# Patient Record
Sex: FEMALE | Race: WHITE | NOT HISPANIC OR LATINO | Employment: PART TIME | URBAN - METROPOLITAN AREA
[De-identification: names, ages, dates, MRNs, and addresses within clinical notes are randomized per-mention and may not be internally consistent; named-entity substitution may affect disease eponyms.]

---

## 2017-12-14 ENCOUNTER — CONVERSION ENCOUNTER (OUTPATIENT)
Dept: MAMMOGRAPHY | Facility: HOSPITAL | Age: 57
End: 2017-12-14

## 2019-02-25 ENCOUNTER — HOSPITAL ENCOUNTER (OUTPATIENT)
Dept: OTHER | Facility: HOSPITAL | Age: 59
Discharge: HOME | End: 2019-02-25

## 2019-02-25 LAB
ALBUMIN SERPL-MCNC: 4.5 G/DL (ref 3.5–5)
ALBUMIN/GLOB SERPL: 1.7 {RATIO} (ref 1.4–2.6)
ALP SERPL-CCNC: 50 U/L (ref 53–141)
ALT SERPL-CCNC: 16 U/L (ref 10–40)
ANION GAP SERPL CALC-SCNC: 14 MMOL/L (ref 8–19)
AST SERPL-CCNC: 17 U/L (ref 15–50)
BASOPHILS # BLD AUTO: 0.04 10*3/UL (ref 0–0.2)
BASOPHILS NFR BLD AUTO: 0.8 % (ref 0–3)
BILIRUB SERPL-MCNC: 0.59 MG/DL (ref 0.2–1.3)
BUN SERPL-MCNC: 13 MG/DL (ref 5–25)
BUN/CREAT SERPL: 18 {RATIO} (ref 6–20)
CALCIUM SERPL-MCNC: 8.9 MG/DL (ref 8.7–10.4)
CHLORIDE SERPL-SCNC: 103 MMOL/L (ref 99–111)
CHOLEST SERPL-MCNC: 193 MG/DL (ref 107–200)
CHOLEST/HDLC SERPL: 2.8 {RATIO} (ref 3–6)
CONV ABS IMM GRAN: 0.01 10*3/UL (ref 0–0.2)
CONV CO2: 28 MMOL/L (ref 22–32)
CONV IMMATURE GRAN: 0.2 % (ref 0–1.8)
CONV TOTAL PROTEIN: 7.2 G/DL (ref 6.3–8.2)
CREAT UR-MCNC: 0.73 MG/DL (ref 0.5–0.9)
DEPRECATED RDW RBC AUTO: 44.3 FL (ref 36.4–46.3)
EOSINOPHIL # BLD AUTO: 0.13 10*3/UL (ref 0–0.7)
EOSINOPHIL # BLD AUTO: 2.5 % (ref 0–7)
ERYTHROCYTE [DISTWIDTH] IN BLOOD BY AUTOMATED COUNT: 12.8 % (ref 11.7–14.4)
EST. AVERAGE GLUCOSE BLD GHB EST-MCNC: 111 MG/DL
GFR SERPLBLD BASED ON 1.73 SQ M-ARVRAT: >60 ML/MIN/{1.73_M2}
GLOBULIN UR ELPH-MCNC: 2.7 G/DL (ref 2–3.5)
GLUCOSE SERPL-MCNC: 100 MG/DL (ref 65–99)
HBA1C MFR BLD: 14 G/DL (ref 12–16)
HBA1C MFR BLD: 5.5 % (ref 3.5–5.7)
HCT VFR BLD AUTO: 41.5 % (ref 37–47)
HDLC SERPL-MCNC: 69 MG/DL (ref 40–60)
LDLC SERPL CALC-MCNC: 110 MG/DL (ref 70–100)
LYMPHOCYTES # BLD AUTO: 1.77 10*3/UL (ref 1–5)
MCH RBC QN AUTO: 31.7 PG (ref 27–31)
MCHC RBC AUTO-ENTMCNC: 33.7 G/DL (ref 33–37)
MCV RBC AUTO: 93.9 FL (ref 81–99)
MONOCYTES # BLD AUTO: 0.49 10*3/UL (ref 0.2–1.2)
MONOCYTES NFR BLD AUTO: 9.4 % (ref 3–10)
NEUTROPHILS # BLD AUTO: 2.75 10*3/UL (ref 2–8)
NEUTROPHILS NFR BLD AUTO: 53 % (ref 30–85)
NRBC CBCN: 0 % (ref 0–0.7)
OSMOLALITY SERPL CALC.SUM OF ELEC: 292 MOSM/KG (ref 273–304)
PLATELET # BLD AUTO: 202 10*3/UL (ref 130–400)
PMV BLD AUTO: 11 FL (ref 9.4–12.3)
POTASSIUM SERPL-SCNC: 4.1 MMOL/L (ref 3.5–5.3)
RBC # BLD AUTO: 4.42 10*6/UL (ref 4.2–5.4)
SODIUM SERPL-SCNC: 141 MMOL/L (ref 135–147)
TRIGL SERPL-MCNC: 72 MG/DL (ref 40–150)
TSH SERPL-ACNC: 1.04 M[IU]/L (ref 0.27–4.2)
VARIANT LYMPHS NFR BLD MANUAL: 34.1 % (ref 20–45)
VLDLC SERPL-MCNC: 14 MG/DL (ref 5–37)
WBC # BLD AUTO: 5.19 10*3/UL (ref 4.8–10.8)

## 2019-04-24 ENCOUNTER — HOSPITAL ENCOUNTER (OUTPATIENT)
Dept: MAMMOGRAPHY | Facility: HOSPITAL | Age: 59
Discharge: HOME | End: 2019-04-24
Attending: OBSTETRICS & GYNECOLOGY

## 2020-02-29 ENCOUNTER — HOSPITAL ENCOUNTER (OUTPATIENT)
Dept: OTHER | Facility: HOSPITAL | Age: 60
Discharge: HOME | End: 2020-02-29

## 2020-02-29 LAB
ALBUMIN SERPL-MCNC: 4.6 G/DL (ref 3.5–5)
ALBUMIN/GLOB SERPL: 1.6 {RATIO} (ref 1.4–2.6)
ALP SERPL-CCNC: 50 U/L (ref 43–160)
ALT SERPL-CCNC: 16 U/L (ref 10–40)
ANION GAP SERPL CALC-SCNC: 18 MMOL/L (ref 8–19)
AST SERPL-CCNC: 17 U/L (ref 15–50)
BASOPHILS # BLD AUTO: 0.04 10*3/UL (ref 0–0.2)
BASOPHILS NFR BLD AUTO: 0.7 % (ref 0–3)
BILIRUB SERPL-MCNC: 0.33 MG/DL (ref 0.2–1.3)
BUN SERPL-MCNC: 16 MG/DL (ref 5–25)
BUN/CREAT SERPL: 23 {RATIO} (ref 6–20)
CALCIUM SERPL-MCNC: 9 MG/DL (ref 8.7–10.4)
CHLORIDE SERPL-SCNC: 102 MMOL/L (ref 99–111)
CHOLEST SERPL-MCNC: 202 MG/DL (ref 107–200)
CHOLEST/HDLC SERPL: 3 {RATIO} (ref 3–6)
CONV ABS IMM GRAN: 0.02 10*3/UL (ref 0–0.2)
CONV CO2: 24 MMOL/L (ref 22–32)
CONV IMMATURE GRAN: 0.3 % (ref 0–1.8)
CONV TOTAL PROTEIN: 7.5 G/DL (ref 6.3–8.2)
CREAT UR-MCNC: 0.7 MG/DL (ref 0.5–0.9)
DEPRECATED RDW RBC AUTO: 44.2 FL (ref 36.4–46.3)
EOSINOPHIL # BLD AUTO: 0.18 10*3/UL (ref 0–0.7)
EOSINOPHIL # BLD AUTO: 3.1 % (ref 0–7)
ERYTHROCYTE [DISTWIDTH] IN BLOOD BY AUTOMATED COUNT: 12.9 % (ref 11.7–14.4)
GFR SERPLBLD BASED ON 1.73 SQ M-ARVRAT: >60 ML/MIN/{1.73_M2}
GLOBULIN UR ELPH-MCNC: 2.9 G/DL (ref 2–3.5)
GLUCOSE SERPL-MCNC: 101 MG/DL (ref 65–99)
HCT VFR BLD AUTO: 39.8 % (ref 37–47)
HDLC SERPL-MCNC: 68 MG/DL (ref 40–60)
HGB BLD-MCNC: 13.3 G/DL (ref 12–16)
LDLC SERPL CALC-MCNC: 117 MG/DL (ref 70–100)
LYMPHOCYTES # BLD AUTO: 1.94 10*3/UL (ref 1–5)
LYMPHOCYTES NFR BLD AUTO: 33.2 % (ref 20–45)
MCH RBC QN AUTO: 31.3 PG (ref 27–31)
MCHC RBC AUTO-ENTMCNC: 33.4 G/DL (ref 33–37)
MCV RBC AUTO: 93.6 FL (ref 81–99)
MONOCYTES # BLD AUTO: 0.65 10*3/UL (ref 0.2–1.2)
MONOCYTES NFR BLD AUTO: 11.1 % (ref 3–10)
NEUTROPHILS # BLD AUTO: 3.02 10*3/UL (ref 2–8)
NEUTROPHILS NFR BLD AUTO: 51.6 % (ref 30–85)
NRBC CBCN: 0 % (ref 0–0.7)
OSMOLALITY SERPL CALC.SUM OF ELEC: 291 MOSM/KG (ref 273–304)
PLATELET # BLD AUTO: 215 10*3/UL (ref 130–400)
PMV BLD AUTO: 10.8 FL (ref 9.4–12.3)
POTASSIUM SERPL-SCNC: 4 MMOL/L (ref 3.5–5.3)
RBC # BLD AUTO: 4.25 10*6/UL (ref 4.2–5.4)
SODIUM SERPL-SCNC: 140 MMOL/L (ref 135–147)
TRIGL SERPL-MCNC: 84 MG/DL (ref 40–150)
TSH SERPL-ACNC: 1.84 M[IU]/L (ref 0.27–4.2)
VLDLC SERPL-MCNC: 17 MG/DL (ref 5–37)
WBC # BLD AUTO: 5.85 10*3/UL (ref 4.8–10.8)

## 2020-03-02 LAB
EST. AVERAGE GLUCOSE BLD GHB EST-MCNC: 120 MG/DL
HBA1C MFR BLD: 5.8 % (ref 3.5–5.7)

## 2020-03-27 ENCOUNTER — HOSPITAL ENCOUNTER (OUTPATIENT)
Dept: LAB | Facility: HOSPITAL | Age: 60
Discharge: HOME | End: 2020-03-27
Attending: INTERNAL MEDICINE

## 2020-03-27 LAB — 25(OH)D3 SERPL-MCNC: 26.8 NG/ML (ref 30–100)

## 2020-04-29 ENCOUNTER — HOSPITAL ENCOUNTER (OUTPATIENT)
Dept: OTHER | Facility: HOSPITAL | Age: 60
Discharge: HOME | End: 2020-04-29

## 2020-04-29 ENCOUNTER — OFFICE VISIT CONVERTED (OUTPATIENT)
Dept: OTHER | Facility: HOSPITAL | Age: 60
End: 2020-04-29
Attending: NURSE PRACTITIONER

## 2020-04-30 LAB — SARS-COV-2 RNA SPEC QL NAA+PROBE: NOT DETECTED

## 2020-05-01 ENCOUNTER — HOSPITAL ENCOUNTER (OUTPATIENT)
Dept: OTHER | Facility: HOSPITAL | Age: 60
Discharge: HOME | End: 2020-05-01

## 2020-05-01 ENCOUNTER — CONVERSION ENCOUNTER (OUTPATIENT)
Dept: OTHER | Facility: HOSPITAL | Age: 60
End: 2020-05-01

## 2020-05-04 LAB — SARS-COV-2 RNA SPEC QL NAA+PROBE: NOT DETECTED

## 2020-06-30 ENCOUNTER — OFFICE VISIT CONVERTED (OUTPATIENT)
Dept: ORTHOPEDIC SURGERY | Facility: CLINIC | Age: 60
End: 2020-06-30
Attending: ORTHOPAEDIC SURGERY

## 2020-08-01 ENCOUNTER — HOSPITAL ENCOUNTER (OUTPATIENT)
Dept: MAMMOGRAPHY | Facility: HOSPITAL | Age: 60
Discharge: HOME | End: 2020-08-01
Attending: OBSTETRICS & GYNECOLOGY

## 2020-12-30 ENCOUNTER — HOSPITAL ENCOUNTER (OUTPATIENT)
Dept: OTHER | Facility: HOSPITAL | Age: 60
Discharge: HOME | End: 2020-12-30
Attending: INTERNAL MEDICINE

## 2021-01-26 ENCOUNTER — HOSPITAL ENCOUNTER (OUTPATIENT)
Dept: OTHER | Facility: HOSPITAL | Age: 61
Discharge: HOME | End: 2021-01-26
Attending: INTERNAL MEDICINE

## 2021-02-23 ENCOUNTER — HOSPITAL ENCOUNTER (OUTPATIENT)
Dept: OTHER | Facility: HOSPITAL | Age: 61
Discharge: HOME | End: 2021-02-23

## 2021-02-23 LAB
ALBUMIN SERPL-MCNC: 4.4 G/DL (ref 3.5–5)
ALBUMIN/GLOB SERPL: 1.5 {RATIO} (ref 1.4–2.6)
ALP SERPL-CCNC: 58 U/L (ref 43–160)
ALT SERPL-CCNC: 23 U/L (ref 10–40)
ANION GAP SERPL CALC-SCNC: 15 MMOL/L (ref 8–19)
AST SERPL-CCNC: 20 U/L (ref 15–50)
BASOPHILS # BLD AUTO: 0.06 10*3/UL (ref 0–0.2)
BASOPHILS NFR BLD AUTO: 0.9 % (ref 0–3)
BILIRUB SERPL-MCNC: 0.63 MG/DL (ref 0.2–1.3)
BUN SERPL-MCNC: 13 MG/DL (ref 5–25)
BUN/CREAT SERPL: 14 {RATIO} (ref 6–20)
CALCIUM SERPL-MCNC: 9 MG/DL (ref 8.7–10.4)
CHLORIDE SERPL-SCNC: 105 MMOL/L (ref 99–111)
CHOLEST SERPL-MCNC: 229 MG/DL (ref 107–200)
CHOLEST/HDLC SERPL: 3.4 {RATIO} (ref 3–6)
CONV ABS IMM GRAN: 0.01 10*3/UL (ref 0–0.2)
CONV CO2: 26 MMOL/L (ref 22–32)
CONV IMMATURE GRAN: 0.2 % (ref 0–1.8)
CONV TOTAL PROTEIN: 7.3 G/DL (ref 6.3–8.2)
CREAT UR-MCNC: 0.9 MG/DL (ref 0.5–0.9)
DEPRECATED RDW RBC AUTO: 44.4 FL (ref 36.4–46.3)
EOSINOPHIL # BLD AUTO: 0.2 10*3/UL (ref 0–0.7)
EOSINOPHIL # BLD AUTO: 3.1 % (ref 0–7)
ERYTHROCYTE [DISTWIDTH] IN BLOOD BY AUTOMATED COUNT: 12.9 % (ref 11.7–14.4)
EST. AVERAGE GLUCOSE BLD GHB EST-MCNC: 117 MG/DL
GFR SERPLBLD BASED ON 1.73 SQ M-ARVRAT: >60 ML/MIN/{1.73_M2}
GLOBULIN UR ELPH-MCNC: 2.9 G/DL (ref 2–3.5)
GLUCOSE SERPL-MCNC: 108 MG/DL (ref 65–99)
HBA1C MFR BLD: 5.7 % (ref 3.5–5.7)
HCT VFR BLD AUTO: 43.7 % (ref 37–47)
HDLC SERPL-MCNC: 67 MG/DL (ref 40–60)
HGB BLD-MCNC: 14.3 G/DL (ref 12–16)
LDLC SERPL CALC-MCNC: 143 MG/DL (ref 70–100)
LYMPHOCYTES # BLD AUTO: 2.06 10*3/UL (ref 1–5)
LYMPHOCYTES NFR BLD AUTO: 31.6 % (ref 20–45)
MCH RBC QN AUTO: 30.5 PG (ref 27–31)
MCHC RBC AUTO-ENTMCNC: 32.7 G/DL (ref 33–37)
MCV RBC AUTO: 93.2 FL (ref 81–99)
MONOCYTES # BLD AUTO: 0.68 10*3/UL (ref 0.2–1.2)
MONOCYTES NFR BLD AUTO: 10.4 % (ref 3–10)
NEUTROPHILS # BLD AUTO: 3.51 10*3/UL (ref 2–8)
NEUTROPHILS NFR BLD AUTO: 53.8 % (ref 30–85)
NRBC CBCN: 0 % (ref 0–0.7)
OSMOLALITY SERPL CALC.SUM OF ELEC: 295 MOSM/KG (ref 273–304)
PLATELET # BLD AUTO: 224 10*3/UL (ref 130–400)
PMV BLD AUTO: 10.9 FL (ref 9.4–12.3)
POTASSIUM SERPL-SCNC: 4.4 MMOL/L (ref 3.5–5.3)
RBC # BLD AUTO: 4.69 10*6/UL (ref 4.2–5.4)
SODIUM SERPL-SCNC: 142 MMOL/L (ref 135–147)
TRIGL SERPL-MCNC: 94 MG/DL (ref 40–150)
TSH SERPL-ACNC: 1.2 M[IU]/L (ref 0.27–4.2)
VLDLC SERPL-MCNC: 19 MG/DL (ref 5–37)
WBC # BLD AUTO: 6.52 10*3/UL (ref 4.8–10.8)

## 2021-03-11 ENCOUNTER — TELEMEDICINE (OUTPATIENT)
Dept: FAMILY MEDICINE CLINIC | Facility: TELEHEALTH | Age: 61
End: 2021-03-11

## 2021-03-11 VITALS — TEMPERATURE: 97.2 F

## 2021-03-11 DIAGNOSIS — R11.2 NAUSEA AND VOMITING, INTRACTABILITY OF VOMITING NOT SPECIFIED, UNSPECIFIED VOMITING TYPE: Primary | ICD-10-CM

## 2021-03-11 DIAGNOSIS — R50.9 FEVER, UNSPECIFIED FEVER CAUSE: ICD-10-CM

## 2021-03-11 PROCEDURE — 99422 OL DIG E/M SVC 11-20 MIN: CPT | Performed by: NURSE PRACTITIONER

## 2021-03-11 NOTE — PROGRESS NOTES
Marisa Portillo  1960 03/11/2021    EMPLOYEES: PLEASE EMAIL THIS TO: [email or web address]    Employee Health,      1. Does this employee have a negative COVID-19 (PCR) test following onset of the most   recent symptoms?  yes    Date of test if available:   3/9/2021    2. Does this employee have an alternate and independent diagnosis, other than COVID-19, that may   account for the presenting symptoms?  yes    3. In your opinion, is the employee at low risk of spreading the illness and cleared to return to work   in a healthcare setting? yes      Vivi Varela, CALOS  14:55 EST  03/11/21

## 2021-03-11 NOTE — PROGRESS NOTES
Marisa Portillo    1960  [id number]    I have reviewed the e-Visit questionnaire and patient's answers, my assessment and plan are as follows:      HPI  Marisa Portillo is a 61 y.o. [employer] employee who has been ill with fever, headache, nausea, diarrhea and body aches since Sunday. She tested negative for Covid-19 on 3/9/2021. She was not exposed to covid 19 and she had her second covid  vaccine the end of January. She has been fever free for 24 hours without the use of fever reducing medications and she is without symptoms today. Yesterday she states she began feeling normal and has been eating and drinking as normal. She does report feeling weak due to the illness.     Review of Systems - General ROS: negative for - chills, fever, body aches or headache  Positive for: mild weakness  GI ROS: negative for nausea, vomiting, diarrhea       Diagnoses and all orders for this visit:    1. Nausea and vomiting, intractability of vomiting not specified, unspecified vomiting type (Primary)    2. Fever, unspecified fever cause    recommend rest and decaffeinated fluids.   Follow up prn for reoccurring signs or symptoms.      Any medications prescribed have been sent electronically to   [facility] - [place], [place] - [address] [phone]  - [phone]   [address] [address]  Phone: [phone] Fax: [phone]      Time Documentation  Counseled patient  Counseling topics: treatment options, follow up plan and return instructions  Total encounter time: counseling time more than 50% of visit: 15 minutes        Vivi Varela, CALOS  03/11/21  14:56 EST

## 2021-03-11 NOTE — PATIENT INSTRUCTIONS
Nausea, Adult  Nausea is feeling sick to your stomach or feeling that you are about to throw up (vomit). Feeling sick to your stomach is usually not serious, but it may be an early sign of a more serious medical problem.  As you feel sicker to your stomach, you may throw up. If you throw up, or if you are not able to drink enough fluids, there is a risk that you may lose too much water in your body (get dehydrated). If you lose too much water in your body, you may:  · Feel tired.  · Feel thirsty.  · Have a dry mouth.  · Have cracked lips.  · Go pee (urinate) less often.  Older adults and people who have other diseases or a weak body defense system (immune system) have a higher risk of losing too much water in the body.  The main goals of treating this condition are:  · To relieve your nausea.  · To ensure your nausea occurs less often.  · To prevent throwing up and losing too much fluid.  Follow these instructions at home:  Watch your symptoms for any changes. Tell your doctor about them. Follow these instructions as told by your doctor.  Eating and drinking         · Take an ORS (oral rehydration solution). This is a drink that is sold at pharmacies and stores.  · Drink clear fluids in small amounts as you are able. These include:  ? Water.  ? Ice chips.  ? Fruit juice that has water added (diluted fruit juice).  ? Low-calorie sports drinks.  · Eat bland, easy-to-digest foods in small amounts as you are able, such as:  ? Bananas.  ? Applesauce.  ? Rice.  ? Low-fat (lean) meats.  ? Toast.  ? Crackers.  · Avoid drinking fluids that have a lot of sugar or caffeine in them. This includes energy drinks, sports drinks, and soda.  · Avoid alcohol.  · Avoid spicy or fatty foods.  General instructions  · Take over-the-counter and prescription medicines only as told by your doctor.  · Rest at home while you get better.  · Drink enough fluid to keep your pee (urine) pale yellow.  · Take slow and deep breaths when you feel  sick to your stomach.  · Avoid food or things that have strong smells.  · Wash your hands often with soap and water. If you cannot use soap and water, use hand .  · Make sure that all people in your home wash their hands well and often.  · Keep all follow-up visits as told by your doctor. This is important.  Contact a doctor if:  · You feel sicker to your stomach.  · You feel sick to your stomach for more than 2 days.  · You throw up.  · You are not able to drink fluids without throwing up.  · You have new symptoms.  · You have a fever.  · You have a headache.  · You have muscle cramps.  · You have a rash.  · You have pain while peeing.  · You feel light-headed or dizzy.  Get help right away if:  · You have pain in your chest, neck, arm, or jaw.  · You feel very weak or you pass out (faint).  · You have throw up that is bright red or looks like coffee grounds.  · You have bloody or black poop (stools) or poop that looks like tar.  · You have a very bad headache, a stiff neck, or both.  · You have very bad pain, cramping, or bloating in your belly (abdomen).  · You have trouble breathing or you are breathing very quickly.  · Your heart is beating very quickly.  · Your skin feels cold and clammy.  · You feel confused.  · You have signs of losing too much water in your body, such as:  ? Dark pee, very little pee, or no pee.  ? Cracked lips.  ? Dry mouth.  ? Sunken eyes.  ? Sleepiness.  ? Weakness.  These symptoms may be an emergency. Do not wait to see if the symptoms will go away. Get medical help right away. Call your local emergency services (911 in the [place] Do not drive yourself to the hospital.  Summary  · Nausea is feeling sick to your stomach or feeling that you are about to throw up (vomit).  · If you throw up, or if you are not able to drink enough fluids, there is a risk that you may lose too much water in your body (get dehydrated).  · Eat and drink what your doctor tells you. Take  over-the-counter and prescription medicines only as told by your doctor.  · Contact a doctor right away if your symptoms get worse or you have new symptoms.  · Keep all follow-up visits as told by your doctor. This is important.  This information is not intended to replace advice given to you by your health care provider. Make sure you discuss any questions you have with your health care provider.  Document Revised: 05/28/2019 Document Reviewed: 05/28/2019  [employer] Patient Education © 2020 [employer]  Fever, Adult         A fever is an increase in your body's temperature. It often means a temperature of 100.4°F (38°C) or higher. Brief mild or moderate fevers often have no long-term effects. They often do not need treatment. Moderate or high fevers may make you feel uncomfortable. Sometimes, they can be a sign of a serious illness or disease. A fever that keeps coming back or that lasts a long time may cause you to lose water in your body (get dehydrated).  You can take your temperature with a thermometer to see if you have a fever. Temperature can change with:  · Age.  · Time of day.  · Where the thermometer is put in the body. Readings may vary when the thermometer is put:  ? In the mouth (oral).  ? In the butt (rectal).  ? In the ear (tympanic).  ? Under the arm (axillary).  ? On the forehead (temporal).  Follow these instructions at home:  Medicines  · Take over-the-counter and prescription medicines only as told by your doctor. Follow the dosing instructions carefully.  · If you were prescribed an antibiotic medicine, take it as told by your doctor. Do not stop taking it even if you start to feel better.  General instructions  · Watch for any changes in your symptoms. Tell your doctor about them.  · Rest as needed.  · Drink enough fluid to keep your pee (urine) pale yellow.  · Sponge yourself or bathe with room-temperature water as needed. This helps to lower your body temperature. Do not use ice  water.  · Do not use too many blankets or wear clothes that are too heavy.  · If your fever was caused by an infection that spreads from person to person (is contagious), such as a cold or the flu:  ? You should stay home from work and public places for at least 24 hours after your fever is gone.  ? Your fever should be gone for at least 24 hours without the need to use medicines.  Contact a doctor if:  · You throw up (vomit).  · You cannot eat or drink without throwing up.  · You have watery poop (diarrhea).  · It hurts when you pee.  · Your symptoms do not get better with treatment.  · You have new symptoms.  · You feel very weak.  Get help right away if:  · You are short of breath or have trouble breathing.  · You are dizzy or you pass out (faint).  · You feel mixed up (confused).  · You have signs of not having enough water in your body, such as:  ? Dark pee, very little pee, or no pee.  ? Cracked lips.  ? Dry mouth.  ? Sunken eyes.  ? Sleepiness.  ? Weakness.  · You have very bad pain in your belly (abdomen).  · You keep throwing up or having watery poop.  · You have a rash on your skin.  · Your symptoms get worse all of a sudden.  Summary  · A fever is an increase in your body's temperature. It often means a temperature of 100.4°F (38°C) or higher.  · Watch for any changes in your symptoms. Tell your doctor about them.  · Take all medicines only as told by your doctor.  · Do not go to work or other public places if your fever was caused by an illness that can spread to other people.  · Get help right away if you have signs that you do not have enough water in your body.  This information is not intended to replace advice given to you by your health care provider. Make sure you discuss any questions you have with your health care provider.  Document Revised: 06/03/2019 Document Reviewed: 06/03/2019  [employer] Patient Education © 2020 [employer]    Fever, Adult         A fever is an increase in your body's  temperature. It often means a temperature of 100.4°F (38°C) or higher. Brief mild or moderate fevers often have no long-term effects. They often do not need treatment. Moderate or high fevers may make you feel uncomfortable. Sometimes, they can be a sign of a serious illness or disease. A fever that keeps coming back or that lasts a long time may cause you to lose water in your body (get dehydrated).  You can take your temperature with a thermometer to see if you have a fever. Temperature can change with:  · Age.  · Time of day.  · Where the thermometer is put in the body. Readings may vary when the thermometer is put:  ? In the mouth (oral).  ? In the butt (rectal).  ? In the ear (tympanic).  ? Under the arm (axillary).  ? On the forehead (temporal).  Follow these instructions at home:  Medicines  · Take over-the-counter and prescription medicines only as told by your doctor. Follow the dosing instructions carefully.  · If you were prescribed an antibiotic medicine, take it as told by your doctor. Do not stop taking it even if you start to feel better.  General instructions  · Watch for any changes in your symptoms. Tell your doctor about them.  · Rest as needed.  · Drink enough fluid to keep your pee (urine) pale yellow.  · Sponge yourself or bathe with room-temperature water as needed. This helps to lower your body temperature. Do not use ice water.  · Do not use too many blankets or wear clothes that are too heavy.  · If your fever was caused by an infection that spreads from person to person (is contagious), such as a cold or the flu:  ? You should stay home from work and public places for at least 24 hours after your fever is gone.  ? Your fever should be gone for at least 24 hours without the need to use medicines.  Contact a doctor if:  · You throw up (vomit).  · You cannot eat or drink without throwing up.  · You have watery poop (diarrhea).  · It hurts when you pee.  · Your symptoms do not get better with  treatment.  · You have new symptoms.  · You feel very weak.  Get help right away if:  · You are short of breath or have trouble breathing.  · You are dizzy or you pass out (faint).  · You feel mixed up (confused).  · You have signs of not having enough water in your body, such as:  ? Dark pee, very little pee, or no pee.  ? Cracked lips.  ? Dry mouth.  ? Sunken eyes.  ? Sleepiness.  ? Weakness.  · You have very bad pain in your belly (abdomen).  · You keep throwing up or having watery poop.  · You have a rash on your skin.  · Your symptoms get worse all of a sudden.  Summary  · A fever is an increase in your body's temperature. It often means a temperature of 100.4°F (38°C) or higher.  · Watch for any changes in your symptoms. Tell your doctor about them.  · Take all medicines only as told by your doctor.  · Do not go to work or other public places if your fever was caused by an illness that can spread to other people.  · Get help right away if you have signs that you do not have enough water in your body.  This information is not intended to replace advice given to you by your health care provider. Make sure you discuss any questions you have with your health care provider.  Document Revised: 06/03/2019 Document Reviewed: 06/03/2019  [employer] Patient Education © 2020 [employer]    Nausea, Adult  Nausea is feeling sick to your stomach or feeling that you are about to throw up (vomit). Feeling sick to your stomach is usually not serious, but it may be an early sign of a more serious medical problem.  As you feel sicker to your stomach, you may throw up. If you throw up, or if you are not able to drink enough fluids, there is a risk that you may lose too much water in your body (get dehydrated). If you lose too much water in your body, you may:  · Feel tired.  · Feel thirsty.  · Have a dry mouth.  · Have cracked lips.  · Go pee (urinate) less often.  Older adults and people who have other diseases or a weak body  defense system (immune system) have a higher risk of losing too much water in the body.  The main goals of treating this condition are:  · To relieve your nausea.  · To ensure your nausea occurs less often.  · To prevent throwing up and losing too much fluid.  Follow these instructions at home:  Watch your symptoms for any changes. Tell your doctor about them. Follow these instructions as told by your doctor.  Eating and drinking         · Take an ORS (oral rehydration solution). This is a drink that is sold at pharmacies and stores.  · Drink clear fluids in small amounts as you are able. These include:  ? Water.  ? Ice chips.  ? Fruit juice that has water added (diluted fruit juice).  ? Low-calorie sports drinks.  · Eat bland, easy-to-digest foods in small amounts as you are able, such as:  ? Bananas.  ? Applesauce.  ? Rice.  ? Low-fat (lean) meats.  ? Toast.  ? Crackers.  · Avoid drinking fluids that have a lot of sugar or caffeine in them. This includes energy drinks, sports drinks, and soda.  · Avoid alcohol.  · Avoid spicy or fatty foods.  General instructions  · Take over-the-counter and prescription medicines only as told by your doctor.  · Rest at home while you get better.  · Drink enough fluid to keep your pee (urine) pale yellow.  · Take slow and deep breaths when you feel sick to your stomach.  · Avoid food or things that have strong smells.  · Wash your hands often with soap and water. If you cannot use soap and water, use hand .  · Make sure that all people in your home wash their hands well and often.  · Keep all follow-up visits as told by your doctor. This is important.  Contact a doctor if:  · You feel sicker to your stomach.  · You feel sick to your stomach for more than 2 days.  · You throw up.  · You are not able to drink fluids without throwing up.  · You have new symptoms.  · You have a fever.  · You have a headache.  · You have muscle cramps.  · You have a rash.  · You have pain  while peeing.  · You feel light-headed or dizzy.  Get help right away if:  · You have pain in your chest, neck, arm, or jaw.  · You feel very weak or you pass out (faint).  · You have throw up that is bright red or looks like coffee grounds.  · You have bloody or black poop (stools) or poop that looks like tar.  · You have a very bad headache, a stiff neck, or both.  · You have very bad pain, cramping, or bloating in your belly (abdomen).  · You have trouble breathing or you are breathing very quickly.  · Your heart is beating very quickly.  · Your skin feels cold and clammy.  · You feel confused.  · You have signs of losing too much water in your body, such as:  ? Dark pee, very little pee, or no pee.  ? Cracked lips.  ? Dry mouth.  ? Sunken eyes.  ? Sleepiness.  ? Weakness.  These symptoms may be an emergency. Do not wait to see if the symptoms will go away. Get medical help right away. Call your local emergency services (911 in the [place] Do not drive yourself to the hospital.  Summary  · Nausea is feeling sick to your stomach or feeling that you are about to throw up (vomit).  · If you throw up, or if you are not able to drink enough fluids, there is a risk that you may lose too much water in your body (get dehydrated).  · Eat and drink what your doctor tells you. Take over-the-counter and prescription medicines only as told by your doctor.  · Contact a doctor right away if your symptoms get worse or you have new symptoms.  · Keep all follow-up visits as told by your doctor. This is important.  This information is not intended to replace advice given to you by your health care provider. Make sure you discuss any questions you have with your health care provider.  Document Revised: 05/28/2019 Document Reviewed: 05/28/2019  [employer] Patient Education © 2020 [employer]

## 2021-05-10 NOTE — H&P
History and Physical      Patient Name: Marisa Portillo   Patient ID: [id number]   Sex: Female   YOB: 1960    Primary Care Provider: Judah Johnson MD    Visit Date: April 29, 2020    Provider: CALOS Parra   Location[facility]   Location Address: [address] [place] [address]  [id number]   Location Phone: [phone]          Chief Complaint  · Evaluation for Respiratory Virus      History Of Present Illness  Marisa Portillo is a 60 year old /White female who presents today to the clinic for evaluation of a respiratory virus.   Date of Onset: 04/25/2020   What Symptoms: chills, headache, nausea, sore throat, vomiting, and Sinus pain   Quality of Symptoms: Sinus pressure in the frontal sinuses with headache sore throat drainage and nausea vomiting since Saturday. Patient states she improved on Sunday went to work but has since worsened feeling fatigued   Anything make it worse or better: Nothing   Severity of Symptoms: Moderate   Any known Exposure to COVID-19: Patient is a  at [employer], and has been swabbing covid patients.      Patient presents the respiratory virus evaluation center with chills, nausea, headache, sinus pressure, sore throat, drainage, nausea, vomiting, body aches since Saturday.  Patient states she improved on Sunday and when she returned to work.  Patient states since then she has felt more fatigued.  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       Past Medical History  Left 3rd Finger crush injury; Seasonal allergies         Past Surgical History  Appendectomy; Colonoscopy; Hysterectomy         Medication List  aspirin oral; Tylenol Extra Strength 500 mg oral tablet; Vivelle-Dot transdermal         Allergy List  NO KNOWN DRUG ALLERGIES         Family Medical History  Stroke; Heart Disease; Cancer, Unspecified; Diabetes, unspecified type; Family history of certain  chronic disabling diseases; arthritis; Osteoporosis         Social History  Alcohol Use (Current some day); lives with spouse; .; Recreational Drug Use (Never); Tobacco (Never)         Review of Systems  · Constitutional  o Admits  o : chills, fatigue  o Denies  o : fever, weight gain, weight loss  · HENT  o Admits  o : headaches, sinus pain, nasal congestion, nasal discharge, sore throat  o Denies  o : vertigo, recent head injury, ear pain, ear fullness  · Respiratory  o Denies  o : cough, asthma  · Gastrointestinal  o Admits  o : nausea, vomiting  o Denies  o : diarrhea, constipation, abdominal pain  · Integument  o Denies  o : rash  · Neurologic  o Admits  o : headache      Vitals  Date Time BP Position Site L\R Cuff Size HR RR TEMP (F) WT  HT  BMI kg/m2 BSA m2 O2 Sat HC       04/29/2020 01:55 PM      80 - R  98.8     94 %          Physical Examination  · Constitutional  o Appearance  o : no acute distress, well-nourished, appears ill  · Head and Face  o Head  o :   § Inspection  § : atraumatic, normocephalic  · Eyes  o Eyes  o : extraocular movements intact, no scleral icterus, no conjunctival injection  · Ears, Nose, Mouth and Throat  o Ears  o :   § External Ears  § : normal  § Otoscopic Examination  § : normal tympanic membrane exam  o Nose  o :   § Intranasal Exam  § : Frontal sinus tenderness with palpation  o Oral Cavity  o :   § Oral Mucosa  § : moist mucous membranes  o Throat  o :   § Oropharynx  § : oropharynx inflammation present, tonsils within normal limits  · Respiratory  o Respiratory Effort  o : breathing comfortably, symmetric chest rise  o Auscultation of Lungs  o : clear to asculatation bilaterally, no wheezes, rales, or rhonchii  · Cardiovascular  o Heart  o :   § Auscultation of Heart  § : regular rate and rhythm, no murmurs, rubs, or gallops  o Peripheral Vascular System  o :   § Extremities  § : no edema  · Lymphatic  o Neck  o : no lymphadenopathy present  o Supraclavicular  Nodes  o : no supraclavicular nodes  · Skin and Subcutaneous Tissue  o General Inspection  o : normal inspection  · Neurologic  o Mental Status Examination  o :   § Orientation  § : grossly oriented to person, place and time  o Gait and Station  o :   § Gait Screening  § : normal gait  · Psychiatric  o General  o : normal mood and affect              Assessment  · Body aches     780.96/R52  · Chills     780.64/R68.83  · Headache     784.0/R51  · Nausea     787.02/R11.0  Educated patient that I cannot exclude Covid-19 as a diagnosis. Patient was in tier 1 for covid testing and was tested today. Patient will be notified of results in 24 hrs. Educated patient on self quarantine and self-monitoring. Patient given work note and education provided by the CDC.  · Sinusitis     473.9/J32.9  Patient prescribed Omnicef and Mucinex at this time for sinus infection. Educated on supportive care to include rest, fluids, Tylenol and Motrin as needed. Educated patient to call or return to clinic with any new or worsening symptoms. Patient reports that she cannot take Augmentin due to diarrhea.  · Vomiting     787.03/R11.10      Plan  · Orders  o [facility] NCOV2 (send-out) (20661) - 780.96/R52, 784.0/R51, 787.02/R11.0, 787.03/R11.10 - 04/29/2020  · Medications  o cefdinir 300 mg oral capsule   SIG: take 1 capsule (300 mg) by oral route every 12 hours for 10 days   DISP: (20) capsules with 0 refills  Prescribed on 04/29/2020     o Mucinex 600 mg oral tablet extended release 12hr   SIG: take 2 tablets (1,200 mg) by oral route every 12 hours as needed for 5 days   DISP: (20) Tablet extended release 12hrs with 0 refills  Prescribed on 04/29/2020     o Medications have been Reconciled  o Transition of Care or Provider Policy  · Instructions  o Chronic conditions reviewed and taken into consideration for today's treatment plan.   o Plan of Care:   o Patient instructed to seek medical attention urgently for new or worsening  symptoms.  o Patient was educated on their diagnosis, treatment, and medications today.   o Recommend self monitoring. Instructions given.   o Self-monitoring for 14 days. Instructions given.  o Follow-up with PCP in 2-3 days.  o Electronically Identified Patient Education Materials Provided Electronically  · Disposition  o Call or Return if symptoms worsen or persist.  o Meds sent to pharmacy  o As Needed for Follow Up            Electronically Signed by: Cathy Hdz, APRN -Author on April 29, 2020 02:13:43 PM

## 2021-05-10 NOTE — H&P
"   History and Physical      Patient Name: Marisa Portillo   Patient ID: [id number]   Sex: Female   YOB: 1960    Primary Care Provider: Judah Johnson MD   Referring Provider: Bob Kent MD    Visit Date: June 30, 2020    Provider: Bob Kent MD   Location: [facility]   Location Address: [address] [place] [address]  [id number]   Location Phone: [phone]          Chief Complaint  · Bilateral Hip Pain      History Of Present Illness  Marisa Portillo is a 60 year old /White female who presents today to [facility].      right. It has been several years since she seen us last and at that time she had an injection with relief of pain from bursitis. She reports both hips are aching her with radiating pain into the thigh, does not go past the knee. No new injuries. She reports the most pain over the lateral hips. She reports pain at night with sleep. She has been taking Tylenol a few times a day if needed. Overall, she reports healthy.               \">The patient presents today for evaluation of bilateral hip pain, left > right. It has been several years since she seen us last and at that time she had an injection with relief of pain from bursitis. She reports both hips are aching her with radiating pain into the thigh, does not go past the knee. No new injuries. She reports the most pain over the lateral hips. She reports pain at night with sleep. She has been taking Tylenol a few times a day if needed. Overall, she reports healthy.                      Past Medical History  Arthritis; Bladder Disorder; Left 3rd Finger crush injury; Limb Swelling; Seasonal allergies         Past Surgical History  Appendectomy; Colonoscopy; Hysterectomy; Metal implants         Medication List  aspirin oral; cefdinir 300 mg oral capsule; diclofenac sodium 75 mg oral tablet,delayed release (DR/EC); Mucinex 600 mg oral tablet extended release 12hr; Tylenol 325 mg oral capsule; " "Vivelle-Dot transdermal         Allergy List  NO KNOWN DRUG ALLERGIES         Family Medical History  Stroke; Heart Disease; Cancer, Unspecified; Diabetes, unspecified type; Blood Diseases; Family history of certain chronic disabling diseases; arthritis; Osteoporosis; Family history of Arthritis         Social History  Alcohol Use (Current some day); lives with spouse; .; Recreational Drug Use (Never); Tobacco (Never); Working         Review of Systems  · Constitutional  o Denies  o : fever, chills, weight loss  · Cardiovascular  o Denies  o : chest pain, shortness of breath  · Gastrointestinal  o Denies  o : liver disease, heartburn, nausea, blood in stools  · Genitourinary  o Denies  o : painful urination, blood in urine  · Integument  o Denies  o : rash, itching  · Neurologic  o Denies  o : headache, weakness, loss of consciousness  · Musculoskeletal  o Denies  o : painful, swollen joints  · Psychiatric  o Denies  o : drug/alcohol addiction, anxiety, depression      Vitals  Date Time BP Position Site L\R Cuff Size HR RR TEMP (F) WT  HT  BMI kg/m2 BSA m2 O2 Sat        06/30/2020 02:21 PM      77 - R   180lbs 8oz 5'  5\" 30.04 1.94 97 %          Physical Examination  · Constitutional  o Appearance  o : well developed, well-nourished, no obvious deformities present  · Head and Face  o Head  o :   § Inspection  § : normocephalic  o Face  o :   § Inspection  § : no facial lesions  · Eyes  o Conjunctivae  o : conjunctivae normal  o Sclerae  o : sclerae white  · Ears, Nose, Mouth and Throat  o Ears  o :   § External Ears  § : appearance within normal limits  § Hearing  § : intact  o Nose  o :   § External Nose  § : appearance normal  · Neck  o Inspection/Palpation  o : normal appearance  o Range of Motion  o : full range of motion  · Respiratory  o Respiratory Effort  o : breathing unlabored  o Inspection of Chest  o : normal appearance  o Auscultation of Lungs  o : no audible wheezing or " rales  · Cardiovascular  o Heart  o : regular rate  · Gastrointestinal  o Abdominal Examination  o : soft and non-tender  · Skin and Subcutaneous Tissue  o General Inspection  o : intact, no rashes  · Psychiatric  o General  o : Alert and oriented x3  o Judgement and Insight  o : judgment and insight intact  o Mood and Affect  o : mood normal, affect appropriate  · Extremities  o Extremities  o : Ambulates without a limp, full weight-bearing, tender over the lateral hips over the greater trochanters, sensation grossly intact, pulses normal, good ROM  · Injection Note/Aspiration Note  o Site  o : left hip   o Procedure  o : Procedure: After educating the patient, patient gave consent for procedure. After using Chloraprep, the joint space was injected. The patient tolerated the procedure well.   o Medication  o : 80 mg of DepoMedrol with 9cc of 1% Lidocaine  · In Office Procedures  o View  o : AP/LATERAL  o Site  o : bilateral, hip   o Indication  o : Bilateral hip pain  o Study  o : X-rays ordered, taken in the office, and reviewed today.  o Xray  o : Reveals good joint space, no fractures or acute findings   o Comparative Data  o : No comparative data found          Assessment  · Trochanteric Bursitis, bilateral     726.5/M70.60  · Bilateral Pain: Hip     719.45/M25.559      Plan  · Orders  o Depo-Medrol injection 80mg () - 726.5/M70.60 - 06/30/2020   Lot [id number] Exp 06 2021 [employer] Administered by ROSIE Kent MD  o Hip Intra-articular Injection without US Guidance [facility] ([id number]) - 726.5/M70.60 - 06/30/2020   Lot [id number] Exp 07 01 2021 [employer] Administered by ROSIE Kent MD  o Hip (Left) 2 or more views (includes AP Pelvis) [facility] ([id number]) - 719.45/M25.559 - 06/30/2020  o Hip (Right) 2 or more views (includes AP Pelvis) [facility]. ([id number]) - 719.45/M25.559 - 06/30/2020  · Medications  o Medications have been Reconciled  o Transition of Care or Provider Policy  · Instructions  o Reviewed  the patient's Past Medical, Social, and Family history as well as the ROS at today's visit, no changes.  o Call or return if worsening symptoms.  o Exercise handout given.  o X-ray ordered, taken and reviewed at this visit.  o The above service was scribed by Yoli Martin on my behalf and I attest to the accuracy of the note. roya  o Diagnosis and treatment plan discussed. She wished to proceed with a left hip steroid injection, home exercises and anti-inflammatory prescription of Voltaren. Follow-up as needed.   o Electronically Identified Patient Education Materials Provided Electronically            Electronically Signed by: Yoli Martin-, Other -Author on July 2, 2020 11:21:14 AM  Electronically Co-signed by: Bob Kent MD -Reviewer on July 7, 2020 08:59:10 AM

## 2021-05-15 VITALS — OXYGEN SATURATION: 94 % | TEMPERATURE: 98.8 F | HEART RATE: 80 BPM

## 2021-05-15 VITALS — BODY MASS INDEX: 30.07 KG/M2 | OXYGEN SATURATION: 97 % | HEART RATE: 77 BPM | HEIGHT: 65 IN | WEIGHT: 180.5 LBS

## 2021-05-15 VITALS — TEMPERATURE: 98.7 F

## 2021-07-26 PROBLEM — N32.9 BLADDER DISORDER: Status: ACTIVE | Noted: 2021-07-26

## 2021-07-26 PROBLEM — M19.90 ARTHRITIS: Status: ACTIVE | Noted: 2021-07-26

## 2021-07-26 PROBLEM — E55.9 VITAMIN D DEFICIENCY: Status: ACTIVE | Noted: 2021-07-26

## 2021-07-26 PROBLEM — M79.89 LIMB SWELLING: Status: ACTIVE | Noted: 2021-07-26

## 2021-07-26 PROBLEM — S06.0XAA CONCUSSION: Status: ACTIVE | Noted: 2019-07-20

## 2021-07-26 PROBLEM — J30.2 SEASONAL ALLERGIC RHINITIS: Status: ACTIVE | Noted: 2021-07-26

## 2021-07-26 RX ORDER — MULTIVITAMIN WITH IRON
250 TABLET ORAL DAILY
COMMUNITY
Start: 2020-07-20 | End: 2022-03-09

## 2021-08-06 PROBLEM — E78.2 MIXED HYPERLIPIDEMIA: Status: ACTIVE | Noted: 2021-08-06

## 2021-08-06 PROBLEM — R73.01 IFG (IMPAIRED FASTING GLUCOSE): Status: ACTIVE | Noted: 2021-08-06

## 2021-08-06 PROBLEM — G45.9 TIA (TRANSIENT ISCHEMIC ATTACK): Status: ACTIVE | Noted: 2021-08-06

## 2021-08-10 ENCOUNTER — OFFICE VISIT (OUTPATIENT)
Dept: INTERNAL MEDICINE | Facility: CLINIC | Age: 61
End: 2021-08-10

## 2021-08-10 VITALS
SYSTOLIC BLOOD PRESSURE: 136 MMHG | HEIGHT: 65 IN | WEIGHT: 177 LBS | OXYGEN SATURATION: 98 % | TEMPERATURE: 97.9 F | DIASTOLIC BLOOD PRESSURE: 79 MMHG | HEART RATE: 76 BPM | BODY MASS INDEX: 29.49 KG/M2

## 2021-08-10 DIAGNOSIS — E55.9 VITAMIN D DEFICIENCY: ICD-10-CM

## 2021-08-10 DIAGNOSIS — R73.01 IFG (IMPAIRED FASTING GLUCOSE): ICD-10-CM

## 2021-08-10 DIAGNOSIS — N32.9 BLADDER DISORDER: ICD-10-CM

## 2021-08-10 DIAGNOSIS — J30.2 SEASONAL ALLERGIC RHINITIS, UNSPECIFIED TRIGGER: ICD-10-CM

## 2021-08-10 DIAGNOSIS — M19.90 ARTHRITIS: Primary | ICD-10-CM

## 2021-08-10 DIAGNOSIS — M79.89 LIMB SWELLING: ICD-10-CM

## 2021-08-10 DIAGNOSIS — G45.9 TIA (TRANSIENT ISCHEMIC ATTACK): ICD-10-CM

## 2021-08-10 DIAGNOSIS — E78.2 MIXED HYPERLIPIDEMIA: ICD-10-CM

## 2021-08-10 PROCEDURE — 99396 PREV VISIT EST AGE 40-64: CPT | Performed by: INTERNAL MEDICINE

## 2021-08-10 RX ORDER — ESTRADIOL 0.04 MG/D
PATCH, EXTENDED RELEASE TRANSDERMAL
Status: ON HOLD | COMMUNITY
Start: 2021-07-23 | End: 2021-12-28

## 2021-10-04 ENCOUNTER — TRANSCRIBE ORDERS (OUTPATIENT)
Dept: ADMINISTRATIVE | Facility: HOSPITAL | Age: 61
End: 2021-10-04

## 2021-10-04 DIAGNOSIS — U07.1 COVID-19: Primary | ICD-10-CM

## 2021-10-04 RX ORDER — EPINEPHRINE 1 MG/ML
0.3 INJECTION, SOLUTION, CONCENTRATE INTRAVENOUS AS NEEDED
Status: DISCONTINUED | OUTPATIENT
Start: 2021-10-05 | End: 2021-10-07 | Stop reason: HOSPADM

## 2021-10-04 RX ORDER — DIPHENHYDRAMINE HYDROCHLORIDE 50 MG/ML
50 INJECTION INTRAMUSCULAR; INTRAVENOUS AS NEEDED
Status: DISCONTINUED | OUTPATIENT
Start: 2021-10-05 | End: 2021-10-07 | Stop reason: HOSPADM

## 2021-10-05 ENCOUNTER — HOSPITAL ENCOUNTER (OUTPATIENT)
Dept: INFUSION THERAPY | Facility: HOSPITAL | Age: 61
Discharge: HOME | End: 2021-10-05
Admitting: NURSE PRACTITIONER

## 2021-10-05 VITALS
HEART RATE: 77 BPM | RESPIRATION RATE: 18 BRPM | OXYGEN SATURATION: 98 % | SYSTOLIC BLOOD PRESSURE: 132 MMHG | TEMPERATURE: 98 F | DIASTOLIC BLOOD PRESSURE: 76 MMHG

## 2021-10-05 DIAGNOSIS — U07.1 COVID-19: Primary | ICD-10-CM

## 2021-10-05 PROCEDURE — M0243 CASIRIVI AND IMDEVI INFUSION: HCPCS | Performed by: NURSE PRACTITIONER

## 2021-10-05 PROCEDURE — 25010000002 INJECTION, CASIRIVIMAB AND IMDEVIMAB, 1200 MG: Performed by: NURSE PRACTITIONER

## 2021-10-05 RX ORDER — ZINC SULFATE 50(220)MG
220 CAPSULE ORAL DAILY
COMMUNITY
End: 2021-12-09

## 2021-10-05 RX ORDER — MULTIVIT WITH MINERALS/LUTEIN
250 TABLET ORAL DAILY
Status: ON HOLD | COMMUNITY
End: 2021-12-28

## 2021-10-05 RX ADMIN — CASIRIVIMAB AND IMDEVIMAB 300 MG: 600; 600 INJECTION, SOLUTION, CONCENTRATE INTRAVENOUS at 10:16

## 2021-10-06 ENCOUNTER — TRANSCRIBE ORDERS (OUTPATIENT)
Dept: ADMINISTRATIVE | Facility: HOSPITAL | Age: 61
End: 2021-10-06

## 2021-10-06 DIAGNOSIS — Z12.31 SCREENING MAMMOGRAM, ENCOUNTER FOR: Primary | ICD-10-CM

## 2021-10-12 ENCOUNTER — HOSPITAL ENCOUNTER (OUTPATIENT)
Dept: MAMMOGRAPHY | Facility: HOSPITAL | Age: 61
Discharge: HOME | End: 2021-10-12
Admitting: OBSTETRICS & GYNECOLOGY

## 2021-10-12 DIAGNOSIS — Z12.31 SCREENING MAMMOGRAM, ENCOUNTER FOR: ICD-10-CM

## 2021-10-12 PROCEDURE — 77063 BREAST TOMOSYNTHESIS BI: CPT

## 2021-10-12 PROCEDURE — 77067 SCR MAMMO BI INCL CAD: CPT

## 2021-10-13 ENCOUNTER — TELEPHONE (OUTPATIENT)
Dept: OBSTETRICS AND GYNECOLOGY | Facility: CLINIC | Age: 61
End: 2021-10-13

## 2021-10-13 DIAGNOSIS — R92.8 ABNORMALITY OF RIGHT BREAST ON SCREENING MAMMOGRAM: Primary | ICD-10-CM

## 2021-10-13 NOTE — TELEPHONE ENCOUNTER
Patient notified of results and recommendations. She is aware the scheduling department will be contacting her to set up that appointment.

## 2021-10-13 NOTE — TELEPHONE ENCOUNTER
----- Message from CALOS Dietrich sent at 10/13/2021  9:55 AM EDT -----  Please discuss results with pt. Orders sent for diagnostic rt mammo and u/s. Thanks

## 2021-11-12 ENCOUNTER — HOSPITAL ENCOUNTER (OUTPATIENT)
Dept: ULTRASOUND IMAGING | Facility: HOSPITAL | Age: 61
Discharge: HOME | End: 2021-11-12

## 2021-11-12 ENCOUNTER — TRANSCRIBE ORDERS (OUTPATIENT)
Dept: ADMINISTRATIVE | Facility: HOSPITAL | Age: 61
End: 2021-11-12

## 2021-11-12 ENCOUNTER — HOSPITAL ENCOUNTER (OUTPATIENT)
Dept: MAMMOGRAPHY | Facility: HOSPITAL | Age: 61
Discharge: HOME | End: 2021-11-12

## 2021-11-12 DIAGNOSIS — N63.10 BREAST MASS, RIGHT: Primary | ICD-10-CM

## 2021-11-12 DIAGNOSIS — R92.8 ABNORMALITY OF RIGHT BREAST ON SCREENING MAMMOGRAM: ICD-10-CM

## 2021-11-12 DIAGNOSIS — R92.8 ABNORMAL MAMMOGRAM: Primary | ICD-10-CM

## 2021-11-12 PROCEDURE — G0279 TOMOSYNTHESIS, MAMMO: HCPCS

## 2021-11-12 PROCEDURE — 76642 ULTRASOUND BREAST LIMITED: CPT

## 2021-11-12 PROCEDURE — 77065 DX MAMMO INCL CAD UNI: CPT

## 2021-12-01 ENCOUNTER — DOCUMENTATION (OUTPATIENT)
Dept: MAMMOGRAPHY | Facility: HOSPITAL | Age: 61
End: 2021-12-01

## 2021-12-01 ENCOUNTER — HOSPITAL ENCOUNTER (OUTPATIENT)
Dept: MAMMOGRAPHY | Facility: HOSPITAL | Age: 61
Discharge: HOME | End: 2021-12-01

## 2021-12-01 DIAGNOSIS — N63.10 BREAST MASS, RIGHT: ICD-10-CM

## 2021-12-01 PROCEDURE — 88305 TISSUE EXAM BY PATHOLOGIST: CPT | Performed by: OBSTETRICS & GYNECOLOGY

## 2021-12-01 PROCEDURE — A4648 IMPLANTABLE TISSUE MARKER: HCPCS

## 2021-12-01 PROCEDURE — 0 LIDOCAINE 1 % SOLUTION: Performed by: OBSTETRICS & GYNECOLOGY

## 2021-12-01 PROCEDURE — 88360 TUMOR IMMUNOHISTOCHEM/MANUAL: CPT | Performed by: OBSTETRICS & GYNECOLOGY

## 2021-12-01 RX ORDER — LIDOCAINE HYDROCHLORIDE AND EPINEPHRINE 10; 10 MG/ML; UG/ML
30 INJECTION, SOLUTION INFILTRATION; PERINEURAL ONCE
Status: COMPLETED | OUTPATIENT
Start: 2021-12-01 | End: 2021-12-01

## 2021-12-01 RX ORDER — LIDOCAINE HYDROCHLORIDE 10 MG/ML
30 INJECTION, SOLUTION INFILTRATION; PERINEURAL ONCE
Status: COMPLETED | OUTPATIENT
Start: 2021-12-01 | End: 2021-12-01

## 2021-12-01 RX ADMIN — LIDOCAINE HYDROCHLORIDE 30 ML: 10 INJECTION, SOLUTION INFILTRATION; PERINEURAL at 10:56

## 2021-12-01 RX ADMIN — LIDOCAINE HYDROCHLORIDE AND EPINEPHRINE 30 ML: 10; 10 INJECTION, SOLUTION INFILTRATION; PERINEURAL at 10:56

## 2021-12-03 ENCOUNTER — DOCUMENTATION (OUTPATIENT)
Dept: MAMMOGRAPHY | Facility: HOSPITAL | Age: 61
End: 2021-12-03

## 2021-12-03 DIAGNOSIS — Z17.0 MALIGNANT NEOPLASM OF RIGHT BREAST IN FEMALE, ESTROGEN RECEPTOR POSITIVE, UNSPECIFIED SITE OF BREAST (HCC): Primary | ICD-10-CM

## 2021-12-03 DIAGNOSIS — C50.911 MALIGNANT NEOPLASM OF RIGHT BREAST IN FEMALE, ESTROGEN RECEPTOR POSITIVE, UNSPECIFIED SITE OF BREAST (HCC): Primary | ICD-10-CM

## 2021-12-03 LAB
CYTO UR: NORMAL
LAB AP CASE REPORT: NORMAL
LAB AP CLINICAL INFORMATION: NORMAL
LAB AP SPECIAL STAINS: NORMAL
PATH REPORT.FINAL DX SPEC: NORMAL
PATH REPORT.GROSS SPEC: NORMAL

## 2021-12-03 NOTE — PROGRESS NOTES
GOT APPT FOR BREAST CLINIC, CALLED PT AND GAVE DATE, TIME AND LOCATION OF APPT ON 12/9/21 AT 1:00 WITH ARRIVAL OF 12:30 AND PT V/U

## 2021-12-07 ENCOUNTER — TELEPHONE (OUTPATIENT)
Dept: MAMMOGRAPHY | Facility: HOSPITAL | Age: 61
End: 2021-12-07

## 2021-12-07 DIAGNOSIS — C50.911 MALIGNANT NEOPLASM OF RIGHT BREAST IN FEMALE, ESTROGEN RECEPTOR POSITIVE, UNSPECIFIED SITE OF BREAST (HCC): Primary | ICD-10-CM

## 2021-12-07 DIAGNOSIS — Z17.0 MALIGNANT NEOPLASM OF RIGHT BREAST IN FEMALE, ESTROGEN RECEPTOR POSITIVE, UNSPECIFIED SITE OF BREAST (HCC): Primary | ICD-10-CM

## 2021-12-09 ENCOUNTER — OFFICE VISIT (OUTPATIENT)
Dept: OTHER | Facility: HOSPITAL | Age: 61
End: 2021-12-09

## 2021-12-09 ENCOUNTER — CONSULT (OUTPATIENT)
Dept: ONCOLOGY | Facility: HOSPITAL | Age: 61
End: 2021-12-09

## 2021-12-09 ENCOUNTER — OFFICE VISIT (OUTPATIENT)
Dept: PLASTIC SURGERY | Facility: CLINIC | Age: 61
End: 2021-12-09

## 2021-12-09 ENCOUNTER — TELEPHONE (OUTPATIENT)
Dept: ONCOLOGY | Facility: HOSPITAL | Age: 61
End: 2021-12-09

## 2021-12-09 VITALS — HEIGHT: 66 IN | WEIGHT: 177 LBS | BODY MASS INDEX: 28.45 KG/M2 | RESPIRATION RATE: 16 BRPM

## 2021-12-09 VITALS
TEMPERATURE: 98.1 F | RESPIRATION RATE: 18 BRPM | DIASTOLIC BLOOD PRESSURE: 70 MMHG | HEART RATE: 75 BPM | WEIGHT: 177.03 LBS | SYSTOLIC BLOOD PRESSURE: 144 MMHG | BODY MASS INDEX: 29.01 KG/M2 | OXYGEN SATURATION: 94 %

## 2021-12-09 VITALS
BODY MASS INDEX: 28.82 KG/M2 | OXYGEN SATURATION: 94 % | WEIGHT: 173 LBS | SYSTOLIC BLOOD PRESSURE: 149 MMHG | HEIGHT: 65 IN | TEMPERATURE: 98.4 F | HEART RATE: 82 BPM | DIASTOLIC BLOOD PRESSURE: 83 MMHG

## 2021-12-09 DIAGNOSIS — Z17.0 MALIGNANT NEOPLASM OF OVERLAPPING SITES OF RIGHT BREAST IN FEMALE, ESTROGEN RECEPTOR POSITIVE (HCC): Primary | ICD-10-CM

## 2021-12-09 DIAGNOSIS — C50.811 MALIGNANT NEOPLASM OF OVERLAPPING SITES OF RIGHT BREAST IN FEMALE, ESTROGEN RECEPTOR POSITIVE (HCC): Primary | ICD-10-CM

## 2021-12-09 PROCEDURE — 99204 OFFICE O/P NEW MOD 45 MIN: CPT | Performed by: SURGERY

## 2021-12-09 PROCEDURE — G0463 HOSPITAL OUTPT CLINIC VISIT: HCPCS | Performed by: INTERNAL MEDICINE

## 2021-12-09 PROCEDURE — 99205 OFFICE O/P NEW HI 60 MIN: CPT | Performed by: SURGERY

## 2021-12-09 PROCEDURE — 99205 OFFICE O/P NEW HI 60 MIN: CPT | Performed by: INTERNAL MEDICINE

## 2021-12-09 RX ORDER — CLINDAMYCIN PHOSPHATE 900 MG/50ML
900 INJECTION INTRAVENOUS ONCE
Status: CANCELLED | OUTPATIENT
Start: 2021-12-09 | End: 2021-12-09

## 2021-12-09 NOTE — PROGRESS NOTES
"Establish Care (immediate breast cancer recon Dr. Mcgraw)            History of Present Illness  Marisa Portillo is a 61 y.o. female who presents to [facility] as a consult from Dr. Mcgraw  to discuss breast reconstructive options.  She wears 36B  bra size and would like to be 36 full C. She is planned to have     Bilateral breast recon with Allergan implant and mesh    Subjective      Patient has no known allergies.  Allergies Reconciled.    Review of Systems     Objective     /83 (BP Location: Right arm, Patient Position: Sitting)   Pulse 82   Temp 98.4 °F (36.9 °C) (Temporal)   Ht 165.1 cm (65\")   Wt 78.5 kg (173 lb)   SpO2 94%   BMI 28.79 kg/m²     Body mass index is 28.79 kg/m².    Physical Exam  Bilateral breast with grade2 nipple ptosis, no palpable masses or tenderness   Axillary Lymphadenopathy: No axillary lymphadenopathy  SN-N (Right Breast): 23  SN-N (Left Breast): 23  N-IMF (Right Breast): 7.5  N-IMF (Left Breast): 8  Base Width (Right Breast): 16  Base Width (Left Breast): 16      Result Review :   The following data was reviewed by: Lacy Shelton MD on 12/09/2021:           Assessment and Plan      Diagnoses and all orders for this visit:    1. Malignant neoplasm of overlapping sites of right breast in female, estrogen receptor positive (HCC) (Primary)        Additional Order(s):       Plan:  • Breast reconstruction options (Tissue Expander/Implant versus Autologous) were all discussed with the patient at length.  In addition, we discussed options for symmetry procedures and nipple reconstruction.  The patient understands that her reconstructed breast will not have the same sensation as a natural breast and that visible incision lines will always be present.  In addition, patient understand that breast reconstruction is a multi-stage procedure that can take months to years to complete.   • We will send information for " prior authorization.  Photos were obtained.   • Patient was given the breast reconstruction pamphlet as well as directed to the ASPS website for additional information.   • The patient was made aware that the FDA has discovered rare occurrences between an uncommon form of lymphoma (anaplastic large cell) and women with breast implants.  While the incidence is quite low, the risk of development is real; therefore, any unusual symptoms or signs (most commonly a late fluid collection years later) should be brought to the attention of your physician.  Patient also counseled on risks and benefits of saline versus silicone implants, lifting restrictions, scar placement, risk of capsular contracture, animation deformity, need for likely revision of breast implants at some point in her life, FDA recommendation of MRI every three years to monitor for rupture, and continued mammography for breast cancer surveillance.   • We will have patient obtain pre-op clearance from their PCP.  • We had a long discussion with the patient and her family today regarding her reconstructive options.  These include no reconstruction, reconstruction at the time of mastectomy or lumpectomy, and finally delayed reconstruction.  We discussed specific types of reconstruction, including: tissue expander and/or implants, and autologous reconstruction with either pedicled or free flap.  We also covered the later stages of reconstruction, including nipple reconstruction and areola tattooing, fat grafting, and symmetry procedures if indicated or desired.   • I described the typical yasmine-operative course of each procedure, including the nature of the procedure, hospital stay, necessity for drains, post-operative activity restriction, and postoperative visits (including those for tissue expansion).  We also discussed the time frame for reconstruction.  I shared information about saline vs. silicone implants, including their differences, risk of capsular  contracture, rippling, or leak/rupture, and safety/monitoring issues.  I described Alloderm and its use for implant reconstruction. We discussed that radiation therapy, if she ultimately requires it, may alter the reconstructive options.     • We reviewed surgical risks including, but not limited to, infection, bleeding, hematoma, seroma, pain, scarring, numbness, skin or nipple loss, wound healing problems, flap failures including partial or complete flap loss, asymmetry, need for revisions or further surgeries,  and risks associated with anesthesia.   • Additional risks with autologous reconstruction include donor wound morbidities, such as hernias or bulges, wound healing problems, muscle weakness, partial or complete flap loss, and typical surgical risks as listed above.   • The use of biological mesh is not for soft tissue reinforcement. The use of mesh is necessary because the mastectomy dissection pocket is larger than the implant itself. The intention of the mesh is to restrain the displacement of the implant to the axilla, which is a very common complication requiring revision. In my experience, the use of mesh avoids that specific complication and very often avoids a second surgery. The use of mesh also allows me to perform a safer procedure since it holds the implant in place and alleviates the pressure over the skin that depends only on the subdermal blood supply. Without the mesh, I believe I wouldn't be able to perform direct implant reconstruction and give the optimal result that patient desires and deserves.   •  Specifically on her case, she can have nipple sparing mastectomies with lateral radial incision per my standpoint.   • Consent: Bilateral breast reconstruction with implant, mesh, use of spy and possible expanders .   • Target implant size: 605 cc  •   • I spent 90 minutes explaining to patient and her  about her procedure and after care.   •   • CPT codes:   • 74067-39 - immediate  insertion of breast prosthesis following reconstruction   • 79802-41  implantation of biologic implant  • 56885- intravenous injection of agent (SPY)  •   • Implants:  • Mesh: galaflex [id number] x2              Allergan , 605, 650 x 3  · Expander: [id number] x2  · Single use sizer [id number] x1    OR time: 3 hours    • Lacy Shelton MD, PhD  • NPI: [id number]    I spent 60 minutes caring for Marisa on this date of service. This time includes time spent by me in the following activities:performing a medically appropriate examination and/or evaluation , counseling and educating the patient/family/caregiver, documenting information in the medical record, and care coordination    Follow Up     No follow-ups on file.    Patient was given instructions and counseling regarding her condition. Please see specific information pulled into the AVS if appropriate.     Lacy Shelton MD  12/09/2021

## 2021-12-09 NOTE — PROGRESS NOTES
[facility]   [facility]    Location  [facility] HEMATOLOGY & ONCOLOGY    Chief Complaint  Breast Cancer (-New Pt)    Referring Provider: Judah Johnson,*  PCP: Judah Johnson MD    Subjective          Oncology/Hematology History Overview Note     Right Breast Cancer:    Diagnostic mammogram on 11/12/2021: 2 cm asymmetry in the outer central right breast with amorphous calcifications.  Similar appearing group of amorphous calcifications in the outer central right breast.  6 mm asymmetry in the inner right breast.  Right axillary lymphadenopathy.    Ultrasound-guided biopsy on 11/1/2021: Right breast 3 o'clock position, 2 cm from the nipple with invasive ductal carcinoma, grade 2.  Right breast 9 o'clock position, 3 cm from the nipple with invasive ductal carcinoma, grade 2, ER positive (50%), MT positive (3%), HER-2 negative (score 0), Ki-67 14%.  Associated with intermediate grade ductal carcinoma in situ.  Right axillary lymph node positive for breast carcinoma.     Malignant neoplasm of overlapping sites of right breast in female, estrogen receptor positive (HCC)   12/9/2021 Initial Diagnosis    Malignant neoplasm of overlapping sites of right breast in female, estrogen receptor positive (HCC)         History of Present Illness  Patient comes in today with her  and 2 daughters to discuss the plan of care.  She is a nurse in our hospital.  We discussed the results of her biopsy and the significance as it relates to the plan of care.  I was talking with the patient in the room with the breast surgeon at the same time.  After giving the patient several options she elected to undergo surgery first after her staging scans are complete.  She will likely need chemotherapy which we also discussed.      Review of Systems   Constitutional: Negative for appetite change, diaphoresis, fever, unexpected weight gain and unexpected weight loss.   HENT: Negative for hearing loss, sore  throat and voice change.    Eyes: Negative for blurred vision, double vision, pain, redness and visual disturbance.   Respiratory: Negative for cough, shortness of breath and wheezing.    Cardiovascular: Negative for chest pain, palpitations and leg swelling.   Endocrine: Negative for cold intolerance, heat intolerance, polydipsia and polyuria.   Genitourinary: Negative for decreased urine volume, difficulty urinating, frequency and urinary incontinence.   Musculoskeletal: Positive for joint swelling. Negative for arthralgias, back pain and myalgias.   Skin: Negative for color change, rash, skin lesions and bruise.   Neurological: Positive for headache. Negative for dizziness, seizures and numbness.   Hematological: Negative for adenopathy. Does not bruise/bleed easily.   Psychiatric/Behavioral: Positive for stress. Negative for depressed mood. The patient is not nervous/anxious.    All other systems reviewed and are negative.      Past Medical History:   Diagnosis Date   • Allergies    • Breast cancer (HCC)    • High cholesterol    • IFG (impaired fasting glucose)    • Osteoarthritis    • TIA (transient ischemic attack)    • Vitamin D deficiency      Past Surgical History:   Procedure Laterality Date   • APPENDECTOMY     • TOTAL ABDOMINAL HYSTERECTOMY WITH SALPINGO OOPHORECTOMY       Social History     Socioeconomic History   • Marital status:    Tobacco Use   • Smoking status: Never Smoker   • Smokeless tobacco: Never Used   Vaping Use   • Vaping Use: Never used   Substance and Sexual Activity   • Alcohol use: Never   • Drug use: Never     Family History   Problem Relation Age of Onset   • Hypertension Mother    • Diabetes Mother    • Cancer Mother    • Arthritis Mother    • Hypertension Father    • Diabetes Father    • Cancer Father    • Arthritis Father        Objective   Physical Exam  General: Alert, cooperative, no acute distress  Eyes: Anicteric sclera, PERRLA  Respiratory: normal respiratory  effort  Cardiovascular: no lower extremity edema  Skin: Normal tone, no rash, no lesions  Psychiatric: Appropriate affect, intact judgment  Neurologic: No focal sensory or motor deficits, normal cognition   Musculoskeletal: Normal muscle strength and tone  Extremities: No clubbing, cyanosis, or deformities    Vitals:    12/09/21 1311   BP: 144/70   Pulse: 75   Resp: 18   Temp: 98.1 °F (36.7 °C)   SpO2: 94%   Weight: 80.3 kg (177 lb 0.5 oz)   PainSc: 0-No pain     ECOG score: 0         PHQ-9 Total Score: 0         Result Review :   The following data was reviewed by: Starr Mendez MD PhD on 12/09/2021:  Lab Results   Component Value Date    HGB 14.3 02/23/2021    HCT 43.7 02/23/2021    MCV 93.2 02/23/2021     02/23/2021    WBC 6.52 02/23/2021    NEUTROABS 3.51 02/23/2021    LYMPHSABS 2.06 02/23/2021    MONOSABS 0.68 02/23/2021    EOSABS 0.20 02/23/2021    BASOSABS 0.06 02/23/2021     Lab Results   Component Value Date    GLUCOSE 108 (H) 02/23/2021    BUN 13 02/23/2021    CREATININE 0.90 02/23/2021     02/23/2021    K 4.4 02/23/2021     02/23/2021    CO2 26 02/23/2021    CALCIUM 9.0 02/23/2021    PROTEINTOT 7.3 02/23/2021    ALBUMIN 4.4 02/23/2021    BILITOT 0.63 02/23/2021    ALKPHOS 58 02/23/2021    AST 20 02/23/2021    ALT 23 02/23/2021          Assessment and Plan    Diagnoses and all orders for this visit:    1. Malignant neoplasm of overlapping sites of right breast in female, estrogen receptor positive (HCC) (Primary)  -     Ambulatory Referral to [facility] Counseling/Testing - [facility]    Right breast cancer: Invasive ductal carcinoma with right axillary lymph node involvement.  Dr. Mcgraw has ordered CT CAP with contrast and bone scan as well as brain MRI for staging.  Patient will also have a breast MRI to better evaluate the axillary lymph node status.  She will follow up with the patient in 1 and half weeks and plan for bilateral mastectomy with right axillary lymph node  dissection if numerous lymph nodes are involved.  She will likely have a port placed at that time as well.  The patient will follow up with me in 3 to 4 weeks after surgery to discuss the results of her pathology and make plans for chemotherapy, assuming it is indicated.      I spent 68 minutes caring for Marisa on this date of service. This time includes time spent by me in the following activities: preparing for the visit, reviewing tests, obtaining and/or reviewing a separately obtained history, performing a medically appropriate examination and/or evaluation, counseling and educating the patient/family/caregiver, ordering medications, tests, or procedures, referring and communicating with other health care professionals and documenting information in the medical record      Patient was given instructions and counseling regarding her condition or for health maintenance advice. Please see specific information pulled into the AVS if appropriate.     Starr Mendez MD PhD    12/9/2021

## 2021-12-09 NOTE — TELEPHONE ENCOUNTER
CALLER: ALONSO YUSUF      RELATIONSHIP :SELF      CALL REGARDING: HAD RECV CALL WITH MESSAGE FROM SCHEDULING EXT [id number],     HAD JUST LEFT ONCOLOGY APPT WITH DR BARRETO WASN'T SURE IF WAS ORDERS PUT IN FROM DR BARRETO OR DR RICHARDSON.      I ADVISED DID NOT SEE ANY ORDERS PUT IN FROM DR BARRETO BUT HAD ORDERS IN FROM DR RICHARDSON FOR SOME CT AND MRI'S      I ADV THE CENTRAL SCHEDULING NUMBER [phone] EXT [id number] OPTION 3    I WARM TRANSFERRED ALONSO WITH AMARJIT IN CENTRAL SCHEDULING TO FURTHER ASSIST.

## 2021-12-09 NOTE — PROGRESS NOTES
Chief Complaint: Breast Cancer    Subjective         History of Present Illness  Marisa Portillo is a 61 y.o. female presents to [facility] to be seen for 2 breast masses in right breast that were biopsied along with an enlarged axillary node.  She has an extensive family history of breast cancer and liver/lung/prostate.  The sites were biopsied and returned as:      Clinical Information    Breast mass   Comment:         Final Diagnosis   1. Right breast,  3 o'clock position, 2 cm from nipple, ultrasound-guided core biopsy:  - Invasive carcinoma of no special type (ductal)  - Histologic grade ([place] Histologic Score):    - Glandular (Acinar)/Tubular Differentiation:  Score 3  - Nuclear Pleomorphism:  Score 2  - Mitotic Rate:  Score 1  - Overall Grade:  Grade 2               - Size of largest focus of invasion:  6 mm     2. Right breast,  9 o'clock position, 3 cm from nipple, ultrasound-guided core biopsy:  - Invasive carcinoma of no special type (ductal)  - Histologic grade ([place] Histologic Score):    - Glandular (Acinar)/Tubular Differentiation:  Score 3  - Nuclear Pleomorphism:  Score 2  - Mitotic Rate:  Score 1  - Overall Grade:  Grade 2               - Size of largest focus of invasion:  16 mm               - Breast biomarker testing:                            - Estrogen Receptor (ER):  Positive (50%, moderate to strong)                            - Progesterone Receptor (PgR):  Positive (3%, weak)                            - HER2 (by immunohistochemistry):  Negative (Score 0)  - Ki-67:  14%               - Other findings:                            - Intermediate grade ductal carcinoma in situ (DCIS)        3. Lymph node, right axilla, ultrasound guided core biopsy:               - Macrometastatic carcinoma, consistent with breast primary               - Size of largest focus of invasion:  5 mm               - Breast biomarker testing:                             - Estrogen Receptor (ER):  Positive (40%, moderate to strong)                            - Progesterone Receptor (PgR): Positive (5%, weak)                            - HER2 (by immunohistochemistry):  Negative (Score      Narrative & Impression   PROCEDURE:  MAMMO DIAGNOSTIC DIGITAL TOMOSYNTHESIS RIGHT W CAD, 11/12/2021, 12:51  US BREAST RIGHT LIMITED, 11/12/2021, 12:59     COMPARISON:  [facility], , US BREAST RIGHT LIMITED, 11/12/2021, 12:59.  [facility], DIG SCREENING BILAT RENAE W 3D RENETTA, 12/14/2017, 7:53.  [facility], DIG SCREENING BILAT RENAE W 3D RENETTA, 4/24/2019, 7:45.  [facility],   DIG SCREENING BILAT RENAE W 3D RENETTA, 8/01/2020, 9:11.  [facility], MAMMO SCREENING   DIGITAL TOMOSYNTHESIS BILATERAL W CAD, 10/12/2021, 13:41.     INDICATIONS:  61-year-old woman called back from screening mammogram for right breast asymmetries   and calcifications.  The patient reports recent influenza vaccination in her right arm.  Family   history of breast cancer in her mother.     FINDINGS:          Mammogram:  2 cm developing asymmetry in the outer central right breast, middle to posterior depth, with   associated punctate and amorphous calcifications.  Similar appearing group of punctate and amorphous calcifications also seen in the outer central   right breast, anterior depth.  6 mm asymmetry in the inner right breast, middle to posterior depth, visible only on the CC view.     Ultrasound:  Corresponding to the asymmetry with calcifications in the outer right breast, there is a 2.3 x 1.8   x 1.9 cm irregular mass at 09:00, 3 cm from the nipple.  Corresponding to the asymmetry in the inner right breast on mammogram, there is a 7 x 6 x 7 mm   indistinct hypoechoic mass at 03:00, 2 cm from the nipple.  Evaluation of the right axilla shows lymph nodes with cortical thickening.     IMPRESSION:  Recommend ultrasound-guided biopsy of two  "right breast masses.     Right axillary lymphadenopathy is amenable to ultrasound-guided biopsy.     Management of calcifications in the right breast will be based on these results.     I discussed results with the patient following the exam.  She will be scheduled for biopsy, and our   staff will notify the ordering clinician's office.       Objective     Past Medical History:   Diagnosis Date   • Allergies    • Breast cancer (HCC)    • High cholesterol    • IFG (impaired fasting glucose)    • Osteoarthritis    • TIA (transient ischemic attack)    • Vitamin D deficiency        Past Surgical History:   Procedure Laterality Date   • APPENDECTOMY     • TOTAL ABDOMINAL HYSTERECTOMY WITH SALPINGO OOPHORECTOMY           Current Outpatient Medications:   •  Acetaminophen (Tylenol) 325 MG capsule, Tylenol 325 mg oral capsule take 2 capsules by oral route 2 times a day   Active, Disp: , Rfl:   •  aspirin 81 MG oral suspension, , Disp: , Rfl:   •  Iona 0.0375 MG/24HR patch, APPLY 1 PATCH 2 TIMES A WEEK, Disp: , Rfl:   •  Magnesium 250 MG tablet, , Disp: , Rfl:   •  Vitamin A 3 MG (70249 UT) capsule, Take 10,000 Units by mouth Daily., Disp: , Rfl:   •  vitamin C (ASCORBIC ACID) 250 MG tablet, Take 250 mg by mouth Daily., Disp: , Rfl:   •  vitamin D3 (Vitamin D) 125 MCG (5000 UT) capsule capsule, , Disp: , Rfl:     No Known Allergies     Family History   Problem Relation Age of Onset   • Hypertension Mother    • Diabetes Mother    • Cancer Mother    • Arthritis Mother    • Hypertension Father    • Diabetes Father    • Cancer Father    • Arthritis Father        Social History     Socioeconomic History   • Marital status:    Tobacco Use   • Smoking status: Never Smoker   • Smokeless tobacco: Never Used   Vaping Use   • Vaping Use: Never used   Substance and Sexual Activity   • Alcohol use: Never   • Drug use: Never       Vital Signs:   Resp 16   Ht 166.4 cm (65.5\")   Wt 80.3 kg (177 lb)   BMI 29.01 kg/m²    Review of " Systems    Physical Exam  Vitals and nursing note reviewed.   Constitutional:       Appearance: Normal appearance.   HENT:      Head: Normocephalic and atraumatic.   Eyes:      Extraocular Movements: Extraocular movements intact.      Pupils: Pupils are equal, round, and reactive to light.   Cardiovascular:      Pulses: Normal pulses.   Pulmonary:      Effort: Pulmonary effort is normal. No accessory muscle usage or respiratory distress.   Chest:   Breasts:      Right: Mass present. No inverted nipple, nipple discharge, skin change, axillary adenopathy or supraclavicular adenopathy.      Left: Normal. No inverted nipple, nipple discharge, skin change, axillary adenopathy or supraclavicular adenopathy.        Comments: Mass at 0900 about 3 cm in size, well helaed biopsy site, no skin or nipple changes  Abdominal:      General: Abdomen is flat.      Palpations: Abdomen is soft.      Tenderness: There is no abdominal tenderness. There is no guarding.   Musculoskeletal:         General: No swelling, tenderness or deformity.      Cervical back: Neck supple.   Lymphadenopathy:      Upper Body:      Right upper body: No supraclavicular or axillary adenopathy.      Left upper body: No supraclavicular or axillary adenopathy.   Skin:     General: Skin is warm and dry.   Neurological:      General: No focal deficit present.      Mental Status: She is alert and oriented to person, place, and time.   Psychiatric:         Mood and Affect: Mood normal.         Thought Content: Thought content normal.          Result Review :               Assessment and Plan    Diagnoses and all orders for this visit:    1. Malignant neoplasm of overlapping sites of right breast in female, estrogen receptor positive (HCC) (Primary)  -     Ambulatory Referral to Plastic Surgery  -     MRI Breast Bilateral Screening With & Without Contrast; Future  -     CT Chest With & Without Contrast; Future  -     CT Abdomen Pelvis With & Without Contrast;  Future  -     MRI Brain With & Without Contrast; Future  -     NM Bone Scan Whole Body; Future  -     NM sentinel node injection only; Future  -     Case Request; Standing  -     Mammo Breast Placement Device Initial Without Biopsy Right; Future  -     Case Request      Will plan for bilateral mastectomy and will send to plastics for consultation- hoping for surgery date of 12/28 if chemotherapy not to be done upfront  Genetics and mammaprint to be ordered by oncology    Follow Up   Return for Next scheduled followup after surgery.  Patient was given instructions and counseling regarding her condition or for health maintenance advice. Please see specific information pulled into the AVS if appropriate.         This document has been electronically signed by Jacqueline Mcgraw MD  December 9, 2021 16:26 EST

## 2021-12-10 ENCOUNTER — PATIENT ROUNDING (BHMG ONLY) (OUTPATIENT)
Dept: ONCOLOGY | Facility: HOSPITAL | Age: 61
End: 2021-12-10

## 2021-12-10 ENCOUNTER — PREP FOR SURGERY (OUTPATIENT)
Dept: OTHER | Facility: HOSPITAL | Age: 61
End: 2021-12-10

## 2021-12-10 DIAGNOSIS — C50.811 MALIGNANT NEOPLASM OF OVERLAPPING SITES OF RIGHT BREAST IN FEMALE, ESTROGEN RECEPTOR POSITIVE (HCC): Primary | ICD-10-CM

## 2021-12-10 DIAGNOSIS — Z17.0 MALIGNANT NEOPLASM OF OVERLAPPING SITES OF RIGHT BREAST IN FEMALE, ESTROGEN RECEPTOR POSITIVE (HCC): Primary | ICD-10-CM

## 2021-12-10 NOTE — PROGRESS NOTES
December 10, 2021    Hello, may I speak with Marisa Portillo?    My name is Claudia Roe.    I am  with [facility]  [address] [place] [address] [address]  [place] [address] [phone].    Before we get started may I verify your date of birth? 1960    I am calling to officially welcome you to our practice and ask about your recent visit. Is this a good time to talk? NO- Left Voicemail    Tell me about your visit with us. What things went well?         We're always looking for ways to make our patients' experiences even better. Do you have recommendations on ways we may improve?  NO- Left Voicemail    Overall were you satisfied with your first visit to our practice? NO- Left Voicemail       I appreciate you taking the time to speak with me today. Is there anything else I can do for you? NO- Left Voicemail      Thank you, and have a great day.

## 2021-12-10 NOTE — PROGRESS NOTES
S/W PATIENT AFTER BIOPSY AND DISCUSSED DISCHARGE INSTRUCTIONS AND PT V/U. PT REPORTED THAT SHE FELT SOMETHING DIFFERENT IN HER BREAST. PT DID HAVE MAMMOGRAM LAST YEAR. PT REPORTED HAVING HER FIRST CHILD AT 24 AND HER FIRST PERIOD AT 14 AND SHE STARTED MENOPAUSE IN HER EARLY 40s. PT IS PRESENTLY TAKING HORMONE REPLACEMENT THERAPY. PT REPORTED THAT HER MOTHER HAD BREAST CANCER, MATERNAL AUNT BREAST CANCER, FATHER HAD LUNG AND LIVER CANCER AND POSSIBLY PROSTATE.  PT REPORTED HAVING GENETICS DONE ABOUT 2 YEARS AGO AND THE RESULTS SHOWED LOW RISK. I GAVE PT A CARD WITH MY CONTACT INFORMATION AND ENCOURAGED HER TO CALL WITH ANY QUESTIONS OR CONCERNS AND SHE V/U

## 2021-12-13 ENCOUNTER — HOSPITAL ENCOUNTER (OUTPATIENT)
Dept: CT IMAGING | Facility: HOSPITAL | Age: 61
Discharge: HOME | End: 2021-12-13

## 2021-12-13 ENCOUNTER — HOSPITAL ENCOUNTER (OUTPATIENT)
Dept: NUCLEAR MEDICINE | Facility: HOSPITAL | Age: 61
Discharge: HOME | End: 2021-12-13

## 2021-12-13 DIAGNOSIS — C50.811 MALIGNANT NEOPLASM OF OVERLAPPING SITES OF RIGHT BREAST IN FEMALE, ESTROGEN RECEPTOR POSITIVE (HCC): ICD-10-CM

## 2021-12-13 DIAGNOSIS — Z17.0 MALIGNANT NEOPLASM OF OVERLAPPING SITES OF RIGHT BREAST IN FEMALE, ESTROGEN RECEPTOR POSITIVE (HCC): ICD-10-CM

## 2021-12-13 LAB — CREAT BLDA-MCNC: 0.7 MG/DL

## 2021-12-13 PROCEDURE — 74177 CT ABD & PELVIS W/CONTRAST: CPT

## 2021-12-13 PROCEDURE — 71260 CT THORAX DX C+: CPT

## 2021-12-13 PROCEDURE — 78306 BONE IMAGING WHOLE BODY: CPT

## 2021-12-13 PROCEDURE — 82565 ASSAY OF CREATININE: CPT

## 2021-12-13 PROCEDURE — 0 TECHNETIUM MEDRONATE KIT: Performed by: SURGERY

## 2021-12-13 PROCEDURE — 0 IOPAMIDOL PER 1 ML: Performed by: SURGERY

## 2021-12-13 PROCEDURE — A9503 TC99M MEDRONATE: HCPCS | Performed by: SURGERY

## 2021-12-13 RX ORDER — TC 99M MEDRONATE 20 MG/10ML
20.3 INJECTION, POWDER, LYOPHILIZED, FOR SOLUTION INTRAVENOUS
Status: COMPLETED | OUTPATIENT
Start: 2021-12-13 | End: 2021-12-13

## 2021-12-13 RX ADMIN — TC 99M MEDRONATE 20.3 MILLICURIE: 20 INJECTION, POWDER, LYOPHILIZED, FOR SOLUTION INTRAVENOUS at 12:26

## 2021-12-13 RX ADMIN — IOPAMIDOL 100 ML: 755 INJECTION, SOLUTION INTRAVENOUS at 12:11

## 2021-12-14 ENCOUNTER — PREP FOR SURGERY (OUTPATIENT)
Dept: OTHER | Facility: HOSPITAL | Age: 61
End: 2021-12-14

## 2021-12-15 ENCOUNTER — HOSPITAL ENCOUNTER (OUTPATIENT)
Dept: MRI IMAGING | Facility: HOSPITAL | Age: 61
Discharge: HOME | End: 2021-12-15
Admitting: SURGERY

## 2021-12-15 VITALS — SYSTOLIC BLOOD PRESSURE: 127 MMHG | HEART RATE: 75 BPM | DIASTOLIC BLOOD PRESSURE: 73 MMHG | OXYGEN SATURATION: 98 %

## 2021-12-15 DIAGNOSIS — Z17.0 MALIGNANT NEOPLASM OF OVERLAPPING SITES OF RIGHT BREAST IN FEMALE, ESTROGEN RECEPTOR POSITIVE (HCC): ICD-10-CM

## 2021-12-15 DIAGNOSIS — C50.811 MALIGNANT NEOPLASM OF OVERLAPPING SITES OF RIGHT BREAST IN FEMALE, ESTROGEN RECEPTOR POSITIVE (HCC): ICD-10-CM

## 2021-12-15 PROCEDURE — 0 GADOBENATE DIMEGLUMINE 529 MG/ML SOLUTION: Performed by: SURGERY

## 2021-12-15 PROCEDURE — 77049 MRI BREAST C-+ W/CAD BI: CPT

## 2021-12-15 PROCEDURE — A9577 INJ MULTIHANCE: HCPCS | Performed by: SURGERY

## 2021-12-15 RX ORDER — FAMOTIDINE 10 MG/ML
20 INJECTION, SOLUTION INTRAVENOUS ONCE
Status: COMPLETED | OUTPATIENT
Start: 2021-12-15 | End: 2021-12-15

## 2021-12-15 RX ADMIN — GADOBENATE DIMEGLUMINE 15 ML: 529 INJECTION, SOLUTION INTRAVENOUS at 10:10

## 2021-12-15 RX ADMIN — FAMOTIDINE 20 MG: 10 INJECTION INTRAVENOUS at 10:41

## 2021-12-15 NOTE — NURSING NOTE
Dr Weiss notified vitals stable, symptoms improved  Ok to discharge with instructions to return to ER if symptoms worsen/return, Benadryl per package directions

## 2021-12-15 NOTE — NURSING NOTE
Pt brought to rad holding per MRI staff post MRI with contrast, c/o itching on back, arms, hands, visible redness noted to back, arms and neck. Runny nose.  Denies itchy throat, ears or difficulty breathing.  Vitals stable, awaiting radiologist to see

## 2021-12-15 NOTE — NURSING NOTE
Dr Weiss at bedside to assess/talk with pt. Symptoms remain, no worsening.  Orders received for Pepcid 20mg IV now

## 2021-12-16 ENCOUNTER — HOSPITAL ENCOUNTER (OUTPATIENT)
Dept: MAMMOGRAPHY | Facility: HOSPITAL | Age: 61
Discharge: HOME | End: 2021-12-16

## 2021-12-16 ENCOUNTER — TELEPHONE (OUTPATIENT)
Dept: ONCOLOGY | Facility: HOSPITAL | Age: 61
End: 2021-12-16

## 2021-12-16 DIAGNOSIS — C50.811 MALIGNANT NEOPLASM OF OVERLAPPING SITES OF RIGHT BREAST IN FEMALE, ESTROGEN RECEPTOR POSITIVE (HCC): ICD-10-CM

## 2021-12-16 DIAGNOSIS — Z17.0 MALIGNANT NEOPLASM OF OVERLAPPING SITES OF RIGHT BREAST IN FEMALE, ESTROGEN RECEPTOR POSITIVE (HCC): ICD-10-CM

## 2021-12-16 PROCEDURE — 0 LIDOCAINE 1 % SOLUTION: Performed by: SURGERY

## 2021-12-16 PROCEDURE — A4648 IMPLANTABLE TISSUE MARKER: HCPCS

## 2021-12-16 RX ORDER — LIDOCAINE HYDROCHLORIDE 10 MG/ML
10 INJECTION, SOLUTION INFILTRATION; PERINEURAL ONCE
Status: COMPLETED | OUTPATIENT
Start: 2021-12-16 | End: 2021-12-16

## 2021-12-16 RX ADMIN — LIDOCAINE HYDROCHLORIDE 10 ML: 10 INJECTION, SOLUTION INFILTRATION; PERINEURAL at 14:42

## 2021-12-16 NOTE — TELEPHONE ENCOUNTER
Caller: Marisa Portillo    Relationship: Self    Best call back number: [phone]    What is the best time to reach you: ANYTIME - LEAVE VM IF NO ANSWER    Who are you requesting to speak with (clinical staff, provider,  specific staff member): NURSE     What was the call regarding: PATIENT WOULD LIKE TO KNOW IF SHE SHOULD GET HER COVID BOOSTER.  SHE ALSO HAS A QUESTION ABOUT THE MRI THAT SHE HAD DONE YESTERDAY.      Do you require a callback: YES

## 2021-12-16 NOTE — TELEPHONE ENCOUNTER
Advised patient this was discussed with Dr. Pizano who advised to NOT get the COVID Booster prior to surgery. Dr. Mcgraw will call patient tomorrow about breast MRI.

## 2021-12-20 ENCOUNTER — CLINICAL SUPPORT (OUTPATIENT)
Dept: GENETICS | Facility: HOSPITAL | Age: 61
End: 2021-12-20

## 2021-12-20 DIAGNOSIS — Z80.3 FAMILY HISTORY OF MALIGNANT NEOPLASM OF BREAST: ICD-10-CM

## 2021-12-20 DIAGNOSIS — Z13.79 GENETIC TESTING: Primary | ICD-10-CM

## 2021-12-20 DIAGNOSIS — Z80.42 FAMILY HISTORY OF MALIGNANT NEOPLASM OF PROSTATE: ICD-10-CM

## 2021-12-20 DIAGNOSIS — Z17.0 MALIGNANT NEOPLASM OF RIGHT BREAST IN FEMALE, ESTROGEN RECEPTOR POSITIVE, UNSPECIFIED SITE OF BREAST: ICD-10-CM

## 2021-12-20 DIAGNOSIS — C50.911 MALIGNANT NEOPLASM OF RIGHT BREAST IN FEMALE, ESTROGEN RECEPTOR POSITIVE, UNSPECIFIED SITE OF BREAST: ICD-10-CM

## 2021-12-20 DIAGNOSIS — C50.919 MALIGNANT NEOPLASM OF FEMALE BREAST, UNSPECIFIED ESTROGEN RECEPTOR STATUS, UNSPECIFIED LATERALITY, UNSPECIFIED SITE OF BREAST (HCC): Primary | ICD-10-CM

## 2021-12-22 ENCOUNTER — TELEPHONE (OUTPATIENT)
Dept: OBSTETRICS AND GYNECOLOGY | Facility: CLINIC | Age: 61
End: 2021-12-22

## 2021-12-27 ENCOUNTER — HOSPITAL ENCOUNTER (OUTPATIENT)
Dept: MRI IMAGING | Facility: HOSPITAL | Age: 61
End: 2021-12-27

## 2021-12-28 ENCOUNTER — HOSPITAL ENCOUNTER (OUTPATIENT)
Facility: HOSPITAL | Age: 61
Discharge: HOME | End: 2021-12-29
Attending: SURGERY | Admitting: SURGERY

## 2021-12-28 ENCOUNTER — HOSPITAL ENCOUNTER (OUTPATIENT)
Dept: NUCLEAR MEDICINE | Facility: HOSPITAL | Age: 61
Discharge: HOME | End: 2021-12-28

## 2021-12-28 ENCOUNTER — ANESTHESIA EVENT (OUTPATIENT)
Dept: PERIOP | Facility: HOSPITAL | Age: 61
End: 2021-12-28

## 2021-12-28 ENCOUNTER — APPOINTMENT (OUTPATIENT)
Dept: GENERAL RADIOLOGY | Facility: HOSPITAL | Age: 61
End: 2021-12-28

## 2021-12-28 ENCOUNTER — ANESTHESIA (OUTPATIENT)
Dept: PERIOP | Facility: HOSPITAL | Age: 61
End: 2021-12-28

## 2021-12-28 DIAGNOSIS — Z17.0 MALIGNANT NEOPLASM OF OVERLAPPING SITES OF RIGHT BREAST IN FEMALE, ESTROGEN RECEPTOR POSITIVE: ICD-10-CM

## 2021-12-28 DIAGNOSIS — Z78.9 DECREASED ACTIVITIES OF DAILY LIVING (ADL): ICD-10-CM

## 2021-12-28 DIAGNOSIS — Z90.13 S/P BILATERAL MASTECTOMY: ICD-10-CM

## 2021-12-28 DIAGNOSIS — Z17.0 MALIGNANT NEOPLASM OF OVERLAPPING SITES OF RIGHT BREAST IN FEMALE, ESTROGEN RECEPTOR POSITIVE (HCC): ICD-10-CM

## 2021-12-28 DIAGNOSIS — Z91.89 AT RISK FOR LYMPHEDEMA: ICD-10-CM

## 2021-12-28 DIAGNOSIS — Z98.890 S/P BREAST RECONSTRUCTION: Primary | ICD-10-CM

## 2021-12-28 DIAGNOSIS — C50.811 MALIGNANT NEOPLASM OF OVERLAPPING SITES OF RIGHT BREAST IN FEMALE, ESTROGEN RECEPTOR POSITIVE: ICD-10-CM

## 2021-12-28 DIAGNOSIS — C50.811 MALIGNANT NEOPLASM OF OVERLAPPING SITES OF RIGHT BREAST IN FEMALE, ESTROGEN RECEPTOR POSITIVE (HCC): ICD-10-CM

## 2021-12-28 PROCEDURE — 19303 MAST SIMPLE COMPLETE: CPT | Performed by: SURGERY

## 2021-12-28 PROCEDURE — C1889 IMPLANT/INSERT DEVICE, NOC: HCPCS | Performed by: SURGERY

## 2021-12-28 PROCEDURE — 19340 INSJ BREAST IMPLT SM D MAST: CPT | Performed by: SURGERY

## 2021-12-28 PROCEDURE — 88305 TISSUE EXAM BY PATHOLOGIST: CPT | Performed by: SURGERY

## 2021-12-28 PROCEDURE — 71045 X-RAY EXAM CHEST 1 VIEW: CPT

## 2021-12-28 PROCEDURE — C1789 PROSTHESIS, BREAST, IMP: HCPCS | Performed by: SURGERY

## 2021-12-28 PROCEDURE — 76000 FLUOROSCOPY <1 HR PHYS/QHP: CPT

## 2021-12-28 PROCEDURE — 38792 RA TRACER ID OF SENTINL NODE: CPT

## 2021-12-28 PROCEDURE — C1781 MESH (IMPLANTABLE): HCPCS | Performed by: SURGERY

## 2021-12-28 PROCEDURE — 93005 ELECTROCARDIOGRAM TRACING: CPT | Performed by: ANESTHESIOLOGY

## 2021-12-28 PROCEDURE — 0 CEFAZOLIN PER 500 MG

## 2021-12-28 PROCEDURE — G0378 HOSPITAL OBSERVATION PER HR: HCPCS

## 2021-12-28 PROCEDURE — 88307 TISSUE EXAM BY PATHOLOGIST: CPT | Performed by: SURGERY

## 2021-12-28 PROCEDURE — C1788 PORT, INDWELLING, IMP: HCPCS | Performed by: SURGERY

## 2021-12-28 PROCEDURE — 19302 P-MASTECTOMY W/LN REMOVAL: CPT | Performed by: SURGERY

## 2021-12-28 PROCEDURE — 25010000002 ONDANSETRON PER 1 MG: Performed by: NURSE ANESTHETIST, CERTIFIED REGISTERED

## 2021-12-28 PROCEDURE — 19302 P-MASTECTOMY W/LN REMOVAL: CPT | Performed by: SPECIALIST/TECHNOLOGIST, OTHER

## 2021-12-28 PROCEDURE — C1894 INTRO/SHEATH, NON-LASER: HCPCS | Performed by: SURGERY

## 2021-12-28 PROCEDURE — 15860 IV NJX TST VASC FLO FLAP/GRF: CPT | Performed by: SURGERY

## 2021-12-28 PROCEDURE — 25010000002 HYDROMORPHONE 1 MG/ML SOLUTION: Performed by: NURSE ANESTHETIST, CERTIFIED REGISTERED

## 2021-12-28 PROCEDURE — 88331 PATH CONSLTJ SURG 1 BLK 1SPC: CPT | Performed by: SURGERY

## 2021-12-28 PROCEDURE — 19357 TISS XPNDR PLMT BRST RCNSTJ: CPT | Performed by: SURGERY

## 2021-12-28 PROCEDURE — 25010000002 DEXAMETHASONE PER 1 MG: Performed by: NURSE ANESTHETIST, CERTIFIED REGISTERED

## 2021-12-28 PROCEDURE — 36571 INSERT PICVAD CATH: CPT | Performed by: SURGERY

## 2021-12-28 PROCEDURE — 15777 ACELLULAR DERM MATRIX IMPLT: CPT | Performed by: SURGERY

## 2021-12-28 PROCEDURE — 25010000002 MIDAZOLAM PER 1 MG: Performed by: ANESTHESIOLOGY

## 2021-12-28 PROCEDURE — 19303 MAST SIMPLE COMPLETE: CPT | Performed by: SPECIALIST/TECHNOLOGIST, OTHER

## 2021-12-28 PROCEDURE — 0 TECHETIUM TC99M TILMANOCEPT: Performed by: SURGERY

## 2021-12-28 PROCEDURE — 25010000002 HEPARIN (PORCINE) PER 1000 UNITS: Performed by: SURGERY

## 2021-12-28 PROCEDURE — 25010000002 PROPOFOL 10 MG/ML EMULSION: Performed by: NURSE ANESTHETIST, CERTIFIED REGISTERED

## 2021-12-28 PROCEDURE — 25010000002 FENTANYL CITRATE (PF) 50 MCG/ML SOLUTION: Performed by: NURSE ANESTHETIST, CERTIFIED REGISTERED

## 2021-12-28 PROCEDURE — A9520 TC99 TILMANOCEPT DIAG 0.5MCI: HCPCS | Performed by: SURGERY

## 2021-12-28 PROCEDURE — 25010000002 ONDANSETRON PER 1 MG: Performed by: SURGERY

## 2021-12-28 PROCEDURE — 25010000002 HYDROMORPHONE PER 4 MG: Performed by: NURSE ANESTHETIST, CERTIFIED REGISTERED

## 2021-12-28 DEVICE — LIGACLIP MCA MULTIPLE CLIP APPLIERS, 20 MEDIUM CLIPS
Type: IMPLANTABLE DEVICE | Site: BREAST | Status: FUNCTIONAL
Brand: LIGACLIP

## 2021-12-28 DEVICE — IMPLANTABLE DEVICE: Type: IMPLANTABLE DEVICE | Site: BREAST | Status: FUNCTIONAL

## 2021-12-28 DEVICE — BRST GEL NATRELLE INSPIRA SMOTH COHESIVE F/P 560CC: Type: IMPLANTABLE DEVICE | Site: BREAST | Status: FUNCTIONAL

## 2021-12-28 DEVICE — PRT INTRO VASC/INTERV VORTEX FILL/HL DETACH/POLYURET/CATH 8F: Type: IMPLANTABLE DEVICE | Site: SUBCLAVIAN | Status: FUNCTIONAL

## 2021-12-28 DEVICE — EXPNDR TISS BRST F/HT V/P STL 133S FV/T 500CC: Type: IMPLANTABLE DEVICE | Site: BREAST | Status: FUNCTIONAL

## 2021-12-28 RX ORDER — ONDANSETRON 2 MG/ML
4 INJECTION INTRAMUSCULAR; INTRAVENOUS ONCE AS NEEDED
Status: DISCONTINUED | OUTPATIENT
Start: 2021-12-28 | End: 2021-12-28 | Stop reason: HOSPADM

## 2021-12-28 RX ORDER — ONDANSETRON 2 MG/ML
4 INJECTION INTRAMUSCULAR; INTRAVENOUS EVERY 6 HOURS PRN
Status: DISCONTINUED | OUTPATIENT
Start: 2021-12-28 | End: 2021-12-29 | Stop reason: HOSPADM

## 2021-12-28 RX ORDER — LIDOCAINE HYDROCHLORIDE 20 MG/ML
INJECTION, SOLUTION INFILTRATION; PERINEURAL AS NEEDED
Status: DISCONTINUED | OUTPATIENT
Start: 2021-12-28 | End: 2021-12-28 | Stop reason: SURG

## 2021-12-28 RX ORDER — ONDANSETRON 2 MG/ML
INJECTION INTRAMUSCULAR; INTRAVENOUS AS NEEDED
Status: DISCONTINUED | OUTPATIENT
Start: 2021-12-28 | End: 2021-12-28 | Stop reason: SURG

## 2021-12-28 RX ORDER — ACETAMINOPHEN 500 MG
1000 TABLET ORAL ONCE
Status: COMPLETED | OUTPATIENT
Start: 2021-12-28 | End: 2021-12-28

## 2021-12-28 RX ORDER — PROMETHAZINE HYDROCHLORIDE 25 MG/1
25 SUPPOSITORY RECTAL ONCE AS NEEDED
Status: DISCONTINUED | OUTPATIENT
Start: 2021-12-28 | End: 2021-12-28 | Stop reason: HOSPADM

## 2021-12-28 RX ORDER — SODIUM CHLORIDE, SODIUM LACTATE, POTASSIUM CHLORIDE, CALCIUM CHLORIDE 600; 310; 30; 20 MG/100ML; MG/100ML; MG/100ML; MG/100ML
9 INJECTION, SOLUTION INTRAVENOUS CONTINUOUS PRN
Status: DISCONTINUED | OUTPATIENT
Start: 2021-12-28 | End: 2021-12-29 | Stop reason: HOSPADM

## 2021-12-28 RX ORDER — DEXMEDETOMIDINE HYDROCHLORIDE 100 UG/ML
INJECTION, SOLUTION INTRAVENOUS AS NEEDED
Status: DISCONTINUED | OUTPATIENT
Start: 2021-12-28 | End: 2021-12-28 | Stop reason: SURG

## 2021-12-28 RX ORDER — PROPOFOL 10 MG/ML
VIAL (ML) INTRAVENOUS AS NEEDED
Status: DISCONTINUED | OUTPATIENT
Start: 2021-12-28 | End: 2021-12-28 | Stop reason: SURG

## 2021-12-28 RX ORDER — ONDANSETRON 4 MG/1
4 TABLET, FILM COATED ORAL EVERY 6 HOURS PRN
Status: DISCONTINUED | OUTPATIENT
Start: 2021-12-28 | End: 2021-12-29 | Stop reason: HOSPADM

## 2021-12-28 RX ORDER — ASPIRIN 81 MG/1
81 TABLET ORAL DAILY
COMMUNITY
End: 2022-03-09

## 2021-12-28 RX ORDER — OXYCODONE HYDROCHLORIDE 5 MG/1
5 TABLET ORAL
Status: DISCONTINUED | OUTPATIENT
Start: 2021-12-28 | End: 2021-12-28 | Stop reason: HOSPADM

## 2021-12-28 RX ORDER — CLINDAMYCIN PHOSPHATE 600 MG/50ML
600 INJECTION INTRAVENOUS EVERY 8 HOURS
Status: COMPLETED | OUTPATIENT
Start: 2021-12-28 | End: 2021-12-29

## 2021-12-28 RX ORDER — MIDAZOLAM HYDROCHLORIDE 1 MG/ML
2 INJECTION INTRAMUSCULAR; INTRAVENOUS ONCE
Status: COMPLETED | OUTPATIENT
Start: 2021-12-28 | End: 2021-12-28

## 2021-12-28 RX ORDER — NALOXONE HCL 0.4 MG/ML
0.4 VIAL (ML) INJECTION
Status: DISCONTINUED | OUTPATIENT
Start: 2021-12-28 | End: 2021-12-29 | Stop reason: HOSPADM

## 2021-12-28 RX ORDER — DEXTROSE AND SODIUM CHLORIDE 5; .45 G/100ML; G/100ML
100 INJECTION, SOLUTION INTRAVENOUS CONTINUOUS
Status: DISCONTINUED | OUTPATIENT
Start: 2021-12-28 | End: 2021-12-29 | Stop reason: HOSPADM

## 2021-12-28 RX ORDER — ESTRADIOL 0.04 MG/D
1 FILM, EXTENDED RELEASE TRANSDERMAL 2 TIMES WEEKLY
Status: DISCONTINUED | OUTPATIENT
Start: 2021-12-30 | End: 2021-12-29 | Stop reason: HOSPADM

## 2021-12-28 RX ORDER — PANTOPRAZOLE SODIUM 40 MG/1
40 TABLET, DELAYED RELEASE ORAL
Status: DISCONTINUED | OUTPATIENT
Start: 2021-12-29 | End: 2021-12-29 | Stop reason: HOSPADM

## 2021-12-28 RX ORDER — BUPIVACAINE HYDROCHLORIDE 2.5 MG/ML
INJECTION, SOLUTION EPIDURAL; INFILTRATION; INTRACAUDAL AS NEEDED
Status: DISCONTINUED | OUTPATIENT
Start: 2021-12-28 | End: 2021-12-28 | Stop reason: HOSPADM

## 2021-12-28 RX ORDER — KETAMINE HYDROCHLORIDE 50 MG/ML
INJECTION, SOLUTION, CONCENTRATE INTRAMUSCULAR; INTRAVENOUS AS NEEDED
Status: DISCONTINUED | OUTPATIENT
Start: 2021-12-28 | End: 2021-12-28 | Stop reason: SURG

## 2021-12-28 RX ORDER — MORPHINE SULFATE 2 MG/ML
2 INJECTION, SOLUTION INTRAMUSCULAR; INTRAVENOUS
Status: DISCONTINUED | OUTPATIENT
Start: 2021-12-28 | End: 2021-12-29 | Stop reason: HOSPADM

## 2021-12-28 RX ORDER — MAGNESIUM HYDROXIDE 1200 MG/15ML
LIQUID ORAL AS NEEDED
Status: DISCONTINUED | OUTPATIENT
Start: 2021-12-28 | End: 2021-12-28 | Stop reason: HOSPADM

## 2021-12-28 RX ORDER — PROMETHAZINE HYDROCHLORIDE 12.5 MG/1
25 TABLET ORAL ONCE AS NEEDED
Status: DISCONTINUED | OUTPATIENT
Start: 2021-12-28 | End: 2021-12-28 | Stop reason: HOSPADM

## 2021-12-28 RX ORDER — HYDROCODONE BITARTRATE AND ACETAMINOPHEN 5; 325 MG/1; MG/1
2 TABLET ORAL EVERY 4 HOURS PRN
Status: DISCONTINUED | OUTPATIENT
Start: 2021-12-28 | End: 2021-12-29 | Stop reason: HOSPADM

## 2021-12-28 RX ORDER — DEXAMETHASONE SODIUM PHOSPHATE 4 MG/ML
INJECTION, SOLUTION INTRA-ARTICULAR; INTRALESIONAL; INTRAMUSCULAR; INTRAVENOUS; SOFT TISSUE AS NEEDED
Status: DISCONTINUED | OUTPATIENT
Start: 2021-12-28 | End: 2021-12-28 | Stop reason: SURG

## 2021-12-28 RX ORDER — CLINDAMYCIN PHOSPHATE 900 MG/50ML
900 INJECTION INTRAVENOUS ONCE
Status: COMPLETED | OUTPATIENT
Start: 2021-12-28 | End: 2021-12-28

## 2021-12-28 RX ORDER — ROCURONIUM BROMIDE 10 MG/ML
INJECTION, SOLUTION INTRAVENOUS AS NEEDED
Status: DISCONTINUED | OUTPATIENT
Start: 2021-12-28 | End: 2021-12-28 | Stop reason: SURG

## 2021-12-28 RX ORDER — HYDROMORPHONE HCL 110MG/55ML
PATIENT CONTROLLED ANALGESIA SYRINGE INTRAVENOUS AS NEEDED
Status: DISCONTINUED | OUTPATIENT
Start: 2021-12-28 | End: 2021-12-28 | Stop reason: SURG

## 2021-12-28 RX ORDER — FENTANYL CITRATE 50 UG/ML
INJECTION, SOLUTION INTRAMUSCULAR; INTRAVENOUS AS NEEDED
Status: DISCONTINUED | OUTPATIENT
Start: 2021-12-28 | End: 2021-12-28 | Stop reason: SURG

## 2021-12-28 RX ORDER — EPHEDRINE SULFATE 50 MG/ML
INJECTION, SOLUTION INTRAVENOUS AS NEEDED
Status: DISCONTINUED | OUTPATIENT
Start: 2021-12-28 | End: 2021-12-28 | Stop reason: SURG

## 2021-12-28 RX ORDER — SUCCINYLCHOLINE/SOD CL,ISO/PF 100 MG/5ML
SYRINGE (ML) INTRAVENOUS AS NEEDED
Status: DISCONTINUED | OUTPATIENT
Start: 2021-12-28 | End: 2021-12-28 | Stop reason: SURG

## 2021-12-28 RX ADMIN — ONDANSETRON 4 MG: 2 INJECTION INTRAMUSCULAR; INTRAVENOUS at 14:20

## 2021-12-28 RX ADMIN — KETAMINE HYDROCHLORIDE 25 MG: 50 INJECTION, SOLUTION INTRAMUSCULAR; INTRAVENOUS at 10:26

## 2021-12-28 RX ADMIN — ONDANSETRON 4 MG: 2 INJECTION INTRAMUSCULAR; INTRAVENOUS at 17:09

## 2021-12-28 RX ADMIN — MIDAZOLAM 2 MG: 1 INJECTION INTRAMUSCULAR; INTRAVENOUS at 10:05

## 2021-12-28 RX ADMIN — EPHEDRINE SULFATE 10 MG: 50 INJECTION INTRAVENOUS at 12:02

## 2021-12-28 RX ADMIN — FENTANYL CITRATE 100 MCG: 50 INJECTION, SOLUTION INTRAMUSCULAR; INTRAVENOUS at 10:26

## 2021-12-28 RX ADMIN — EPHEDRINE SULFATE 10 MG: 50 INJECTION INTRAVENOUS at 11:59

## 2021-12-28 RX ADMIN — LIDOCAINE HYDROCHLORIDE 100 MG: 20 INJECTION, SOLUTION INFILTRATION; PERINEURAL at 10:26

## 2021-12-28 RX ADMIN — HYDROMORPHONE HYDROCHLORIDE 0.5 MG: 1 INJECTION, SOLUTION INTRAMUSCULAR; INTRAVENOUS; SUBCUTANEOUS at 14:44

## 2021-12-28 RX ADMIN — PROPOFOL 150 MG: 10 INJECTION, EMULSION INTRAVENOUS at 10:26

## 2021-12-28 RX ADMIN — DEXAMETHASONE SODIUM PHOSPHATE 8 MG: 4 INJECTION INTRA-ARTICULAR; INTRALESIONAL; INTRAMUSCULAR; INTRAVENOUS; SOFT TISSUE at 10:30

## 2021-12-28 RX ADMIN — EPHEDRINE SULFATE 10 MG: 50 INJECTION INTRAVENOUS at 10:56

## 2021-12-28 RX ADMIN — TILMANOCEPT 1 DOSE: KIT at 08:10

## 2021-12-28 RX ADMIN — HYDROCODONE BITARTRATE AND ACETAMINOPHEN 2 TABLET: 5; 325 TABLET ORAL at 18:43

## 2021-12-28 RX ADMIN — HYDROMORPHONE HYDROCHLORIDE 0.5 MG: 1 INJECTION, SOLUTION INTRAMUSCULAR; INTRAVENOUS; SUBCUTANEOUS at 14:30

## 2021-12-28 RX ADMIN — ROCURONIUM BROMIDE 15 MG: 10 INJECTION INTRAVENOUS at 11:10

## 2021-12-28 RX ADMIN — ROCURONIUM BROMIDE 5 MG: 10 INJECTION INTRAVENOUS at 10:26

## 2021-12-28 RX ADMIN — CLINDAMYCIN IN 5 PERCENT DEXTROSE 600 MG: 12 INJECTION, SOLUTION INTRAVENOUS at 18:48

## 2021-12-28 RX ADMIN — ONDANSETRON 4 MG: 2 INJECTION INTRAMUSCULAR; INTRAVENOUS at 10:30

## 2021-12-28 RX ADMIN — SODIUM CHLORIDE, POTASSIUM CHLORIDE, SODIUM LACTATE AND CALCIUM CHLORIDE 9 ML/HR: 600; 310; 30; 20 INJECTION, SOLUTION INTRAVENOUS at 08:18

## 2021-12-28 RX ADMIN — Medication 140 MG: at 10:26

## 2021-12-28 RX ADMIN — HYDROMORPHONE HYDROCHLORIDE 0.5 MG: 1 INJECTION, SOLUTION INTRAMUSCULAR; INTRAVENOUS; SUBCUTANEOUS at 15:23

## 2021-12-28 RX ADMIN — EPHEDRINE SULFATE 10 MG: 50 INJECTION INTRAVENOUS at 10:43

## 2021-12-28 RX ADMIN — DEXMEDETOMIDINE 20 MCG: 100 INJECTION, SOLUTION, CONCENTRATE INTRAVENOUS at 11:24

## 2021-12-28 RX ADMIN — DEXMEDETOMIDINE 10 MCG: 100 INJECTION, SOLUTION, CONCENTRATE INTRAVENOUS at 11:19

## 2021-12-28 RX ADMIN — HYDROMORPHONE HYDROCHLORIDE 0.5 MG: 2 INJECTION, SOLUTION INTRAMUSCULAR; INTRAVENOUS; SUBCUTANEOUS at 13:17

## 2021-12-28 RX ADMIN — SUGAMMADEX 160 MG: 100 INJECTION, SOLUTION INTRAVENOUS at 13:07

## 2021-12-28 RX ADMIN — CLINDAMYCIN PHOSPHATE 900 MG: 900 INJECTION, SOLUTION INTRAVENOUS at 10:31

## 2021-12-28 RX ADMIN — DEXMEDETOMIDINE 10 MCG: 100 INJECTION, SOLUTION, CONCENTRATE INTRAVENOUS at 10:26

## 2021-12-28 RX ADMIN — SODIUM CHLORIDE, POTASSIUM CHLORIDE, SODIUM LACTATE AND CALCIUM CHLORIDE: 600; 310; 30; 20 INJECTION, SOLUTION INTRAVENOUS at 12:37

## 2021-12-28 RX ADMIN — HYDROMORPHONE HYDROCHLORIDE 0.5 MG: 2 INJECTION, SOLUTION INTRAMUSCULAR; INTRAVENOUS; SUBCUTANEOUS at 11:33

## 2021-12-28 RX ADMIN — ACETAMINOPHEN 1000 MG: 500 TABLET ORAL at 08:18

## 2021-12-28 RX ADMIN — DEXTROSE AND SODIUM CHLORIDE 100 ML/HR: 5; 450 INJECTION, SOLUTION INTRAVENOUS at 17:10

## 2021-12-28 NOTE — ANESTHESIA PREPROCEDURE EVALUATION
Anesthesia Evaluation     Patient summary reviewed and Nursing notes reviewed   no history of anesthetic complications:  NPO Solid Status: > 8 hours  NPO Liquid Status: > 2 hours           Airway   Mallampati: II  TM distance: >3 FB  Neck ROM: full  No difficulty expected  Dental      Pulmonary - negative pulmonary ROS and normal exam    breath sounds clear to auscultation  Cardiovascular - normal exam  Exercise tolerance: good (4-7 METS)    Rhythm: regular    (+) hyperlipidemia,       Neuro/Psych  (+) TIA,     GI/Hepatic/Renal/Endo - negative ROS     Musculoskeletal     Abdominal    Substance History - negative use     OB/GYN negative ob/gyn ROS         Other   arthritis,    history of cancer                    Anesthesia Plan    ASA 3     general       Anesthetic plan, all risks, benefits, and alternatives have been provided, discussed and informed consent has been obtained with: patient.

## 2021-12-28 NOTE — ANESTHESIA POSTPROCEDURE EVALUATION
Patient: Marisa Portillo    Procedure Summary     Date: 12/28/21 Room / Location: [facility] OR  / [facility]    Anesthesia Start: 1021 Anesthesia Stop: 1331    Procedures:       BILATERAL BREAST MASTECTOMIES WITH RIGHT SENTINEL NODE BIOPSIES WITH FROZEN SECTIONS (Bilateral )      INSERTION OF PORTACATH (Left )      bilateral breast reconstructon with bilateral mesh placement.   left implant, right tissue expander placement (Bilateral Breast) Diagnosis:       Malignant neoplasm of overlapping sites of right breast in female, estrogen receptor positive (HCC)      (Malignant neoplasm of overlapping sites of right breast in female, estrogen receptor positive (HCC) [C50.811, Z17.0])    Surgeons: Jacqueline Mcgraw MD; Lacy Shelton MD Provider: Richard Amos MD    Anesthesia Type: general ASA Status: 3          Anesthesia Type: general    Vitals  Vitals Value Taken Time   /73 12/28/21 1434   Temp 36 °C (96.8 °F) 12/28/21 1328   Pulse 91 12/28/21 1439   Resp 11 12/28/21 1358   SpO2 96 % 12/28/21 1439   Vitals shown include unvalidated device data.        Post Anesthesia Care and Evaluation    Patient location during evaluation: bedside  Patient participation: complete - patient participated  Level of consciousness: awake  Pain management: adequate  Airway patency: patent  Anesthetic complications: No anesthetic complications  PONV Status: none  Cardiovascular status: acceptable and stable  Respiratory status: acceptable  Hydration status: acceptable    Comments: An Anesthesiologist personally participated in the most demanding procedures (including induction and emergence if applicable) in the anesthesia plan, monitored the course of anesthesia administration at frequent intervals and remained physically present and available for immediate diagnosis and treatment of emergencies.

## 2021-12-29 ENCOUNTER — READMISSION MANAGEMENT (OUTPATIENT)
Dept: CALL CENTER | Facility: HOSPITAL | Age: 61
End: 2021-12-29

## 2021-12-29 VITALS
HEIGHT: 65 IN | OXYGEN SATURATION: 98 % | RESPIRATION RATE: 20 BRPM | HEART RATE: 77 BPM | WEIGHT: 177.03 LBS | TEMPERATURE: 98.6 F | SYSTOLIC BLOOD PRESSURE: 164 MMHG | DIASTOLIC BLOOD PRESSURE: 49 MMHG | BODY MASS INDEX: 29.49 KG/M2

## 2021-12-29 PROBLEM — Z90.13 S/P BILATERAL MASTECTOMY: Status: ACTIVE | Noted: 2021-12-29

## 2021-12-29 PROBLEM — Z95.828 PORT-A-CATH IN PLACE: Status: ACTIVE | Noted: 2021-12-29

## 2021-12-29 PROBLEM — Z98.890 S/P BREAST RECONSTRUCTION: Status: ACTIVE | Noted: 2021-12-29

## 2021-12-29 LAB
ANION GAP SERPL CALCULATED.3IONS-SCNC: 9.5 MMOL/L (ref 5–15)
BASOPHILS # BLD AUTO: 0.02 10*3/MM3 (ref 0–0.2)
BASOPHILS NFR BLD AUTO: 0.1 % (ref 0–1.5)
BUN SERPL-MCNC: 9 MG/DL (ref 8–23)
BUN/CREAT SERPL: 13.4 (ref 7–25)
CALCIUM SPEC-SCNC: 8.5 MG/DL (ref 8.6–10.5)
CHLORIDE SERPL-SCNC: 102 MMOL/L (ref 98–107)
CO2 SERPL-SCNC: 24.5 MMOL/L (ref 22–29)
CREAT SERPL-MCNC: 0.67 MG/DL (ref 0.57–1)
CYTO UR: NORMAL
DEPRECATED RDW RBC AUTO: 45.6 FL (ref 37–54)
EOSINOPHIL # BLD AUTO: 0 10*3/MM3 (ref 0–0.4)
EOSINOPHIL NFR BLD AUTO: 0 % (ref 0.3–6.2)
ERYTHROCYTE [DISTWIDTH] IN BLOOD BY AUTOMATED COUNT: 13.5 % (ref 12.3–15.4)
GFR SERPL CREATININE-BSD FRML MDRD: 89 ML/MIN/1.73
GLUCOSE SERPL-MCNC: 169 MG/DL (ref 65–99)
HCT VFR BLD AUTO: 33.8 % (ref 34–46.6)
HGB BLD-MCNC: 11.6 G/DL (ref 12–15.9)
IMM GRANULOCYTES # BLD AUTO: 0.07 10*3/MM3 (ref 0–0.05)
IMM GRANULOCYTES NFR BLD AUTO: 0.5 % (ref 0–0.5)
LAB AP CASE REPORT: NORMAL
LAB AP CLINICAL INFORMATION: NORMAL
LAB AP SYNOPTIC CHECKLIST: NORMAL
LYMPHOCYTES # BLD AUTO: 1.06 10*3/MM3 (ref 0.7–3.1)
LYMPHOCYTES NFR BLD AUTO: 7.5 % (ref 19.6–45.3)
Lab: NORMAL
MCH RBC QN AUTO: 31.4 PG (ref 26.6–33)
MCHC RBC AUTO-ENTMCNC: 34.3 G/DL (ref 31.5–35.7)
MCV RBC AUTO: 91.6 FL (ref 79–97)
MONOCYTES # BLD AUTO: 1.26 10*3/MM3 (ref 0.1–0.9)
MONOCYTES NFR BLD AUTO: 8.9 % (ref 5–12)
NEUTROPHILS NFR BLD AUTO: 11.68 10*3/MM3 (ref 1.7–7)
NEUTROPHILS NFR BLD AUTO: 83 % (ref 42.7–76)
NRBC BLD AUTO-RTO: 0 /100 WBC (ref 0–0.2)
PATH REPORT.FINAL DX SPEC: NORMAL
PATH REPORT.GROSS SPEC: NORMAL
PLATELET # BLD AUTO: 202 10*3/MM3 (ref 140–450)
PMV BLD AUTO: 10.4 FL (ref 6–12)
POTASSIUM SERPL-SCNC: 4 MMOL/L (ref 3.5–5.2)
RBC # BLD AUTO: 3.69 10*6/MM3 (ref 3.77–5.28)
SODIUM SERPL-SCNC: 136 MMOL/L (ref 136–145)
WBC NRBC COR # BLD: 14.09 10*3/MM3 (ref 3.4–10.8)

## 2021-12-29 PROCEDURE — 99024 POSTOP FOLLOW-UP VISIT: CPT | Performed by: SURGERY

## 2021-12-29 PROCEDURE — G0378 HOSPITAL OBSERVATION PER HR: HCPCS

## 2021-12-29 PROCEDURE — L8015 EXT BREASTPROSTHESIS GARMENT: HCPCS

## 2021-12-29 PROCEDURE — 97530 THERAPEUTIC ACTIVITIES: CPT

## 2021-12-29 PROCEDURE — 25010000002 ONDANSETRON PER 1 MG: Performed by: SURGERY

## 2021-12-29 PROCEDURE — 80048 BASIC METABOLIC PNL TOTAL CA: CPT | Performed by: SURGERY

## 2021-12-29 PROCEDURE — 97165 OT EVAL LOW COMPLEX 30 MIN: CPT

## 2021-12-29 PROCEDURE — 85025 COMPLETE CBC W/AUTO DIFF WBC: CPT | Performed by: SURGERY

## 2021-12-29 PROCEDURE — 97535 SELF CARE MNGMENT TRAINING: CPT

## 2021-12-29 RX ORDER — CEPHALEXIN 500 MG/1
500 CAPSULE ORAL 4 TIMES DAILY
Qty: 12 CAPSULE | Refills: 0 | Status: SHIPPED | OUTPATIENT
Start: 2021-12-29 | End: 2022-01-01

## 2021-12-29 RX ORDER — HYDROCODONE BITARTRATE AND ACETAMINOPHEN 5; 325 MG/1; MG/1
1 TABLET ORAL EVERY 4 HOURS PRN
Qty: 18 TABLET | Refills: 0 | Status: SHIPPED | OUTPATIENT
Start: 2021-12-29 | End: 2022-01-01

## 2021-12-29 RX ADMIN — HYDROCODONE BITARTRATE AND ACETAMINOPHEN 2 TABLET: 5; 325 TABLET ORAL at 09:50

## 2021-12-29 RX ADMIN — CLINDAMYCIN IN 5 PERCENT DEXTROSE 600 MG: 12 INJECTION, SOLUTION INTRAVENOUS at 02:22

## 2021-12-29 RX ADMIN — HYDROCODONE BITARTRATE AND ACETAMINOPHEN 2 TABLET: 5; 325 TABLET ORAL at 04:11

## 2021-12-29 RX ADMIN — PANTOPRAZOLE SODIUM 40 MG: 40 TABLET, DELAYED RELEASE ORAL at 05:43

## 2021-12-29 RX ADMIN — ONDANSETRON 4 MG: 2 INJECTION INTRAMUSCULAR; INTRAVENOUS at 04:12

## 2021-12-29 RX ADMIN — DEXTROSE AND SODIUM CHLORIDE 100 ML/HR: 5; 450 INJECTION, SOLUTION INTRAVENOUS at 04:12

## 2021-12-29 NOTE — OUTREACH NOTE
Prep Survey      Responses   [facility] patient discharged from? [facility]   Is LACE score < 7 ? Yes   Emergency Room discharge w/ pulse ox? No   Eligibility [facility]   Date of Admission 12/28/21   Date of Discharge 12/29/21   Discharge Disposition Home or Self Care   Discharge diagnosis BILATERAL BREAST MASTECTOMIES,   bilateral breast reconstructon    Does the patient have one of the following disease processes/diagnoses(primary or secondary)? General Surgery   Does the patient have Home health ordered? No   Is there a DME ordered? No   Prep survey completed? Yes          Babs Kilpatrick RN

## 2021-12-30 ENCOUNTER — TRANSITIONAL CARE MANAGEMENT TELEPHONE ENCOUNTER (OUTPATIENT)
Dept: CALL CENTER | Facility: HOSPITAL | Age: 61
End: 2021-12-30

## 2021-12-30 NOTE — OUTREACH NOTE
Call Center TCM Note      Responses   [facility] patient discharged from? [place]   Does the patient have one of the following disease processes/diagnoses(primary or secondary)? General Surgery   TCM attempt successful? Yes   Call start time 1108   Call end time 1109   Discharge diagnosis BILATERAL BREAST MASTECTOMIES,   bilateral breast reconstructon    Meds reviewed with patient/caregiver? Yes   Is the patient having any side effects they believe may be caused by any medication additions or changes? No   Does the patient have all medications related to this admission filled (includes all antibiotics, pain medications, etc.) Yes   Is the patient taking all medications as directed (includes completed medication regime)? Yes   Does the patient have a follow up appointment scheduled with their surgeon? Yes   Has the patient kept scheduled appointments due by today? N/A   Comments f/u with surgeon on 1/4/22   Has home health visited the patient within 72 hours of discharge? N/A   Psychosocial issues? No   Did the patient receive a copy of their discharge instructions? Yes   Nursing interventions Reviewed instructions with patient   What is the patient's perception of their health status since discharge? Improving   Nursing interventions Nurse provided patient education   Is the patient /caregiver able to teach back basic post-op care? Continue use of incentive spirometry at least 1 week post discharge,  Practice 'cough and deep breath',  Drive as instructed by MD in discharge instructions,  Take showers only when approved by MD-sponge bathe until then,  No tub bath, swimming, or hot tub until instructed by MD,  Do not remove steri-strips,  Keep incision areas clean,dry and protected,  Lifting as instructed by MD in discharge instructions   Is the patient/caregiver able to teach back signs and symptoms of incisional infection? Fever   Is the patient/caregiver able to teach back steps to recovery at home? Set small,  achievable goals for return to baseline health,  Rest and rebuild strength, gradually increase activity   Is the patient/caregiver able to teach back the hierarchy of who to call/visit for symptoms/problems? PCP, Specialist, Home health nurse, Urgent Care, ED, 911 Yes   TCM call completed? Yes   Wrap up additional comments Says she is doing well, no questions or concerns at this time, she will be following up with her surgeon.          Iesha Devine RN    12/30/2021, 11:09 EST

## 2022-01-04 ENCOUNTER — OFFICE VISIT (OUTPATIENT)
Dept: PLASTIC SURGERY | Facility: CLINIC | Age: 62
End: 2022-01-04

## 2022-01-04 VITALS
TEMPERATURE: 95.5 F | OXYGEN SATURATION: 98 % | DIASTOLIC BLOOD PRESSURE: 80 MMHG | HEIGHT: 65 IN | HEART RATE: 80 BPM | SYSTOLIC BLOOD PRESSURE: 139 MMHG | BODY MASS INDEX: 29.16 KG/M2 | WEIGHT: 175 LBS

## 2022-01-04 DIAGNOSIS — Z09 POSTOPERATIVE FOLLOW-UP: Primary | ICD-10-CM

## 2022-01-04 PROCEDURE — 99024 POSTOP FOLLOW-UP VISIT: CPT | Performed by: NURSE PRACTITIONER

## 2022-01-04 NOTE — PROGRESS NOTES
"Post-op Follow-up (expander)            History of Present Illness  Marisa Portillo is a 61 y.o. female who presents to [facility] as follow up from bilateral nipple sparing mastectomies with left implant and right expander due to blood flow. She has 300 cc of fluid on the right breast and the drain has minimal output.    cSubjective      Multihance [gadobenate]  Allergies Reconciled.    Review of Systems     Objective     /75 (BP Location: Left arm, Patient Position: Sitting)   Pulse 75   Temp 95.6 °F (35.3 °C) (Temporal)   Ht 165.1 cm (65\")   Wt 78.5 kg (173 lb)   SpO2 97%   BMI 28.79 kg/m²     Body mass index is 28.79 kg/m².    Physical Exam  Breasts healing well, left still high. Right with lower portion with retraction.       Result Review :   The following data was reviewed by: Lacy Shelton MD on 01/13/2022:           Assessment and Plan      Diagnoses and all orders for this visit:    1. Postoperative follow-up (Primary)        Additional Order(s):       Plan:  • Right drain removed today  • Right breast expander :   • 180cc today. Total of 480cc  • Plan to follow up next week for next fill. Left breast has 560 cc. I plan to fill the right breast with 120 cc of saline.   •   • CPT codes:   • Lacy Shelton MD, PhD  • NPI: [id number]    I spent 60 minutes caring for Marisa on this date of service. This time includes time spent by me in the following activities:performing a medically appropriate examination and/or evaluation , counseling and educating the patient/family/caregiver, documenting information in the medical record, and care coordination    Follow Up     No follow-ups on file.    Patient was given instructions and counseling regarding her condition. Please see specific information pulled into the AVS if appropriate.     Lacy Shelton MD  01/13/2022      Answers for HPI/ROS submitted by the patient on " 1/3/2022  Please describe your symptoms.: Post op visit  Have you had these symptoms before?: No  How long have you been having these symptoms?: 5-7 days  Please list any medications you are currently taking for this condition.: Hrdrocodone5/325 1 tab q4 hours prn pain. Average tsking once daily  Please describe any probable cause for these symptoms. : Post op masrectomy reconstruction  What is the primary reason for your visit?: Other      Answers for HPI/ROS submitted by the patient on 1/12/2022  How long have you been having these symptoms?: 1-2 weeks  What is the primary reason for your visit?: Other

## 2022-01-05 LAB — QT INTERVAL: 432 MS

## 2022-01-05 NOTE — PROGRESS NOTES
Answers for HPI/ROS submitted by the patient on 1/6/2022  Have you had these symptoms before?: No  How long have you been having these symptoms?: 1-2 weeks  What is the primary reason for your visit?: Other    Conflicting answers have been found for some questions. Please document the patient's answers manually.     Chief Complaint  No chief complaint on file.    Subjective          History of Present Illness  The patient is here to follow up on bilateral mastectomy with axillary dissection.  They are doing well and have no complaints.  Pathology is shown below:      Clinical Information    Comment:    Malignant neoplasm of overlapping sites of right breast in female, estrogen receptor positive (HCC)      Final Diagnosis   1. [place] node #1, excision:               - One lymph node positive for macrometastatic carcinoma (1/1)               - See synoptic checklist     2. [place] node #2, excision:               - One lymph node positive for macrometastatic carcinoma (1/1)               - See synoptic checklist     3. [place] node #3, excision:               - One lymph node negative for metastatic carcinoma (0/1)               - See synoptic checklist     4. [place] node #4, excision:               - One of two lymph nodes positive for macrometastatic carcinoma (1/2)               - See synoptic checklist     5. [place] node #5, excision:               - One of three lymph nodes positive for macrometastatic carcinoma (1/3)               - See synoptic checklist     6. [place] node #6, excision:               - One lymph node negative for carcinoma (0/1)               - See synoptic checklist     7. [place] node #7, excision:               - One lymph node negative for carcinoma (0/1)               - See synoptic checklist     8. Left breast, mastectomy:               - Fibrocystic change               - Usual ductal hyperplasia     9. Left breast, retroareolar biopsy:               - Intraductal papilloma     "           - Usual ductal hyperplasia     10. 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 Right breast, retroareolar biopsy:               - Negative for carcinoma and carcinoma in situ               - See synoptic checklist     12. Right breast, additional retroareolar tissue, excision:               - Negative for carcinoma and carcinoma in situ               - See synoptic checklist      Electronically signed by Deyvi Crews MD on 12/29/2021 at 1250   Synoptic Checklist     INVASIVE CARCINOMA OF THE BREAST: Resection  8th Edition - Protocol posted: 6/30/2021  INVASIVE CARCINOMA OF THE BREAST: COMPLETE EXCISION - 1, 2, 3, 4, 5, 6, 7, 10, 11, 12  SPECIMEN   Procedure  Total mastectomy    Specimen Laterality  Right    TUMOR   Histologic Type  Invasive carcinoma of no special type (ductal)    Histologic Grade ([place] Histologic Score)     Glandular (Acinar) / Tubular Differentiation  Score 3    Nuclear Pleomorphism  Score 2    Mitotic Rate  Score 2    Overall Grade  Grade 2 (scores of 6 or 7)    Tumor Size  Greatest dimension of largest invasive focus (Millimeters): 23 mm   Tumor Focality  Multiple foci of invasive carcinoma    Number of Foci  2    Ductal Carcinoma In Situ (DCIS)  Present      Positive for extensive intraductal component (EIC)    Architectural Patterns  [place]      Cribriform      Papillary      Solid    Nuclear Grade  Grade III (high)    Necrosis  Present, central (expansive \"comedo\" necrosis)    Tumor Extent     Microcalcifications  Present in invasive carcinoma      Present in non-neoplastic tissue    Treatment Effect in the Breast  No known presurgical therapy    MARGINS   Margin Status for Invasive Carcinoma  " All margins negative for invasive carcinoma    Distance from Invasive Carcinoma to Closest Margin  20 mm   Closest Margin(s) to Invasive Carcinoma  Posterior    Margin Status for DCIS  All margins negative for DCIS    Distance from DCIS to Closest Margin  7 mm   Closest Margin(s) to DCIS  Posterior    REGIONAL LYMPH NODES   Regional Lymph Node Status  Tumor present in regional lymph node(s)    Number of Lymph Nodes with Macrometastases  4    Number of Lymph Nodes with Micrometastases  0    Number of Lymph Nodes with Isolated Tumor Cells  0    Size of Largest Sofi Metastatic Deposit  23 mm   Extranodal Extension  Not identified    Total Number of Lymph Nodes Examined (sentinel and non-sentinel)  10    Number of [place] Nodes Examined  10    PATHOLOGIC STAGE CLASSIFICATION (pTNM, AJCC 8th Edition)   Reporting of pT, pN, and (when applicable) pM categories is based on information available to the pathologist at the time the report is issued. As per the AJCC (Chapter 1, 8th Ed.) it is the managing physician’s responsibility to establish the final pathologic stage based upon all pertinent information, including but potentially not limited to this pathology report.   TNM Descriptors  m (multiple foci of invasive carcinoma)    pT Category  pT2    pN Category  pN2a    Breast Biomarker Testing Performed on Previous Biopsy     Estrogen Receptor (ER) Status  Positive (greater than 10% of cells demonstrate nuclear positivity)    Percentage of Cells with Nuclear Positivity  40 %   Breast Biomarker Testing Performed on Previous Biopsy     Progesterone Receptor (PgR) Status  Positive    Percentage of Cells with Nuclear Positivity  5 %   Breast Biomarker Testing Performed on Previous Biopsy     HER2 (by immunohistochemistry)  Negative (Score 0)    Breast Biomarker Testing Performed on Previous Biopsy     Ki-67 Percentage of Positive Nuclei  14 %   Testing Performed on Case Number  [id number]    .          Objective   Vital Signs:    There were no vitals taken for this visit.    Physical Exam  Vitals and nursing note reviewed.   Constitutional:       General: She is not in acute distress.     Appearance: Normal appearance. She is well-developed.   HENT:      Head: Normocephalic and atraumatic.   Eyes:      Extraocular Movements: Extraocular movements intact.      Pupils: Pupils are equal, round, and reactive to light.   Cardiovascular:      Pulses: Normal pulses.   Pulmonary:      Effort: Pulmonary effort is normal. No retractions.      Breath sounds: Normal air entry. No wheezing.   Abdominal:      General: There is no distension.      Palpations: Abdomen is soft.      Tenderness: There is no abdominal tenderness.      Hernia: No hernia is present.   Musculoskeletal:         General: No swelling or deformity.      Cervical back: Neck supple.   Skin:     General: Skin is warm and dry.      Findings: No erythema.      Comments: Surgical Incision Healing Well   Neurological:      General: No focal deficit present.      Mental Status: She is alert and oriented to person, place, and time.      Motor: Motor function is intact.   Psychiatric:         Mood and Affect: Mood normal.         Thought Content: Thought content normal.            Result Review :                 Assessment and Plan    Diagnoses and all orders for this visit:    1. Malignant neoplasm of overlapping sites of right breast in female, estrogen receptor positive (HCC) (Primary)    Keep apt with oncology and plastics  Will refer to radiation oncology    Follow Up   No follow-ups on file.  Patient was given instructions and counseling regarding her condition or for health maintenance advice. Please see specific information pulled into the AVS if appropriate.

## 2022-01-06 ENCOUNTER — OFFICE VISIT (OUTPATIENT)
Dept: SURGERY | Facility: CLINIC | Age: 62
End: 2022-01-06

## 2022-01-06 ENCOUNTER — TELEPHONE (OUTPATIENT)
Dept: SURGERY | Facility: CLINIC | Age: 62
End: 2022-01-06

## 2022-01-06 VITALS — WEIGHT: 175.2 LBS | BODY MASS INDEX: 29.19 KG/M2 | RESPIRATION RATE: 14 BRPM | HEIGHT: 65 IN

## 2022-01-06 DIAGNOSIS — C50.811 MALIGNANT NEOPLASM OF OVERLAPPING SITES OF RIGHT BREAST IN FEMALE, ESTROGEN RECEPTOR POSITIVE: Primary | ICD-10-CM

## 2022-01-06 DIAGNOSIS — Z17.0 MALIGNANT NEOPLASM OF OVERLAPPING SITES OF RIGHT BREAST IN FEMALE, ESTROGEN RECEPTOR POSITIVE: Primary | ICD-10-CM

## 2022-01-06 PROCEDURE — 99024 POSTOP FOLLOW-UP VISIT: CPT | Performed by: SURGERY

## 2022-01-06 NOTE — TELEPHONE ENCOUNTER
DELETE AFTER REVIEWING: Telephone encounter to be sent to the clinical pool     Caller: MARGARETH    Relationship: [facility]    Best call back number: [phone]    What orders are you requesting (i.e. lab or imaging): ACTIVE THERAPY PLAN    Additional notes: MARGARETH IS WITH [facility], NEEDS ORDER INPUT INTO [employer] OR FAXED [phone]

## 2022-01-07 ENCOUNTER — APPOINTMENT (OUTPATIENT)
Dept: OCCUPATIONAL THERAPY | Facility: HOSPITAL | Age: 62
End: 2022-01-07

## 2022-01-12 ENCOUNTER — HOSPITAL ENCOUNTER (OUTPATIENT)
Dept: OCCUPATIONAL THERAPY | Facility: HOSPITAL | Age: 62
Discharge: HOME | End: 2022-01-12
Admitting: SURGERY

## 2022-01-12 DIAGNOSIS — L90.5 SCAR CONDITION AND FIBROSIS OF SKIN: ICD-10-CM

## 2022-01-12 DIAGNOSIS — R52 PAIN: ICD-10-CM

## 2022-01-12 DIAGNOSIS — Z91.89 AT RISK FOR LYMPHEDEMA: Primary | ICD-10-CM

## 2022-01-12 PROCEDURE — 97165 OT EVAL LOW COMPLEX 30 MIN: CPT

## 2022-01-12 PROCEDURE — 97535 SELF CARE MNGMENT TRAINING: CPT

## 2022-01-12 PROCEDURE — 97110 THERAPEUTIC EXERCISES: CPT

## 2022-01-12 NOTE — THERAPY EVALUATION
Outpatient Occupational Therapy Lymphedema Initial Evaluation   [facility]     Patient Name: Marisa Portillo  : 1960  MRN: [id number]  Today's Date: 2022      Visit Date: 2022    Patient Active Problem List   Diagnosis   • Arthritis   • Bladder disorder   • Concussion   • Contact dermatitis and eczema   • Injury, other and unspecified, finger   • Limb swelling   • Seasonal allergic rhinitis   • Vitamin D deficiency   • IFG (impaired fasting glucose)   • Mixed hyperlipidemia   • TIA (transient ischemic attack)   • Malignant neoplasm of overlapping sites of right breast in female, estrogen receptor positive (HCC)   • Port-A-Cath placement   • S/P bilateral nipple sparing mastectomy   • S/P BILATERAL IMMEDIATE BREAST RECONSTRUCTION WITH LEFT PRE PEC PLACEMENT OF SILICONE GEL IMPLANT AND MESH, RIGHT PRE PEC PLACEMENT OF EXPANDER AND MESH        Past Medical History:   Diagnosis Date   • Allergies    • Breast cancer (HCC)    • High cholesterol    • IFG (impaired fasting glucose)    • Osteoarthritis    • Port-A-Cath placement 2021   • S/P BILATERAL IMMEDIATE BREAST RECONSTRUCTION WITH LEFT PRE PEC PLACEMENT OF SILICONE GEL IMPLANT AND MESH, RIGHT PRE PEC PLACEMENT OF EXPANDER AND MESH 2021   • TIA (transient ischemic attack)     no residual (tia approximately 8 years ago)   • Vitamin D deficiency         Past Surgical History:   Procedure Laterality Date   • APPENDECTOMY     • BREAST AUGMENTATION Bilateral 2021    Procedure: bilateral breast reconstructon with bilateral mesh placement.   left implant, right tissue expander placement;  Surgeon: Lacy Shelton MD;  [place] [facility] [place] [facility] [place]  Service: Plastics;  Laterality: Bilateral;   • CEREBRAL ANGIOGRAM      clip placed- pt stated can have MRI with cerebral clips   • CYST REMOVAL Right     rt wrist   • MASTECTOMY     • MASTECTOMY COMPLETE / SIMPLE W/ SENTINEL NODE BIOPSY Bilateral    • PORTACATH PLACEMENT Left 2021     Procedure: INSERTION OF PORTACATH;  Surgeon: Jacqueline Mcgraw MD;  [place] [facility] [place] [facility] [place];  Service: General;  Laterality: Left;   • SENTINEL NODE BIOPSY Bilateral 12/28/2021    Procedure: BILATERAL BREAST MASTECTOMIES WITH RIGHT SENTINEL NODE BIOPSIES WITH FROZEN SECTIONS;  Surgeon: Jacqueline Mcgraw MD;  Location: [facility];  Service: General;  Laterality: Bilateral;   • TOTAL ABDOMINAL HYSTERECTOMY WITH SALPINGO OOPHORECTOMY      Ovaries left in place          Visit Dx:     ICD-10-CM ICD-9-CM   1. At risk for lymphedema  Z91.89 V49.89   2. Scar condition and fibrosis of skin  L90.5 709.2   3. Pain  R52 780.96        Patient History     Row Name 01/12/22 0800             History    Brief Description of Current Complaint B mastectomy with SNLB x10  -CH              Fall Risk Assessment    Does patient have a fear of falling No  -CH              Daily Activities    How does patient learn best? Listening; Reading; Demonstration; Pictures/Video  -      Barriers to learning None  -            User Key  (r) = Recorded By, (t) = Taken By, (c) = Cosigned By    Initials Name Provider Type     Daja Felix OT Occupational Therapist                 Lymphedema     Row Name 01/12/22 0800             Subjective Pain    Able to rate subjective pain? yes  -CH      Pre-Treatment Pain Level 6  -CH      Post-Treatment Pain Level 6  -CH      Subjective Pain Comment Patient complained of pain in the distal brachium that radiates into the chest wall.  Patient describes it as a burning and sharp pain.  -CH              Subjective Comments    Subjective Comments Patient states she is anticipating getting her drain tubes being pulled at her next plastic surgeons visit.  She will also follow-up on when her range of motion restrictions will be removed.  -CH              Lymphedema Assessment    Lymphedema Classification RUE:; at risk/stage 0  -CH      Lymphedema Cancer Related Sx bilateral; simple mastectomy; sentinel  "node biopsy; other (comment)  Immediate above the pectoral implantation with expander on the right and full implant on the left.  -CH      Lymph Nodes Removed # 10  -CH      Positive Lymph Nodes # 4  -CH      Chemo Received yes  -CH      Chemo Treatments #/Timeframe Pt. has an appt with her oncologist to discuss chemotherapy regime.  -CH      Radiation Therapy Received yes  -CH      Radiation Treatments #/Timeframe --  Pt. has a referral for XRT but has not initiated.  -CH              Physical Concerns    The amount of pain associated with my lymphedema is: 0  -CH      The amount of limb heaviness associated with my lymphedema is: 0  -CH      The amount of skin tightness associated with my lymphedema is: 0  -CH      The size of my swollen limb(s) seems: 0  -CH      Lymphedema affects the movement of my swollen limb(s): 0  -CH      The strength in my swollen limb(s) is: 0  -CH              Psychosocial Concerns    Lymphedema affects my body image (i.e., \"how I think I look\"). 0  -CH      Lymphedema affects my socializing with others. 0  -CH      Lymphedema affects my intimate relations with spouse or partner (rate 0 if not applicable 0  -CH      Lymphedema \"gets me down\" (i.e., depression, frustration, or anger) 0  -CH      I must rely on others for help due to my lymphedema. 0  -CH      I know what to do to manage my lymphedema 0  -CH              Functional Concerns    Lymphedema affects my ability to perform self-care activities (i.e. eating, dressing, hygiene) 0  -CH      Lymphedema affects my ability to perform routine home or work-related activities. 0  -CH      Lymphedema affects my performance of preferred leisure activities. 0  -CH      Lymphedema affects proper fit of clothing/shoes 0  -CH      Lymphedema affects my sleep 0  -CH              Posture/Observations    Alignment Options Rounded shoulders  -CH              General ROM    GENERAL ROM COMMENTS unable to assess due to restriction from " reconstruction surgery  -CH              MMT (Manual Muscle Testing)    General MMT Comments unable to assess due to restriction from reconstruction surgery  -CH              Skin Changes/Observations    Location/Assessment Upper Quadrant  -CH      Upper Quadrant Conditions bilateral:; intact; scab(s)  -CH      Upper Quadrant Color/Pigment bilateral:; normal  -CH      Upper Quadrant Skin Details Drain tubes in place bilaterally  -CH      Skin Observations Comment OT suspects patient may have lymphatic sclerosis that may be the cause of the pain in the inferior aspect of the brachium.  OT could not visually confirm a lymphatic cord nor could it be felt however the pain presentation does mimic that condition.  -CH              L-Dex Bioimpedence Screening    L-Dex Measurement Extremity RUE  -CH      L-Dex Patient Position Standing  -CH      L-Dex UE Dominate Side Right  -CH      L-Dex UE At Risk Side Right  -CH      L-Dex UE Pre Surgical Value No  -CH      L-Dex UE Score -4.5  -CH      L-Dex UE Baseline Score -4.5  -CH      L-Dex UE Value Change 0  -CH              Lymphedema Life Impact Scale Totals    A.  Total Q1 - Q17 (Do not include Q18) 0  -CH      B.  Total number of questions answered (Q1-Q17) 17  -CH      C. Divide A by B 0  -CH      D. Multiple C by 25 0  -CH            User Key  (r) = Recorded By, (t) = Taken By, (c) = Cosigned By    Initials Name Provider Type    Daja Villafana OT Occupational Therapist                  Therapy Education  Education Details: Patient provided with education on lymphatic pathology and the development of lymphedema as well as the need to engage in lymphedema prevention program.  OT provided patient with education on home exercise program.  Patient is only able to do the first 3 exercise of that program as it does not require movement at the shoulder that abducts or flexes into the arms.  However OT did instruct patient to initiate full exercise program once she has been  "released from her activity restrictions from the plastic surgeon.  Discussed potential lymphatic sclerosis versus neurological damage from surgery that is resulting in the pain at the inferior brachium.  Further evaluation for both will be completed once range of motion is restrictions are lifted.  Patient was also educated on \"bye-bye\" lymphatic flossing technique.  OT educated patient that she cannot go beyond the restricted range of motion that is placed on her until she is released by the plastic surgeon however after that she can do full lymphatic flossing technique with abduction and flexion at the shoulder.  OT was unable to complete the stoplight/lymphedema prevention education protocol at this visit will complete at next visit.  Given: Symptoms/condition management, HEP  Program: New  How Provided: Verbal, Demonstration  Provided to: Patient  Level of Understanding: Verbalized, Demonstrated  70241 - OT Self Care/Mgmt Minutes: 25     Patient provided education on risk factors for lymphedema to include greater than 6 lymph node removal, BMI greater than 30, mastectomy versus lumpectomy, radiation therapy, and Taxol use and advanced age.  Patients risk factors include having a mastectomy, having greater than 6 lymph nodes removed (x10), undergoing radiation therapy, and probable use of Taxol and chemotherapy regime.      Pt. educated on side effects of radiation therapy including edema, skin integrity and soft tissue changes.   Pt. educated to perform self-manual lymphatic drainage 2 times daily to mitigate edema/lymphedema.  Pt. educated on performing stretching 2 times daily to prevent soft tissue tightening and/or range of motion deficits.  Pt. also educated to keep skin well moisturized per XRT instruction.  OT also recommended patient increase hydration to improve skin integrity through XRT.    Patient currently is unable to wear a bra due to precautions from the above the pec implant.  However, patient " had 10 lymph nodes removed in the right axilla and will be participating in XRT and will benefit from postsurgical compression to manage ongoing swelling due to her increased risk of lymphedema development as she will have 4 out of the 6 main risk factors that are associated with development of lymphedema after breast cancer treatment.    OT Mastectomy Care:    Location: Bilateral chest wall    Date of Surgery: 12/28/2021  Date of Discharge: 12/29/2021    Garments  Type of Garment Dispensed: Post Surgical Compression Garment    Breast : Amoeleif    Product Name: [id number] zip ST high size 36/38 black  Number of Garments Dispensed: One     OT Goals     Row Name 01/12/22 1329          Time Calculation    OT Goal Re-Cert Due Date 02/11/22  -           User Key  (r) = Recorded By, (t) = Taken By, (c) = Cosigned By    Initials Name Provider Type    Daja Villafana, OT Occupational Therapist              1. Post Breast Surgery Care/at risk for Lymphedema  LTG 1: 90 days:  As an indicator of no exacerbation of lymphedema staging, the patient will present with an L-Dex score less than [10] points from preoperative baseline.   STATUS: New  STG 1a:   30 days: To prevent exacerbation of mixed edema to lymphedema, patient will utilize the 2 postsurgical compression garments daily.      STATUS: New  STG 1b: 30 days: Patient will be independent with self-manual lymphatic massage.    STATUS: New  STG 1c: 30 days:  Patient will be independent with identification of signs and symptoms of lymphedema exasperation per stoplight to recovery education handout.   STATUS: New  STG 1 d: 30 days: Patient will be independent with HEP to prevent advancement in lymphedema staging.   STATUS: New  TREATMENT:  Self Care/ADL retraining, Therapeutic Activity, Neuromuscular Re-education, Therapeutic Exercise, Bioimpedence Fluid Analysis, Post-Surgical compression garement [address]-High/ [employer] Cam[employer] Kit 2860K,  Orthotic Management and training,  and Manual Therapy.     OT Assessment/Plan     Row Name 01/12/22 1328 01/12/22 1325       OT Assessment    Functional Limitations -- Performance in self-care ADL  -    Impairments -- Impaired lymphatic circulation; Integumentary integrity; Pain  -    Assessment Comments -- Patient is a pleasant 61-year-old female who recently underwent a bilateral mastectomy with sentinel node biopsy x10 and immediate above the pec reconstruction.  Patient is noted with metastasis to 4 lymph nodes and will undergo both radiation therapy and chemotherapy.  Patient will benefit from continued skilled occupational therapy services to address ongoing pain and possible lymphatic sclerosis as well as evaluate ongoing lymphatic functioning to prevent decline in I/ADL independence and advancement in lymphedema staging.  -    OT Rehab Potential -- Good  -    Patient/caregiver participated in establishment of treatment plan and goals -- Yes  -    Patient would benefit from skilled therapy intervention -- Yes  -       OT Plan    OT Frequency -- Other (comment)  0-1 time per week.  -    Predicted Duration of Therapy Intervention (OT) -- Pt. to re-evaluated 3 weeks post-surgery, 3 weeks post XRT, every 3 months from baseline for years 1-3 and every 6 months years 4 and 5  -    Planned CPT's? -- OT RE-EVAL: 97786; OT SELF CARE/MGMT/TRAIN 15 MIN: 25297; OT NEUROMUSC RE EDUCATION EA 15 MIN: 49076; OT THER ACT EA 15 MIN: 82382WJ; OT THER PROC EA 15 MIN: 83669MU; OT ULTRASOUND EA 15 MIN: 24776; OT MANUAL THERAPY EA 15 MIN: 40290; OT BIS XTRACELL FLUID ANALYSIS: 57031; OT ORTHOTIC MGMT/TRAIN EA 15 MIN: 69269; OT ORTHO/PROSTHET CHECKOUT EA 15 MIN: 83857  -    Planned Therapy Interventions (Optional Details) home exercise program; joint mobilization; manual therapy techniques; orthotic fitting/training; patient/family education; postural re-education; ROM (Range of Motion); strengthening; stretching   - --          User Key  (r) = Recorded By, (t) = Taken By, (c) = Cosigned By    Initials Name Provider Type     Daja Felix OT Occupational Therapist              OT eval complexity:   Examination:   Performance Deficits include: Health management, bathing and showering, dressing   # deficits affecting performance: 3-5  Decision Making:   Treatment Options Considered : Health promotion, remediation/restoration, adaptation, prevention  # Treatment options considered : Multiple (4+)  Modification Made for: None  Level of Task Modification: None  Comorbidity Affect Performance: Yes  How is performance affected: Breast reconstruction surgery restriction  Evaluation Level Determined: Low            Time Calculation:   Timed Charges  08727 - OT Therapeutic Exercise Minutes: 15  59101 - OT Self Care/Mgmt Minutes: 25  Total Minutes  Timed Charges Total Minutes: 40   Total Minutes: 40     Therapy Charges for Today     Code Description Service Date Service Provider Modifiers Qty    00193024681  OT EVAL LOW COMPLEXITY 4 1/12/2022 Daja Felix OT GO 1    [id number]  OT SELF CARE/MGMT/TRAIN EA 15 MIN 1/12/2022 Daja Felix OT GO 2    [id number]  OT THER PROC EA 15 MIN 1/12/2022 Daja Felix OT GO 1                    Daja Felix OT  1/12/2022

## 2022-01-13 ENCOUNTER — OFFICE VISIT (OUTPATIENT)
Dept: PLASTIC SURGERY | Facility: CLINIC | Age: 62
End: 2022-01-13

## 2022-01-13 VITALS
OXYGEN SATURATION: 97 % | HEART RATE: 75 BPM | WEIGHT: 173 LBS | DIASTOLIC BLOOD PRESSURE: 75 MMHG | HEIGHT: 65 IN | TEMPERATURE: 95.6 F | SYSTOLIC BLOOD PRESSURE: 124 MMHG | BODY MASS INDEX: 28.82 KG/M2

## 2022-01-13 DIAGNOSIS — Z09 POSTOPERATIVE FOLLOW-UP: Primary | ICD-10-CM

## 2022-01-13 PROCEDURE — 99024 POSTOP FOLLOW-UP VISIT: CPT | Performed by: SURGERY

## 2022-01-14 ENCOUNTER — OFFICE VISIT (OUTPATIENT)
Dept: ONCOLOGY | Facility: HOSPITAL | Age: 62
End: 2022-01-14

## 2022-01-14 ENCOUNTER — HOSPITAL ENCOUNTER (OUTPATIENT)
Dept: OCCUPATIONAL THERAPY | Facility: HOSPITAL | Age: 62
Setting detail: THERAPIES SERIES
Discharge: HOME | End: 2022-01-14

## 2022-01-14 ENCOUNTER — HOSPITAL ENCOUNTER (OUTPATIENT)
Dept: ONCOLOGY | Facility: HOSPITAL | Age: 62
Setting detail: INFUSION SERIES
Discharge: HOME | End: 2022-01-14

## 2022-01-14 VITALS
TEMPERATURE: 97.9 F | SYSTOLIC BLOOD PRESSURE: 139 MMHG | RESPIRATION RATE: 18 BRPM | OXYGEN SATURATION: 97 % | DIASTOLIC BLOOD PRESSURE: 79 MMHG | BODY MASS INDEX: 29.94 KG/M2 | WEIGHT: 179.9 LBS | HEART RATE: 70 BPM

## 2022-01-14 DIAGNOSIS — Z17.0 MALIGNANT NEOPLASM OF OVERLAPPING SITES OF RIGHT BREAST IN FEMALE, ESTROGEN RECEPTOR POSITIVE: Primary | ICD-10-CM

## 2022-01-14 DIAGNOSIS — C50.811 MALIGNANT NEOPLASM OF OVERLAPPING SITES OF RIGHT BREAST IN FEMALE, ESTROGEN RECEPTOR POSITIVE: Primary | ICD-10-CM

## 2022-01-14 DIAGNOSIS — R52 PAIN: ICD-10-CM

## 2022-01-14 DIAGNOSIS — L90.5 SCAR CONDITION AND FIBROSIS OF SKIN: ICD-10-CM

## 2022-01-14 DIAGNOSIS — Z91.89 AT RISK FOR LYMPHEDEMA: Primary | ICD-10-CM

## 2022-01-14 LAB
ALBUMIN SERPL-MCNC: 4.18 G/DL (ref 3.5–5.2)
ALBUMIN/GLOB SERPL: 1.7 G/DL
ALP SERPL-CCNC: 58 U/L (ref 39–117)
ALT SERPL W P-5'-P-CCNC: 25 U/L (ref 1–33)
ANION GAP SERPL CALCULATED.3IONS-SCNC: 6 MMOL/L (ref 5–15)
AST SERPL-CCNC: 22 U/L (ref 1–32)
BASOPHILS # BLD AUTO: 0.05 10*3/MM3 (ref 0–0.2)
BASOPHILS NFR BLD AUTO: 0.9 % (ref 0–1.5)
BILIRUB SERPL-MCNC: 0.4 MG/DL (ref 0–1.2)
BUN SERPL-MCNC: 13 MG/DL (ref 8–23)
BUN/CREAT SERPL: 19.7 (ref 7–25)
CALCIUM SPEC-SCNC: 9.1 MG/DL (ref 8.6–10.5)
CHLORIDE SERPL-SCNC: 105 MMOL/L (ref 98–107)
CO2 SERPL-SCNC: 27 MMOL/L (ref 22–29)
CREAT SERPL-MCNC: 0.66 MG/DL (ref 0.57–1)
DEPRECATED RDW RBC AUTO: 45.6 FL (ref 37–54)
EOSINOPHIL # BLD AUTO: 0.28 10*3/MM3 (ref 0–0.4)
EOSINOPHIL NFR BLD AUTO: 4.8 % (ref 0.3–6.2)
ERYTHROCYTE [DISTWIDTH] IN BLOOD BY AUTOMATED COUNT: 13.1 % (ref 12.3–15.4)
GFR SERPL CREATININE-BSD FRML MDRD: 91 ML/MIN/1.73
GLOBULIN UR ELPH-MCNC: 2.5 GM/DL
GLUCOSE SERPL-MCNC: 97 MG/DL (ref 65–99)
HCT VFR BLD AUTO: 38.9 % (ref 34–46.6)
HGB BLD-MCNC: 12.9 G/DL (ref 12–15.9)
IMM GRANULOCYTES # BLD AUTO: 0 10*3/MM3 (ref 0–0.05)
IMM GRANULOCYTES NFR BLD AUTO: 0 % (ref 0–0.5)
LYMPHOCYTES # BLD AUTO: 1.44 10*3/MM3 (ref 0.7–3.1)
LYMPHOCYTES NFR BLD AUTO: 24.8 % (ref 19.6–45.3)
MCH RBC QN AUTO: 31.1 PG (ref 26.6–33)
MCHC RBC AUTO-ENTMCNC: 33.2 G/DL (ref 31.5–35.7)
MCV RBC AUTO: 93.7 FL (ref 79–97)
MONOCYTES # BLD AUTO: 0.62 10*3/MM3 (ref 0.1–0.9)
MONOCYTES NFR BLD AUTO: 10.7 % (ref 5–12)
NEUTROPHILS NFR BLD AUTO: 3.41 10*3/MM3 (ref 1.7–7)
NEUTROPHILS NFR BLD AUTO: 58.8 % (ref 42.7–76)
PLATELET # BLD AUTO: 244 10*3/MM3 (ref 140–450)
PMV BLD AUTO: 10 FL (ref 6–12)
POTASSIUM SERPL-SCNC: 4 MMOL/L (ref 3.5–5.2)
PROT SERPL-MCNC: 6.7 G/DL (ref 6–8.5)
RBC # BLD AUTO: 4.15 10*6/MM3 (ref 3.77–5.28)
SODIUM SERPL-SCNC: 138 MMOL/L (ref 136–145)
WBC NRBC COR # BLD: 5.8 10*3/MM3 (ref 3.4–10.8)

## 2022-01-14 PROCEDURE — 25010000002 HEPARIN LOCK FLUSH PER 10 UNITS: Performed by: INTERNAL MEDICINE

## 2022-01-14 PROCEDURE — 85025 COMPLETE CBC W/AUTO DIFF WBC: CPT | Performed by: INTERNAL MEDICINE

## 2022-01-14 PROCEDURE — 99214 OFFICE O/P EST MOD 30 MIN: CPT | Performed by: INTERNAL MEDICINE

## 2022-01-14 PROCEDURE — 80053 COMPREHEN METABOLIC PANEL: CPT | Performed by: INTERNAL MEDICINE

## 2022-01-14 PROCEDURE — L8015 EXT BREASTPROSTHESIS GARMENT: HCPCS

## 2022-01-14 PROCEDURE — 36591 DRAW BLOOD OFF VENOUS DEVICE: CPT

## 2022-01-14 RX ORDER — HEPARIN SODIUM (PORCINE) LOCK FLUSH IV SOLN 100 UNIT/ML 100 UNIT/ML
500 SOLUTION INTRAVENOUS AS NEEDED
Status: DISCONTINUED | OUTPATIENT
Start: 2022-01-14 | End: 2022-01-15 | Stop reason: HOSPADM

## 2022-01-14 RX ORDER — SODIUM CHLORIDE 0.9 % (FLUSH) 0.9 %
20 SYRINGE (ML) INJECTION AS NEEDED
Status: CANCELLED | OUTPATIENT
Start: 2022-01-14

## 2022-01-14 RX ORDER — SODIUM CHLORIDE 0.9 % (FLUSH) 0.9 %
20 SYRINGE (ML) INJECTION AS NEEDED
Status: DISCONTINUED | OUTPATIENT
Start: 2022-01-14 | End: 2022-01-15 | Stop reason: HOSPADM

## 2022-01-14 RX ORDER — DEXAMETHASONE 4 MG/1
TABLET ORAL
Qty: 12 TABLET | Refills: 3 | Status: SHIPPED | OUTPATIENT
Start: 2022-01-14 | End: 2022-03-09

## 2022-01-14 RX ORDER — ONDANSETRON HYDROCHLORIDE 8 MG/1
8 TABLET, FILM COATED ORAL 3 TIMES DAILY PRN
Qty: 30 TABLET | Refills: 5 | Status: SHIPPED | OUTPATIENT
Start: 2022-01-14 | End: 2022-05-23

## 2022-01-14 RX ORDER — HEPARIN SODIUM (PORCINE) LOCK FLUSH IV SOLN 100 UNIT/ML 100 UNIT/ML
500 SOLUTION INTRAVENOUS AS NEEDED
Status: CANCELLED | OUTPATIENT
Start: 2022-01-14

## 2022-01-14 RX ORDER — OLANZAPINE 5 MG/1
5 TABLET ORAL NIGHTLY
Qty: 30 TABLET | Refills: 2 | Status: SHIPPED | OUTPATIENT
Start: 2022-01-14 | End: 2022-05-23

## 2022-01-14 RX ADMIN — HEPARIN SODIUM (PORCINE) LOCK FLUSH IV SOLN 100 UNIT/ML 500 UNITS: 100 SOLUTION at 10:33

## 2022-01-14 RX ADMIN — SODIUM CHLORIDE, PRESERVATIVE FREE 20 ML: 5 INJECTION INTRAVENOUS at 10:33

## 2022-01-14 NOTE — THERAPY TREATMENT NOTE
Outpatient Occupational Therapy Lymphedema Treatment Note   [facility]     Patient Name: Marisa Portillo  : 1960  MRN: [id number]  Today's Date: 2022      Visit Date: 2022    Patient Active Problem List   Diagnosis   • Arthritis   • Bladder disorder   • Concussion   • Contact dermatitis and eczema   • Injury, other and unspecified, finger   • Limb swelling   • Seasonal allergic rhinitis   • Vitamin D deficiency   • IFG (impaired fasting glucose)   • Mixed hyperlipidemia   • TIA (transient ischemic attack)   • Malignant neoplasm of overlapping sites of right breast in female, estrogen receptor positive (HCC)   • Port-A-Cath placement   • S/P bilateral nipple sparing mastectomy   • S/P BILATERAL IMMEDIATE BREAST RECONSTRUCTION WITH LEFT PRE PEC PLACEMENT OF SILICONE GEL IMPLANT AND MESH, RIGHT PRE PEC PLACEMENT OF EXPANDER AND MESH        Past Medical History:   Diagnosis Date   • Allergies    • Breast cancer (HCC)    • High cholesterol    • IFG (impaired fasting glucose)    • Osteoarthritis    • Port-A-Cath placement 2021   • S/P BILATERAL IMMEDIATE BREAST RECONSTRUCTION WITH LEFT PRE PEC PLACEMENT OF SILICONE GEL IMPLANT AND MESH, RIGHT PRE PEC PLACEMENT OF EXPANDER AND MESH 2021   • TIA (transient ischemic attack)     no residual (tia approximately 8 years ago)   • Vitamin D deficiency         Past Surgical History:   Procedure Laterality Date   • APPENDECTOMY     • BREAST AUGMENTATION Bilateral 2021    Procedure: bilateral breast reconstructon with bilateral mesh placement.   left implant, right tissue expander placement;  Surgeon: Lacy Shelton MD;  [facility] [place];  Service: Plastics;  Laterality: Bilateral;   • CEREBRAL ANGIOGRAM      clip placed- pt stated can have MRI with cerebral clips   • CYST REMOVAL Right     rt wrist   • MASTECTOMY     • MASTECTOMY COMPLETE / SIMPLE W/ SENTINEL NODE BIOPSY Bilateral    • PORTACATH PLACEMENT Left 2021     Procedure: INSERTION OF PORTACATH;  Surgeon: Jacqueline Mcgraw MD;  [place] [facility] [place] [facility] [place]  Service: General;  Laterality: Left;   • SENTINEL NODE BIOPSY Bilateral 12/28/2021    Procedure: BILATERAL BREAST MASTECTOMIES WITH RIGHT SENTINEL NODE BIOPSIES WITH FROZEN SECTIONS;  Surgeon: Jacqueline Mcgraw MD;  Location: [facility];  Service: General;  Laterality: Bilateral;   • TOTAL ABDOMINAL HYSTERECTOMY WITH SALPINGO OOPHORECTOMY      Ovaries left in place          Visit Dx:      ICD-10-CM ICD-9-CM   1. At risk for lymphedema  Z91.89 V49.89   2. Scar condition and fibrosis of skin  L90.5 709.2   3. Pain  R52 780.96        Lymphedema     Row Name 01/14/22 1100             Skin Changes/Observations    Location/Assessment Upper Quadrant  -CH      Upper Quadrant Conditions bilateral:; clean; left:; weeping  -      Skin Observations Comment Patient is observed with weeping serous fluid that is clear with mild yellow tinge that is draining from the left drain tube incision site.  -CH              Compression/Skin Care    Compression/Skin Care skin care  -      Skin Care other (comment)  -      Compression/Skin Care Comments OT cleanse serous fluid off of patient with the use of an alcohol swab on the abdomen.  OT did not use alcohol on the open wound.  OT dressed the wound with a Telfa nonstick 4 x 4 gauze pad and tape.  -            User Key  (r) = Recorded By, (t) = Taken By, (c) = Cosigned By    Initials Name Provider Type     Daja Felix OT Occupational Therapist                 OT Ortho     Row Name 01/14/22 1100             Subjective Comments    Subjective Comments Patient states that after her drain tubes were removed at the plastic surgeons office they attempted to assist her with the postsurgical compression garment and found that it was too tight for her to fit appropriately and sustain comfort and maintain the integrity of the breast reconstruction surgery.  -              Orthotics  "& Prosthetics Management    Orthosis Location other (see comments)  Post Surgical Compression Bra  -CH      Additional Documentation Orthosis Location (Row)  -CH            User Key  (r) = Recorded By, (t) = Taken By, (c) = Cosigned By    Initials Name Provider Type    Daja Villafana OT Occupational Therapist                  OT Mastectomy Care:    Location: Bilateral chest wall     Date of Surgery: 12/28/2021  Date of Discharge: 12/29/2021     Garments  Type of Garment Dispensed: Post Surgical Compression Garment     Breast : Jimmy     Product Name: [id number] Ashley zip ST high size 40/42      black  Number of Garments Dispensed:   2    **Patient exchanged the 1 Ashley zip ST high size 36/38 for the Ashley zip ST high 40/42.  OT did issue a second compression garments to allow her to have 1 to wash in 1 to wear.  Despite some increase in discomfort with maneuvering to get into the garment patient stated once the garment was on that her lateral chest wall felt \"much better\"                               Time Calculation:         Therapy Charges for Today     Code Description Service Date Service Provider Modifiers Qty    46477718738 HC BRA POST/SURG SULTANA ALL SZ/COLOR JIMMY 1/14/2022 Daja Felix OT  1    [id number] HC BRA POST/SURG SULTANA ALL SZ/COLOR JIMMY 1/14/2022 Daja Felix OT  1                      Daja Felix OT  1/14/2022  "

## 2022-01-14 NOTE — PROGRESS NOTES
"Follow-up (Expander fill)            History of Present Illness  Marisa Portillo is a 61 y.o. female who presents to [facility] as follow up from bilateral nipple sparing mastectomies with left implant and right expander due to blood flow. She has 300 cc of fluid on the right breast and the drain has minimal output.   Patient  is starting chemo on 01/31/20222. 4 treatments 4 weeks apart .    cSubjective      Multihance [gadobenate]  Allergies Reconciled.    Review of Systems     Objective     /79 (BP Location: Left arm, Patient Position: Sitting)   Pulse 71   Temp 98 °F (36.7 °C) (Temporal)   Ht 165.1 cm (65\")   Wt 79.4 kg (175 lb)   SpO2 98%   BMI 29.12 kg/m²     Body mass index is 29.12 kg/m².    Physical Exam  Breasts healing well, left still high. Right with lower portion with retraction.       Result Review :   The following data was reviewed by: Lacy Shelton MD on 01/13/2022:           Assessment and Plan      Diagnoses and all orders for this visit:    1. S/P BILATERAL IMMEDIATE BREAST RECONSTRUCTION WITH LEFT PRE PEC PLACEMENT OF SILICONE GEL IMPLANT AND MESH, RIGHT PRE PEC PLACEMENT OF EXPANDER AND MESH (Primary)        Additional Order(s):       Plan:  • Right drain removed today  • Right breast expander :   • 120cc today. Total of 600 cc  • Left breast has 560 cc.    • Patient to follow up after end of radiation   • CPT codes:   • Lacy Shelton MD, PhD  • NPI: [id number]    I spent 60 minutes caring for Marisa on this date of service. This time includes time spent by me in the following activities:performing a medically appropriate examination and/or evaluation , counseling and educating the patient/family/caregiver, documenting information in the medical record, and care coordination    Follow Up     No follow-ups on file.    Patient was given instructions and counseling regarding her condition. Please see specific information " pulled into the AVS if appropriate.     Lacy Shelton MD  01/20/2022      Answers for HPI/ROS submitted by the patient on 1/3/2022  Please describe your symptoms.: Post op visit  Have you had these symptoms before?: No  How long have you been having these symptoms?: 5-7 days  Please list any medications you are currently taking for this condition.: Hrdrocodone5/325 1 tab q4 hours prn pain. Average tsking once daily  Please describe any probable cause for these symptoms. : Post op masrectomy reconstruction  What is the primary reason for your visit?: Other      Answers for HPI/ROS submitted by the patient on 1/12/2022  How long have you been having these symptoms?: 1-2 weeks  What is the primary reason for your visit?: Other

## 2022-01-14 NOTE — PROGRESS NOTES
Patient  Marisa Portillo    Location  [facility] HEMATOLOGY & ONCOLOGY    Chief Complaint  Breast Cancer (-F2)    Referring Provider: Judah Johnosn, *  PCP: Judah Johnson MD    Subjective          Oncology/Hematology History Overview Note     Right Breast Cancer:    Diagnostic mammogram on 11/12/2021: 2 cm asymmetry in the outer central right breast with amorphous calcifications.  Similar appearing group of amorphous calcifications in the outer central right breast.  6 mm asymmetry in the inner right breast.  Right axillary lymphadenopathy.    Ultrasound-guided biopsy on 11/1/2021: Right breast 3 o'clock position, 2 cm from the nipple with invasive ductal carcinoma, grade 2.  Right breast 9 o'clock position, 3 cm from the nipple with invasive ductal carcinoma, grade 2, ER positive (50%), AK positive (3%), HER-2 negative (score 0), Ki-67 14%.  Associated with intermediate grade ductal carcinoma in situ.  Right axillary lymph node positive for breast carcinoma.    CT CAP and bone scan on 12/13/21: negative for metastatic disease    Bilateral mastectomy 12/28/2021: Right breast with multiple (2) foci of invasive ductal carcinoma, largest focus 2.3 cm, grade 2, associated with DCIS with extensive intraductal component, all margins negative, 4/10 lymph nodes involved with no extranodal extension and the largest focus measured at 3.3 cm, ER positive (40%), AK positive (5%), HER-2 negative by IHC (score 0), Ki-67 14%, pT2 pN2a cM0     Malignant neoplasm of overlapping sites of right breast in female, estrogen receptor positive (HCC)   12/9/2021 Initial Diagnosis    Malignant neoplasm of overlapping sites of right breast in female, estrogen receptor positive (HCC)     12/28/2021 Cancer Staged    Staging form: Breast, AJCC 8th Edition  - Pathologic stage from 12/28/2021: Stage IB (pT2, pN2a, cM0, G2, ER+, AK+, HER2-) - Signed by Starr Mendez MD PhD on 1/30/2022 1/14/2022 -   Chemotherapy    OP CENTRAL VENOUS ACCESS DEVICE ACCESS, CARE, AND MAINTENANCE (CVAD)     1/31/2022 -  Chemotherapy    OP BREAST TC DOCEtaxel / Cyclophosphamide         History of Present Illness  Patient comes in today to follow-up after her bilateral mastectomy.  This was done on 12/28/2021.  I reviewed the results of the pathology and discussed it with the patient.  She had 4 axillary lymph nodes involved with malignancy.  There is no reason to send Oncotype testing.  She would likely benefit from chemotherapy.  We discussed the risks and benefits to treatment with 4 cycles of TC.  Patient is agreeable.  She has not yet seen radiation oncology and is agreeable to referral.  She has follow-up with plastic surgery.  In the left breast she had an implant placed.  In the right breast she has an expander.  Drains were taken out yesterday post surgery.  Other than some pain related to the procedure she does not have any new complaints or concerns.    Review of Systems   Constitutional: Positive for fatigue. Negative for appetite change, diaphoresis, fever, unexpected weight gain and unexpected weight loss.   HENT: Negative for hearing loss, sore throat and voice change.    Eyes: Negative for blurred vision, double vision, pain, redness and visual disturbance.   Respiratory: Negative for cough, shortness of breath and wheezing.    Cardiovascular: Negative for chest pain, palpitations and leg swelling.   Endocrine: Negative for cold intolerance, heat intolerance, polydipsia and polyuria.   Genitourinary: Negative for decreased urine volume, difficulty urinating, frequency and urinary incontinence.   Musculoskeletal: Negative for arthralgias, back pain, joint swelling and myalgias.   Skin: Negative for color change, rash, skin lesions and wound.   Neurological: Negative for dizziness, seizures, numbness and headache.   Hematological: Negative for adenopathy. Does not bruise/bleed easily.   Psychiatric/Behavioral: Negative  for depressed mood. The patient is not nervous/anxious.    All other systems reviewed and are negative.      Past Medical History:   Diagnosis Date   • Allergies    • Breast cancer (HCC)    • High cholesterol    • IFG (impaired fasting glucose)    • Osteoarthritis    • Port-A-Cath placement 12/29/2021   • S/P BILATERAL IMMEDIATE BREAST RECONSTRUCTION WITH LEFT PRE PEC PLACEMENT OF SILICONE GEL IMPLANT AND MESH, RIGHT PRE PEC PLACEMENT OF EXPANDER AND MESH 12/29/2021   • TIA (transient ischemic attack)     no residual (tia approximately 8 years ago)   • Vitamin D deficiency      Past Surgical History:   Procedure Laterality Date   • APPENDECTOMY     • BREAST AUGMENTATION Bilateral 12/28/2021    Procedure: bilateral breast reconstructon with bilateral mesh placement.   left implant, right tissue expander placement;  Surgeon: Lacy Shelton MD;  Location: [facility] OR [facility];  Service: Plastics;  Laterality: Bilateral;   • CEREBRAL ANGIOGRAM      clip placed- pt stated can have MRI with cerebral clips   • CYST REMOVAL Right     rt wrist   • MASTECTOMY     • MASTECTOMY COMPLETE / SIMPLE W/ SENTINEL NODE BIOPSY Bilateral    • PORTACATH PLACEMENT Left 12/28/2021    Procedure: INSERTION OF PORTACATH;  Surgeon: Jacqueline Mcgraw MD;  Location: [facility] OR [facility] [place];  Service: General;  Laterality: Left;   • SENTINEL NODE BIOPSY Bilateral 12/28/2021    Procedure: BILATERAL BREAST MASTECTOMIES WITH RIGHT SENTINEL NODE BIOPSIES WITH FROZEN SECTIONS;  Surgeon: Jacqueline Mcgraw MD;  Location: [facility];  Service: General;  Laterality: Bilateral;   • TOTAL ABDOMINAL HYSTERECTOMY WITH SALPINGO OOPHORECTOMY      Ovaries left in place      Social History     Socioeconomic History   • Marital status:    Tobacco Use   • Smoking status: Never Smoker   • Smokeless tobacco: Never Used   Vaping Use   • Vaping Use: Never used   Substance and Sexual Activity   • Alcohol use: Yes     Comment: socially    • Drug use: Never   •  Sexual activity: Defer     Family History   Problem Relation Age of Onset   • Hypertension Mother    • Diabetes Mother    • Cancer Mother    • Arthritis Mother    • Hypertension Father    • Diabetes Father    • Cancer Father    • Arthritis Father    • Malig Hyperthermia Neg Hx        Objective   Physical Exam  General: Alert, cooperative, no acute distress  Eyes: Anicteric sclera, PERRLA  Respiratory: normal respiratory effort  Cardiovascular: no lower extremity edema  Skin: Normal tone, no rash, no lesions  Psychiatric: Appropriate affect, intact judgment  Neurologic: No focal sensory or motor deficits, normal cognition   Musculoskeletal: Normal muscle strength and tone  Extremities: No clubbing, cyanosis, or deformities    Vitals:    01/14/22 0839   BP: 139/79   Pulse: 70   Resp: 18   Temp: 97.9 °F (36.6 °C)   SpO2: 97%   Weight: 81.6 kg (179 lb 14.3 oz)   PainSc: 0-No pain     ECOG score: 0         PHQ-9 Total Score: 0       Result Review :   The following data was reviewed by: Starr Mendez MD PhD on 01/14/2022:  Lab Results   Component Value Date    HGB 12.9 01/14/2022    HCT 38.9 01/14/2022    MCV 93.7 01/14/2022     01/14/2022    WBC 5.80 01/14/2022    NEUTROABS 3.41 01/14/2022    LYMPHSABS 1.44 01/14/2022    MONOSABS 0.62 01/14/2022    EOSABS 0.28 01/14/2022    BASOSABS 0.05 01/14/2022     Lab Results   Component Value Date    GLUCOSE 97 01/14/2022    BUN 13 01/14/2022    CREATININE 0.66 01/14/2022     01/14/2022    K 4.0 01/14/2022     01/14/2022    CO2 27.0 01/14/2022    CALCIUM 9.1 01/14/2022    PROTEINTOT 6.7 01/14/2022    ALBUMIN 4.18 01/14/2022    BILITOT 0.4 01/14/2022    ALKPHOS 58 01/14/2022    AST 22 01/14/2022    ALT 25 01/14/2022          Assessment and Plan    Diagnoses and all orders for this visit:    1. Malignant neoplasm of overlapping sites of right breast in female, estrogen receptor positive (HCC) (Primary)  -     CBC & Differential; Future  -     Comprehensive  Metabolic Panel; Future  -     OLANZapine (zyPREXA) 5 MG tablet; Take 1 tablet by mouth Every Night.  Dispense: 30 tablet; Refill: 2  -     dexamethasone (DECADRON) 4 MG tablet; Take 2 tablets oral twice a day for 3 consecutive days beginning the day before chemotherapy and continue for 6 doses.  Dispense: 12 tablet; Refill: 3  -     ondansetron (ZOFRAN) 8 MG tablet; Take 1 tablet by mouth 3 (Three) Times a Day As Needed for Nausea or Vomiting.  Dispense: 30 tablet; Refill: 5      ER positive right breast cancer: Status post bilateral mastectomy.  Patient agrees to 4 cycles of chemotherapy with TC.  I will check labs today and follow-up with the patient prior to the start of treatment.  I will also refer her to radiation oncology for initial appointment.    Patient was given instructions and counseling regarding her condition or for health maintenance advice. Please see specific information pulled into the AVS if appropriate.     Starr Mendez MD PhD    1/30/2022

## 2022-01-20 ENCOUNTER — OFFICE VISIT (OUTPATIENT)
Dept: PLASTIC SURGERY | Facility: CLINIC | Age: 62
End: 2022-01-20

## 2022-01-20 ENCOUNTER — OFFICE VISIT (OUTPATIENT)
Dept: ONCOLOGY | Facility: HOSPITAL | Age: 62
End: 2022-01-20

## 2022-01-20 VITALS
OXYGEN SATURATION: 98 % | WEIGHT: 175 LBS | HEART RATE: 71 BPM | DIASTOLIC BLOOD PRESSURE: 79 MMHG | TEMPERATURE: 98 F | HEIGHT: 65 IN | SYSTOLIC BLOOD PRESSURE: 134 MMHG | BODY MASS INDEX: 29.16 KG/M2

## 2022-01-20 VITALS
RESPIRATION RATE: 16 BRPM | WEIGHT: 179.23 LBS | DIASTOLIC BLOOD PRESSURE: 74 MMHG | HEART RATE: 83 BPM | BODY MASS INDEX: 29.83 KG/M2 | SYSTOLIC BLOOD PRESSURE: 129 MMHG | OXYGEN SATURATION: 98 % | TEMPERATURE: 97.8 F

## 2022-01-20 DIAGNOSIS — Z23 NEED FOR IMMUNIZATION AGAINST COMBINATION OF INFECTIOUS DISEASES: Primary | ICD-10-CM

## 2022-01-20 DIAGNOSIS — Z98.890 S/P BREAST RECONSTRUCTION: Primary | ICD-10-CM

## 2022-01-20 DIAGNOSIS — C50.811 MALIGNANT NEOPLASM OF OVERLAPPING SITES OF RIGHT BREAST IN FEMALE, ESTROGEN RECEPTOR POSITIVE: ICD-10-CM

## 2022-01-20 DIAGNOSIS — Z17.0 MALIGNANT NEOPLASM OF OVERLAPPING SITES OF RIGHT BREAST IN FEMALE, ESTROGEN RECEPTOR POSITIVE: ICD-10-CM

## 2022-01-20 PROCEDURE — 99024 POSTOP FOLLOW-UP VISIT: CPT | Performed by: SURGERY

## 2022-01-20 PROCEDURE — 99215 OFFICE O/P EST HI 40 MIN: CPT | Performed by: NURSE PRACTITIONER

## 2022-01-20 PROCEDURE — G0463 HOSPITAL OUTPT CLINIC VISIT: HCPCS | Performed by: NURSE PRACTITIONER

## 2022-01-20 NOTE — PROGRESS NOTES
CHEMOTHERAPY PREPARATION    Marisa Portillo  [id number]  1960    Chief Complaint: chemo education     History of present illness:  Marisa Portillo is a 61 y.o. year old female who is here today for chemotherapy preparation and needs assessment.  The patient has been diagnosed with breast cancer and is scheduled to begin treatment with Docetaxel and Cytoxan.     Oncology History:    Oncology/Hematology History Overview Note     Right Breast Cancer:    Diagnostic mammogram on 11/12/2021: 2 cm asymmetry in the outer central right breast with amorphous calcifications.  Similar appearing group of amorphous calcifications in the outer central right breast.  6 mm asymmetry in the inner right breast.  Right axillary lymphadenopathy.    Ultrasound-guided biopsy on 11/1/2021: Right breast 3 o'clock position, 2 cm from the nipple with invasive ductal carcinoma, grade 2.  Right breast 9 o'clock position, 3 cm from the nipple with invasive ductal carcinoma, grade 2, ER positive (50%), NC positive (3%), HER-2 negative (score 0), Ki-67 14%.  Associated with intermediate grade ductal carcinoma in situ.  Right axillary lymph node positive for breast carcinoma.     Malignant neoplasm of overlapping sites of right breast in female, estrogen receptor positive (HCC)   12/9/2021 Initial Diagnosis    Malignant neoplasm of overlapping sites of right breast in female, estrogen receptor positive (HCC)     1/14/2022 -  Chemotherapy    OP CENTRAL VENOUS ACCESS DEVICE ACCESS, CARE, AND MAINTENANCE (CVAD)     1/31/2022 -  Chemotherapy    OP BREAST TC DOCEtaxel / Cyclophosphamide         The current medication list and allergy list were reviewed and reconciled.     Past Medical History, Past Surgical History, Social History, Family History have been reviewed and are without significant changes except as mentioned.    Review of Systems:    Review of Systems   Constitutional: Positive for fatigue.   All other systems reviewed and are  negative.      Physical Exam:    Vitals:    01/20/22 1352   BP: 129/74   Pulse: 83   Resp: 16   Temp: 97.8 °F (36.6 °C)   SpO2: 98%     Vitals:    01/20/22 1352   PainSc: 0-No pain          ECOG: (0) Fully Active - Able to Carry On All Pre-disease Performance Without Restriction    General: well appearing, in no acute distress  Cardiovascular: regular rate and rhythm, no murmurs noted  Lungs: clear to auscultation bilaterally, respirations even and unlabored  Extremities: no lower extremity edema  Skin: no rashes, lesions, bruising, or petechiae  Psych: Mood is stable            NEEDS ASSESSMENTS    VAD Assessment  The patient and I discussed planned intravenous chemotherapy as well as other IV treatments that are often needed throughout the course of treatment. These may include, but are not limited to blood transfusions, antibiotics, and IV hydration. The vasculature does not appear to be adequate for multiple peripheral IVs throughout their treatment course. Discussed risks and benefits of VADs. The patient would like to pursue Port-A-Cath insertion prior to initiation of treatment.       Palliative Care  The patient and I discussed palliative care services. Palliative care is not the same as Hospice care. This is specialized medical care for people living with serious illness with the goal of improving quality of life for the patient and their family. [facility] has partnered with [facility] to offer our patients outpatient palliative care early along with their treatment to assist in coordination of care, symptom management, pain management, and medical decision making.  Oncology criteria for palliative care referral is not met at this time. The patient is not interested in a palliative care consultation.     Additional Referral needs  none      CHEMOTHERAPY EDUCATION    Booklets Given: Chemotherapy and You [x]  Eating Hints [x]    Sexuality/Fertility Books []      Chemotherapy/Biotherapy Education Sheets: (list all  that apply)  Docetaxel, Cytoxan, Neulasta                                                                                                                                                                Chemotherapy Regimen:   Treatment Plans     Name Type Plan Dates Plan Provider         Active    OP BREAST TC DOCEtaxel / Cyclophosphamide ONCOLOGY TREATMENT  1/30/2022 - Present Starr Mendez MD PhD                    TOPICS EDUCATION PROVIDED COMMENTS   ANEMIA:  role of RBC, cause, s/s, ways to manage, role of transfusion [x]    THROMBOCYTOPENIA:  role of platelet, cause, s/s, ways to prevent bleeding, things to avoid, when to seek help [x]    NEUTROPENIA:  role of WBC, cause, infection precautions, s/s of infection, when to call MD [x]    NUTRITION & APPETITE CHANGES:  importance of maintaining healthy diet & weight, ways to manage to improve intake, dietary consult, exercise regimen [x]    DIARRHEA:  causes, s/s of dehydration, ways to manage, dietary changes, when to call MD [x]    CONSTIPATION:  causes, ways to manage, dietary changes, when to call MD [x]    NAUSEA & VOMITING:  cause, use of antiemetics, dietary changes, when to call MD [x]    MOUTH SORES:  causes, oral care, ways to manage [x]    ALOPECIA:  cause, ways to manage, resources [x]    INFERTILITY & SEXUALITY:  causes, fertility preservation options, sexuality changes, ways to manage, importance of birth control [x]    NERVOUS SYSTEM CHANGES:  causes, s/s, neuropathies, cognitive changes, ways to manage [x]    PAIN:  causes, ways to manage [x]    SKIN & NAIL CHANGES:  cause, s/s, ways to manage [x]    ORGAN TOXICITIES:  cause, s/s, need for diagnostic tests, labs, when to notify MD [x]    SURVIVORSHIP:  distress, distress assessment, secondary malignancies, early/late effects, follow-up, social issues, social support [x]    HOME CARE:  use of spill kits, storing of PO chemo, how to manage bodily fluids [x]    MISCELLANEOUS:  drug interactions,  administration, anne-marie, et [x]        Assessment and Plan:    Diagnoses and all orders for this visit:    1. Need for immunization against combination of infectious diseases (Primary)    2. Malignant neoplasm of overlapping sites of right breast in female, estrogen receptor positive (HCC)      Port placement completed.   Plans to start treatment on 1/31/2022.  Chemotherapy education completed with patient and her daughter.     Discussed with Dr. Mendez with recommendation to obtain Shingles vaccine as well as pneumonia vaccine.       The patient and I have reviewed their cancer diagnosis and scheduled treatment plan. Needs assessment was completed including genetics, psychosocial needs, barriers to care, VAD evaluation, advanced care planning, and palliative care services. Referrals have been ordered as appropriate based upon our evaluation and patient desires.     Chemotherapy teaching was also completed today as documented above. Adequate time was given to answer all questions to her satisfaction. Patient and family are aware of their care team members and contact information if they have questions or problems throughout the treatment course. The patient is adequately prepared to begin treatment as scheduled.     Reviewed with patient education regarding EMLA cream, dexamethasone and Zofran prescriptions sent to pharmacy.       I advised the patient that she can take Tylenol or Ibuprofen as needed for aches/pains related to cancer/treatment.  I also advised that her can use Senakot or Miralax as needed for constipation or Imodium as needed for diarrhea.       I reviewed with the patient the care team members. I also reviewed the option of the urgent care clinic through our oncology office for evaluation and management of symptoms related to treatment.    I spent 45 minutes caring for Marisa on this date of service. This time includes time spent by me in the following activities: preparing for the visit,  reviewing tests, obtaining and/or reviewing a separately obtained history, performing a medically appropriate examination and/or evaluation, counseling and educating the patient/family/caregiver, ordering medications, tests, or procedures, referring and communicating with other health care professionals, documenting information in the medical record, independently interpreting results and communicating that information with the patient/family/caregiver and care coordination.     Li Culver, APRN

## 2022-01-21 ENCOUNTER — TELEPHONE (OUTPATIENT)
Dept: SURGERY | Facility: CLINIC | Age: 62
End: 2022-01-21

## 2022-01-21 NOTE — TELEPHONE ENCOUNTER
Caller: ALONSO YUSUF    Relationship: SELF    Best call back number: [phone]    What form or medical record are you requesting: UPDATED FMLA PAPERWORK    Who is requesting this form or medical record from you: EMPLOYER    How would you like to receive the form or medical records (pick-up, mail, fax): FAX  If fax, what is the fax number: [phone]    Timeframe paperwork needed: BY NEXT Monday ASAP    Additional Notes: PATIENT IS REQUESTING UPDATED FMLA FORM WITH EXTENDED LEAVE DATES. ORIGINAL DATES 21 - 22. EXTENDED LEAVE OF 2 MORE WEEKS FOR A TOTAL OF 6 WEEKS (21 - 2/10/22). PLEASE INCLUDE NAME, , AND RETURN TO WORK DATE AS WELL, AND ADDRESS THE ATTENTION TO ESTEVAN MADRIGAL.

## 2022-01-24 ENCOUNTER — IMMUNIZATION (OUTPATIENT)
Dept: VACCINE CLINIC | Facility: HOSPITAL | Age: 62
End: 2022-01-24

## 2022-01-24 DIAGNOSIS — Z23 NEED FOR VACCINATION: Primary | ICD-10-CM

## 2022-01-24 PROCEDURE — 91306 HC SARSCOV2 VAC 50MCG/0.25ML IM: CPT | Performed by: INTERNAL MEDICINE

## 2022-01-24 PROCEDURE — 0064A HC ADM SARSCOV2 50MCG/0.25ML BOOSTER: CPT | Performed by: INTERNAL MEDICINE

## 2022-01-25 NOTE — TELEPHONE ENCOUNTER
I am not sure if pt needs a letter from us or her [facility] papers changed. I will ask her at her f/u with Dr. Phillips, and get this taken care of for her.

## 2022-01-26 ENCOUNTER — APPOINTMENT (OUTPATIENT)
Dept: OCCUPATIONAL THERAPY | Facility: HOSPITAL | Age: 62
End: 2022-01-26

## 2022-01-26 NOTE — PROGRESS NOTES
Answers for HPI/ROS submitted by the patient on 1/6/2022  Have you had these symptoms before?: No  How long have you been having these symptoms?: 1-2 weeks  What is the primary reason for your visit?: Other    Conflicting answers have been found for some questions. Please document the patient's answers manually.     Chief Complaint  Follow-up    Subjective          History of Present Illness  The patient is here to follow up on bilateral mastectomy with axillary dissection.  They are doing well and have no complaints.  Pathology is shown below:      Clinical Information    Comment:    Malignant neoplasm of overlapping sites of right breast in female, estrogen receptor positive (HCC)      Final Diagnosis   1. [place] node #1, excision:               - One lymph node positive for macrometastatic carcinoma (1/1)               - See synoptic checklist     2. [place] node #2, excision:               - One lymph node positive for macrometastatic carcinoma (1/1)               - See synoptic checklist     3. [place] node #3, excision:               - One lymph node negative for metastatic carcinoma (0/1)               - See synoptic checklist     4. [place] node #4, excision:               - One of two lymph nodes positive for macrometastatic carcinoma (1/2)               - See synoptic checklist     5. [place] node #5, excision:               - One of three lymph nodes positive for macrometastatic carcinoma (1/3)               - See synoptic checklist     6. [place] node #6, excision:               - One lymph node negative for carcinoma (0/1)               - See synoptic checklist     7. [place] node #7, excision:               - One lymph node negative for carcinoma (0/1)               - See synoptic checklist     8. Left breast, mastectomy:               - Fibrocystic change               - Usual ductal hyperplasia     9. Left breast, retroareolar biopsy:               - Intraductal papilloma               -  "Usual ductal hyperplasia     10. 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 Right breast, retroareolar biopsy:               - Negative for carcinoma and carcinoma in situ               - See synoptic checklist     12. Right breast, additional retroareolar tissue, excision:               - Negative for carcinoma and carcinoma in situ               - See synoptic checklist      Electronically signed by Deyvi Crews MD on 12/29/2021 at 1250   Synoptic Checklist     INVASIVE CARCINOMA OF THE BREAST: Resection  8th Edition - Protocol posted: 6/30/2021  INVASIVE CARCINOMA OF THE BREAST: COMPLETE EXCISION - 1, 2, 3, 4, 5, 6, 7, 10, 11, 12  SPECIMEN   Procedure  Total mastectomy    Specimen Laterality  Right    TUMOR   Histologic Type  Invasive carcinoma of no special type (ductal)    Histologic Grade (Sherman Histologic Score)     Glandular (Acinar) / Tubular Differentiation  Score 3    Nuclear Pleomorphism  Score 2    Mitotic Rate  Score 2    Overall Grade  Grade 2 (scores of 6 or 7)    Tumor Size  Greatest dimension of largest invasive focus (Millimeters): 23 mm   Tumor Focality  Multiple foci of invasive carcinoma    Number of Foci  2    Ductal Carcinoma In Situ (DCIS)  Present      Positive for extensive intraductal component (EIC)    Architectural Patterns  [place]      Cribriform      Papillary      Solid    Nuclear Grade  Grade III (high)    Necrosis  Present, central (expansive \"comedo\" necrosis)    Tumor Extent     Microcalcifications  Present in invasive carcinoma      Present in non-neoplastic tissue    Treatment Effect in the Breast  No known presurgical therapy    MARGINS   Margin Status for Invasive Carcinoma  All margins " negative for invasive carcinoma    Distance from Invasive Carcinoma to Closest Margin  20 mm   Closest Margin(s) to Invasive Carcinoma  Posterior    Margin Status for DCIS  All margins negative for DCIS    Distance from DCIS to Closest Margin  7 mm   Closest Margin(s) to DCIS  Posterior    REGIONAL LYMPH NODES   Regional Lymph Node Status  Tumor present in regional lymph node(s)    Number of Lymph Nodes with Macrometastases  4    Number of Lymph Nodes with Micrometastases  0    Number of Lymph Nodes with Isolated Tumor Cells  0    Size of Largest Sofi Metastatic Deposit  23 mm   Extranodal Extension  Not identified    Total Number of Lymph Nodes Examined (sentinel and non-sentinel)  10    Number of [place] Nodes Examined  10    PATHOLOGIC STAGE CLASSIFICATION (pTNM, AJCC 8th Edition)   Reporting of pT, pN, and (when applicable) pM categories is based on information available to the pathologist at the time the report is issued. As per the AJCC (Chapter 1, 8th Ed.) it is the managing physician’s responsibility to establish the final pathologic stage based upon all pertinent information, including but potentially not limited to this pathology report.   TNM Descriptors  m (multiple foci of invasive carcinoma)    pT Category  pT2    pN Category  pN2a    Breast Biomarker Testing Performed on Previous Biopsy     Estrogen Receptor (ER) Status  Positive (greater than 10% of cells demonstrate nuclear positivity)    Percentage of Cells with Nuclear Positivity  40 %   Breast Biomarker Testing Performed on Previous Biopsy     Progesterone Receptor (PgR) Status  Positive    Percentage of Cells with Nuclear Positivity  5 %   Breast Biomarker Testing Performed on Previous Biopsy     HER2 (by immunohistochemistry)  Negative (Score 0)    Breast Biomarker Testing Performed on Previous Biopsy     Ki-67 Percentage of Positive Nuclei  14 %   Testing Performed on Case Number  [id number]    .        She is getting her expansions and those  "are going ok and she is completely filled on the right.      Objective   Vital Signs:   Resp 16   Ht 165.1 cm (65\")   Wt 78.9 kg (174 lb)   BMI 28.96 kg/m²     Physical Exam  Vitals and nursing note reviewed.   Constitutional:       General: She is not in acute distress.     Appearance: Normal appearance. She is well-developed.   HENT:      Head: Normocephalic and atraumatic.   Eyes:      Extraocular Movements: Extraocular movements intact.      Pupils: Pupils are equal, round, and reactive to light.   Cardiovascular:      Pulses: Normal pulses.   Pulmonary:      Effort: Pulmonary effort is normal. No retractions.      Breath sounds: Normal air entry. No wheezing.   Abdominal:      General: There is no distension.      Palpations: Abdomen is soft.      Tenderness: There is no abdominal tenderness.      Hernia: No hernia is present.   Musculoskeletal:         General: No swelling or deformity.      Cervical back: Neck supple.   Skin:     General: Skin is warm and dry.      Findings: No erythema.      Comments: Surgical Incision Healing Well   Neurological:      General: No focal deficit present.      Mental Status: She is alert and oriented to person, place, and time.      Motor: Motor function is intact.   Psychiatric:         Mood and Affect: Mood normal.         Thought Content: Thought content normal.               Assessment and Plan    Diagnoses and all orders for this visit:    1. Malignant neoplasm of overlapping sites of right breast in female, estrogen receptor positive (HCC) (Primary)  -     Ambulatory Referral to Radiation Oncology    Keep apt with oncology and plastics  Will refer to radiation oncology    Follow Up   Return in about 3 months (around 4/27/2022).  Patient was given instructions and counseling regarding her condition or for health maintenance advice. Please see specific information pulled into the AVS if appropriate.     "

## 2022-01-27 ENCOUNTER — HOSPITAL ENCOUNTER (OUTPATIENT)
Dept: OCCUPATIONAL THERAPY | Facility: HOSPITAL | Age: 62
Setting detail: THERAPIES SERIES
Discharge: HOME | End: 2022-01-27

## 2022-01-27 ENCOUNTER — OFFICE VISIT (OUTPATIENT)
Dept: SURGERY | Facility: CLINIC | Age: 62
End: 2022-01-27

## 2022-01-27 VITALS — WEIGHT: 174 LBS | BODY MASS INDEX: 28.99 KG/M2 | RESPIRATION RATE: 16 BRPM | HEIGHT: 65 IN

## 2022-01-27 DIAGNOSIS — R52 PAIN: ICD-10-CM

## 2022-01-27 DIAGNOSIS — Z91.89 AT RISK FOR LYMPHEDEMA: Primary | ICD-10-CM

## 2022-01-27 DIAGNOSIS — Z17.0 MALIGNANT NEOPLASM OF OVERLAPPING SITES OF RIGHT BREAST IN FEMALE, ESTROGEN RECEPTOR POSITIVE: Primary | ICD-10-CM

## 2022-01-27 DIAGNOSIS — C50.811 MALIGNANT NEOPLASM OF OVERLAPPING SITES OF RIGHT BREAST IN FEMALE, ESTROGEN RECEPTOR POSITIVE: Primary | ICD-10-CM

## 2022-01-27 DIAGNOSIS — L90.5 SCAR CONDITION AND FIBROSIS OF SKIN: ICD-10-CM

## 2022-01-27 PROCEDURE — 97535 SELF CARE MNGMENT TRAINING: CPT

## 2022-01-27 PROCEDURE — 99024 POSTOP FOLLOW-UP VISIT: CPT | Performed by: SURGERY

## 2022-01-27 PROCEDURE — 97140 MANUAL THERAPY 1/> REGIONS: CPT

## 2022-01-27 NOTE — THERAPY TREATMENT NOTE
Outpatient Occupational Therapy Lymphedema Treatment Note   [facility]     Patient Name: Marisa Portillo  : 1960  MRN: [id number]  Today's Date: 2022      Visit Date: 2022    Patient Active Problem List   Diagnosis   • Arthritis   • Bladder disorder   • Concussion   • Contact dermatitis and eczema   • Injury, other and unspecified, finger   • Limb swelling   • Seasonal allergic rhinitis   • Vitamin D deficiency   • IFG (impaired fasting glucose)   • Mixed hyperlipidemia   • TIA (transient ischemic attack)   • Malignant neoplasm of overlapping sites of right breast in female, estrogen receptor positive (HCC)   • Port-A-Cath placement   • S/P bilateral nipple sparing mastectomy   • S/P BILATERAL IMMEDIATE BREAST RECONSTRUCTION WITH LEFT PRE PEC PLACEMENT OF SILICONE GEL IMPLANT AND MESH, RIGHT PRE PEC PLACEMENT OF EXPANDER AND MESH        Past Medical History:   Diagnosis Date   • Allergies    • Breast cancer (HCC)    • High cholesterol    • IFG (impaired fasting glucose)    • Osteoarthritis    • Port-A-Cath placement 2021   • S/P BILATERAL IMMEDIATE BREAST RECONSTRUCTION WITH LEFT PRE PEC PLACEMENT OF SILICONE GEL IMPLANT AND MESH, RIGHT PRE PEC PLACEMENT OF EXPANDER AND MESH 2021   • TIA (transient ischemic attack)     no residual (tia approximately 8 years ago)   • Vitamin D deficiency         Past Surgical History:   Procedure Laterality Date   • APPENDECTOMY     • BREAST AUGMENTATION Bilateral 2021    Procedure: bilateral breast reconstructon with bilateral mesh placement.   left implant, right tissue expander placement;  Surgeon: Lacy Shelton MD;  [facility] [facility] [place];  Service: Plastics;  Laterality: Bilateral;   • CEREBRAL ANGIOGRAM      clip placed- pt stated can have MRI with cerebral clips   • CYST REMOVAL Right     rt wrist   • MASTECTOMY     • MASTECTOMY COMPLETE / SIMPLE W/ SENTINEL NODE BIOPSY Bilateral    • PORTACATH PLACEMENT Left 2021     Procedure: INSERTION OF PORTACATH;  Surgeon: Jacqueline Mcgraw MD;  Location: [facility] OR [facility] [place];  Service: General;  Laterality: Left;   • SENTINEL NODE BIOPSY Bilateral 12/28/2021    Procedure: BILATERAL BREAST MASTECTOMIES WITH RIGHT SENTINEL NODE BIOPSIES WITH FROZEN SECTIONS;  Surgeon: Jacqueline Mcgraw MD;  Location: [facility] OR [facility] [place];  Service: General;  Laterality: Bilateral;   • TOTAL ABDOMINAL HYSTERECTOMY WITH SALPINGO OOPHORECTOMY      Ovaries left in place          Visit Dx:      ICD-10-CM ICD-9-CM   1. At risk for lymphedema  Z91.89 V49.89   2. Scar condition and fibrosis of skin  L90.5 709.2   3. Pain  R52 780.96        Lymphedema     Row Name 01/27/22 1600             Subjective Pain    Able to rate subjective pain? yes  -SC      Pre-Treatment Pain Level 6  -SC      Post-Treatment Pain Level 6  -SC      Subjective Pain Comment B chest wall at implantation areas  -SC              Subjective Comments    Subjective Comments Pt states she has noticed an increase in pulling and burning in L axilla. Pt also states that her plastic surgeon has discontinued all ROM restrictions  -SC              Skin Changes/Observations    Location/Assessment Upper Quadrant  -SC      Upper Quadrant Conditions bilateral:; clean; left:; scab(s)  -SC      Skin Observations Comment Pt noted with healing scar from surgery and implantation  -SC              Manual Lymphatic Drainage    Manual Therapy Therapist providing myofascial release in L axilla to attempt to break lymphatic cording x1. Therapist unable to break cord this date  -SC              Compression/Skin Care    Compression/Skin Care Comments Pt donning compression bra  -SC            User Key  (r) = Recorded By, (t) = Taken By, (c) = Cosigned By    Initials Name Provider Type    SC Zo Winter COTA Occupational Therapy Assistant                         OT Assessment/Plan     Row Name 01/27/22 0442          OT Assessment    Assessment Comments Patient will benefit  "from continued skilled occupational therapy services to address ongoing pain and possible lymphatic sclerosis as well as evaluate ongoing lymphatic functioning to prevent decline in I/ADL independence and advancement in lymphedema staging  -SC     Patient would benefit from skilled therapy intervention Yes  -SC            OT Plan    OT Plan Comments Continue POC  -SC           User Key  (r) = Recorded By, (t) = Taken By, (c) = Cosigned By    Initials Name Provider Type    Zo Brock COTA Occupational Therapy Assistant                    Manual Rx (last 36 hours)     Manual Treatments     Row Name 01/27/22 1655             Total Minutes    96335 - OT Manual Therapy Minutes 25  -SC            User Key  (r) = Recorded By, (t) = Taken By, (c) = Cosigned By    Initials Name Provider Type    Zo Brock COTA Occupational Therapy Assistant                  Therapy Education  Given: HEP, Symptoms/condition management  Program: New, Reinforced  How Provided: Verbal, Demonstration  Provided to: Patient  Level of Understanding: Teach back education performed, Verbalized, Demonstrated  59899 - OT Self Care/Mgmt Minutes: 40  Therapist re-educating pt on full home exercise program. Pt able to demonstrate good understanding of technique. Patient was also re-educated on \"bye-bye\" lymphatic flossing technique in flexion and abduction.       Patient provided education on risk factors for lymphedema to include greater than 6 lymph node removal, BMI greater than 30, mastectomy versus lumpectomy, radiation therapy, and Taxol use and advanced age.  Patients risk factors include greater than 6 lymph node removal, BMI greater than 30, mastectomy, and XRT.  Patient provided with the LeAP lymphedema action plan stoplight to recovery handout at evaluation (Rehabilitation Oncology: July 2019 - Volume 37 - Issue 3 - p 122-127 doi: 10.1097/01.[id number]) to improve patient's ability to identify lymph exacerbation and " provide guidance on appropriate action to be taken to control symptoms.   Pt re-educated to perform self-manual lymphatic drainage technique with recommendation to perform 2 times daily to mitigate edema/lymphedema.  Pt re-educated on performing stretching 2 times daily to prevent soft tissue tightening and/or range of motion deficits.        Time Calculation:   Timed Charges  41159 - OT Manual Therapy Minutes: 25  25050 - OT Self Care/Mgmt Minutes: 40  Total Minutes  Timed Charges Total Minutes: 65   Total Minutes: 65     Therapy Charges for Today     Code Description Service Date Service Provider Modifiers Qty    41240011060 HC OT MANUAL THERAPY EA 15 MIN 1/27/2022 Zo Winter COTA GO 2    83467517587 HC OT SELF CARE/MGMT/TRAIN EA 15 MIN 1/27/2022 oZ Winter COTA GO 2                      TANIA Gunter  1/27/2022

## 2022-01-31 ENCOUNTER — DOCUMENTATION (OUTPATIENT)
Dept: PHARMACY | Facility: HOSPITAL | Age: 62
End: 2022-01-31

## 2022-01-31 ENCOUNTER — TELEPHONE (OUTPATIENT)
Dept: SURGERY | Facility: CLINIC | Age: 62
End: 2022-01-31

## 2022-01-31 ENCOUNTER — OFFICE VISIT (OUTPATIENT)
Dept: ONCOLOGY | Facility: HOSPITAL | Age: 62
End: 2022-01-31

## 2022-01-31 ENCOUNTER — HOSPITAL ENCOUNTER (OUTPATIENT)
Dept: ONCOLOGY | Facility: HOSPITAL | Age: 62
Setting detail: INFUSION SERIES
Discharge: HOME | End: 2022-01-31

## 2022-01-31 VITALS
HEART RATE: 89 BPM | OXYGEN SATURATION: 96 % | BODY MASS INDEX: 30.41 KG/M2 | SYSTOLIC BLOOD PRESSURE: 138 MMHG | RESPIRATION RATE: 18 BRPM | TEMPERATURE: 98.5 F | DIASTOLIC BLOOD PRESSURE: 74 MMHG | HEIGHT: 64 IN | WEIGHT: 178.13 LBS

## 2022-01-31 DIAGNOSIS — Z17.0 MALIGNANT NEOPLASM OF OVERLAPPING SITES OF RIGHT BREAST IN FEMALE, ESTROGEN RECEPTOR POSITIVE: Primary | ICD-10-CM

## 2022-01-31 DIAGNOSIS — C50.811 MALIGNANT NEOPLASM OF OVERLAPPING SITES OF RIGHT BREAST IN FEMALE, ESTROGEN RECEPTOR POSITIVE: Primary | ICD-10-CM

## 2022-01-31 LAB
ALBUMIN SERPL-MCNC: 4.43 G/DL (ref 3.5–5.2)
ALBUMIN/GLOB SERPL: 1.7 G/DL
ALP SERPL-CCNC: 67 U/L (ref 39–117)
ALT SERPL W P-5'-P-CCNC: 14 U/L (ref 1–33)
ANION GAP SERPL CALCULATED.3IONS-SCNC: 11.6 MMOL/L (ref 5–15)
AST SERPL-CCNC: 12 U/L (ref 1–32)
BASOPHILS # BLD AUTO: 0.01 10*3/MM3 (ref 0–0.2)
BASOPHILS NFR BLD AUTO: 0.1 % (ref 0–1.5)
BILIRUB SERPL-MCNC: 0.3 MG/DL (ref 0–1.2)
BUN SERPL-MCNC: 15 MG/DL (ref 8–23)
BUN/CREAT SERPL: 21.1 (ref 7–25)
CALCIUM SPEC-SCNC: 9.7 MG/DL (ref 8.6–10.5)
CHLORIDE SERPL-SCNC: 104 MMOL/L (ref 98–107)
CO2 SERPL-SCNC: 22.4 MMOL/L (ref 22–29)
CREAT SERPL-MCNC: 0.71 MG/DL (ref 0.57–1)
DEPRECATED RDW RBC AUTO: 44.7 FL (ref 37–54)
EOSINOPHIL # BLD AUTO: 0 10*3/MM3 (ref 0–0.4)
EOSINOPHIL NFR BLD AUTO: 0 % (ref 0.3–6.2)
ERYTHROCYTE [DISTWIDTH] IN BLOOD BY AUTOMATED COUNT: 12.9 % (ref 12.3–15.4)
GFR SERPL CREATININE-BSD FRML MDRD: 83 ML/MIN/1.73
GLOBULIN UR ELPH-MCNC: 2.6 GM/DL
GLUCOSE SERPL-MCNC: 167 MG/DL (ref 65–99)
HCT VFR BLD AUTO: 38.8 % (ref 34–46.6)
HGB BLD-MCNC: 13 G/DL (ref 12–15.9)
IMM GRANULOCYTES # BLD AUTO: 0.03 10*3/MM3 (ref 0–0.05)
IMM GRANULOCYTES NFR BLD AUTO: 0.2 % (ref 0–0.5)
LYMPHOCYTES # BLD AUTO: 0.84 10*3/MM3 (ref 0.7–3.1)
LYMPHOCYTES NFR BLD AUTO: 5.8 % (ref 19.6–45.3)
MCH RBC QN AUTO: 31.2 PG (ref 26.6–33)
MCHC RBC AUTO-ENTMCNC: 33.5 G/DL (ref 31.5–35.7)
MCV RBC AUTO: 93 FL (ref 79–97)
MONOCYTES # BLD AUTO: 0.49 10*3/MM3 (ref 0.1–0.9)
MONOCYTES NFR BLD AUTO: 3.4 % (ref 5–12)
NEUTROPHILS NFR BLD AUTO: 13.02 10*3/MM3 (ref 1.7–7)
NEUTROPHILS NFR BLD AUTO: 90.5 % (ref 42.7–76)
PLATELET # BLD AUTO: 234 10*3/MM3 (ref 140–450)
PMV BLD AUTO: 10.2 FL (ref 6–12)
POTASSIUM SERPL-SCNC: 3.8 MMOL/L (ref 3.5–5.2)
PROT SERPL-MCNC: 7 G/DL (ref 6–8.5)
RBC # BLD AUTO: 4.17 10*6/MM3 (ref 3.77–5.28)
SODIUM SERPL-SCNC: 138 MMOL/L (ref 136–145)
WBC NRBC COR # BLD: 14.39 10*3/MM3 (ref 3.4–10.8)

## 2022-01-31 PROCEDURE — 99214 OFFICE O/P EST MOD 30 MIN: CPT | Performed by: INTERNAL MEDICINE

## 2022-01-31 PROCEDURE — 85025 COMPLETE CBC W/AUTO DIFF WBC: CPT | Performed by: INTERNAL MEDICINE

## 2022-01-31 PROCEDURE — 96417 CHEMO IV INFUS EACH ADDL SEQ: CPT

## 2022-01-31 PROCEDURE — 25010000002 HEPARIN LOCK FLUSH PER 10 UNITS: Performed by: INTERNAL MEDICINE

## 2022-01-31 PROCEDURE — 96413 CHEMO IV INFUSION 1 HR: CPT

## 2022-01-31 PROCEDURE — 25010000002 PALONOSETRON PER 25 MCG: Performed by: INTERNAL MEDICINE

## 2022-01-31 PROCEDURE — 96377 APPLICATON ON-BODY INJECTOR: CPT

## 2022-01-31 PROCEDURE — 25010000002 PEGFILGRASTIM 6 MG/0.6ML PREFILLED SYRINGE KIT: Performed by: INTERNAL MEDICINE

## 2022-01-31 PROCEDURE — 25010000002 DOCETAXEL 20 MG/ML SOLUTION 8 ML VIAL: Performed by: INTERNAL MEDICINE

## 2022-01-31 PROCEDURE — 96375 TX/PRO/DX INJ NEW DRUG ADDON: CPT

## 2022-01-31 PROCEDURE — 80053 COMPREHEN METABOLIC PANEL: CPT | Performed by: INTERNAL MEDICINE

## 2022-01-31 PROCEDURE — 25010000002 CYCLOPHOSPHAMIDE PER 100 MG: Performed by: INTERNAL MEDICINE

## 2022-01-31 RX ORDER — FAMOTIDINE 10 MG/ML
20 INJECTION, SOLUTION INTRAVENOUS AS NEEDED
Status: CANCELLED | OUTPATIENT
Start: 2022-01-31

## 2022-01-31 RX ORDER — SODIUM CHLORIDE 0.9 % (FLUSH) 0.9 %
20 SYRINGE (ML) INJECTION AS NEEDED
Status: DISCONTINUED | OUTPATIENT
Start: 2022-01-31 | End: 2022-02-01 | Stop reason: HOSPADM

## 2022-01-31 RX ORDER — SODIUM CHLORIDE 0.9 % (FLUSH) 0.9 %
20 SYRINGE (ML) INJECTION AS NEEDED
Status: CANCELLED | OUTPATIENT
Start: 2022-01-31

## 2022-01-31 RX ORDER — HEPARIN SODIUM (PORCINE) LOCK FLUSH IV SOLN 100 UNIT/ML 100 UNIT/ML
500 SOLUTION INTRAVENOUS AS NEEDED
Status: DISCONTINUED | OUTPATIENT
Start: 2022-01-31 | End: 2022-02-01 | Stop reason: HOSPADM

## 2022-01-31 RX ORDER — PALONOSETRON 0.05 MG/ML
0.25 INJECTION, SOLUTION INTRAVENOUS ONCE
Status: COMPLETED | OUTPATIENT
Start: 2022-01-31 | End: 2022-01-31

## 2022-01-31 RX ORDER — SODIUM CHLORIDE 9 MG/ML
250 INJECTION, SOLUTION INTRAVENOUS ONCE
Status: COMPLETED | OUTPATIENT
Start: 2022-01-31 | End: 2022-01-31

## 2022-01-31 RX ORDER — SENNOSIDES 8.6 MG
650 CAPSULE ORAL 2 TIMES DAILY
COMMUNITY

## 2022-01-31 RX ORDER — HEPARIN SODIUM (PORCINE) LOCK FLUSH IV SOLN 100 UNIT/ML 100 UNIT/ML
500 SOLUTION INTRAVENOUS AS NEEDED
Status: CANCELLED | OUTPATIENT
Start: 2022-01-31

## 2022-01-31 RX ORDER — DIPHENHYDRAMINE HYDROCHLORIDE 50 MG/ML
50 INJECTION INTRAMUSCULAR; INTRAVENOUS AS NEEDED
Status: CANCELLED | OUTPATIENT
Start: 2022-01-31

## 2022-01-31 RX ADMIN — SODIUM CHLORIDE, PRESERVATIVE FREE 20 ML: 5 INJECTION INTRAVENOUS at 13:42

## 2022-01-31 RX ADMIN — PALONOSETRON HYDROCHLORIDE 0.25 MG: 0.25 INJECTION, SOLUTION INTRAVENOUS at 11:27

## 2022-01-31 RX ADMIN — DOCETAXEL 140 MG: 20 INJECTION, SOLUTION, CONCENTRATE INTRAVENOUS at 11:48

## 2022-01-31 RX ADMIN — SODIUM CHLORIDE 250 ML: 9 INJECTION, SOLUTION INTRAVENOUS at 11:27

## 2022-01-31 RX ADMIN — PEGFILGRASTIM 6 MG: KIT SUBCUTANEOUS at 13:37

## 2022-01-31 RX ADMIN — HEPARIN SODIUM (PORCINE) LOCK FLUSH IV SOLN 100 UNIT/ML 500 UNITS: 100 SOLUTION at 13:42

## 2022-01-31 RX ADMIN — CYCLOPHOSPHAMIDE 1130 MG: 1 INJECTION, POWDER, FOR SOLUTION INTRAVENOUS; ORAL at 12:58

## 2022-01-31 NOTE — PROGRESS NOTES
"Presents with  for C1 of 4 TC for Right ER+ breast CA.    Ht: 161.3 cm  Wt: 80.8 kg  BSA: 1.85 m2    Doses confirmed to be within acceptable variance using today's parameters:    Docetaxel 75 mg/m2= 140 mg  Cyclophos 600 mg/m2= 1130 mg    Labs from 1/31 reviewed and are within Epic comm parameter variation limits.    UpToDate patient information sheets provided and key information verbally highlighted including:  Overview of regimen with infusion times; use of PO dexamethasone days -1, 0, 1 each cycle; overview of OnPro device including use of loratidine for bone pain; recognition and mgmt of infusion or allergic reactions; n/v mgmt; peripheral neuropathy; \"call your doctor right away\" parameters.  All questions answered in kind and no outstanding issues at this time.  RTC 3 weeks for C2.  "

## 2022-01-31 NOTE — PROGRESS NOTES
Outpatient Nutrition Oncology Assessment    Patient Name: Marisa Portillo  YOB: 1960  MRN: [id number]  Assessment Date: 1/31/2022    CLINICAL NUTRITION ASSESSMENT    Dx:  R breast Ca      Type of Cancer Treatment  Docetaxel, Cytoxan         Reason for Assessment  Assessment   H&P:    Past Medical History:   Diagnosis Date   • Allergies    • Breast cancer (HCC)    • High cholesterol    • IFG (impaired fasting glucose)    • Osteoarthritis    • Port-A-Cath placement 12/29/2021   • S/P BILATERAL IMMEDIATE BREAST RECONSTRUCTION WITH LEFT PRE PEC PLACEMENT OF SILICONE GEL IMPLANT AND MESH, RIGHT PRE PEC PLACEMENT OF EXPANDER AND MESH 12/29/2021   • TIA (transient ischemic attack)     no residual (tia approximately 8 years ago)   • Vitamin D deficiency       Current Problems:   Patient Active Problem List   Diagnosis Code   • Arthritis M19.90   • Bladder disorder N32.9   • Concussion S06.0X9A   • Contact dermatitis and eczema L25.9   • Injury, other and unspecified, finger S69.90XA, S69.80XA   • Limb swelling M79.89   • Seasonal allergic rhinitis J30.2   • Vitamin D deficiency E55.9   • IFG (impaired fasting glucose) R73.01   • Mixed hyperlipidemia E78.2   • TIA (transient ischemic attack) G45.9   • Malignant neoplasm of overlapping sites of right breast in female, estrogen receptor positive (HCC) C50.811, Z17.0   • Port-A-Cath placement Z95.828   • S/P bilateral nipple sparing mastectomy Z90.13   • S/P BILATERAL IMMEDIATE BREAST RECONSTRUCTION WITH LEFT PRE PEC PLACEMENT OF SILICONE GEL IMPLANT AND MESH, RIGHT PRE PEC PLACEMENT OF EXPANDER AND MESH Z98.890                Weight Hx  Wt Readings from Last 30 Encounters:   01/31/22 0942 80.8 kg (178 lb 2.1 oz)   01/27/22 0908 78.9 kg (174 lb)   01/20/22 1352 81.3 kg (179 lb 3.7 oz)   01/20/22 1202 79.4 kg (175 lb)   01/14/22 0839 81.6 kg (179 lb 14.3 oz)   01/13/22 1109 78.5 kg (173 lb)   01/06/22 0857 79.5 kg (175 lb 3.2 oz)   01/04/22 0930 79.4 kg (175 lb)    12/28/21 0707 80.3 kg (177 lb 0.5 oz)   12/14/21 1330 78.5 kg (173 lb)   12/09/21 1503 78.5 kg (173 lb)   12/09/21 1311 80.3 kg (177 lb 0.5 oz)   12/09/21 1302 80.3 kg (177 lb)   08/10/21 1426 80.3 kg (177 lb)   06/30/20 0000 81.9 kg (180 lb 8 oz)         Estimated/Assessed Needs     Row Name 01/31/22 1226          Calculation Measurements    Weight Used For Calculations 80.8 kg (178 lb 2.1 oz)            Estimated/Assessed Needs    Additional Documentation Fluid Requirements (Group); KCAL/KG (Group); Protein Requirements (Group)            Calorie Requirements    Weight Used For Calorie Calculations 80.8 kg (178 lb 2.1 oz)            KCAL/KG    KCAL/KG 25 Kcal/Kg (kcal)     25 Kcal/Kg (kcal) 2020            Protein Requirements    Weight Used For Protein Calculations 53.4 kg (117 lb 11.6 oz)  IBW     Est Protein Requirement Amount (gms/kg) 1.2 gm protein     Estimated Protein Requirements (gms/day) 64.08            Fluid Requirements    Fluid Requirements (mL/day) 1602  30 ml/kg IBW     RDA Method (mL) 1602                  Labs/Medications        Pertinent Labs Reviewed.   Results from last 7 days   Lab Units 01/31/22  0939   SODIUM mmol/L 138   POTASSIUM mmol/L 3.8   CHLORIDE mmol/L 104   CO2 mmol/L 22.4   BUN mg/dL 15   CREATININE mg/dL 0.71   CALCIUM mg/dL 9.7   BILIRUBIN mg/dL 0.3   ALK PHOS U/L 67   ALT (SGPT) U/L 14   AST (SGOT) U/L 12   GLUCOSE mg/dL 167*     Results from last 7 days   Lab Units 01/31/22  0939   HEMOGLOBIN g/dL 13.0   HEMATOCRIT % 38.8       Pertinent Medications Magnesium, OLANZapine, Vitamin A, acetaminophen, aspirin, dexamethasone, ondansetron, and vitamin D3     Nutrition Diagnosis        Nutrition Dx Problem 1 Increased nutrient needs related to increased nutrient needs due to catabolic disease as evidenced by physiological causes increasing nutrient needs.       Nutrition Intervention       RD Action Nutrition assessment and consultation  Written materials provided:  Nausea &  Vomiting, Oral Care, High Calorie/High Protein foods, Food Safety     Monitor/Evaluation       Monitor Per oncology nutrition protocol.     Comments:    Pt receiving her first infusion today.  She has not experienced recent nutrition-related concerns or unintentional weight decline.  Pt reports to eat a variety of foods and to be drinking adequately.  Reviewed potential nutrition-related side effects, such as n/v and mucositis.  Discussed MNT for n/v & importance of good oral care.  Pt had questions regarding plant-based high protein oral nutrition shakes.  Answered questions appropriately.  Pt agreed for Oncology RD to request samples of [employer]lsen oral nutrition shakes to be sent to her home address.  Ordered 1 12 pack case of Standard 1.0 (vanilla) to be sent to her home.      Addendum:  Recommended pt stop taking Vitamin A supplementation during her chemotherapy (and future radiation therapy) treatments.  Communicated to pt that a request for the oral supplements successfully went through to [employer].    Electronically signed by:  Starr Felipe RD  01/31/22 12:38 EST

## 2022-02-01 ENCOUNTER — TELEPHONE (OUTPATIENT)
Dept: ONCOLOGY | Facility: HOSPITAL | Age: 62
End: 2022-02-01

## 2022-02-01 ENCOUNTER — APPOINTMENT (OUTPATIENT)
Dept: RADIATION ONCOLOGY | Facility: HOSPITAL | Age: 62
End: 2022-02-01

## 2022-02-01 NOTE — TELEPHONE ENCOUNTER
"Diagnosis: Breast Cancer    Reason for referral: Rounding    Comments: [employer] student contacted pt via telephone to f/u during first week of tx/rounding. Pt sounded to be in a pleasant mood. Introduced myself and discussed OSW role/psychosocial services available. Discussed opportunities for gas, transportation and financial assistance. Pt reported that she provides own transportation to and from appts and is not interested in any additional assistance at this time. Pt inquired about her eligibility for financial assistance to help cover medical bills. OSW relayed information about the Financial Assistance Program and provided pt with Oncology Financial Navigator phone number so that pt can reach out and inquire about application and get answers to any additional questions regarding the program. [employer] student also discussed with pt opportunities for connection to support services and mental health services (peer support, support groups, counseling, etc.). Pt indicated no interest in support services at this time, stating that she has \"plenty of support\" including from pt's spouse and other family members and friends. Pt also shared that she is speaking with other people who are going through the similar experience as pt. Pt describes these conversations to be her \"own little support group\" and finds it very helpful. Pt resides with spouse in [place] and has [employer] [facility] [employer] insurance coverage. Provided OSW phone number and encouraged pt to reach out if any assistance is needed in future. OSW and [employer] student support remains available, pt expressed gratitude.       Services/Referrals Provided: Financial assistance - Oncology financial navigator    "

## 2022-02-02 ENCOUNTER — TELEPHONE (OUTPATIENT)
Dept: ONCOLOGY | Facility: HOSPITAL | Age: 62
End: 2022-02-02

## 2022-02-02 NOTE — TELEPHONE ENCOUNTER
Marisa Portillo 1960       Symptoms    • Does patient have nausea and vomiting? N    • Does patient have medications prescribed for N/V? Y    • Does patient have diarrhea? N    • Does the patient have diarrhea medications? N    • Does the patient have pain? N    • Is pain medications effective?     Discharge  • Do you have any questions about your discharge instructions? N    Patient Satisfaction with [facility]    • Where you satisfied with the care you received? Y    Pt suggestions for the [facility]    • Do you have any suggestions to improve your care? Y    Comments    • Follow up phone call comments

## 2022-02-07 ENCOUNTER — TELEPHONE (OUTPATIENT)
Dept: ONCOLOGY | Facility: HOSPITAL | Age: 62
End: 2022-02-07

## 2022-02-07 NOTE — TELEPHONE ENCOUNTER
Caller: ALONSO    Relationship to patient: SELF    Best call back number: [phone]    Patient is needing: TO TALK TO AWAIS ABOUT FMLA.

## 2022-02-08 ENCOUNTER — APPOINTMENT (OUTPATIENT)
Dept: OCCUPATIONAL THERAPY | Facility: HOSPITAL | Age: 62
End: 2022-02-08

## 2022-02-11 ENCOUNTER — APPOINTMENT (OUTPATIENT)
Dept: RADIATION ONCOLOGY | Facility: HOSPITAL | Age: 62
End: 2022-02-11

## 2022-02-11 ENCOUNTER — CONSULT (OUTPATIENT)
Dept: RADIATION ONCOLOGY | Facility: HOSPITAL | Age: 62
End: 2022-02-11

## 2022-02-11 VITALS
DIASTOLIC BLOOD PRESSURE: 78 MMHG | WEIGHT: 181 LBS | BODY MASS INDEX: 31.56 KG/M2 | HEART RATE: 86 BPM | OXYGEN SATURATION: 96 % | SYSTOLIC BLOOD PRESSURE: 143 MMHG | RESPIRATION RATE: 16 BRPM | TEMPERATURE: 97.2 F

## 2022-02-11 DIAGNOSIS — Z17.0 MALIGNANT NEOPLASM OF OVERLAPPING SITES OF RIGHT BREAST IN FEMALE, ESTROGEN RECEPTOR POSITIVE: Primary | ICD-10-CM

## 2022-02-11 DIAGNOSIS — C50.811 MALIGNANT NEOPLASM OF OVERLAPPING SITES OF RIGHT BREAST IN FEMALE, ESTROGEN RECEPTOR POSITIVE: Primary | ICD-10-CM

## 2022-02-11 PROCEDURE — 99204 OFFICE O/P NEW MOD 45 MIN: CPT | Performed by: RADIOLOGY

## 2022-02-11 PROCEDURE — G0463 HOSPITAL OUTPT CLINIC VISIT: HCPCS | Performed by: RADIOLOGY

## 2022-02-11 NOTE — PROGRESS NOTES
New Patient Office Visit      Encounter Date: 02/11/2022   Patient Name: Marisa Portillo  YOB: 1960   Medical Record Number: [id number]   Primary Diagnosis: Malignant neoplasm of overlapping sites of right breast in female, estrogen receptor positive (HCC) [C50.811, Z17.0]   Cancer Staging: Cancer Staging  Malignant neoplasm of overlapping sites of right breast in female, estrogen receptor positive (HCC)  Staging form: Breast, AJCC 8th Edition  - Pathologic stage from 12/28/2021: Stage IB (pT2, pN2a, cM0, G2, ER+, MS+, HER2-) - Signed by Starr Mendez MD PhD on 1/30/2022      Chief Complaint:    Chief Complaint   Patient presents with   • Consult   • Breast Cancer       History of Present Illness: Marisa Portillo is a 61-year-old female with invasive ductal carcinoma of the right breast who is seen in consultation regarding radiotherapy as a component of adjuvant management. Ms. Portillo appreciated a right breast mass this past fall and underwent screening mammography on 10/12/2021 revealing a focal asymmetry with associated calcifications within the outer central right breast spanning the middle to posterior third depth. There is also an asymmetry within the inner posterior right breast. Diagnostic mammography performed on 11/12/2021 revealed a 2 cm developing asymmetry in the outer central right breast, middle to posterior depth with associated punctate and amorphous calcifications. Ultrasound performed the same day revealed this to measure 2.3 x 1.8 x 1.9 cm. Evaluation of the right axilla revealed lymph nodes with cortical thickening. Pathology from biopsy of the right breast revealed invasive ductal carcinoma, grade 2, ER positive/MS positive, HER-2/aries negative with Ki-67 of 14%. There were 2 lesions within the right breast, one at 3:00, 2 cm from the nipple and another at 9:00, 3 cm from the nipple. A right axillary lymph node was biopsied revealing metastatic carcinoma consistent  with breast primary. On 12/28/2021, Ms. Portillo underwent bilateral nipple sparing mastectomy with right axillary lymph node dissection. Pathology from this procedure revealed invasive ductal carcinoma, grade 2, ER positive/IN positive, HER-2/aries negative.  Ms. Portillo reports feeling well overall with no complaints.  She has been evaluated by Dr. Mendez with plans for adjuvant chemotherapy.    Subjective          Review of Systems: Review of Systems   Constitutional: Positive for fatigue. Negative for appetite change and unexpected weight change.   HENT: Negative for sore throat, tinnitus and trouble swallowing.    Eyes: Positive for visual disturbance (wear glasses).   Respiratory: Negative for cough and shortness of breath.    Cardiovascular: Negative for chest pain and palpitations.   Gastrointestinal: Negative for constipation, diarrhea and nausea.   Endocrine:        On hormone replacement from [age] yrs old    Genitourinary: Negative for difficulty urinating, dysuria, frequency, urgency and vaginal bleeding.   Musculoskeletal: Positive for arthralgias and back pain.   Skin: Negative for color change.   Neurological: Negative for dizziness, tremors, weakness and headaches.   Psychiatric/Behavioral: Positive for sleep disturbance (insomnia). Negative for agitation and suicidal ideas.       Past Medical History:   Past Medical History:   Diagnosis Date   • Allergies    • Breast cancer (HCC)    • High cholesterol    • IFG (impaired fasting glucose)    • Osteoarthritis    • Port-A-Cath placement 12/29/2021   • S/P BILATERAL IMMEDIATE BREAST RECONSTRUCTION WITH LEFT PRE PEC PLACEMENT OF SILICONE GEL IMPLANT AND MESH, RIGHT PRE PEC PLACEMENT OF EXPANDER AND MESH 12/29/2021   • TIA (transient ischemic attack)     no residual (tia approximately 8 years ago)   • Vitamin D deficiency        Past Surgical History:   Past Surgical History:   Procedure Laterality Date   • APPENDECTOMY     • BREAST AUGMENTATION Bilateral  12/28/2021    Procedure: bilateral breast reconstructon with bilateral mesh placement.   left implant, right tissue expander placement;  Surgeon: Lacy Shelton MD;  Location: [facility];  Service: Plastics;  Laterality: Bilateral;   • CEREBRAL ANGIOGRAM      clip placed- pt stated can have MRI with cerebral clips   • CYST REMOVAL Right     rt wrist   • MASTECTOMY     • MASTECTOMY COMPLETE / SIMPLE W/ SENTINEL NODE BIOPSY Bilateral    • PORTACATH PLACEMENT Left 12/28/2021    Procedure: INSERTION OF PORTACATH;  Surgeon: Jacqueline Mcgraw MD;  Location: [facility];  Service: General;  Laterality: Left;   • SENTINEL NODE BIOPSY Bilateral 12/28/2021    Procedure: BILATERAL BREAST MASTECTOMIES WITH RIGHT SENTINEL NODE BIOPSIES WITH FROZEN SECTIONS;  Surgeon: Jacqueline Mcgraw MD;  Location: [facility];  Service: General;  Laterality: Bilateral;       Family History:   Family History   Problem Relation Age of Onset   • Hypertension Mother    • Diabetes Mother    • Cancer Mother    • Arthritis Mother    • Breast cancer Mother    • Hypertension Father    • Diabetes Father    • Cancer Father    • Arthritis Father    • Liver cancer Father    • Lung cancer Father    • Breast cancer Maternal Aunt    • Malig Hyperthermia Neg Hx        Social History:   Social History     Socioeconomic History   • Marital status:    Tobacco Use   • Smoking status: Never Smoker   • Smokeless tobacco: Never Used   Vaping Use   • Vaping Use: Never used   Substance and Sexual Activity   • Alcohol use: Yes     Comment: socially    • Drug use: Never   • Sexual activity: Defer       Medications:     Current Outpatient Medications:   •  acetaminophen (TYLENOL) 650 MG 8 hr tablet, Take 650 mg by mouth Every 8 (Eight) Hours As Needed for Mild Pain ., Disp: , Rfl:   •  dexamethasone (DECADRON) 4 MG tablet, Take 2 tablets oral twice a day for 3 consecutive days beginning the day before chemotherapy and continue for 6 doses.,  Disp: 12 tablet, Rfl: 3  •  Loratadine-Pseudoephedrine (CLARITIN-D 24 HOUR PO), Take  by mouth., Disp: , Rfl:   •  Magnesium 250 MG tablet, Take 250 mg by mouth Daily., Disp: , Rfl:   •  OLANZapine (zyPREXA) 5 MG tablet, Take 1 tablet by mouth Every Night., Disp: 30 tablet, Rfl: 2  •  ondansetron (ZOFRAN) 8 MG tablet, Take 1 tablet by mouth 3 (Three) Times a Day As Needed for Nausea or Vomiting., Disp: 30 tablet, Rfl: 5  •  vitamin D3 (Vitamin D) 125 MCG (5000 UT) capsule capsule, Take 5,000 Units by mouth Daily., Disp: , Rfl:   •  aspirin 81 MG EC tablet, Take 81 mg by mouth Daily. TAKES FOR PREVENTIVE HEART HEALTH. PT REPORTED HER LAST DOSE 12-12-21., Disp: , Rfl:   •  Vitamin A 3 MG (84406 UT) capsule, Take 10,000 Units by mouth Daily., Disp: , Rfl:     Allergies:   Allergies   Allergen Reactions   • Multihance [Gadobenate] Hives and Itching     MRI contrast       Pain: (on a scale of 0-10)   Pain Score    02/11/22 1307   PainSc: 0-No pain       Advanced Care Plan: N   Quality of Life: 100 - Full Activity    Objective     Physical Exam:   Vital Signs:   Vitals:    02/11/22 1307   BP: 143/78   Pulse: 86   Resp: 16   Temp: 97.2 °F (36.2 °C)   TempSrc: Temporal   SpO2: 96%   Weight: 82.1 kg (181 lb)   PainSc: 0-No pain     Body mass index is 31.56 kg/m².     Physical Exam  Constitutional:       General: She is not in acute distress.     Appearance: Normal appearance. She is normal weight. She is not ill-appearing or toxic-appearing.   HENT:      Head: Normocephalic and atraumatic.      Mouth/Throat:      Mouth: Mucous membranes are moist.   Pulmonary:      Effort: Pulmonary effort is normal. No respiratory distress.   Chest:      Comments: S/p bilateral nipple sparing mastectomy with healing incisions  Abdominal:      General: There is no distension.      Palpations: Abdomen is soft.   Skin:     General: Skin is warm and dry.   Neurological:      General: No focal deficit present.      Mental Status: She is alert  and oriented to person, place, and time.      Cranial Nerves: No cranial nerve deficit.      Gait: Gait normal.   Psychiatric:         Mood and Affect: Mood normal.         Results:   Radiographs:   Pathology: I personally reviewed the pathology reports from the procedures performed on 12/1/2021 and 12/28/2021. The pertinent findings are as above in HPI.    Labs:   WBC   Date Value Ref Range Status   01/31/2022 14.39 (H) 3.40 - 10.80 10*3/mm3 Final     Hemoglobin   Date Value Ref Range Status   01/31/2022 13.0 12.0 - 15.9 g/dL Final     Hematocrit   Date Value Ref Range Status   01/31/2022 38.8 34.0 - 46.6 % Final     Platelets   Date Value Ref Range Status   01/31/2022 234 140 - 450 10*3/mm3 Final    No results found for: PSA No results found for: CEA       Assessment / Plan          Assessment/Plan:   Marisa Portillo is a 61-year-old female with cT2 cN1 cM0, pT2 pN2a cM0 invasive ductal carcinoma, grade 2, ER positive/OR positive, HER-2/aries negative of the right breast status post bilateral mastectomy with right axillary lymph node dissection.  She is doing well overall after surgery.  ECOG 0    I discussed the clinical, radiographic and pathologic findings to date with Ms. Portillo.  I explained the role of adjuvant radiotherapy in the management of node positive breast cancer.  I recommended external beam radiotherapy to the right chest wall and axilla, outlining the rationale for this approach.  We specifically discussed the risk for implant failure after external beam radiotherapy.  Understanding the risks, benefits and alternatives to radiotherapy, Ms. Portillo is agreeable to recommendation and will first undergo adjuvant chemotherapy before undergoing CT simulation for treatment planning purposes.  She was encouraged to contact me in the interim with any questions or concerns regarding her care.        Dudley Martinez MD  Radiation Oncology  [facility]    This document has been signed by Dudley BAIG  MD Juan on February 11, 2022 16:20 EST

## 2022-02-14 ENCOUNTER — APPOINTMENT (OUTPATIENT)
Dept: OCCUPATIONAL THERAPY | Facility: HOSPITAL | Age: 62
End: 2022-02-14

## 2022-02-19 RX ORDER — FAMOTIDINE 10 MG/ML
20 INJECTION, SOLUTION INTRAVENOUS AS NEEDED
Status: CANCELLED | OUTPATIENT
Start: 2022-02-21

## 2022-02-19 RX ORDER — DIPHENHYDRAMINE HYDROCHLORIDE 50 MG/ML
50 INJECTION INTRAMUSCULAR; INTRAVENOUS AS NEEDED
Status: CANCELLED | OUTPATIENT
Start: 2022-02-21

## 2022-02-21 ENCOUNTER — OFFICE VISIT (OUTPATIENT)
Dept: ONCOLOGY | Facility: HOSPITAL | Age: 62
End: 2022-02-21

## 2022-02-21 ENCOUNTER — HOSPITAL ENCOUNTER (OUTPATIENT)
Dept: ONCOLOGY | Facility: HOSPITAL | Age: 62
Setting detail: INFUSION SERIES
Discharge: HOME | End: 2022-02-21

## 2022-02-21 VITALS
WEIGHT: 187.39 LBS | DIASTOLIC BLOOD PRESSURE: 67 MMHG | OXYGEN SATURATION: 98 % | HEART RATE: 107 BPM | BODY MASS INDEX: 32.67 KG/M2 | RESPIRATION RATE: 18 BRPM | TEMPERATURE: 97.8 F | SYSTOLIC BLOOD PRESSURE: 149 MMHG

## 2022-02-21 VITALS
SYSTOLIC BLOOD PRESSURE: 149 MMHG | RESPIRATION RATE: 18 BRPM | TEMPERATURE: 97.8 F | OXYGEN SATURATION: 98 % | WEIGHT: 187.39 LBS | HEIGHT: 64 IN | BODY MASS INDEX: 31.99 KG/M2 | DIASTOLIC BLOOD PRESSURE: 67 MMHG | HEART RATE: 107 BPM

## 2022-02-21 DIAGNOSIS — C50.811 MALIGNANT NEOPLASM OF OVERLAPPING SITES OF RIGHT BREAST IN FEMALE, ESTROGEN RECEPTOR POSITIVE: Primary | ICD-10-CM

## 2022-02-21 DIAGNOSIS — Z17.0 MALIGNANT NEOPLASM OF OVERLAPPING SITES OF RIGHT BREAST IN FEMALE, ESTROGEN RECEPTOR POSITIVE: Primary | ICD-10-CM

## 2022-02-21 DIAGNOSIS — C50.911 HORMONE RECEPTOR POSITIVE BREAST CANCER, RIGHT: Primary | ICD-10-CM

## 2022-02-21 LAB
ALBUMIN SERPL-MCNC: 4.23 G/DL (ref 3.5–5.2)
ALBUMIN/GLOB SERPL: 1.9 G/DL
ALP SERPL-CCNC: 68 U/L (ref 39–117)
ALT SERPL W P-5'-P-CCNC: 44 U/L (ref 1–33)
ANION GAP SERPL CALCULATED.3IONS-SCNC: 11.2 MMOL/L (ref 5–15)
AST SERPL-CCNC: 19 U/L (ref 1–32)
BASOPHILS # BLD AUTO: 0.01 10*3/MM3 (ref 0–0.2)
BASOPHILS NFR BLD AUTO: 0.1 % (ref 0–1.5)
BILIRUB SERPL-MCNC: 0.3 MG/DL (ref 0–1.2)
BUN SERPL-MCNC: 16 MG/DL (ref 8–23)
BUN/CREAT SERPL: 24.2 (ref 7–25)
CALCIUM SPEC-SCNC: 9.2 MG/DL (ref 8.6–10.5)
CHLORIDE SERPL-SCNC: 107 MMOL/L (ref 98–107)
CO2 SERPL-SCNC: 20.8 MMOL/L (ref 22–29)
CREAT SERPL-MCNC: 0.66 MG/DL (ref 0.57–1)
DEPRECATED RDW RBC AUTO: 48 FL (ref 37–54)
EOSINOPHIL # BLD AUTO: 0 10*3/MM3 (ref 0–0.4)
EOSINOPHIL NFR BLD AUTO: 0 % (ref 0.3–6.2)
ERYTHROCYTE [DISTWIDTH] IN BLOOD BY AUTOMATED COUNT: 14.1 % (ref 12.3–15.4)
GFR SERPL CREATININE-BSD FRML MDRD: 90 ML/MIN/1.73
GLOBULIN UR ELPH-MCNC: 2.3 GM/DL
GLUCOSE SERPL-MCNC: 216 MG/DL (ref 65–99)
HCT VFR BLD AUTO: 35.2 % (ref 34–46.6)
HGB BLD-MCNC: 11.7 G/DL (ref 12–15.9)
IMM GRANULOCYTES # BLD AUTO: 0.08 10*3/MM3 (ref 0–0.05)
IMM GRANULOCYTES NFR BLD AUTO: 0.7 % (ref 0–0.5)
LYMPHOCYTES # BLD AUTO: 0.69 10*3/MM3 (ref 0.7–3.1)
LYMPHOCYTES NFR BLD AUTO: 5.7 % (ref 19.6–45.3)
MCH RBC QN AUTO: 31.4 PG (ref 26.6–33)
MCHC RBC AUTO-ENTMCNC: 33.2 G/DL (ref 31.5–35.7)
MCV RBC AUTO: 94.4 FL (ref 79–97)
MONOCYTES # BLD AUTO: 0.12 10*3/MM3 (ref 0.1–0.9)
MONOCYTES NFR BLD AUTO: 1 % (ref 5–12)
NEUTROPHILS NFR BLD AUTO: 11.25 10*3/MM3 (ref 1.7–7)
NEUTROPHILS NFR BLD AUTO: 92.5 % (ref 42.7–76)
PLATELET # BLD AUTO: 379 10*3/MM3 (ref 140–450)
PMV BLD AUTO: 9.8 FL (ref 6–12)
POTASSIUM SERPL-SCNC: 4 MMOL/L (ref 3.5–5.2)
PROT SERPL-MCNC: 6.5 G/DL (ref 6–8.5)
RBC # BLD AUTO: 3.73 10*6/MM3 (ref 3.77–5.28)
SODIUM SERPL-SCNC: 139 MMOL/L (ref 136–145)
WBC NRBC COR # BLD: 12.15 10*3/MM3 (ref 3.4–10.8)

## 2022-02-21 PROCEDURE — 96413 CHEMO IV INFUSION 1 HR: CPT

## 2022-02-21 PROCEDURE — 25010000002 HEPARIN LOCK FLUSH PER 10 UNITS: Performed by: INTERNAL MEDICINE

## 2022-02-21 PROCEDURE — 96375 TX/PRO/DX INJ NEW DRUG ADDON: CPT

## 2022-02-21 PROCEDURE — 25010000002 CYCLOPHOSPHAMIDE PER 100 MG: Performed by: INTERNAL MEDICINE

## 2022-02-21 PROCEDURE — 99213 OFFICE O/P EST LOW 20 MIN: CPT | Performed by: INTERNAL MEDICINE

## 2022-02-21 PROCEDURE — 80053 COMPREHEN METABOLIC PANEL: CPT | Performed by: INTERNAL MEDICINE

## 2022-02-21 PROCEDURE — 96377 APPLICATON ON-BODY INJECTOR: CPT

## 2022-02-21 PROCEDURE — 85025 COMPLETE CBC W/AUTO DIFF WBC: CPT | Performed by: INTERNAL MEDICINE

## 2022-02-21 PROCEDURE — 96417 CHEMO IV INFUS EACH ADDL SEQ: CPT

## 2022-02-21 PROCEDURE — 25010000002 PEGFILGRASTIM 6 MG/0.6ML PREFILLED SYRINGE KIT: Performed by: INTERNAL MEDICINE

## 2022-02-21 PROCEDURE — 25010000002 DOCETAXEL 20 MG/ML SOLUTION 8 ML VIAL: Performed by: INTERNAL MEDICINE

## 2022-02-21 PROCEDURE — 25010000002 PALONOSETRON PER 25 MCG: Performed by: INTERNAL MEDICINE

## 2022-02-21 RX ORDER — SODIUM CHLORIDE 0.9 % (FLUSH) 0.9 %
20 SYRINGE (ML) INJECTION AS NEEDED
Status: CANCELLED | OUTPATIENT
Start: 2022-02-21

## 2022-02-21 RX ORDER — HEPARIN SODIUM (PORCINE) LOCK FLUSH IV SOLN 100 UNIT/ML 100 UNIT/ML
500 SOLUTION INTRAVENOUS AS NEEDED
Status: CANCELLED | OUTPATIENT
Start: 2022-02-21

## 2022-02-21 RX ORDER — PALONOSETRON 0.05 MG/ML
0.25 INJECTION, SOLUTION INTRAVENOUS ONCE
Status: COMPLETED | OUTPATIENT
Start: 2022-02-21 | End: 2022-02-21

## 2022-02-21 RX ORDER — HEPARIN SODIUM (PORCINE) LOCK FLUSH IV SOLN 100 UNIT/ML 100 UNIT/ML
500 SOLUTION INTRAVENOUS AS NEEDED
Status: DISCONTINUED | OUTPATIENT
Start: 2022-02-21 | End: 2022-02-22 | Stop reason: HOSPADM

## 2022-02-21 RX ORDER — SODIUM CHLORIDE 9 MG/ML
250 INJECTION, SOLUTION INTRAVENOUS ONCE
Status: COMPLETED | OUTPATIENT
Start: 2022-02-21 | End: 2022-02-21

## 2022-02-21 RX ORDER — SODIUM CHLORIDE 0.9 % (FLUSH) 0.9 %
20 SYRINGE (ML) INJECTION AS NEEDED
Status: DISCONTINUED | OUTPATIENT
Start: 2022-02-21 | End: 2022-02-22 | Stop reason: HOSPADM

## 2022-02-21 RX ADMIN — PALONOSETRON HYDROCHLORIDE 0.25 MG: 0.25 INJECTION, SOLUTION INTRAVENOUS at 11:16

## 2022-02-21 RX ADMIN — CYCLOPHOSPHAMIDE 1130 MG: 1 INJECTION, POWDER, FOR SOLUTION INTRAVENOUS; ORAL at 12:37

## 2022-02-21 RX ADMIN — DOCETAXEL 140 MG: 20 INJECTION, SOLUTION, CONCENTRATE INTRAVENOUS at 11:35

## 2022-02-21 RX ADMIN — SODIUM CHLORIDE, PRESERVATIVE FREE 20 ML: 5 INJECTION INTRAVENOUS at 13:10

## 2022-02-21 RX ADMIN — SODIUM CHLORIDE 250 ML: 9 INJECTION, SOLUTION INTRAVENOUS at 11:16

## 2022-02-21 RX ADMIN — HEPARIN SODIUM (PORCINE) LOCK FLUSH IV SOLN 100 UNIT/ML 500 UNITS: 100 SOLUTION at 13:10

## 2022-02-21 RX ADMIN — PEGFILGRASTIM 6 MG: KIT SUBCUTANEOUS at 13:14

## 2022-02-21 NOTE — PROGRESS NOTES
2/21/22:    Follow up with pt today to see if she received and/or like the [employer] plant-based oral shake samples that were sent a few weeks ago.  Pt reports she received them, liked the shakes, and wanted more information on ordering them.  Pt wanted to weigh her options on price, nutritional content, and asked about glucose control oral nutrition shakes. Provided nutrition information on the new [employer] shake for diabetics and the price, the nutrition information on the current shake she is drinking and price, and on [employer] brand plant-based shakes.  Emailed the representative from [employer] (Sung Radford) who gave Oncology RD a contact number to call for possible discounted pricing on the shakes as well.      Oncology RD remains available per protocol.

## 2022-02-21 NOTE — PROGRESS NOTES
Marisa Portillo   : 1960     LOCATION: [facility] HEMATOLOGY & ONCOLOGY     Chief Complaint  Breast Cancer    Referring Provider: Judah Johnson, *  PCP: Judah Johnson MD    Oncology/Hematology History Overview Note     Right Breast Cancer:    Diagnostic mammogram on 2021: 2 cm asymmetry in the outer central right breast with amorphous calcifications.  Similar appearing group of amorphous calcifications in the outer central right breast.  6 mm asymmetry in the inner right breast.  Right axillary lymphadenopathy.    Ultrasound-guided biopsy on 2021: Right breast 3 o'clock position, 2 cm from the nipple with invasive ductal carcinoma, grade 2.  Right breast 9 o'clock position, 3 cm from the nipple with invasive ductal carcinoma, grade 2, ER positive (50%), IN positive (3%), HER-2 negative (score 0), Ki-67 14%.  Associated with intermediate grade ductal carcinoma in situ.  Right axillary lymph node positive for breast carcinoma.    CT CAP and bone scan on 21: negative for metastatic disease    Bilateral mastectomy 2021: Right breast with multiple (2) foci of invasive ductal carcinoma, largest focus 2.3 cm, grade 2, associated with DCIS with extensive intraductal component, all margins negative, 4/10 lymph nodes involved with no extranodal extension and the largest focus measured at 3.3 cm, ER positive (40%), IN positive (5%), HER-2 negative by IHC (score 0), Ki-67 14%, pT2 pN2a cM0     Malignant neoplasm of overlapping sites of right breast in female, estrogen receptor positive (HCC)   2021 Initial Diagnosis    Malignant neoplasm of overlapping sites of right breast in female, estrogen receptor positive (HCC)     2021 Cancer Staged    Staging form: Breast, AJCC 8th Edition  - Pathologic stage from 2021: Stage IB (pT2, pN2a, cM0, G2, ER+, IN+, HER2-) - Signed by Starr Mendez MD PhD on 2022 -  Chemotherapy    OP  CENTRAL VENOUS ACCESS DEVICE ACCESS, CARE, AND MAINTENANCE (CVAD)     1/31/2022 -  Chemotherapy    OP BREAST TC DOCEtaxel / Cyclophosphamide             Subjective        History of Present Illness   Pt returns for f/u prior to cycle# 2 TC   Had some back pain post neulasta otherwise tolerated chemo well       Review of Systems   Constitutional: Positive for fatigue. Negative for appetite change, diaphoresis, fever, unexpected weight gain and unexpected weight loss.   HENT: Negative for hearing loss, sore throat and voice change.    Eyes: Negative for blurred vision, double vision, pain, redness and visual disturbance.   Respiratory: Negative for cough, shortness of breath and wheezing.    Cardiovascular: Negative for chest pain, palpitations and leg swelling.   Endocrine: Negative for cold intolerance, heat intolerance, polydipsia and polyuria.   Genitourinary: Negative for decreased urine volume, difficulty urinating, frequency and urinary incontinence.   Musculoskeletal: Negative for arthralgias, back pain, joint swelling and myalgias.   Skin: Negative for color change, rash, skin lesions and wound.   Neurological: Negative for dizziness, seizures, numbness and headache.   Hematological: Negative for adenopathy. Does not bruise/bleed easily.   Psychiatric/Behavioral: Negative for depressed mood. The patient is not nervous/anxious.      Current Outpatient Medications on File Prior to Visit   Medication Sig Dispense Refill   • acetaminophen (TYLENOL) 650 MG 8 hr tablet Take 650 mg by mouth Every 8 (Eight) Hours As Needed for Mild Pain .     • aspirin 81 MG EC tablet Take 81 mg by mouth Daily. TAKES FOR PREVENTIVE HEART HEALTH. PT REPORTED HER LAST DOSE 12-12-21.     • dexamethasone (DECADRON) 4 MG tablet Take 2 tablets oral twice a day for 3 consecutive days beginning the day before chemotherapy and continue for 6 doses. 12 tablet 3   • Loratadine-Pseudoephedrine (CLARITIN-D 24 HOUR PO) Take  by mouth.     •  Magnesium 250 MG tablet Take 250 mg by mouth Daily.     • OLANZapine (zyPREXA) 5 MG tablet Take 1 tablet by mouth Every Night. 30 tablet 2   • ondansetron (ZOFRAN) 8 MG tablet Take 1 tablet by mouth 3 (Three) Times a Day As Needed for Nausea or Vomiting. 30 tablet 5   • Vitamin A 3 MG (12452 UT) capsule Take 10,000 Units by mouth Daily.     • vitamin D3 (Vitamin D) 125 MCG (5000 UT) capsule capsule Take 5,000 Units by mouth Daily.       Current Facility-Administered Medications on File Prior to Visit   Medication Dose Route Frequency Provider Last Rate Last Admin   • heparin injection 500 Units  500 Units Intravenous PRN Starr Mendez MD PhD       • sodium chloride 0.9 % flush 20 mL  20 mL Intravenous PRN Starr Mendez MD PhD           Allergies   Allergen Reactions   • Multihance [Gadobenate] Hives and Itching     MRI contrast       Past Medical History:   Diagnosis Date   • Allergies    • Breast cancer (HCC)    • High cholesterol    • IFG (impaired fasting glucose)    • Osteoarthritis    • Port-A-Cath placement 12/29/2021   • S/P BILATERAL IMMEDIATE BREAST RECONSTRUCTION WITH LEFT PRE PEC PLACEMENT OF SILICONE GEL IMPLANT AND MESH, RIGHT PRE PEC PLACEMENT OF EXPANDER AND MESH 12/29/2021   • TIA (transient ischemic attack)     no residual (tia approximately 8 years ago)   • Vitamin D deficiency      Past Surgical History:   Procedure Laterality Date   • APPENDECTOMY     • BREAST AUGMENTATION Bilateral 12/28/2021    Procedure: bilateral breast reconstructon with bilateral mesh placement.   left implant, right tissue expander placement;  Surgeon: Lacy Shelton MD;  Location: [facility];  Service: Plastics;  Laterality: Bilateral;   • CEREBRAL ANGIOGRAM      clip placed- pt stated can have MRI with cerebral clips   • CYST REMOVAL Right     rt wrist   • MASTECTOMY     • MASTECTOMY COMPLETE / SIMPLE W/ SENTINEL NODE BIOPSY Bilateral    • PORTACATH PLACEMENT Left 12/28/2021    Procedure: INSERTION  [facility];  Surgeon: Jacqueline Mcgraw MD;  Location: [facility];  Service: General;  Laterality: Left;   • SENTINEL NODE BIOPSY Bilateral 12/28/2021    Procedure: BILATERAL BREAST MASTECTOMIES WITH RIGHT SENTINEL NODE BIOPSIES WITH FROZEN SECTIONS;  Surgeon: Jacqueline Mcgraw MD;  Location: [facility];  Service: General;  Laterality: Bilateral;     Social History     Socioeconomic History   • Marital status:    Tobacco Use   • Smoking status: Never Smoker   • Smokeless tobacco: Never Used   Vaping Use   • Vaping Use: Never used   Substance and Sexual Activity   • Alcohol use: Yes     Comment: socially    • Drug use: Never   • Sexual activity: Defer     Family History   Problem Relation Age of Onset   • Hypertension Mother    • Diabetes Mother    • Cancer Mother    • Arthritis Mother    • Breast cancer Mother    • Hypertension Father    • Diabetes Father    • Cancer Father    • Arthritis Father    • Liver cancer Father    • Lung cancer Father    • Breast cancer Maternal Aunt    • Malig Hyperthermia Neg Hx      Immunization History   Administered Date(s) Administered   • COVID-19 (MODERNA) 1st, 2nd, 3rd Dose Only 12/30/2020, 01/26/2021   • COVID-19 (MODERNA) BOOSTER 01/24/2022       Objective     /67   Pulse 107   Temp 97.8 °F (36.6 °C)   Resp 18   Wt 85 kg (187 lb 6.3 oz)   SpO2 98%   BMI 32.67 kg/m²         Physical Exam    Deferred for discussion    ECOG score: 0           PHQ-9 Total Score:           Result Review :   The following data was reviewed by: Angelina Burrows MD on 02/21/2022:  Lab Results   Component Value Date    HGB 11.7 (L) 02/21/2022    HCT 35.2 02/21/2022    MCV 94.4 02/21/2022     02/21/2022    WBC 12.15 (H) 02/21/2022    NEUTROABS 11.25 (H) 02/21/2022    LYMPHSABS 0.69 (L) 02/21/2022    MONOSABS 0.12 02/21/2022    EOSABS 0.00 02/21/2022    BASOSABS 0.01 02/21/2022     Lab Results   Component Value Date    GLUCOSE 216 (H) 02/21/2022    BUN 16 02/21/2022     CREATININE 0.66 02/21/2022     02/21/2022    K 4.0 02/21/2022     02/21/2022    CO2 20.8 (L) 02/21/2022    CALCIUM 9.2 02/21/2022    PROTEINTOT 6.5 02/21/2022    ALBUMIN 4.23 02/21/2022    BILITOT 0.3 02/21/2022    ALKPHOS 68 02/21/2022    AST 19 02/21/2022    ALT 44 (H) 02/21/2022         Data reviewed: labs          Assessment and Plan      ASSESSMENT  Breast cancer   PLAN/RECOMMENDATIONS  Labs and clinical parameters ok for treatment  Start oral iron(one tab daily ) for mild anemia  F/u 3 weeks  Diagnoses and all orders for this visit:    1. Hormone receptor positive breast cancer, right (HCC) (Primary)      I spent 20 minutes caring for Marisa on this date of service. This time includes time spent by me in the following activities: reviewing tests, counseling and educating the patient/family/caregiver, ordering medications, tests, or procedures, documenting information in the medical record and independently interpreting results and communicating that information with the patient/family/caregiver    Patient was given instructions and counseling regarding her condition or for health maintenance advice. Please see specific information pulled into the AVS if appropriate.     Angelina Burrows MD    2/21/2022

## 2022-02-25 ENCOUNTER — APPOINTMENT (OUTPATIENT)
Dept: OCCUPATIONAL THERAPY | Facility: HOSPITAL | Age: 62
End: 2022-02-25

## 2022-03-01 ENCOUNTER — HOSPITAL ENCOUNTER (OUTPATIENT)
Dept: OCCUPATIONAL THERAPY | Facility: HOSPITAL | Age: 62
Setting detail: THERAPIES SERIES
Discharge: HOME | End: 2022-03-01

## 2022-03-01 DIAGNOSIS — Z91.89 AT RISK FOR LYMPHEDEMA: Primary | ICD-10-CM

## 2022-03-01 DIAGNOSIS — L90.5 SCAR CONDITION AND FIBROSIS OF SKIN: ICD-10-CM

## 2022-03-01 DIAGNOSIS — R52 PAIN: ICD-10-CM

## 2022-03-01 PROCEDURE — 97110 THERAPEUTIC EXERCISES: CPT

## 2022-03-01 PROCEDURE — 97535 SELF CARE MNGMENT TRAINING: CPT

## 2022-03-01 PROCEDURE — 97140 MANUAL THERAPY 1/> REGIONS: CPT

## 2022-03-01 PROCEDURE — 93702 BIS XTRACELL FLUID ANALYSIS: CPT

## 2022-03-01 NOTE — THERAPY RE-EVALUATION
Outpatient Occupational Therapy Lymphedema Re-Evaluation   [place]     Patient Name: Marisa Portillo  : 1960  MRN: [id number]  Today's Date: 3/1/2022      Visit Date: 2022    Patient Active Problem List   Diagnosis   • Arthritis   • Bladder disorder   • Concussion   • Contact dermatitis and eczema   • Injury, other and unspecified, finger   • Limb swelling   • Seasonal allergic rhinitis   • Vitamin D deficiency   • IFG (impaired fasting glucose)   • Mixed hyperlipidemia   • TIA (transient ischemic attack)   • Malignant neoplasm of overlapping sites of right breast in female, estrogen receptor positive (HCC)   • Port-A-Cath placement   • S/P bilateral nipple sparing mastectomy   • S/P BILATERAL IMMEDIATE BREAST RECONSTRUCTION WITH LEFT PRE PEC PLACEMENT OF SILICONE GEL IMPLANT AND MESH, RIGHT PRE PEC PLACEMENT OF EXPANDER AND MESH        Past Medical History:   Diagnosis Date   • Allergies    • Breast cancer (HCC)    • High cholesterol    • IFG (impaired fasting glucose)    • Osteoarthritis    • Port-A-Cath placement 2021   • S/P BILATERAL IMMEDIATE BREAST RECONSTRUCTION WITH LEFT PRE PEC PLACEMENT OF SILICONE GEL IMPLANT AND MESH, RIGHT PRE PEC PLACEMENT OF EXPANDER AND MESH 2021   • TIA (transient ischemic attack)     no residual (tia approximately 8 years ago)   • Vitamin D deficiency         Past Surgical History:   Procedure Laterality Date   • APPENDECTOMY     • BREAST AUGMENTATION Bilateral 2021    Procedure: bilateral breast reconstructon with bilateral mesh placement.   left implant, right tissue expander placement;  Surgeon: Lacy Shelton MD;  Location: [facility] OR [facility];  Service: Plastics;  Laterality: Bilateral;   • CEREBRAL ANGIOGRAM      clip placed- pt stated can have MRI with cerebral clips   • CYST REMOVAL Right     rt wrist   • MASTECTOMY     • MASTECTOMY COMPLETE / SIMPLE W/ SENTINEL NODE BIOPSY Bilateral    • PORTACATH PLACEMENT Left 2021    Procedure:  INSERTION OF PORTACATH;  Surgeon: Jacqueline Mcgraw MD;  Location: [facility];  Service: General;  Laterality: Left;   • SENTINEL NODE BIOPSY Bilateral 12/28/2021    Procedure: BILATERAL BREAST MASTECTOMIES WITH RIGHT SENTINEL NODE BIOPSIES WITH FROZEN SECTIONS;  Surgeon: Jacqueline Mcgraw MD;  Location: [facility];  Service: General;  Laterality: Bilateral;         Visit Dx:     ICD-10-CM ICD-9-CM   1. At risk for lymphedema  Z91.89 V49.89   2. Scar condition and fibrosis of skin  L90.5 709.2   3. 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 Chay Mcgraw Harper  -MP      What clinical tests have you had for this problem? X-ray; CT scan; MRI; Blood Work; Mammogram  -MP      Are you or can you be pregnant No  -MP              Fall Risk Assessment    Any falls in the past year: No  -MP      Does patient have a fear of falling No  -MP              Services    Prior Rehab/Home Health Experiences No  -MP      Are you currently receiving Home Health services No  -MP      Do you plan to receive Home Health services in the near future No  -MP              Daily Activities    Primary Language English  -MP      Are you able to read Yes  -MP      Are you able to write Yes  -MP      How does patient learn best? Other (comment)  -MP      Barriers to learning None  -MP              Safety    Are you being hurt, hit, or frightened by anyone at home or in your life? No  -MP      Are you being neglected by a caregiver No  -MP      Have you had any of the following issues with N/A  -MP            User Key   "(r) = Recorded By, (t) = Taken By, (c) = Cosigned By    Initials Name Provider Type    MP Leticia Shahid OT Occupational Therapist                 Lymphedema     Row Name 03/01/22 0800             Subjective Pain    Able to rate subjective pain? yes  -MP      Pre-Treatment Pain Level 0  -MP      Post-Treatment Pain Level 0  -MP              Subjective Comments    Subjective Comments Pt is currently undergoing chemotherapy.  She states she has not had any problems with it.  -MP              Lymphedema Assessment    Lymphedema Classification at risk/stage 0  -MP      Lymph Nodes Removed # 10  -MP      Positive Lymph Nodes # 4  -MP      Chemo Received yes  -MP      Adverse Chemo Reactions/Complication none per pt report  -MP              Physical Concerns    The amount of pain associated with my lymphedema is: 0  -MP      The amount of limb heaviness associated with my lymphedema is: 0  -MP      The amount of skin tightness associated with my lymphedema is: 0  -MP      The size of my swollen limb(s) seems: 0  -MP      Lymphedema affects the movement of my swollen limb(s): 0  -MP      The strength in my swollen limb(s) is: 0  -MP              Psychosocial Concerns    Lymphedema affects my body image (i.e., \"how I think I look\"). 0  -MP      Lymphedema affects my socializing with others. 0  -MP      Lymphedema affects my intimate relations with spouse or partner (rate 0 if not applicable 0  -MP      Lymphedema \"gets me down\" (i.e., depression, frustration, or anger) 0  -MP      I must rely on others for help due to my lymphedema. 0  -MP      I know what to do to manage my lymphedema 0  -MP              Functional Concerns    Lymphedema affects my ability to perform self-care activities (i.e. eating, dressing, hygiene) 0  -MP      Lymphedema affects my ability to perform routine home or work-related activities. 0  -MP      Lymphedema affects my performance of preferred leisure activities. 0  -MP      Lymphedema affects " proper fit of clothing/shoes 0  -MP      Lymphedema affects my sleep 0  -MP              General ROM    Head/Neck/Trunk Comments  -MP      RT Upper Ext Rt Shoulder External Rotation  -MP      LT Upper Ext Lt Shoulder External Rotation  -MP      GENERAL ROM COMMENTS slight rounding of shoulders  -MP              Right Upper Ext    Rt Shoulder External Rotation AROM 60  -MP              Left Upper Ext    Lt Shoulder External Rotation AROM 60  -MP              Skin Changes/Observations    Location/Assessment Upper Quadrant  -MP      Upper Quadrant Conditions bilateral:; clean; left:; scab(s)  -MP      Skin Observations Comment no drainage, normal color and no complaints of scar pain  -MP              Lymphedema Sensation    Lymphedema Sensation Comments numbness at the right lateral chest wall  -MP              Manual Lymphatic Drainage    Manual Therapy mild cording noted at the left axilla region.  Therapist provided myofascial release and patient reported decrease in tightness after; no cording visually observed after.  -MP              L-Dex Bioimpedence Screening    L-Dex UE Score 0.3  -MP      L-Dex UE Baseline Score -4.5  -MP      L-Dex UE Value Change 4.8  -MP      $ L-Dex Charge yes  -MP              Lymphedema Life Impact Scale Totals    A.  Total Q1 - Q17 (Do not include Q18) 0  -MP      B.  Total number of questions answered (Q1-Q17) 17  -MP      C. Divide A by B 0  -MP      D. Multiple C by 25 0  -MP            User Key  (r) = Recorded By, (t) = Taken By, (c) = Cosigned By    Initials Name Provider Type    Leticia Avalos OT Occupational Therapist               1. Post Breast Surgery Care/at risk for Lymphedema  LTG 1: 90 days:  As an indicator of no exacerbation of lymphedema staging, the patient will present with an L-Dex score less than 7 points from preoperative baseline.              STATUS: met. ongoing  STG 1a:   30 days: To prevent exacerbation of mixed edema to lymphedema, patient will utilize  the 2 postsurgical compression garments daily.                 STATUS: met.  STG 1b: 30 days: Patient will be independent with self-manual lymphatic massage.               STATUS: met. ongoing  STG 1c: 30 days:  Patient will be independent with identification of signs and symptoms of lymphedema exasperation per stoplight to recovery education handout.              STATUS: met. ongoing  STG 1 d: 30 days: Patient will be independent with HEP to prevent advancement in lymphedema staging.              STATUS: met. Ongoing.  TREATMENT:  Self Care/ADL retraining, Therapeutic Activity, Neuromuscular Re-education, Therapeutic Exercise, Bioimpedence Fluid Analysis, Post-Surgical compression garement [id number] [facility]-ST-High/ [employer] Kit 2860K, Orthotic Management and training,  and Manual Therapy.        Therapy Education  Education Details: OT provided review of education in use of stop light tool, which patient is well verses in.  OT instructed patient to continue current HEP, and added doorway stretch exercise to be done gently for low pectoralis and pectoralis minor.  Pt demonstrated properly. Patient demonstated all HEP exercises properly.  She was asked to focus on external rotation bilaterally since slight reduction in motion is noted.  Given: HEP, Symptoms/condition management  Program: Reinforced, New  How Provided: Verbal, Demonstration, Written  Provided to: Patient  Level of Understanding: Teach back education performed, Verbalized, Demonstrated  86578 - OT Self Care/Mgmt Minutes: 15      Manual Rx (last 36 hours)     Manual Treatments     Row Name 03/01/22 1044             Total Minutes    64569 - OT Manual Therapy Minutes 25  -MP            User Key  (r) = Recorded By, (t) = Taken By, (c) = Cosigned By    Initials Name Provider Type    Leticia Avalos OT Occupational Therapist               OT Goals     Row Name 03/01/22 1044          Time Calculation    OT Goal Re-Cert Due Date 03/31/22  -JENNIFER            User Key  (r) = Recorded By, (t) = Taken By, (c) = Cosigned By    Initials Name Provider Type    Leticia Avalos OT Occupational Therapist                 OT Assessment/Plan     Row Name 03/01/22 1032          OT Assessment    Assessment Comments Pt responded well to trigger point and myofascial release technique with decreased tightness at the left axilla and no visual evidence of lymphatic cording.  She is able to perform HEP independently and is aware of signs/symptoms to be alert for and to call therapist or MD if any concerns prior to next appointment in 3 months.  She is appropriate to continue surveillence program in order to prevent decline in ADLs status and evaluate ongoing lymphatic function and prevent advancement in lymphedema staging.  -MP     OT Diagnosis At risk for lymphedema  -MP     OT Rehab Potential Excellent  -MP     Patient/caregiver participated in establishment of treatment plan and goals Yes  -MP     Patient would benefit from skilled therapy intervention Yes  -MP            OT Plan    OT Frequency Other (comment)  -MP     OT Plan Comments See 3 weeks after Chemotherapy completion or monthly until completiion of chemotherapy due to increased risk during chemotherapy and medications, then continue surveillence progran every 3 months through year 3 and every 6 months year 4 and 5.  -MP           User Key  (r) = Recorded By, (t) = Taken By, (c) = Cosigned By    Initials Name Provider Type    Leticia Avalos OT Occupational Therapist              Note:  Patient's last chemotherapy appointment will be April 4.  She leaves for vacation on April 23, so will be scheduled for next Lymphedema appointment on April 21, 2022.           Time Calculation:   Timed Charges  52973 - OT Therapeutic Exercise Minutes: 20  81176 - OT Manual Therapy Minutes: 25  73031 - OT Self Care/Mgmt Minutes: 15  Total Minutes  Timed Charges Total Minutes: 60   Total Minutes: 60     Therapy Charges for Today      Code Description Service Date Service Provider Modifiers Qty    43743988457 HC PT BIS XTRACELL FLUID ANALYSIS 3/1/2022 Leticia Shahid OT  1    33714501875 HC OT THER PROC EA 15 MIN 3/1/2022 Leticia Shahid OT GO 1    62205599815 HC OT MANUAL THERAPY EA 15 MIN 3/1/2022 Leticia Shahid OT GO 2    12510460337 HC OT SELF CARE/MGMT/TRAIN EA 15 MIN 3/1/2022 Leticia Shahid OT GO 1                    Leticia Shahid OT  3/1/2022

## 2022-03-09 ENCOUNTER — HOSPITAL ENCOUNTER (INPATIENT)
Facility: HOSPITAL | Age: 62
LOS: 5 days | Discharge: HOME | End: 2022-03-14
Attending: EMERGENCY MEDICINE | Admitting: INTERNAL MEDICINE

## 2022-03-09 ENCOUNTER — APPOINTMENT (OUTPATIENT)
Dept: GENERAL RADIOLOGY | Facility: HOSPITAL | Age: 62
End: 2022-03-09

## 2022-03-09 DIAGNOSIS — A41.9 SEPSIS, DUE TO UNSPECIFIED ORGANISM, UNSPECIFIED WHETHER ACUTE ORGAN DYSFUNCTION PRESENT: Primary | ICD-10-CM

## 2022-03-09 LAB
ALBUMIN SERPL-MCNC: 3.7 G/DL (ref 3.5–5.2)
ALBUMIN/GLOB SERPL: 1.3 G/DL
ALP SERPL-CCNC: 95 U/L (ref 39–117)
ALT SERPL W P-5'-P-CCNC: 18 U/L (ref 1–33)
ANION GAP SERPL CALCULATED.3IONS-SCNC: 10.6 MMOL/L (ref 5–15)
AST SERPL-CCNC: 16 U/L (ref 1–32)
BACTERIA UR QL AUTO: ABNORMAL /HPF
BASOPHILS # BLD AUTO: 0.1 10*3/MM3 (ref 0–0.2)
BASOPHILS NFR BLD AUTO: 0.3 % (ref 0–1.5)
BILIRUB SERPL-MCNC: 0.9 MG/DL (ref 0–1.2)
BILIRUB UR QL STRIP: NEGATIVE
BUN SERPL-MCNC: 11 MG/DL (ref 8–23)
BUN/CREAT SERPL: 14.3 (ref 7–25)
CALCIUM SPEC-SCNC: 8.9 MG/DL (ref 8.6–10.5)
CHLORIDE SERPL-SCNC: 102 MMOL/L (ref 98–107)
CLARITY UR: ABNORMAL
CO2 SERPL-SCNC: 22.4 MMOL/L (ref 22–29)
COLOR UR: ABNORMAL
CREAT SERPL-MCNC: 0.77 MG/DL (ref 0.57–1)
D-LACTATE SERPL-SCNC: 1.2 MMOL/L (ref 0.5–2)
DEPRECATED RDW RBC AUTO: 52.4 FL (ref 37–54)
EGFRCR SERPLBLD CKD-EPI 2021: 86.8 ML/MIN/1.73
EOSINOPHIL # BLD AUTO: 0 10*3/MM3 (ref 0–0.4)
EOSINOPHIL NFR BLD AUTO: 0 % (ref 0.3–6.2)
ERYTHROCYTE [DISTWIDTH] IN BLOOD BY AUTOMATED COUNT: 15.7 % (ref 12.3–15.4)
GLOBULIN UR ELPH-MCNC: 2.8 GM/DL
GLUCOSE SERPL-MCNC: 146 MG/DL (ref 65–99)
GLUCOSE UR STRIP-MCNC: ABNORMAL MG/DL
HCT VFR BLD AUTO: 32.5 % (ref 34–46.6)
HGB BLD-MCNC: 11.1 G/DL (ref 12–15.9)
HGB UR QL STRIP.AUTO: ABNORMAL
HOLD SPECIMEN: NORMAL
HOLD SPECIMEN: NORMAL
HYALINE CASTS UR QL AUTO: ABNORMAL /LPF
IMM GRANULOCYTES # BLD AUTO: 0.27 10*3/MM3 (ref 0–0.05)
IMM GRANULOCYTES NFR BLD AUTO: 0.8 % (ref 0–0.5)
KETONES UR QL STRIP: ABNORMAL
LEUKOCYTE ESTERASE UR QL STRIP.AUTO: ABNORMAL
LYMPHOCYTES # BLD AUTO: 0.94 10*3/MM3 (ref 0.7–3.1)
LYMPHOCYTES NFR BLD AUTO: 2.8 % (ref 19.6–45.3)
MCH RBC QN AUTO: 32 PG (ref 26.6–33)
MCHC RBC AUTO-ENTMCNC: 34.2 G/DL (ref 31.5–35.7)
MCV RBC AUTO: 93.7 FL (ref 79–97)
MONOCYTES # BLD AUTO: 2.46 10*3/MM3 (ref 0.1–0.9)
MONOCYTES NFR BLD AUTO: 7.3 % (ref 5–12)
NEUTROPHILS NFR BLD AUTO: 30.06 10*3/MM3 (ref 1.7–7)
NEUTROPHILS NFR BLD AUTO: 88.8 % (ref 42.7–76)
NITRITE UR QL STRIP: POSITIVE
NRBC BLD AUTO-RTO: 0.1 /100 WBC (ref 0–0.2)
PH UR STRIP.AUTO: 5.5 [PH] (ref 5–8)
PLATELET # BLD AUTO: 178 10*3/MM3 (ref 140–450)
PMV BLD AUTO: 10 FL (ref 6–12)
POTASSIUM SERPL-SCNC: 3.7 MMOL/L (ref 3.5–5.2)
PROT SERPL-MCNC: 6.5 G/DL (ref 6–8.5)
PROT UR QL STRIP: ABNORMAL
RBC # BLD AUTO: 3.47 10*6/MM3 (ref 3.77–5.28)
RBC # UR STRIP: ABNORMAL /HPF
REF LAB TEST METHOD: ABNORMAL
SODIUM SERPL-SCNC: 135 MMOL/L (ref 136–145)
SP GR UR STRIP: 1.02 (ref 1–1.03)
SQUAMOUS #/AREA URNS HPF: ABNORMAL /HPF
UROBILINOGEN UR QL STRIP: ABNORMAL
WBC # UR STRIP: ABNORMAL /HPF
WBC NRBC COR # BLD: 33.83 10*3/MM3 (ref 3.4–10.8)
WHOLE BLOOD HOLD SPECIMEN: NORMAL
WHOLE BLOOD HOLD SPECIMEN: NORMAL

## 2022-03-09 PROCEDURE — 87040 BLOOD CULTURE FOR BACTERIA: CPT

## 2022-03-09 PROCEDURE — 81001 URINALYSIS AUTO W/SCOPE: CPT | Performed by: EMERGENCY MEDICINE

## 2022-03-09 PROCEDURE — 83605 ASSAY OF LACTIC ACID: CPT | Performed by: EMERGENCY MEDICINE

## 2022-03-09 PROCEDURE — 99283 EMERGENCY DEPT VISIT LOW MDM: CPT

## 2022-03-09 PROCEDURE — 99284 EMERGENCY DEPT VISIT MOD MDM: CPT

## 2022-03-09 PROCEDURE — 36415 COLL VENOUS BLD VENIPUNCTURE: CPT

## 2022-03-09 PROCEDURE — 87086 URINE CULTURE/COLONY COUNT: CPT | Performed by: EMERGENCY MEDICINE

## 2022-03-09 PROCEDURE — 71045 X-RAY EXAM CHEST 1 VIEW: CPT

## 2022-03-09 PROCEDURE — 85025 COMPLETE CBC W/AUTO DIFF WBC: CPT

## 2022-03-09 PROCEDURE — 80053 COMPREHEN METABOLIC PANEL: CPT

## 2022-03-09 PROCEDURE — 87186 SC STD MICRODIL/AGAR DIL: CPT | Performed by: EMERGENCY MEDICINE

## 2022-03-09 PROCEDURE — 25010000002 CEFEPIME PER 500 MG: Performed by: EMERGENCY MEDICINE

## 2022-03-09 RX ORDER — SODIUM CHLORIDE 0.9 % (FLUSH) 0.9 %
10 SYRINGE (ML) INJECTION AS NEEDED
Status: DISCONTINUED | OUTPATIENT
Start: 2022-03-09 | End: 2022-03-10

## 2022-03-09 RX ORDER — ACETAMINOPHEN 325 MG/1
975 TABLET ORAL ONCE
Status: COMPLETED | OUTPATIENT
Start: 2022-03-09 | End: 2022-03-09

## 2022-03-09 RX ORDER — OLANZAPINE 5 MG/1
5 TABLET, ORALLY DISINTEGRATING ORAL ONCE
Status: COMPLETED | OUTPATIENT
Start: 2022-03-09 | End: 2022-03-09

## 2022-03-09 RX ORDER — CEFEPIME 1 G/50ML
2 INJECTION, SOLUTION INTRAVENOUS ONCE
Status: COMPLETED | OUTPATIENT
Start: 2022-03-09 | End: 2022-03-09

## 2022-03-09 RX ADMIN — OLANZAPINE 5 MG: 5 TABLET, ORALLY DISINTEGRATING ORAL at 21:00

## 2022-03-09 RX ADMIN — ACETAMINOPHEN 975 MG: 325 TABLET ORAL at 18:33

## 2022-03-09 RX ADMIN — SODIUM CHLORIDE 1000 ML: 9 INJECTION, SOLUTION INTRAVENOUS at 20:35

## 2022-03-09 RX ADMIN — CEFEPIME 2 G: 1 INJECTION, SOLUTION INTRAVENOUS at 20:38

## 2022-03-10 LAB
ANION GAP SERPL CALCULATED.3IONS-SCNC: 7.3 MMOL/L (ref 5–15)
BASOPHILS # BLD AUTO: 0.09 10*3/MM3 (ref 0–0.2)
BASOPHILS NFR BLD AUTO: 0.4 % (ref 0–1.5)
BUN SERPL-MCNC: 10 MG/DL (ref 8–23)
BUN/CREAT SERPL: 16.4 (ref 7–25)
CALCIUM SPEC-SCNC: 8.4 MG/DL (ref 8.6–10.5)
CHLORIDE SERPL-SCNC: 110 MMOL/L (ref 98–107)
CO2 SERPL-SCNC: 22.7 MMOL/L (ref 22–29)
CREAT SERPL-MCNC: 0.61 MG/DL (ref 0.57–1)
DEPRECATED RDW RBC AUTO: 54.5 FL (ref 37–54)
EGFRCR SERPLBLD CKD-EPI 2021: 100.6 ML/MIN/1.73
EOSINOPHIL # BLD AUTO: 0.02 10*3/MM3 (ref 0–0.4)
EOSINOPHIL NFR BLD AUTO: 0.1 % (ref 0.3–6.2)
ERYTHROCYTE [DISTWIDTH] IN BLOOD BY AUTOMATED COUNT: 15.7 % (ref 12.3–15.4)
GLUCOSE SERPL-MCNC: 124 MG/DL (ref 65–99)
HCT VFR BLD AUTO: 28.8 % (ref 34–46.6)
HGB BLD-MCNC: 9.6 G/DL (ref 12–15.9)
IMM GRANULOCYTES # BLD AUTO: 0.19 10*3/MM3 (ref 0–0.05)
IMM GRANULOCYTES NFR BLD AUTO: 0.8 % (ref 0–0.5)
LYMPHOCYTES # BLD AUTO: 0.8 10*3/MM3 (ref 0.7–3.1)
LYMPHOCYTES NFR BLD AUTO: 3.2 % (ref 19.6–45.3)
MCH RBC QN AUTO: 32 PG (ref 26.6–33)
MCHC RBC AUTO-ENTMCNC: 33.3 G/DL (ref 31.5–35.7)
MCV RBC AUTO: 96 FL (ref 79–97)
MONOCYTES # BLD AUTO: 2.03 10*3/MM3 (ref 0.1–0.9)
MONOCYTES NFR BLD AUTO: 8.1 % (ref 5–12)
NEUTROPHILS NFR BLD AUTO: 21.8 10*3/MM3 (ref 1.7–7)
NEUTROPHILS NFR BLD AUTO: 87.4 % (ref 42.7–76)
NRBC BLD AUTO-RTO: 0 /100 WBC (ref 0–0.2)
PLATELET # BLD AUTO: 164 10*3/MM3 (ref 140–450)
PMV BLD AUTO: 10 FL (ref 6–12)
POTASSIUM SERPL-SCNC: 3.6 MMOL/L (ref 3.5–5.2)
RBC # BLD AUTO: 3 10*6/MM3 (ref 3.77–5.28)
SODIUM SERPL-SCNC: 140 MMOL/L (ref 136–145)
WBC NRBC COR # BLD: 24.93 10*3/MM3 (ref 3.4–10.8)

## 2022-03-10 PROCEDURE — 85025 COMPLETE CBC W/AUTO DIFF WBC: CPT | Performed by: INTERNAL MEDICINE

## 2022-03-10 PROCEDURE — 99223 1ST HOSP IP/OBS HIGH 75: CPT | Performed by: INTERNAL MEDICINE

## 2022-03-10 PROCEDURE — 25010000002 ENOXAPARIN PER 10 MG: Performed by: INTERNAL MEDICINE

## 2022-03-10 PROCEDURE — 25010000002 CEFEPIME PER 500 MG: Performed by: INTERNAL MEDICINE

## 2022-03-10 PROCEDURE — 80048 BASIC METABOLIC PNL TOTAL CA: CPT | Performed by: INTERNAL MEDICINE

## 2022-03-10 PROCEDURE — 25010000002 SODIUM CHLORIDE 0.9 % WITH KCL 20 MEQ 20-0.9 MEQ/L-% SOLUTION: Performed by: INTERNAL MEDICINE

## 2022-03-10 RX ORDER — ACETAMINOPHEN 325 MG/1
650 TABLET ORAL EVERY 4 HOURS PRN
Status: DISCONTINUED | OUTPATIENT
Start: 2022-03-10 | End: 2022-03-10

## 2022-03-10 RX ORDER — ONDANSETRON 2 MG/ML
4 INJECTION INTRAMUSCULAR; INTRAVENOUS EVERY 4 HOURS PRN
Status: DISCONTINUED | OUTPATIENT
Start: 2022-03-10 | End: 2022-03-14 | Stop reason: HOSPADM

## 2022-03-10 RX ORDER — POLYETHYLENE GLYCOL 3350 17 G/17G
17 POWDER, FOR SOLUTION ORAL DAILY PRN
Status: DISCONTINUED | OUTPATIENT
Start: 2022-03-10 | End: 2022-03-14 | Stop reason: HOSPADM

## 2022-03-10 RX ORDER — CEFEPIME 1 G/50ML
2 INJECTION, SOLUTION INTRAVENOUS EVERY 8 HOURS
Status: DISCONTINUED | OUTPATIENT
Start: 2022-03-10 | End: 2022-03-14 | Stop reason: HOSPADM

## 2022-03-10 RX ORDER — SODIUM CHLORIDE 0.9 % (FLUSH) 0.9 %
10 SYRINGE (ML) INJECTION EVERY 12 HOURS SCHEDULED
Status: DISCONTINUED | OUTPATIENT
Start: 2022-03-10 | End: 2022-03-14 | Stop reason: HOSPADM

## 2022-03-10 RX ORDER — CALCIUM CARBONATE 200(500)MG
2 TABLET,CHEWABLE ORAL 3 TIMES DAILY PRN
Status: DISCONTINUED | OUTPATIENT
Start: 2022-03-10 | End: 2022-03-14 | Stop reason: HOSPADM

## 2022-03-10 RX ORDER — ALUMINA, MAGNESIA, AND SIMETHICONE 2400; 2400; 240 MG/30ML; MG/30ML; MG/30ML
15 SUSPENSION ORAL EVERY 6 HOURS PRN
Status: DISCONTINUED | OUTPATIENT
Start: 2022-03-10 | End: 2022-03-14 | Stop reason: HOSPADM

## 2022-03-10 RX ORDER — ACETAMINOPHEN 500 MG
1000 TABLET ORAL EVERY 4 HOURS PRN
Status: DISCONTINUED | OUTPATIENT
Start: 2022-03-10 | End: 2022-03-14 | Stop reason: HOSPADM

## 2022-03-10 RX ORDER — AMOXICILLIN 250 MG
1 CAPSULE ORAL 2 TIMES DAILY PRN
Status: DISCONTINUED | OUTPATIENT
Start: 2022-03-10 | End: 2022-03-14 | Stop reason: HOSPADM

## 2022-03-10 RX ORDER — ACETAMINOPHEN 160 MG/5ML
650 SOLUTION ORAL EVERY 4 HOURS PRN
Status: DISCONTINUED | OUTPATIENT
Start: 2022-03-10 | End: 2022-03-10

## 2022-03-10 RX ORDER — OLANZAPINE 5 MG/1
5 TABLET ORAL NIGHTLY
Status: DISCONTINUED | OUTPATIENT
Start: 2022-03-10 | End: 2022-03-14 | Stop reason: HOSPADM

## 2022-03-10 RX ORDER — ACETAMINOPHEN 650 MG/1
650 SUPPOSITORY RECTAL EVERY 4 HOURS PRN
Status: DISCONTINUED | OUTPATIENT
Start: 2022-03-10 | End: 2022-03-10

## 2022-03-10 RX ORDER — SODIUM CHLORIDE AND POTASSIUM CHLORIDE 150; 900 MG/100ML; MG/100ML
75 INJECTION, SOLUTION INTRAVENOUS CONTINUOUS
Status: DISCONTINUED | OUTPATIENT
Start: 2022-03-10 | End: 2022-03-12

## 2022-03-10 RX ORDER — FAMOTIDINE 20 MG/1
20 TABLET, FILM COATED ORAL NIGHTLY
Status: DISCONTINUED | OUTPATIENT
Start: 2022-03-10 | End: 2022-03-12

## 2022-03-10 RX ORDER — TRAMADOL HYDROCHLORIDE 50 MG/1
50 TABLET ORAL EVERY 6 HOURS PRN
Status: DISCONTINUED | OUTPATIENT
Start: 2022-03-10 | End: 2022-03-14 | Stop reason: HOSPADM

## 2022-03-10 RX ORDER — HYDROCODONE BITARTRATE AND ACETAMINOPHEN 7.5; 325 MG/1; MG/1
1 TABLET ORAL EVERY 4 HOURS PRN
Status: DISCONTINUED | OUTPATIENT
Start: 2022-03-10 | End: 2022-03-14 | Stop reason: HOSPADM

## 2022-03-10 RX ORDER — IBUPROFEN 600 MG/1
600 TABLET ORAL EVERY 4 HOURS PRN
Status: DISCONTINUED | OUTPATIENT
Start: 2022-03-10 | End: 2022-03-12

## 2022-03-10 RX ORDER — BISACODYL 10 MG
10 SUPPOSITORY, RECTAL RECTAL DAILY PRN
Status: DISCONTINUED | OUTPATIENT
Start: 2022-03-10 | End: 2022-03-14 | Stop reason: HOSPADM

## 2022-03-10 RX ORDER — SODIUM CHLORIDE 0.9 % (FLUSH) 0.9 %
10 SYRINGE (ML) INJECTION AS NEEDED
Status: DISCONTINUED | OUTPATIENT
Start: 2022-03-10 | End: 2022-03-14 | Stop reason: HOSPADM

## 2022-03-10 RX ORDER — BISACODYL 5 MG/1
5 TABLET, DELAYED RELEASE ORAL DAILY PRN
Status: DISCONTINUED | OUTPATIENT
Start: 2022-03-10 | End: 2022-03-14 | Stop reason: HOSPADM

## 2022-03-10 RX ORDER — CHOLECALCIFEROL (VITAMIN D3) 125 MCG
5 CAPSULE ORAL NIGHTLY PRN
Status: DISCONTINUED | OUTPATIENT
Start: 2022-03-10 | End: 2022-03-14 | Stop reason: HOSPADM

## 2022-03-10 RX ADMIN — Medication 10 ML: at 00:37

## 2022-03-10 RX ADMIN — CEFEPIME 2 G: 1 INJECTION, SOLUTION INTRAVENOUS at 04:20

## 2022-03-10 RX ADMIN — POTASSIUM CHLORIDE AND SODIUM CHLORIDE 100 ML/HR: 900; 150 INJECTION, SOLUTION INTRAVENOUS at 09:49

## 2022-03-10 RX ADMIN — IBUPROFEN 600 MG: 600 TABLET ORAL at 20:33

## 2022-03-10 RX ADMIN — FAMOTIDINE 20 MG: 20 TABLET ORAL at 20:33

## 2022-03-10 RX ADMIN — OLANZAPINE 5 MG: 5 TABLET, FILM COATED ORAL at 20:33

## 2022-03-10 RX ADMIN — CEFEPIME 2 G: 1 INJECTION, SOLUTION INTRAVENOUS at 12:15

## 2022-03-10 RX ADMIN — POTASSIUM CHLORIDE AND SODIUM CHLORIDE 100 ML/HR: 900; 150 INJECTION, SOLUTION INTRAVENOUS at 00:37

## 2022-03-10 RX ADMIN — FAMOTIDINE 20 MG: 20 TABLET ORAL at 00:41

## 2022-03-10 RX ADMIN — ENOXAPARIN SODIUM 40 MG: 100 INJECTION SUBCUTANEOUS at 08:47

## 2022-03-10 RX ADMIN — ACETAMINOPHEN 650 MG: 325 TABLET ORAL at 04:20

## 2022-03-10 RX ADMIN — POTASSIUM CHLORIDE AND SODIUM CHLORIDE 100 ML/HR: 900; 150 INJECTION, SOLUTION INTRAVENOUS at 22:59

## 2022-03-10 RX ADMIN — ACETAMINOPHEN 650 MG: 325 TABLET ORAL at 16:46

## 2022-03-10 RX ADMIN — CEFEPIME 2 G: 1 INJECTION, SOLUTION INTRAVENOUS at 20:33

## 2022-03-10 RX ADMIN — Medication 10 ML: at 12:17

## 2022-03-10 RX ADMIN — ACETAMINOPHEN 650 MG: 325 TABLET ORAL at 08:47

## 2022-03-10 NOTE — H&P
[facility]   HISTORY AND PHYSICAL    Patient Name: Marisa Portillo  : 1960  MRN: [id number]  Primary Care Physician: Judah Johnson MD  Date of admission: 3/9/2022      Subjective   Subjective     Chief Complaint/HPI: Fevers, not feeling well, chills, dark urine, was told she might have cystitis with the chemotherapy, she came to the emergency room last night not feeling well, found to have a white count of 33,000, she has been admitted for IV antibiotics, she had a temperature of 102, general malaise, also having some headaches some nausea, not eating real well    Marisa Portillo is a 62 y.o. female who has a history of invasive ductal carcinoma the right breast and has been seen by radiation oncology as well as hematology oncology,, she initially had diagnosis back in November of invasive ductal CA with positive lymph nodes, on 2020 when she underwent bilateral nipple sparing mastectomy with right axillary lymph node dissection she did have a CT scan initially back in December showing negative for metastatic disease, just received chemotherapy on , , ,    Review of Systems   All systems were reviewed and negative except for: Fevers general malaise as above, not feeling well decreased appetite, headaches      Personal History     Past Medical History:   Diagnosis Date   • Allergies    • Breast cancer (HCC)    • High cholesterol    • IFG (impaired fasting glucose)    • Osteoarthritis    • Port-A-Cath placement 2021   • S/P BILATERAL IMMEDIATE BREAST RECONSTRUCTION WITH LEFT PRE PEC PLACEMENT OF SILICONE GEL IMPLANT AND MESH, RIGHT PRE PEC PLACEMENT OF EXPANDER AND MESH 2021   • TIA (transient ischemic attack)     no residual (tia approximately 8 years ago)   • Vitamin D deficiency        Past Surgical History:   Procedure Laterality Date   • APPENDECTOMY     • BREAST AUGMENTATION Bilateral 2021    Procedure: bilateral breast  reconstructon with bilateral mesh placement.   left implant, right tissue expander placement;  Surgeon: Lacy Shelton MD;  [place] [facility] [place] [facility] [place]  Service: Plastics;  Laterality: Bilateral;   • CEREBRAL ANGIOGRAM      clip placed- pt stated can have MRI with cerebral clips   • CYST REMOVAL Right     rt wrist   • MASTECTOMY     • MASTECTOMY COMPLETE / SIMPLE W/ SENTINEL NODE BIOPSY Bilateral    • PORTACATH PLACEMENT Left 12/28/2021    Procedure: INSERTION OF PORTACATH;  Surgeon: Jacqueline Mcgraw MD;  [place] [facility] [place] [facility] [place]  Service: General;  Laterality: Left;   • SENTINEL NODE BIOPSY Bilateral 12/28/2021    Procedure: BILATERAL BREAST MASTECTOMIES WITH RIGHT SENTINEL NODE BIOPSIES WITH FROZEN SECTIONS;  Surgeon: Jacqueline Mcgraw MD;  Location: [facility];  Service: General;  Laterality: Bilateral;       Family History: family history includes Arthritis in her father and mother; Breast cancer in her maternal aunt and mother; Cancer in her father and mother; Diabetes in her father and mother; Hypertension in her father and mother; Liver cancer in her father; Lung cancer in her father.     Social History:  reports that she has never smoked. She has never used smokeless tobacco. She reports current alcohol use. She reports that she does not use drugs.    Home Medications:  Loratadine-Pseudoephedrine, OLANZapine, Vitamin A, acetaminophen, ondansetron, and vitamin D3      Allergies:  Allergies   Allergen Reactions   • Multihance [Gadobenate] Hives and Itching     MRI contrast       Objective   Objective     Vitals:  Temp:  [98.8 °F (37.1 °C)-102.9 °F (39.4 °C)] 99.1 °F (37.3 °C)  Heart Rate:  [] 94  Resp:  [20] 20  BP: (105-129)/(58-66) 124/65    Physical Exam patient is awake and alert, she is generally weak appearing, she is lost most of her hair, her neck is supple her abdomen soft nondistended cardiac exam regular rhythm her lungs are clear anteriorly and laterally with diminished breath  sounds she is got very moist clammy skin throughout, feels very warm to touch, her lower legs showed trace edema around the ankles and PreTAB regions bilateral, she is answering questions appropriately moving all 4 extremities to command no unusual rashes on the face arms or lower legs,       Result Review    Result Review:  I have personally reviewed the results from the time of this admission to 03/10/22 7:51 AM EST and agree with these findings:  [x]  Laboratory  [x]  Microbiology  [x]  Radiology  []  EKG/Telemetry   []  Cardiology/Vascular   []  Pathology  [x]  Old records  []  Other:      Assessment/Plan   Assessment / Plan     Brief Patient Summary:  Marisa Portillo is a 62 y.o. female who has a diagnosis of invasive ductal carcinoma, has been undergoing chemotherapy, and now presents with infection possible sepsis,    Active Hospital Problems:  Active Hospital Problems    Diagnosis    • Sepsis, due to unspecified organism, unspecified whether acute organ dysfunction present (HCC)        Assessment/Plan:     Sepsis, plan, continue IV fluids, IV antibiotics, hematology, oncology consultation, supportive care, follow labs,    Urinary tract infection, antibiotics supportive care as above, await culture results    Anemia most likely multifactorial, rehydration etc. we will follow,    Invasive ductal carcinoma right breast previous bilateral mastectomies with multifocal areas of invasive ductal carcinoma largest area 2.3 cm with positive lymph nodes 4 out of 10 ER and [place] positive HER-2 negative,, currently undergoing chemotherapy and possible radiation therapy as adjuvant treatment,    MAGUI, appendectomy, Port-A-Cath placement,    Impaired fasting glucose in the past, currently slightly elevated will follow    Vitamin D deficiency    TIA 2010, MRI MRA underwent a Star closure procedure with clipping    Osteoarthritis    Chronic venous insufficiency changes,    DVT prophylaxis, with Lovenox,    GI prophylaxis  ordered       CODE STATUS:    Code Status and Medical Interventions:   Ordered at: 03/09/22 2152     Code Status (Patient has no pulse and is not breathing):    CPR (Attempt to Resuscitate)     Medical Interventions (Patient has pulse or is breathing):    Full         Electronically signed by Judah Johnson MD, 03/10/22, 7:51 AM EST.

## 2022-03-10 NOTE — SIGNIFICANT NOTE
03/10/22 1258   Coping/Psychosocial   Observed Emotional State pleasant   Verbalized Emotional State hopefulness;happiness   Additional Documentation Spiritual Care (Group)   Spiritual Care   Use of Spiritual Resources spirituality for coping, indicated strong use of   Spiritual Care Source  initiative  (daily rounding)   Spiritual Care Follow-Up follow-up planned regularly for general support   Response to Spiritual Care receptive of support;thanks expressed   Spiritual Care Interventions prayer support provided   Spiritual Care Visit Type initial  (introductions made and my role explained.)   Spiritual Care Request coping/stress of illness support;spiritual/moral support   Receptivity to Spiritual Care visit welcomed   At time of visit pt is on 4NT.

## 2022-03-10 NOTE — PLAN OF CARE
Goal Outcome Evaluation:  Plan of Care Reviewed With: patient        Progress: no change  Outcome Evaluation: Pt. currently resting. Pt. receiving continuous fluids with KCL 20mEq at 100mL/hr. Pt. reported she was having body aches and headache, tylenol was given as needed. Pt. has SCDs on. No new issues/needs noted at this time.

## 2022-03-11 ENCOUNTER — ANESTHESIA (OUTPATIENT)
Dept: PERIOP | Facility: HOSPITAL | Age: 62
End: 2022-03-11

## 2022-03-11 ENCOUNTER — ANESTHESIA EVENT (OUTPATIENT)
Dept: PERIOP | Facility: HOSPITAL | Age: 62
End: 2022-03-11

## 2022-03-11 LAB
25(OH)D3 SERPL-MCNC: 19.1 NG/ML (ref 30–100)
ABO GROUP BLD: NORMAL
ABO GROUP BLD: NORMAL
ALBUMIN SERPL-MCNC: 2.9 G/DL (ref 3.5–5.2)
ALBUMIN/GLOB SERPL: 1.3 G/DL
ALP SERPL-CCNC: 74 U/L (ref 39–117)
ALT SERPL W P-5'-P-CCNC: 18 U/L (ref 1–33)
ANION GAP SERPL CALCULATED.3IONS-SCNC: 9.7 MMOL/L (ref 5–15)
AST SERPL-CCNC: 17 U/L (ref 1–32)
BACTERIA SPEC AEROBE CULT: ABNORMAL
BASOPHILS # BLD AUTO: 0.07 10*3/MM3 (ref 0–0.2)
BASOPHILS NFR BLD AUTO: 0.4 % (ref 0–1.5)
BILIRUB SERPL-MCNC: 0.7 MG/DL (ref 0–1.2)
BLD GP AB SCN SERPL QL: NEGATIVE
BUN SERPL-MCNC: 8 MG/DL (ref 8–23)
BUN/CREAT SERPL: 14.8 (ref 7–25)
CALCIUM SPEC-SCNC: 8.1 MG/DL (ref 8.6–10.5)
CHLORIDE SERPL-SCNC: 111 MMOL/L (ref 98–107)
CO2 SERPL-SCNC: 20.3 MMOL/L (ref 22–29)
CREAT SERPL-MCNC: 0.54 MG/DL (ref 0.57–1)
DEPRECATED RDW RBC AUTO: 54.1 FL (ref 37–54)
EGFRCR SERPLBLD CKD-EPI 2021: 103.6 ML/MIN/1.73
EOSINOPHIL # BLD AUTO: 0.02 10*3/MM3 (ref 0–0.4)
EOSINOPHIL NFR BLD AUTO: 0.1 % (ref 0.3–6.2)
ERYTHROCYTE [DISTWIDTH] IN BLOOD BY AUTOMATED COUNT: 15.6 % (ref 12.3–15.4)
ERYTHROCYTE [SEDIMENTATION RATE] IN BLOOD: 25 MM/HR (ref 0–30)
FOLATE SERPL-MCNC: 16.8 NG/ML (ref 4.78–24.2)
GLOBULIN UR ELPH-MCNC: 2.3 GM/DL
GLUCOSE SERPL-MCNC: 106 MG/DL (ref 65–99)
HBA1C MFR BLD: 6.3 % (ref 4.8–5.6)
HCT VFR BLD AUTO: 23.3 % (ref 34–46.6)
HGB BLD-MCNC: 7.7 G/DL (ref 12–15.9)
IMM GRANULOCYTES # BLD AUTO: 0.14 10*3/MM3 (ref 0–0.05)
IMM GRANULOCYTES NFR BLD AUTO: 0.8 % (ref 0–0.5)
LYMPHOCYTES # BLD AUTO: 0.79 10*3/MM3 (ref 0.7–3.1)
LYMPHOCYTES NFR BLD AUTO: 4.3 % (ref 19.6–45.3)
MAGNESIUM SERPL-MCNC: 1.9 MG/DL (ref 1.6–2.4)
MCH RBC QN AUTO: 31.8 PG (ref 26.6–33)
MCHC RBC AUTO-ENTMCNC: 33 G/DL (ref 31.5–35.7)
MCV RBC AUTO: 96.3 FL (ref 79–97)
MONOCYTES # BLD AUTO: 1.91 10*3/MM3 (ref 0.1–0.9)
MONOCYTES NFR BLD AUTO: 10.3 % (ref 5–12)
NEUTROPHILS NFR BLD AUTO: 15.58 10*3/MM3 (ref 1.7–7)
NEUTROPHILS NFR BLD AUTO: 84.1 % (ref 42.7–76)
NRBC BLD AUTO-RTO: 0 /100 WBC (ref 0–0.2)
PLATELET # BLD AUTO: 214 10*3/MM3 (ref 140–450)
PMV BLD AUTO: 9.6 FL (ref 6–12)
POTASSIUM SERPL-SCNC: 3.6 MMOL/L (ref 3.5–5.2)
PROT SERPL-MCNC: 5.2 G/DL (ref 6–8.5)
RBC # BLD AUTO: 2.42 10*6/MM3 (ref 3.77–5.28)
RH BLD: NEGATIVE
RH BLD: NEGATIVE
SARS-COV-2 RNA PNL SPEC NAA+PROBE: NOT DETECTED
SODIUM SERPL-SCNC: 141 MMOL/L (ref 136–145)
T&S EXPIRATION DATE: NORMAL
TSH SERPL DL<=0.05 MIU/L-ACNC: 1.4 UIU/ML (ref 0.27–4.2)
VIT B12 BLD-MCNC: >2000 PG/ML (ref 211–946)
WBC NRBC COR # BLD: 18.51 10*3/MM3 (ref 3.4–10.8)

## 2022-03-11 PROCEDURE — 87205 SMEAR GRAM STAIN: CPT | Performed by: SURGERY

## 2022-03-11 PROCEDURE — 0HRT0JZ REPLACEMENT OF RIGHT BREAST WITH SYNTHETIC SUBSTITUTE, OPEN APPROACH: ICD-10-PCS | Performed by: SURGERY

## 2022-03-11 PROCEDURE — 86900 BLOOD TYPING SEROLOGIC ABO: CPT

## 2022-03-11 PROCEDURE — 99223 1ST HOSP IP/OBS HIGH 75: CPT | Performed by: INTERNAL MEDICINE

## 2022-03-11 PROCEDURE — 25010000002 VANCOMYCIN 5 G RECONSTITUTED SOLUTION: Performed by: INTERNAL MEDICINE

## 2022-03-11 PROCEDURE — 99233 SBSQ HOSP IP/OBS HIGH 50: CPT | Performed by: INTERNAL MEDICINE

## 2022-03-11 PROCEDURE — 86900 BLOOD TYPING SEROLOGIC ABO: CPT | Performed by: NURSE ANESTHETIST, CERTIFIED REGISTERED

## 2022-03-11 PROCEDURE — 25010000002 MIDAZOLAM PER 1 MG: Performed by: STUDENT IN AN ORGANIZED HEALTH CARE EDUCATION/TRAINING PROGRAM

## 2022-03-11 PROCEDURE — 19020 MASTOTOMY EXPL DRG ABSC DP: CPT | Performed by: SURGERY

## 2022-03-11 PROCEDURE — 82607 VITAMIN B-12: CPT | Performed by: INTERNAL MEDICINE

## 2022-03-11 PROCEDURE — 85652 RBC SED RATE AUTOMATED: CPT | Performed by: INTERNAL MEDICINE

## 2022-03-11 PROCEDURE — 25010000002 VANCOMYCIN 5 G RECONSTITUTED SOLUTION: Performed by: SURGERY

## 2022-03-11 PROCEDURE — 87077 CULTURE AEROBIC IDENTIFY: CPT | Performed by: SURGERY

## 2022-03-11 PROCEDURE — 25010000002 FENTANYL CITRATE (PF) 50 MCG/ML SOLUTION: Performed by: NURSE ANESTHETIST, CERTIFIED REGISTERED

## 2022-03-11 PROCEDURE — C1789 PROSTHESIS, BREAST, IMP: HCPCS | Performed by: SURGERY

## 2022-03-11 PROCEDURE — 87186 SC STD MICRODIL/AGAR DIL: CPT | Performed by: SURGERY

## 2022-03-11 PROCEDURE — 25010000002 CEFEPIME PER 500 MG: Performed by: INTERNAL MEDICINE

## 2022-03-11 PROCEDURE — U0004 COV-19 TEST NON-CDC HGH THRU: HCPCS | Performed by: SURGERY

## 2022-03-11 PROCEDURE — 25010000002 GENTAMICIN PER 80 MG: Performed by: SURGERY

## 2022-03-11 PROCEDURE — 25010000002 CEFAZOLIN 1-4 GM/50ML-% SOLUTION: Performed by: SURGERY

## 2022-03-11 PROCEDURE — 87075 CULTR BACTERIA EXCEPT BLOOD: CPT | Performed by: SURGERY

## 2022-03-11 PROCEDURE — 83036 HEMOGLOBIN GLYCOSYLATED A1C: CPT | Performed by: INTERNAL MEDICINE

## 2022-03-11 PROCEDURE — 80050 GENERAL HEALTH PANEL: CPT | Performed by: INTERNAL MEDICINE

## 2022-03-11 PROCEDURE — 25010000002 ALBUMIN HUMAN 5% PER 50 ML: Performed by: NURSE ANESTHETIST, CERTIFIED REGISTERED

## 2022-03-11 PROCEDURE — 86901 BLOOD TYPING SEROLOGIC RH(D): CPT | Performed by: NURSE ANESTHETIST, CERTIFIED REGISTERED

## 2022-03-11 PROCEDURE — 82746 ASSAY OF FOLIC ACID SERUM: CPT | Performed by: INTERNAL MEDICINE

## 2022-03-11 PROCEDURE — 25010000002 PROPOFOL 10 MG/ML EMULSION: Performed by: NURSE ANESTHETIST, CERTIFIED REGISTERED

## 2022-03-11 PROCEDURE — 82306 VITAMIN D 25 HYDROXY: CPT | Performed by: INTERNAL MEDICINE

## 2022-03-11 PROCEDURE — 25010000002 HYDROMORPHONE PER 4 MG: Performed by: NURSE ANESTHETIST, CERTIFIED REGISTERED

## 2022-03-11 PROCEDURE — 86850 RBC ANTIBODY SCREEN: CPT | Performed by: NURSE ANESTHETIST, CERTIFIED REGISTERED

## 2022-03-11 PROCEDURE — 25010000002 ONDANSETRON PER 1 MG: Performed by: NURSE ANESTHETIST, CERTIFIED REGISTERED

## 2022-03-11 PROCEDURE — 87070 CULTURE OTHR SPECIMN AEROBIC: CPT | Performed by: SURGERY

## 2022-03-11 PROCEDURE — 0H9T0ZZ DRAINAGE OF RIGHT BREAST, OPEN APPROACH: ICD-10-PCS | Performed by: SURGERY

## 2022-03-11 PROCEDURE — P9041 ALBUMIN (HUMAN),5%, 50ML: HCPCS | Performed by: NURSE ANESTHETIST, CERTIFIED REGISTERED

## 2022-03-11 PROCEDURE — 99232 SBSQ HOSP IP/OBS MODERATE 35: CPT | Performed by: SURGERY

## 2022-03-11 PROCEDURE — 83735 ASSAY OF MAGNESIUM: CPT | Performed by: INTERNAL MEDICINE

## 2022-03-11 PROCEDURE — 86901 BLOOD TYPING SEROLOGIC RH(D): CPT

## 2022-03-11 PROCEDURE — 0HPT0NZ REMOVAL OF TISSUE EXPANDER FROM RIGHT BREAST, OPEN APPROACH: ICD-10-PCS | Performed by: SURGERY

## 2022-03-11 PROCEDURE — 19342 INSJ/RPLCMT BRST IMPLT SEP D: CPT | Performed by: SURGERY

## 2022-03-11 DEVICE — IMPLANTABLE DEVICE: Type: IMPLANTABLE DEVICE | Site: BREAST | Status: FUNCTIONAL

## 2022-03-11 RX ORDER — CEFAZOLIN SODIUM 1 G/50ML
INJECTION, SOLUTION INTRAVENOUS CONTINUOUS PRN
Status: COMPLETED | OUTPATIENT
Start: 2022-03-11 | End: 2022-03-11

## 2022-03-11 RX ORDER — FENTANYL CITRATE 50 UG/ML
INJECTION, SOLUTION INTRAMUSCULAR; INTRAVENOUS AS NEEDED
Status: DISCONTINUED | OUTPATIENT
Start: 2022-03-11 | End: 2022-03-11 | Stop reason: SURG

## 2022-03-11 RX ORDER — ALBUMIN, HUMAN INJ 5% 5 %
SOLUTION INTRAVENOUS CONTINUOUS PRN
Status: DISCONTINUED | OUTPATIENT
Start: 2022-03-11 | End: 2022-03-11 | Stop reason: SURG

## 2022-03-11 RX ORDER — SCOLOPAMINE TRANSDERMAL SYSTEM 1 MG/1
1 PATCH, EXTENDED RELEASE TRANSDERMAL ONCE
Status: COMPLETED | OUTPATIENT
Start: 2022-03-11 | End: 2022-03-11

## 2022-03-11 RX ORDER — GENTAMICIN SULFATE 40 MG/ML
INJECTION, SOLUTION INTRAMUSCULAR; INTRAVENOUS AS NEEDED
Status: DISCONTINUED | OUTPATIENT
Start: 2022-03-11 | End: 2022-03-11 | Stop reason: HOSPADM

## 2022-03-11 RX ORDER — HYDROMORPHONE HCL 110MG/55ML
PATIENT CONTROLLED ANALGESIA SYRINGE INTRAVENOUS AS NEEDED
Status: DISCONTINUED | OUTPATIENT
Start: 2022-03-11 | End: 2022-03-11 | Stop reason: SURG

## 2022-03-11 RX ORDER — SODIUM CHLORIDE, SODIUM LACTATE, POTASSIUM CHLORIDE, CALCIUM CHLORIDE 600; 310; 30; 20 MG/100ML; MG/100ML; MG/100ML; MG/100ML
INJECTION, SOLUTION INTRAVENOUS CONTINUOUS PRN
Status: DISCONTINUED | OUTPATIENT
Start: 2022-03-11 | End: 2022-03-11 | Stop reason: SURG

## 2022-03-11 RX ORDER — PROMETHAZINE HYDROCHLORIDE 12.5 MG/1
25 TABLET ORAL ONCE AS NEEDED
Status: DISCONTINUED | OUTPATIENT
Start: 2022-03-11 | End: 2022-03-11 | Stop reason: HOSPADM

## 2022-03-11 RX ORDER — ONDANSETRON 2 MG/ML
4 INJECTION INTRAMUSCULAR; INTRAVENOUS ONCE AS NEEDED
Status: DISCONTINUED | OUTPATIENT
Start: 2022-03-11 | End: 2022-03-11 | Stop reason: HOSPADM

## 2022-03-11 RX ORDER — MIDAZOLAM HYDROCHLORIDE 1 MG/ML
2 INJECTION INTRAMUSCULAR; INTRAVENOUS ONCE
Status: COMPLETED | OUTPATIENT
Start: 2022-03-11 | End: 2022-03-11

## 2022-03-11 RX ORDER — SODIUM CHLORIDE, SODIUM LACTATE, POTASSIUM CHLORIDE, CALCIUM CHLORIDE 600; 310; 30; 20 MG/100ML; MG/100ML; MG/100ML; MG/100ML
9 INJECTION, SOLUTION INTRAVENOUS CONTINUOUS PRN
Status: DISCONTINUED | OUTPATIENT
Start: 2022-03-11 | End: 2022-03-13

## 2022-03-11 RX ORDER — MAGNESIUM HYDROXIDE 1200 MG/15ML
LIQUID ORAL AS NEEDED
Status: DISCONTINUED | OUTPATIENT
Start: 2022-03-11 | End: 2022-03-11 | Stop reason: HOSPADM

## 2022-03-11 RX ORDER — OXYCODONE HYDROCHLORIDE 5 MG/1
5 TABLET ORAL
Status: DISCONTINUED | OUTPATIENT
Start: 2022-03-11 | End: 2022-03-11 | Stop reason: HOSPADM

## 2022-03-11 RX ORDER — ALBUMIN, HUMAN INJ 5% 5 %
SOLUTION INTRAVENOUS
Status: COMPLETED
Start: 2022-03-11 | End: 2022-03-11

## 2022-03-11 RX ORDER — PROPOFOL 10 MG/ML
VIAL (ML) INTRAVENOUS AS NEEDED
Status: DISCONTINUED | OUTPATIENT
Start: 2022-03-11 | End: 2022-03-11 | Stop reason: SURG

## 2022-03-11 RX ORDER — SODIUM CHLORIDE 0.9 % (FLUSH) 0.9 %
10 SYRINGE (ML) INJECTION AS NEEDED
Status: DISCONTINUED | OUTPATIENT
Start: 2022-03-11 | End: 2022-03-11 | Stop reason: HOSPADM

## 2022-03-11 RX ORDER — SODIUM CHLORIDE 0.9 % (FLUSH) 0.9 %
10 SYRINGE (ML) INJECTION EVERY 12 HOURS SCHEDULED
Status: DISCONTINUED | OUTPATIENT
Start: 2022-03-11 | End: 2022-03-11 | Stop reason: HOSPADM

## 2022-03-11 RX ORDER — ONDANSETRON 2 MG/ML
INJECTION INTRAMUSCULAR; INTRAVENOUS AS NEEDED
Status: DISCONTINUED | OUTPATIENT
Start: 2022-03-11 | End: 2022-03-11 | Stop reason: SURG

## 2022-03-11 RX ORDER — PROMETHAZINE HYDROCHLORIDE 25 MG/1
25 SUPPOSITORY RECTAL ONCE AS NEEDED
Status: DISCONTINUED | OUTPATIENT
Start: 2022-03-11 | End: 2022-03-11 | Stop reason: HOSPADM

## 2022-03-11 RX ORDER — MEPERIDINE HYDROCHLORIDE 25 MG/ML
12.5 INJECTION INTRAMUSCULAR; INTRAVENOUS; SUBCUTANEOUS
Status: DISCONTINUED | OUTPATIENT
Start: 2022-03-11 | End: 2022-03-11 | Stop reason: HOSPADM

## 2022-03-11 RX ADMIN — FENTANYL CITRATE 50 MCG: 50 INJECTION, SOLUTION INTRAMUSCULAR; INTRAVENOUS at 19:15

## 2022-03-11 RX ADMIN — HYDROMORPHONE HYDROCHLORIDE 1 MG: 2 INJECTION, SOLUTION INTRAMUSCULAR; INTRAVENOUS; SUBCUTANEOUS at 20:04

## 2022-03-11 RX ADMIN — ACETAMINOPHEN 975 MG: 325 SUPPOSITORY RECTAL at 20:00

## 2022-03-11 RX ADMIN — ACETAMINOPHEN 1000 MG: 500 TABLET, FILM COATED ORAL at 13:26

## 2022-03-11 RX ADMIN — PROPOFOL 100 MG: 10 INJECTION, EMULSION INTRAVENOUS at 19:21

## 2022-03-11 RX ADMIN — HYDROMORPHONE HYDROCHLORIDE 1 MG: 2 INJECTION, SOLUTION INTRAMUSCULAR; INTRAVENOUS; SUBCUTANEOUS at 19:58

## 2022-03-11 RX ADMIN — FAMOTIDINE 20 MG: 20 TABLET ORAL at 22:56

## 2022-03-11 RX ADMIN — CEFEPIME 2 G: 1 INJECTION, SOLUTION INTRAVENOUS at 04:58

## 2022-03-11 RX ADMIN — CEFEPIME 2 G: 1 INJECTION, SOLUTION INTRAVENOUS at 13:27

## 2022-03-11 RX ADMIN — ALBUMIN HUMAN: 0.05 INJECTION, SOLUTION INTRAVENOUS at 19:40

## 2022-03-11 RX ADMIN — Medication 10 ML: at 09:00

## 2022-03-11 RX ADMIN — ALBUMIN HUMAN: 0.05 INJECTION, SOLUTION INTRAVENOUS at 19:10

## 2022-03-11 RX ADMIN — ACETAMINOPHEN 1000 MG: 500 TABLET, FILM COATED ORAL at 07:37

## 2022-03-11 RX ADMIN — MIDAZOLAM 2 MG: 1 INJECTION INTRAMUSCULAR; INTRAVENOUS at 18:16

## 2022-03-11 RX ADMIN — VANCOMYCIN HYDROCHLORIDE 1250 MG: 5 INJECTION, POWDER, LYOPHILIZED, FOR SOLUTION INTRAVENOUS at 22:57

## 2022-03-11 RX ADMIN — ONDANSETRON 4 MG: 2 INJECTION INTRAMUSCULAR; INTRAVENOUS at 19:54

## 2022-03-11 RX ADMIN — SCOPALAMINE 1 PATCH: 1 PATCH, EXTENDED RELEASE TRANSDERMAL at 19:07

## 2022-03-11 RX ADMIN — FENTANYL CITRATE 50 MCG: 50 INJECTION, SOLUTION INTRAMUSCULAR; INTRAVENOUS at 19:54

## 2022-03-11 RX ADMIN — CEFEPIME 2 G: 1 INJECTION, SOLUTION INTRAVENOUS at 19:35

## 2022-03-11 RX ADMIN — SODIUM CHLORIDE, POTASSIUM CHLORIDE, SODIUM LACTATE AND CALCIUM CHLORIDE: 600; 310; 30; 20 INJECTION, SOLUTION INTRAVENOUS at 19:10

## 2022-03-11 RX ADMIN — VANCOMYCIN HYDROCHLORIDE 1750 MG: 5 INJECTION, POWDER, LYOPHILIZED, FOR SOLUTION INTRAVENOUS at 09:00

## 2022-03-11 RX ADMIN — SODIUM CHLORIDE, POTASSIUM CHLORIDE, SODIUM LACTATE AND CALCIUM CHLORIDE 9 ML/HR: 600; 310; 30; 20 INJECTION, SOLUTION INTRAVENOUS at 18:16

## 2022-03-11 NOTE — PROGRESS NOTES
[facility]   Progress Note    Patient Name: Marisa Portillo  : 1960  MRN: [id number]  Primary Care Physician: Judah Johnson MD  Date of admission: 3/9/2022    Subjective   Subjective   HPI: Patient seen today for sepsis, UTI, fevers, and now with developing right breast pain and swelling, she says it is gotten much worse she noticed it getting worse the other day in the shower, she is concerned,  Patient appears to be a little more alert and awake than she was yesterday morning,  Still with fevers up to 101.1 degrees,    Review of Systems   All systems were reviewed and negative except for: Swelling of the right expander and breast area skin, still with fever still not feeling well general malaise nausea not eating well no appetite,      Objective   Objective     Vitals:  Patient Vitals for the past 24 hrs:   BP Temp Temp src Pulse Resp SpO2   22 0718 146/80 99.9 °F (37.7 °C) Oral 108 18 95 %   22 0247 146/66 98.2 °F (36.8 °C) Oral 97 20 98 %   03/10/22 2302 133/58 99.1 °F (37.3 °C) -- 102 -- 95 %   03/10/22 2300 -- -- Oral -- 20 --   03/10/22 2105 -- 99.5 °F (37.5 °C) -- -- -- --   03/10/22 1950 136/66 (!) 101.1 °F (38.4 °C) Oral 97 20 95 %   03/10/22 1559 146/67 (!) 100.6 °F (38.1 °C) Oral 110 20 96 %   03/10/22 1109 115/64 98.2 °F (36.8 °C) Oral 95 20 95 %      Physical Exam   Lungs clear anteriorly and laterally, cardiac exam regular rhythm abdomen soft nondistended lower extremities no edema to trace ankle edema, no rashes, but she has marked redness swelling discoloration warmth to the right breast area around the entire breast mainly on the lateral side was swelling, no expressible drainage or ulceration, nipple area appears to be spared, very tender to touch, some axillary swelling is also noted,      Result Review    Result Review:  I have personally reviewed the results from the time of this admission to 22 9:50 AM EST and agree with these findings:  [x]   Laboratory  [x]  Microbiology  [x]  Radiology  []  EKG/Telemetry   []  Cardiology/Vascular   []  Pathology  []  Old records  []  Other:      Active Hospital Meds:  Scheduled Meds:cefepime, 2 g, Intravenous, Q8H  enoxaparin, 40 mg, Subcutaneous, Daily  famotidine, 20 mg, Oral, Nightly  OLANZapine, 5 mg, Oral, Nightly  sodium chloride, 10 mL, Intravenous, Q12H  vancomycin, 1,250 mg, Intravenous, Q12H  vancomycin, 1,750 mg, Intravenous, Once      Continuous Infusions:Pharmacy to Dose Cefepime,   Pharmacy to dose vancomycin,   sodium chloride 0.9 % with KCl 20 mEq, 100 mL/hr, Last Rate: 100 mL/hr (03/10/22 0700)      PRN Meds:.•  acetaminophen  •  aluminum-magnesium hydroxide-simethicone  •  senna-docusate sodium **AND** polyethylene glycol **AND** bisacodyl **AND** bisacodyl  •  calcium carbonate  •  HYDROcodone-acetaminophen  •  ibuprofen  •  melatonin  •  ondansetron  •  Pharmacy to Dose Cefepime  •  Pharmacy to dose vancomycin  •  sodium chloride  •  traMADol    Assessment/Plan   Assessment / Plan     Active Hospital Problems:  Active Hospital Problems    Diagnosis    • Sepsis, due to unspecified organism, unspecified whether acute organ dysfunction present (HCC)        Assessment/Plan:     Sepsis, multifactorial to include urinary tract infection and now cellulitis of the right breast and expander implant area, may need CT scan of the chest area breast area to assess for abscess formation etc., I discussed the case with hematology oncology Dr. Mendez today March 11 and with our plastic surgeon who did the surgery Dr. Sandhu this morning, March 11, and she is going to see the patient after surgery, plan is to continue IV fluids, broaden antibiotics to include vancomycin,, white count has improved some    Worsening anemia most likely due to rehydration, bone marrow suppression sepsis, may need blood transfusion, will check again tomorrow March 12, reassess at that point, platelet count is holding     Urinary tract  infection, gram-negative rods, final sensitivity ID pending, continue cefepime     Invasive ductal carcinoma right breast previous bilateral mastectomies with multifocal areas of invasive ductal carcinoma largest area 2.3 cm with positive lymph nodes 4 out of 10 ER and GA positive HER-2 negative,, currently undergoing chemotherapy and possible radiation therapy as adjuvant treatment,     MAGUI, appendectomy, Port-A-Cath placement,     Impaired fasting glucose in the past, currently slightly elevated will follow, blood sugar seem to be holding March 11,     Vitamin D deficiency     TIA 2010, MRI MRA underwent a Star closure procedure with clipping     Osteoarthritis     Chronic venous insufficiency changes,     DVT prophylaxis, with Lovenox,     GI prophylaxis ordered, with Pepcid           CODE STATUS:    Code Status and Medical Interventions:   Ordered at: 03/09/22 2152     Code Status (Patient has no pulse and is not breathing):    CPR (Attempt to Resuscitate)     Medical Interventions (Patient has pulse or is breathing):    Full       Electronically signed by Judah Johnson MD, 03/11/22, 9:50 AM EST.

## 2022-03-11 NOTE — ANESTHESIA PREPROCEDURE EVALUATION
Anesthesia Evaluation     Patient summary reviewed and Nursing notes reviewed   history of anesthetic complications: PONV  NPO Solid Status: > 8 hours  NPO Liquid Status: > 2 hours           Airway   Mallampati: III  TM distance: >3 FB  Neck ROM: full  No difficulty expected  Dental          Pulmonary - negative pulmonary ROS    breath sounds clear to auscultation  Cardiovascular   Exercise tolerance: good (4-7 METS)    ECG reviewed  Rhythm: regular    (+) hyperlipidemia,       Neuro/Psych  (+) TIA,      ROS Comment: concussion  GI/Hepatic/Renal/Endo    (+) obesity,       Musculoskeletal     Abdominal    Substance History - negative use     OB/GYN negative ob/gyn ROS         Other   arthritis,    history of cancer (breast)    ROS/Med Hx Other: Sepsis s/p mastectomy and reconstruction 3 months ago. Vascular insufficiency LLE. Pt sees Vascular surgeon in [place]. Plan to receive stent.                Anesthesia Plan    ASA 3 - emergent     general   (Patient understands anesthesia not responsible for dental damage.)  intravenous induction     Anesthetic plan, all risks, benefits, and alternatives have been provided, discussed and informed consent has been obtained with: patient.    Plan discussed with CRNA and attending.        CODE STATUS:    Code Status (Patient has no pulse and is not breathing): CPR (Attempt to Resuscitate)  Medical Interventions (Patient has pulse or is breathing): Full

## 2022-03-11 NOTE — PLAN OF CARE
Goal Outcome Evaluation:  Plan of Care Reviewed With: patient   Pt VSS, has been febrile intermittently throughout shift, skin flushed and clammy, states that this is norm after her chemo treatments. She is adlib able to complete her own ADL's continent of B/B, left chest port patent and infusing with NS 20 K+, c/o of HA today, states  she is starting to feel better.      Progress: no change

## 2022-03-11 NOTE — CONSULTS
"Plastic Surgery  Note    Current Date: 3/11/2022  Name: Marisa Portillo  MRN: [id number]  YOB: 1960  VS: /80 (BP Location: Left arm, Patient Position: Lying)   Pulse 108   Temp 99.9 °F (37.7 °C) (Oral)   Resp 18   Ht 165.1 cm (65\")   Wt 84.3 kg (185 lb 13.6 oz)   SpO2 95%   BMI 30.93 kg/m²   ?  ?  History of Present Illness:  Marisa Portillo is a female 62 y.o. on chemotherapy due to right breast cancer sp 3 months of kelly mastectomy with right expander and left implant. Her last chemo was 01/22 and she has developed right breast redness for approximately 5 days. She has had fevers and leukocytosis    Family History:   Family History   Problem Relation Age of Onset   • Hypertension Mother    • Diabetes Mother    • Cancer Mother    • Arthritis Mother    • Breast cancer Mother    • Hypertension Father    • Diabetes Father    • Cancer Father    • Arthritis Father    • Liver cancer Father    • Lung cancer Father    • Breast cancer Maternal Aunt    • Malig Hyperthermia Neg Hx      Social History:   Social History     Socioeconomic History   • Marital status:    Tobacco Use   • Smoking status: Never Smoker   • Smokeless tobacco: Never Used   Vaping Use   • Vaping Use: Never used   Substance and Sexual Activity   • Alcohol use: Yes     Comment: socially    • Drug use: Never   • Sexual activity: Defer     Medical Problems: [unfilled]  Past Medical History:   Past Medical History:   Diagnosis Date   • Allergies    • Breast cancer (HCC)    • High cholesterol    • IFG (impaired fasting glucose)    • Osteoarthritis    • Port-A-Cath placement 12/29/2021   • S/P BILATERAL IMMEDIATE BREAST RECONSTRUCTION WITH LEFT PRE PEC PLACEMENT OF SILICONE GEL IMPLANT AND MESH, RIGHT PRE PEC PLACEMENT OF EXPANDER AND MESH 12/29/2021   • TIA (transient ischemic attack)     no residual (tia approximately 8 years ago)   • Vitamin D deficiency      Past Surgical History:   Past Surgical History:   Procedure " Laterality Date   • APPENDECTOMY     • BREAST AUGMENTATION Bilateral 12/28/2021    Procedure: bilateral breast reconstructon with bilateral mesh placement.   left implant, right tissue expander placement;  Surgeon: Lacy Shelton MD;  Location: [facility];  Service: Plastics;  Laterality: Bilateral;   • CEREBRAL ANGIOGRAM      clip placed- pt stated can have MRI with cerebral clips   • CYST REMOVAL Right     rt wrist   • MASTECTOMY     • MASTECTOMY COMPLETE / SIMPLE W/ SENTINEL NODE BIOPSY Bilateral    • PORTACATH PLACEMENT Left 12/28/2021    Procedure: INSERTION OF PORTACATH;  Surgeon: Jacqueline Mcgraw MD;  Location: [facility];  Service: General;  Laterality: Left;   • SENTINEL NODE BIOPSY Bilateral 12/28/2021    Procedure: BILATERAL BREAST MASTECTOMIES WITH RIGHT SENTINEL NODE BIOPSIES WITH FROZEN SECTIONS;  Surgeon: Jacqueline Mcgraw MD;  Location: [facility];  Service: General;  Laterality: Bilateral;     Medications:   Current Facility-Administered Medications:   •  acetaminophen (TYLENOL) tablet 1,000 mg, 1,000 mg, Oral, Q4H PRN, Stefan Tran MD, 1,000 mg at 03/11/22 0737  •  aluminum-magnesium hydroxide-simethicone (MAALOX MAX) 400-400-40 MG/5ML suspension 15 mL, 15 mL, Oral, Q6H PRN, Stefan Tran MD  •  sennosides-docusate (PERICOLACE) 8.6-50 MG per tablet 1 tablet, 1 tablet, Oral, BID PRN **AND** polyethylene glycol (MIRALAX) packet 17 g, 17 g, Oral, Daily PRN **AND** bisacodyl (DULCOLAX) EC tablet 5 mg, 5 mg, Oral, Daily PRN **AND** bisacodyl (DULCOLAX) suppository 10 mg, 10 mg, Rectal, Daily PRN, Stefan Tran MD  •  calcium carbonate (TUMS) chewable tablet 500 mg (200 mg elemental), 2 tablet, Oral, TID PRN, Stefan Tran MD  •  cefepime (MAXIPIME) IVPB 2 g (premix) in D5, 2 g, Intravenous, Q8H, Stefan Tran MD, 2 g at 03/11/22 045  •  enoxaparin (LOVENOX) syringe 40 mg, 40 mg, Subcutaneous, Daily, Stefan Tran MD, 40 mg at 03/10/22 0849  •  famotidine (PEPCID) tablet  "20 mg, 20 mg, Oral, Nightly, Stefan Tran MD, 20 mg at 03/10/22 2033  •  HYDROcodone-acetaminophen (NORCO) 7.5-325 MG per tablet 1 tablet, 1 tablet, Oral, Q4H PRN, Stefan Tran MD  •  ibuprofen (ADVIL,MOTRIN) tablet 600 mg, 600 mg, Oral, Q4H PRN, Stefan Tran MD, 600 mg at 03/10/22 2033  •  melatonin tablet 5 mg, 5 mg, Oral, Nightly PRN, Stefan Tran MD  •  OLANZapine (zyPREXA) tablet 5 mg, 5 mg, Oral, Nightly, Stefan Tran MD, 5 mg at 03/10/22 2033  •  ondansetron (ZOFRAN) injection 4 mg, 4 mg, Intravenous, Q4H PRN, Stefan Tran MD  •  Pharmacy to Dose Cefepime, , Does not apply, Continuous PRN, Stefan Tran MD  •  Pharmacy to dose vancomycin, , Does not apply, Continuous PRN, Judah Johnson MD  •  sodium chloride 0.9 % flush 10 mL, 10 mL, Intravenous, Q12H, Stefan Tran MD, 10 mL at 03/10/22 1217  •  sodium chloride 0.9 % flush 10 mL, 10 mL, Intravenous, PRN, Stefan Tran MD  •  sodium chloride 0.9 % with KCl 20 mEq/L infusion, 75 mL/hr, Intravenous, Continuous, Judah Johnson MD, Last Rate: 100 mL/hr at 03/10/22 2259, 100 mL/hr at 03/10/22 2259  •  traMADol (ULTRAM) tablet 50 mg, 50 mg, Oral, Q6H PRN, Stefan Tran MD  •  vancomycin 1250 mg/250 mL 0.9% NS IVPB ([facility]), 1,250 mg, Intravenous, Q12H, Judah Johnson MD  •  vancomycin 1750 mg/500 mL 0.9% NS IVPB ([facility]), 1,750 mg, Intravenous, Once, Judah Johnson MD  Allergies:   Allergies   Allergen Reactions   • Multihance [Gadobenate] Hives and Itching     MRI contrast     ?  Review of Systems: All system were reviewed and were negative, except the ones noted above.   Physical Examination:   Blood pressure 146/80, pulse 108, temperature 99.9 °F (37.7 °C), temperature source Oral, resp. rate 18, height 165.1 cm (65\"), weight 84.3 kg (185 lb 13.6 oz), SpO2 95 %, not currently breastfeeding.  Physical examination demonstrates  General appearance: alert, cooperative, no distress, appears stated age  Head: " Normocephalic, without obvious abnormality  Lungs: clear to auscultation bilaterally  Breasts: left breast looks normal. Right breast with redness and swelling   Heart: regular rate and rhythm, S1, S2 normal, no murmur, click, rub or gallop    Assessment and Plan:  . 61 yo lady with right breast infection.  Plan for OR today for right breast exploration   Risks and benefits discussed with patient who agrees to proceed.     Lacy Shelton. MD, PhD  Plastic and reconstructive surgery

## 2022-03-11 NOTE — PROGRESS NOTES
"Pharmacy to Dose Vancomycin Day: 1    Marisa Portillo is a 62 y.o.female admitted with SSTI. Pharmacy has been consulted to dose IV Vancomycin     Consulting Provider: Alex  Clinical Indication: SSTI  Pertinent Past Medical History:   Goal -600 mg/L.hr  Duration of therapy: 7 days    165.1 cm (65\")       03/10/22  0020      Weight: 84.3 kg (185 lb 13.6 oz)         Estimated Creatinine Clearance: 114.3 mL/min (A) (by C-G formula based on SCr of 0.54 mg/dL (L)).  Results from last 7 days   Lab Units 03/11/22  0459 03/10/22  0641 03/09/22  1831   BUN mg/dL 8 10 11   CREATININE mg/dL 0.54* 0.61 0.77       HD/PD/CRRT?: No    Lab Results   Component Value Date    WBC 18.51 (H) 03/11/2022      Temperature    03/10/22 2302 03/11/22 0247 03/11/22 0718   Temp: 99.1 °F (37.3 °C) 98.2 °F (36.8 °C) 99.9 °F (37.7 °C)        Contrast Administered: No    Relevant Micro:   Microbiology Results (last 10 days)       Procedure Component Value - Date/Time    Urine Culture - Urine, Urine, Clean Catch [[id number]]  (Abnormal) Collected: 03/09/22 1926    Lab Status: Preliminary result Specimen: Urine, Clean Catch Updated: 03/10/22 1457     Urine Culture >100,000 CFU/mL Gram Negative Bacilli    Blood Culture - Blood, Arm, Left [[id number]]  (Normal) Collected: 03/09/22 1831    Lab Status: Preliminary result Specimen: Blood from Arm, Left Updated: 03/10/22 1847     Blood Culture No growth at 24 hours             Relevant Radiology:   3/9 Chest Xray:  IMPRESSION: Mild bilateral lower lobe pulmonary opacities, favored to be atelectasis, less likely pneumonia.    Other Antimicrobial Therapy: Cefepime 2g IV q8h    Assessment/Plan  Loading dose: 1750 mg at 08:30 03/11/2022.  Regimen: 1250 mg IV every 12 hours.  Start time: 09:26 on 03/11/2022  Exposure target: AUC24 (range)400-600 mg/L.hr   AUC24,ss: 483 mg/L.hr  PAUC*: 69 %  Ctrough,ss: 14.2 mg/L  Pconc*: 25 %  Tox.: 9 %    Note: Vancomycin trough scheduled prior to 3rd dose    Level " due: Vanc trough 3/12 @ 0800  Labs ordered: CBC and BMP ordered with AM labs

## 2022-03-11 NOTE — CONSULTS
[facility]     Progress Note    Patient Name: Marisa Portillo  : 1960  MRN: [id number]  Primary Care Physician:  Judah Johnson MD  Date of admission: 3/9/2022    Subjective   Subjective     Chief Complaint: fevers, breast pain    HPI:  Ms. Marisa Portillo is a very pleasant 62 year old female who is followed by Dr. Mendez for stage IB right hormone positive, Her 2 neg, LN positive invasive ductal breast cancer with DCIS component diagnosed on 2021. She completed bilateral mastectomies on 2021 with bilateral expanders placed. She is having adjuvant chemotherapy with Docetaxel and Cyclophosphamide which started on  2022. She just completed cycle 2 of treatment on 2022. She is to receive 4 cycles of chemotherapy and then have radiation for positive lymph nodes. She noticed she was not feeling well on Tuesday. Her  noticed her skin was hot to touch when he came to bed. She was noted to have fevers up to 102.8 and came to the ER. Her urine was noted to be very dark, + nitrates, + WBC, + RBC's. Urine culture shows gram negative bacilli. Ms. Portillo reports her right breast expander has pain and redness as well. She is on Vancomycin and Cefepime. WBC trending downward from 24 to 18.5 this AM. Hemoglobin 9.6 down to 7.7. Platelet counts are acceptable.     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     Review of Systems  Review of Systems   Constitutional: Positive for fatigue and fever.   HENT: Negative.    Eyes: Negative.    Respiratory: Negative.    Cardiovascular: Negative.    Gastrointestinal: Negative.    Endocrine: Negative.    Genitourinary: Positive for breast pain and hematuria (microscopic).   Musculoskeletal: Negative.    Allergic/Immunologic: Negative.    Neurological: Negative.    Hematological: Negative.    Psychiatric/Behavioral: Negative.          Objective   Objective     Vitals:   Temp:  [98.2 °F (36.8 °C)-101.1 °F (38.4 °C)] 100 °F (37.8  °C)  Heart Rate:  [] 103  Resp:  [18-20] 20  BP: (133-146)/(58-80) 139/73  Physical Exam    Constitutional: Awake, alert, non-toxic but ill appearing   Eyes: PERRLA, sclerae anicteric, no conjunctival injection   HENT: NCAT, mucous membranes moist   Neck: Supple, no thyromegaly, no lymphadenopathy, trachea midline   Respiratory: Clear to auscultation bilaterally, nonlabored respirations    Cardiovascular: RRR, no murmurs, rubs, or gallops, palpable pedal pulses bilaterally   Gastrointestinal: Positive bowel sounds, soft, nontender, nondistended   Musculoskeletal: No bilateral ankle edema, no clubbing or cyanosis to extremities   Psychiatric: Appropriate affect, cooperative   Neurologic: Oriented x 3, strength symmetric in all extremities, Cranial Nerves grossly intact to confrontation, speech clear   Skin: No rashes   Breast exam: Right breast with expander in place with redness, erythema and warm to touch. She has complaints of pain to the breast.     Result Review    Result Review:  I have personally reviewed the results from the time of this admission to 3/11/2022 11:46 EST and agree with these findings:  [x]  Laboratory  [x]  Microbiology  [x]  Radiology  []  EKG/Telemetry   []  Cardiology/Vascular   []  Pathology  [x]  Old records  []  Other:  Most notable findings include: right breast, fevers, hemglobin 7.7, WBC 18.51.     Assessment/Plan   Assessment / Plan     Brief Patient Summary:  Marisa Portillo is a 62 y.o. female admitted on 3/9/2022 for fevers, sepsis and UTI. She has bilateral breast expander in place with redness, erythema and warmth to touch. Status post cycle 2 of 4  adjuvant Docetaxel, Cyclophosphamide chemotherapy on 2/21/2022. Next dose scheduled for 3/14/2022. Fevers up to 102.9 T max. She has UTI with gram negative baccilli. Dr. Shelton seen at bedside and plans to take her to surgery today, take out expander, wash out and place drains. She has anemia present with hemoglobin dropped  from 9.6 down to 7.7 since admission which is secondary to chemotherapy. Patient is on oral iron . WBC is trending downward, 24.93 on admission and down to 18.51. She is on Vancomycin and Cefepime.         Active Hospital Problems:  Active Hospital Problems    Diagnosis    • Sepsis, due to unspecified organism, unspecified whether acute organ dysfunction present (HCC)             Sepsis secondary to UTI and soft tissue infection of the chest wall. Continue with IV antibiotics per primary team. I discussed her case with Dr. Mcgraw, the breast cancer surgeon. I will also discuss the plan with Dr. Shelton in plastic surgery.  Plans to go to surgery this afternoon with Dr. Shelton to take out expander, wash out and place drains. I will delay her next cycle of chemotherapy at least 1 week. We will continue to follow the patient closely and move it back further if needed.      Stage IB right hormone positive, LN postive Breast cancer: Status post bilateral mastectomy with left breast implant and right breast expander in place. Cycle 2 of chemotherapy was given on 2/21/22.  Next dose of chemotherapy due on 3/14/22 which will have to be delayed due current infection.  At this time we have rescheduled 1 week later and will continue to re-evaluate that plan.      Chemotherapy induced anemia: Transfuse for hemoglobin less than 7 unless patient is symptomatic with anemia such as shortness of breath or dizziness. She is on oral iron therapy.     Leukocytosis secondary to UTI and right chest wall infection. The patient gets neupogen after each chemotherapy cycle which is also likely contributing.           DVT prophylaxis:  Medical and mechanical DVT prophylaxis orders are present.    CODE STATUS:   Code Status (Patient has no pulse and is not breathing): CPR (Attempt to Resuscitate)  Medical Interventions (Patient has pulse or is breathing): Full    Disposition:  I expect patient to be discharged: not at this time.      Electronically signed by CALOS Galaviz, 03/11/22, 11:20 AM EST.    _______________________________________________________________________    I personally saw and examined the patient.  I reviewed the case with our nurse practitioner.  I performed key components of the E and M services required for billing.    I discussed the plan of care with the patient.  She tells me that she feels a bit better today than she did but she is still very fatigued and her right breast is very painful.  She is also still having fevers which are causing headaches. I told her it would be fine to take Tylenol as needed to control the fevers and headaches.  Oncology will continue to follow closely.  I will speak with Dr. Shelton after the patient's surgery.     Starr Mendez MD, PhD 3/11/22 5:37pm

## 2022-03-11 NOTE — PLAN OF CARE
Goal Outcome Evaluation:  Plan of Care Reviewed With: patient        Progress: no change  Outcome Evaluation: Pt. currently resting. Pt. continues to have intermitten fever. PRN Motrin was given. Pt. currently on continuous fluids with KCL 20mEq at 100mL/hr. No new issues/needs noted at this time.

## 2022-03-12 LAB
ALBUMIN SERPL-MCNC: 3 G/DL (ref 3.5–5.2)
ALBUMIN/GLOB SERPL: 1.3 G/DL
ALP SERPL-CCNC: 89 U/L (ref 39–117)
ALT SERPL W P-5'-P-CCNC: 19 U/L (ref 1–33)
ANION GAP SERPL CALCULATED.3IONS-SCNC: 10.8 MMOL/L (ref 5–15)
AST SERPL-CCNC: 20 U/L (ref 1–32)
BASOPHILS # BLD AUTO: 0.08 10*3/MM3 (ref 0–0.2)
BASOPHILS NFR BLD AUTO: 0.5 % (ref 0–1.5)
BILIRUB SERPL-MCNC: 0.7 MG/DL (ref 0–1.2)
BUN SERPL-MCNC: 8 MG/DL (ref 8–23)
BUN/CREAT SERPL: 14 (ref 7–25)
CALCIUM SPEC-SCNC: 8.4 MG/DL (ref 8.6–10.5)
CHLORIDE SERPL-SCNC: 108 MMOL/L (ref 98–107)
CO2 SERPL-SCNC: 21.2 MMOL/L (ref 22–29)
CREAT SERPL-MCNC: 0.57 MG/DL (ref 0.57–1)
DEPRECATED RDW RBC AUTO: 53.1 FL (ref 37–54)
EGFRCR SERPLBLD CKD-EPI 2021: 102.3 ML/MIN/1.73
EOSINOPHIL # BLD AUTO: 0.03 10*3/MM3 (ref 0–0.4)
EOSINOPHIL NFR BLD AUTO: 0.2 % (ref 0.3–6.2)
ERYTHROCYTE [DISTWIDTH] IN BLOOD BY AUTOMATED COUNT: 15.6 % (ref 12.3–15.4)
ERYTHROCYTE [SEDIMENTATION RATE] IN BLOOD: 24 MM/HR (ref 0–30)
GLOBULIN UR ELPH-MCNC: 2.4 GM/DL
GLUCOSE SERPL-MCNC: 102 MG/DL (ref 65–99)
HCT VFR BLD AUTO: 25.4 % (ref 34–46.6)
HGB BLD-MCNC: 8.7 G/DL (ref 12–15.9)
IMM GRANULOCYTES # BLD AUTO: 0.11 10*3/MM3 (ref 0–0.05)
IMM GRANULOCYTES NFR BLD AUTO: 0.7 % (ref 0–0.5)
LYMPHOCYTES # BLD AUTO: 1.1 10*3/MM3 (ref 0.7–3.1)
LYMPHOCYTES NFR BLD AUTO: 7.4 % (ref 19.6–45.3)
MAGNESIUM SERPL-MCNC: 1.9 MG/DL (ref 1.6–2.4)
MCH RBC QN AUTO: 32.6 PG (ref 26.6–33)
MCHC RBC AUTO-ENTMCNC: 34.3 G/DL (ref 31.5–35.7)
MCV RBC AUTO: 95.1 FL (ref 79–97)
MONOCYTES # BLD AUTO: 1.48 10*3/MM3 (ref 0.1–0.9)
MONOCYTES NFR BLD AUTO: 10 % (ref 5–12)
NEUTROPHILS NFR BLD AUTO: 11.97 10*3/MM3 (ref 1.7–7)
NEUTROPHILS NFR BLD AUTO: 81.2 % (ref 42.7–76)
NRBC BLD AUTO-RTO: 0 /100 WBC (ref 0–0.2)
PLATELET # BLD AUTO: 228 10*3/MM3 (ref 140–450)
PMV BLD AUTO: 9.7 FL (ref 6–12)
POTASSIUM SERPL-SCNC: 3.6 MMOL/L (ref 3.5–5.2)
PROT SERPL-MCNC: 5.4 G/DL (ref 6–8.5)
RBC # BLD AUTO: 2.67 10*6/MM3 (ref 3.77–5.28)
SODIUM SERPL-SCNC: 140 MMOL/L (ref 136–145)
VANCOMYCIN TROUGH SERPL-MCNC: 12.9 MCG/ML (ref 5–20)
WBC NRBC COR # BLD: 14.77 10*3/MM3 (ref 3.4–10.8)

## 2022-03-12 PROCEDURE — 80202 ASSAY OF VANCOMYCIN: CPT | Performed by: SURGERY

## 2022-03-12 PROCEDURE — 80053 COMPREHEN METABOLIC PANEL: CPT | Performed by: SURGERY

## 2022-03-12 PROCEDURE — 85652 RBC SED RATE AUTOMATED: CPT | Performed by: SURGERY

## 2022-03-12 PROCEDURE — 25010000002 SODIUM CHLORIDE 0.9 % WITH KCL 20 MEQ 20-0.9 MEQ/L-% SOLUTION: Performed by: SURGERY

## 2022-03-12 PROCEDURE — 99232 SBSQ HOSP IP/OBS MODERATE 35: CPT | Performed by: INTERNAL MEDICINE

## 2022-03-12 PROCEDURE — 25010000002 CEFEPIME PER 500 MG: Performed by: SURGERY

## 2022-03-12 PROCEDURE — 25010000002 VANCOMYCIN 5 G RECONSTITUTED SOLUTION: Performed by: SURGERY

## 2022-03-12 PROCEDURE — 25010000002 ENOXAPARIN PER 10 MG: Performed by: SURGERY

## 2022-03-12 PROCEDURE — 85025 COMPLETE CBC W/AUTO DIFF WBC: CPT | Performed by: INTERNAL MEDICINE

## 2022-03-12 PROCEDURE — 83735 ASSAY OF MAGNESIUM: CPT | Performed by: SURGERY

## 2022-03-12 RX ORDER — PANTOPRAZOLE SODIUM 40 MG/1
40 TABLET, DELAYED RELEASE ORAL
Status: DISCONTINUED | OUTPATIENT
Start: 2022-03-12 | End: 2022-03-13

## 2022-03-12 RX ORDER — ERGOCALCIFEROL 1.25 MG/1
50000 CAPSULE ORAL
Status: DISCONTINUED | OUTPATIENT
Start: 2022-03-12 | End: 2022-03-14 | Stop reason: HOSPADM

## 2022-03-12 RX ORDER — SODIUM CHLORIDE AND POTASSIUM CHLORIDE 150; 900 MG/100ML; MG/100ML
50 INJECTION, SOLUTION INTRAVENOUS CONTINUOUS
Status: DISCONTINUED | OUTPATIENT
Start: 2022-03-12 | End: 2022-03-13

## 2022-03-12 RX ADMIN — CEFEPIME 2 G: 1 INJECTION, SOLUTION INTRAVENOUS at 12:22

## 2022-03-12 RX ADMIN — CEFEPIME 2 G: 1 INJECTION, SOLUTION INTRAVENOUS at 20:28

## 2022-03-12 RX ADMIN — OLANZAPINE 5 MG: 5 TABLET, FILM COATED ORAL at 20:30

## 2022-03-12 RX ADMIN — VANCOMYCIN HYDROCHLORIDE 1250 MG: 5 INJECTION, POWDER, LYOPHILIZED, FOR SOLUTION INTRAVENOUS at 20:29

## 2022-03-12 RX ADMIN — ERGOCALCIFEROL 50000 UNITS: 1.25 CAPSULE ORAL at 09:37

## 2022-03-12 RX ADMIN — POTASSIUM CHLORIDE AND SODIUM CHLORIDE 75 ML/HR: 900; 150 INJECTION, SOLUTION INTRAVENOUS at 02:58

## 2022-03-12 RX ADMIN — CEFEPIME 2 G: 1 INJECTION, SOLUTION INTRAVENOUS at 04:23

## 2022-03-12 RX ADMIN — HYDROCODONE BITARTRATE AND ACETAMINOPHEN 1 TABLET: 7.5; 325 TABLET ORAL at 16:43

## 2022-03-12 RX ADMIN — VANCOMYCIN HYDROCHLORIDE 1250 MG: 5 INJECTION, POWDER, LYOPHILIZED, FOR SOLUTION INTRAVENOUS at 09:37

## 2022-03-12 RX ADMIN — PANTOPRAZOLE SODIUM 40 MG: 40 TABLET, DELAYED RELEASE ORAL at 16:44

## 2022-03-12 NOTE — PROGRESS NOTES
"Pharmacy to Dose Vancomycin Day: 2    Marisa Portillo is a 62 y.o.female admitted with SSTI. Pharmacy has been consulted to dose IV Vancomycin     Consulting Provider: Alex  Clinical Indication: SSTI  Pertinent Past Medical History:   Goal -600 mg/L.hr  Duration of therapy: 7 days    165.1 cm (65\")       03/10/22  0020      Weight: 84.3 kg (185 lb 13.6 oz)         Estimated Creatinine Clearance: 108.3 mL/min (by C-G formula based on SCr of 0.57 mg/dL).  Results from last 7 days  Lab  Units  03/12/22  0509  03/11/22  0459  03/10/22  0641  03/09/22  1831  BUN  mg/dL  8  8  10  11  CREATININE  mg/dL  0.57  0.54*  0.61  0.77      HD/PD/CRRT?: No    Lab Results   Component Value Date    WBC 14.77 (H) 03/12/2022      Temperature    03/12/22 0359 03/12/22 0719 03/12/22 1110   Temp: 98.5 °F (36.9 °C) 98.8 °F (37.1 °C) 98.1 °F (36.7 °C)        Contrast Administered: No    Relevant Micro:   Microbiology Results (last 10 days)       Procedure Component Value - Date/Time    Wound Culture - Wound, Breast, Right [[id number]] Collected: 03/11/22 1929    Lab Status: Preliminary result Specimen: Wound from Breast, Right Updated: 03/11/22 2124     Gram Stain Occasional Gram positive cocci in pairs and clusters    COVID PRE-OP / PRE-PROCEDURE SCREENING ORDER (NO ISOLATION) - Swab, Nasopharynx [[id number]]  (Normal) Collected: 03/11/22 1308    Lab Status: Final result Specimen: Swab from Nasopharynx Updated: 03/11/22 1813    Narrative:      The following orders were created for panel order COVID PRE-OP / PRE-PROCEDURE SCREENING ORDER (NO ISOLATION) - Swab, Nasopharynx.  Procedure                               Abnormality         Status                     ---------                               -----------         ------                     COVID-19,APTIMA PANTHER(...[[id number]]  Normal              Final result                 Please view results for these tests on the individual orders.    COVID-19,APTIMA PANTHER(MICHEAL), " SERA/[facility], NP/OP SWAB IN UTM/VTM/SALINE TRANSPORT MEDIA,24 HR TAT - Swab, Nasopharynx [[id number]]  (Normal) Collected: 03/11/22 1308    Lab Status: Final result Specimen: Swab from Nasopharynx Updated: 03/11/22 1813     COVID19 Not Detected    Narrative:      Fact sheet for providers: https://www.fda.gov/media/367056/download     Fact sheet for patients: https://www.fda.gov/media/102137/download    Test performed by RT PCR.    Urine Culture - Urine, Urine, Clean Catch [[id number]]  (Abnormal)  (Susceptibility) Collected: 03/09/22 1926    Lab Status: Final result Specimen: Urine, Clean Catch Updated: 03/11/22 1255     Urine Culture >100,000 CFU/mL Klebsiella pneumoniae ssp pneumoniae    Susceptibility        Klebsiella pneumoniae ssp pneumoniae      CAYDEN      Ampicillin Resistant      Ampicillin + Sulbactam Susceptible      Cefazolin Susceptible      Cefepime Susceptible      Ceftazidime Susceptible      Ceftriaxone Susceptible      Gentamicin Susceptible      Levofloxacin Susceptible      Nitrofurantoin Intermediate      Piperacillin + Tazobactam Susceptible      Trimethoprim + Sulfamethoxazole Susceptible                           Blood Culture - Blood, Arm, Left [[id number]]  (Normal) Collected: 03/09/22 1831    Lab Status: Preliminary result Specimen: Blood from Arm, Left Updated: 03/11/22 1845     Blood Culture No growth at 2 days             Relevant Radiology:   3/9 Chest Xray:  IMPRESSION: Mild bilateral lower lobe pulmonary opacities, favored to be atelectasis, less likely pneumonia.    Other Antimicrobial Therapy: Cefepime 2g IV q8h    Assessment/Plan:    Continues on Vancomycin 1,250 mg IV q 12 hours  Vancomycin trough resulted 12.9 mcg/ml, correlating with AUC of 471 at steady state  SCr clinically stable      Continue current dosing for now. Consider deescalation as cultures finalize, if clinically appropriate.  Follow up level at steady state if therapy continues, or if change in renal indices.

## 2022-03-12 NOTE — OP NOTE
Plastic Surgery Op Note    RIGHT BREAST INCISION AND DRAINAGE WITH EXPANDER REMOVAL AND REPLACEMENT WITH IMPLANT    Marisa Portillo  3/11/2022    Pre-op Diagnosis:   Sepsis, due to unspecified organism, unspecified whether acute organ dysfunction present (HCC) [A41.9]    Post-Op Diagnosis Codes:     * Sepsis, due to unspecified organism, unspecified whether acute organ dysfunction present (HCC) [A41.9]  RIGHT BREAST ABSCESS     Anesthesia: General    Staff:   Circulator: Bj Malave RN  Scrub Person: Yamile Dumas    Surgeon: Dr Shelton    Estimated Blood Loss: minimal    Specimens:   Order Name Source Comment Collection Info Order Time   ANAEROBIC CULTURE Breast, Right  Collected By: Lacy Shelton MD 3/11/2022  7:44 PM     Release to patient   Immediate        WOUND CULTURE Breast, Right  Collected By: Lacy Shelton MD 3/11/2022  7:44 PM     Release to patient   Immediate        TISSUE PATHOLOGY EXAM Breast, Right  Collected By: Lacy Shelton MD 3/11/2022  7:46 PM     Release to patient   Immediate              Drains:   Closed/Suction Drain 1 Inferior;Right Breast Bulb 19 Fr. (Active)       [REMOVED] Closed/Suction Drain 1 Inferior;Lateral;Right Breast Bulb 15 Fr. (Removed)       [REMOVED] Closed/Suction Drain 2 Inferior;Lateral;Left Breast Bulb 15 Fr. (Removed)       Findings:     300 cc of pus  Mesh well incorporated  Smooth breast implant cavity     Implants:      Right:   Explanted: allergan expander.   Implanted: Sientra Smooth round gel implant [id number]        Complications: none       Date: 3/11/2022  Time: 20:15 EST  After risks and benefits were discussed with patient and inform consent was signed, patient was taken to the OR and positioned supine. The surgical site was then prepped in a sterile fashion way and a time out was performed confirming patient's name and procedure to be performed. All surgical team was introduced by name.   I incised the right breast in  the same previous mastectomy site. Then, I proceeded with aspiration of the pus. The total volume was 300 cc. Then I de inserted the mesh in its upper portion and  perforated the expander, aspirated the content of the expander and removed it. I then inspected the cavity. The mesh was well incorporated and the pocket was smooth. I then irrigated with 3 L of saline and then with 1 L of antibiotic solution with double antibiotics and betadine. A 19F drain was placed. Then a new implant was placed. The upper portion of the mesh was re sutured to the breast cavity and the skin was closed in 3 layers. Surgical glue was placed at the end.   Patient tolerated procedure well, there were no complications, all instruments were checked and patient was awaken and taken to PACU in stable condition.  I was present for the entire procedure.   Lacy Shelton MD  03/11/22  20:15 EST

## 2022-03-12 NOTE — PLAN OF CARE
Goal Outcome Evaluation:  Upon assessment right breast  was red warm shiny and indurated area was outlined then PCP/ Surgeon and Oncologist was notified, last nite during her shower  noticed area beginning to reddend,she c/o slight discomfort, but did not report, thought this discomfort was a possible side effect from placement of tissue expander. New orders for NPO and decrease of fluids, pt will be going to OR today, consent signed and chlorhexidine bath completed. Family is present at bedside      Progress: no change

## 2022-03-12 NOTE — PROGRESS NOTES
[facility]   Progress Note    Patient Name: Marisa Portillo  : 1960  MRN: [id number]  Primary Care Physician: Judah Johnson MD  Date of admission: 3/9/2022    Subjective   Subjective   HPI: Right breast implant expander infection with cellulitis, status post operating room last night with debridement I&D, and drain placement by plastics, patient feels much better, also here with UTI fevers general malaise, that is also improved,        Review of Systems   All systems were reviewed and negative except for: Says she slept better than she has for several nights now, no fevers rested comfortably, daughter at bedside, no chest pains other than the pain from the surgical site, but that is minimal right now no shortness of breath      Objective   Objective     Vitals:  Patient Vitals for the past 24 hrs:   BP Temp Temp src Pulse Resp SpO2   22 0719 119/59 98.8 °F (37.1 °C) Oral 90 20 95 %   22 0359 104/56 98.5 °F (36.9 °C) Oral 85 18 92 %   22 0239 101/54 97.9 °F (36.6 °C) Oral 87 16 91 %   22 0126 104/56 98.6 °F (37 °C) Oral 88 16 92 %   22 0024 123/58 98.8 °F (37.1 °C) Oral 90 16 93 %   22 2313 115/59 98.8 °F (37.1 °C) Oral 89 16 92 %   22 2239 122/58 98.6 °F (37 °C) Oral 91 16 93 %   22 2216 120/56 98.1 °F (36.7 °C) Oral 92 16 94 %   225 -- -- Oral -- 16 --   22 2140 114/58 98.8 °F (37.1 °C) Oral 97 16 97 %   22 108/51 99.1 °F (37.3 °C) Temporal 96 16 93 %   22 112/52 -- -- 98 14 93 %   22 114/53 -- -- 101 14 91 %   22 114/65 -- -- 103 14 90 %   22 103/52 -- -- 99 16 92 %   22 95/46 -- -- 100 14 92 %   22 103/54 -- -- 102 14 92 %   22 105/50 -- -- 100 14 92 %   22 105/51 -- -- 101 14 92 %   22 101/48 -- -- 100 14 93 %   22 100/49 -- -- 100 12 95 %   22 98/53 -- -- 99 12 94 %   22 103/50 --  -- 98 16 92 %   03/11/22 2011 114/53 98.6 °F (37 °C) Temporal 97 14 95 %   03/11/22 1918 -- -- -- -- -- 95 %   03/11/22 1516 138/67 (!) 101.1 °F (38.4 °C) Oral 106 18 94 %   03/11/22 1125 139/73 100 °F (37.8 °C) Oral 103 20 97 %      Physical Exam   Lungs clear anteriorly and laterally cardiac exam regular rhythm her abdomen soft and her lower legs showed no edema no rashes on the ankles arms or face, she is sleeping comfortably resting quiet, breathing comfortably,      Result Review    Result Review:  I have personally reviewed the results from the time of this admission to 03/12/22 9:05 AM EST and agree with these findings:  [x]  Laboratory  [x]  Microbiology  [x]  Radiology  []  EKG/Telemetry   []  Cardiology/Vascular   []  Pathology  []  Old records  []  Other:      Active Hospital Meds:  Scheduled Meds:cefepime, 2 g, Intravenous, Q8H  enoxaparin, 40 mg, Subcutaneous, Daily  famotidine, 20 mg, Oral, Nightly  OLANZapine, 5 mg, Oral, Nightly  sodium chloride, 10 mL, Intravenous, Q12H  vancomycin, 1,250 mg, Intravenous, Q12H      Continuous Infusions:lactated ringers, 9 mL/hr, Last Rate: 9 mL/hr (03/11/22 1816)  Pharmacy to Dose Cefepime,   Pharmacy to dose vancomycin,   sodium chloride 0.9 % with KCl 20 mEq, 75 mL/hr, Last Rate: 75 mL/hr (03/12/22 0258)      PRN Meds:.•  acetaminophen  •  aluminum-magnesium hydroxide-simethicone  •  senna-docusate sodium **AND** polyethylene glycol **AND** bisacodyl **AND** bisacodyl  •  calcium carbonate  •  HYDROcodone-acetaminophen  •  HYDROmorphone  •  ibuprofen  •  lactated ringers  •  melatonin  •  ondansetron  •  Pharmacy to Dose Cefepime  •  Pharmacy to dose vancomycin  •  sodium chloride  •  traMADol    Assessment/Plan   Assessment / Plan     Active Hospital Problems:  Active Hospital Problems    Diagnosis    • Sepsis, due to unspecified organism, unspecified whether acute organ dysfunction present (HCC)        Assessment/Plan:     Sepsis, multifactorial to include  urinary tract infection and now cellulitis of the right breast and expander implant area, status post surgical debridement removal of pus fluid, drain placed last night March 11, 2022 patient's improved clinically white count is improving, IV vancomycin added day #2 IV cefepime continues day #3 --------------I discussed the case with hematology oncology Dr. Mendez today March 11 and with our plastic surgeon who did the surgery Dr. Sandhu this morning, March 11, and evening of March 12, appreciate her help,------ we will continue antibiotics, decrease IV fluids since she is feeling better and eating better, discussed with nursing staff March 12, follow labs daily     anemia -----most likely due to rehydration, bone marrow suppression, chemo, sepsis, may need blood transfusion,, improved overnight March 12,     Urinary tract infection,  Klebsiella pneumonia, sensitive to cefepime, continue as above    Invasive ductal carcinoma right breast previous bilateral mastectomies with multifocal areas of invasive ductal carcinoma largest area 2.3 cm with positive lymph nodes 4 out of 10 ER and [facility] positive HER-2 negative,, currently undergoing chemotherapy and possible radiation therapy as adjuvant treatment,     MAGUI, appendectomy, Port-A-Cath placement,     Impaired fasting glucose in the past, currently slightly elevated will follow, blood sugar seem to be holding March 11,     Vitamin D deficiency, will replace with 50,000 units weekly dosing,     TIA 2010, MRI MRA underwent a Star closure procedure with clipping     Osteoarthritis     Chronic venous insufficiency changes,     DVT prophylaxis, with Lovenox,     GI prophylaxis ordered, with Pepcid        CODE STATUS:    Code Status and Medical Interventions:   Ordered at: 03/09/22 2574     Code Status (Patient has no pulse and is not breathing):    CPR (Attempt to Resuscitate)     Medical Interventions (Patient has pulse or is breathing):    Full       Electronically signed  by Judah Johnson MD, 03/12/22, 9:05 AM EST.

## 2022-03-12 NOTE — ANESTHESIA POSTPROCEDURE EVALUATION
Patient: Marisa Portillo    Procedure Summary     Date: 03/11/22 Room / Location: [facility] OR 04 / [facility] MAIN OR    Anesthesia Start: 1910 Anesthesia Stop: 2014    Procedure: INCISION AND DRAINAGE ABSCESS OF RIGHT BREAST WITH EXPANDER REMOVAL AND IMPLANT PLACEMENT (Right Breast) Diagnosis:       Sepsis, due to unspecified organism, unspecified whether acute organ dysfunction present (HCC)      (Sepsis, due to unspecified organism, unspecified whether acute organ dysfunction present (HCC) [A41.9])    Surgeons: Lacy Shelton MD Provider: Shruthi Ahmadi DO    Anesthesia Type: general ASA Status: 3 - Emergent          Anesthesia Type: general    Vitals  Vitals Value Taken Time   /54 03/11/22 2046   Temp 37 °C (98.6 °F) 03/11/22 2011   Pulse 100 03/11/22 2050   Resp 14 03/11/22 2031   SpO2 92 % 03/11/22 2050   Vitals shown include unvalidated device data.        Post Anesthesia Care and Evaluation    Patient location during evaluation: bedside  Patient participation: complete - patient participated  Level of consciousness: awake  Pain management: adequate  Airway patency: patent  Anesthetic complications: No anesthetic complications  PONV Status: none  Cardiovascular status: acceptable and stable  Respiratory status: acceptable  Hydration status: acceptable    Comments: An Anesthesiologist personally participated in the most demanding procedures (including induction and emergence if applicable) in the anesthesia plan, monitored the course of anesthesia administration at frequent intervals and remained physically present and available for immediate diagnosis and treatment of emergencies.

## 2022-03-12 NOTE — PLAN OF CARE
Goal Outcome Evaluation:           Progress: improving. Pt returned from surgery approximatly 2140. Monitoring  vital signs, dressing in place dry and intact on right breast area to right side. Drain in place. Sanguineous drainage approximately 50 cc and the 10 cc. Pt denies pain. Pt was medicated with zofran for nausea and was effective. Pt alert and oriented. Daughter at bedside. Will continue to monitor conditon.

## 2022-03-13 LAB
ALBUMIN SERPL-MCNC: 2.8 G/DL (ref 3.5–5.2)
ALBUMIN/GLOB SERPL: 1.3 G/DL
ALP SERPL-CCNC: 68 U/L (ref 39–117)
ALT SERPL W P-5'-P-CCNC: 18 U/L (ref 1–33)
ANION GAP SERPL CALCULATED.3IONS-SCNC: 8.9 MMOL/L (ref 5–15)
AST SERPL-CCNC: 17 U/L (ref 1–32)
BASOPHILS # BLD AUTO: 0.07 10*3/MM3 (ref 0–0.2)
BASOPHILS NFR BLD AUTO: 1.3 % (ref 0–1.5)
BILIRUB SERPL-MCNC: 0.3 MG/DL (ref 0–1.2)
BUN SERPL-MCNC: 8 MG/DL (ref 8–23)
BUN/CREAT SERPL: 13.3 (ref 7–25)
CALCIUM SPEC-SCNC: 8.4 MG/DL (ref 8.6–10.5)
CHLORIDE SERPL-SCNC: 110 MMOL/L (ref 98–107)
CO2 SERPL-SCNC: 24.1 MMOL/L (ref 22–29)
CREAT SERPL-MCNC: 0.6 MG/DL (ref 0.57–1)
DEPRECATED RDW RBC AUTO: 53.1 FL (ref 37–54)
EGFRCR SERPLBLD CKD-EPI 2021: 101 ML/MIN/1.73
EOSINOPHIL # BLD AUTO: 0.09 10*3/MM3 (ref 0–0.4)
EOSINOPHIL NFR BLD AUTO: 1.7 % (ref 0.3–6.2)
ERYTHROCYTE [DISTWIDTH] IN BLOOD BY AUTOMATED COUNT: 15.3 % (ref 12.3–15.4)
GLOBULIN UR ELPH-MCNC: 2.1 GM/DL
GLUCOSE SERPL-MCNC: 92 MG/DL (ref 65–99)
HCT VFR BLD AUTO: 23.9 % (ref 34–46.6)
HGB BLD-MCNC: 8.1 G/DL (ref 12–15.9)
IMM GRANULOCYTES # BLD AUTO: 0.05 10*3/MM3 (ref 0–0.05)
IMM GRANULOCYTES NFR BLD AUTO: 1 % (ref 0–0.5)
IRON 24H UR-MRATE: 65 MCG/DL (ref 37–145)
IRON SATN MFR SERPL: 35 % (ref 20–50)
LYMPHOCYTES # BLD AUTO: 0.99 10*3/MM3 (ref 0.7–3.1)
LYMPHOCYTES NFR BLD AUTO: 19.1 % (ref 19.6–45.3)
MAGNESIUM SERPL-MCNC: 2 MG/DL (ref 1.6–2.4)
MCH RBC QN AUTO: 32.4 PG (ref 26.6–33)
MCHC RBC AUTO-ENTMCNC: 33.9 G/DL (ref 31.5–35.7)
MCV RBC AUTO: 95.6 FL (ref 79–97)
MONOCYTES # BLD AUTO: 0.78 10*3/MM3 (ref 0.1–0.9)
MONOCYTES NFR BLD AUTO: 15 % (ref 5–12)
NEUTROPHILS NFR BLD AUTO: 3.21 10*3/MM3 (ref 1.7–7)
NEUTROPHILS NFR BLD AUTO: 61.9 % (ref 42.7–76)
NRBC BLD AUTO-RTO: 0 /100 WBC (ref 0–0.2)
PLATELET # BLD AUTO: 242 10*3/MM3 (ref 140–450)
PMV BLD AUTO: 9.7 FL (ref 6–12)
POTASSIUM SERPL-SCNC: 3.5 MMOL/L (ref 3.5–5.2)
PROT SERPL-MCNC: 4.9 G/DL (ref 6–8.5)
RBC # BLD AUTO: 2.5 10*6/MM3 (ref 3.77–5.28)
SODIUM SERPL-SCNC: 143 MMOL/L (ref 136–145)
TIBC SERPL-MCNC: 186 MCG/DL (ref 298–536)
TRANSFERRIN SERPL-MCNC: 125 MG/DL (ref 200–360)
WBC NRBC COR # BLD: 5.19 10*3/MM3 (ref 3.4–10.8)

## 2022-03-13 PROCEDURE — 80053 COMPREHEN METABOLIC PANEL: CPT | Performed by: INTERNAL MEDICINE

## 2022-03-13 PROCEDURE — 25010000002 CEFEPIME PER 500 MG: Performed by: SURGERY

## 2022-03-13 PROCEDURE — 99232 SBSQ HOSP IP/OBS MODERATE 35: CPT | Performed by: INTERNAL MEDICINE

## 2022-03-13 PROCEDURE — 25010000002 VANCOMYCIN 5 G RECONSTITUTED SOLUTION: Performed by: SURGERY

## 2022-03-13 PROCEDURE — 83735 ASSAY OF MAGNESIUM: CPT | Performed by: INTERNAL MEDICINE

## 2022-03-13 PROCEDURE — 84466 ASSAY OF TRANSFERRIN: CPT | Performed by: INTERNAL MEDICINE

## 2022-03-13 PROCEDURE — 85025 COMPLETE CBC W/AUTO DIFF WBC: CPT | Performed by: INTERNAL MEDICINE

## 2022-03-13 PROCEDURE — 83540 ASSAY OF IRON: CPT | Performed by: INTERNAL MEDICINE

## 2022-03-13 RX ORDER — CETIRIZINE HYDROCHLORIDE 10 MG/1
10 TABLET ORAL DAILY
Status: DISCONTINUED | OUTPATIENT
Start: 2022-03-13 | End: 2022-03-14 | Stop reason: HOSPADM

## 2022-03-13 RX ORDER — FAMOTIDINE 10 MG/ML
20 INJECTION, SOLUTION INTRAVENOUS EVERY 12 HOURS SCHEDULED
Status: DISCONTINUED | OUTPATIENT
Start: 2022-03-13 | End: 2022-03-14 | Stop reason: HOSPADM

## 2022-03-13 RX ORDER — POTASSIUM CHLORIDE 750 MG/1
10 CAPSULE, EXTENDED RELEASE ORAL 2 TIMES DAILY WITH MEALS
Status: DISCONTINUED | OUTPATIENT
Start: 2022-03-13 | End: 2022-03-14 | Stop reason: HOSPADM

## 2022-03-13 RX ORDER — CLOTRIMAZOLE AND BETAMETHASONE DIPROPIONATE 10; .64 MG/G; MG/G
1 CREAM TOPICAL EVERY 12 HOURS SCHEDULED
Status: DISCONTINUED | OUTPATIENT
Start: 2022-03-13 | End: 2022-03-14 | Stop reason: HOSPADM

## 2022-03-13 RX ADMIN — CETIRIZINE HYDROCHLORIDE 10 MG: 10 TABLET, FILM COATED ORAL at 11:59

## 2022-03-13 RX ADMIN — POTASSIUM CHLORIDE 10 MEQ: 750 CAPSULE, EXTENDED RELEASE ORAL at 17:26

## 2022-03-13 RX ADMIN — POTASSIUM CHLORIDE 10 MEQ: 750 CAPSULE, EXTENDED RELEASE ORAL at 11:59

## 2022-03-13 RX ADMIN — CEFEPIME 2 G: 1 INJECTION, SOLUTION INTRAVENOUS at 21:12

## 2022-03-13 RX ADMIN — Medication 10 ML: at 21:12

## 2022-03-13 RX ADMIN — CLOTRIMAZOLE AND BETAMETHASONE DIPROPIONATE 1 APPLICATION: 10; .5 CREAM TOPICAL at 10:59

## 2022-03-13 RX ADMIN — FAMOTIDINE 20 MG: 10 INJECTION INTRAVENOUS at 21:12

## 2022-03-13 RX ADMIN — Medication 10 ML: at 08:59

## 2022-03-13 RX ADMIN — OLANZAPINE 5 MG: 5 TABLET, FILM COATED ORAL at 21:12

## 2022-03-13 RX ADMIN — FAMOTIDINE 20 MG: 10 INJECTION INTRAVENOUS at 12:00

## 2022-03-13 RX ADMIN — VANCOMYCIN HYDROCHLORIDE 1250 MG: 5 INJECTION, POWDER, LYOPHILIZED, FOR SOLUTION INTRAVENOUS at 21:12

## 2022-03-13 RX ADMIN — CEFEPIME 2 G: 1 INJECTION, SOLUTION INTRAVENOUS at 12:00

## 2022-03-13 RX ADMIN — CLOTRIMAZOLE AND BETAMETHASONE DIPROPIONATE 1 APPLICATION: 10; .5 CREAM TOPICAL at 21:13

## 2022-03-13 RX ADMIN — PANTOPRAZOLE SODIUM 40 MG: 40 TABLET, DELAYED RELEASE ORAL at 06:34

## 2022-03-13 RX ADMIN — VANCOMYCIN HYDROCHLORIDE 1250 MG: 5 INJECTION, POWDER, LYOPHILIZED, FOR SOLUTION INTRAVENOUS at 08:59

## 2022-03-13 RX ADMIN — CEFEPIME 2 G: 1 INJECTION, SOLUTION INTRAVENOUS at 04:00

## 2022-03-13 NOTE — PLAN OF CARE
Goal Outcome Evaluation:  Plan of Care Reviewed With: patient        Progress: improving Pt pleasant and cooperative. Pt ambulating with steady gait throughout the day and evening. Pt doing well. Lab work results had improvement. Pt states feeling much better. Pt had surgery 03/11/22 and does continue with dressing on right chest. Drain noted with sanguineous drainage 10 ml. Pt denies pain. Will continue to monitor.

## 2022-03-14 ENCOUNTER — APPOINTMENT (OUTPATIENT)
Dept: ONCOLOGY | Facility: HOSPITAL | Age: 62
End: 2022-03-14

## 2022-03-14 ENCOUNTER — READMISSION MANAGEMENT (OUTPATIENT)
Dept: CALL CENTER | Facility: HOSPITAL | Age: 62
End: 2022-03-14

## 2022-03-14 VITALS
HEIGHT: 65 IN | TEMPERATURE: 98.4 F | DIASTOLIC BLOOD PRESSURE: 71 MMHG | SYSTOLIC BLOOD PRESSURE: 127 MMHG | HEART RATE: 79 BPM | RESPIRATION RATE: 18 BRPM | WEIGHT: 185.85 LBS | BODY MASS INDEX: 30.96 KG/M2 | OXYGEN SATURATION: 98 %

## 2022-03-14 LAB
ALBUMIN SERPL-MCNC: 2.9 G/DL (ref 3.5–5.2)
ALBUMIN/GLOB SERPL: 1.2 G/DL
ALP SERPL-CCNC: 64 U/L (ref 39–117)
ALT SERPL W P-5'-P-CCNC: 17 U/L (ref 1–33)
ANION GAP SERPL CALCULATED.3IONS-SCNC: 9.8 MMOL/L (ref 5–15)
AST SERPL-CCNC: 15 U/L (ref 1–32)
BACTERIA SPEC AEROBE CULT: ABNORMAL
BACTERIA SPEC AEROBE CULT: NORMAL
BASOPHILS # BLD AUTO: 0.08 10*3/MM3 (ref 0–0.2)
BASOPHILS NFR BLD AUTO: 1.5 % (ref 0–1.5)
BILIRUB SERPL-MCNC: 0.2 MG/DL (ref 0–1.2)
BUN SERPL-MCNC: 6 MG/DL (ref 8–23)
BUN/CREAT SERPL: 11.3 (ref 7–25)
CALCIUM SPEC-SCNC: 8.5 MG/DL (ref 8.6–10.5)
CHLORIDE SERPL-SCNC: 109 MMOL/L (ref 98–107)
CO2 SERPL-SCNC: 23.2 MMOL/L (ref 22–29)
CREAT SERPL-MCNC: 0.53 MG/DL (ref 0.57–1)
DEPRECATED RDW RBC AUTO: 53.1 FL (ref 37–54)
EGFRCR SERPLBLD CKD-EPI 2021: 104.1 ML/MIN/1.73
EOSINOPHIL # BLD AUTO: 0.15 10*3/MM3 (ref 0–0.4)
EOSINOPHIL NFR BLD AUTO: 2.9 % (ref 0.3–6.2)
ERYTHROCYTE [DISTWIDTH] IN BLOOD BY AUTOMATED COUNT: 15.4 % (ref 12.3–15.4)
GLOBULIN UR ELPH-MCNC: 2.5 GM/DL
GLUCOSE SERPL-MCNC: 108 MG/DL (ref 65–99)
GRAM STN SPEC: ABNORMAL
HCT VFR BLD AUTO: 26.4 % (ref 34–46.6)
HGB BLD-MCNC: 8.9 G/DL (ref 12–15.9)
IMM GRANULOCYTES # BLD AUTO: 0.06 10*3/MM3 (ref 0–0.05)
IMM GRANULOCYTES NFR BLD AUTO: 1.2 % (ref 0–0.5)
LAB AP CASE REPORT: NORMAL
LAB AP CLINICAL INFORMATION: NORMAL
LYMPHOCYTES # BLD AUTO: 0.98 10*3/MM3 (ref 0.7–3.1)
LYMPHOCYTES NFR BLD AUTO: 18.9 % (ref 19.6–45.3)
MAGNESIUM SERPL-MCNC: 2 MG/DL (ref 1.6–2.4)
MCH RBC QN AUTO: 31.8 PG (ref 26.6–33)
MCHC RBC AUTO-ENTMCNC: 33.7 G/DL (ref 31.5–35.7)
MCV RBC AUTO: 94.3 FL (ref 79–97)
MONOCYTES # BLD AUTO: 0.76 10*3/MM3 (ref 0.1–0.9)
MONOCYTES NFR BLD AUTO: 14.7 % (ref 5–12)
NEUTROPHILS NFR BLD AUTO: 3.15 10*3/MM3 (ref 1.7–7)
NEUTROPHILS NFR BLD AUTO: 60.8 % (ref 42.7–76)
NRBC BLD AUTO-RTO: 0 /100 WBC (ref 0–0.2)
NT-PROBNP SERPL-MCNC: 1085 PG/ML (ref 0–900)
PATH REPORT.FINAL DX SPEC: NORMAL
PATH REPORT.GROSS SPEC: NORMAL
PLATELET # BLD AUTO: 316 10*3/MM3 (ref 140–450)
PMV BLD AUTO: 9.3 FL (ref 6–12)
POTASSIUM SERPL-SCNC: 3.7 MMOL/L (ref 3.5–5.2)
PROT SERPL-MCNC: 5.4 G/DL (ref 6–8.5)
RBC # BLD AUTO: 2.8 10*6/MM3 (ref 3.77–5.28)
SODIUM SERPL-SCNC: 142 MMOL/L (ref 136–145)
WBC NRBC COR # BLD: 5.18 10*3/MM3 (ref 3.4–10.8)

## 2022-03-14 PROCEDURE — 85025 COMPLETE CBC W/AUTO DIFF WBC: CPT | Performed by: INTERNAL MEDICINE

## 2022-03-14 PROCEDURE — 99239 HOSP IP/OBS DSCHRG MGMT >30: CPT | Performed by: INTERNAL MEDICINE

## 2022-03-14 PROCEDURE — 25010000002 CEFEPIME PER 500 MG: Performed by: SURGERY

## 2022-03-14 PROCEDURE — 83735 ASSAY OF MAGNESIUM: CPT | Performed by: INTERNAL MEDICINE

## 2022-03-14 PROCEDURE — 25010000002 HEPARIN LOCK FLUSH PER 10 UNITS: Performed by: INTERNAL MEDICINE

## 2022-03-14 PROCEDURE — 83880 ASSAY OF NATRIURETIC PEPTIDE: CPT | Performed by: INTERNAL MEDICINE

## 2022-03-14 PROCEDURE — 25010000002 VANCOMYCIN 5 G RECONSTITUTED SOLUTION: Performed by: SURGERY

## 2022-03-14 PROCEDURE — 80053 COMPREHEN METABOLIC PANEL: CPT | Performed by: INTERNAL MEDICINE

## 2022-03-14 RX ORDER — ERGOCALCIFEROL 1.25 MG/1
50000 CAPSULE ORAL
Qty: 12 CAPSULE | Refills: 3 | Status: SHIPPED | OUTPATIENT
Start: 2022-03-19 | End: 2023-03-28

## 2022-03-14 RX ORDER — CEPHALEXIN 500 MG/1
500 CAPSULE ORAL 2 TIMES DAILY
Qty: 21 CAPSULE | Refills: 0 | Status: SHIPPED | OUTPATIENT
Start: 2022-03-14 | End: 2022-04-15

## 2022-03-14 RX ORDER — DOXYCYCLINE HYCLATE 100 MG/1
100 CAPSULE ORAL 2 TIMES DAILY
Qty: 20 CAPSULE | Refills: 0 | Status: SHIPPED | OUTPATIENT
Start: 2022-03-14 | End: 2022-05-09

## 2022-03-14 RX ORDER — FAMOTIDINE 20 MG/1
20 TABLET, FILM COATED ORAL 2 TIMES DAILY
Qty: 60 TABLET | Refills: 0 | Status: SHIPPED | OUTPATIENT
Start: 2022-03-14 | End: 2022-08-04

## 2022-03-14 RX ORDER — CLOTRIMAZOLE AND BETAMETHASONE DIPROPIONATE 10; .64 MG/G; MG/G
1 CREAM TOPICAL EVERY 12 HOURS SCHEDULED
Qty: 45 G | Refills: 2 | Status: ON HOLD | OUTPATIENT
Start: 2022-03-14 | End: 2022-04-15

## 2022-03-14 RX ORDER — POTASSIUM CHLORIDE 750 MG/1
10 CAPSULE, EXTENDED RELEASE ORAL 2 TIMES DAILY WITH MEALS
Qty: 60 CAPSULE | Refills: 5 | Status: SHIPPED | OUTPATIENT
Start: 2022-03-14 | End: 2022-05-26

## 2022-03-14 RX ORDER — HEPARIN SODIUM (PORCINE) LOCK FLUSH IV SOLN 100 UNIT/ML 100 UNIT/ML
500 SOLUTION INTRAVENOUS ONCE
Status: COMPLETED | OUTPATIENT
Start: 2022-03-14 | End: 2022-03-14

## 2022-03-14 RX ADMIN — CETIRIZINE HYDROCHLORIDE 10 MG: 10 TABLET, FILM COATED ORAL at 09:19

## 2022-03-14 RX ADMIN — FAMOTIDINE 20 MG: 10 INJECTION INTRAVENOUS at 09:19

## 2022-03-14 RX ADMIN — VANCOMYCIN HYDROCHLORIDE 1250 MG: 5 INJECTION, POWDER, LYOPHILIZED, FOR SOLUTION INTRAVENOUS at 09:19

## 2022-03-14 RX ADMIN — POTASSIUM CHLORIDE 10 MEQ: 750 CAPSULE, EXTENDED RELEASE ORAL at 09:19

## 2022-03-14 RX ADMIN — Medication 10 ML: at 09:19

## 2022-03-14 RX ADMIN — CLOTRIMAZOLE AND BETAMETHASONE DIPROPIONATE 1 APPLICATION: 10; .5 CREAM TOPICAL at 09:20

## 2022-03-14 RX ADMIN — CEFEPIME 2 G: 1 INJECTION, SOLUTION INTRAVENOUS at 11:41

## 2022-03-14 RX ADMIN — ACETAMINOPHEN 1000 MG: 500 TABLET, FILM COATED ORAL at 00:45

## 2022-03-14 RX ADMIN — HEPARIN SODIUM (PORCINE) LOCK FLUSH IV SOLN 100 UNIT/ML 500 UNITS: 100 SOLUTION at 12:27

## 2022-03-14 RX ADMIN — CEFEPIME 2 G: 1 INJECTION, SOLUTION INTRAVENOUS at 06:00

## 2022-03-14 NOTE — PLAN OF CARE
Goal Outcome Evaluation:  Plan of Care Reviewed With: patient        Progress: improving  Outcome Evaluation: PT DENIES PAIN/NAUSEA AT THIS TIME. RASH SPREAD TO BACKS OF LEGS. VS WNL. PT AMBULATED HALLS 2-3 TIMES AT THE BEGINNING OF SHIFT. PT STATES OVERALL IMPROVEMENT. WILL CONTINUE TO MONITOR

## 2022-03-14 NOTE — DISCHARGE SUMMARY
Psychiatric         DISCHARGE SUMMARY    Patient Name: Marisa Portillo  : 1960  MRN: [id number]    Date of Admission: 3/9/2022  Date of Discharge:  3/14/2022  Primary Care Physician: Judah Johnson MD    Consults     Date and Time Order Name Status Description    3/10/2022  8:09 AM Hematology & Oncology Inpatient Consult Completed           Presenting Problem:   Sepsis, due to unspecified organism, unspecified whether acute organ dysfunction present (HCC) [A41.9]    Active and Resolved Hospital Problems:  Active Hospital Problems    Diagnosis POA   • Sepsis, due to unspecified organism, unspecified whether acute organ dysfunction present (HCC) [A41.9] Yes      Resolved Hospital Problems   No resolved problems to display.         Hospital Course     Hospital Course:  Marisa Portillo is a 62 y.o. female     Rash on the back most likely due to moisture heat, will apply Lotrisone, IV Pepcid will be restarted, along with Zyrtec, will consider steroid but avoid if possible , will send home with Zyrtec and Pepcid orally along with some Lotrisone cream,      Sepsis, multifactorial to include urinary tract infection and now cellulitis of the right breast and expander implant area, status post surgical debridement removal of pus// fluid, drain placed last night 2022------- patient's improved clinically,   white count is improving my back to normal ,, IV vancomycin added day #4 IV cefepime continues day #5--------------I discussed the case with hematology oncology Dr. Mendez--  and with our plastic surgeon who did the surgery Dr. Sandhu, , and evening of , appreciate her help,------ we will continue antibiotics, we will stop IV fluids , we will send home today on oral antibiotics Keflex and doxycycline Keflex for 7 days doxy for 10 days, she will follow-up in the office with LASIK surgery, await final culture results on the  gram-positive organism from the wound culture, the urine is sensitive to cephalexin      Mild hypokalemia will replace, with oral dosing 10 twice a day March 13, will send home with oral dosing of potassium, patient will have blood work again in 1 week as an outpatient, holding chemo for a week or so     anemia -----most likely due to rehydration, bone marrow suppression, chemo, sepsis, may need blood transfusion,, , blood counts improved, on March 14 we will go ahead and send home without any blood transfusions, she did not receive any IV iron in the hospital, her iron levels were actually normal on March 13, anemia most likely due to chemo bone marrow suppression etc.     Urinary tract infection,  Klebsiella pneumonia, sensitive to cefepime, continue as above, will send home on Keflex with adequate sensitivities, she has been adequately treated in the hospital anyway antibiotics are more for the cellulitis and breast abscess issue     Invasive ductal carcinoma right breast previous bilateral mastectomies with multifocal areas of invasive ductal carcinoma largest area 2.3 cm with positive lymph nodes 4 out of 10 ER and [place] positive HER-2 negative,, currently undergoing chemotherapy and possible radiation therapy as adjuvant treatment,     MAGUI, appendectomy, Port-A-Cath placement,     Impaired fasting glucose in the past, currently slightly elevated will follow, blood sugar seem to be holding March 11, 12th and 13th     Vitamin D deficiency, will replace with 50,000 units weekly dosing, March 12, patient will get a prescription for vitamin D 50,000 units to go home with weekly,     TIA 2010, MRI MRA underwent a Star closure procedure with clipping     Osteoarthritis     Chronic venous insufficiency changes,, Hep-Lock IV fluids March 13 she will watch the swelling in her feet at time of discharge,     DVT prophylaxis, with Lovenox,     GI prophylaxis ordered, with Pepcid, she will be getting Pepcid orally anyway for  the rash on her back,       Day of Discharge     Patient Vitals for the past 24 hrs:   BP Temp Temp src Pulse Resp SpO2   03/14/22 0738 118/61 97.9 °F (36.6 °C) Oral 78 18 96 %   03/14/22 0300 122/52 98 °F (36.7 °C) Oral 67 18 99 %   03/13/22 2300 133/59 99.1 °F (37.3 °C) Oral 93 18 96 %   03/13/22 1900 137/81 98.9 °F (37.2 °C) Oral 90 18 97 %   03/13/22 1514 124/75 98.8 °F (37.1 °C) Oral 85 18 98 %   03/13/22 1158 124/65 98.4 °F (36.9 °C) Oral 82 20 96 %        Physical Exam:  Patient was alert pleasant talkative, not having a lot of pain on the right breast area her lungs were clear anteriorly and laterally cardiac exam regular rhythm her lower legs did show some edema, trace to 1+ diffusely, she was pleasant, talkative alert moving all 4 extremities well,      Pertinent  and/or Most Recent Results     LAB RESULTS:      Lab 03/14/22  0813 03/13/22  0530 03/12/22  0509 03/11/22  0459 03/10/22  0641 03/09/22  2041   WBC 5.18 5.19 14.77* 18.51* 24.93*  --    HEMOGLOBIN 8.9* 8.1* 8.7* 7.7* 9.6*  --    HEMATOCRIT 26.4* 23.9* 25.4* 23.3* 28.8*  --    PLATELETS 316 242 228 214 164  --    NEUTROS ABS 3.15 3.21 11.97* 15.58* 21.80*  --    IMMATURE GRANS (ABS) 0.06* 0.05 0.11* 0.14* 0.19*  --    LYMPHS ABS 0.98 0.99 1.10 0.79 0.80  --    MONOS ABS 0.76 0.78 1.48* 1.91* 2.03*  --    EOS ABS 0.15 0.09 0.03 0.02 0.02  --    MCV 94.3 95.6 95.1 96.3 96.0  --    SED RATE  --   --  24 25  --   --    LACTATE  --   --   --   --   --  1.2         Lab 03/14/22  0813 03/13/22  0530 03/12/22  0509 03/11/22  0459 03/10/22  0641   SODIUM 142 143 140 141 140   POTASSIUM 3.7 3.5 3.6 3.6 3.6   CHLORIDE 109* 110* 108* 111* 110*   CO2 23.2 24.1 21.2* 20.3* 22.7   ANION GAP 9.8 8.9 10.8 9.7 7.3   BUN 6* 8 8 8 10   CREATININE 0.53* 0.60 0.57 0.54* 0.61   EGFR 104.1 101.0 102.3 103.6 100.6   GLUCOSE 108* 92 102* 106* 124*   CALCIUM 8.5* 8.4* 8.4* 8.1* 8.4*   MAGNESIUM 2.0 2.0 1.9 1.9  --    HEMOGLOBIN A1C  --   --   --  6.30*  --    TSH  --   --    --  1.400  --          Lab 03/14/22  0813 03/13/22  0530 03/12/22  0509 03/11/22  0459 03/09/22  1831   TOTAL PROTEIN 5.4* 4.9* 5.4* 5.2* 6.5   ALBUMIN 2.90* 2.80* 3.00* 2.90* 3.70   GLOBULIN 2.5 2.1 2.4 2.3 2.8   ALT (SGPT) 17 18 19 18 18   AST (SGOT) 15 17 20 17 16   BILIRUBIN 0.2 0.3 0.7 0.7 0.9   ALK PHOS 64 68 89 74 95         Lab 03/14/22  0813   PROBNP 1,085.0*             Lab 03/13/22  0530 03/11/22  1853 03/11/22  1845 03/11/22  1845 03/11/22 0459   IRON 65  --   --   --   --    IRON SATURATION 35  --   --   --   --    TIBC 186*  --   --   --   --    TRANSFERRIN 125*  --   --   --   --    FOLATE  --   --   --   --  16.80   VITAMIN B 12  --   --   --   --  >2,000*   ABO TYPING  --  A   < > A  --    RH TYPING  --  Negative   < > Negative  --    ANTIBODY SCREEN  --   --   --  Negative  --     < > = values in this interval not displayed.         Brief Urine Lab Results  (Last result in the past 365 days)      Color   Clarity   Blood   Leuk Est   Nitrite   Protein   CREAT   Urine HCG        03/09/22 1926 Dark Yellow   Cloudy   Small (1+)   Small (1+)   Positive   100 mg/dL (2+)               Microbiology Results (last 10 days)     Procedure Component Value - Date/Time    Wound Culture - Wound, Breast, Right [[id number]]  (Abnormal) Collected: 03/11/22 1929    Lab Status: Preliminary result Specimen: Wound from Breast, Right Updated: 03/13/22 0725     Wound Culture Scant growth (1+) Gram Positive Cocci     Gram Stain Occasional Gram positive cocci in pairs and clusters    COVID PRE-OP / PRE-PROCEDURE SCREENING ORDER (NO ISOLATION) - Swab, Nasopharynx [[id number]]  (Normal) Collected: 03/11/22 1308    Lab Status: Final result Specimen: Swab from Nasopharynx Updated: 03/11/22 1813    Narrative:      The following orders were created for panel order COVID PRE-OP / PRE-PROCEDURE SCREENING ORDER (NO ISOLATION) - Swab, Nasopharynx.  Procedure                               Abnormality         Status                      ---------                               -----------         ------                     COVID-19,APTIMA PANTHER(...[[id number]]  Normal              Final result                 Please view results for these tests on the individual orders.    COVID-19,APTIMA PANTHER(MICHEAL),[facility], NP/OP SWAB IN UTM/VTM/SALINE TRANSPORT MEDIA,24 HR TAT - Swab, Nasopharynx [[id number]]  (Normal) Collected: 03/11/22 1308    Lab Status: Final result Specimen: Swab from Nasopharynx Updated: 03/11/22 1813     COVID19 Not Detected    Narrative:      Fact sheet for providers: https://www.fda.gov/media/802800/download     Fact sheet for patients: https://www.fda.gov/media/991019/download    Test performed by RT PCR.    Urine Culture - Urine, Urine, Clean Catch [[id number]]  (Abnormal)  (Susceptibility) Collected: 03/09/22 1926    Lab Status: Final result Specimen: Urine, Clean Catch Updated: 03/11/22 1255     Urine Culture >100,000 CFU/mL Klebsiella pneumoniae ssp pneumoniae    Susceptibility      Klebsiella pneumoniae ssp pneumoniae      CAYDEN      Ampicillin Resistant     Ampicillin + Sulbactam Susceptible      Cefazolin Susceptible      Cefepime Susceptible      Ceftazidime Susceptible      Ceftriaxone Susceptible      Gentamicin Susceptible      Levofloxacin Susceptible      Nitrofurantoin Intermediate      Piperacillin + Tazobactam Susceptible      Trimethoprim + Sulfamethoxazole Susceptible                           Blood Culture - Blood, Arm, Left [[id number]]  (Normal) Collected: 03/09/22 1831    Lab Status: Preliminary result Specimen: Blood from Arm, Left Updated: 03/13/22 1947     Blood Culture No growth at 4 days          XR Chest 1 View    Result Date: 3/9/2022  Impression:  Mild bilateral lower lobe pulmonary opacities, favored to be atelectasis, less likely pneumonia.       DESTINY GOLDEN MD       Electronically Signed and Approved By: DESTINY GOLDEN MD on 3/09/2022 at 20:21                           Labs Pending at  Discharge:  Pending Labs     Order Current Status    Tissue Pathology Exam Collected (03/11/22 1945)    Anaerobic Culture - Wound, Breast, Right In process    Blood Culture - Blood, Arm, Left Preliminary result    Wound Culture - Wound, Breast, Right Preliminary result          Imaging Results (All)     Procedure Component Value Units Date/Time    XR Chest 1 View [[id number]] Collected: 03/09/22 2022     Updated: 03/09/22 2025    Narrative:      PROCEDURE: XR CHEST 1 VW     COMPARISON: [facility], CR, XR CHEST 1 VW, 12/28/2021, 14:21.     INDICATIONS: FEVER, FATIGUE, BREAST CANCER PATIENT     FINDINGS:   Faint bilateral lower lobe airspace opacities.  No pneumothorax or pleural effusion.  No evidence   of acute process of the mediastinal structures.  Left-sided port unchanged with tip in the lower   SVC.       Impression:       Mild bilateral lower lobe pulmonary opacities, favored to be atelectasis, less likely   pneumonia.                  DESTINY GOLDEN MD         Electronically Signed and Approved By: DESTINY GOLDEN MD on 3/09/2022 at 20:21                          Discharge Details        Discharge Medications      New Medications      Instructions Start Date   cephalexin 500 MG capsule  Commonly known as: Keflex   500 mg, Oral, 2 Times Daily      clotrimazole-betamethasone 1-0.05 % cream  Commonly known as: LOTRISONE   1 application, Topical, Every 12 Hours Scheduled      doxycycline 100 MG capsule  Commonly known as: VIBRAMYCIN   100 mg, Oral, 2 Times Daily      potassium chloride 10 MEQ CR capsule  Commonly known as: MICRO-K   10 mEq, Oral, 2 Times Daily With Meals      vitamin D 1.25 MG (45183 UT) capsule capsule  Commonly known as: ERGOCALCIFEROL   50,000 Units, Oral, Every 7 Days   Start Date: March 19, 2022        Continue These Medications      Instructions Start Date   acetaminophen 650 MG 8 hr tablet  Commonly known as: TYLENOL   650 mg, Oral, 2 Times Daily      CLARITIN-D 24 HOUR PO   1  tablet, Oral, Daily      OLANZapine 5 MG tablet  Commonly known as: zyPREXA   5 mg, Oral, Nightly      ondansetron 8 MG tablet  Commonly known as: ZOFRAN   8 mg, Oral, 3 Times Daily PRN      Vitamin A 3 MG (82743 UT) capsule   10,000 Units, Oral, Daily      vitamin D3 125 MCG (5000 UT) capsule capsule   5,000 Units, Oral, Daily             Allergies   Allergen Reactions   • Multihance [Gadobenate] Hives and Itching     MRI contrast         Discharge Disposition:  Home or Self Care    Diet:  Diet Instructions     Diet: Regular      Discharge Diet: Regular            Discharge Activity:   Activity Instructions     Activity as Tolerated              CODE STATUS:  Code Status and Medical Interventions:   Ordered at: 03/09/22 2152     Code Status (Patient has no pulse and is not breathing):    CPR (Attempt to Resuscitate)     Medical Interventions (Patient has pulse or is breathing):    Full         Future Appointments   Date Time Provider Department Center   3/21/2022  9:30 AM CHAIR 22 [facility] OP INFU  [facility] OPIF [facility]   3/21/2022  9:45 AM Starr Mendez MD PhD [facility] ONC E521 [facility]   4/8/2022  2:00 PM Dudley Martinez MD [facility] RO [facility]   4/11/2022  9:30 AM CHAIR 18 [facility] OP INFU  [facility] OP[facility]   4/11/2022 11:45 AM Starr Mendez MD PhD [facility] ONC E521 [facility]   4/21/2022  9:00 AM Jacqueline Mcgraw MD [facility] GS HARET [facility]   4/21/2022 10:30 AM Leticia Shahid OT  [facility] LYMCL [facility]   6/6/2022 10:00 AM Daja Felix OT  [facility] LYMCL [facility]   8/11/2022  2:00 PM Judah Johnson MD [facility] JENNIFER [facility]       Additional Instructions for the Follow-ups that You Need to Schedule     Discharge Follow-up with PCP   As directed       Currently Documented PCP:    Judah Johnson MD    PCP Phone Number:    [phone]     Follow Up Details: followup in 7-10 days               Time spent on Discharge including face to face service:  35    minutes    Electronically signed by Judah Johnson MD, 03/14/22, 9:04 AM EDT.

## 2022-03-15 ENCOUNTER — TRANSITIONAL CARE MANAGEMENT TELEPHONE ENCOUNTER (OUTPATIENT)
Dept: CALL CENTER | Facility: HOSPITAL | Age: 62
End: 2022-03-15

## 2022-03-15 ENCOUNTER — TELEPHONE (OUTPATIENT)
Dept: ONCOLOGY | Facility: HOSPITAL | Age: 62
End: 2022-03-15

## 2022-03-15 NOTE — OUTREACH NOTE
Prep Survey    Flowsheet Row Responses   Congregational facility patient discharged from? [facility]   Is LACE score < 7 ? No   Emergency Room discharge w/ pulse ox? No   Eligibility [facility]   Hospital [facility]   Date of Admission 03/09/22   Date of Discharge 03/14/22   Discharge Disposition Home or Self Care   Discharge diagnosis sepsis d/t UTI & cellulitis right breast/expander implant area, I & D right breast with expander removal & implant placement   Does the patient have one of the following disease processes/diagnoses(primary or secondary)? Sepsis   Does the patient have Home health ordered? No   Is there a DME ordered? No   Prep survey completed? Yes          TITO STEIN - Registered Nurse

## 2022-03-15 NOTE — TELEPHONE ENCOUNTER
Caller: Marisa Portillo    Relationship: Self    Best call back number: [phone]    Who are you requesting to speak with (clinical staff, provider,  specific staff member): [employer]    What was the call regarding: MARISA CALLED TO SPEAK WITH AWAIS REGARDING A SCHEDULED FLIGHT.    Do you require a callback: YES

## 2022-03-15 NOTE — OUTREACH NOTE
Call Center TCM Note    Flowsheet Row Responses   [facility] patient discharged from? [facility]   Does the patient have one of the following disease processes/diagnoses(primary or secondary)? Sepsis   TCM attempt successful? Yes   Call start time 1600   Call end time 1604   Discharge diagnosis sepsis d/t UTI & cellulitis right breast/expander implant area, I & D right breast with expander removal & implant placement   Meds reviewed with patient/caregiver? Yes   Is the patient having any side effects they believe may be caused by any medication additions or changes? No   Does the patient have all medications related to this admission filled (includes all antibiotics, inhalers, nebulizers,steroids,etc.) Yes   Is the patient taking all medications as directed (includes completed medication regime)? Yes   Does the patient have a primary care provider?  Yes   Comments regarding PCP Patient has followup with surgeon on 3/17/2022, PCP on 3/24/22 and hematology on 3/21/22   Does the patient have an appointment with their PCP within 7 days of discharge? Greater than 7 days   What is preventing the patient from scheduling follow up appointments within 7 days of discharge? Earlier appointment not available   Nursing Interventions Verified appointment date/time/provider   Has the patient kept scheduled appointments due by today? N/A   Psychosocial issues? No   Did the patient receive a copy of their discharge instructions? Yes   Nursing interventions Reviewed instructions with patient   What is the patient's perception of their health status since discharge? Improving   Nursing interventions Nurse provided patient education   Is patient/caregiver able to teach back steps to recovery at home? Set small, achievable goals for return to baseline health, Rest and regain strength   Is the patient/caregiver able to teach back signs and symptoms of worsening condition: Fever   If the patient is a current smoker, are they able to  teach back resources for cessation? Not a smoker   Is the patient/caregiver able to teach back the hierarchy of who to call/visit for symptoms/problems? PCP, Specialist, Home health nurse, Urgent Care, ED, 911 Yes   TCM call completed? Yes   Wrap up additional comments Per patient she is doing well.           Paddy Hobson RN    3/15/2022, 16:06 EDT

## 2022-03-17 ENCOUNTER — OFFICE VISIT (OUTPATIENT)
Dept: PLASTIC SURGERY | Facility: CLINIC | Age: 62
End: 2022-03-17

## 2022-03-17 ENCOUNTER — TELEPHONE (OUTPATIENT)
Dept: ONCOLOGY | Facility: HOSPITAL | Age: 62
End: 2022-03-17

## 2022-03-17 VITALS
TEMPERATURE: 96.8 F | SYSTOLIC BLOOD PRESSURE: 128 MMHG | OXYGEN SATURATION: 97 % | WEIGHT: 185 LBS | HEART RATE: 97 BPM | DIASTOLIC BLOOD PRESSURE: 80 MMHG | HEIGHT: 65 IN | BODY MASS INDEX: 30.82 KG/M2

## 2022-03-17 DIAGNOSIS — N61.1 BREAST ABSCESS: Primary | ICD-10-CM

## 2022-03-17 LAB — BACTERIA SPEC ANAEROBE CULT: NORMAL

## 2022-03-17 PROCEDURE — 99024 POSTOP FOLLOW-UP VISIT: CPT | Performed by: SURGERY

## 2022-03-17 NOTE — TELEPHONE ENCOUNTER
Caller: ALONSO    Relationship to patient: SELF    Best call back number: [phone]    Patient is needing: TO REQUEST A CALL FROM AWAIS REGARDING FMLA PAPERWORK.

## 2022-03-18 PROBLEM — N61.1 BREAST ABSCESS: Status: ACTIVE | Noted: 2022-03-18

## 2022-03-21 ENCOUNTER — OFFICE VISIT (OUTPATIENT)
Dept: PLASTIC SURGERY | Facility: CLINIC | Age: 62
End: 2022-03-21

## 2022-03-21 ENCOUNTER — OFFICE VISIT (OUTPATIENT)
Dept: ONCOLOGY | Facility: HOSPITAL | Age: 62
End: 2022-03-21

## 2022-03-21 ENCOUNTER — HOSPITAL ENCOUNTER (OUTPATIENT)
Dept: ONCOLOGY | Facility: HOSPITAL | Age: 62
Setting detail: INFUSION SERIES
Discharge: HOME | End: 2022-03-21

## 2022-03-21 ENCOUNTER — NURSE NAVIGATOR (OUTPATIENT)
Dept: ONCOLOGY | Facility: HOSPITAL | Age: 62
End: 2022-03-21

## 2022-03-21 VITALS
TEMPERATURE: 96.4 F | HEART RATE: 97 BPM | DIASTOLIC BLOOD PRESSURE: 80 MMHG | SYSTOLIC BLOOD PRESSURE: 129 MMHG | HEIGHT: 65 IN | WEIGHT: 185 LBS | BODY MASS INDEX: 30.82 KG/M2

## 2022-03-21 VITALS
HEART RATE: 97 BPM | TEMPERATURE: 97.8 F | DIASTOLIC BLOOD PRESSURE: 66 MMHG | WEIGHT: 185.19 LBS | BODY MASS INDEX: 30.85 KG/M2 | RESPIRATION RATE: 18 BRPM | SYSTOLIC BLOOD PRESSURE: 147 MMHG | HEIGHT: 65 IN | OXYGEN SATURATION: 97 %

## 2022-03-21 VITALS
TEMPERATURE: 97.8 F | BODY MASS INDEX: 30.82 KG/M2 | OXYGEN SATURATION: 97 % | DIASTOLIC BLOOD PRESSURE: 66 MMHG | HEART RATE: 97 BPM | WEIGHT: 185.19 LBS | RESPIRATION RATE: 18 BRPM | SYSTOLIC BLOOD PRESSURE: 147 MMHG

## 2022-03-21 DIAGNOSIS — Z17.0 MALIGNANT NEOPLASM OF OVERLAPPING SITES OF RIGHT BREAST IN FEMALE, ESTROGEN RECEPTOR POSITIVE: Primary | ICD-10-CM

## 2022-03-21 DIAGNOSIS — C50.811 MALIGNANT NEOPLASM OF OVERLAPPING SITES OF RIGHT BREAST IN FEMALE, ESTROGEN RECEPTOR POSITIVE: Primary | ICD-10-CM

## 2022-03-21 DIAGNOSIS — Z09 POSTOPERATIVE FOLLOW-UP: Primary | ICD-10-CM

## 2022-03-21 LAB
ALBUMIN SERPL-MCNC: 4.2 G/DL (ref 3.5–5.2)
ALBUMIN/GLOB SERPL: 1.8 G/DL
ALP SERPL-CCNC: 69 U/L (ref 39–117)
ALT SERPL W P-5'-P-CCNC: 18 U/L (ref 1–33)
ANION GAP SERPL CALCULATED.3IONS-SCNC: 8.7 MMOL/L (ref 5–15)
AST SERPL-CCNC: 14 U/L (ref 1–32)
BASOPHILS # BLD AUTO: 0.02 10*3/MM3 (ref 0–0.2)
BASOPHILS NFR BLD AUTO: 0.2 % (ref 0–1.5)
BILIRUB SERPL-MCNC: 0.2 MG/DL (ref 0–1.2)
BUN SERPL-MCNC: 13 MG/DL (ref 8–23)
BUN/CREAT SERPL: 22.8 (ref 7–25)
CALCIUM SPEC-SCNC: 9.3 MG/DL (ref 8.6–10.5)
CHLORIDE SERPL-SCNC: 105 MMOL/L (ref 98–107)
CO2 SERPL-SCNC: 24.3 MMOL/L (ref 22–29)
CREAT SERPL-MCNC: 0.57 MG/DL (ref 0.57–1)
DEPRECATED RDW RBC AUTO: 54.1 FL (ref 37–54)
EGFRCR SERPLBLD CKD-EPI 2021: 102.3 ML/MIN/1.73
EOSINOPHIL # BLD AUTO: 0 10*3/MM3 (ref 0–0.4)
EOSINOPHIL NFR BLD AUTO: 0 % (ref 0.3–6.2)
ERYTHROCYTE [DISTWIDTH] IN BLOOD BY AUTOMATED COUNT: 15.6 % (ref 12.3–15.4)
GLOBULIN UR ELPH-MCNC: 2.4 GM/DL
GLUCOSE SERPL-MCNC: 179 MG/DL (ref 65–99)
HCT VFR BLD AUTO: 34.3 % (ref 34–46.6)
HGB BLD-MCNC: 11.4 G/DL (ref 12–15.9)
IMM GRANULOCYTES # BLD AUTO: 0.04 10*3/MM3 (ref 0–0.05)
IMM GRANULOCYTES NFR BLD AUTO: 0.4 % (ref 0–0.5)
LYMPHOCYTES # BLD AUTO: 0.62 10*3/MM3 (ref 0.7–3.1)
LYMPHOCYTES NFR BLD AUTO: 5.7 % (ref 19.6–45.3)
MCH RBC QN AUTO: 32 PG (ref 26.6–33)
MCHC RBC AUTO-ENTMCNC: 33.2 G/DL (ref 31.5–35.7)
MCV RBC AUTO: 96.3 FL (ref 79–97)
MONOCYTES # BLD AUTO: 0.08 10*3/MM3 (ref 0.1–0.9)
MONOCYTES NFR BLD AUTO: 0.7 % (ref 5–12)
NEUTROPHILS NFR BLD AUTO: 10.12 10*3/MM3 (ref 1.7–7)
NEUTROPHILS NFR BLD AUTO: 93 % (ref 42.7–76)
PLATELET # BLD AUTO: 340 10*3/MM3 (ref 140–450)
PMV BLD AUTO: 9 FL (ref 6–12)
POTASSIUM SERPL-SCNC: 4.3 MMOL/L (ref 3.5–5.2)
PROT SERPL-MCNC: 6.6 G/DL (ref 6–8.5)
RBC # BLD AUTO: 3.56 10*6/MM3 (ref 3.77–5.28)
SODIUM SERPL-SCNC: 138 MMOL/L (ref 136–145)
WBC NRBC COR # BLD: 10.88 10*3/MM3 (ref 3.4–10.8)

## 2022-03-21 PROCEDURE — 25010000002 DOCETAXEL 20 MG/ML SOLUTION 8 ML VIAL: Performed by: INTERNAL MEDICINE

## 2022-03-21 PROCEDURE — 96417 CHEMO IV INFUS EACH ADDL SEQ: CPT

## 2022-03-21 PROCEDURE — 25010000002 PEGFILGRASTIM 6 MG/0.6ML PREFILLED SYRINGE KIT: Performed by: INTERNAL MEDICINE

## 2022-03-21 PROCEDURE — 25010000002 CYCLOPHOSPHAMIDE PER 100 MG: Performed by: INTERNAL MEDICINE

## 2022-03-21 PROCEDURE — 25010000002 PALONOSETRON PER 25 MCG: Performed by: INTERNAL MEDICINE

## 2022-03-21 PROCEDURE — 85025 COMPLETE CBC W/AUTO DIFF WBC: CPT | Performed by: INTERNAL MEDICINE

## 2022-03-21 PROCEDURE — 96375 TX/PRO/DX INJ NEW DRUG ADDON: CPT

## 2022-03-21 PROCEDURE — 25010000002 HEPARIN LOCK FLUSH PER 10 UNITS: Performed by: INTERNAL MEDICINE

## 2022-03-21 PROCEDURE — 96377 APPLICATON ON-BODY INJECTOR: CPT

## 2022-03-21 PROCEDURE — 96413 CHEMO IV INFUSION 1 HR: CPT

## 2022-03-21 PROCEDURE — 99215 OFFICE O/P EST HI 40 MIN: CPT | Performed by: INTERNAL MEDICINE

## 2022-03-21 PROCEDURE — 80053 COMPREHEN METABOLIC PANEL: CPT | Performed by: INTERNAL MEDICINE

## 2022-03-21 PROCEDURE — 99024 POSTOP FOLLOW-UP VISIT: CPT | Performed by: NURSE PRACTITIONER

## 2022-03-21 RX ORDER — ESTRADIOL 0.03 MG/D
FILM, EXTENDED RELEASE TRANSDERMAL
COMMUNITY
End: 2022-05-09

## 2022-03-21 RX ORDER — DIPHENHYDRAMINE HYDROCHLORIDE 50 MG/ML
50 INJECTION INTRAMUSCULAR; INTRAVENOUS AS NEEDED
Status: CANCELLED | OUTPATIENT
Start: 2022-03-21

## 2022-03-21 RX ORDER — HEPARIN SODIUM (PORCINE) LOCK FLUSH IV SOLN 100 UNIT/ML 100 UNIT/ML
500 SOLUTION INTRAVENOUS AS NEEDED
Status: DISCONTINUED | OUTPATIENT
Start: 2022-03-21 | End: 2022-03-22 | Stop reason: HOSPADM

## 2022-03-21 RX ORDER — PALONOSETRON 0.05 MG/ML
0.25 INJECTION, SOLUTION INTRAVENOUS ONCE
Status: COMPLETED | OUTPATIENT
Start: 2022-03-21 | End: 2022-03-21

## 2022-03-21 RX ORDER — HEPARIN SODIUM (PORCINE) LOCK FLUSH IV SOLN 100 UNIT/ML 100 UNIT/ML
500 SOLUTION INTRAVENOUS AS NEEDED
Status: CANCELLED | OUTPATIENT
Start: 2022-03-21

## 2022-03-21 RX ORDER — FAMOTIDINE 10 MG/ML
20 INJECTION, SOLUTION INTRAVENOUS AS NEEDED
Status: CANCELLED | OUTPATIENT
Start: 2022-03-21

## 2022-03-21 RX ORDER — SODIUM CHLORIDE 0.9 % (FLUSH) 0.9 %
20 SYRINGE (ML) INJECTION AS NEEDED
Status: DISCONTINUED | OUTPATIENT
Start: 2022-03-21 | End: 2022-03-22 | Stop reason: HOSPADM

## 2022-03-21 RX ORDER — SODIUM CHLORIDE 9 MG/ML
250 INJECTION, SOLUTION INTRAVENOUS ONCE
Status: COMPLETED | OUTPATIENT
Start: 2022-03-21 | End: 2022-03-21

## 2022-03-21 RX ORDER — SODIUM CHLORIDE 0.9 % (FLUSH) 0.9 %
20 SYRINGE (ML) INJECTION AS NEEDED
Status: CANCELLED | OUTPATIENT
Start: 2022-03-21

## 2022-03-21 RX ADMIN — HEPARIN SODIUM (PORCINE) LOCK FLUSH IV SOLN 100 UNIT/ML 500 UNITS: 100 SOLUTION at 12:56

## 2022-03-21 RX ADMIN — CYCLOPHOSPHAMIDE 1130 MG: 500 INJECTION, POWDER, FOR SOLUTION INTRAVENOUS; ORAL at 12:17

## 2022-03-21 RX ADMIN — Medication 20 ML: at 12:55

## 2022-03-21 RX ADMIN — SODIUM CHLORIDE 250 ML: 9 INJECTION, SOLUTION INTRAVENOUS at 11:01

## 2022-03-21 RX ADMIN — PEGFILGRASTIM 6 MG: KIT SUBCUTANEOUS at 12:51

## 2022-03-21 RX ADMIN — PALONOSETRON HYDROCHLORIDE 0.25 MG: 0.25 INJECTION, SOLUTION INTRAVENOUS at 11:01

## 2022-03-21 RX ADMIN — DOCETAXEL 140 MG: 20 INJECTION, SOLUTION, CONCENTRATE INTRAVENOUS at 11:10

## 2022-03-21 NOTE — ADDENDUM NOTE
Encounter addended by: Mariana Willard RN on: 3/21/2022 1:21 PM   Actions taken: Charge Capture section accepted

## 2022-03-21 NOTE — PROGRESS NOTES
"Chief Complaint  Post-op Follow-up (INCISION AND DRAINAGE ABSCESS OF RIGHT BREAST WITH EXPANDER REMOVAL AND IMPLANT PLACEMENT)    Subjective          History of Present Illness  Marisa Portillo is a 62 y.o. female who presents to [facility] for Postoperative Follow-Up of INCISION AND DRAINAGE ABSCESS OF RIGHT BREAST WITH EXPANDER REMOVAL AND IMPLANT PLACEMENT done on 3/11/2022.  Drain right side less than 30 ml for past 3 days. Ready for drain to be removed. Having chemo today.    Allergies: Multihance [gadobenate]  Allergies Reconciled.    Review of Systems   Review of Systems  All system were reviewed and were negative, except the ones noted above.   Objective     /80 (BP Location: Left arm, Patient Position: Sitting)   Pulse 97   Temp 96.4 °F (35.8 °C) (Temporal)   Ht 165.1 cm (65\")   Wt 83.9 kg (185 lb)   BMI 30.79 kg/m²     Body mass index is 30.79 kg/m².    Physical Exam  Breast: Incision healing well, expected swelling, no erythema, drain with serosanguineous fluid     Result Review :                Assessment and Plan      Diagnoses and all orders for this visit:    1. Postoperative follow-up (Primary)        Plan:  • Drain removed. Instructed to apply bacitracin ointment to drain site in a thin layer daily x 5 days, 2 x 2 gauze dressing to drain site prn,  Bra prn, no heavy lifting, RTC 2 weeks with Dr. Shelton.         Follow Up     Return in about 2 weeks (around 4/4/2022) for with Dr. Shelton.    Patient was given instructions and counseling regarding her condition. Please see specific information pulled into the AVS if appropriate.     Brigitte Chase, APRDIVYA  03/21/2022  "

## 2022-03-21 NOTE — PROGRESS NOTES
3/21/22:    Follow up per pt request.    No wt loss or nutrition-related concerns noted.      Pt had questions regarding a cold-pressed fruit/vegetable juice that she drinking (which is unpasteurized).  The product (Zest) contains only fruits/vegetable juices, not excessive vitamins/minerals and no herbal content, however due to the potential risk of microorganisms Oncology RD recommended pt not consume these drinks during chemotherapy.    Oncology RD remains available per protocol.

## 2022-03-21 NOTE — PROGRESS NOTES
Rounded on patient today.  Patient with recent hospital admission, here today for a follow up and infusion.  Saw plastics prior to coming to our clinic and had drains removed.  Patient states she is doing well and feeling well.  No needs or concerns to be addressed today.  Dietician to come speak to patient per request.  I reminded Ms Portillo to call me if I could help her with anything at all.  She looked wonderful today.

## 2022-03-21 NOTE — PROGRESS NOTES
Patient  Marisa Portillo    Location  [facility] HEMATOLOGY & ONCOLOGY    Chief Complaint  Breast Cancer    Referring Provider: Starr Mendez MD PhD  PCP: Judah Johnson MD    Subjective          Oncology/Hematology History Overview Note     Right Breast Cancer:    Diagnostic mammogram on 11/12/2021: 2 cm asymmetry in the outer central right breast with amorphous calcifications.  Similar appearing group of amorphous calcifications in the outer central right breast.  6 mm asymmetry in the inner right breast.  Right axillary lymphadenopathy.    Ultrasound-guided biopsy on 11/1/2021: Right breast 3 o'clock position, 2 cm from the nipple with invasive ductal carcinoma, grade 2.  Right breast 9 o'clock position, 3 cm from the nipple with invasive ductal carcinoma, grade 2, ER positive (50%), TN positive (3%), HER-2 negative (score 0), Ki-67 14%.  Associated with intermediate grade ductal carcinoma in situ.  Right axillary lymph node positive for breast carcinoma.    CT CAP and bone scan on 12/13/21: negative for metastatic disease    Bilateral mastectomy 12/28/2021: Right breast with multiple (2) foci of invasive ductal carcinoma, largest focus 2.3 cm, grade 2, associated with DCIS with extensive intraductal component, all margins negative, 4/10 lymph nodes involved with no extranodal extension and the largest focus measured at 3.3 cm, ER positive (40%), TN positive (5%), HER-2 negative by IHC (score 0), Ki-67 14%, pT2 pN2a cM0     Malignant neoplasm of overlapping sites of right breast in female, estrogen receptor positive (HCC)   12/9/2021 Initial Diagnosis    Malignant neoplasm of overlapping sites of right breast in female, estrogen receptor positive (HCC)     12/28/2021 Cancer Staged    Staging form: Breast, AJCC 8th Edition  - Pathologic stage from 12/28/2021: Stage IB (pT2, pN2a, cM0, G2, ER+, TN+, HER2-) - Signed by Starr Mendez MD PhD on 1/30/2022 1/14/2022 -   Chemotherapy    OP CENTRAL VENOUS ACCESS DEVICE ACCESS, CARE, AND MAINTENANCE (CVAD)     1/31/2022 -  Chemotherapy    OP BREAST TC DOCEtaxel / Cyclophosphamide         HPI  Patient comes in today for toxicity check prior to cycle 3 of chemotherapy.  She was recently in the hospital for UTI with sepsis and abscess around the breast implant.  Patient follow-up with plastic surgery this morning and had drains removed.  I have spoken with Dr. Shelton who agrees the patient can proceed to chemotherapy.  She says she is feeling much better although still very fatigued after the hospitalization.    Review of Systems   Constitutional: Positive for fatigue. Negative for appetite change, diaphoresis, fever, unexpected weight gain and unexpected weight loss.   HENT: Negative for hearing loss, sore throat and voice change.    Eyes: Negative for blurred vision, double vision, pain, redness and visual disturbance.   Respiratory: Negative for cough, shortness of breath and wheezing.    Cardiovascular: Negative for chest pain, palpitations and leg swelling.   Endocrine: Negative for cold intolerance, heat intolerance, polydipsia and polyuria.   Genitourinary: Negative for decreased urine volume, difficulty urinating, frequency and urinary incontinence.   Musculoskeletal: Negative for arthralgias, back pain, joint swelling and myalgias.   Skin: Negative for color change, rash, skin lesions and wound.   Neurological: Negative for dizziness, seizures, numbness and headache.   Hematological: Negative for adenopathy. Does not bruise/bleed easily.   Psychiatric/Behavioral: Negative for depressed mood. The patient is not nervous/anxious.    All other systems reviewed and are negative.      Past Medical History:   Diagnosis Date   • Allergies    • Breast cancer (HCC)    • High cholesterol    • IFG (impaired fasting glucose)    • Osteoarthritis    • Port-A-Cath placement 12/29/2021   • S/P BILATERAL IMMEDIATE BREAST RECONSTRUCTION WITH  LEFT PRE PEC PLACEMENT OF SILICONE GEL IMPLANT AND MESH, RIGHT PRE PEC PLACEMENT OF EXPANDER AND MESH 12/29/2021   • TIA (transient ischemic attack)     no residual (tia approximately 8 years ago)   • Vitamin D deficiency      Past Surgical History:   Procedure Laterality Date   • APPENDECTOMY     • BREAST AUGMENTATION Bilateral 12/28/2021    Procedure: bilateral breast reconstructon with bilateral mesh placement.   left implant, right tissue expander placement;  Surgeon: Lacy Shelton MD;  [place] [facility] [place] [facility] [place]: [place]astics;  Laterality: Bilateral;   • CEREBRAL ANGIOGRAM      clip placed- pt stated can have MRI with cerebral clips   • CYST REMOVAL Right     rt wrist   • INCISION AND DRAINAGE OF WOUND Right 3/11/2022    Procedure: INCISION AND DRAINAGE ABSCESS OF RIGHT BREAST WITH EXPANDER REMOVAL AND IMPLANT PLACEMENT;  Surgeon: Lacy Shelton MD;  Location: [facility];  Service: Plastics;  Laterality: Right;   • MASTECTOMY     • MASTECTOMY COMPLETE / SIMPLE W/ SENTINEL NODE BIOPSY Bilateral    • PORTACATH PLACEMENT Left 12/28/2021    Procedure: INSERTION OF PORTACATH;  Surgeon: Jacqueline Mcgraw MD;  Location: [facility];  Service: General;  Laterality: Left;   • SENTINEL NODE BIOPSY Bilateral 12/28/2021    Procedure: BILATERAL BREAST MASTECTOMIES WITH RIGHT SENTINEL NODE BIOPSIES WITH FROZEN SECTIONS;  Surgeon: Jacqueline Mcgraw MD;  Location: [facility];  Service: General;  Laterality: Bilateral;     Social History     Socioeconomic History   • Marital status:    Tobacco Use   • Smoking status: Never Smoker   • Smokeless tobacco: Never Used   Vaping Use   • Vaping Use: Never used   Substance and Sexual Activity   • Alcohol use: Yes     Comment: socially    • Drug use: Never   • Sexual activity: Defer     Family History   Problem Relation Age of Onset   • Hypertension Mother    • Diabetes Mother    • Cancer Mother    • Arthritis Mother    • Breast cancer Mother     • Hypertension Father    • Diabetes Father    • Cancer Father    • Arthritis Father    • Liver cancer Father    • Lung cancer Father    • Breast cancer Maternal Aunt    • Malig Hyperthermia Neg Hx        Objective   Physical Exam  General: Alert, cooperative, no acute distress, appears fatigued  Eyes: Anicteric sclera, PERRLA  Respiratory: normal respiratory effort  Cardiovascular: no lower extremity edema  Skin: Normal tone, no rash, no lesions  Psychiatric: Appropriate affect, intact judgment  Neurologic: No focal sensory or motor deficits, normal cognition   Musculoskeletal: Normal muscle strength and tone  Extremities: No clubbing, cyanosis, or deformities    Vitals:    03/21/22 0919   BP: 147/66   Pulse: 97   Resp: 18   Temp: 97.8 °F (36.6 °C)   SpO2: 97%   Weight: 84 kg (185 lb 3 oz)   PainSc: 0-No pain     ECOG score: 0         PHQ-9 Total Score:         Result Review :   The following data was reviewed by: Starr Mendez MD PhD on 03/21/2022:  Lab Results   Component Value Date    HGB 11.4 (L) 03/21/2022    HCT 34.3 03/21/2022    MCV 96.3 03/21/2022     03/21/2022    WBC 10.88 (H) 03/21/2022    NEUTROABS 10.12 (H) 03/21/2022    LYMPHSABS 0.62 (L) 03/21/2022    MONOSABS 0.08 (L) 03/21/2022    EOSABS 0.00 03/21/2022    BASOSABS 0.02 03/21/2022     Lab Results   Component Value Date    GLUCOSE 179 (H) 03/21/2022    BUN 13 03/21/2022    CREATININE 0.57 03/21/2022     03/21/2022    K 4.3 03/21/2022     03/21/2022    CO2 24.3 03/21/2022    CALCIUM 9.3 03/21/2022    PROTEINTOT 6.6 03/21/2022    ALBUMIN 4.20 03/21/2022    BILITOT 0.2 03/21/2022    ALKPHOS 69 03/21/2022    AST 14 03/21/2022    ALT 18 03/21/2022          Assessment and Plan    Diagnoses and all orders for this visit:    1. Malignant neoplasm of overlapping sites of right breast in female, estrogen receptor positive (HCC) (Primary)      ER+ breast cancer: Patient has experienced significant toxicity with her recent infections  and hospitalizations.  However, she has recovered and her labs show no significant ongoing toxicity.  She will proceed to cycle 3 of TC today.  I will follow-up with her in 3 weeks for repeat CBC, CMP, and toxicity check prior to cycle 4.      UTI and Right breast Abccess:  The patient continues to improve.  Dr. Shelton removed her drains today and agrees that the patient can continue chemotherapy as planned.       Patient was given instructions and counseling regarding her condition or for health maintenance advice. Please see specific information pulled into the AVS if appropriate.

## 2022-03-22 ENCOUNTER — OFFICE VISIT (OUTPATIENT)
Dept: INTERNAL MEDICINE | Facility: CLINIC | Age: 62
End: 2022-03-22

## 2022-03-22 ENCOUNTER — TELEPHONE (OUTPATIENT)
Dept: PLASTIC SURGERY | Facility: CLINIC | Age: 62
End: 2022-03-22

## 2022-03-22 VITALS
RESPIRATION RATE: 16 BRPM | SYSTOLIC BLOOD PRESSURE: 134 MMHG | OXYGEN SATURATION: 99 % | HEIGHT: 65 IN | HEART RATE: 78 BPM | DIASTOLIC BLOOD PRESSURE: 76 MMHG | BODY MASS INDEX: 30.89 KG/M2 | WEIGHT: 185.4 LBS | TEMPERATURE: 97.3 F

## 2022-03-22 DIAGNOSIS — E78.2 MIXED HYPERLIPIDEMIA: ICD-10-CM

## 2022-03-22 DIAGNOSIS — A41.9 SEPSIS, DUE TO UNSPECIFIED ORGANISM, UNSPECIFIED WHETHER ACUTE ORGAN DYSFUNCTION PRESENT: Primary | ICD-10-CM

## 2022-03-22 DIAGNOSIS — L25.9 CONTACT DERMATITIS AND ECZEMA: ICD-10-CM

## 2022-03-22 DIAGNOSIS — N61.1 BREAST ABSCESS: ICD-10-CM

## 2022-03-22 DIAGNOSIS — R73.01 IFG (IMPAIRED FASTING GLUCOSE): ICD-10-CM

## 2022-03-22 DIAGNOSIS — E55.9 VITAMIN D DEFICIENCY: ICD-10-CM

## 2022-03-22 PROCEDURE — 99495 TRANSJ CARE MGMT MOD F2F 14D: CPT | Performed by: INTERNAL MEDICINE

## 2022-03-22 NOTE — TELEPHONE ENCOUNTER
Patient was given exercises to do at lymphedema clinic and wants to know if there are any restrictions to those?    She can resume wearing compression bra if she wants correct?    Weight limit is 10 lbs correct?

## 2022-03-22 NOTE — PROGRESS NOTES
"Chief Complaint/ HPI: Patient is here to follow-up with hospital stay, recent breast cellulitis abscess after recent mastectomy, spacer placement, also had UTI, feeling much better, went home a week ago Monday with antibiotics,    F/u transtional care visit, March 9 through March 14, 2022 at the hospital for the above issues, doing well currently no fevers, still short of breath at times,      Objective   Vital Signs  Vitals:    03/22/22 1645   BP: 134/76   BP Location: Left arm   Patient Position: Sitting   Cuff Size: Adult   Pulse: 78   Resp: 16   Temp: 97.3 °F (36.3 °C)   TempSrc: Temporal   SpO2: 99%   Weight: 84.1 kg (185 lb 6.4 oz)   Height: 165.1 cm (65\")      Body mass index is 30.85 kg/m².  Review of Systems   Constitutional: Negative.    HENT: Negative.    Eyes: Negative.    Respiratory: Negative.    Cardiovascular: Negative.    Gastrointestinal: Negative.    Endocrine: Negative.    Genitourinary: Negative.    Musculoskeletal: Negative.    Allergic/Immunologic: Negative.    Neurological: Negative.    Hematological: Negative.    Psychiatric/Behavioral: Negative.       Physical Exam  Constitutional:       General: She is not in acute distress.     Appearance: Normal appearance.   HENT:      Head: Normocephalic.      Mouth/Throat:      Mouth: Mucous membranes are moist.   Eyes:      Conjunctiva/sclera: Conjunctivae normal.      Pupils: Pupils are equal, round, and reactive to light.   Cardiovascular:      Rate and Rhythm: Normal rate and regular rhythm.      Pulses: Normal pulses.      Heart sounds: Normal heart sounds.   Pulmonary:      Effort: Pulmonary effort is normal.      Breath sounds: Normal breath sounds.   Abdominal:      General: Abdomen is flat. Bowel sounds are normal.      Palpations: Abdomen is soft.   Musculoskeletal:         General: No swelling. Normal range of motion.      Cervical back: Neck supple.   Skin:     General: Skin is warm and dry.      Coloration: Skin is not jaundiced. "   Neurological:      General: No focal deficit present.      Mental Status: She is alert and oriented to person, place, and time. Mental status is at baseline.   Psychiatric:         Mood and Affect: Mood normal.         Behavior: Behavior normal.         Thought Content: Thought content normal.         Judgment: Judgment normal.        Result Review :   Lab Results   Component Value Date    PROBNP 1,085.0 (H) 03/14/2022     CMP    CMP 3/13/22 3/14/22 3/21/22   Glucose 92 108 (A) 179 (A)   BUN 8 6 (A) 13   Creatinine 0.60 0.53 (A) 0.57   Sodium 143 142 138   Potassium 3.5 3.7 4.3   Chloride 110 (A) 109 (A) 105   Calcium 8.4 (A) 8.5 (A) 9.3   Albumin 2.80 (A) 2.90 (A) 4.20   Total Bilirubin 0.3 0.2 0.2   Alkaline Phosphatase 68 64 69   AST (SGOT) 17 15 14   ALT (SGPT) 18 17 18   (A) Abnormal value            CBC w/diff    CBC w/Diff 3/13/22 3/14/22 3/21/22   WBC 5.19 5.18 10.88 (A)   RBC 2.50 (A) 2.80 (A) 3.56 (A)   Hemoglobin 8.1 (A) 8.9 (A) 11.4 (A)   Hematocrit 23.9 (A) 26.4 (A) 34.3   MCV 95.6 94.3 96.3   MCH 32.4 31.8 32.0   MCHC 33.9 33.7 33.2   RDW 15.3 15.4 15.6 (A)   Platelets 242 316 340   Neutrophil Rel % 61.9 60.8 93.0 (A)   Immature Granulocyte Rel % 1.0 (A) 1.2 (A) 0.4   Lymphocyte Rel % 19.1 (A) 18.9 (A) 5.7 (A)   Monocyte Rel % 15.0 (A) 14.7 (A) 0.7 (A)   Eosinophil Rel % 1.7 2.9 0.0 (A)   Basophil Rel % 1.3 1.5 0.2   (A) Abnormal value                Lab Results   Component Value Date    TSH 1.400 03/11/2022    TSH 1.200 02/23/2021    TSH 1.840 02/29/2020      No results found for: FREET4   A1C Last 3 Results    HGBA1C Last 3 Results 3/11/22   Hemoglobin A1C 6.30 (A)   (A) Abnormal value                              Visit Diagnoses:    ICD-10-CM ICD-9-CM   1. Sepsis, due to unspecified organism, unspecified whether acute organ dysfunction present (HCC)  A41.9 038.9     995.91   2. IFG (impaired fasting glucose)  R73.01 790.21   3. Breast abscess  N61.1 611.0   4. Vitamin D deficiency  E55.9 268.9    5. Contact dermatitis and eczema  L25.9 692.9   6. Mixed hyperlipidemia  E78.2 272.2       Assessment and Plan   Diagnoses and all orders for this visit:    1. Sepsis, due to unspecified organism, unspecified whether acute organ dysfunction present (HCC) (Primary)    2. IFG (impaired fasting glucose)    3. Breast abscess    4. Vitamin D deficiency    5. Contact dermatitis and eczema    6. Mixed hyperlipidemia        Rash on the back most likely due to moisture heat, will apply Lotrisone, IV Pepcid will be restarted, along with Zyrtec, will consider steroid but avoid if possible March 13, will send home with Zyrtec and Pepcid orally along with some Lotrisone cream, March 14     Sepsis, multifactorial to include urinary tract infection and now cellulitis of the right breast and expander implant area, status post surgical debridement removal of pus// fluid, drain placed last night March 11, 2022------- patient's improved clinically,   white count is improving my back to normal March 13,, IV vancomycin added day #4 IV cefepime continues day #5--------------I discussed the case with hematology oncology Dr. Mendez-- March 11 and with our plastic surgeon who did the surgery Dr. Sandhu, March 11, and evening of March 12, appreciate her help,------ we will continue antibiotics, we will stop IV fluids March 13, we will send home today on oral antibiotics Keflex and doxycycline Keflex for 7 days doxy for 10 days, current--- labs, white count 10.8 hemoglobin 11.4 platelets are normal, blood sugars were elevated     Mild hypokalemia will replace, with oral dosing 10 twice a day March 13, will send home with oral dosing of potassium, patient will have blood work again in 1 week as an outpatient, holding chemo for a week or so--- patient will go back to 1 potassium per day for now, continue magnesium supplements March 22, 2022,     anemia -----most likely due to rehydration, bone marrow suppression, chemo, sepsis, may need  blood transfusion,, , blood counts improved, on March 14 we will go ahead and send home without any blood transfusions, she did not receive any IV iron in the hospital, her iron levels were actually normal on March 13, anemia most likely due to chemo bone marrow suppression etc. lab work shows improvement in hemoglobin white count slightly elevated but due to steroids recently, March 22, 2022     Urinary tract infection,  Klebsiella pneumonia, sensitive to cefepime, continue as above, will send home on Keflex with adequate sensitivities, she has been adequately treated in the hospital anyway antibiotics are more for the cellulitis and breast abscess issue     Invasive ductal carcinoma right breast previous bilateral mastectomies with multifocal areas of invasive ductal carcinoma largest area 2.3 cm with positive lymph nodes 4 out of 10 ER and [place] positive HER-2 negative,, currently undergoing chemotherapy and possible radiation therapy as adjuvant treatment, patient does take prechemo prednisone, and just finished up a course,     MAGUI, appendectomy, Port-A-Cath placement,     Impaired fasting glucose in the past, currently slightly elevated--- will monitor closely,    Vitamin D deficiency, will replace with 50,000 units weekly dosing, March 12, patient will get a prescription for vitamin D 50,000 units to go home with weekly,     TIA 2010, MRI/ mrA underwent a Star closure procedure with clipping     Osteoarthritis     Chronic venous insufficiency changes,, Hep-Lock IV fluids March 13 she will watch the swelling in her feet at time of discharge,       Follow Up   Return in about 4 months (around 7/22/2022).  Patient was given instructions and counseling regarding her condition or for health maintenance advice. Please see specific information pulled into the AVS if appropriate.

## 2022-03-24 ENCOUNTER — READMISSION MANAGEMENT (OUTPATIENT)
Dept: CALL CENTER | Facility: HOSPITAL | Age: 62
End: 2022-03-24

## 2022-03-24 NOTE — OUTREACH NOTE
Sepsis Week 2 Survey    Flowsheet Row Responses   [facility] patient discharged from? [place]   Does the patient have one of the following disease processes/diagnoses(primary or secondary)? Sepsis   Week 2 attempt successful? Yes   Call start time 1655   Call end time 1657   Discharge diagnosis sepsis d/t UTI & cellulitis right breast/expander implant area, I & D right breast with expander removal & implant placement   Is the patient taking all medications as directed (includes completed medication regime)? Yes   Has the patient kept scheduled appointments due by today? Yes   Psychosocial issues? No   What is the patient's perception of their health status since discharge? Improving   Nursing interventions Nurse provided patient education   Nursing interventions Nurse provided patient education   Is the patient/caregiver able to teach back the hierarchy of who to call/visit for symptoms/problems? PCP, Specialist, Home health nurse, Urgent Care, ED, 911 Yes   Additional teach back comments She is doing well and f/u with providers. No issues at the current time.    Week 2 call completed? Yes   Revoked No further contact(revokes)-requires comment   Is the patient interested in additional calls from an ambulatory ?  NOTE:  applies to high risk patients requiring additional follow-up. No   Graduated/Revoked comments F/U with providers and does not need more calls.           JESICA PALM - Registered Nurse

## 2022-04-02 RX ORDER — SULFAMETHOXAZOLE AND TRIMETHOPRIM 800; 160 MG/1; MG/1
1 TABLET ORAL 2 TIMES DAILY
Qty: 28 TABLET | Refills: 0 | Status: SHIPPED | OUTPATIENT
Start: 2022-04-02 | End: 2022-04-16

## 2022-04-04 DIAGNOSIS — Z17.0 MALIGNANT NEOPLASM OF OVERLAPPING SITES OF RIGHT BREAST IN FEMALE, ESTROGEN RECEPTOR POSITIVE: ICD-10-CM

## 2022-04-04 DIAGNOSIS — C50.811 MALIGNANT NEOPLASM OF OVERLAPPING SITES OF RIGHT BREAST IN FEMALE, ESTROGEN RECEPTOR POSITIVE: ICD-10-CM

## 2022-04-04 RX ORDER — OLANZAPINE 5 MG/1
TABLET ORAL
Qty: 30 TABLET | Refills: 2 | OUTPATIENT
Start: 2022-04-04

## 2022-04-05 ENCOUNTER — TELEPHONE (OUTPATIENT)
Dept: ONCOLOGY | Facility: HOSPITAL | Age: 62
End: 2022-04-05

## 2022-04-05 ENCOUNTER — OFFICE VISIT (OUTPATIENT)
Dept: PLASTIC SURGERY | Facility: CLINIC | Age: 62
End: 2022-04-05

## 2022-04-05 VITALS
DIASTOLIC BLOOD PRESSURE: 75 MMHG | OXYGEN SATURATION: 97 % | HEIGHT: 65 IN | BODY MASS INDEX: 30.85 KG/M2 | SYSTOLIC BLOOD PRESSURE: 130 MMHG | HEART RATE: 75 BPM

## 2022-04-05 DIAGNOSIS — N61.1 BREAST ABSCESS: ICD-10-CM

## 2022-04-05 DIAGNOSIS — S21.001A WOUND OF RIGHT BREAST, INITIAL ENCOUNTER: Primary | ICD-10-CM

## 2022-04-05 PROCEDURE — 87147 CULTURE TYPE IMMUNOLOGIC: CPT | Performed by: SURGERY

## 2022-04-05 PROCEDURE — 87205 SMEAR GRAM STAIN: CPT | Performed by: SURGERY

## 2022-04-05 PROCEDURE — 99024 POSTOP FOLLOW-UP VISIT: CPT | Performed by: SURGERY

## 2022-04-05 PROCEDURE — 87070 CULTURE OTHR SPECIMN AEROBIC: CPT | Performed by: SURGERY

## 2022-04-05 PROCEDURE — 87186 SC STD MICRODIL/AGAR DIL: CPT | Performed by: SURGERY

## 2022-04-05 RX ORDER — DEXAMETHASONE 4 MG/1
TABLET ORAL
COMMUNITY
Start: 2022-04-03 | End: 2022-05-09

## 2022-04-05 NOTE — TELEPHONE ENCOUNTER
Caller: Marisa Portillo    Relationship: Self    Best call back number: [phone]    What is the best time to reach you: ANY    Who are you requesting to speak with (clinical staff, provider,  specific staff member): CLINICAL      What was the call regarding: PATIENT CALLED WANTED TO KNOW WHEN SHE SHOULD RETURN TO WORK FOR NORMAL WORK SCHEDULE     Do you require a callback: YES

## 2022-04-05 NOTE — PROGRESS NOTES
"Chief Complaint  Follow-up (Breast concerns)    Subjective          History of Present Illness  aMrisa Portillo is a 62 y.o. female who presents to [facility] for Postoperative Follow-Up of INCISION AND DRAINAGE ABSCESS OF RIGHT BREAST WITH EXPANDER REMOVAL AND IMPLANT PLACEMENT done on 3/11/2022.  Pt here today for breast concerns. Right breast redness. No drainage.    Allergies: Multihance [gadobenate]  Allergies Reconciled.    Review of Systems     All system were reviewed and were negative, except the ones noted above.   Objective     /75   Pulse 75   Ht 165.1 cm (65\")   SpO2 97%   BMI 30.85 kg/m²     Body mass index is 30.85 kg/m².    Physical Exam  Breast: cellulite of the right breast.     Result Review :     Procedure:    After risks and benefits were discussed with patient, the right breast was prepped in a sterile fashion way, the lower outer portion of the breast was anesthetized with lidocaine and  I incised the lower portion of the incision. Serous sanguineous fluid was observed. The a drain was placed. The drain was fixed in place with 3-0 nylon. A sterile dressing was applied.  Patient tolerated procedure well and there were no complications.        Assessment and Plan      Diagnoses and all orders for this visit:    1. Wound of right breast, initial encounter (Primary)  -     Wound Culture - Wound, Breast, Right    2. Breast abscess        Plan:  • Patient to keep her antibiotics, she will call us on Thursday telling us about her progression.         Follow Up     No follow-ups on file.    Patient was given instructions and counseling regarding her condition. Please see specific information pulled into the AVS if appropriate.     Lacy Shelton MD  04/05/2022  "

## 2022-04-06 NOTE — TELEPHONE ENCOUNTER
Informed patient restrictions typically continue until 3-4 weeks after last treatment, but this can be adjusted based on how she is feeling.

## 2022-04-07 LAB
BACTERIA SPEC AEROBE CULT: ABNORMAL
GRAM STN SPEC: ABNORMAL

## 2022-04-08 ENCOUNTER — APPOINTMENT (OUTPATIENT)
Dept: RADIATION ONCOLOGY | Facility: HOSPITAL | Age: 62
End: 2022-04-08

## 2022-04-10 RX ORDER — PALONOSETRON 0.05 MG/ML
0.25 INJECTION, SOLUTION INTRAVENOUS ONCE
Status: CANCELLED | OUTPATIENT
Start: 2022-04-11

## 2022-04-10 RX ORDER — DIPHENHYDRAMINE HYDROCHLORIDE 50 MG/ML
50 INJECTION INTRAMUSCULAR; INTRAVENOUS AS NEEDED
Status: CANCELLED | OUTPATIENT
Start: 2022-04-11

## 2022-04-10 RX ORDER — FAMOTIDINE 10 MG/ML
20 INJECTION, SOLUTION INTRAVENOUS AS NEEDED
Status: CANCELLED | OUTPATIENT
Start: 2022-04-11

## 2022-04-10 RX ORDER — SODIUM CHLORIDE 9 MG/ML
250 INJECTION, SOLUTION INTRAVENOUS ONCE
Status: CANCELLED | OUTPATIENT
Start: 2022-04-11

## 2022-04-11 ENCOUNTER — OFFICE VISIT (OUTPATIENT)
Dept: PLASTIC SURGERY | Facility: CLINIC | Age: 62
End: 2022-04-11

## 2022-04-11 ENCOUNTER — HOSPITAL ENCOUNTER (OUTPATIENT)
Dept: ONCOLOGY | Facility: HOSPITAL | Age: 62
Setting detail: INFUSION SERIES
Discharge: HOME | End: 2022-04-11

## 2022-04-11 ENCOUNTER — OFFICE VISIT (OUTPATIENT)
Dept: ONCOLOGY | Facility: HOSPITAL | Age: 62
End: 2022-04-11

## 2022-04-11 VITALS
HEIGHT: 65 IN | BODY MASS INDEX: 30.6 KG/M2 | SYSTOLIC BLOOD PRESSURE: 119 MMHG | HEART RATE: 85 BPM | OXYGEN SATURATION: 97 % | WEIGHT: 183.64 LBS | TEMPERATURE: 98.3 F | DIASTOLIC BLOOD PRESSURE: 65 MMHG | RESPIRATION RATE: 18 BRPM

## 2022-04-11 VITALS
BODY MASS INDEX: 30.56 KG/M2 | RESPIRATION RATE: 18 BRPM | TEMPERATURE: 98.3 F | HEART RATE: 85 BPM | WEIGHT: 183.64 LBS | OXYGEN SATURATION: 97 % | DIASTOLIC BLOOD PRESSURE: 65 MMHG | SYSTOLIC BLOOD PRESSURE: 119 MMHG

## 2022-04-11 VITALS — DIASTOLIC BLOOD PRESSURE: 78 MMHG | SYSTOLIC BLOOD PRESSURE: 118 MMHG | HEART RATE: 91 BPM

## 2022-04-11 DIAGNOSIS — C50.811 MALIGNANT NEOPLASM OF OVERLAPPING SITES OF RIGHT BREAST IN FEMALE, ESTROGEN RECEPTOR POSITIVE: Primary | ICD-10-CM

## 2022-04-11 DIAGNOSIS — Z09 POSTOPERATIVE FOLLOW-UP: Primary | ICD-10-CM

## 2022-04-11 DIAGNOSIS — Z17.0 MALIGNANT NEOPLASM OF OVERLAPPING SITES OF RIGHT BREAST IN FEMALE, ESTROGEN RECEPTOR POSITIVE: Primary | ICD-10-CM

## 2022-04-11 DIAGNOSIS — N61.1 BREAST ABSCESS: ICD-10-CM

## 2022-04-11 LAB
ALBUMIN SERPL-MCNC: 3.96 G/DL (ref 3.5–5.2)
ALBUMIN/GLOB SERPL: 1.8 G/DL
ALP SERPL-CCNC: 61 U/L (ref 39–117)
ALT SERPL W P-5'-P-CCNC: 15 U/L (ref 1–33)
ANION GAP SERPL CALCULATED.3IONS-SCNC: 8.8 MMOL/L (ref 5–15)
AST SERPL-CCNC: 16 U/L (ref 1–32)
BASOPHILS # BLD AUTO: 0.08 10*3/MM3 (ref 0–0.2)
BASOPHILS NFR BLD AUTO: 1.6 % (ref 0–1.5)
BILIRUB SERPL-MCNC: 0.2 MG/DL (ref 0–1.2)
BUN SERPL-MCNC: 14 MG/DL (ref 8–23)
BUN/CREAT SERPL: 17.5 (ref 7–25)
CALCIUM SPEC-SCNC: 9.1 MG/DL (ref 8.6–10.5)
CHLORIDE SERPL-SCNC: 106 MMOL/L (ref 98–107)
CO2 SERPL-SCNC: 23.2 MMOL/L (ref 22–29)
CREAT SERPL-MCNC: 0.8 MG/DL (ref 0.57–1)
DEPRECATED RDW RBC AUTO: 56.2 FL (ref 37–54)
EGFRCR SERPLBLD CKD-EPI 2021: 82.9 ML/MIN/1.73
EOSINOPHIL # BLD AUTO: 0.06 10*3/MM3 (ref 0–0.4)
EOSINOPHIL NFR BLD AUTO: 1.2 % (ref 0.3–6.2)
ERYTHROCYTE [DISTWIDTH] IN BLOOD BY AUTOMATED COUNT: 15.9 % (ref 12.3–15.4)
GLOBULIN UR ELPH-MCNC: 2.2 GM/DL
GLUCOSE SERPL-MCNC: 105 MG/DL (ref 65–99)
HCT VFR BLD AUTO: 32.9 % (ref 34–46.6)
HGB BLD-MCNC: 10.8 G/DL (ref 12–15.9)
IMM GRANULOCYTES # BLD AUTO: 0.02 10*3/MM3 (ref 0–0.05)
IMM GRANULOCYTES NFR BLD AUTO: 0.4 % (ref 0–0.5)
LYMPHOCYTES # BLD AUTO: 1.23 10*3/MM3 (ref 0.7–3.1)
LYMPHOCYTES NFR BLD AUTO: 24 % (ref 19.6–45.3)
MCH RBC QN AUTO: 31.8 PG (ref 26.6–33)
MCHC RBC AUTO-ENTMCNC: 32.8 G/DL (ref 31.5–35.7)
MCV RBC AUTO: 96.8 FL (ref 79–97)
MONOCYTES # BLD AUTO: 1.12 10*3/MM3 (ref 0.1–0.9)
MONOCYTES NFR BLD AUTO: 21.8 % (ref 5–12)
NEUTROPHILS NFR BLD AUTO: 2.62 10*3/MM3 (ref 1.7–7)
NEUTROPHILS NFR BLD AUTO: 51 % (ref 42.7–76)
PLATELET # BLD AUTO: 317 10*3/MM3 (ref 140–450)
PMV BLD AUTO: 9.4 FL (ref 6–12)
POTASSIUM SERPL-SCNC: 4.3 MMOL/L (ref 3.5–5.2)
PROT SERPL-MCNC: 6.2 G/DL (ref 6–8.5)
RBC # BLD AUTO: 3.4 10*6/MM3 (ref 3.77–5.28)
SODIUM SERPL-SCNC: 138 MMOL/L (ref 136–145)
WBC NRBC COR # BLD: 5.13 10*3/MM3 (ref 3.4–10.8)

## 2022-04-11 PROCEDURE — 99214 OFFICE O/P EST MOD 30 MIN: CPT | Performed by: INTERNAL MEDICINE

## 2022-04-11 PROCEDURE — 80053 COMPREHEN METABOLIC PANEL: CPT | Performed by: INTERNAL MEDICINE

## 2022-04-11 PROCEDURE — 25010000002 HEPARIN LOCK FLUSH PER 10 UNITS: Performed by: INTERNAL MEDICINE

## 2022-04-11 PROCEDURE — 36591 DRAW BLOOD OFF VENOUS DEVICE: CPT

## 2022-04-11 PROCEDURE — 99024 POSTOP FOLLOW-UP VISIT: CPT | Performed by: NURSE PRACTITIONER

## 2022-04-11 PROCEDURE — 85025 COMPLETE CBC W/AUTO DIFF WBC: CPT | Performed by: INTERNAL MEDICINE

## 2022-04-11 RX ORDER — HEPARIN SODIUM (PORCINE) LOCK FLUSH IV SOLN 100 UNIT/ML 100 UNIT/ML
500 SOLUTION INTRAVENOUS AS NEEDED
Status: CANCELLED | OUTPATIENT
Start: 2022-04-11

## 2022-04-11 RX ORDER — HEPARIN SODIUM (PORCINE) LOCK FLUSH IV SOLN 100 UNIT/ML 100 UNIT/ML
500 SOLUTION INTRAVENOUS AS NEEDED
Status: DISCONTINUED | OUTPATIENT
Start: 2022-04-11 | End: 2022-04-12 | Stop reason: HOSPADM

## 2022-04-11 RX ORDER — SODIUM CHLORIDE 0.9 % (FLUSH) 0.9 %
20 SYRINGE (ML) INJECTION AS NEEDED
Status: DISCONTINUED | OUTPATIENT
Start: 2022-04-11 | End: 2022-04-12 | Stop reason: HOSPADM

## 2022-04-11 RX ORDER — SODIUM CHLORIDE 0.9 % (FLUSH) 0.9 %
20 SYRINGE (ML) INJECTION AS NEEDED
Status: CANCELLED | OUTPATIENT
Start: 2022-04-11

## 2022-04-11 RX ADMIN — Medication 20 ML: at 11:09

## 2022-04-11 RX ADMIN — HEPARIN SODIUM (PORCINE) LOCK FLUSH IV SOLN 100 UNIT/ML 500 UNITS: 100 SOLUTION at 11:10

## 2022-04-11 NOTE — PROGRESS NOTES
Chief Complaint  Follow up for drain Insertion on 04/05/2022  Subjective         History of Present Illness  Marisa Portillo is a 62 y.o. female who presents to [facility] for Postoperative Follow-Up of INCISION AND DRAINAGE ABSCESS OF RIGHT BREAST WITH EXPANDER REMOVAL AND IMPLANT PLACEMENT done on 3/11/2022.      Pt is here today for a follow up with drains has small amount of cloudy thick light brown drng.  No fever, but is still mild redness and has drainage coming out of incision site  Has been putting out less than 30 for 4 days. Drain keeps losing suctioning, tried to push back in and had some suctioning.           Allergies: Multihance [gadobenate]  Allergies Reconciled.    Review of Systems     All system were reviewed and were negative, except the ones noted above.   Objective     /78 (BP Location: Left arm, Patient Position: Sitting)   Pulse 91     There is no height or weight on file to calculate BMI.    Physical Exam  Breast: right breast incision healing, mild but improved erythema, no increased warmth, drain intact    Result Review :     Procedure:    Assessment and Plan      Diagnoses and all orders for this visit:    1. Postoperative follow-up (Primary)        Plan:  • Finish Bactrim. Removed drain due to loss of suction. Continue to monitor redness. Discuss with Dr. Mendez if she wants to continue chemo today (last treatment is scheduled today). RTC next week. Call office for any sign of infection or worsening of symptoms.         Follow Up     No follow-ups on file.    Patient was given instructions and counseling regarding her condition. Please see specific information pulled into the AVS if appropriate.     Brigitte Chase, CALOS  04/11/2022

## 2022-04-14 ENCOUNTER — PREP FOR SURGERY (OUTPATIENT)
Dept: OTHER | Facility: HOSPITAL | Age: 62
End: 2022-04-14

## 2022-04-14 DIAGNOSIS — N61.0 BREAST INFECTION: Primary | ICD-10-CM

## 2022-04-14 RX ORDER — CLINDAMYCIN PHOSPHATE 900 MG/50ML
900 INJECTION INTRAVENOUS ONCE
Status: CANCELLED | OUTPATIENT
Start: 2022-04-14 | End: 2022-04-14

## 2022-04-14 NOTE — PROGRESS NOTES
"Chief Complaint  Follow up for drain Insertion on 04/05/2022  Subjective         History of Present Illness  Marisa Portillo is a 62 y.o. female who presents to [facility] for Postoperative Follow-Up RIGHT BREAST IMPLANT REMOVAL WITH INCISION AND DRAINAGE on 4/15/2022. Doing well. Drain putting out slightly more than 30 ml a day. Incision healing. Has one more chemo on Monday.  .           Allergies: Multihance [gadobenate]  Allergies Reconciled.    Review of Systems     All system were reviewed and were negative, except the ones noted above.   Objective     /67 (BP Location: Left arm, Patient Position: Sitting)   Pulse 80   Temp 97.4 °F (36.3 °C)   Ht 165.1 cm (65\")   Wt 83 kg (183 lb)   SpO2 97%   BMI 30.45 kg/m²     Body mass index is 30.45 kg/m².    Physical Exam  Breast: right breast incision healing, improved erythema, no increased warmth, drain intact with serous fluid    Result Review :     Procedure:    Assessment and Plan      Diagnoses and all orders for this visit:    1. Postoperative follow-up (Primary)        Plan:  •  Discuss with Dr. Mendez if she wants to continue chemo. Dr. Shelton saw patient and notes she is fine with chemo when the drain is. May return to clinic Monday morning for drain pull if less than 30 ml daily x 3 days. Limit use of right arm, may shower, RTC next week. Call office for any sign of infection or worsening of symptoms.         Follow Up     Return in 5 days (on 4/25/2022) for drain removal with Brigitte.    Patient was given instructions and counseling regarding her condition. Please see specific information pulled into the AVS if appropriate.     Brigitte Chase, CALOS  04/20/2022  "

## 2022-04-15 ENCOUNTER — ANESTHESIA (OUTPATIENT)
Dept: PERIOP | Facility: HOSPITAL | Age: 62
End: 2022-04-15

## 2022-04-15 ENCOUNTER — TELEPHONE (OUTPATIENT)
Dept: ONCOLOGY | Facility: HOSPITAL | Age: 62
End: 2022-04-15

## 2022-04-15 ENCOUNTER — OFFICE VISIT (OUTPATIENT)
Dept: PLASTIC SURGERY | Facility: CLINIC | Age: 62
End: 2022-04-15

## 2022-04-15 ENCOUNTER — ANESTHESIA EVENT (OUTPATIENT)
Dept: PERIOP | Facility: HOSPITAL | Age: 62
End: 2022-04-15

## 2022-04-15 ENCOUNTER — HOSPITAL ENCOUNTER (OUTPATIENT)
Facility: HOSPITAL | Age: 62
Setting detail: HOSPITAL OUTPATIENT SURGERY
Discharge: HOME | End: 2022-04-15
Attending: SURGERY | Admitting: SURGERY

## 2022-04-15 VITALS
HEIGHT: 65 IN | OXYGEN SATURATION: 94 % | WEIGHT: 183.86 LBS | RESPIRATION RATE: 16 BRPM | SYSTOLIC BLOOD PRESSURE: 118 MMHG | DIASTOLIC BLOOD PRESSURE: 57 MMHG | BODY MASS INDEX: 30.63 KG/M2 | TEMPERATURE: 97 F | HEART RATE: 81 BPM

## 2022-04-15 VITALS
SYSTOLIC BLOOD PRESSURE: 125 MMHG | BODY MASS INDEX: 30.6 KG/M2 | HEIGHT: 65 IN | OXYGEN SATURATION: 97 % | WEIGHT: 183.64 LBS | HEART RATE: 85 BPM | TEMPERATURE: 98.5 F | DIASTOLIC BLOOD PRESSURE: 78 MMHG

## 2022-04-15 DIAGNOSIS — N61.0 BREAST INFECTION: Primary | ICD-10-CM

## 2022-04-15 DIAGNOSIS — Z09 POSTOPERATIVE FOLLOW-UP: Primary | ICD-10-CM

## 2022-04-15 PROCEDURE — 25010000002 PROPOFOL 10 MG/ML EMULSION: Performed by: NURSE ANESTHETIST, CERTIFIED REGISTERED

## 2022-04-15 PROCEDURE — 25010000002 ONDANSETRON PER 1 MG: Performed by: NURSE ANESTHETIST, CERTIFIED REGISTERED

## 2022-04-15 PROCEDURE — 25010000002 MIDAZOLAM PER 1 MG: Performed by: ANESTHESIOLOGY

## 2022-04-15 PROCEDURE — 25010000002 FENTANYL CITRATE (PF) 50 MCG/ML SOLUTION: Performed by: NURSE ANESTHETIST, CERTIFIED REGISTERED

## 2022-04-15 PROCEDURE — 19328 RMVL INTACT BREAST IMPLANT: CPT | Performed by: SURGERY

## 2022-04-15 RX ORDER — ACETAMINOPHEN 500 MG
1000 TABLET ORAL ONCE
Status: COMPLETED | OUTPATIENT
Start: 2022-04-15 | End: 2022-04-15

## 2022-04-15 RX ORDER — CLINDAMYCIN PHOSPHATE 900 MG/50ML
900 INJECTION INTRAVENOUS ONCE
Status: COMPLETED | OUTPATIENT
Start: 2022-04-15 | End: 2022-04-15

## 2022-04-15 RX ORDER — OXYCODONE HYDROCHLORIDE 5 MG/1
5 TABLET ORAL
Status: DISCONTINUED | OUTPATIENT
Start: 2022-04-15 | End: 2022-04-15 | Stop reason: HOSPADM

## 2022-04-15 RX ORDER — PROPOFOL 10 MG/ML
VIAL (ML) INTRAVENOUS AS NEEDED
Status: DISCONTINUED | OUTPATIENT
Start: 2022-04-15 | End: 2022-04-15 | Stop reason: SURG

## 2022-04-15 RX ORDER — SULFAMETHOXAZOLE AND TRIMETHOPRIM 800; 160 MG/1; MG/1
1 TABLET ORAL 2 TIMES DAILY
Qty: 20 TABLET | Refills: 0 | Status: SHIPPED | OUTPATIENT
Start: 2022-04-15 | End: 2022-04-25 | Stop reason: SDUPTHER

## 2022-04-15 RX ORDER — MEPERIDINE HYDROCHLORIDE 25 MG/ML
12.5 INJECTION INTRAMUSCULAR; INTRAVENOUS; SUBCUTANEOUS
Status: DISCONTINUED | OUTPATIENT
Start: 2022-04-15 | End: 2022-04-15 | Stop reason: HOSPADM

## 2022-04-15 RX ORDER — PROMETHAZINE HYDROCHLORIDE 25 MG/1
25 SUPPOSITORY RECTAL ONCE AS NEEDED
Status: DISCONTINUED | OUTPATIENT
Start: 2022-04-15 | End: 2022-04-15 | Stop reason: HOSPADM

## 2022-04-15 RX ORDER — PHENYLEPHRINE HCL IN 0.9% NACL 1 MG/10 ML
SYRINGE (ML) INTRAVENOUS AS NEEDED
Status: DISCONTINUED | OUTPATIENT
Start: 2022-04-15 | End: 2022-04-15 | Stop reason: SURG

## 2022-04-15 RX ORDER — ONDANSETRON 2 MG/ML
4 INJECTION INTRAMUSCULAR; INTRAVENOUS ONCE AS NEEDED
Status: DISCONTINUED | OUTPATIENT
Start: 2022-04-15 | End: 2022-04-15 | Stop reason: HOSPADM

## 2022-04-15 RX ORDER — MIDAZOLAM HYDROCHLORIDE 1 MG/ML
2 INJECTION INTRAMUSCULAR; INTRAVENOUS ONCE
Status: COMPLETED | OUTPATIENT
Start: 2022-04-15 | End: 2022-04-15

## 2022-04-15 RX ORDER — SULFAMETHOXAZOLE AND TRIMETHOPRIM 800; 160 MG/1; MG/1
1 TABLET ORAL 2 TIMES DAILY
Qty: 20 TABLET | Refills: 0 | Status: SHIPPED | OUTPATIENT
Start: 2022-04-15 | End: 2022-05-09

## 2022-04-15 RX ORDER — ONDANSETRON 2 MG/ML
INJECTION INTRAMUSCULAR; INTRAVENOUS AS NEEDED
Status: DISCONTINUED | OUTPATIENT
Start: 2022-04-15 | End: 2022-04-15 | Stop reason: SURG

## 2022-04-15 RX ORDER — FENTANYL CITRATE 50 UG/ML
INJECTION, SOLUTION INTRAMUSCULAR; INTRAVENOUS AS NEEDED
Status: DISCONTINUED | OUTPATIENT
Start: 2022-04-15 | End: 2022-04-15 | Stop reason: SURG

## 2022-04-15 RX ORDER — PROMETHAZINE HYDROCHLORIDE 12.5 MG/1
25 TABLET ORAL ONCE AS NEEDED
Status: DISCONTINUED | OUTPATIENT
Start: 2022-04-15 | End: 2022-04-15 | Stop reason: HOSPADM

## 2022-04-15 RX ORDER — LIDOCAINE HYDROCHLORIDE 20 MG/ML
INJECTION, SOLUTION INFILTRATION; PERINEURAL AS NEEDED
Status: DISCONTINUED | OUTPATIENT
Start: 2022-04-15 | End: 2022-04-15 | Stop reason: SURG

## 2022-04-15 RX ORDER — SODIUM CHLORIDE, SODIUM LACTATE, POTASSIUM CHLORIDE, CALCIUM CHLORIDE 600; 310; 30; 20 MG/100ML; MG/100ML; MG/100ML; MG/100ML
9 INJECTION, SOLUTION INTRAVENOUS CONTINUOUS PRN
Status: DISCONTINUED | OUTPATIENT
Start: 2022-04-15 | End: 2022-04-15 | Stop reason: HOSPADM

## 2022-04-15 RX ADMIN — SODIUM CHLORIDE, POTASSIUM CHLORIDE, SODIUM LACTATE AND CALCIUM CHLORIDE 9 ML/HR: 600; 310; 30; 20 INJECTION, SOLUTION INTRAVENOUS at 10:23

## 2022-04-15 RX ADMIN — Medication 100 MCG: at 11:46

## 2022-04-15 RX ADMIN — PROPOFOL 100 MCG/KG/MIN: 10 INJECTION, EMULSION INTRAVENOUS at 11:36

## 2022-04-15 RX ADMIN — PROPOFOL 50 MG: 10 INJECTION, EMULSION INTRAVENOUS at 11:36

## 2022-04-15 RX ADMIN — FENTANYL CITRATE 50 MCG: 50 INJECTION, SOLUTION INTRAMUSCULAR; INTRAVENOUS at 11:37

## 2022-04-15 RX ADMIN — ONDANSETRON 4 MG: 2 INJECTION INTRAMUSCULAR; INTRAVENOUS at 11:42

## 2022-04-15 RX ADMIN — CLINDAMYCIN IN 5 PERCENT DEXTROSE 900 MG: 18 INJECTION, SOLUTION INTRAVENOUS at 11:35

## 2022-04-15 RX ADMIN — LIDOCAINE HYDROCHLORIDE 100 MG: 20 INJECTION, SOLUTION INFILTRATION; PERINEURAL at 11:36

## 2022-04-15 RX ADMIN — ONDANSETRON 4 MG: 2 INJECTION INTRAMUSCULAR; INTRAVENOUS at 12:37

## 2022-04-15 RX ADMIN — ACETAMINOPHEN 1000 MG: 500 TABLET ORAL at 10:23

## 2022-04-15 RX ADMIN — MIDAZOLAM HYDROCHLORIDE 2 MG: 1 INJECTION, SOLUTION INTRAMUSCULAR; INTRAVENOUS at 10:23

## 2022-04-15 NOTE — TELEPHONE ENCOUNTER
Patient had surgery today to remove infected implant and had a drain placed. Advised we will delay chemotherapy another week due to infection and drain.

## 2022-04-15 NOTE — H&P
Plastic  Surgery H&P    Patient Identification:  Name: Marisa Portillo  Age: 62 y.o.  Sex: female  :  1960  MRN: [id number]                       Chief Complaint:  Right breast infection    History of Present Illness:   Patient is a 63 yo lady sp kelly nipple sparing mastectomy who complicated with right breast infection during chemotherapy and failed salvage treatment. She is here today for right breast implant removal.     Past Medical History:   Diagnosis Date   • Allergies    • Breast cancer (HCC)    • High cholesterol    • IFG (impaired fasting glucose)    • Osteoarthritis    • Port-A-Cath placement 2021   • S/P BILATERAL IMMEDIATE BREAST RECONSTRUCTION WITH LEFT PRE PEC PLACEMENT OF SILICONE GEL IMPLANT AND MESH, RIGHT PRE PEC PLACEMENT OF EXPANDER AND MESH 2021   • TIA (transient ischemic attack)     no residual (tia approximately 8 years ago)   • Vitamin D deficiency        Past Surgical History:   Procedure Laterality Date   • APPENDECTOMY     • BREAST AUGMENTATION Bilateral 2021    Procedure: bilateral breast reconstructon with bilateral mesh placement.   left implant, right tissue expander placement;  Surgeon: Lacy Shelton MD;  Location: [facility] OR [facility] [place];  Service: Plastics;  Laterality: Bilateral;   • CEREBRAL ANGIOGRAM      clip placed- pt stated can have MRI with cerebral clips   • CYST REMOVAL Right     rt wrist   • INCISION AND DRAINAGE OF WOUND Right 3/11/2022    Procedure: INCISION AND DRAINAGE ABSCESS OF RIGHT BREAST WITH EXPANDER REMOVAL AND IMPLANT PLACEMENT;  Surgeon: Lacy Shelton MD;  Location: [facility] OR;  Service: Plastics;  Laterality: Right;   • MASTECTOMY     • MASTECTOMY COMPLETE / SIMPLE W/ SENTINEL NODE BIOPSY Bilateral    • PORTACATH PLACEMENT Left 2021    Procedure: INSERTION OF PORTACATH;  Surgeon: Jacqueline Mcgraw MD;  Location: [facility] OR [facility] [place];  Service: General;  Laterality: Left;   • SENTINEL NODE BIOPSY Bilateral 2021     Procedure: BILATERAL BREAST MASTECTOMIES WITH RIGHT SENTINEL NODE BIOPSIES WITH FROZEN SECTIONS;  Surgeon: Jacqueline Mcgraw MD;  Location: [facility] OR [facility] [place];  Service: General;  Laterality: Bilateral;        No medications prior to admission.       Allergies   Allergen Reactions   • Multihance [Gadobenate] Hives and Itching     MRI contrast       Social History     Tobacco Use   • Smoking status: Never Smoker   • Smokeless tobacco: Never Used   Substance Use Topics   • Alcohol use: Yes     Comment: socially         Family History   Problem Relation Age of Onset   • Hypertension Mother    • Diabetes Mother    • Cancer Mother    • Arthritis Mother    • Breast cancer Mother    • Hypertension Father    • Diabetes Father    • Cancer Father    • Arthritis Father    • Liver cancer Father    • Lung cancer Father    • Breast cancer Maternal Aunt    • Malig Hyperthermia Neg Hx         Review of Systems  All system were reviewed and were negative, except the ones noted above.     Objective:  not currently breastfeeding.    Exam:  General: alert, cooperative and no distress  Head: normocephalic, without obvious abnormality, atraumatic  Eyes: EOMI, no icterus  Neck: no JVD and supple, symmetrical, trachea midline  Lungs: clear to auscultation bilaterally  Heart: normal rate, regular rhythm   Abdomen: soft, non-distended, non-tender, no peritoneal signs, no masses  Extremities: normal, atraumatic, no cyanosis or edema  Skin:right breast redness with exposed implant     All labs (24hrs): No results found for this or any previous visit (from the past 24 hour(s)).         Assessment:  61 yo lady with right breast infection after breast reconstruction    Plan:  Plan to proceed with right breast implant removal. Risks and benefits discussed with patient who agrees to proceed.     Lacy Shelton MD    4/15/2022

## 2022-04-15 NOTE — TELEPHONE ENCOUNTER
Caller: Marisa Portillo    Relationship: Self    Best call back number: [phone]    What is the best time to reach you: ASAP    Who are you requesting to speak with (clinical staff, provider,  specific staff member): AWAIS    Do you know the name of the person who called:     What was the call regarding: PT ASKING TO SPEAK WITH AWAIS    Do you require a callback: YES

## 2022-04-15 NOTE — DISCHARGE INSTRUCTIONS
Ok for regular diet    Do not drive for next 2 weeks    Ok to shower but be aware you are a fall risk so have someone with you or place a chair in the shower. It is ok to wet the breast. Wash the drain site with water and liquid soap everyday. No sponge or cloths. Wash the drain site before other parts of the body    Strip drains q 8 hours and record drain output daily. Wash hands or wear gloves when manipulating drains.    Do not exercise for the next 2 weeks.    Sleep in an upright position    No bra for 1 week. After that, wear a comfortable soft bra    Ok to move arms slowly to the shoulder level (brushing hair movement)      Tylenol last at 10:23 am

## 2022-04-15 NOTE — OP NOTE
Plastic Surgery Op Note    RIGHT BREAST INCISION AND DRAINAGE, REMOVAL OF  IMPLANT AND MESH    Marisa Portillo  4/15/2022    Pre-op Diagnosis:   Breast infection [N61.0]    Post-Op Diagnosis Codes:     * Breast infection [N61.0]    Anesthesia: Choice    Staff:   Circulator: Tatiana La, UGO; Hannah Koroma RN  Scrub Person: Aarti Beckett  Assistant: Symone Menendez    Surgeon: Dr Shelton    Estimated Blood Loss: minimal    Specimens:   Order Name Source Comment Collection Info Order Time   TISSUE PATHOLOGY EXAM Breast, Right  Collected By: Lacy Shelton MD 4/15/2022 11:58 AM     Release to patient   Immediate              Drains:   Closed/Suction Drain 1 Inferior;Right Bulb 15 Fr. (Active)   Dressing Status Clean;Dry;Intact 04/15/22 1223   Drainage Appearance Bloody 04/15/22 1223   Status To bulb suction 04/15/22 1223   Output (mL) 10 mL 04/15/22 1340       [REMOVED] Closed/Suction Drain 1 Inferior;Right Breast Bulb 19 Fr. (Removed)       Findings:     Erythema of the skin, minimal fluid pocket      Complications: none       Date: 4/15/2022  Time: 16:19 EDT  After risks and benefits were discussed with patient and informed consent was signed, patient was taken to the OR and positioned supine. The surgical site was then prepped in a sterile fashion way and a time out was performed confirming patient's name and procedure to be performed. All surgical team was introduced by name.   I injected lidocaine with epinephrine at the location of the previous incision and future drain site. Then, I incised the skin and removed the implant and mesh. The cavity was irrigated, a drain was placed and the skin was closed with 3-0 vicryl. A sterile dressing was applied.   Patient tolerated procedure well, there were no complications, all instruments were checked and patient was awakened and taken to PACU in stable condition.  I was present for the entire procedure.   Lacy Shelton MD  04/15/22  16:19  EDT

## 2022-04-15 NOTE — ANESTHESIA PREPROCEDURE EVALUATION
Anesthesia Evaluation     Patient summary reviewed and Nursing notes reviewed   no history of anesthetic complications:  NPO Solid Status: > 8 hours  NPO Liquid Status: > 2 hours           Airway   Mallampati: II  TM distance: >3 FB  Neck ROM: full  No difficulty expected  Dental      Pulmonary - negative pulmonary ROS and normal exam    breath sounds clear to auscultation  Cardiovascular - normal exam  Exercise tolerance: good (4-7 METS)    Rhythm: regular  Rate: normal    (+) hyperlipidemia,       Neuro/Psych  (+) TIA,    GI/Hepatic/Renal/Endo - negative ROS     Musculoskeletal (-) negative ROS    Abdominal    Substance History - negative use     OB/GYN negative ob/gyn ROS         Other - negative ROS                       Anesthesia Plan    ASA 2     general   (Patient understands anesthesia not responsible for dental damage.)  intravenous induction     Anesthetic plan, all risks, benefits, and alternatives have been provided, discussed and informed consent has been obtained with: patient.    Plan discussed with CRNA.        CODE STATUS:

## 2022-04-15 NOTE — ANESTHESIA POSTPROCEDURE EVALUATION
Patient: Marisa Portillo    Procedure Summary     Date: 04/15/22 Room / Location: [facility]  / [facility]    Anesthesia Start: 1135 Anesthesia Stop: 1217    Procedure: RIGHT BREAST IMPLANT REMOVAL WITH INCISION AND DRAINAGE (Right Breast) Diagnosis:       Breast infection      (Breast infection [N61.0])    Surgeons: Lacy Shelton MD Provider: Deyvi Ortez MD    Anesthesia Type: general ASA Status: 2          Anesthesia Type: general    Vitals  Vitals Value Taken Time   /53 04/15/22 1224   Temp 36.3 °C (97.3 °F) 04/15/22 1214   Pulse 77 04/15/22 1226   Resp 12 04/15/22 1214   SpO2 95 % 04/15/22 1226   Vitals shown include unvalidated device data.        Post Anesthesia Care and Evaluation    Patient location during evaluation: bedside  Patient participation: complete - patient participated  Level of consciousness: awake and alert  Pain management: adequate  Airway patency: patent  Anesthetic complications: No anesthetic complications  PONV Status: none  Cardiovascular status: acceptable  Respiratory status: acceptable  Hydration status: acceptable    Comments: An Anesthesiologist personally participated in the most demanding procedures (including induction and emergence if applicable) in the anesthesia plan, monitored the course of anesthesia administration at frequent intervals and remained physically present and available for immediate diagnosis and treatment of emergencies.

## 2022-04-15 NOTE — PROGRESS NOTES
Patient came to office today concerning her right breast. She has an open wound to the lateral aspect of the right incision, states that this was the location of the drain. The site is approximately 2.5 cm x 1.5 cm. Within this opening there is a 3-4 mm opening in which the breast implant is visible. The site is draining a clear yellow tinged fluid. Sutures are noted on the medial superior aspect of the wound and an additional stitch is visible to the lateral aspect as well. There is noted redness around the wound, based on the ink pen markings that the patient previously placed the redness is decreasing. Area is warm and non-tender, there is a small area at the posterior lateral aspect of the opening that is firm but softened after pressure applied and fluid drained from the site. All information was relayed to Dr. Shelton. Due to the opening having exposure of the implant the patient was advised to go to the hospital so that she can have surgery today with Dr. Shelton. Patient verbalized understanding of all information. A dressing of 4x4's and paper tape were applied.

## 2022-04-18 ENCOUNTER — APPOINTMENT (OUTPATIENT)
Dept: RADIATION ONCOLOGY | Facility: HOSPITAL | Age: 62
End: 2022-04-18

## 2022-04-18 ENCOUNTER — APPOINTMENT (OUTPATIENT)
Dept: ONCOLOGY | Facility: HOSPITAL | Age: 62
End: 2022-04-18

## 2022-04-18 LAB
LAB AP CASE REPORT: NORMAL
LAB AP CLINICAL INFORMATION: NORMAL
PATH REPORT.FINAL DX SPEC: NORMAL
PATH REPORT.GROSS SPEC: NORMAL

## 2022-04-19 ENCOUNTER — TELEPHONE (OUTPATIENT)
Dept: SURGERY | Facility: CLINIC | Age: 62
End: 2022-04-19

## 2022-04-20 ENCOUNTER — OFFICE VISIT (OUTPATIENT)
Dept: PLASTIC SURGERY | Facility: CLINIC | Age: 62
End: 2022-04-20

## 2022-04-20 VITALS
SYSTOLIC BLOOD PRESSURE: 128 MMHG | HEIGHT: 65 IN | DIASTOLIC BLOOD PRESSURE: 67 MMHG | WEIGHT: 183 LBS | BODY MASS INDEX: 30.49 KG/M2 | HEART RATE: 80 BPM | OXYGEN SATURATION: 97 % | TEMPERATURE: 97.4 F

## 2022-04-20 DIAGNOSIS — Z09 POSTOPERATIVE FOLLOW-UP: Primary | ICD-10-CM

## 2022-04-20 PROCEDURE — 99024 POSTOP FOLLOW-UP VISIT: CPT | Performed by: NURSE PRACTITIONER

## 2022-04-20 NOTE — TELEPHONE ENCOUNTER
It's completely up to how the pt feels. She doesn't have to, but if she wishes to, she can r/s to a more convenient day/time for her.

## 2022-04-20 NOTE — PROGRESS NOTES
"Chief Complaint  Follow up for drain Insertion on 04/05/2022  Subjective         History of Present Illness  Marisa Portillo is a 62 y.o. female who presents to [facility] for Postoperative Follow-Up RIGHT BREAST IMPLANT REMOVAL WITH INCISION AND DRAINAGE on 4/15/2022. Here today for right breast drain removal; Has been putting out 10CC-20CC. Still has 1 more day of antibiotics left. Doing well.  .           Allergies: Multihance [gadobenate]  Allergies Reconciled.    Review of Systems     All system were reviewed and were negative, except the ones noted above.   Objective     /77   Pulse 84   Temp 98 °F (36.7 °C) (Temporal)   Ht 165.1 cm (65\")   SpO2 97%   BMI 30.45 kg/m²     Body mass index is 30.45 kg/m².    Physical Exam  Breast: right breast incision healing, resolving erythema, no increased warmth, drain intact with minimal output    Result Review :     Procedure:    Assessment and Plan      Diagnoses and all orders for this visit:    1. Postoperative follow-up (Primary)        Plan:    Removed drain from right breast, incision healing well, limit use of arms, return to clinic 2 weeks to discuss implant placement (if radiation agrees). RTC 2 weeks    Follow Up     No follow-ups on file.    Patient was given instructions and counseling regarding her condition. Please see specific information pulled into the AVS if appropriate.     Brigitte Chase, CALOS  04/25/2022  "

## 2022-04-21 ENCOUNTER — APPOINTMENT (OUTPATIENT)
Dept: OCCUPATIONAL THERAPY | Facility: HOSPITAL | Age: 62
End: 2022-04-21

## 2022-04-24 RX ORDER — DIPHENHYDRAMINE HYDROCHLORIDE 50 MG/ML
50 INJECTION INTRAMUSCULAR; INTRAVENOUS AS NEEDED
Status: CANCELLED | OUTPATIENT
Start: 2022-04-25

## 2022-04-24 RX ORDER — FAMOTIDINE 10 MG/ML
20 INJECTION, SOLUTION INTRAVENOUS AS NEEDED
Status: CANCELLED | OUTPATIENT
Start: 2022-04-25

## 2022-04-25 ENCOUNTER — OFFICE VISIT (OUTPATIENT)
Dept: ONCOLOGY | Facility: HOSPITAL | Age: 62
End: 2022-04-25

## 2022-04-25 ENCOUNTER — HOSPITAL ENCOUNTER (OUTPATIENT)
Dept: ONCOLOGY | Facility: HOSPITAL | Age: 62
Setting detail: INFUSION SERIES
Discharge: HOME | End: 2022-04-25

## 2022-04-25 ENCOUNTER — OFFICE VISIT (OUTPATIENT)
Dept: PLASTIC SURGERY | Facility: CLINIC | Age: 62
End: 2022-04-25

## 2022-04-25 VITALS
SYSTOLIC BLOOD PRESSURE: 124 MMHG | HEART RATE: 84 BPM | HEIGHT: 65 IN | BODY MASS INDEX: 30.45 KG/M2 | DIASTOLIC BLOOD PRESSURE: 77 MMHG | OXYGEN SATURATION: 97 % | TEMPERATURE: 98 F

## 2022-04-25 VITALS
DIASTOLIC BLOOD PRESSURE: 76 MMHG | RESPIRATION RATE: 16 BRPM | SYSTOLIC BLOOD PRESSURE: 139 MMHG | OXYGEN SATURATION: 97 % | HEART RATE: 93 BPM | BODY MASS INDEX: 30.49 KG/M2 | TEMPERATURE: 97.6 F | WEIGHT: 182.98 LBS | HEIGHT: 65 IN

## 2022-04-25 VITALS
RESPIRATION RATE: 16 BRPM | WEIGHT: 182.98 LBS | OXYGEN SATURATION: 97 % | TEMPERATURE: 97.6 F | BODY MASS INDEX: 30.45 KG/M2 | DIASTOLIC BLOOD PRESSURE: 76 MMHG | SYSTOLIC BLOOD PRESSURE: 139 MMHG | HEART RATE: 93 BPM

## 2022-04-25 DIAGNOSIS — Z09 POSTOPERATIVE FOLLOW-UP: Primary | ICD-10-CM

## 2022-04-25 DIAGNOSIS — N65.0 BREAST RECONSTRUCTION DEFORMITY: Primary | ICD-10-CM

## 2022-04-25 DIAGNOSIS — C50.811 MALIGNANT NEOPLASM OF OVERLAPPING SITES OF RIGHT BREAST IN FEMALE, ESTROGEN RECEPTOR POSITIVE: Primary | ICD-10-CM

## 2022-04-25 DIAGNOSIS — Z17.0 MALIGNANT NEOPLASM OF OVERLAPPING SITES OF RIGHT BREAST IN FEMALE, ESTROGEN RECEPTOR POSITIVE: ICD-10-CM

## 2022-04-25 DIAGNOSIS — N61.1 BREAST ABSCESS: ICD-10-CM

## 2022-04-25 DIAGNOSIS — C50.811 MALIGNANT NEOPLASM OF OVERLAPPING SITES OF RIGHT BREAST IN FEMALE, ESTROGEN RECEPTOR POSITIVE: ICD-10-CM

## 2022-04-25 DIAGNOSIS — Z17.0 MALIGNANT NEOPLASM OF OVERLAPPING SITES OF RIGHT BREAST IN FEMALE, ESTROGEN RECEPTOR POSITIVE: Primary | ICD-10-CM

## 2022-04-25 LAB
ALBUMIN SERPL-MCNC: 4.42 G/DL (ref 3.5–5.2)
ALBUMIN/GLOB SERPL: 1.8 G/DL
ALP SERPL-CCNC: 62 U/L (ref 39–117)
ALT SERPL W P-5'-P-CCNC: 24 U/L (ref 1–33)
ANION GAP SERPL CALCULATED.3IONS-SCNC: 14.1 MMOL/L (ref 5–15)
AST SERPL-CCNC: 22 U/L (ref 1–32)
BASOPHILS # BLD AUTO: 0.01 10*3/MM3 (ref 0–0.2)
BASOPHILS NFR BLD AUTO: 0.4 % (ref 0–1.5)
BILIRUB SERPL-MCNC: 0.3 MG/DL (ref 0–1.2)
BUN SERPL-MCNC: 16 MG/DL (ref 8–23)
BUN/CREAT SERPL: 21.6 (ref 7–25)
CALCIUM SPEC-SCNC: 9.9 MG/DL (ref 8.6–10.5)
CHLORIDE SERPL-SCNC: 104 MMOL/L (ref 98–107)
CO2 SERPL-SCNC: 19.9 MMOL/L (ref 22–29)
CREAT SERPL-MCNC: 0.74 MG/DL (ref 0.57–1)
DEPRECATED RDW RBC AUTO: 52.4 FL (ref 37–54)
EGFRCR SERPLBLD CKD-EPI 2021: 91 ML/MIN/1.73
EOSINOPHIL # BLD AUTO: 0 10*3/MM3 (ref 0–0.4)
EOSINOPHIL NFR BLD AUTO: 0 % (ref 0.3–6.2)
ERYTHROCYTE [DISTWIDTH] IN BLOOD BY AUTOMATED COUNT: 14.7 % (ref 12.3–15.4)
GLOBULIN UR ELPH-MCNC: 2.5 GM/DL
GLUCOSE SERPL-MCNC: 244 MG/DL (ref 65–99)
HCT VFR BLD AUTO: 35.7 % (ref 34–46.6)
HGB BLD-MCNC: 11.9 G/DL (ref 12–15.9)
IMM GRANULOCYTES # BLD AUTO: 0 10*3/MM3 (ref 0–0.05)
IMM GRANULOCYTES NFR BLD AUTO: 0 % (ref 0–0.5)
LYMPHOCYTES # BLD AUTO: 0.59 10*3/MM3 (ref 0.7–3.1)
LYMPHOCYTES NFR BLD AUTO: 21.4 % (ref 19.6–45.3)
MCH RBC QN AUTO: 32 PG (ref 26.6–33)
MCHC RBC AUTO-ENTMCNC: 33.3 G/DL (ref 31.5–35.7)
MCV RBC AUTO: 96 FL (ref 79–97)
MONOCYTES # BLD AUTO: 0.02 10*3/MM3 (ref 0.1–0.9)
MONOCYTES NFR BLD AUTO: 0.7 % (ref 5–12)
NEUTROPHILS NFR BLD AUTO: 2.14 10*3/MM3 (ref 1.7–7)
NEUTROPHILS NFR BLD AUTO: 77.5 % (ref 42.7–76)
PLATELET # BLD AUTO: 220 10*3/MM3 (ref 140–450)
PMV BLD AUTO: 9.7 FL (ref 6–12)
POTASSIUM SERPL-SCNC: 4 MMOL/L (ref 3.5–5.2)
PROT SERPL-MCNC: 6.9 G/DL (ref 6–8.5)
RBC # BLD AUTO: 3.72 10*6/MM3 (ref 3.77–5.28)
SODIUM SERPL-SCNC: 138 MMOL/L (ref 136–145)
WBC NRBC COR # BLD: 2.76 10*3/MM3 (ref 3.4–10.8)

## 2022-04-25 PROCEDURE — 99214 OFFICE O/P EST MOD 30 MIN: CPT | Performed by: INTERNAL MEDICINE

## 2022-04-25 PROCEDURE — 25010000002 DOCETAXEL 20 MG/ML SOLUTION 8 ML VIAL: Performed by: INTERNAL MEDICINE

## 2022-04-25 PROCEDURE — 25010000002 PEGFILGRASTIM 6 MG/0.6ML PREFILLED SYRINGE KIT: Performed by: INTERNAL MEDICINE

## 2022-04-25 PROCEDURE — 85025 COMPLETE CBC W/AUTO DIFF WBC: CPT | Performed by: INTERNAL MEDICINE

## 2022-04-25 PROCEDURE — 96417 CHEMO IV INFUS EACH ADDL SEQ: CPT

## 2022-04-25 PROCEDURE — 25010000002 CYCLOPHOSPHAMIDE PER 100 MG: Performed by: INTERNAL MEDICINE

## 2022-04-25 PROCEDURE — 25010000002 HEPARIN LOCK FLUSH PER 10 UNITS: Performed by: INTERNAL MEDICINE

## 2022-04-25 PROCEDURE — G0463 HOSPITAL OUTPT CLINIC VISIT: HCPCS | Performed by: INTERNAL MEDICINE

## 2022-04-25 PROCEDURE — 99024 POSTOP FOLLOW-UP VISIT: CPT | Performed by: NURSE PRACTITIONER

## 2022-04-25 PROCEDURE — 96377 APPLICATON ON-BODY INJECTOR: CPT

## 2022-04-25 PROCEDURE — 96413 CHEMO IV INFUSION 1 HR: CPT

## 2022-04-25 PROCEDURE — 80053 COMPREHEN METABOLIC PANEL: CPT | Performed by: INTERNAL MEDICINE

## 2022-04-25 PROCEDURE — 96375 TX/PRO/DX INJ NEW DRUG ADDON: CPT

## 2022-04-25 PROCEDURE — 25010000002 PALONOSETRON PER 25 MCG: Performed by: INTERNAL MEDICINE

## 2022-04-25 RX ORDER — HEPARIN SODIUM (PORCINE) LOCK FLUSH IV SOLN 100 UNIT/ML 100 UNIT/ML
500 SOLUTION INTRAVENOUS AS NEEDED
Status: CANCELLED | OUTPATIENT
Start: 2022-04-25

## 2022-04-25 RX ORDER — SODIUM CHLORIDE 9 MG/ML
250 INJECTION, SOLUTION INTRAVENOUS ONCE
Status: COMPLETED | OUTPATIENT
Start: 2022-04-25 | End: 2022-04-25

## 2022-04-25 RX ORDER — SODIUM CHLORIDE 0.9 % (FLUSH) 0.9 %
20 SYRINGE (ML) INJECTION AS NEEDED
Status: CANCELLED | OUTPATIENT
Start: 2022-04-25

## 2022-04-25 RX ORDER — SODIUM CHLORIDE 0.9 % (FLUSH) 0.9 %
20 SYRINGE (ML) INJECTION AS NEEDED
Status: DISCONTINUED | OUTPATIENT
Start: 2022-04-25 | End: 2022-04-26 | Stop reason: HOSPADM

## 2022-04-25 RX ORDER — PALONOSETRON 0.05 MG/ML
0.25 INJECTION, SOLUTION INTRAVENOUS ONCE
Status: COMPLETED | OUTPATIENT
Start: 2022-04-25 | End: 2022-04-25

## 2022-04-25 RX ORDER — HEPARIN SODIUM (PORCINE) LOCK FLUSH IV SOLN 100 UNIT/ML 100 UNIT/ML
500 SOLUTION INTRAVENOUS AS NEEDED
Status: DISCONTINUED | OUTPATIENT
Start: 2022-04-25 | End: 2022-04-26 | Stop reason: HOSPADM

## 2022-04-25 RX ADMIN — PEGFILGRASTIM 6 MG: KIT SUBCUTANEOUS at 13:14

## 2022-04-25 RX ADMIN — CYCLOPHOSPHAMIDE 1130 MG: 1 INJECTION, POWDER, FOR SOLUTION INTRAVENOUS; ORAL at 13:10

## 2022-04-25 RX ADMIN — DOCETAXEL 140 MG: 20 INJECTION, SOLUTION, CONCENTRATE INTRAVENOUS at 12:05

## 2022-04-25 RX ADMIN — Medication 20 ML: at 13:55

## 2022-04-25 RX ADMIN — PALONOSETRON HYDROCHLORIDE 0.25 MG: 0.25 INJECTION, SOLUTION INTRAVENOUS at 11:34

## 2022-04-25 RX ADMIN — SODIUM CHLORIDE 250 ML: 9 INJECTION, SOLUTION INTRAVENOUS at 11:34

## 2022-04-25 RX ADMIN — HEPARIN SODIUM (PORCINE) LOCK FLUSH IV SOLN 100 UNIT/ML 500 UNITS: 100 SOLUTION at 13:55

## 2022-04-25 NOTE — PROGRESS NOTES
Patient  Marisa Portillo    Location  [facility] HEMATOLOGY & ONCOLOGY    Chief Complaint  Breast Cancer    Referring Provider: Judah Johnson, *  PCP: Judah Johnson MD    Subjective          Oncology/Hematology History Overview Note     Right Breast Cancer:    Diagnostic mammogram on 11/12/2021: 2 cm asymmetry in the outer central right breast with amorphous calcifications.  Similar appearing group of amorphous calcifications in the outer central right breast.  6 mm asymmetry in the inner right breast.  Right axillary lymphadenopathy.    Ultrasound-guided biopsy on 11/1/2021: Right breast 3 o'clock position, 2 cm from the nipple with invasive ductal carcinoma, grade 2.  Right breast 9 o'clock position, 3 cm from the nipple with invasive ductal carcinoma, grade 2, ER positive (50%), ID positive (3%), HER-2 negative (score 0), Ki-67 14%.  Associated with intermediate grade ductal carcinoma in situ.  Right axillary lymph node positive for breast carcinoma.    CT CAP and bone scan on 12/13/21: negative for metastatic disease    Bilateral mastectomy 12/28/2021: Right breast with multiple (2) foci of invasive ductal carcinoma, largest focus 2.3 cm, grade 2, associated with DCIS with extensive intraductal component, all margins negative, 4/10 lymph nodes involved with no extranodal extension and the largest focus measured at 3.3 cm, ER positive (40%), ID positive (5%), HER-2 negative by IHC (score 0), Ki-67 14%, pT2 pN2a cM0     Malignant neoplasm of overlapping sites of right breast in female, estrogen receptor positive (HCC)   12/9/2021 Initial Diagnosis    Malignant neoplasm of overlapping sites of right breast in female, estrogen receptor positive (HCC)     12/28/2021 Cancer Staged    Staging form: Breast, AJCC 8th Edition  - Pathologic stage from 12/28/2021: Stage IB (pT2, pN2a, cM0, G2, ER+, ID+, HER2-) - Signed by Starr Mendez MD PhD on 1/30/2022 1/14/2022 -   Chemotherapy    OP CENTRAL VENOUS ACCESS DEVICE ACCESS, CARE, AND MAINTENANCE (CVAD)     1/31/2022 -  Chemotherapy    OP BREAST TC DOCEtaxel / Cyclophosphamide         HPI  Patient comes in today for cycle 4 of chemotherapy.  This has been delayed 2 weeks due to repeated infection in her breast, near the implant.  She has since had her breast implant removed and the drains were removed today.  The pt says she feels well and is glad to get the chemotherapy over with.      Review of Systems   Constitutional: Negative for appetite change, diaphoresis, fatigue, fever, unexpected weight gain and unexpected weight loss.   HENT: Negative for hearing loss, mouth sores, sore throat, swollen glands, trouble swallowing and voice change.    Eyes: Negative for blurred vision.   Respiratory: Negative for cough, shortness of breath and wheezing.    Cardiovascular: Negative for chest pain and palpitations.   Gastrointestinal: Negative for abdominal pain, blood in stool, constipation, diarrhea, nausea and vomiting.   Endocrine: Negative for cold intolerance and heat intolerance.   Genitourinary: Negative for difficulty urinating, dysuria, frequency, hematuria and urinary incontinence.   Musculoskeletal: Negative for arthralgias, back pain and myalgias.   Skin: Negative for rash, skin lesions and wound.   Neurological: Negative for dizziness, seizures, weakness, numbness and headache.   Hematological: Does not bruise/bleed easily.   Psychiatric/Behavioral: Negative for depressed mood. The patient is not nervous/anxious.    All other systems reviewed and are negative.      Past Medical History:   Diagnosis Date   • Allergies    • Breast cancer (HCC)    • High cholesterol    • IFG (impaired fasting glucose)    • Osteoarthritis    • Port-A-Cath placement 12/29/2021   • S/P BILATERAL IMMEDIATE BREAST RECONSTRUCTION WITH LEFT PRE PEC PLACEMENT OF SILICONE GEL IMPLANT AND MESH, RIGHT PRE PEC PLACEMENT OF EXPANDER AND MESH 12/29/2021   •  TIA (transient ischemic attack)     no residual (tia approximately 8 years ago)   • Vitamin D deficiency      Past Surgical History:   Procedure Laterality Date   • APPENDECTOMY     • BREAST AUGMENTATION Bilateral 12/28/2021    Procedure: bilateral breast reconstructon with bilateral mesh placement.   left implant, right tissue expander placement;  Surgeon: Lacy Shelton MD;  [place] [facility] [place] [facility] [place]  Service: [place];  Laterality: Bilateral;   • BREAST TISSUE EXPANDER REMOVAL INSERTION OF IMPLANT Right 4/15/2022    Procedure: RIGHT BREAST IMPLANT REMOVAL WITH INCISION AND DRAINAGE;  Surgeon: Lacy Shelton MD;  [place] [facility] [place] [facility] [place]  Service: Plastics;  Laterality: Right;   • CEREBRAL ANGIOGRAM      clip placed- pt stated can have MRI with cerebral clips   • CYST REMOVAL Right     rt wrist   • INCISION AND DRAINAGE OF WOUND Right 3/11/2022    Procedure: INCISION AND DRAINAGE ABSCESS OF RIGHT BREAST WITH EXPANDER REMOVAL AND IMPLANT PLACEMENT;  Surgeon: Lacy Shelton MD;  Location: [facility] MAIN OR;  Service: Plastics;  Laterality: Right;   • MASTECTOMY     • MASTECTOMY COMPLETE / SIMPLE W/ SENTINEL NODE BIOPSY Bilateral    • PORTACATH PLACEMENT Left 12/28/2021    Procedure: INSERTION OF PORTACATH;  Surgeon: Jacqueline Mcgraw MD;  [place] [facility] [place] [facility] [place]  Service: General;  Laterality: Left;   • SENTINEL NODE BIOPSY Bilateral 12/28/2021    Procedure: BILATERAL BREAST MASTECTOMIES WITH RIGHT SENTINEL NODE BIOPSIES WITH FROZEN SECTIONS;  Surgeon: Jacqueline Mcgraw MD;  [place] [facility] [place]  Service: General;  Laterality: Bilateral;     Social History     Socioeconomic History   • Marital status:    Tobacco Use   • Smoking status: Never Smoker   • Smokeless tobacco: Never Used   Vaping Use   • Vaping Use: Never used   Substance and Sexual Activity   • Alcohol use: Yes     Comment: socially    • Drug use: Never   • Sexual activity: Defer     Family History   Problem Relation  Age of Onset   • Hypertension Mother    • Diabetes Mother    • Cancer Mother    • Arthritis Mother    • Breast cancer Mother    • Hypertension Father    • Diabetes Father    • Cancer Father    • Arthritis Father    • Liver cancer Father    • Lung cancer Father    • Breast cancer Maternal Aunt    • Malig Hyperthermia Neg Hx        Objective   Physical Exam  General: Alert, cooperative, no acute distress, well-appearing  Eyes: Anicteric sclera, PERRLA  Respiratory: normal respiratory effort  Cardiovascular: no lower extremity edema  Skin: Normal tone, no rash, no lesions  Psychiatric: Appropriate affect, intact judgment  Neurologic: No focal sensory or motor deficits, normal cognition   Musculoskeletal: Normal muscle strength and tone  Extremities: No clubbing, cyanosis, or deformities    Vitals:    04/25/22 1012   BP: 139/76   Pulse: 93   Resp: 16   Temp: 97.6 °F (36.4 °C)   SpO2: 97%   Weight: 83 kg (182 lb 15.7 oz)   PainSc: 0-No pain     ECOG score: 0         PHQ-9 Total Score:         Result Review :   The following data was reviewed by: Starr Mendez MD PhD on 04/25/2022:  Lab Results   Component Value Date    HGB 11.9 (L) 04/25/2022    HCT 35.7 04/25/2022    MCV 96.0 04/25/2022     04/25/2022    WBC 2.76 (L) 04/25/2022    NEUTROABS 2.14 04/25/2022    LYMPHSABS 0.59 (L) 04/25/2022    MONOSABS 0.02 (L) 04/25/2022    EOSABS 0.00 04/25/2022    BASOSABS 0.01 04/25/2022     Lab Results   Component Value Date    GLUCOSE 105 (H) 04/11/2022    BUN 14 04/11/2022    CREATININE 0.80 04/11/2022     04/11/2022    K 4.3 04/11/2022     04/11/2022    CO2 23.2 04/11/2022    CALCIUM 9.1 04/11/2022    PROTEINTOT 6.2 04/11/2022    ALBUMIN 3.96 04/11/2022    BILITOT 0.2 04/11/2022    ALKPHOS 61 04/11/2022    AST 16 04/11/2022    ALT 15 04/11/2022          Assessment and Plan    Diagnoses and all orders for this visit:    1. Breast abscess    2. Malignant neoplasm of overlapping sites of right breast in female,  estrogen receptor positive (HCC)      ER+ Right breast Cancer:  The pt is here for toxicity check prior to C4 of TC.  I reviewed her CBC and CMP and they are adequate for treatment.  I will f/u with her in 1 month for another toxicity check post treatment.  We discussed adjuvant endocrine therapy today but will hold off on starting it for now.  She will see rad onc next week and talk about whether it is safe to delay radiation in order to place a tissue expander again.      Patient was given instructions and counseling regarding her condition or for health maintenance advice. Please see specific information pulled into the AVS if appropriate.

## 2022-04-25 NOTE — PROGRESS NOTES
Patient  Marisa Portillo    Location  [facility] HEMATOLOGY & ONCOLOGY    Chief Complaint  Breast Cancer    Referring Provider: Judah Johnson, *  PCP: Judah Johnson MD    Subjective     {Problem List  Visit Diagnosis   Encounters  Notes  Medications  Labs  Result Review Imaging  Media :23}     Oncology/Hematology History Overview Note     Right Breast Cancer:    Diagnostic mammogram on 11/12/2021: 2 cm asymmetry in the outer central right breast with amorphous calcifications.  Similar appearing group of amorphous calcifications in the outer central right breast.  6 mm asymmetry in the inner right breast.  Right axillary lymphadenopathy.    Ultrasound-guided biopsy on 11/1/2021: Right breast 3 o'clock position, 2 cm from the nipple with invasive ductal carcinoma, grade 2.  Right breast 9 o'clock position, 3 cm from the nipple with invasive ductal carcinoma, grade 2, ER positive (50%), SD positive (3%), HER-2 negative (score 0), Ki-67 14%.  Associated with intermediate grade ductal carcinoma in situ.  Right axillary lymph node positive for breast carcinoma.    CT CAP and bone scan on 12/13/21: negative for metastatic disease    Bilateral mastectomy 12/28/2021: Right breast with multiple (2) foci of invasive ductal carcinoma, largest focus 2.3 cm, grade 2, associated with DCIS with extensive intraductal component, all margins negative, 4/10 lymph nodes involved with no extranodal extension and the largest focus measured at 3.3 cm, ER positive (40%), SD positive (5%), HER-2 negative by IHC (score 0), Ki-67 14%, pT2 pN2a cM0     Malignant neoplasm of overlapping sites of right breast in female, estrogen receptor positive (HCC)   12/9/2021 Initial Diagnosis    Malignant neoplasm of overlapping sites of right breast in female, estrogen receptor positive (HCC)     12/28/2021 Cancer Staged    Staging form: Breast, AJCC 8th Edition  - Pathologic stage from 12/28/2021: Stage IB  (pT2, pN2a, cM0, G2, ER+, SD+, HER2-) - Signed by Starr Mendez MD PhD on 1/30/2022 1/14/2022 -  Chemotherapy    OP CENTRAL VENOUS ACCESS DEVICE ACCESS, CARE, AND MAINTENANCE (CVAD)     1/31/2022 -  Chemotherapy    OP BREAST TC DOCEtaxel / Cyclophosphamide         History of Present Illness  ***    Review of Systems    Past Medical History:   Diagnosis Date   • Allergies    • Breast cancer (HCC)    • High cholesterol    • IFG (impaired fasting glucose)    • Osteoarthritis    • Port-A-Cath placement 12/29/2021   • S/P BILATERAL IMMEDIATE BREAST RECONSTRUCTION WITH LEFT PRE PEC PLACEMENT OF SILICONE GEL IMPLANT AND MESH, RIGHT PRE PEC PLACEMENT OF EXPANDER AND MESH 12/29/2021   • TIA (transient ischemic attack)     no residual (tia approximately 8 years ago)   • Vitamin D deficiency      Past Surgical History:   Procedure Laterality Date   • APPENDECTOMY     • BREAST AUGMENTATION Bilateral 12/28/2021    Procedure: bilateral breast reconstructon with bilateral mesh placement.   left implant, right tissue expander placement;  Surgeon: Lacy Shelton MD;  [place] [facility] [place] [facility] [place]  Service: [place]astics;  Laterality: Bilateral;   • BREAST TISSUE EXPANDER REMOVAL INSERTION OF IMPLANT Right 4/15/2022    Procedure: RIGHT BREAST IMPLANT REMOVAL WITH INCISION AND DRAINAGE;  Surgeon: Lacy Shelton MD;  [place] [facility] [place] [facility] [place]  Service: Plastics;  Laterality: Right;   • CEREBRAL ANGIOGRAM      clip placed- pt stated can have MRI with cerebral clips   • CYST REMOVAL Right     rt wrist   • INCISION AND DRAINAGE OF WOUND Right 3/11/2022    Procedure: INCISION AND DRAINAGE ABSCESS OF RIGHT BREAST WITH EXPANDER REMOVAL AND IMPLANT PLACEMENT;  Surgeon: Lacy Shelton MD;  Location: [facility] OR;  Service: Plastics;  Laterality: Right;   • MASTECTOMY     • MASTECTOMY COMPLETE / SIMPLE W/ SENTINEL NODE BIOPSY Bilateral    • PORTACATH PLACEMENT Left 12/28/2021    Procedure: INSERTION OF PORTACATH;   Surgeon: Jacqueline Mcgraw MD;  Location: [facility];  Service: General;  Laterality: Left;   • SENTINEL NODE BIOPSY Bilateral 12/28/2021    Procedure: BILATERAL BREAST MASTECTOMIES WITH RIGHT SENTINEL NODE BIOPSIES WITH FROZEN SECTIONS;  Surgeon: Jacqueline Mcgraw MD;  Location: [facility];  Service: General;  Laterality: Bilateral;     Social History     Socioeconomic History   • Marital status:    Tobacco Use   • Smoking status: Never Smoker   • Smokeless tobacco: Never Used   Vaping Use   • Vaping Use: Never used   Substance and Sexual Activity   • Alcohol use: Yes     Comment: socially    • Drug use: Never   • Sexual activity: Defer     Family History   Problem Relation Age of Onset   • Hypertension Mother    • Diabetes Mother    • Cancer Mother    • Arthritis Mother    • Breast cancer Mother    • Hypertension Father    • Diabetes Father    • Cancer Father    • Arthritis Father    • Liver cancer Father    • Lung cancer Father    • Breast cancer Maternal Aunt    • Malig Hyperthermia Neg Hx        Objective   Physical Exam  ***    Vitals:    04/25/22 1012   BP: 139/76   Pulse: 93   Resp: 16   Temp: 97.6 °F (36.4 °C)   SpO2: 97%   Weight: 83 kg (182 lb 15.7 oz)   PainSc: 0-No pain     ECOG score: 0         PHQ-9 Total Score:         Result Review :{Labs  Result Review  Imaging  Med Tab  Media  Procedures :23}   The following data was reviewed by: Starr Mendez MD PhD on 04/25/2022:  Lab Results   Component Value Date    HGB 11.9 (L) 04/25/2022    HCT 35.7 04/25/2022    MCV 96.0 04/25/2022     04/25/2022    WBC 2.76 (L) 04/25/2022    NEUTROABS 2.14 04/25/2022    LYMPHSABS 0.59 (L) 04/25/2022    MONOSABS 0.02 (L) 04/25/2022    EOSABS 0.00 04/25/2022    BASOSABS 0.01 04/25/2022     Lab Results   Component Value Date    GLUCOSE 244 (H) 04/25/2022    BUN 16 04/25/2022    CREATININE 0.74 04/25/2022     04/25/2022    K 4.0 04/25/2022     04/25/2022    CO2 19.9 (L) 04/25/2022     CALCIUM 9.9 04/25/2022    PROTEINTOT 6.9 04/25/2022    ALBUMIN 4.42 04/25/2022    BILITOT 0.3 04/25/2022    ALKPHOS 62 04/25/2022    AST 22 04/25/2022    ALT 24 04/25/2022          Assessment and Plan {CC Problem List  Visit Diagnosis   ROS  Review (Popup)  Health Maintenance  Quality  BestPractice  Medications  SmartSets  SnapShot Encounters  Media :23}   Diagnoses and all orders for this visit:    1. Breast abscess    2. Malignant neoplasm of overlapping sites of right breast in female, estrogen receptor positive (HCC)  -     CBC & Differential; Future  -     Comprehensive Metabolic Panel; Future          Patient was given instructions and counseling regarding her condition or for health maintenance advice. Please see specific information pulled into the AVS if appropriate.     Starr Mendez MD PhD    4/25/2022

## 2022-04-26 ENCOUNTER — APPOINTMENT (OUTPATIENT)
Dept: OCCUPATIONAL THERAPY | Facility: HOSPITAL | Age: 62
End: 2022-04-26

## 2022-05-02 ENCOUNTER — APPOINTMENT (OUTPATIENT)
Dept: RADIATION ONCOLOGY | Facility: HOSPITAL | Age: 62
End: 2022-05-02

## 2022-05-09 ENCOUNTER — HOSPITAL ENCOUNTER (OUTPATIENT)
Dept: OCCUPATIONAL THERAPY | Facility: HOSPITAL | Age: 62
Setting detail: THERAPIES SERIES
Discharge: HOME | End: 2022-05-09

## 2022-05-09 ENCOUNTER — OFFICE VISIT (OUTPATIENT)
Dept: PLASTIC SURGERY | Facility: CLINIC | Age: 62
End: 2022-05-09

## 2022-05-09 ENCOUNTER — OFFICE VISIT (OUTPATIENT)
Dept: SURGERY | Facility: CLINIC | Age: 62
End: 2022-05-09

## 2022-05-09 VITALS
HEART RATE: 97 BPM | SYSTOLIC BLOOD PRESSURE: 146 MMHG | DIASTOLIC BLOOD PRESSURE: 82 MMHG | BODY MASS INDEX: 30.29 KG/M2 | HEIGHT: 65 IN | TEMPERATURE: 98 F

## 2022-05-09 VITALS — WEIGHT: 182 LBS | RESPIRATION RATE: 15 BRPM | HEIGHT: 65 IN | BODY MASS INDEX: 30.32 KG/M2

## 2022-05-09 DIAGNOSIS — L24.A9 WOUND DRAINAGE: Primary | ICD-10-CM

## 2022-05-09 DIAGNOSIS — Z17.0 MALIGNANT NEOPLASM OF OVERLAPPING SITES OF RIGHT BREAST IN FEMALE, ESTROGEN RECEPTOR POSITIVE: Primary | ICD-10-CM

## 2022-05-09 DIAGNOSIS — Z91.89 AT RISK FOR LYMPHEDEMA: Primary | ICD-10-CM

## 2022-05-09 DIAGNOSIS — R52 PAIN: ICD-10-CM

## 2022-05-09 DIAGNOSIS — C50.811 MALIGNANT NEOPLASM OF OVERLAPPING SITES OF RIGHT BREAST IN FEMALE, ESTROGEN RECEPTOR POSITIVE: Primary | ICD-10-CM

## 2022-05-09 DIAGNOSIS — L90.5 SCAR CONDITION AND FIBROSIS OF SKIN: ICD-10-CM

## 2022-05-09 PROCEDURE — 99212 OFFICE O/P EST SF 10 MIN: CPT | Performed by: SURGERY

## 2022-05-09 PROCEDURE — 87186 SC STD MICRODIL/AGAR DIL: CPT | Performed by: NURSE PRACTITIONER

## 2022-05-09 PROCEDURE — 97535 SELF CARE MNGMENT TRAINING: CPT

## 2022-05-09 PROCEDURE — 99024 POSTOP FOLLOW-UP VISIT: CPT | Performed by: NURSE PRACTITIONER

## 2022-05-09 PROCEDURE — 87205 SMEAR GRAM STAIN: CPT | Performed by: NURSE PRACTITIONER

## 2022-05-09 PROCEDURE — 87070 CULTURE OTHR SPECIMN AEROBIC: CPT | Performed by: NURSE PRACTITIONER

## 2022-05-09 PROCEDURE — 87147 CULTURE TYPE IMMUNOLOGIC: CPT | Performed by: NURSE PRACTITIONER

## 2022-05-09 PROCEDURE — 93702 BIS XTRACELL FLUID ANALYSIS: CPT

## 2022-05-09 RX ORDER — SULFAMETHOXAZOLE AND TRIMETHOPRIM 800; 160 MG/1; MG/1
1 TABLET ORAL 2 TIMES DAILY
Qty: 20 TABLET | Refills: 0 | Status: SHIPPED | OUTPATIENT
Start: 2022-05-09 | End: 2022-05-26

## 2022-05-09 NOTE — PROGRESS NOTES
Answers for HPI/ROS submitted by the patient on 1/6/2022  Have you had these symptoms before?: No  How long have you been having these symptoms?: 1-2 weeks  What is the primary reason for your visit?: Other    Conflicting answers have been found for some questions. Please document the patient's answers manually.     Chief Complaint  Follow-up    Subjective          History of Present Illness  The patient is here to follow up on bilateral mastectomy with axillary dissection.  They are doing well and have no complaints.  Pathology is shown below:      Clinical Information    Comment:    Malignant neoplasm of overlapping sites of right breast in female, estrogen receptor positive (HCC)      Final Diagnosis   1. [place] node #1, excision:               - One lymph node positive for macrometastatic carcinoma (1/1)               - See synoptic checklist     2. [place] node #2, excision:               - One lymph node positive for macrometastatic carcinoma (1/1)               - See synoptic checklist     3. [place] node #3, excision:               - One lymph node negative for metastatic carcinoma (0/1)               - See synoptic checklist     4. [place] node #4, excision:               - One of two lymph nodes positive for macrometastatic carcinoma (1/2)               - See synoptic checklist     5. [place] node #5, excision:               - One of three lymph nodes positive for macrometastatic carcinoma (1/3)               - See synoptic checklist     6. [place] node #6, excision:               - One lymph node negative for carcinoma (0/1)               - See synoptic checklist     7. [place] node #7, excision:               - One lymph node negative for carcinoma (0/1)               - See synoptic checklist     8. Left breast, mastectomy:               - Fibrocystic change               - Usual ductal hyperplasia     9. Left breast, retroareolar biopsy:               - Intraductal papilloma               -  "Usual ductal hyperplasia     10. 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 Right breast, retroareolar biopsy:               - Negative for carcinoma and carcinoma in situ               - See synoptic checklist     12. Right breast, additional retroareolar tissue, excision:               - Negative for carcinoma and carcinoma in situ               - See synoptic checklist      Electronically signed by Deyvi Crews MD on 12/29/2021 at 1250   Synoptic Checklist     INVASIVE CARCINOMA OF THE BREAST: Resection  8th Edition - Protocol posted: 6/30/2021  INVASIVE CARCINOMA OF THE BREAST: COMPLETE EXCISION - 1, 2, 3, 4, 5, 6, 7, 10, 11, 12  SPECIMEN   Procedure  Total mastectomy    Specimen Laterality  Right    TUMOR   Histologic Type  Invasive carcinoma of no special type (ductal)    Histologic Grade ([place] Histologic Score)     Glandular (Acinar) / Tubular Differentiation  Score 3    Nuclear Pleomorphism  Score 2    Mitotic Rate  Score 2    Overall Grade  Grade 2 (scores of 6 or 7)    Tumor Size  Greatest dimension of largest invasive focus (Millimeters): 23 mm   Tumor Focality  Multiple foci of invasive carcinoma    Number of Foci  2    Ductal Carcinoma In Situ (DCIS)  Present      Positive for extensive intraductal component (EIC)    Architectural Patterns  [place]      Cribriform      Papillary      Solid    Nuclear Grade  Grade III (high)    Necrosis  Present, central (expansive \"comedo\" necrosis)    Tumor Extent     Microcalcifications  Present in invasive carcinoma      Present in non-neoplastic tissue    Treatment Effect in the Breast  No known presurgical therapy    MARGINS   Margin Status for Invasive Carcinoma  All margins " negative for invasive carcinoma    Distance from Invasive Carcinoma to Closest Margin  20 mm   Closest Margin(s) to Invasive Carcinoma  Posterior    Margin Status for DCIS  All margins negative for DCIS    Distance from DCIS to Closest Margin  7 mm   Closest Margin(s) to DCIS  Posterior    REGIONAL LYMPH NODES   Regional Lymph Node Status  Tumor present in regional lymph node(s)    Number of Lymph Nodes with Macrometastases  4    Number of Lymph Nodes with Micrometastases  0    Number of Lymph Nodes with Isolated Tumor Cells  0    Size of Largest Sofi Metastatic Deposit  23 mm   Extranodal Extension  Not identified    Total Number of Lymph Nodes Examined (sentinel and non-sentinel)  10    Number of [place] Nodes Examined  10    PATHOLOGIC STAGE CLASSIFICATION (pTNM, AJCC 8th Edition)   Reporting of pT, pN, and (when applicable) pM categories is based on information available to the pathologist at the time the report is issued. As per the AJCC (Chapter 1, 8th Ed.) it is the managing physician’s responsibility to establish the final pathologic stage based upon all pertinent information, including but potentially not limited to this pathology report.   TNM Descriptors  m (multiple foci of invasive carcinoma)    pT Category  pT2    pN Category  pN2a    Breast Biomarker Testing Performed on Previous Biopsy     Estrogen Receptor (ER) Status  Positive (greater than 10% of cells demonstrate nuclear positivity)    Percentage of Cells with Nuclear Positivity  40 %   Breast Biomarker Testing Performed on Previous Biopsy     Progesterone Receptor (PgR) Status  Positive    Percentage of Cells with Nuclear Positivity  5 %   Breast Biomarker Testing Performed on Previous Biopsy     HER2 (by immunohistochemistry)  Negative (Score 0)    Breast Biomarker Testing Performed on Previous Biopsy     Ki-67 Percentage of Positive Nuclei  14 %   Testing Performed on Case Number  [id number]    .              Objective   Vital Signs:   Resp  "15   Ht 165.1 cm (65\")   Wt 82.6 kg (182 lb)   BMI 30.29 kg/m²     Physical Exam  Vitals and nursing note reviewed.   Constitutional:       General: She is not in acute distress.     Appearance: Normal appearance. She is well-developed.   HENT:      Head: Normocephalic and atraumatic.   Eyes:      Extraocular Movements: Extraocular movements intact.      Pupils: Pupils are equal, round, and reactive to light.   Cardiovascular:      Pulses: Normal pulses.   Pulmonary:      Effort: Pulmonary effort is normal. No retractions.      Breath sounds: Normal air entry. No wheezing.   Abdominal:      General: There is no distension.      Palpations: Abdomen is soft.      Tenderness: There is no abdominal tenderness.      Hernia: No hernia is present.   Musculoskeletal:         General: No swelling or deformity.      Cervical back: Neck supple.   Skin:     General: Skin is warm and dry.      Findings: No erythema.      Comments: Surgical Incision Healing Well   Neurological:      General: No focal deficit present.      Mental Status: She is alert and oriented to person, place, and time.      Motor: Motor function is intact.   Psychiatric:         Mood and Affect: Mood normal.         Thought Content: Thought content normal.               Assessment and Plan    Diagnoses and all orders for this visit:    1. Malignant neoplasm of overlapping sites of right breast in female, estrogen receptor positive (HCC) (Primary)    Keep apt with oncology and plastics  Followup one year from original surgery for annual exam    Follow Up   Return in about 7 months (around 12/14/2022).  Patient was given instructions and counseling regarding her condition or for health maintenance advice. Please see specific information pulled into the AVS if appropriate.     "

## 2022-05-09 NOTE — THERAPY RE-EVALUATION
Outpatient Occupational Therapy Lymphedema Re-Evaluation   [place]     Patient Name: Marisa Portillo  : 1960  MRN: [id number]  Today's Date: 2022      Visit Date: 2022    Patient Active Problem List   Diagnosis   • Arthritis   • Bladder disorder   • Concussion   • Contact dermatitis and eczema   • Injury, other and unspecified, finger   • Limb swelling   • Seasonal allergic rhinitis   • Vitamin D deficiency   • IFG (impaired fasting glucose)   • Mixed hyperlipidemia   • TIA (transient ischemic attack)   • Malignant neoplasm of overlapping sites of right breast in female, estrogen receptor positive (HCC)   • Port-A-Cath placement   • S/P bilateral nipple sparing mastectomy   • S/P BILATERAL IMMEDIATE BREAST RECONSTRUCTION WITH LEFT PRE PEC PLACEMENT OF SILICONE GEL IMPLANT AND MESH, RIGHT PRE PEC PLACEMENT OF EXPANDER AND MESH   • Sepsis, due to unspecified organism, unspecified whether acute organ dysfunction present (HCC)   • Breast abscess   • Breast infection        Past Medical History:   Diagnosis Date   • Allergies    • Breast cancer (HCC)    • High cholesterol    • IFG (impaired fasting glucose)    • Osteoarthritis    • Port-A-Cath placement 2021   • S/P BILATERAL IMMEDIATE BREAST RECONSTRUCTION WITH LEFT PRE PEC PLACEMENT OF SILICONE GEL IMPLANT AND MESH, RIGHT PRE PEC PLACEMENT OF EXPANDER AND MESH 2021   • TIA (transient ischemic attack)     no residual (tia approximately 8 years ago)   • Vitamin D deficiency         Past Surgical History:   Procedure Laterality Date   • APPENDECTOMY     • BREAST AUGMENTATION Bilateral 2021    Procedure: bilateral breast reconstructon with bilateral mesh placement.   left implant, right tissue expander placement;  Surgeon: Lacy Shelton MD;  Location: [facility] OR [facility] [place];  Service: Plastics;  Laterality: Bilateral;   • BREAST TISSUE EXPANDER REMOVAL INSERTION OF IMPLANT Right 4/15/2022    Procedure: RIGHT BREAST IMPLANT REMOVAL WITH  INCISION AND DRAINAGE;  Surgeon: Lacy Shelton MD;  [place] [facility] [place] [facility]  Service: Plastics;  Laterality: Right;   • CEREBRAL ANGIOGRAM      clip placed- pt stated can have MRI with cerebral clips   • CYST REMOVAL Right     rt wrist   • INCISION AND DRAINAGE OF WOUND Right 3/11/2022    Procedure: INCISION AND DRAINAGE ABSCESS OF RIGHT BREAST WITH EXPANDER REMOVAL AND IMPLANT PLACEMENT;  Surgeon: Lacy Shelton MD;  Location: [facility] MAIN OR;  Service: Plastics;  Laterality: Right;   • MASTECTOMY     • MASTECTOMY COMPLETE / SIMPLE W/ SENTINEL NODE BIOPSY Bilateral    • PORTACATH PLACEMENT Left 12/28/2021    Procedure: INSERTION OF PORTACATH;  Surgeon: Jacqueline Mcgraw MD;  [place] [facility] [place] [facility]  Service: General;  Laterality: Left;   • SENTINEL NODE BIOPSY Bilateral 12/28/2021    Procedure: BILATERAL BREAST MASTECTOMIES WITH RIGHT SENTINEL NODE BIOPSIES WITH FROZEN SECTIONS;  Surgeon: Jacqueline Mcgraw MD;  [place] [facility] [place] [facility]  Service: General;  Laterality: Bilateral;         Visit Dx:     ICD-10-CM ICD-9-CM   1. At risk for lymphedema  Z91.89 V49.89   2. Scar condition and fibrosis of skin  L90.5 709.2   3. Pain  R52 780.96            Lymphedema     Row Name 05/09/22 0800             Subjective Pain    Able to rate subjective pain? yes  -CH      Pre-Treatment Pain Level 0  -CH      Post-Treatment Pain Level 0  -CH      Subjective Pain Comment No pain noted during the session.  -CH              Subjective Comments    Subjective Comments Patient reports having difficulty with her right breast implant.  Patient states she developed a seroma with infection and had to undergo a removal of the breast implant.  Patient continued to have further seroma and a drain tube was put in place.  Patient was instructed by the plastic surgeon to discontinue all movement in the right upper extremity, discontinue use of compression and to limit lifting with the right upper extremity greater than 10  "pounds.  Patient states that the surgeon is planning on replacing the implant prior to radiation therapy if radiation therapy is agreeable.  Patient also reports noting a return of seroma with inflammation around the nipple  -CH              Lymphedema Assessment    Lymphedema Classification RUE:;at risk/stage 0  -CH      Lymphedema Cancer Related Sx bilateral;simple mastectomy;sentinel node biopsy;other (comment)  Immediate above the pectoral implantation with expander on the right and full implant on the left.  -CH      Lymph Nodes Removed # 10  -CH      Positive Lymph Nodes # 4  -CH      Chemo Received yes  -CH      Chemo Treatments #/Timeframe TC DOCEtaxel / Cyclophosphamide  -CH      Radiation Therapy Received unspecified  -CH      Radiation Treatments #/Timeframe Patient has not started radiation at this time due to setbacks with chemotherapy and the issue with the breast reconstruction  -CH              LLIS - Physical Concerns    The amount of pain associated with my lymphedema is: 0  -CH      The amount of limb heaviness associated with my lymphedema is: 0  -CH      The amount of skin tightness associated with my lymphedema is: 0  -CH      The size of my swollen limb(s) seems: 0  -CH      Lymphedema affects the movement of my swollen limb(s): 0  -CH      The strength in my swollen limb(s) is: 0  -CH              LLIS - Psychosocial Concerns    Lymphedema affects my body image (i.e., \"how I think I look\"). 0  -CH      Lymphedema affects my socializing with others. 0  -CH      Lymphedema affects my intimate relations with spouse or partner (rate 0 if not applicable 0  -CH      Lymphedema \"gets me down\" (i.e., depression, frustration, or anger) 0  -CH      I must rely on others for help due to my lymphedema. 0  -CH      I know what to do to manage my lymphedema 2  -CH              LLIS - Functional Concerns    Lymphedema affects my ability to perform self-care activities (i.e. eating, dressing, hygiene) 0  -CH "      Lymphedema affects my ability to perform routine home or work-related activities. 0  -CH      Lymphedema affects my performance of preferred leisure activities. 0  -CH      Lymphedema affects proper fit of clothing/shoes 0  -CH      Lymphedema affects my sleep 0  -CH              Skin Changes/Observations    Location/Assessment Upper Quadrant  -CH      Upper Quadrant Conditions right:;clean  -CH      Upper Quadrant Color/Pigment right:;purple;fibrosis;other (comment)  -CH      Skin Observations Comment Patient is observed with fluid underneath the fibrotic scar tissue in the right breast.  Patient is noted with a purulent weeping out of pinhole sized opening at the 6 o'clock position of the breast that eventually stopped looking purulent and was serous in appearance after OT was able to milk fluid out of the opening.  -CH              L-Dex Bioimpedence Screening    L-Dex Measurement Extremity RUE  -CH      L-Dex Patient Position Standing  -CH      L-Dex UE Dominate Side Right  -CH      L-Dex UE At Risk Side Right  -CH      L-Dex UE Pre Surgical Value Yes  -CH      L-Dex UE Score -2.7  -CH      L-Dex UE Baseline Score -4.5  -CH      L-Dex UE Value Change 1.8  -CH      $ L-Dex Charge yes  -CH              Lymphedema Life Impact Scale Totals    A.  Total Q1 - Q17 (Do not include Q18) 2  -CH      B.  Total number of questions answered (Q1-Q17) 17  -CH      C. Divide A by B 0.12  -CH      D. Multiple C by 25 3  -CH            User Key  (r) = Recorded By, (t) = Taken By, (c) = Cosigned By    Initials Name Provider Type    Daja Villafana OT Occupational Therapist              Therapy Education  Education Details: OT discussed the fluid drainage from the right breast.  OT contacted the plastic surgeon to inform them of the change although patient does have an appointment with her breast surgeon as well today.  OT and patient discussed the restrictions and range of motion weightbearing and compression.  OT  educated patient on precautions to manage inflammation during radiation therapy.  OT did request patient withhold doing self manual lymphatic drainage until either infection has been ruled out or she has been on her antibiotic for 3 days worth of full doses antibiotics.  OT requested that patient contact this office approximately 3 weeks after she is completed radiation therapy.  OT did not schedule this appointment at this time given the fact that patient is still managing ongoing issues in the right breast and is unsure when radiation therapy will be able to start.  OT also recommended patient contact therapy if she is having difficulty with pain and swelling or lack of functional use of her right upper extremity in self-care and work-related tasks.  Given: Symptoms/condition management  Program: Reinforced, New  How Provided: Verbal  Provided to: Patient  Level of Understanding: Verbalized  58310 - OT Self Care/Mgmt Minutes: 25     Pt. educated on side effects of radiation therapy including edema, skin integrity and soft tissue changes.    Pt. educated to perform self-manual lymphatic drainage 2 times daily to mitigate edema/lymphedema.  Pt. educated on performing stretching 2 times daily to prevent soft tissue tightening and/or range of motion deficits.  Pt. also educated to keep skin well moisturized per XRT instruction.  OT also recommended patient increase hydration to improve skin integrity through XRT.       OT Goals     Row Name 05/09/22 1543          Time Calculation    OT Goal Re-Cert Due Date 06/08/22  -           User Key  (r) = Recorded By, (t) = Taken By, (c) = Cosigned By    Initials Name Provider Type    Daja Villafana OT Occupational Therapist              1. Post Breast Surgery Care/at risk for Lymphedema  LTG 1: 90 days:  As an indicator of no exacerbation of lymphedema staging, the patient will present with an L-Dex score less than 7 points from preoperative  baseline.              STATUS: met. ongoing  STG 1a:   30 days: To prevent exacerbation of mixed edema to lymphedema, patient will utilize the 2 postsurgical compression garments daily.                 STATUS: met.  STG 1b: 30 days: Patient will be independent with self-manual lymphatic massage.               STATUS: met. ongoing  STG 1c: 30 days:  Patient will be independent with identification of signs and symptoms of lymphedema exasperation per stoplight to recovery education handout.              STATUS: met. ongoing  STG 1 d: 30 days: Patient will be independent with HEP to prevent advancement in lymphedema staging.              STATUS: met. Ongoing.  TREATMENT:  Self Care/ADL retraining, Therapeutic Activity, Neuromuscular Re-education, Therapeutic Exercise, Bioimpedence Fluid Analysis, Post-Surgical compression garement [address] Zip-ST-High/ [employer] Camisole Kit 2860K, Orthotic Management and training,  and Manual Therapy.     OT Assessment/Plan     Row Name 05/09/22 1539          OT Assessment    Functional Limitations Performance in self-care ADL  -CH     Impairments Impaired lymphatic circulation;Integumentary integrity;Pain  -CH     Assessment Comments Patient is noted with normalized lymphatic functioning in the right upper extremity at this time.  Patient does have scar tissue fibrosis and fluid building in the right chest wall.  Patient will benefit from skilled occupational therapy services to evaluate ongoing lymphatic functioning to prevent advancement in lymphedema staging.  Patient may also benefit from skilled occupational therapy services to manage any secondary orthopedic dysfunction from lack of use of her right upper extremity while healing is taking place.  -CH     OT Rehab Potential Excellent  -CH     Patient/caregiver participated in establishment of treatment plan and goals Yes  -CH     Patient would benefit from skilled therapy intervention Yes  -CH            OT Plan    OT Frequency  Other (comment)  See duration  -     Predicted Duration of Therapy Intervention (OT) Pt. to re-evaluated 3 weeks post-surgery, 3 weeks post XRT, every 3 months from baseline for years 1-3 and every 6 months years 4 and 5  -     Planned CPT's? OT RE-EVAL: 45412;OT SELF CARE/MGMT/TRAIN 15 MIN: 68429;OT NEUROMUSC RE EDUCATION EA 15 MIN: 60707;OT THER ACT EA 15 MIN: [id number];OT THER PROC EA 15 MIN: 06482LT;OT ULTRASOUND EA 15 MIN: 51925;OT MANUAL THERAPY EA 15 MIN: 89520;OT BIS XTRACELL FLUID ANALYSIS: 07342;OT ORTHOTIC MGMT/TRAIN EA 15 MIN: 90844;OT ORTHO/PROSTHET CHECKOUT EA 15 MIN: 34237  -     Planned Therapy Interventions (Optional Details) home exercise program;joint mobilization;manual therapy techniques;orthotic fitting/training;patient/family education;postural re-education;ROM (Range of Motion);strengthening;stretching  -           User Key  (r) = Recorded By, (t) = Taken By, (c) = Cosigned By    Initials Name Provider Type     Daja Felix OT Occupational Therapist                          Time Calculation:   Timed Charges  65622 - OT Self Care/Mgmt Minutes: 25  Total Minutes  Timed Charges Total Minutes: 25   Total Minutes: 25     Therapy Charges for Today     Code Description Service Date Service Provider Modifiers Qty    54827328509 HC PT BIS XTRACELL FLUID ANALYSIS 5/9/2022 Daja Felix OT  1    96336018427 HC OT SELF CARE/MGMT/TRAIN EA 15 MIN 5/9/2022 Daja Felix OT GO 2                    Daja Felix OT  5/9/2022

## 2022-05-09 NOTE — PROGRESS NOTES
"Chief Complaint    Subjective         History of Present Illness  Marisa Portillo is a 62 y.o. female who presents to [facility] for Postoperative Follow-Up RIGHT BREAST IMPLANT REMOVAL WITH INCISION AND DRAINAGE on 4/15/2022.     Here today for concern with right breast. Pt noticed her right breast nipple has been swollen for a little during the day but then the swelling would go away at night. Pt was at lymphedema clinic and they had noticed some clear drainage coming from right breast nipple. Denies pain. Dr. Mcgarw thought it looked serous fluid at appt this morning.           Allergies: Multihance [gadobenate]  Allergies Reconciled.    Review of Systems     All system were reviewed and were negative, except the ones noted above.   Objective     /82   Pulse 97   Temp 98 °F (36.7 °C) (Temporal)   Ht 165.1 cm (65\")   BMI 30.29 kg/m²     Body mass index is 30.29 kg/m².    Physical Exam  Breast: right breast incision healing, mild 1 cm area  Erythema over center of incision, no increased warmth, amal amount fluid palpable, aspirated 85 cc of cloudy yellow fluid    Result Review :     Procedure:    Assessment and Plan      Diagnoses and all orders for this visit:    1. Wound drainage (Primary)  -     Wound Culture - Wound, Breast, Right    Other orders  -     sulfamethoxazole-trimethoprim (Bactrim DS) 800-160 MG per tablet; Take 1 tablet by mouth 2 (Two) Times a Day.  Dispense: 20 tablet; Refill: 0        Plan:    Wrapped in ace wrap, wear compression, will send in antibiotics and check culture and sensitivity, incision healing well, limit use of arms, return to clinic Wed to discuss future surgery with Dr. Shelton.  Follow Up     No follow-ups on file.    Patient was given instructions and counseling regarding her condition. Please see specific information pulled into the AVS if appropriate.     Brigitte Chase, APRN  05/09/2022  "

## 2022-05-11 ENCOUNTER — OFFICE VISIT (OUTPATIENT)
Dept: PLASTIC SURGERY | Facility: CLINIC | Age: 62
End: 2022-05-11

## 2022-05-11 VITALS
HEART RATE: 66 BPM | SYSTOLIC BLOOD PRESSURE: 122 MMHG | OXYGEN SATURATION: 98 % | DIASTOLIC BLOOD PRESSURE: 72 MMHG | TEMPERATURE: 97.5 F | BODY MASS INDEX: 30.29 KG/M2 | HEIGHT: 65 IN

## 2022-05-11 DIAGNOSIS — Z17.0 MALIGNANT NEOPLASM OF OVERLAPPING SITES OF RIGHT BREAST IN FEMALE, ESTROGEN RECEPTOR POSITIVE: Primary | ICD-10-CM

## 2022-05-11 DIAGNOSIS — N61.1 BREAST ABSCESS: ICD-10-CM

## 2022-05-11 DIAGNOSIS — C50.811 MALIGNANT NEOPLASM OF OVERLAPPING SITES OF RIGHT BREAST IN FEMALE, ESTROGEN RECEPTOR POSITIVE: Primary | ICD-10-CM

## 2022-05-11 LAB
BACTERIA SPEC AEROBE CULT: ABNORMAL
GRAM STN SPEC: ABNORMAL
GRAM STN SPEC: ABNORMAL

## 2022-05-11 PROCEDURE — 99024 POSTOP FOLLOW-UP VISIT: CPT | Performed by: SURGERY

## 2022-05-11 NOTE — PROGRESS NOTES
"Chief Complaint    Subjective         History of Present Illness  Marisa Portillo is a 62 y.o. female who presents to [facility] for Postoperative Follow-Up RIGHT BREAST IMPLANT REMOVAL WITH INCISION AND DRAINAGE on 4/15/2022.     Pt noticed her right breast nipple has been swollen for a little during the day but then the swelling would go away at night.  Denies pain. Is wearing compression bra today and has minimal yellow drainage. Is on Bactrim. Took culture from right breast drainage last visit. Culture showed...    Scant growth (1+) Staphylococcus aureus Abnormal.    Aspirated 100CC's last visit and then 50CC's today.     Here today to discuss surgery plan.          Allergies: Multihance [gadobenate]  Allergies Reconciled.    Review of Systems     All system were reviewed and were negative, except the ones noted above.   Objective     /72   Pulse 66   Temp 97.5 °F (36.4 °C) (Temporal)   Ht 165.1 cm (65\")   SpO2 98%   BMI 30.29 kg/m²     Body mass index is 30.29 kg/m².    Physical Exam  Breast: right breast incision healing, mild 1 cm area  Erythema over center of incision, no increased warmth, amal amount fluid palpable, aspirated 30 cc of cloudy yellow fluid    Result Review :     Procedure:    Assessment and Plan      Diagnoses and all orders for this visit:    1. Malignant neoplasm of overlapping sites of right breast in female, estrogen receptor positive (HCC) (Primary)    2. Breast abscess        Plan:  Unfortunately, I won't be able to proceed with reconstruction before radiation due to her recurrent infections. She will heal and come back for follow up for delay reconstruction, probably flap.     Follow Up     No follow-ups on file.    Patient was given instructions and counseling regarding her condition. Please see specific information pulled into the AVS if appropriate.     Lacy Shelton MD  05/11/2022  "

## 2022-05-12 ENCOUNTER — NURSE NAVIGATOR (OUTPATIENT)
Dept: ONCOLOGY | Facility: HOSPITAL | Age: 62
End: 2022-05-12

## 2022-05-12 NOTE — PROGRESS NOTES
Called Ms. Bandar to check in with her as she has finished chemo.  She shared with me that she has another breast infection and that Cat is putting her surgery on hold for now and wanting her to go to Radiation.  Her appointment with Radiation is scheduled for June 9th as of now.  She wanted to see if she could get that appointment moved up.  I called Radiation Therapy and expressed her wish for appointment to be moved.   Radiation Therapy will look at schedule and try to move patient appointment up to an earlier date and call with new appointment.  Ms. Portillo called and informed.

## 2022-05-18 ENCOUNTER — TELEPHONE (OUTPATIENT)
Dept: OCCUPATIONAL THERAPY | Facility: HOSPITAL | Age: 62
End: 2022-05-18

## 2022-05-18 DIAGNOSIS — C50.811 MALIGNANT NEOPLASM OF OVERLAPPING SITES OF RIGHT BREAST IN FEMALE, ESTROGEN RECEPTOR POSITIVE: Primary | ICD-10-CM

## 2022-05-18 DIAGNOSIS — Z90.10 DEFORMITY DUE TO MASTECTOMY: ICD-10-CM

## 2022-05-18 DIAGNOSIS — Z17.0 MALIGNANT NEOPLASM OF OVERLAPPING SITES OF RIGHT BREAST IN FEMALE, ESTROGEN RECEPTOR POSITIVE: Primary | ICD-10-CM

## 2022-05-18 DIAGNOSIS — Z90.13 HISTORY OF BILATERAL MASTECTOMY: ICD-10-CM

## 2022-05-18 DIAGNOSIS — N64.89 DEFORMITY DUE TO MASTECTOMY: ICD-10-CM

## 2022-05-18 NOTE — PROGRESS NOTES
"Chief Complaint  Follow-up Breast Recon   Subjective          History of Present Illness  Marisa Portillo is a 62 y.o. female who presents to [facility] SURG[facility] for a follow up  04/15/22 RIGHT BREAST IMPLANT REMOVAL WITH INCISION AND DRAINAGE.    Mild swelling and feels like more fluid on right breast. Stated she has done some reaching, pulling at work and may have overdone it. Has been wearing compression bra and ace bandage. Some soreness she noticed a few days ago on right breast and discomfort.      Allergies: Multihance [gadobenate]  Allergies Reconciled.    Review of Systems   All review of system has been reviewed and it  is negative except the ones note above.     Objective     /81   Pulse 78   Temp 98.2 °F (36.8 °C) (Temporal)   Ht 165.1 cm (65\")   SpO2 98%   BMI 30.67 kg/m²     Body mass index is 30.67 kg/m².    Physical Exam  General Inspection: Incision healing well, no erythema however there is fluid on the right breast.     Result Review :                Assessment and Plan {CC Problem List  Visit Diagnosis  ROS  Review (Popup)  Health Maintenance  Quality  BestPractice  Medications  SmartSets  SnapShot Encounters  Media :23}     Diagnoses and all orders for this visit:    1. Breast infection (Primary)  -     Wound Culture - Wound, Breast, Right    2. Breast abscess    3. Postoperative follow-up    4. Recurrent seroma of breast        Plan:  • 70 cc of serous fluid drained from right breast. Fluid sent to culture. Follow up in 2 weeks for wound evaluation. I recommend patient to avoid working so she can heal better.     I spent 15 minutes caring for Marisa on this date of service. This time includes time spent by me in the following activities:performing a medically appropriate examination and/or evaluation  and documenting information in the medical record    Follow Up     No follow-ups on file.    Patient was given instructions and " counseling regarding her condition. Please see specific information pulled into the AVS if appropriate.     Lacy Shelton MD  05/26/2022

## 2022-05-23 ENCOUNTER — HOSPITAL ENCOUNTER (OUTPATIENT)
Dept: ONCOLOGY | Facility: HOSPITAL | Age: 62
Setting detail: INFUSION SERIES
Discharge: HOME | End: 2022-05-23

## 2022-05-23 ENCOUNTER — OFFICE VISIT (OUTPATIENT)
Dept: ONCOLOGY | Facility: HOSPITAL | Age: 62
End: 2022-05-23

## 2022-05-23 VITALS
OXYGEN SATURATION: 97 % | WEIGHT: 184.3 LBS | BODY MASS INDEX: 30.67 KG/M2 | TEMPERATURE: 97.9 F | SYSTOLIC BLOOD PRESSURE: 126 MMHG | HEART RATE: 92 BPM | DIASTOLIC BLOOD PRESSURE: 70 MMHG | RESPIRATION RATE: 18 BRPM

## 2022-05-23 DIAGNOSIS — C50.811 MALIGNANT NEOPLASM OF OVERLAPPING SITES OF RIGHT BREAST IN FEMALE, ESTROGEN RECEPTOR POSITIVE: Primary | ICD-10-CM

## 2022-05-23 DIAGNOSIS — F51.04 PSYCHOPHYSIOLOGICAL INSOMNIA: ICD-10-CM

## 2022-05-23 DIAGNOSIS — N95.1 MENOPAUSAL HOT FLUSHES: ICD-10-CM

## 2022-05-23 DIAGNOSIS — Z17.0 MALIGNANT NEOPLASM OF OVERLAPPING SITES OF RIGHT BREAST IN FEMALE, ESTROGEN RECEPTOR POSITIVE: Primary | ICD-10-CM

## 2022-05-23 LAB
ALBUMIN SERPL-MCNC: 4.19 G/DL (ref 3.5–5.2)
ALBUMIN/GLOB SERPL: 2.1 G/DL
ALP SERPL-CCNC: 60 U/L (ref 39–117)
ALT SERPL W P-5'-P-CCNC: 19 U/L (ref 1–33)
ANION GAP SERPL CALCULATED.3IONS-SCNC: 3.5 MMOL/L (ref 5–15)
AST SERPL-CCNC: 18 U/L (ref 1–32)
BASOPHILS # BLD AUTO: 0.03 10*3/MM3 (ref 0–0.2)
BASOPHILS NFR BLD AUTO: 0.7 % (ref 0–1.5)
BILIRUB SERPL-MCNC: 0.4 MG/DL (ref 0–1.2)
BUN SERPL-MCNC: 12 MG/DL (ref 8–23)
BUN/CREAT SERPL: 20 (ref 7–25)
CALCIUM SPEC-SCNC: 8.9 MG/DL (ref 8.6–10.5)
CHLORIDE SERPL-SCNC: 108 MMOL/L (ref 98–107)
CO2 SERPL-SCNC: 26.5 MMOL/L (ref 22–29)
CREAT SERPL-MCNC: 0.6 MG/DL (ref 0.57–1)
DEPRECATED RDW RBC AUTO: 49.7 FL (ref 37–54)
EGFRCR SERPLBLD CKD-EPI 2021: 101 ML/MIN/1.73
EOSINOPHIL # BLD AUTO: 0.22 10*3/MM3 (ref 0–0.4)
EOSINOPHIL NFR BLD AUTO: 5.2 % (ref 0.3–6.2)
ERYTHROCYTE [DISTWIDTH] IN BLOOD BY AUTOMATED COUNT: 13.9 % (ref 12.3–15.4)
GLOBULIN UR ELPH-MCNC: 2 GM/DL
GLUCOSE SERPL-MCNC: 87 MG/DL (ref 65–99)
HCT VFR BLD AUTO: 35.8 % (ref 34–46.6)
HGB BLD-MCNC: 11.8 G/DL (ref 12–15.9)
IMM GRANULOCYTES # BLD AUTO: 0.01 10*3/MM3 (ref 0–0.05)
IMM GRANULOCYTES NFR BLD AUTO: 0.2 % (ref 0–0.5)
LYMPHOCYTES # BLD AUTO: 1.57 10*3/MM3 (ref 0.7–3.1)
LYMPHOCYTES NFR BLD AUTO: 37.1 % (ref 19.6–45.3)
MCH RBC QN AUTO: 31.7 PG (ref 26.6–33)
MCHC RBC AUTO-ENTMCNC: 33 G/DL (ref 31.5–35.7)
MCV RBC AUTO: 96.2 FL (ref 79–97)
MONOCYTES # BLD AUTO: 0.57 10*3/MM3 (ref 0.1–0.9)
MONOCYTES NFR BLD AUTO: 13.5 % (ref 5–12)
NEUTROPHILS NFR BLD AUTO: 1.83 10*3/MM3 (ref 1.7–7)
NEUTROPHILS NFR BLD AUTO: 43.3 % (ref 42.7–76)
PLATELET # BLD AUTO: 228 10*3/MM3 (ref 140–450)
PMV BLD AUTO: 9.6 FL (ref 6–12)
POTASSIUM SERPL-SCNC: 4.1 MMOL/L (ref 3.5–5.2)
PROT SERPL-MCNC: 6.2 G/DL (ref 6–8.5)
RBC # BLD AUTO: 3.72 10*6/MM3 (ref 3.77–5.28)
SODIUM SERPL-SCNC: 138 MMOL/L (ref 136–145)
WBC NRBC COR # BLD: 4.23 10*3/MM3 (ref 3.4–10.8)

## 2022-05-23 PROCEDURE — 36591 DRAW BLOOD OFF VENOUS DEVICE: CPT

## 2022-05-23 PROCEDURE — 80053 COMPREHEN METABOLIC PANEL: CPT | Performed by: INTERNAL MEDICINE

## 2022-05-23 PROCEDURE — 99215 OFFICE O/P EST HI 40 MIN: CPT | Performed by: INTERNAL MEDICINE

## 2022-05-23 PROCEDURE — 85025 COMPLETE CBC W/AUTO DIFF WBC: CPT | Performed by: INTERNAL MEDICINE

## 2022-05-23 PROCEDURE — 25010000002 HEPARIN LOCK FLUSH PER 10 UNITS: Performed by: INTERNAL MEDICINE

## 2022-05-23 RX ORDER — HEPARIN SODIUM (PORCINE) LOCK FLUSH IV SOLN 100 UNIT/ML 100 UNIT/ML
500 SOLUTION INTRAVENOUS AS NEEDED
Status: CANCELLED | OUTPATIENT
Start: 2022-05-23

## 2022-05-23 RX ORDER — SODIUM CHLORIDE 0.9 % (FLUSH) 0.9 %
20 SYRINGE (ML) INJECTION AS NEEDED
Status: CANCELLED | OUTPATIENT
Start: 2022-05-23

## 2022-05-23 RX ORDER — HEPARIN SODIUM (PORCINE) LOCK FLUSH IV SOLN 100 UNIT/ML 100 UNIT/ML
500 SOLUTION INTRAVENOUS AS NEEDED
Status: DISCONTINUED | OUTPATIENT
Start: 2022-05-23 | End: 2022-05-24 | Stop reason: HOSPADM

## 2022-05-23 RX ORDER — DULOXETIN HYDROCHLORIDE 20 MG/1
20 CAPSULE, DELAYED RELEASE ORAL DAILY
Qty: 30 CAPSULE | Refills: 3 | Status: SHIPPED | OUTPATIENT
Start: 2022-05-23 | End: 2022-06-20

## 2022-05-23 RX ORDER — SODIUM CHLORIDE 0.9 % (FLUSH) 0.9 %
20 SYRINGE (ML) INJECTION AS NEEDED
Status: DISCONTINUED | OUTPATIENT
Start: 2022-05-23 | End: 2022-05-24 | Stop reason: HOSPADM

## 2022-05-23 RX ORDER — HYDROXYZINE HYDROCHLORIDE 25 MG/1
25 TABLET, FILM COATED ORAL NIGHTLY PRN
Qty: 30 TABLET | Refills: 3 | Status: SHIPPED | OUTPATIENT
Start: 2022-05-23 | End: 2022-05-26

## 2022-05-23 RX ADMIN — HEPARIN SODIUM (PORCINE) LOCK FLUSH IV SOLN 100 UNIT/ML 500 UNITS: 100 SOLUTION at 11:24

## 2022-05-23 RX ADMIN — Medication 20 ML: at 11:23

## 2022-05-23 NOTE — PROGRESS NOTES
Patient  Marisa Portillo    Location  [facility] HEMATOLOGY & ONCOLOGY    Chief Complaint  Breast Cancer    Referring Provider: Judah Johnson, *  PCP: Judah Johnson MD    Subjective          Oncology/Hematology History Overview Note     ER+ Right Breast Cancer:    Diagnostic mammogram on 11/12/2021: 2 cm asymmetry in the outer central right breast with amorphous calcifications.  Similar appearing group of amorphous calcifications in the outer central right breast.  6 mm asymmetry in the inner right breast.  Right axillary lymphadenopathy.    Ultrasound-guided biopsy on 11/1/2021: Right breast 3 o'clock position, 2 cm from the nipple with invasive ductal carcinoma, grade 2.  Right breast 9 o'clock position, 3 cm from the nipple with invasive ductal carcinoma, grade 2, ER positive (50%), MD positive (3%), HER-2 negative (score 0), Ki-67 14%.  Associated with intermediate grade ductal carcinoma in situ.  Right axillary lymph node positive for breast carcinoma.    CT CAP and bone scan on 12/13/21: negative for metastatic disease    Bilateral mastectomy 12/28/2021: Right breast with multiple (2) foci of invasive ductal carcinoma, largest focus 2.3 cm, grade 2, associated with DCIS with extensive intraductal component, all margins negative, 4/10 lymph nodes involved with no extranodal extension and the largest focus measured at 3.3 cm, ER positive (40%), MD positive (5%), HER-2 negative by IHC (score 0), Ki-67 14%, pT2 pN2a cM0    Completed 4 cycles of chemotherapy with TC on 4/25/22.  Complicated by a UTI and multiple right breast abscesses causing delay in cycles 3 and 4.     *The pt discontinued HRT in October of 2021 when she had an abnormal screening mammogram     Malignant neoplasm of overlapping sites of right breast in female, estrogen receptor positive (HCC)   12/9/2021 Initial Diagnosis    Malignant neoplasm of overlapping sites of right breast in female, estrogen receptor  positive (HCC)     12/28/2021 Cancer Staged    Staging form: Breast, AJCC 8th Edition  - Pathologic stage from 12/28/2021: Stage IB (pT2, pN2a, cM0, G2, ER+, MD+, HER2-) - Signed by Starr Mendez MD PhD on 1/30/2022 1/14/2022 -  Chemotherapy    OP CENTRAL VENOUS ACCESS DEVICE ACCESS, CARE, AND MAINTENANCE (CVAD)     1/31/2022 -  Chemotherapy    OP BREAST TC DOCEtaxel / Cyclophosphamide         HPI  Patient comes in today for follow-up toxicity check after completing 4 cycles of chemotherapy.  Unfortunately she had another breast abscess after the fourth cycle of chemotherapy.  She was put on antibiotics again.  She has follow-up with plastic surgery.  She may undergo radiation therapy before completing reconstruction given the delays.    Patient's biggest complaint at this time is that she has ongoing insomnia.  She has difficulty falling asleep at night.  She is still working sometimes as a nurse at night.  On other days she lays awake in bed until 3 to 4:00 in the morning with her mind racing. This was a problem for her after menopause and reason why she started HRT.  She also has hot flashes throughout the day and some at nighttime. She stopped HRT in October when she first had an abnormal screening mammogram.  Since that time the hot flashes have been more significant.    Review of Systems   Constitutional: Positive for fatigue. Negative for appetite change, diaphoresis, fever, unexpected weight gain and unexpected weight loss.   HENT: Positive for sinus pressure. Negative for hearing loss, sore throat and voice change.    Eyes: Negative for blurred vision, double vision, pain, redness and visual disturbance.   Respiratory: Negative for cough, shortness of breath and wheezing.    Cardiovascular: Negative for chest pain, palpitations and leg swelling.   Endocrine: Negative for cold intolerance, heat intolerance, polydipsia and polyuria.   Genitourinary: Negative for decreased urine volume, difficulty  urinating, frequency and urinary incontinence.   Musculoskeletal: Negative for arthralgias, back pain, joint swelling and myalgias.   Skin: Negative for color change, rash, skin lesions and wound.   Neurological: Negative for dizziness, seizures, numbness and headache.   Hematological: Negative for adenopathy. Does not bruise/bleed easily.   Psychiatric/Behavioral: Positive for sleep disturbance (Patient not sleeping well ). Negative for depressed mood. The patient is not nervous/anxious.    All other systems reviewed and are negative.      Past Medical History:   Diagnosis Date   • Allergies    • Breast cancer (HCC)    • High cholesterol    • IFG (impaired fasting glucose)    • Osteoarthritis    • Port-A-Cath placement 12/29/2021   • S/P BILATERAL IMMEDIATE BREAST RECONSTRUCTION WITH LEFT PRE PEC PLACEMENT OF SILICONE GEL IMPLANT AND MESH, RIGHT PRE PEC PLACEMENT OF EXPANDER AND MESH 12/29/2021   • TIA (transient ischemic attack)     no residual (tia approximately 8 years ago)   • Vitamin D deficiency      Past Surgical History:   Procedure Laterality Date   • APPENDECTOMY     • BREAST AUGMENTATION Bilateral 12/28/2021    Procedure: bilateral breast reconstructon with bilateral mesh placement.   left implant, right tissue expander placement;  Surgeon: Lacy Shelton MD;  Location: [facility] [place] [place] [facility];  Service: Plastics;  Laterality: Bilateral;   • BREAST TISSUE EXPANDER REMOVAL INSERTION OF IMPLANT Right 4/15/2022    Procedure: RIGHT BREAST IMPLANT REMOVAL WITH INCISION AND DRAINAGE;  Surgeon: Lacy Shelton MD;  Location: [facility] [place] [facility] [place];  Service: Plastics;  Laterality: Right;   • CEREBRAL ANGIOGRAM      clip placed- pt stated can have MRI with cerebral clips   • CYST REMOVAL Right     rt wrist   • INCISION AND DRAINAGE OF WOUND Right 3/11/2022    Procedure: INCISION AND DRAINAGE ABSCESS OF RIGHT BREAST WITH EXPANDER REMOVAL AND IMPLANT PLACEMENT;  Surgeon: Lacy Shelton MD;  Location: [facility] [place]  MAIN OR;  Service: Plastics;  Laterality: Right;   • MASTECTOMY     • MASTECTOMY COMPLETE / SIMPLE W/ SENTINEL NODE BIOPSY Bilateral    • PORTACATH PLACEMENT Left 12/28/2021    Procedure: INSERTION OF PORTACATH;  Surgeon: Jacqueline Mcgraw MD;  Location: [facility];  Service: General;  Laterality: Left;   • SENTINEL NODE BIOPSY Bilateral 12/28/2021    Procedure: BILATERAL BREAST MASTECTOMIES WITH RIGHT SENTINEL NODE BIOPSIES WITH FROZEN SECTIONS;  Surgeon: Jacqueline Mcgraw MD;  Location: [facility];  Service: General;  Laterality: Bilateral;     Social History     Socioeconomic History   • Marital status:    Tobacco Use   • Smoking status: Never Smoker   • Smokeless tobacco: Never Used   Vaping Use   • Vaping Use: Never used   Substance and Sexual Activity   • Alcohol use: Yes     Comment: socially    • Drug use: Never   • Sexual activity: Defer     Family History   Problem Relation Age of Onset   • Hypertension Mother    • Diabetes Mother    • Cancer Mother    • Arthritis Mother    • Breast cancer Mother    • Hypertension Father    • Diabetes Father    • Cancer Father    • Arthritis Father    • Liver cancer Father    • Lung cancer Father    • Breast cancer Maternal Aunt    • Malig Hyperthermia Neg Hx        Objective   Physical Exam  General: Alert, cooperative, no acute distress  Eyes: Anicteric sclera, PERRLA  Respiratory: normal respiratory effort  Cardiovascular: no lower extremity edema  Skin: Normal tone, no rash, no lesions  Psychiatric: Appropriate affect, intact judgment  Neurologic: No focal sensory or motor deficits, normal cognition   Musculoskeletal: Normal muscle strength and tone  Extremities: No clubbing, cyanosis, or deformities    Vitals:    05/23/22 1126   BP: 126/70   Pulse: 92   Resp: 18   Temp: 97.9 °F (36.6 °C)   SpO2: 97%   Weight: 83.6 kg (184 lb 4.9 oz)   PainSc: 0-No pain     ECOG score: 0         PHQ-9 Total Score:         Result Review :   The following data was  reviewed by: Starr Mendez MD PhD on 05/23/2022:  Lab Results   Component Value Date    HGB 11.8 (L) 05/23/2022    HCT 35.8 05/23/2022    MCV 96.2 05/23/2022     05/23/2022    WBC 4.23 05/23/2022    NEUTROABS 1.83 05/23/2022    LYMPHSABS 1.57 05/23/2022    MONOSABS 0.57 05/23/2022    EOSABS 0.22 05/23/2022    BASOSABS 0.03 05/23/2022     Lab Results   Component Value Date    GLUCOSE 87 05/23/2022    BUN 12 05/23/2022    CREATININE 0.60 05/23/2022     05/23/2022    K 4.1 05/23/2022     (H) 05/23/2022    CO2 26.5 05/23/2022    CALCIUM 8.9 05/23/2022    PROTEINTOT 6.2 05/23/2022    ALBUMIN 4.19 05/23/2022    BILITOT 0.4 05/23/2022    ALKPHOS 60 05/23/2022    AST 18 05/23/2022    ALT 19 05/23/2022          Assessment and Plan    Diagnoses and all orders for this visit:    1. Malignant neoplasm of overlapping sites of right breast in female, estrogen receptor positive (HCC) (Primary)    2. Menopausal hot flushes    3. Psychophysiological insomnia    Other orders  -     DULoxetine (CYMBALTA) 20 MG capsule; Take 1 capsule by mouth Daily.  Dispense: 30 capsule; Refill: 3  -     hydrOXYzine (ATARAX) 25 MG tablet; Take 1 tablet by mouth At Night As Needed for Anxiety.  Dispense: 30 tablet; Refill: 3      ER+ Right breast Cancer:  The pt completed 4 cycles of TC.  She has no evidence of significant toxicity on CBC or CMP but did have another right breast abscess. This is being managed by plastic surgery. We discussed the risks and benefits to anastrozole again. Unfortunately the patient is already having significant hot flashes and insomnia since stopping HRT. I will start treatment for these symptoms prior to the AI.  She will proceed to radiation when she is well healed and will f/u with me in 1 month.      Menopausal symptoms: The pt is having insomnia and hot flashes after stopping HRT.  We discussed adding cymbalta since it can ease these symptoms. I will f/u with the patient in one month to see if  this is helping but she is encouraged to call sooner if she has concerns or questions.  Alternative treatment options include venlafaxine and oxybutynin which can also control hot flashes.      Anemia:  The patient had normal iron studies in March so she will stop oral iron. Her Hgb has improved to 11.8 since completing chemotherapy. Her other counts are also normal. No need to further evaluate at this time.     I spent 40 minutes caring for Marisa on this date of service. This time includes time spent by me in the following activities: preparing for the visit, reviewing tests, obtaining and/or reviewing a separately obtained history, performing a medically appropriate examination and/or evaluation, counseling and educating the patient/family/caregiver, ordering medications, tests, or procedures and documenting information in the medical record      Patient was given instructions and counseling regarding her condition or for health maintenance advice. Please see specific information pulled into the AVS if appropriate.

## 2022-05-26 ENCOUNTER — OFFICE VISIT (OUTPATIENT)
Dept: RADIATION ONCOLOGY | Facility: HOSPITAL | Age: 62
End: 2022-05-26

## 2022-05-26 ENCOUNTER — OFFICE VISIT (OUTPATIENT)
Dept: PLASTIC SURGERY | Facility: CLINIC | Age: 62
End: 2022-05-26

## 2022-05-26 VITALS
OXYGEN SATURATION: 100 % | BODY MASS INDEX: 30.67 KG/M2 | RESPIRATION RATE: 16 BRPM | SYSTOLIC BLOOD PRESSURE: 130 MMHG | HEART RATE: 78 BPM | DIASTOLIC BLOOD PRESSURE: 73 MMHG | WEIGHT: 184.3 LBS | TEMPERATURE: 97.4 F

## 2022-05-26 VITALS
TEMPERATURE: 98.2 F | SYSTOLIC BLOOD PRESSURE: 125 MMHG | OXYGEN SATURATION: 98 % | HEART RATE: 78 BPM | DIASTOLIC BLOOD PRESSURE: 81 MMHG | BODY MASS INDEX: 30.67 KG/M2 | HEIGHT: 65 IN

## 2022-05-26 DIAGNOSIS — N61.1 BREAST ABSCESS: ICD-10-CM

## 2022-05-26 DIAGNOSIS — Z09 POSTOPERATIVE FOLLOW-UP: ICD-10-CM

## 2022-05-26 DIAGNOSIS — N61.0 BREAST INFECTION: Primary | ICD-10-CM

## 2022-05-26 DIAGNOSIS — C50.811 MALIGNANT NEOPLASM OF OVERLAPPING SITES OF RIGHT BREAST IN FEMALE, ESTROGEN RECEPTOR POSITIVE: Primary | ICD-10-CM

## 2022-05-26 DIAGNOSIS — Z17.0 MALIGNANT NEOPLASM OF OVERLAPPING SITES OF RIGHT BREAST IN FEMALE, ESTROGEN RECEPTOR POSITIVE: Primary | ICD-10-CM

## 2022-05-26 DIAGNOSIS — N64.89 RECURRENT SEROMA OF BREAST: ICD-10-CM

## 2022-05-26 PROCEDURE — 99024 POSTOP FOLLOW-UP VISIT: CPT | Performed by: SURGERY

## 2022-05-26 PROCEDURE — G0463 HOSPITAL OUTPT CLINIC VISIT: HCPCS | Performed by: RADIOLOGY

## 2022-05-26 PROCEDURE — 87070 CULTURE OTHR SPECIMN AEROBIC: CPT | Performed by: SURGERY

## 2022-05-26 PROCEDURE — 87147 CULTURE TYPE IMMUNOLOGIC: CPT | Performed by: SURGERY

## 2022-05-26 PROCEDURE — 99213 OFFICE O/P EST LOW 20 MIN: CPT | Performed by: RADIOLOGY

## 2022-05-26 PROCEDURE — 87205 SMEAR GRAM STAIN: CPT | Performed by: SURGERY

## 2022-05-26 PROCEDURE — 87186 SC STD MICRODIL/AGAR DIL: CPT | Performed by: SURGERY

## 2022-05-26 RX ORDER — HYDROXYZINE HYDROCHLORIDE 25 MG/1
25 TABLET, FILM COATED ORAL NIGHTLY PRN
COMMUNITY

## 2022-05-26 NOTE — PROGRESS NOTES
Follow Up Office Visit      Encounter Date: 05/26/2022   Patient Name: Marisa Portillo  YOB: 1960   Medical Record Number: [id number]   Primary Diagnosis: Malignant neoplasm of overlapping sites of right breast in female, estrogen receptor positive (HCC) [C50.811, Z17.0]  Cancer Staging: Cancer Staging  Malignant neoplasm of overlapping sites of right breast in female, estrogen receptor positive (HCC)  Staging form: Breast, AJCC 8th Edition  - Pathologic stage from 12/28/2021: Stage IB (pT2, pN2a, cM0, G2, ER+, MA+, HER2-) - Signed by Starr Mendze MD PhD on 1/30/2022        Chief Complaint:    Chief Complaint   Patient presents with   • Follow-up   • Breast Cancer       History of Present Illness: Marisa Portillo returns for follow-up after undergoing seroma drainage today per Dr. Shelton.  Since undergoing mastectomy and expander placement, Ms. Portillo is undergone expander removal with subsequent permanent implant placement.  She did undergo permanent implant removal due to development of an abscess.  She is now undergoing serial follow-up by Dr. Shelton for persistent right chest wall seroma.  She reports feeling well overall with no complaints.  She specifically denies fevers, chills, night sweats or chest wall pain.  She is taking anastrozole as prescribed by Dr. Mendez.  She does experience hot flashes with this but this is helped with Cymbalta.    Subjective      Review of Systems: Review of Systems   Constitutional: Positive for fatigue. Negative for appetite change.   Respiratory: Negative for cough and shortness of breath.    Gastrointestinal: Negative for constipation, diarrhea and nausea.   Endocrine:        Hot flashes   Genitourinary: Negative for difficulty urinating, dysuria, frequency and urgency.   Musculoskeletal: Negative for back pain.        Pain under right arm.   Seroma drained today -200ml   Skin: Negative for color change and rash.   Neurological: Negative  for dizziness and headaches.   Psychiatric/Behavioral: Negative for sleep disturbance.       The following portions of the patient's history were reviewed and updated as appropriate: allergies, current medications, past family history, past medical history, past social history, past surgical history and problem list.    Medications:     Current Outpatient Medications:   •  Acetaminophen (Tylenol) 325 MG capsule, Every 6 (Six) Hours., Disp: , Rfl:   •  acetaminophen (TYLENOL) 650 MG 8 hr tablet, Take 650 mg by mouth 2 (Two) Times a Day., Disp: , Rfl:   •  DULoxetine (CYMBALTA) 20 MG capsule, Take 1 capsule by mouth Daily., Disp: 30 capsule, Rfl: 3  •  famotidine (Pepcid) 20 MG tablet, Take 1 tablet by mouth 2 (Two) Times a Day., Disp: 60 tablet, Rfl: 0  •  hydrOXYzine (ATARAX) 25 MG tablet, Take 25 mg by mouth 3 (Three) Times a Day As Needed for Itching., Disp: , Rfl:   •  Loratadine-Pseudoephedrine (CLARITIN-D 24 HOUR PO), Take 1 tablet by mouth Daily., Disp: , Rfl:   •  Vitamin A 3 MG (29761 UT) capsule, Take 10,000 Units by mouth Daily., Disp: , Rfl:   •  vitamin D (ERGOCALCIFEROL) 1.25 MG (24700 UT) capsule capsule, Take 1 capsule by mouth Every 7 (Seven) Days., Disp: 12 capsule, Rfl: 3    Allergies:   Allergies   Allergen Reactions   • Multihance [Gadobenate] Hives and Itching     MRI contrast       ECOG: (0) Fully active, able to carry on all predisease performance without restriction  Quality of Life: 100 - Full Activity     Objective     Physical Exam:   Vital Signs:   Vitals:    05/26/22 0933   BP: 130/73   Pulse: 78   Resp: 16   Temp: 97.4 °F (36.3 °C)   TempSrc: Temporal   SpO2: 100%   Weight: 83.6 kg (184 lb 4.9 oz)   PainSc: 0-No pain     Body mass index is 30.67 kg/m².     Physical Exam  Constitutional:       General: She is not in acute distress.     Appearance: Normal appearance. She is normal weight.   HENT:      Head: Normocephalic and atraumatic.   Pulmonary:      Effort: Pulmonary effort is  normal. No respiratory distress.   Chest:      Comments: Bandage in place at right lateral chest wall; no active drainage; no apparent erythema  Abdominal:      General: There is no distension.      Palpations: Abdomen is soft.   Skin:     General: Skin is warm and dry.   Neurological:      General: No focal deficit present.      Mental Status: She is alert and oriented to person, place, and time.      Cranial Nerves: No cranial nerve deficit.      Gait: Gait normal.   Psychiatric:         Mood and Affect: Mood normal.         Behavior: Behavior normal.         Judgment: Judgment normal.         Radiographs: XR Chest 1 View    Result Date: 3/9/2022   Mild bilateral lower lobe pulmonary opacities, favored to be atelectasis, less likely pneumonia.       DESTINY GOLDEN MD       Electronically Signed and Approved By: DESTINY GOLDEN MD on 3/09/2022 at 20:21              Pathology: I personally reviewed the pathology reports from the procedure performed on 4/15/2022 and 3/11/2022.    Assessment / Plan          Assessment/Plan:   Marisa Portillo is a 61-year-old female with cT2 cN1 cM0, pT2 pN2a cM0 invasive ductal carcinoma, grade 2, ER positive/KS positive, HER-2/aires negative of the right breast status post bilateral mastectomy with right axillary lymph node dissection;  pT2 pN2a.  She is currently undergoing management for subsequent right chest wall abscess formation and seroma formation.  She is doing well overall; ECOG 0    I will follow-up with Ms. Portillo in 1 month.  She is scheduled to undergo reevaluation by Dr. Shelton.  In the interim I explained that I do not want to initiate external beam radiotherapy sooner than 1 month after any intervention even if minimally invasive.  We did discuss the potential long-term complications associated with radiation for those who are undergoing reconstruction.  She was encouraged to contact me in the interim with any questions or concerns regarding her care.      Dudley Martinez,  MD  Radiation Oncology  [facility]    This document has been signed by Dudley Martinez MD on May 26, 2022 10:47 EDT

## 2022-05-27 PROBLEM — N64.89 RECURRENT SEROMA OF BREAST: Status: ACTIVE | Noted: 2022-05-27

## 2022-05-29 LAB
BACTERIA SPEC AEROBE CULT: ABNORMAL
GRAM STN SPEC: ABNORMAL
GRAM STN SPEC: ABNORMAL

## 2022-05-29 RX ORDER — SULFAMETHOXAZOLE AND TRIMETHOPRIM 800; 160 MG/1; MG/1
1 TABLET ORAL 2 TIMES DAILY
Qty: 20 TABLET | Refills: 0 | Status: SHIPPED | OUTPATIENT
Start: 2022-05-29 | End: 2022-06-16

## 2022-06-01 ENCOUNTER — PROCEDURE VISIT (OUTPATIENT)
Dept: PLASTIC SURGERY | Facility: CLINIC | Age: 62
End: 2022-06-01

## 2022-06-01 VITALS
HEIGHT: 63 IN | TEMPERATURE: 98.2 F | WEIGHT: 195 LBS | SYSTOLIC BLOOD PRESSURE: 144 MMHG | HEART RATE: 82 BPM | DIASTOLIC BLOOD PRESSURE: 88 MMHG | BODY MASS INDEX: 34.55 KG/M2

## 2022-06-01 DIAGNOSIS — N64.89 SEROMA OF BREAST: Primary | ICD-10-CM

## 2022-06-01 PROCEDURE — 99202 OFFICE O/P NEW SF 15 MIN: CPT | Performed by: SURGERY

## 2022-06-01 PROCEDURE — 10160 PNXR ASPIR ABSC HMTMA BULLA: CPT | Performed by: SURGERY

## 2022-06-01 NOTE — PROGRESS NOTES
"Chief Complaint  Procedure (BREAST FLUID ASPIRATION )    Subjective          History of Present Illness  Marisa Portillo is a 74 y.o. female who presents to [facility] PLASTIC & RECONSTRUCTIVE SURGERY for Postoperative Follow-Up of BREAST RECON AND BREAST FLUID ASPIRATION     Allergies: Cephalexin, Penicillins, Bacitracin, Benzalkonium chloride, Gramicidin, Hydrocortisone, Neomycin, Neomycin-bacitracin zn-polymyx, Polymyxin b, and Promethazine  Allergies Reconciled.    Review of Systems   All review of system has been reviewed and it  is negative except the ones note above.     Objective     /88 (BP Location: Left arm, Patient Position: Sitting)   Pulse 82   Temp 98.2 °F (36.8 °C)   Ht 160 cm (62.99\")   Wt 88.5 kg (195 lb)   BMI 34.55 kg/m²     Body mass index is 34.55 kg/m².    Physical Exam  General Inspection: Incision healing well, swelling    Result Review :           I performed a needle aspiration of serous fluid that was not completely light in color without problems. The volume was 70 cc.      Assessment and Plan      Diagnoses and all orders for this visit:    1. Seroma of breast (Primary)        Plan:  • Follow up in 1 week to evaluate possible seroma. And new aspiration. I will refill her bactrim. I would like her to be 20 days on it    I spent 15 minutes caring for Marisa on this date of service. This time includes time spent by me in the following activities:performing a medically appropriate examination and/or evaluation  and documenting information in the medical record    Follow Up     No follow-ups on file.    Patient was given instructions and counseling regarding her condition. Please see specific information pulled into the AVS if appropriate.     Lacy Shelton MD  06/01/2022    "

## 2022-06-02 RX ORDER — SULFAMETHOXAZOLE AND TRIMETHOPRIM 800; 160 MG/1; MG/1
1 TABLET ORAL 2 TIMES DAILY
Qty: 20 TABLET | Refills: 0 | Status: SHIPPED | OUTPATIENT
Start: 2022-06-02 | End: 2022-06-09

## 2022-06-06 ENCOUNTER — APPOINTMENT (OUTPATIENT)
Dept: OCCUPATIONAL THERAPY | Facility: HOSPITAL | Age: 62
End: 2022-06-06

## 2022-06-09 ENCOUNTER — OFFICE VISIT (OUTPATIENT)
Dept: PLASTIC SURGERY | Facility: CLINIC | Age: 62
End: 2022-06-09

## 2022-06-09 VITALS
TEMPERATURE: 98.1 F | HEART RATE: 73 BPM | HEIGHT: 63 IN | WEIGHT: 195 LBS | SYSTOLIC BLOOD PRESSURE: 151 MMHG | DIASTOLIC BLOOD PRESSURE: 78 MMHG | BODY MASS INDEX: 34.55 KG/M2

## 2022-06-09 DIAGNOSIS — N64.89 RECURRENT SEROMA OF BREAST: ICD-10-CM

## 2022-06-09 DIAGNOSIS — N61.0 BREAST INFECTION: Primary | ICD-10-CM

## 2022-06-09 PROCEDURE — 87186 SC STD MICRODIL/AGAR DIL: CPT | Performed by: SURGERY

## 2022-06-09 PROCEDURE — 99024 POSTOP FOLLOW-UP VISIT: CPT | Performed by: SURGERY

## 2022-06-09 PROCEDURE — 87070 CULTURE OTHR SPECIMN AEROBIC: CPT | Performed by: SURGERY

## 2022-06-09 PROCEDURE — 87147 CULTURE TYPE IMMUNOLOGIC: CPT | Performed by: SURGERY

## 2022-06-09 PROCEDURE — 87205 SMEAR GRAM STAIN: CPT | Performed by: SURGERY

## 2022-06-09 RX ORDER — SULFAMETHOXAZOLE AND TRIMETHOPRIM 800; 160 MG/1; MG/1
1 TABLET ORAL 2 TIMES DAILY
Qty: 20 TABLET | Refills: 0 | Status: SHIPPED | OUTPATIENT
Start: 2022-06-09 | End: 2022-06-23

## 2022-06-09 NOTE — PROGRESS NOTES
"Chief Complaint  Follow-up Breast Recon   Subjective          History of Present Illness  Marisa Portillo is a 62 y.o. female who presents to [facility] PLASTIC [facility] RECONSTRUCTIVE SURGERY for a follow up  04/15/22 RIGHT BREAST IMPLANT REMOVAL WITH INCISION AND DRAINAGE.    Right breast 50CC fluid aspiration of pus today.  Small area of redness, bottom left portion.      Previous Culture from 5/26/22  Wound Culture - Wound, Breast, Right  Order: [id number]   Status: Final result     Visible to patient: Yes (seen)     Next appt: 06/15/2022 at 08:45 AM in Plastic Surgery (Brigitte Chase, [facility])     Dx: Breast infection    Specimen Information: Breast, Right; Wound         0 Result Notes    Wound Culture   Lab   Rare Staphylococcus aureus Abnormal    [facility]               Gram Stain   Lab   Occasional WBCs seen  [facility]      No organisms seen [facility]                Susceptibility     Staphylococcus aureus     CAYDEN     Clindamycin 0.25 ug/ml Susceptible     Erythromycin <=0.25 ug/ml Susceptible     Inducible Clindamycin Resistance NEG ug/ml Negative     Oxacillin 0.5 ug/ml Susceptible     Rifampin <=0.5 ug/ml Susceptible     Tetracycline <=1 ug/ml Susceptible     Trimethoprim + Sulfamethoxazole <=10 ug/ml Susceptible     Vancomycin <=0.5 ug/ml Susceptible               Linear View       Susceptibility Comments    Staphylococcus aureus   This isolate does not demonstrate inducible clindamycin resistance in vitro.          Specimen Collected: 05/26/22 08:54 Last Resulted: 05/29/22 08                 Allergies: Multihance [gadobenate]  Allergies Reconciled.    Review of Systems   All review of system has been reviewed and it  is negative except the ones note above.     Objective     /78 (BP Location: Left arm, Patient Position: Sitting)   Pulse 73   Temp 98.1 °F (36.7 °C) (Temporal)   Ht 160 cm (62.99\")   Wt 88.5 kg (195 lb)   BMI 34.55 kg/m²     Body mass index is 34.55 kg/m².    Physical " Exam  General Inspection: Incision healing well, no erythema however there is fluid on the right breast.     Result Review :                Assessment and Plan      Diagnoses and all orders for this visit:    1. Breast infection (Primary)  -     Wound Culture - Aspirate, Breast, Right    2. Recurrent seroma of breast    Other orders  -     sulfamethoxazole-trimethoprim (Bactrim DS) 800-160 MG per tablet; Take 1 tablet by mouth 2 (Two) Times a Day.  Dispense: 20 tablet; Refill: 0        Plan:  • 25 cc of serous fluid drained from right breast. Fluid sent to culture. Follow up in 2 weeks for wound evaluation. I recommend patient to avoid working so she can heal better.     I spent 15 minutes caring for Marisa on this date of service. This time includes time spent by me in the following activities:performing a medically appropriate examination and/or evaluation  and documenting information in the medical record    Follow Up     Return in about 1 week (around 6/16/2022) for f/u right breast infection.    Patient was given instructions and counseling regarding her condition. Please see specific information pulled into the AVS if appropriate.     Lacy Shelton MD  06/09/2022

## 2022-06-09 NOTE — PROGRESS NOTES
Chief Complaint  Follow-up Breast Recon   Subjective          History of Present Illness  Marisa Portillo is a 62 y.o. female who presents to [facility] PLASTIC & RECONSTRUCTIVE SURGERY for a follow up  04/15/22 RIGHT BREAST IMPLANT REMOVAL WITH INCISION AND DRAINAGE.    She states she still has fluid on the right that needs aspirated       Previous Culture from 5/26/22  Wound Culture - Wound, Breast, Right  Order: [id number]   Status: Final result     Visible to patient: Yes (seen)     Next appt: 06/15/2022 at 08:45 AM in Plastic Surgery (CAL[facility] Robbins)     Dx: Breast infection    Specimen Information: Breast, Right; Wound         0 Result Notes    Wound Culture   Lab   Rare Staphylococcus aureus Abnormal    [facility]               Gram Stain   Lab   Occasional WBCs seen  [facility]      No organisms seen [facility]                Susceptibility     Staphylococcus aureus     CAYDEN     Clindamycin 0.25 ug/ml Susceptible     Erythromycin <=0.25 ug/ml Susceptible     Inducible Clindamycin Resistance NEG ug/ml Negative     Oxacillin 0.5 ug/ml Susceptible     Rifampin <=0.5 ug/ml Susceptible     Tetracycline <=1 ug/ml Susceptible     Trimethoprim + Sulfamethoxazole <=10 ug/ml Susceptible     Vancomycin <=0.5 ug/ml Susceptible               Linear View       Susceptibility Comments    Staphylococcus aureus   This isolate does not demonstrate inducible clindamycin resistance in vitro.          Specimen Collected: 05/26/22 08:54 Last Resulted: 05/29/22 08                 Allergies: Multihance [gadobenate]  Allergies Reconciled.    Review of Systems   All review of system has been reviewed and it  is negative except the ones note above.     Objective     /84 (BP Location: Left arm)   Pulse 75   Temp 98.1 °F (36.7 °C) (Temporal)   Wt 81.2 kg (179 lb)   SpO2 95%   BMI 31.72 kg/m²     Body mass index is 31.72 kg/m².    Physical Exam  General Inspection: Incision healing well, no erythema  however there is fluid on the right breast.     Result Review :                Assessment and Plan      Diagnoses and all orders for this visit:    1. Recurrent seroma of breast (Primary)    2. Breast infection        Plan:  • 29cc aspirated from right, cleaned and band aid applied. RTO in 1wk for follow up     I spent 15 minutes caring for Marisa on this date of service. This time includes time spent by me in the following activities:performing a medically appropriate examination and/or evaluation  and documenting information in the medical record    Follow Up     No follow-ups on file.    Patient was given instructions and counseling regarding her condition. Please see specific information pulled into the AVS if appropriate.     Lacy Shelton MD  06/16/2022

## 2022-06-12 LAB
BACTERIA SPEC AEROBE CULT: ABNORMAL
GRAM STN SPEC: ABNORMAL
GRAM STN SPEC: ABNORMAL

## 2022-06-14 NOTE — NURSING NOTE
at bedside. Pt vomiting at this time, to place order for zofran.   Received new order from Dr Johnson to decrease fluids to 75 ml/hr elevate right arm and apply cold compress to right breast tissue area, he spoke with  DR Mendez/ Dr Crow, both will see pt today

## 2022-06-16 ENCOUNTER — OFFICE VISIT (OUTPATIENT)
Dept: PLASTIC SURGERY | Facility: CLINIC | Age: 62
End: 2022-06-16

## 2022-06-16 VITALS
HEART RATE: 75 BPM | OXYGEN SATURATION: 95 % | DIASTOLIC BLOOD PRESSURE: 84 MMHG | SYSTOLIC BLOOD PRESSURE: 157 MMHG | TEMPERATURE: 98.1 F | WEIGHT: 179 LBS | BODY MASS INDEX: 31.72 KG/M2

## 2022-06-16 DIAGNOSIS — N64.89 RECURRENT SEROMA OF BREAST: Primary | ICD-10-CM

## 2022-06-16 DIAGNOSIS — N61.0 BREAST INFECTION: ICD-10-CM

## 2022-06-16 PROCEDURE — 99024 POSTOP FOLLOW-UP VISIT: CPT | Performed by: SURGERY

## 2022-06-16 NOTE — PROGRESS NOTES
"Chief Complaint  Follow-up Breast Recon   Subjective          History of Present Illness  Marisa Portillo is a 62 y.o. female who presents to [facility] PLASTIC & RECONSTRUCTIVE SURGERY for a follow up  04/15/22 RIGHT BREAST IMPLANT REMOVAL WITH INCISION AND DRAINAGE.    She states there may be a little of fluid on the right side, no open areas that are draining, redness or warmth           Allergies: Multihance [gadobenate]  Allergies Reconciled.    Review of Systems   All review of system has been reviewed and it  is negative except the ones note above.     Objective     /76 (BP Location: Left arm)   Pulse 63   Temp 98.2 °F (36.8 °C) (Temporal)   Ht 160 cm (63\")   Wt 81.4 kg (179 lb 6.4 oz)   SpO2 96%   BMI 31.78 kg/m²     Body mass index is 31.78 kg/m².    Physical Exam  General Inspection: Incision healing well, no erythema however there is fluid on the right breast.     Result Review :                Assessment and Plan      Diagnoses and all orders for this visit:    1. Recurrent seroma of breast (Primary)    2. Breast infection        Plan:  I aspirated 13 cc of serous fluid from the right breast. Follow up in 1 week for follow up.   I spent 15 minutes caring for Marisa on this date of service. This time includes time spent by me in the following activities:performing a medically appropriate examination and/or evaluation  and documenting information in the medical record    Follow Up     No follow-ups on file.    Patient was given instructions and counseling regarding her condition. Please see specific information pulled into the AVS if appropriate.     Lacy Shelton MD  06/23/2022    "

## 2022-06-20 ENCOUNTER — OFFICE VISIT (OUTPATIENT)
Dept: ONCOLOGY | Facility: HOSPITAL | Age: 62
End: 2022-06-20

## 2022-06-20 ENCOUNTER — HOSPITAL ENCOUNTER (OUTPATIENT)
Dept: ONCOLOGY | Facility: HOSPITAL | Age: 62
Setting detail: INFUSION SERIES
Discharge: HOME | End: 2022-06-20

## 2022-06-20 VITALS
BODY MASS INDEX: 31.64 KG/M2 | DIASTOLIC BLOOD PRESSURE: 77 MMHG | RESPIRATION RATE: 18 BRPM | TEMPERATURE: 96.8 F | HEART RATE: 75 BPM | SYSTOLIC BLOOD PRESSURE: 142 MMHG | OXYGEN SATURATION: 98 % | WEIGHT: 178.57 LBS

## 2022-06-20 DIAGNOSIS — C50.811 MALIGNANT NEOPLASM OF OVERLAPPING SITES OF RIGHT BREAST IN FEMALE, ESTROGEN RECEPTOR POSITIVE: Primary | ICD-10-CM

## 2022-06-20 DIAGNOSIS — C50.811 MALIGNANT NEOPLASM OF OVERLAPPING SITES OF RIGHT BREAST IN FEMALE, ESTROGEN RECEPTOR POSITIVE: ICD-10-CM

## 2022-06-20 DIAGNOSIS — Z17.0 MALIGNANT NEOPLASM OF OVERLAPPING SITES OF RIGHT BREAST IN FEMALE, ESTROGEN RECEPTOR POSITIVE: Primary | ICD-10-CM

## 2022-06-20 DIAGNOSIS — Z17.0 MALIGNANT NEOPLASM OF OVERLAPPING SITES OF RIGHT BREAST IN FEMALE, ESTROGEN RECEPTOR POSITIVE: ICD-10-CM

## 2022-06-20 PROCEDURE — 96523 IRRIG DRUG DELIVERY DEVICE: CPT

## 2022-06-20 PROCEDURE — G0463 HOSPITAL OUTPT CLINIC VISIT: HCPCS

## 2022-06-20 PROCEDURE — 99214 OFFICE O/P EST MOD 30 MIN: CPT | Performed by: INTERNAL MEDICINE

## 2022-06-20 PROCEDURE — 25010000002 HEPARIN LOCK FLUSH PER 10 UNITS: Performed by: INTERNAL MEDICINE

## 2022-06-20 RX ORDER — HEPARIN SODIUM (PORCINE) LOCK FLUSH IV SOLN 100 UNIT/ML 100 UNIT/ML
500 SOLUTION INTRAVENOUS AS NEEDED
Status: DISCONTINUED | OUTPATIENT
Start: 2022-06-20 | End: 2022-06-21 | Stop reason: HOSPADM

## 2022-06-20 RX ORDER — HEPARIN SODIUM (PORCINE) LOCK FLUSH IV SOLN 100 UNIT/ML 100 UNIT/ML
500 SOLUTION INTRAVENOUS AS NEEDED
Status: CANCELLED | OUTPATIENT
Start: 2022-06-20

## 2022-06-20 RX ORDER — SODIUM CHLORIDE 0.9 % (FLUSH) 0.9 %
20 SYRINGE (ML) INJECTION AS NEEDED
Status: DISCONTINUED | OUTPATIENT
Start: 2022-06-20 | End: 2022-06-21 | Stop reason: HOSPADM

## 2022-06-20 RX ORDER — SODIUM CHLORIDE 0.9 % (FLUSH) 0.9 %
20 SYRINGE (ML) INJECTION AS NEEDED
Status: CANCELLED | OUTPATIENT
Start: 2022-06-20

## 2022-06-20 RX ORDER — VENLAFAXINE HYDROCHLORIDE 37.5 MG/1
37.5 CAPSULE, EXTENDED RELEASE ORAL DAILY
Qty: 30 CAPSULE | Refills: 1 | Status: SHIPPED | OUTPATIENT
Start: 2022-06-20 | End: 2022-07-19

## 2022-06-20 RX ADMIN — HEPARIN SODIUM (PORCINE) LOCK FLUSH IV SOLN 100 UNIT/ML 500 UNITS: 100 SOLUTION at 09:52

## 2022-06-20 RX ADMIN — Medication 20 ML: at 09:52

## 2022-06-20 NOTE — PROGRESS NOTES
Patient  Marisa Portillo    Location  [facility] HEMATOLOGY & ONCOLOGY    Chief Complaint  Breast Cancer    Referring Provider: Judah Johnson, *  PCP: Judah Johnson MD    Subjective          Oncology/Hematology History Overview Note     ER+ Right Breast Cancer:    Diagnostic mammogram on 11/12/2021: 2 cm asymmetry in the outer central right breast with amorphous calcifications.  Similar appearing group of amorphous calcifications in the outer central right breast.  6 mm asymmetry in the inner right breast.  Right axillary lymphadenopathy.    Ultrasound-guided biopsy on 11/1/2021: Right breast 3 o'clock position, 2 cm from the nipple with invasive ductal carcinoma, grade 2.  Right breast 9 o'clock position, 3 cm from the nipple with invasive ductal carcinoma, grade 2, ER positive (50%), IA positive (3%), HER-2 negative (score 0), Ki-67 14%.  Associated with intermediate grade ductal carcinoma in situ.  Right axillary lymph node positive for breast carcinoma.    CT CAP and bone scan on 12/13/21: negative for metastatic disease    Bilateral mastectomy 12/28/2021: Right breast with multiple (2) foci of invasive ductal carcinoma, largest focus 2.3 cm, grade 2, associated with DCIS with extensive intraductal component, all margins negative, 4/10 lymph nodes involved with no extranodal extension and the largest focus measured at 3.3 cm, ER positive (40%), IA positive (5%), HER-2 negative by IHC (score 0), Ki-67 14%, pT2 pN2a cM0    Completed 4 cycles of chemotherapy with TC on 4/25/22.  Complicated by a UTI and multiple right breast abscesses causing delay in cycles 3 and 4.     *The pt discontinued HRT in October of 2021 when she had an abnormal screening mammogram     Malignant neoplasm of overlapping sites of right breast in female, estrogen receptor positive (HCC)   12/9/2021 Initial Diagnosis    Malignant neoplasm of overlapping sites of right breast in female, estrogen receptor  positive (HCC)     12/28/2021 Cancer Staged    Staging form: Breast, AJCC 8th Edition  - Pathologic stage from 12/28/2021: Stage IB (pT2, pN2a, cM0, G2, ER+, WI+, HER2-) - Signed by Starr Mendez MD PhD on 1/30/2022 1/14/2022 -  Chemotherapy    OP CENTRAL VENOUS ACCESS DEVICE ACCESS, CARE, AND MAINTENANCE (CV[facility])     1/31/2022 - 4/26/2022 Chemotherapy    OP BREAST TC DOCEtaxel / Cyclophosphamide         HPI  Patient comes in today for follow-up after starting Cymbalta.  She says has not yet helped her symptoms of hot flashes and difficulty sleeping.  She continues to see the plastic surgeon weekly.  She is hopeful the infection is gone as she has not yet been able to start radiation.  She has follow-up with radiation oncology on Monday.    Review of Systems   Constitutional: Positive for fatigue. Negative for appetite change, diaphoresis, fever, unexpected weight gain and unexpected weight loss.   HENT: Negative for hearing loss, sore throat and voice change.    Eyes: Negative for blurred vision, double vision, pain, redness and visual disturbance.   Respiratory: Negative for cough, shortness of breath and wheezing.    Cardiovascular: Negative for chest pain, palpitations and leg swelling.   Endocrine: Negative for cold intolerance, heat intolerance, polydipsia and polyuria.   Genitourinary: Negative for decreased urine volume, difficulty urinating, frequency and urinary incontinence.   Musculoskeletal: Negative for arthralgias, back pain, joint swelling and myalgias.   Skin: Negative for color change, rash, skin lesions and wound.   Neurological: Negative for dizziness, seizures, numbness and headache.   Hematological: Negative for adenopathy. Does not bruise/bleed easily.   Psychiatric/Behavioral: Negative for depressed mood. The patient is not nervous/anxious.    All other systems reviewed and are negative.      Past Medical History:   Diagnosis Date   • Allergies    • Breast cancer (HCC)    • High  cholesterol    • IFG (impaired fasting glucose)    • Osteoarthritis    • Port-A-Cath placement 12/29/2021   • S/P BILATERAL IMMEDIATE BREAST RECONSTRUCTION WITH LEFT PRE PEC PLACEMENT OF SILICONE GEL IMPLANT AND MESH, RIGHT PRE PEC PLACEMENT OF EXPANDER AND MESH 12/29/2021   • TIA (transient ischemic attack)     no residual (tia approximately 8 years ago)   • Vitamin D deficiency      Past Surgical History:   Procedure Laterality Date   • APPENDECTOMY     • BREAST AUGMENTATION Bilateral 12/28/2021    Procedure: bilateral breast reconstructon with bilateral mesh placement.   left implant, right tissue expander placement;  Surgeon: Lacy Shelton MD;  [place] [facility] [facility] [place] [facility]  Service: Plastics;  Laterality: Bilateral;   • BREAST TISSUE EXPANDER REMOVAL INSERTION OF IMPLANT Right 4/15/2022    Procedure: RIGHT BREAST IMPLANT REMOVAL WITH INCISION AND DRAINAGE;  Surgeon: Lacy Shelton MD;  Location[place] [facility] [place] [facility] [place]  Service: Plastics;  Laterality: Right;   • CEREBRAL ANGIOGRAM      clip placed- pt stated can have MRI with cerebral clips   • CYST REMOVAL Right     rt wrist   • INCISION AND DRAINAGE OF WOUND Right 3/11/2022    Procedure: INCISION AND DRAINAGE ABSCESS OF RIGHT BREAST WITH EXPANDER REMOVAL AND IMPLANT PLACEMENT;  Surgeon: Lacy Shelton MD;  Location: [facility];  Service: Plastics;  Laterality: Right;   • MASTECTOMY     • MASTECTOMY COMPLETE / SIMPLE W/ SENTINEL NODE BIOPSY Bilateral    • PORTACATH PLACEMENT Left 12/28/2021    Procedure: INSERTION OF PORTACATH;  Surgeon: Jacqueline Mcgraw MD;  [place] [facility] [place] [facility]  Service: General;  Laterality: Left;   • SENTINEL NODE BIOPSY Bilateral 12/28/2021    Procedure: BILATERAL BREAST MASTECTOMIES WITH RIGHT SENTINEL NODE BIOPSIES WITH FROZEN SECTIONS;  Surgeon: Jacqueline Mcgraw MD;  [place] [facility] [place] [facility] [place] [facility] [place]  Service: General;  Laterality: Bilateral;     Social History     Socioeconomic History   • Marital status:     Tobacco Use   • Smoking status: Never Smoker   • Smokeless tobacco: Never Used   Vaping Use   • Vaping Use: Never used   Substance and Sexual Activity   • Alcohol use: Yes     Comment: socially    • Drug use: Never   • Sexual activity: Defer     Family History   Problem Relation Age of Onset   • Hypertension Mother    • Diabetes Mother    • Cancer Mother    • Arthritis Mother    • Breast cancer Mother    • Hypertension Father    • Diabetes Father    • Cancer Father    • Arthritis Father    • Liver cancer Father    • Lung cancer Father    • Breast cancer Maternal Aunt    • Malig Hyperthermia Neg Hx        Objective   Physical Exam  General: Alert, cooperative, no acute distress, well appearing  Eyes: Anicteric sclera, PERRLA  Respiratory: normal respiratory effort  Cardiovascular: no lower extremity edema  Skin: Normal tone, no rash, no lesions  Psychiatric: Appropriate affect, intact judgment  Neurologic: No focal sensory or motor deficits, normal cognition   Musculoskeletal: Normal muscle strength and tone  Extremities: No clubbing, cyanosis, or deformities    There were no vitals filed for this visit.            PHQ-9 Total Score:         Result Review :   The following data was reviewed by: Shira Amato MA on 06/20/2022:  Lab Results   Component Value Date    HGB 11.8 (L) 05/23/2022    HCT 35.8 05/23/2022    MCV 96.2 05/23/2022     05/23/2022    WBC 4.23 05/23/2022    NEUTROABS 1.83 05/23/2022    LYMPHSABS 1.57 05/23/2022    MONOSABS 0.57 05/23/2022    EOSABS 0.22 05/23/2022    BASOSABS 0.03 05/23/2022     Lab Results   Component Value Date    GLUCOSE 87 05/23/2022    BUN 12 05/23/2022    CREATININE 0.60 05/23/2022     05/23/2022    K 4.1 05/23/2022     (H) 05/23/2022    CO2 26.5 05/23/2022    CALCIUM 8.9 05/23/2022    PROTEINTOT 6.2 05/23/2022    ALBUMIN 4.19 05/23/2022    BILITOT 0.4 05/23/2022    ALKPHOS 60 05/23/2022    AST 18 05/23/2022    ALT 19 05/23/2022          Assessment and Plan     There are no diagnoses linked to this encounter.        ER+ Right breast Cancer:  The pt completed 4 cycles of TC.  Her treatment course was complicated by right breast abscess. This is being managed by plastic surgery. We discussed the risks and benefits to anastrozole again. Unfortunately the patient is already having significant hot flashes and insomnia since stopping HRT so we will continue to hold off on starting this medication.  Radiation is also on hold until she completely heals from her abscesses.    Menopausal symptoms: Cymbalta was not helpful.  Will discontinue this and start the patient on venlafaxine.  I will follow-up with her in 1 month.          Patient was given instructions and counseling regarding her condition or for health maintenance advice. Please see specific information pulled into the AVS if appropriate.

## 2022-06-23 ENCOUNTER — OFFICE VISIT (OUTPATIENT)
Dept: PLASTIC SURGERY | Facility: CLINIC | Age: 62
End: 2022-06-23

## 2022-06-23 VITALS
TEMPERATURE: 98.2 F | BODY MASS INDEX: 31.79 KG/M2 | HEIGHT: 63 IN | DIASTOLIC BLOOD PRESSURE: 76 MMHG | WEIGHT: 179.4 LBS | SYSTOLIC BLOOD PRESSURE: 144 MMHG | HEART RATE: 63 BPM | OXYGEN SATURATION: 96 %

## 2022-06-23 DIAGNOSIS — N61.0 BREAST INFECTION: ICD-10-CM

## 2022-06-23 DIAGNOSIS — N64.89 RECURRENT SEROMA OF BREAST: Primary | ICD-10-CM

## 2022-06-23 PROCEDURE — 99024 POSTOP FOLLOW-UP VISIT: CPT | Performed by: SURGERY

## 2022-06-27 ENCOUNTER — OFFICE VISIT (OUTPATIENT)
Dept: RADIATION ONCOLOGY | Facility: HOSPITAL | Age: 62
End: 2022-06-27

## 2022-06-27 ENCOUNTER — APPOINTMENT (OUTPATIENT)
Dept: RADIATION ONCOLOGY | Facility: HOSPITAL | Age: 62
End: 2022-06-27

## 2022-06-27 VITALS
OXYGEN SATURATION: 96 % | RESPIRATION RATE: 16 BRPM | SYSTOLIC BLOOD PRESSURE: 140 MMHG | DIASTOLIC BLOOD PRESSURE: 67 MMHG | WEIGHT: 178.79 LBS | BODY MASS INDEX: 31.67 KG/M2 | TEMPERATURE: 98 F | HEART RATE: 67 BPM

## 2022-06-27 DIAGNOSIS — C50.811 MALIGNANT NEOPLASM OF OVERLAPPING SITES OF RIGHT BREAST IN FEMALE, ESTROGEN RECEPTOR POSITIVE: Primary | ICD-10-CM

## 2022-06-27 DIAGNOSIS — Z17.0 MALIGNANT NEOPLASM OF OVERLAPPING SITES OF RIGHT BREAST IN FEMALE, ESTROGEN RECEPTOR POSITIVE: Primary | ICD-10-CM

## 2022-06-27 PROCEDURE — G0463 HOSPITAL OUTPT CLINIC VISIT: HCPCS | Performed by: RADIOLOGY

## 2022-06-27 PROCEDURE — 99212 OFFICE O/P EST SF 10 MIN: CPT | Performed by: RADIOLOGY

## 2022-06-27 NOTE — PROGRESS NOTES
Follow Up Office Visit      Encounter Date: 06/27/2022   Patient Name: Marisa Portillo  YOB: 1960   Medical Record Number: [id number]   Primary Diagnosis: Malignant neoplasm of overlapping sites of right breast in female, estrogen receptor positive (HCC) [C50.811, Z17.0]   Cancer Staging: Cancer Staging  Malignant neoplasm of overlapping sites of right breast in female, estrogen receptor positive (HCC)  Staging form: Breast, AJCC 8th Edition  - Pathologic stage from 12/28/2021: Stage IB (pT2, pN2a, cM0, G2, ER+, GA+, HER2-) - Signed by Starr Mendez MD PhD on 1/30/2022        Chief Complaint:    Chief Complaint   Patient presents with   • Follow-up   • Breast Cancer       History of Present Illness: Marisa Portillo returns for short interval follow-up.  She was recently evaluated by Dr. Shelton and underwent drainage of approximately 13 cc from the right lateral chest wall.  She reports feeling well overall with no complaints.  She will be evaluated by Dr. Shelton later this week at which time no additional drainage is anticipated.    Subjective      Review of Systems: Review of Systems   Constitutional: Positive for fatigue (3/10). Negative for appetite change.   Respiratory: Negative for cough and shortness of breath.    Gastrointestinal: Negative for constipation, diarrhea and nausea.   Genitourinary: Negative for difficulty urinating, dysuria, frequency and urgency.   Skin: Negative for color change.   Neurological: Negative for dizziness and headaches.   Psychiatric/Behavioral: Positive for sleep disturbance (improving with medication ).       The following portions of the patient's history were reviewed and updated as appropriate: allergies, current medications, past family history, past medical history, past social history, past surgical history and problem list.    Medications:     Current Outpatient Medications:   •  acetaminophen (TYLENOL) 650 MG 8 hr tablet, Take 650 mg by  mouth 2 (Two) Times a Day., Disp: , Rfl:   •  famotidine (Pepcid) 20 MG tablet, Take 1 tablet by mouth 2 (Two) Times a Day., Disp: 60 tablet, Rfl: 0  •  hydrOXYzine (ATARAX) 25 MG tablet, Take 25 mg by mouth 3 (Three) Times a Day As Needed for Itching., Disp: , Rfl:   •  Loratadine-Pseudoephedrine (CLARITIN-D 24 HOUR PO), Take 1 tablet by mouth Daily., Disp: , Rfl:   •  venlafaxine XR (EFFEXOR-XR) 37.5 MG 24 hr capsule, Take 1 capsule by mouth Daily., Disp: 30 capsule, Rfl: 1  •  Vitamin A 3 MG (61887 UT) capsule, Take 10,000 Units by mouth Daily., Disp: , Rfl:   •  vitamin D (ERGOCALCIFEROL) 1.25 MG (76009 UT) capsule capsule, Take 1 capsule by mouth Every 7 (Seven) Days., Disp: 12 capsule, Rfl: 3    Allergies:   Allergies   Allergen Reactions   • Multihance [Gadobenate] Hives and Itching     MRI contrast       ECOG: (0) Fully active, able to carry on all predisease performance without restriction  Quality of Life: 100 - Full Activity     Objective     Physical Exam:   Vital Signs:   Vitals:    06/27/22 1055   BP: 140/67   Pulse: 67   Resp: 16   Temp: 98 °F (36.7 °C)   TempSrc: Temporal   SpO2: 96%   Weight: 81.1 kg (178 lb 12.7 oz)   PainSc: 0-No pain     Body mass index is 31.67 kg/m².     Physical Exam  Constitutional:       General: She is not in acute distress.     Appearance: Normal appearance. She is normal weight. She is not toxic-appearing.   HENT:      Head: Normocephalic and atraumatic.   Pulmonary:      Effort: Pulmonary effort is normal. No respiratory distress.   Chest:      Comments: Status post nipple sparing right mastectomy; well-healed incision  Abdominal:      General: There is no distension.      Palpations: Abdomen is soft.   Skin:     General: Skin is warm and dry.   Neurological:      General: No focal deficit present.      Mental Status: She is alert and oriented to person, place, and time.      Cranial Nerves: No cranial nerve deficit.      Gait: Gait normal.   Psychiatric:         Mood  and Affect: Mood normal.         Behavior: Behavior normal.         Judgment: Judgment normal.             Assessment / Plan          Assessment/Plan:   Marisa Portillo is a 62-year-old female with cT2 cN1 cM0, invasive ductal carcinoma, grade 2, ER positive/NV positive, HER-2/aries negative of the right breast status post bilateral mastectomy with right axillary lymph node dissection;  pT2 pN2a.  She developed a left chest wall abscess but has recovered from this and continues to undergo management per plastic surgery. She is doing well overall; ECOG 0    We once again discussed the potential risks of external beam radiotherapy to the right chest wall.  Ms. Novoa understands the associated risks and benefits of external beam radiotherapy to the right chest wall and axilla as this has been a repeated conversation in our encounters.  She is scheduled for CT simulation for treatment planning purposes.    Dudley Martinez MD  Radiation Oncology  [facility]    This document has been signed by Dudley Martinez MD on June 27, 2022 11:39 EDT

## 2022-06-27 NOTE — PROGRESS NOTES
"Chief Complaint  Follow-up Breast Recon   Subjective          History of Present Illness  Marisa Portillo is a 62 y.o. female who presents to [facility] PLASTIC & RECONSTRUCTIVE SURGERY for a follow up  04/15/22 RIGHT BREAST IMPLANT REMOVAL WITH INCISION AND DRAINAGE.    Per patient no concerns except small pocket with minimal fluid remaining, overall area has improved.         Allergies: Multihance [gadobenate]  Allergies Reconciled.    Review of Systems   All review of system has been reviewed and it  is negative except the ones note above.     Objective     /88 (BP Location: Left arm, Patient Position: Sitting)   Pulse 67   Temp 94.4 °F (34.7 °C) (Temporal)   Ht 160 cm (62.99\")   Wt 80.7 kg (178 lb)   SpO2 99%   BMI 31.54 kg/m²     Body mass index is 31.54 kg/m².    Physical Exam     Pulmonary/Chest  Effort normal.     Right Breast: Incision healing well, no erythema, small 2 cm x 1 cm raised pocket over central inferioir right breast with palable fluid, small 0.5mm raised area superiorly inside fold that is raised and slightly tender     Result Review :                Assessment and Plan      Diagnoses and all orders for this visit:    1. Postoperative follow-up (Primary)        Plan:  Aspirated 10 cc of serous fluid from the right breast raised area which is improved from last visit. Antibiotic ointment to area. Instructed to keep gauze over area to compress pocket that is allowing fluid collection. Wear compression bra. Follow up in 1 week to reassess.     Follow Up     Return in about 1 week (around 7/7/2022) for f/u right breast seroma.    Patient was given instructions and counseling regarding her condition. Please see specific information pulled into the AVS if appropriate.     Brigitte Chase, CAL[facility]  06/30/2022    "

## 2022-06-30 ENCOUNTER — OFFICE VISIT (OUTPATIENT)
Dept: PLASTIC SURGERY | Facility: CLINIC | Age: 62
End: 2022-06-30

## 2022-06-30 VITALS
HEIGHT: 63 IN | DIASTOLIC BLOOD PRESSURE: 88 MMHG | HEART RATE: 67 BPM | TEMPERATURE: 94.4 F | OXYGEN SATURATION: 99 % | WEIGHT: 178 LBS | SYSTOLIC BLOOD PRESSURE: 164 MMHG | BODY MASS INDEX: 31.54 KG/M2

## 2022-06-30 DIAGNOSIS — Z09 POSTOPERATIVE FOLLOW-UP: Primary | ICD-10-CM

## 2022-06-30 PROCEDURE — 99024 POSTOP FOLLOW-UP VISIT: CPT | Performed by: NURSE PRACTITIONER

## 2022-07-01 ENCOUNTER — HOSPITAL ENCOUNTER (OUTPATIENT)
Dept: RADIATION ONCOLOGY | Facility: HOSPITAL | Age: 62
Setting detail: RADIATION/ONCOLOGY SERIES
End: 2022-07-01

## 2022-07-06 NOTE — PROGRESS NOTES
"Chief Complaint    Subjective          History of Present Illness  Marisa Portillo is a 62 y.o. female who presents to [facility] PLASTIC & RECONSTRUCTIVE SURGERY for a follow up  04/15/22 RIGHT BREAST IMPLANT REMOVAL WITH INCISION AND DRAINAGE.     Still has a pocket of fluid on right breast. Some tenderness and discomfort since last aspiration.             Allergies: Multihance [gadobenate]  Allergies Reconciled.    Review of Systems   All review of system has been reviewed and it  is negative except the ones note above.     Objective     /82   Pulse 79   Temp 99.1 °F (37.3 °C) (Temporal)   Ht 160 cm (62.99\")   SpO2 95%   BMI 31.54 kg/m²     Body mass index is 31.54 kg/m².    Physical Exam     Pulmonary/Chest  Effort normal.     Right Breast:     Patient still with small amount of fluid on the right breast.   Result Review :     Today I performed an incision and opening of the wound. I palpated the breast cavity and it was 2 cm deep. I explained to patient that since it has been recurrent, we will start packing the wound with dakins and allow it to heal from inside to outside.            Assessment and Plan      Diagnoses and all orders for this visit:    1. Recurrent seroma of breast (Primary)        Plan:    Follow Up     No follow-ups on file.    Patient was given instructions and counseling regarding her condition. Please see specific information pulled into the AVS if appropriate.     Lacy Shelton MD  07/07/2022    "

## 2022-07-07 ENCOUNTER — OFFICE VISIT (OUTPATIENT)
Dept: PLASTIC SURGERY | Facility: CLINIC | Age: 62
End: 2022-07-07

## 2022-07-07 VITALS
BODY MASS INDEX: 31.54 KG/M2 | HEIGHT: 63 IN | OXYGEN SATURATION: 95 % | DIASTOLIC BLOOD PRESSURE: 82 MMHG | TEMPERATURE: 99.1 F | HEART RATE: 79 BPM | SYSTOLIC BLOOD PRESSURE: 149 MMHG

## 2022-07-07 DIAGNOSIS — N64.89 RECURRENT SEROMA OF BREAST: Primary | ICD-10-CM

## 2022-07-07 PROCEDURE — 99024 POSTOP FOLLOW-UP VISIT: CPT | Performed by: SURGERY

## 2022-07-07 PROCEDURE — 10060 I&D ABSCESS SIMPLE/SINGLE: CPT | Performed by: SURGERY

## 2022-07-11 NOTE — PROGRESS NOTES
"Chief Complaint    Subjective          History of Present Illness  Marisa Portillo is a 62 y.o. female who presents to [facility] PLASTIC & RECONSTRUCTIVE SURGERY for a follow up  04/15/22 RIGHT BREAST IMPLANT REMOVAL WITH INCISION AND DRAINAGE.     3m post op and follow up on seroma on right. She is packing it twice daily. She feels like it is getting better.       Allergies: Multihance [gadobenate]  Allergies Reconciled.    Review of Systems   All review of system has been reviewed and it  is negative except the ones note above.     Objective     /91 (BP Location: Left arm)   Pulse 76   Temp 98 °F (36.7 °C) (Temporal)   Ht 160 cm (63\")   Wt 80.6 kg (177 lb 12.8 oz)   SpO2 96%   BMI 31.50 kg/m²     Body mass index is 31.5 kg/m².    Physical Exam     Pulmonary/Chest  Effort normal.     Right Breast:   Wound is smaller and with more sanguineous exudate     Result Review :     T     Assessment and Plan      Diagnoses and all orders for this visit:    1. Breast infection (Primary)        Plan: gauze & packing removed, replaced with Iodoform packing and covered with bandage and paper tape. Follow up in 2wks.     Follow Up     Return for 2wk follow up open area .    Patient was given instructions and counseling regarding her condition. Please see specific information pulled into the AVS if appropriate.     Lacy Shelton MD  07/21/2022    "

## 2022-07-15 PROCEDURE — 77263 THER RADIOLOGY TX PLNG CPLX: CPT | Performed by: RADIOLOGY

## 2022-07-18 ENCOUNTER — HOSPITAL ENCOUNTER (OUTPATIENT)
Dept: RADIATION ONCOLOGY | Facility: HOSPITAL | Age: 62
Setting detail: RADIATION/ONCOLOGY SERIES
End: 2022-07-18

## 2022-07-18 ENCOUNTER — OFFICE VISIT (OUTPATIENT)
Dept: ONCOLOGY | Facility: HOSPITAL | Age: 62
End: 2022-07-18

## 2022-07-18 VITALS
TEMPERATURE: 97.5 F | WEIGHT: 177.91 LBS | DIASTOLIC BLOOD PRESSURE: 77 MMHG | BODY MASS INDEX: 31.52 KG/M2 | HEART RATE: 70 BPM | RESPIRATION RATE: 18 BRPM | OXYGEN SATURATION: 97 % | SYSTOLIC BLOOD PRESSURE: 149 MMHG

## 2022-07-18 DIAGNOSIS — Z17.0 MALIGNANT NEOPLASM OF OVERLAPPING SITES OF RIGHT BREAST IN FEMALE, ESTROGEN RECEPTOR POSITIVE: ICD-10-CM

## 2022-07-18 DIAGNOSIS — C50.811 MALIGNANT NEOPLASM OF OVERLAPPING SITES OF RIGHT BREAST IN FEMALE, ESTROGEN RECEPTOR POSITIVE: ICD-10-CM

## 2022-07-18 PROCEDURE — 99214 OFFICE O/P EST MOD 30 MIN: CPT | Performed by: INTERNAL MEDICINE

## 2022-07-18 PROCEDURE — G0463 HOSPITAL OUTPT CLINIC VISIT: HCPCS | Performed by: INTERNAL MEDICINE

## 2022-07-18 RX ORDER — ANASTROZOLE 1 MG/1
1 TABLET ORAL DAILY
Qty: 30 TABLET | Refills: 1 | Status: SHIPPED | OUTPATIENT
Start: 2022-07-18 | End: 2022-08-23

## 2022-07-18 NOTE — PROGRESS NOTES
Patient  Marisa Portillo    Location  [facility] HEMATOLOGY & ONCOLOGY    Chief Complaint  Breast Cancer    Referring Provider: Judah Johnson, *  PCP: Judah Johnson MD    Subjective          Oncology/Hematology History Overview Note     ER+ Right Breast Cancer:    Diagnostic mammogram on 11/12/2021: 2 cm asymmetry in the outer central right breast with amorphous calcifications.  Similar appearing group of amorphous calcifications in the outer central right breast.  6 mm asymmetry in the inner right breast.  Right axillary lymphadenopathy.    Ultrasound-guided biopsy on 11/1/2021: Right breast 3 o'clock position, 2 cm from the nipple with invasive ductal carcinoma, grade 2.  Right breast 9 o'clock position, 3 cm from the nipple with invasive ductal carcinoma, grade 2, ER positive (50%), OH positive (3%), HER-2 negative (score 0), Ki-67 14%.  Associated with intermediate grade ductal carcinoma in situ.  Right axillary lymph node positive for breast carcinoma.    CT CAP and bone scan on 12/13/21: negative for metastatic disease    Bilateral mastectomy 12/28/2021: Right breast with multiple (2) foci of invasive ductal carcinoma, largest focus 2.3 cm, grade 2, associated with DCIS with extensive intraductal component, all margins negative, 4/10 lymph nodes involved with no extranodal extension and the largest focus measured at 3.3 cm, ER positive (40%), OH positive (5%), HER-2 negative by IHC (score 0), Ki-67 14%, pT2 pN2a cM0    Completed 4 cycles of chemotherapy with TC on 4/25/22.  Complicated by a UTI and multiple right breast abscesses causing delay in cycles 3 and 4.     *The pt discontinued HRT in October of 2021 when she had an abnormal screening mammogram     Malignant neoplasm of overlapping sites of right breast in female, estrogen receptor positive (HCC)   12/9/2021 Initial Diagnosis    Malignant neoplasm of overlapping sites of right breast in female, estrogen receptor  positive (HCC)     12/28/2021 Cancer Staged    Staging form: Breast, AJCC 8th Edition  - Pathologic stage from 12/28/2021: Stage IB (pT2, pN2a, cM0, G2, ER+, FL+, HER2-) - Signed by Starr Mendez MD PhD on 1/30/2022 1/14/2022 -  Chemotherapy    OP CENTRAL VENOUS ACCESS DEVICE ACCESS, CARE, AND MAINTENANCE (CVAD)     1/31/2022 - 4/26/2022 Chemotherapy    OP BREAST TC DOCEtaxel / Cyclophosphamide     Malignant neoplasm of overlapping sites of right female breast (HCC)   7/18/2022 Initial Diagnosis    Malignant neoplasm of overlapping sites of right female breast (HCC)     7/26/2022 -  Radiation    RADIATION THERAPY Treatment Details (Noted on 7/18/2022)  Site: Right Chest wall  Technique: 3D CRT  Goal: No goal specified  Planned Treatment Start Date: 7/26/2022         HPI  Patient comes in today for follow-up after starting venlafaxine for hot flashes.  She says the hot flashes have improved significantly and she is now getting better sleep.  She continues to follow-up with plastic surgery.  The breast seroma was opened and she is now packing it with gauze.  She told me that she had an appointment with Dr. Martinez later today for CT simulation.  She wonders if she will be able to proceed with radiation with the open wound.  We discussed the risks and benefits to starting anastrozole which the patient agrees to.    Review of Systems   Constitutional: Positive for fatigue. Negative for appetite change, diaphoresis, fever, unexpected weight gain and unexpected weight loss.   HENT: Negative for hearing loss, sore throat and voice change.    Eyes: Negative for blurred vision, double vision, pain, redness and visual disturbance.   Respiratory: Negative for cough, shortness of breath and wheezing.    Cardiovascular: Positive for chest pain. Negative for palpitations and leg swelling.   Endocrine: Negative for cold intolerance, heat intolerance, polydipsia and polyuria.   Genitourinary: Negative for decreased urine  volume, difficulty urinating, frequency and urinary incontinence.   Musculoskeletal: Negative for arthralgias, back pain, joint swelling and myalgias.   Skin: Negative for color change, rash, skin lesions and wound.   Neurological: Negative for dizziness, seizures, numbness and headache.   Hematological: Negative for adenopathy. Does not bruise/bleed easily.   Psychiatric/Behavioral: Negative for depressed mood. The patient is not nervous/anxious.    All other systems reviewed and are negative.      Past Medical History:   Diagnosis Date   • Allergies    • Breast cancer (HCC)    • High cholesterol    • IFG (impaired fasting glucose)    • Osteoarthritis    • Port-A-Cath placement 12/29/2021   • S/P BILATERAL IMMEDIATE BREAST RECONSTRUCTION WITH LEFT PRE PEC PLACEMENT OF SILICONE GEL IMPLANT AND MESH, RIGHT PRE PEC PLACEMENT OF EXPANDER AND MESH 12/29/2021   • TIA (transient ischemic attack)     no residual (tia approximately 8 years ago)   • Vitamin D deficiency      Past Surgical History:   Procedure Laterality Date   • APPENDECTOMY     • BREAST AUGMENTATION Bilateral 12/28/2021    Procedure: bilateral breast reconstructon with bilateral mesh placement.   left implant, right tissue expander placement;  Surgeon: Lacy Shelton MD;  [place] [facility] [place] [facility]  Service: Plastics;  Laterality: Bilateral;   • BREAST TISSUE EXPANDER REMOVAL INSERTION OF IMPLANT Right 4/15/2022    Procedure: RIGHT BREAST IMPLANT REMOVAL WITH INCISION AND DRAINAGE;  Surgeon: Lacy Shelton MD;  [place] [facility] [place] [facility] [place]  Service: Plastics;  Laterality: Right;   • CEREBRAL ANGIOGRAM      clip placed- pt stated can have MRI with cerebral clips   • CYST REMOVAL Right     rt wrist   • INCISION AND DRAINAGE OF WOUND Right 3/11/2022    Procedure: INCISION AND DRAINAGE ABSCESS OF RIGHT BREAST WITH EXPANDER REMOVAL AND IMPLANT PLACEMENT;  Surgeon: Lacy Shelton MD;  Location: [facility] MAIN OR;  Service: Plastics;  Laterality:  Right;   • MASTECTOMY     • MASTECTOMY COMPLETE / SIMPLE W/ SENTINEL NODE BIOPSY Bilateral    • PORTACATH PLACEMENT Left 12/28/2021    Procedure: INSERTION OF PORTACATH;  Surgeon: Jacqueline Mcgraw MD;  Location: [facility];  Service: General;  Laterality: Left;   • SENTINEL NODE BIOPSY Bilateral 12/28/2021    Procedure: BILATERAL BREAST MASTECTOMIES WITH RIGHT SENTINEL NODE BIOPSIES WITH FROZEN SECTIONS;  Surgeon: Jacqueline Mcgraw MD;  Location: [facility];  Service: General;  Laterality: Bilateral;     Social History     Socioeconomic History   • Marital status:    Tobacco Use   • Smoking status: Never Smoker   • Smokeless tobacco: Never Used   Vaping Use   • Vaping Use: Never used   Substance and Sexual Activity   • Alcohol use: Yes     Comment: socially    • Drug use: Never   • Sexual activity: Defer     Family History   Problem Relation Age of Onset   • Hypertension Mother    • Diabetes Mother    • Cancer Mother    • Arthritis Mother    • Breast cancer Mother    • Hypertension Father    • Diabetes Father    • Cancer Father    • Arthritis Father    • Liver cancer Father    • Lung cancer Father    • Breast cancer Maternal Aunt    • Malig Hyperthermia Neg Hx        Objective   Physical Exam  General: Alert, cooperative, no acute distress  Eyes: Anicteric sclera, PERRLA  Respiratory: normal respiratory effort  Cardiovascular: no lower extremity edema  Skin: Normal tone, no rash, no lesions  Psychiatric: Appropriate affect, intact judgment  Neurologic: No focal sensory or motor deficits, normal cognition   Musculoskeletal: Normal muscle strength and tone  Extremities: No clubbing, cyanosis, or deformities    Vitals:    07/18/22 0902   BP: 149/77   Pulse: 70   Resp: 18   Temp: 97.5 °F (36.4 °C)   SpO2: 97%   Weight: 80.7 kg (177 lb 14.6 oz)   PainSc: 0-No pain     ECOG score: 0         PHQ-9 Total Score:         Result Review :   The following data was reviewed by: Starr Mendez MD PhD on  07/18/2022:  Lab Results   Component Value Date    HGB 11.8 (L) 05/23/2022    HCT 35.8 05/23/2022    MCV 96.2 05/23/2022     05/23/2022    WBC 4.23 05/23/2022    NEUTROABS 1.83 05/23/2022    LYMPHSABS 1.57 05/23/2022    MONOSABS 0.57 05/23/2022    EOSABS 0.22 05/23/2022    BASOSABS 0.03 05/23/2022     Lab Results   Component Value Date    GLUCOSE 87 05/23/2022    BUN 12 05/23/2022    CREATININE 0.60 05/23/2022     05/23/2022    K 4.1 05/23/2022     (H) 05/23/2022    CO2 26.5 05/23/2022    CALCIUM 8.9 05/23/2022    PROTEINTOT 6.2 05/23/2022    ALBUMIN 4.19 05/23/2022    BILITOT 0.4 05/23/2022    ALKPHOS 60 05/23/2022    AST 18 05/23/2022    ALT 19 05/23/2022          Assessment and Plan    Diagnoses and all orders for this visit:    1. Malignant neoplasm of overlapping sites of right breast in female, estrogen receptor positive (HCC)    Other orders  -     anastrozole (ARIMIDEX) 1 MG tablet; Take 1 tablet by mouth Daily.  Dispense: 30 tablet; Refill: 1        ER+ Right breast Cancer:  The pt completed 4 cycles of TC.  Her treatment course was complicated by right breast abscess. This is being managed by plastic surgery.  She still has a wound which is being packed.  I contacted Dr. Martinez to notify him of this change and he decided to delay CT simulation until the wound has healed.  I communicated this to the patient.  His staff will reach out to the patient to reschedule.  She will start anastrozole today and follow-up with me in 1 month to discuss any side effects and toxicity.    Menopausal symptoms: Venlafaxine has significantly helped improve hot flashes and allow the patient to get better sleep.  She will continue the same dose for now.  If hot flashes worsen again once she starts anastrozole then we can increase the dose of venlafaxine to compensate.      Patient was given instructions and counseling regarding her condition or for health maintenance advice. Please see specific information  pulled into the AVS if appropriate.

## 2022-07-19 RX ORDER — VENLAFAXINE HYDROCHLORIDE 37.5 MG/1
37.5 CAPSULE, EXTENDED RELEASE ORAL DAILY
Qty: 30 CAPSULE | Refills: 1 | Status: SHIPPED | OUTPATIENT
Start: 2022-07-19 | End: 2022-08-23

## 2022-07-21 ENCOUNTER — OFFICE VISIT (OUTPATIENT)
Dept: PLASTIC SURGERY | Facility: CLINIC | Age: 62
End: 2022-07-21

## 2022-07-21 VITALS
OXYGEN SATURATION: 96 % | WEIGHT: 177.8 LBS | DIASTOLIC BLOOD PRESSURE: 91 MMHG | BODY MASS INDEX: 31.5 KG/M2 | HEART RATE: 76 BPM | SYSTOLIC BLOOD PRESSURE: 167 MMHG | HEIGHT: 63 IN | TEMPERATURE: 98 F

## 2022-07-21 DIAGNOSIS — N61.0 BREAST INFECTION: Primary | ICD-10-CM

## 2022-07-21 PROCEDURE — 99212 OFFICE O/P EST SF 10 MIN: CPT | Performed by: SURGERY

## 2022-07-27 ENCOUNTER — NURSE NAVIGATOR (OUTPATIENT)
Dept: ONCOLOGY | Facility: HOSPITAL | Age: 62
End: 2022-07-27

## 2022-07-27 NOTE — PROGRESS NOTES
Called and left brief message on voicemail (not set up). Left my name and contact number.  Wanting to check up on patient to see how she's doing.  Requested patient to call me back.

## 2022-07-27 NOTE — PROGRESS NOTES
Ms. Portillo called back, got to follow up with her and check up on her progress.  Has started medication for hot flashes, notes some improvement.  Started anastrozole.  Instructed if hot flashes get worse, may be able to speak with Dr Mendez about increasing dose of Venlafaxine to compensate for this. Still packing breast wound, states that Plastics is managing this.  Reports that she has not started RT yet, is waiting for would to heal.  Has a follow up appt with [facility] in plastics on 8/4 and is scheduled for CT Sim on 8/18.  Will continue to follow Ms. Portillo's progress and I asked to her to please contact me if there's anything I can help facilitate.

## 2022-08-01 ENCOUNTER — HOSPITAL ENCOUNTER (OUTPATIENT)
Dept: ONCOLOGY | Facility: HOSPITAL | Age: 62
Setting detail: INFUSION SERIES
Discharge: HOME | End: 2022-08-01

## 2022-08-01 DIAGNOSIS — G45.9 TIA (TRANSIENT ISCHEMIC ATTACK): ICD-10-CM

## 2022-08-01 DIAGNOSIS — M79.89 LIMB SWELLING: ICD-10-CM

## 2022-08-01 DIAGNOSIS — E78.2 MIXED HYPERLIPIDEMIA: ICD-10-CM

## 2022-08-01 DIAGNOSIS — N32.9 BLADDER DISORDER: ICD-10-CM

## 2022-08-01 DIAGNOSIS — M19.90 ARTHRITIS: ICD-10-CM

## 2022-08-01 DIAGNOSIS — C50.811 MALIGNANT NEOPLASM OF OVERLAPPING SITES OF RIGHT BREAST IN FEMALE, ESTROGEN RECEPTOR POSITIVE: Primary | ICD-10-CM

## 2022-08-01 DIAGNOSIS — Z17.0 MALIGNANT NEOPLASM OF OVERLAPPING SITES OF RIGHT BREAST IN FEMALE, ESTROGEN RECEPTOR POSITIVE: Primary | ICD-10-CM

## 2022-08-01 DIAGNOSIS — R73.01 IFG (IMPAIRED FASTING GLUCOSE): ICD-10-CM

## 2022-08-01 DIAGNOSIS — E55.9 VITAMIN D DEFICIENCY: ICD-10-CM

## 2022-08-01 DIAGNOSIS — J30.2 SEASONAL ALLERGIC RHINITIS, UNSPECIFIED TRIGGER: ICD-10-CM

## 2022-08-01 LAB
ALBUMIN SERPL-MCNC: 4.7 G/DL (ref 3.5–5.2)
ALBUMIN/GLOB SERPL: 1.9 G/DL
ALP SERPL-CCNC: 65 U/L (ref 39–117)
ALT SERPL W P-5'-P-CCNC: 14 U/L (ref 1–33)
ANION GAP SERPL CALCULATED.3IONS-SCNC: 9 MMOL/L (ref 5–15)
AST SERPL-CCNC: 16 U/L (ref 1–32)
BASOPHILS # BLD AUTO: 0.06 10*3/MM3 (ref 0–0.2)
BASOPHILS NFR BLD AUTO: 1 % (ref 0–1.5)
BILIRUB SERPL-MCNC: 0.4 MG/DL (ref 0–1.2)
BUN SERPL-MCNC: 17 MG/DL (ref 8–23)
BUN/CREAT SERPL: 19.1 (ref 7–25)
CALCIUM SPEC-SCNC: 9.8 MG/DL (ref 8.6–10.5)
CHLORIDE SERPL-SCNC: 106 MMOL/L (ref 98–107)
CHOLEST SERPL-MCNC: 221 MG/DL (ref 0–200)
CO2 SERPL-SCNC: 28 MMOL/L (ref 22–29)
CREAT SERPL-MCNC: 0.89 MG/DL (ref 0.57–1)
DEPRECATED RDW RBC AUTO: 43 FL (ref 37–54)
EGFRCR SERPLBLD CKD-EPI 2021: 73 ML/MIN/1.73
EOSINOPHIL # BLD AUTO: 0.18 10*3/MM3 (ref 0–0.4)
EOSINOPHIL NFR BLD AUTO: 2.9 % (ref 0.3–6.2)
ERYTHROCYTE [DISTWIDTH] IN BLOOD BY AUTOMATED COUNT: 12.8 % (ref 12.3–15.4)
GLOBULIN UR ELPH-MCNC: 2.5 GM/DL
GLUCOSE SERPL-MCNC: 86 MG/DL (ref 65–99)
HBA1C MFR BLD: 5.8 % (ref 4.8–5.6)
HCT VFR BLD AUTO: 39.7 % (ref 34–46.6)
HDLC SERPL-MCNC: 61 MG/DL (ref 40–60)
HGB BLD-MCNC: 13.5 G/DL (ref 12–15.9)
IMM GRANULOCYTES # BLD AUTO: 0.01 10*3/MM3 (ref 0–0.05)
IMM GRANULOCYTES NFR BLD AUTO: 0.2 % (ref 0–0.5)
LDLC SERPL CALC-MCNC: 118 MG/DL (ref 0–100)
LDLC/HDLC SERPL: 1.82 {RATIO}
LYMPHOCYTES # BLD AUTO: 1.93 10*3/MM3 (ref 0.7–3.1)
LYMPHOCYTES NFR BLD AUTO: 30.7 % (ref 19.6–45.3)
MCH RBC QN AUTO: 31.3 PG (ref 26.6–33)
MCHC RBC AUTO-ENTMCNC: 34 G/DL (ref 31.5–35.7)
MCV RBC AUTO: 91.9 FL (ref 79–97)
MONOCYTES # BLD AUTO: 0.65 10*3/MM3 (ref 0.1–0.9)
MONOCYTES NFR BLD AUTO: 10.3 % (ref 5–12)
NEUTROPHILS NFR BLD AUTO: 3.46 10*3/MM3 (ref 1.7–7)
NEUTROPHILS NFR BLD AUTO: 54.9 % (ref 42.7–76)
NRBC BLD AUTO-RTO: 0 /100 WBC (ref 0–0.2)
PLATELET # BLD AUTO: 211 10*3/MM3 (ref 140–450)
PMV BLD AUTO: 10.2 FL (ref 6–12)
POTASSIUM SERPL-SCNC: 4.1 MMOL/L (ref 3.5–5.2)
PROT SERPL-MCNC: 7.2 G/DL (ref 6–8.5)
RBC # BLD AUTO: 4.32 10*6/MM3 (ref 3.77–5.28)
SODIUM SERPL-SCNC: 143 MMOL/L (ref 136–145)
T4 FREE SERPL-MCNC: 1.08 NG/DL (ref 0.93–1.7)
TRIGL SERPL-MCNC: 245 MG/DL (ref 0–150)
TSH SERPL DL<=0.05 MIU/L-ACNC: 0.65 UIU/ML (ref 0.27–4.2)
VLDLC SERPL-MCNC: 42 MG/DL (ref 5–40)
WBC NRBC COR # BLD: 6.29 10*3/MM3 (ref 3.4–10.8)

## 2022-08-01 PROCEDURE — 36591 DRAW BLOOD OFF VENOUS DEVICE: CPT

## 2022-08-01 PROCEDURE — 80061 LIPID PANEL: CPT | Performed by: INTERNAL MEDICINE

## 2022-08-01 PROCEDURE — 84439 ASSAY OF FREE THYROXINE: CPT | Performed by: INTERNAL MEDICINE

## 2022-08-01 PROCEDURE — 80050 GENERAL HEALTH PANEL: CPT | Performed by: INTERNAL MEDICINE

## 2022-08-01 PROCEDURE — 25010000002 HEPARIN LOCK FLUSH PER 10 UNITS: Performed by: INTERNAL MEDICINE

## 2022-08-01 PROCEDURE — 82306 VITAMIN D 25 HYDROXY: CPT | Performed by: INTERNAL MEDICINE

## 2022-08-01 PROCEDURE — 83036 HEMOGLOBIN GLYCOSYLATED A1C: CPT | Performed by: INTERNAL MEDICINE

## 2022-08-01 RX ORDER — SODIUM CHLORIDE 0.9 % (FLUSH) 0.9 %
20 SYRINGE (ML) INJECTION AS NEEDED
Status: CANCELLED | OUTPATIENT
Start: 2022-08-01

## 2022-08-01 RX ORDER — HEPARIN SODIUM (PORCINE) LOCK FLUSH IV SOLN 100 UNIT/ML 100 UNIT/ML
500 SOLUTION INTRAVENOUS AS NEEDED
Status: DISCONTINUED | OUTPATIENT
Start: 2022-08-01 | End: 2022-08-02 | Stop reason: HOSPADM

## 2022-08-01 RX ORDER — HEPARIN SODIUM (PORCINE) LOCK FLUSH IV SOLN 100 UNIT/ML 100 UNIT/ML
500 SOLUTION INTRAVENOUS AS NEEDED
Status: CANCELLED | OUTPATIENT
Start: 2022-08-01

## 2022-08-01 RX ORDER — SODIUM CHLORIDE 0.9 % (FLUSH) 0.9 %
20 SYRINGE (ML) INJECTION AS NEEDED
Status: DISCONTINUED | OUTPATIENT
Start: 2022-08-01 | End: 2022-08-02 | Stop reason: HOSPADM

## 2022-08-01 RX ADMIN — Medication 20 ML: at 14:21

## 2022-08-01 RX ADMIN — HEPARIN SODIUM (PORCINE) LOCK FLUSH IV SOLN 100 UNIT/ML 500 UNITS: 100 SOLUTION at 14:21

## 2022-08-02 LAB — 25(OH)D3 SERPL-MCNC: 48.4 NG/ML (ref 30–100)

## 2022-08-04 ENCOUNTER — OFFICE VISIT (OUTPATIENT)
Dept: PLASTIC SURGERY | Facility: CLINIC | Age: 62
End: 2022-08-04

## 2022-08-04 VITALS
HEIGHT: 63 IN | SYSTOLIC BLOOD PRESSURE: 122 MMHG | OXYGEN SATURATION: 96 % | TEMPERATURE: 98 F | BODY MASS INDEX: 31.36 KG/M2 | DIASTOLIC BLOOD PRESSURE: 75 MMHG | HEART RATE: 76 BPM | WEIGHT: 177 LBS

## 2022-08-04 DIAGNOSIS — Z90.13 S/P BILATERAL MASTECTOMY: ICD-10-CM

## 2022-08-04 DIAGNOSIS — Z17.0 MALIGNANT NEOPLASM OF OVERLAPPING SITES OF RIGHT BREAST IN FEMALE, ESTROGEN RECEPTOR POSITIVE: ICD-10-CM

## 2022-08-04 DIAGNOSIS — C50.811 MALIGNANT NEOPLASM OF OVERLAPPING SITES OF RIGHT BREAST IN FEMALE, ESTROGEN RECEPTOR POSITIVE: ICD-10-CM

## 2022-08-04 DIAGNOSIS — Z09 POSTOPERATIVE FOLLOW-UP: Primary | ICD-10-CM

## 2022-08-04 PROCEDURE — 99212 OFFICE O/P EST SF 10 MIN: CPT | Performed by: NURSE PRACTITIONER

## 2022-08-09 NOTE — PROGRESS NOTES
"Chief Complaint    Subjective          History of Present Illness  Marisa Portillo is a 62 y.o. female who presents to [facility] for a follow up  04/15/22 RIGHT BREAST IMPLANT REMOVAL WITH INCISION AND DRAINAGE.       Allergies: Multihance [gadobenate]  Allergies Reconciled.    Review of Systems   All review of system has been reviewed and it  is negative except the ones note above.     Objective     /82   Pulse 84   Temp 97.8 °F (36.6 °C)   Ht 160 cm (62.99\")   Wt 79.4 kg (175 lb)   BMI 31.01 kg/m²     Body mass index is 31.01 kg/m².    Physical Exam     Pulmonary/Chest  Effort normal.     Breast  Right breast with decrease tunnel. Now at 2 cm.     Result Review :     Assessment and Plan      Diagnoses and all orders for this visit:    1. Breast abscess (Primary)        Plan: Packing removed and silver nitrate applied to right lower chest wound. Irrigated with Betadine. Pressure dressing applied. Will send in [facility] order form to have supplies sent to patients home.     Follow Up 2 weeks    No follow-ups on file.    Patient was given instructions and counseling regarding her condition. Please see specific information pulled into the AVS if appropriate.     Lacy Shelton MD  08/17/2022    "

## 2022-08-11 ENCOUNTER — OFFICE VISIT (OUTPATIENT)
Dept: INTERNAL MEDICINE | Facility: CLINIC | Age: 62
End: 2022-08-11

## 2022-08-11 VITALS
HEIGHT: 63 IN | OXYGEN SATURATION: 94 % | TEMPERATURE: 97.5 F | SYSTOLIC BLOOD PRESSURE: 136 MMHG | HEART RATE: 71 BPM | DIASTOLIC BLOOD PRESSURE: 86 MMHG | WEIGHT: 175.4 LBS | BODY MASS INDEX: 31.08 KG/M2

## 2022-08-11 DIAGNOSIS — G45.9 TIA (TRANSIENT ISCHEMIC ATTACK): ICD-10-CM

## 2022-08-11 DIAGNOSIS — N61.1 BREAST ABSCESS: ICD-10-CM

## 2022-08-11 DIAGNOSIS — M79.89 LIMB SWELLING: ICD-10-CM

## 2022-08-11 DIAGNOSIS — C50.811 MALIGNANT NEOPLASM OF OVERLAPPING SITES OF RIGHT FEMALE BREAST, UNSPECIFIED ESTROGEN RECEPTOR STATUS: Primary | ICD-10-CM

## 2022-08-11 DIAGNOSIS — Z95.828 PORT-A-CATH IN PLACE: ICD-10-CM

## 2022-08-11 DIAGNOSIS — E78.2 MIXED HYPERLIPIDEMIA: ICD-10-CM

## 2022-08-11 DIAGNOSIS — R73.01 IFG (IMPAIRED FASTING GLUCOSE): ICD-10-CM

## 2022-08-11 DIAGNOSIS — J30.2 SEASONAL ALLERGIC RHINITIS, UNSPECIFIED TRIGGER: ICD-10-CM

## 2022-08-11 DIAGNOSIS — E55.9 VITAMIN D DEFICIENCY: ICD-10-CM

## 2022-08-11 PROCEDURE — 99396 PREV VISIT EST AGE 40-64: CPT | Performed by: INTERNAL MEDICINE

## 2022-08-11 NOTE — PROGRESS NOTES
"Chief Complaint/ HPI: Here to follow-up with annual exam, lab work,     patient is undergoing treatment for seroma on the right breast area of chest wall that is been aspirated multiple times, currently being packed, by plastic surgery, awaiting possible radiation treatments by Dr. Martinez,    hospital stay,, March 2022, breast cellulitis abscess after  mastectomy, spacer placement, also had UTI, feeling much better, went home a week ago Monday with antibiotics,    , Started Arimidex, July 2022      Objective   Vital Signs  Vitals:    08/11/22 1418   BP: 136/86   Pulse: 71   Temp: 97.5 °F (36.4 °C)   SpO2: 94%   Weight: 79.6 kg (175 lb 6.4 oz)   Height: 160 cm (62.99\")      Body mass index is 31.08 kg/m².  Review of Systems   Constitutional: Negative.    HENT: Negative.    Eyes: Negative.    Respiratory: Negative.    Cardiovascular: Negative.    Gastrointestinal: Negative.    Endocrine: Negative.    Genitourinary: Negative.    Musculoskeletal: Negative.    Allergic/Immunologic: Negative.    Neurological: Negative.    Hematological: Negative.    Psychiatric/Behavioral: Negative.       Physical Exam  Constitutional:       General: She is not in acute distress.     Appearance: Normal appearance.   HENT:      Head: Normocephalic.      Mouth/Throat:      Mouth: Mucous membranes are moist.   Eyes:      Conjunctiva/sclera: Conjunctivae normal.      Pupils: Pupils are equal, round, and reactive to light.   Cardiovascular:      Rate and Rhythm: Normal rate and regular rhythm.      Pulses: Normal pulses.      Heart sounds: Normal heart sounds.   Pulmonary:      Effort: Pulmonary effort is normal.      Breath sounds: Normal breath sounds.   Abdominal:      General: Abdomen is flat. Bowel sounds are normal.      Palpations: Abdomen is soft.   Musculoskeletal:         General: No swelling. Normal range of motion.      Cervical back: Neck supple.   Skin:     General: Skin is warm and dry.      Coloration: Skin is not jaundiced. "   Neurological:      General: No focal deficit present.      Mental Status: She is alert and oriented to person, place, and time. Mental status is at baseline.   Psychiatric:         Mood and Affect: Mood normal.         Behavior: Behavior normal.         Thought Content: Thought content normal.         Judgment: Judgment normal.        Result Review :   Lab Results   Component Value Date    PROBNP 1,085.0 (H) 03/14/2022     CMP    CMP 4/25/22 5/23/22 8/1/22   Glucose 244 (A) 87 86   BUN 16 12 17   Creatinine 0.74 0.60 0.89   Sodium 138 138 143   Potassium 4.0 4.1 4.1   Chloride 104 108 (A) 106   Calcium 9.9 8.9 9.8   Albumin 4.42 4.19 4.70   Total Bilirubin 0.3 0.4 0.4   Alkaline Phosphatase 62 60 65   AST (SGOT) 22 18 16   ALT (SGPT) 24 19 14   (A) Abnormal value            CBC w/diff    CBC w/Diff 4/25/22 5/23/22 8/1/22   WBC 2.76 (A) 4.23 6.29   RBC 3.72 (A) 3.72 (A) 4.32   Hemoglobin 11.9 (A) 11.8 (A) 13.5   Hematocrit 35.7 35.8 39.7   MCV 96.0 96.2 91.9   MCH 32.0 31.7 31.3   MCHC 33.3 33.0 34.0   RDW 14.7 13.9 12.8   Platelets 220 228 211   Neutrophil Rel % 77.5 (A) 43.3 54.9   Immature Granulocyte Rel % 0.0 0.2 0.2   Lymphocyte Rel % 21.4 37.1 30.7   Monocyte Rel % 0.7 (A) 13.5 (A) 10.3   Eosinophil Rel % 0.0 (A) 5.2 2.9   Basophil Rel % 0.4 0.7 1.0   (A) Abnormal value             Lipid Panel    Lipid Panel 8/1/22   Total Cholesterol 221 (A)   Triglycerides 245 (A)   HDL Cholesterol 61 (A)   VLDL Cholesterol 42 (A)   LDL Cholesterol  118 (A)   LDL/HDL Ratio 1.82   (A) Abnormal value             Lab Results   Component Value Date    TSH 0.651 08/01/2022    TSH 1.400 03/11/2022    TSH 1.200 02/23/2021      Lab Results   Component Value Date    FREET4 1.08 08/01/2022      A1C Last 3 Results    HGBA1C Last 3 Results 3/11/22 8/1/22   Hemoglobin A1C 6.30 (A) 5.80 (A)   (A) Abnormal value                              Visit Diagnoses:    ICD-10-CM ICD-9-CM   1. Malignant neoplasm of overlapping sites of right  female breast, unspecified estrogen receptor status (HCC)  C50.811 174.8   2. Breast abscess  N61.1 611.0   3. TIA (transient ischemic attack)  G45.9 435.9   4. Vitamin D deficiency  E55.9 268.9   5. Seasonal allergic rhinitis, unspecified trigger  J30.2 477.9   6. Mixed hyperlipidemia  E78.2 272.2   7. Limb swelling  M79.89 729.81   8. Port-A-Cath placement  Z95.828 V45.89   9. IFG (impaired fasting glucose)  R73.01 790.21       Assessment and Plan   Diagnoses and all orders for this visit:    1. Malignant neoplasm of overlapping sites of right female breast, unspecified estrogen receptor status (HCC) (Primary)    2. Breast abscess    3. TIA (transient ischemic attack)    4. Vitamin D deficiency    5. Seasonal allergic rhinitis, unspecified trigger    6. Mixed hyperlipidemia    7. Limb swelling    8. Port-A-Cath placement    9. IFG (impaired fasting glucose)    Patient is losing some weight since she had her chemotherapy,     preventive measures, wears her seatbelt, does not smoke, occasional social drink, stays very active, walks steps all day long,    Sepsis, multifactorial to include urinary tract infection and now cellulitis of the right breast and expander implant area, status post surgical debridement removal of pus// fluid, drain placed last night March 11, 2022------- patient's improved clinically,   white count is improving my back to normal March 13,, IV vancomycin added day #4 IV cefepime continues day #5--------------Jada did the surgery Dr. Sandhu, March 11,  antibiotics, we will stop IV fluids March 13,      anemia -- march 2022---most likely due to rehydration, bone marrow suppression, chemo, sepsis, may need blood transfusion,, , blood counts improved, on March 14 we will go ahead and send home without any blood transfusions,, resolved as of August 1, 2022 hemoglobin 13.5 hematocrit 39.7,    Mixed hyperlipidemia good HDL cholesterol no treatment,     Invasive ductal carcinoma right breast previous  bilateral mastectomies with multifocal areas of invasive ductal carcinoma largest area 2.3 cm with positive lymph nodes 4 out of 10 ER and UT positive HER-2 negative,, currently undergoing chemotherapy, finished, April 2022,, still awaiting radiation therapy due to previous infection abscess cellulitis and now seroma treatment, as of August 11, 2022, --- continues and started anastrozole, Arimidex, July 2022 Dr. Mendez,     [facility], appendectomy, Port-A-Cath placement,     ifg, hemoglobin A1c 5.8, August 1, 2022,    Vitamin D deficiency, cont vitamin D 50,000 units weekly,     TIA 2010, MRI/ mrA underwent a Star closure procedure with clipping     Osteoarthritis     Chronic venous insufficiency changes,,             Follow Up   No follow-ups on file.  Patient was given instructions and counseling regarding her condition or for health maintenance advice. Please see specific information pulled into the AVS if appropriate.

## 2022-08-17 ENCOUNTER — OFFICE VISIT (OUTPATIENT)
Dept: PLASTIC SURGERY | Facility: CLINIC | Age: 62
End: 2022-08-17

## 2022-08-17 VITALS
HEIGHT: 63 IN | BODY MASS INDEX: 31.01 KG/M2 | HEART RATE: 84 BPM | WEIGHT: 175 LBS | DIASTOLIC BLOOD PRESSURE: 82 MMHG | TEMPERATURE: 97.8 F | SYSTOLIC BLOOD PRESSURE: 153 MMHG

## 2022-08-17 DIAGNOSIS — N61.1 BREAST ABSCESS: Primary | ICD-10-CM

## 2022-08-17 PROCEDURE — 99212 OFFICE O/P EST SF 10 MIN: CPT | Performed by: SURGERY

## 2022-08-18 ENCOUNTER — APPOINTMENT (OUTPATIENT)
Dept: RADIATION ONCOLOGY | Facility: HOSPITAL | Age: 62
End: 2022-08-18

## 2022-08-18 ENCOUNTER — TELEPHONE (OUTPATIENT)
Dept: PLASTIC SURGERY | Facility: CLINIC | Age: 62
End: 2022-08-18

## 2022-08-18 NOTE — TELEPHONE ENCOUNTER
She called wanting to know if she is suppose to irrigate her breast wound with Betadine 1 or 2 times daily? She couldn't remember what you had told her. I can call her back if it's before 430, if not please call her @ [phone].

## 2022-08-22 ENCOUNTER — APPOINTMENT (OUTPATIENT)
Dept: ONCOLOGY | Facility: HOSPITAL | Age: 62
End: 2022-08-22

## 2022-08-23 ENCOUNTER — OFFICE VISIT (OUTPATIENT)
Dept: ONCOLOGY | Facility: HOSPITAL | Age: 62
End: 2022-08-23

## 2022-08-23 VITALS
BODY MASS INDEX: 30.94 KG/M2 | TEMPERATURE: 97.8 F | SYSTOLIC BLOOD PRESSURE: 134 MMHG | OXYGEN SATURATION: 97 % | RESPIRATION RATE: 16 BRPM | HEART RATE: 80 BPM | DIASTOLIC BLOOD PRESSURE: 85 MMHG | WEIGHT: 174.6 LBS

## 2022-08-23 DIAGNOSIS — R23.2 HOT FLASHES: Primary | ICD-10-CM

## 2022-08-23 DIAGNOSIS — Z13.820 SCREENING FOR OSTEOPOROSIS: ICD-10-CM

## 2022-08-23 DIAGNOSIS — T81.49XA WOUND INFECTION AFTER SURGERY: ICD-10-CM

## 2022-08-23 DIAGNOSIS — C50.811 MALIGNANT NEOPLASM OF OVERLAPPING SITES OF RIGHT FEMALE BREAST, UNSPECIFIED ESTROGEN RECEPTOR STATUS: ICD-10-CM

## 2022-08-23 PROCEDURE — G0463 HOSPITAL OUTPT CLINIC VISIT: HCPCS | Performed by: NURSE PRACTITIONER

## 2022-08-23 PROCEDURE — 99214 OFFICE O/P EST MOD 30 MIN: CPT | Performed by: NURSE PRACTITIONER

## 2022-08-23 RX ORDER — ANASTROZOLE 1 MG/1
1 TABLET ORAL DAILY
Qty: 90 TABLET | Refills: 1 | Status: SHIPPED | OUTPATIENT
Start: 2022-08-23 | End: 2023-03-31

## 2022-08-23 RX ORDER — VENLAFAXINE HYDROCHLORIDE 37.5 MG/1
37.5 CAPSULE, EXTENDED RELEASE ORAL DAILY
Qty: 90 CAPSULE | Refills: 1 | Status: SHIPPED | OUTPATIENT
Start: 2022-08-23 | End: 2022-11-30 | Stop reason: SDUPTHER

## 2022-08-23 NOTE — PROGRESS NOTES
Chief Complaint  Breast Cancer    Judah Johnson,*  Judah Johnson MD      Subjective          Marisa Portillo presents to [facility] for breast cancer follow up.     History of Present Illness   Ms. Marisa Portillo presents for 1 month follow up. She was seen by Dr. Mendez a month ago. Reports she still has intermittent hot flashes and is still taking the low dose Venlafaxine for these and reports the med is helping. Also reports still with Insomnia and is taking PRN Trazadone. Reports she is taking Anastrozole and is tolerating well. Offers no complaints other then at the hot flashes. Reports she is following with Dr. Shelton and is still recovering from seroma wound infection and does dressing changes. She has follow up with Dr. Martinez on 8/29/22 to discuss when she will be ready to initiate radiation. She had lab work at Dr. Johnson office recently and the CBC and CMP is normal. She has not had a dexa scan in quite some time.     Cancer Staging  Malignant neoplasm of overlapping sites of right breast in female, estrogen receptor positive (HCC)  Staging form: Breast, AJCC 8th Edition  - Pathologic stage from 12/28/2021: Stage IB (pT2, pN2a, cM0, G2, ER+, KS+, HER2-) - Signed by Starr Mendez MD PhD on 1/30/2022       Treatment intent: curative    Oncology/Hematology History Overview Note     ER+ Right Breast Cancer:    Diagnostic mammogram on 11/12/2021: 2 cm asymmetry in the outer central right breast with amorphous calcifications.  Similar appearing group of amorphous calcifications in the outer central right breast.  6 mm asymmetry in the inner right breast.  Right axillary lymphadenopathy.    Ultrasound-guided biopsy on 11/1/2021: Right breast 3 o'clock position, 2 cm from the nipple with invasive ductal carcinoma, grade 2.  Right breast 9 o'clock position, 3 cm from the nipple with invasive ductal carcinoma, grade 2, ER positive (50%), KS  positive (3%), HER-2 negative (score 0), Ki-67 14%.  Associated with intermediate grade ductal carcinoma in situ.  Right axillary lymph node positive for breast carcinoma.    CT CAP and bone scan on 12/13/21: negative for metastatic disease    Bilateral mastectomy 12/28/2021: Right breast with multiple (2) foci of invasive ductal carcinoma, largest focus 2.3 cm, grade 2, associated with DCIS with extensive intraductal component, all margins negative, 4/10 lymph nodes involved with no extranodal extension and the largest focus measured at 3.3 cm, ER positive (40%), KS positive (5%), HER-2 negative by IHC (score 0), Ki-67 14%, pT2 pN2a cM0    Completed 4 cycles of chemotherapy with TC on 4/25/22.  Complicated by a UTI and multiple right breast abscesses causing delay in cycles 3 and 4.     *The pt discontinued HRT in October of 2021 when she had an abnormal screening mammogram     Malignant neoplasm of overlapping sites of right breast in female, estrogen receptor positive (HCC)   12/9/2021 Initial Diagnosis    Malignant neoplasm of overlapping sites of right breast in female, estrogen receptor positive (HCC)     12/28/2021 Cancer Staged    Staging form: Breast, AJCC 8th Edition  - Pathologic stage from 12/28/2021: Stage IB (pT2, pN2a, cM0, G2, ER+, KS+, HER2-) - Signed by Starr Mendez MD PhD on 1/30/2022 1/14/2022 -  Chemotherapy    OP CENTRAL VENOUS ACCESS DEVICE ACCESS, CARE, AND MAINTENANCE (CVAD)     1/31/2022 - 4/26/2022 Chemotherapy    OP BREAST TC DOCEtaxel / Cyclophosphamide     Malignant neoplasm of overlapping sites of right female breast (HCC)   7/18/2022 Initial Diagnosis    Malignant neoplasm of overlapping sites of right female breast (HCC)     7/26/2022 -  Radiation    RADIATION THERAPY Treatment Details (Noted on 7/18/2022)  Site: Right Chest wall  Technique: 3D CRT  Goal: No goal specified  Planned Treatment Start Date: 7/26/2022         Review of Systems   Constitutional: Positive for  fatigue.   HENT: Negative.    Eyes: Negative.    Respiratory: Negative.    Cardiovascular: Negative.    Gastrointestinal: Negative.    Endocrine: Negative.    Genitourinary: Negative.  Positive for breast discharge (post op wound infection of the right breast).   Musculoskeletal: Negative.    Allergic/Immunologic: Negative.    Neurological: Negative.    Hematological: Negative.    Psychiatric/Behavioral: Negative.    All other systems reviewed and are negative.      Current Outpatient Medications on File Prior to Visit   Medication Sig Dispense Refill   • acetaminophen (TYLENOL) 650 MG 8 hr tablet Take 650 mg by mouth 2 (Two) Times a Day.     • hydrOXYzine (ATARAX) 25 MG tablet Take 25 mg by mouth 3 (Three) Times a Day As Needed for Itching.     • Loratadine-Pseudoephedrine (CLARITIN-D 24 HOUR PO) Take 1 tablet by mouth Daily.     • Vitamin A 3 MG (61964 UT) capsule Take 10,000 Units by mouth Daily.     • vitamin D (ERGOCALCIFEROL) 1.25 MG (54269 UT) capsule capsule Take 1 capsule by mouth Every 7 (Seven) Days. 12 capsule 3   • [DISCONTINUED] anastrozole (ARIMIDEX) 1 MG tablet Take 1 tablet by mouth Daily. 30 tablet 1   • [DISCONTINUED] venlafaxine XR (EFFEXOR-XR) 37.5 MG 24 hr capsule TAKE 1 CAPSULE BY MOUTH DAILY. 30 capsule 1   • [DISCONTINUED] OLANZapine (zyPREXA) 5 MG tablet Take 1 tablet by mouth Every Night. 30 tablet 2     No current facility-administered medications on file prior to visit.       Allergies   Allergen Reactions   • Multihance [Gadobenate] Hives and Itching     MRI contrast     Past Medical History:   Diagnosis Date   • Allergies    • Breast cancer (HCC)    • High cholesterol    • IFG (impaired fasting glucose)    • Osteoarthritis    • Port-A-Cath placement 12/29/2021   • S/P BILATERAL IMMEDIATE BREAST RECONSTRUCTION WITH LEFT PRE PEC PLACEMENT OF SILICONE GEL IMPLANT AND MESH, RIGHT PRE PEC PLACEMENT OF EXPANDER AND MESH 12/29/2021   • TIA (transient ischemic attack)     no residual (tia  approximately 8 years ago)   • Vitamin D deficiency      Past Surgical History:   Procedure Laterality Date   • APPENDECTOMY     • BREAST AUGMENTATION Bilateral 12/28/2021    Procedure: bilateral breast reconstructon with bilateral mesh placement.   left implant, right tissue expander placement;  Surgeon: Lacy Shelton MD;  Location: [facility];  Service: Plastics;  Laterality: Bilateral;   • BREAST TISSUE EXPANDER REMOVAL INSERTION OF IMPLANT Right 4/15/2022    Procedure: RIGHT BREAST IMPLANT REMOVAL WITH INCISION AND DRAINAGE;  Surgeon: Lacy Shelton MD;  Location: [facility];  Service: Plastics;  Laterality: Right;   • CEREBRAL ANGIOGRAM      clip placed- pt stated can have MRI with cerebral clips   • CYST REMOVAL Right     rt wrist   • INCISION AND DRAINAGE OF WOUND Right 3/11/2022    Procedure: INCISION AND DRAINAGE ABSCESS OF RIGHT BREAST WITH EXPANDER REMOVAL AND IMPLANT PLACEMENT;  Surgeon: Lacy Shelton MD;  Location: [facility];  Service: Plastics;  Laterality: Right;   • MASTECTOMY     • MASTECTOMY COMPLETE / SIMPLE W/ SENTINEL NODE BIOPSY Bilateral    • PORTACATH PLACEMENT Left 12/28/2021    Procedure: INSERTION OF PORTACATH;  Surgeon: Jacqueline Mcgraw MD;  Location: [facility];  Service: General;  Laterality: Left;   • SENTINEL NODE BIOPSY Bilateral 12/28/2021    Procedure: BILATERAL BREAST MASTECTOMIES WITH RIGHT SENTINEL NODE BIOPSIES WITH FROZEN SECTIONS;  Surgeon: Jacqueline Mcgraw MD;  Location: [facility];  Service: General;  Laterality: Bilateral;     Social History     Socioeconomic History   • Marital status:    Tobacco Use   • Smoking status: Never Smoker   • Smokeless tobacco: Never Used   Vaping Use   • Vaping Use: Never used   Substance and Sexual Activity   • Alcohol use: Yes     Comment: socially    • Drug use: Never   • Sexual activity: Defer     Family History   Problem Relation Age of Onset   • Hypertension Mother    • Diabetes  Mother    • Cancer Mother    • Arthritis Mother    • Breast cancer Mother    • Hypertension Father    • Diabetes Father    • Cancer Father    • Arthritis Father    • Liver cancer Father    • Lung cancer Father    • Breast cancer Maternal Aunt    • Malig Hyperthermia Neg Hx      Immunization History   Administered Date(s) Administered   • COVID-19 (MODERNA) 1st, 2nd, 3rd Dose Only 12/30/2020, 01/26/2021   • COVID-19 (MODERNA) BOOSTER 12/30/2020, 01/26/2021, 01/24/2022       Objective   Physical Exam  Vitals and nursing note reviewed.   Constitutional:       Appearance: Normal appearance.   HENT:      Head: Normocephalic.      Nose: Nose normal.      Mouth/Throat:      Mouth: Mucous membranes are moist.   Eyes:      Pupils: Pupils are equal, round, and reactive to light.   Cardiovascular:      Rate and Rhythm: Normal rate and regular rhythm.      Pulses: Normal pulses.      Heart sounds: Normal heart sounds.   Pulmonary:      Effort: Pulmonary effort is normal.      Breath sounds: Normal breath sounds.   Abdominal:      General: Bowel sounds are normal.      Palpations: Abdomen is soft.   Musculoskeletal:         General: Normal range of motion.      Cervical back: Normal range of motion and neck supple.   Skin:     General: Skin is warm and dry.      Capillary Refill: Capillary refill takes less than 2 seconds.   Neurological:      General: No focal deficit present.      Mental Status: She is alert and oriented to person, place, and time.   Psychiatric:         Mood and Affect: Mood normal.         Behavior: Behavior normal.         Thought Content: Thought content normal.         Judgment: Judgment normal.         Vitals:    08/23/22 1436   BP: 134/85   Pulse: 80   Resp: 16   Temp: 97.8 °F (36.6 °C)   SpO2: 97%   Weight: 79.2 kg (174 lb 9.7 oz)   PainSc: 0-No pain     ECOG score: 0         ECOG: (0) Fully Active - Able to Carry On All Pre-disease Performance Without Restriction  Fall Risk Assessment was completed,  and patient is at low risk for falls.  PHQ-9 Total Score: 0       The patient is  experiencing fatigue. Fatigue score: 4    PT/OT Functional Screening: PT fx screen: No needs identified  Speech Functional Screening: Speech fx screen: No needs identified  Rehab to be ordered: Rehab to be ordered: No needs identified        Result Review :   The following data was reviewed by: CALOS Galaviz on 08/23/2022:  Lab Results   Component Value Date    HGB 13.5 08/01/2022    HCT 39.7 08/01/2022    MCV 91.9 08/01/2022     08/01/2022    WBC 6.29 08/01/2022    NEUTROABS 3.46 08/01/2022    LYMPHSABS 1.93 08/01/2022    MONOSABS 0.65 08/01/2022    EOSABS 0.18 08/01/2022    BASOSABS 0.06 08/01/2022     Lab Results   Component Value Date    GLUCOSE 86 08/01/2022    BUN 17 08/01/2022    CREATININE 0.89 08/01/2022     08/01/2022    K 4.1 08/01/2022     08/01/2022    CO2 28.0 08/01/2022    CALCIUM 9.8 08/01/2022    PROTEINTOT 7.2 08/01/2022    ALBUMIN 4.70 08/01/2022    BILITOT 0.4 08/01/2022    ALKPHOS 65 08/01/2022    AST 16 08/01/2022    ALT 14 08/01/2022          Assessment and Plan    Diagnoses and all orders for this visit:    1. Hot flashes (Primary)  -     venlafaxine XR (EFFEXOR-XR) 37.5 MG 24 hr capsule; Take 1 capsule by mouth Daily.  Dispense: 90 capsule; Refill: 1    2. Malignant neoplasm of overlapping sites of right female breast, unspecified estrogen receptor status (HCC)  -     anastrozole (ARIMIDEX) 1 MG tablet; Take 1 tablet by mouth Daily.  Dispense: 90 tablet; Refill: 1    3. Screening for osteoporosis  -     DEXA Bone Density Axial; Future    4. Wound infection after surgery    Following with Dr. Shelton for the seroma wound infection of the right breast. Has not started radiation yet with Dr. Martinez but has a follow up with Dr. Martinez to discuss on 8/29/22. Taking Venlafaxine for the hot flashes. Tolerating Anastrozole. Refills were sent for both today. She will follow up with   Vanessa in 3 months.     Dexa scan ordered to evaluate for osteoporosis.     Continue close follow up with Dr. Shelton for the post op wound infection of the right breast.       I spent 20 minutes caring for Marisa on this date of service. This time includes time spent by me in the following activities:preparing for the visit, reviewing tests, obtaining and/or reviewing a separately obtained history, performing a medically appropriate examination and/or evaluation , counseling and educating the patient/family/caregiver, ordering medications, tests, or procedures, referring and communicating with other health care professionals , documenting information in the medical record, independently interpreting results and communicating that information with the patient/family/caregiver and care coordination    Patient Follow Up: 3 months or sooner if needed.   Patient was given instructions and counseling regarding her condition or for health maintenance advice. Please see specific information pulled into the AVS if appropriate.     Li Culver, APRN    8/23/2022

## 2022-08-26 RX ORDER — ANASTROZOLE 1 MG/1
1 TABLET ORAL DAILY
Refills: 1 | OUTPATIENT
Start: 2022-08-26

## 2022-09-01 ENCOUNTER — HOSPITAL ENCOUNTER (OUTPATIENT)
Dept: RADIATION ONCOLOGY | Facility: HOSPITAL | Age: 62
Setting detail: RADIATION/ONCOLOGY SERIES
End: 2022-09-01

## 2022-09-01 ENCOUNTER — OFFICE VISIT (OUTPATIENT)
Dept: PLASTIC SURGERY | Facility: CLINIC | Age: 62
End: 2022-09-01

## 2022-09-01 ENCOUNTER — OFFICE VISIT (OUTPATIENT)
Dept: RADIATION ONCOLOGY | Facility: HOSPITAL | Age: 62
End: 2022-09-01

## 2022-09-01 VITALS
RESPIRATION RATE: 16 BRPM | HEART RATE: 89 BPM | TEMPERATURE: 97 F | BODY MASS INDEX: 31.33 KG/M2 | SYSTOLIC BLOOD PRESSURE: 142 MMHG | OXYGEN SATURATION: 98 % | WEIGHT: 176.81 LBS | DIASTOLIC BLOOD PRESSURE: 68 MMHG

## 2022-09-01 VITALS
DIASTOLIC BLOOD PRESSURE: 78 MMHG | BODY MASS INDEX: 31.33 KG/M2 | TEMPERATURE: 98 F | OXYGEN SATURATION: 95 % | HEIGHT: 63 IN | HEART RATE: 87 BPM | SYSTOLIC BLOOD PRESSURE: 124 MMHG | WEIGHT: 176.81 LBS

## 2022-09-01 DIAGNOSIS — C50.811 MALIGNANT NEOPLASM OF OVERLAPPING SITES OF RIGHT FEMALE BREAST, UNSPECIFIED ESTROGEN RECEPTOR STATUS: Primary | ICD-10-CM

## 2022-09-01 DIAGNOSIS — N64.89 RECURRENT SEROMA OF BREAST: Primary | ICD-10-CM

## 2022-09-01 PROCEDURE — 11042 DBRDMT SUBQ TIS 1ST 20SQCM/<: CPT | Performed by: SURGERY

## 2022-09-01 PROCEDURE — 99214 OFFICE O/P EST MOD 30 MIN: CPT | Performed by: RADIOLOGY

## 2022-09-01 PROCEDURE — 11045 DBRDMT SUBQ TISS EACH ADDL: CPT | Performed by: SURGERY

## 2022-09-01 PROCEDURE — G0463 HOSPITAL OUTPT CLINIC VISIT: HCPCS | Performed by: RADIOLOGY

## 2022-09-01 NOTE — PROGRESS NOTES
Follow Up Office Visit      Encounter Date: 09/01/2022   Patient Name: Marisa Portillo  YOB: 1960   Medical Record Number: [id number]   Primary Diagnosis: Malignant neoplasm of overlapping sites of right female breast, unspecified estrogen receptor status (HCC) [C50.811]   Cancer Staging: Cancer Staging  Malignant neoplasm of overlapping sites of right breast in female, estrogen receptor positive (HCC)  Staging form: Breast, AJCC 8th Edition  - Pathologic stage from 12/28/2021: Stage IB (pT2, pN2a, cM0, G2, ER+, MA+, HER2-) - Signed by Starr Mendez MD PhD on 1/30/2022        Chief Complaint:    Chief Complaint   Patient presents with   • Follow-up   • Breast Cancer       History of Present Illness: Marisa Portillo returns for follow-up.  She continues to pack her right chest wall wound and is now getting only serosanguineous drainage from this wound.  She is back to working full-time.  She denies any suspicious chest wall masses, bone pain, back pain, headaches, vision changes or weight loss.  She does have hot flashes related to anastrozole.    Subjective      Review of Systems: Review of Systems   Constitutional: Positive for fatigue (5/10). Negative for appetite change.   Respiratory: Negative for cough and shortness of breath.    Gastrointestinal: Negative for constipation, diarrhea and nausea.   Endocrine: Positive for heat intolerance (hot flashes due to Arimidex, taking Effexor with mild relief. ).   Genitourinary: Negative for difficulty urinating, dysuria, frequency and urgency.   Skin: Positive for wound (right breast abcess). Negative for color change.   Neurological: Negative for dizziness and headaches.   Psychiatric/Behavioral: Positive for sleep disturbance (improving with medication ).       The following portions of the patient's history were reviewed and updated as appropriate: allergies, current medications, past family history, past medical history, past social  history, past surgical history and problem list.    Medications:     Current Outpatient Medications:   •  acetaminophen (TYLENOL) 650 MG 8 hr tablet, Take 650 mg by mouth 2 (Two) Times a Day., Disp: , Rfl:   •  anastrozole (ARIMIDEX) 1 MG tablet, Take 1 tablet by mouth Daily., Disp: 90 tablet, Rfl: 1  •  hydrOXYzine (ATARAX) 25 MG tablet, Take 25 mg by mouth 3 (Three) Times a Day As Needed for Itching., Disp: , Rfl:   •  Loratadine-Pseudoephedrine (CLARITIN-D 24 HOUR PO), Take 1 tablet by mouth Daily., Disp: , Rfl:   •  venlafaxine XR (EFFEXOR-XR) 37.5 MG 24 hr capsule, Take 1 capsule by mouth Daily., Disp: 90 capsule, Rfl: 1  •  Vitamin A 3 MG (81939 UT) capsule, Take 10,000 Units by mouth Daily., Disp: , Rfl:   •  vitamin D (ERGOCALCIFEROL) 1.25 MG (38063 UT) capsule capsule, Take 1 capsule by mouth Every 7 (Seven) Days., Disp: 12 capsule, Rfl: 3    Allergies:   Allergies   Allergen Reactions   • Multihance [Gadobenate] Hives and Itching     MRI contrast       ECOG: (0) Fully active, able to carry on all predisease performance without restriction  Quality of Life: 100 - Full Activity     Objective     Physical Exam:   Vital Signs:   Vitals:    09/01/22 0801   BP: 142/68   Pulse: 89   Resp: 16   Temp: 97 °F (36.1 °C)   TempSrc: Temporal   SpO2: 98%   Weight: 80.2 kg (176 lb 12.9 oz)   PainSc:   1     Body mass index is 31.33 kg/m².     Physical Exam  Constitutional:       General: She is not in acute distress.     Appearance: Normal appearance. She is normal weight. She is not toxic-appearing.   HENT:      Head: Normocephalic and atraumatic.   Pulmonary:      Effort: Pulmonary effort is normal. No respiratory distress.   Chest:      Comments: Status post nipple sparing right mastectomy; granulation tissue within the mastectomy incision with small amount of serosanguineous drainage  Abdominal:      General: There is no distension.      Palpations: Abdomen is soft.   Skin:     General: Skin is warm and dry.    Neurological:      General: No focal deficit present.      Mental Status: She is alert and oriented to person, place, and time.      Cranial Nerves: No cranial nerve deficit.      Gait: Gait normal.   Psychiatric:         Mood and Affect: Mood normal.         Behavior: Behavior normal.         Judgment: Judgment normal.             Assessment / Plan          Assessment/Plan:   Marisa Portillo is a 62-year-old female with cT2 cN1 cM0, invasive ductal carcinoma, grade 2, ER positive/WI positive, HER-2/aries negative of the right breast status post bilateral mastectomy with right axillary lymph node dissection;  pT2 pN2a.  She is now status post adjuvant chemotherapy but continues to have left chest wall wound healing delay after a left chest wall abscess.    I explained to Ms. Portillo that delivery of radiotherapy to the right chest wall at this point would lead to a nonhealing wound as reepithelialization would be halted.  We did discuss the concern for tumor recurrence given the interval from chemotherapy to the delivery of radiation.  I will see her in follow-up in 4 weeks in hopes that her wound healing will continue and she may undergo CT simulation for treatment planning purposes at that time.    Dudley Martinez MD  Radiation Oncology  [facility]    This document has been signed by Dudley Martinez MD on September 1, 2022 08:48 EDT

## 2022-09-02 NOTE — PROGRESS NOTES
"Chief Complaint    Subjective          History of Present Illness  Marisa Portillo is a 62 y.o. female who presents to [facility] PLASTIC [facility] RECONSTRU[facility] SURGERY for a follow up  04/15/22 RIGHT BREAST IMPLANT REMOVAL WITH INCISION AND DRAINAGE.     She states she was cleaning her wound yesterday & felt burning, then it started bleeding & has been draining yellow since. She denies odor, redness and warmth. Her pain level is 1/10. Waiting to do radiation possibly in 4 weeks.     Allergies: Multihance [gadobenate]  Allergies Reconciled.    Review of Systems   All review of system has been reviewed and it  is negative except the ones note above.     Objective     /84 (BP Location: Left arm)   Pulse 93   Temp 98.2 °F (36.8 °C) (Temporal)   Ht 160 cm (63\")   Wt 79.4 kg (175 lb)   SpO2 95%   BMI 31.00 kg/m²     Body mass index is 31 kg/m².    Physical Exam     Pulmonary/Chest  Effort normal.     Breast  Right breast sutures intact, 5 mm opening on medial side just inferior to nipple, serous drng      Result Review :     Assessment and Plan      Diagnoses and all orders for this visit:    1. Malignant neoplasm of overlapping sites of right breast in female, estrogen receptor positive (HCC) (Primary)    2. Delayed wound healing        Plan:  Cleaned with betadine, expressed approximately 10 cc clear serosanguineous fluid, cauterized with silver nitrate, applied antibiotic ointment and gauze, instructed to keep clean gauze over area until healed, obtained culture and will call with result, rtc 2 weeks if not improved please call    Follow Up     No follow-ups on file.    Patient was given instructions and counseling regarding her condition. Please see specific information pulled into the AVS if appropriate.     Brigitte Chase, APRN  09/09/2022    "

## 2022-09-07 ENCOUNTER — HOSPITAL ENCOUNTER (OUTPATIENT)
Dept: BONE DENSITY | Facility: HOSPITAL | Age: 62
Discharge: HOME | End: 2022-09-07
Admitting: NURSE PRACTITIONER

## 2022-09-07 DIAGNOSIS — Z13.820 SCREENING FOR OSTEOPOROSIS: ICD-10-CM

## 2022-09-07 PROCEDURE — 77080 DXA BONE DENSITY AXIAL: CPT

## 2022-09-09 ENCOUNTER — OFFICE VISIT (OUTPATIENT)
Dept: PLASTIC SURGERY | Facility: CLINIC | Age: 62
End: 2022-09-09

## 2022-09-09 VITALS
OXYGEN SATURATION: 95 % | TEMPERATURE: 98.2 F | DIASTOLIC BLOOD PRESSURE: 84 MMHG | HEART RATE: 93 BPM | HEIGHT: 63 IN | BODY MASS INDEX: 31.01 KG/M2 | WEIGHT: 175 LBS | SYSTOLIC BLOOD PRESSURE: 126 MMHG

## 2022-09-09 DIAGNOSIS — T14.8XXD DELAYED WOUND HEALING: ICD-10-CM

## 2022-09-09 DIAGNOSIS — C50.811 MALIGNANT NEOPLASM OF OVERLAPPING SITES OF RIGHT BREAST IN FEMALE, ESTROGEN RECEPTOR POSITIVE: Primary | ICD-10-CM

## 2022-09-09 DIAGNOSIS — N61.0 BREAST INFECTION: ICD-10-CM

## 2022-09-09 DIAGNOSIS — Z17.0 MALIGNANT NEOPLASM OF OVERLAPPING SITES OF RIGHT BREAST IN FEMALE, ESTROGEN RECEPTOR POSITIVE: Primary | ICD-10-CM

## 2022-09-09 PROCEDURE — 87147 CULTURE TYPE IMMUNOLOGIC: CPT | Performed by: NURSE PRACTITIONER

## 2022-09-09 PROCEDURE — 99212 OFFICE O/P EST SF 10 MIN: CPT | Performed by: NURSE PRACTITIONER

## 2022-09-09 PROCEDURE — 87070 CULTURE OTHR SPECIMN AEROBIC: CPT | Performed by: NURSE PRACTITIONER

## 2022-09-09 PROCEDURE — 87205 SMEAR GRAM STAIN: CPT | Performed by: NURSE PRACTITIONER

## 2022-09-09 PROCEDURE — 87186 SC STD MICRODIL/AGAR DIL: CPT | Performed by: NURSE PRACTITIONER

## 2022-09-12 LAB
BACTERIA SPEC AEROBE CULT: ABNORMAL
GRAM STN SPEC: ABNORMAL

## 2022-09-12 RX ORDER — CLINDAMYCIN HYDROCHLORIDE 300 MG/1
300 CAPSULE ORAL 3 TIMES DAILY
Qty: 30 CAPSULE | Refills: 0 | Status: SHIPPED | OUTPATIENT
Start: 2022-09-12 | End: 2022-09-22

## 2022-09-13 ENCOUNTER — HOSPITAL ENCOUNTER (INPATIENT)
Facility: HOSPITAL | Age: 62
LOS: 3 days | Discharge: HOME | End: 2022-09-16
Attending: EMERGENCY MEDICINE | Admitting: HOSPITALIST

## 2022-09-13 ENCOUNTER — APPOINTMENT (OUTPATIENT)
Dept: GENERAL RADIOLOGY | Facility: HOSPITAL | Age: 62
End: 2022-09-13

## 2022-09-13 ENCOUNTER — OFFICE VISIT (OUTPATIENT)
Dept: PLASTIC SURGERY | Facility: CLINIC | Age: 62
End: 2022-09-13

## 2022-09-13 ENCOUNTER — TELEPHONE (OUTPATIENT)
Dept: PLASTIC SURGERY | Facility: CLINIC | Age: 62
End: 2022-09-13

## 2022-09-13 VITALS
HEIGHT: 63 IN | TEMPERATURE: 99.1 F | WEIGHT: 175 LBS | OXYGEN SATURATION: 95 % | SYSTOLIC BLOOD PRESSURE: 121 MMHG | BODY MASS INDEX: 31.01 KG/M2 | HEART RATE: 97 BPM | DIASTOLIC BLOOD PRESSURE: 74 MMHG

## 2022-09-13 DIAGNOSIS — R26.2 DIFFICULTY WALKING: ICD-10-CM

## 2022-09-13 DIAGNOSIS — A41.9 SEPSIS, DUE TO UNSPECIFIED ORGANISM, UNSPECIFIED WHETHER ACUTE ORGAN DYSFUNCTION PRESENT: Primary | ICD-10-CM

## 2022-09-13 DIAGNOSIS — R11.2 NAUSEA AND VOMITING, UNSPECIFIED VOMITING TYPE: ICD-10-CM

## 2022-09-13 DIAGNOSIS — N61.0 CELLULITIS OF LEFT BREAST: ICD-10-CM

## 2022-09-13 DIAGNOSIS — N61.0 CELLULITIS OF BREAST: Primary | ICD-10-CM

## 2022-09-13 DIAGNOSIS — Z78.9 DECREASED ACTIVITIES OF DAILY LIVING (ADL): ICD-10-CM

## 2022-09-13 LAB
ALBUMIN SERPL-MCNC: 4.2 G/DL (ref 3.5–5.2)
ALBUMIN/GLOB SERPL: 1.4 G/DL
ALP SERPL-CCNC: 73 U/L (ref 39–117)
ALT SERPL W P-5'-P-CCNC: 11 U/L (ref 1–33)
ANION GAP SERPL CALCULATED.3IONS-SCNC: 13.1 MMOL/L (ref 5–15)
AST SERPL-CCNC: 14 U/L (ref 1–32)
BASOPHILS # BLD AUTO: 0.05 10*3/MM3 (ref 0–0.2)
BASOPHILS NFR BLD AUTO: 0.2 % (ref 0–1.5)
BILIRUB SERPL-MCNC: 1 MG/DL (ref 0–1.2)
BILIRUB UR QL STRIP: NEGATIVE
BUN SERPL-MCNC: 19 MG/DL (ref 8–23)
BUN/CREAT SERPL: 18.6 (ref 7–25)
CALCIUM SPEC-SCNC: 9.1 MG/DL (ref 8.6–10.5)
CHLORIDE SERPL-SCNC: 96 MMOL/L (ref 98–107)
CLARITY UR: CLEAR
CO2 SERPL-SCNC: 23.9 MMOL/L (ref 22–29)
COLOR UR: YELLOW
CREAT SERPL-MCNC: 1.02 MG/DL (ref 0.57–1)
D-LACTATE SERPL-SCNC: 1.5 MMOL/L (ref 0.5–2)
DEPRECATED RDW RBC AUTO: 45.3 FL (ref 37–54)
EGFRCR SERPLBLD CKD-EPI 2021: 61.9 ML/MIN/1.73
EOSINOPHIL # BLD AUTO: 0.01 10*3/MM3 (ref 0–0.4)
EOSINOPHIL NFR BLD AUTO: 0 % (ref 0.3–6.2)
ERYTHROCYTE [DISTWIDTH] IN BLOOD BY AUTOMATED COUNT: 13.5 % (ref 12.3–15.4)
FLUAV AG NPH QL: NEGATIVE
FLUBV AG NPH QL IA: NEGATIVE
GLOBULIN UR ELPH-MCNC: 3.1 GM/DL
GLUCOSE SERPL-MCNC: 204 MG/DL (ref 65–99)
GLUCOSE UR STRIP-MCNC: NEGATIVE MG/DL
HCT VFR BLD AUTO: 39.6 % (ref 34–46.6)
HGB BLD-MCNC: 13.7 G/DL (ref 12–15.9)
HGB UR QL STRIP.AUTO: NEGATIVE
HOLD SPECIMEN: NORMAL
HOLD SPECIMEN: NORMAL
IMM GRANULOCYTES # BLD AUTO: 0.07 10*3/MM3 (ref 0–0.05)
IMM GRANULOCYTES NFR BLD AUTO: 0.3 % (ref 0–0.5)
KETONES UR QL STRIP: NEGATIVE
LEUKOCYTE ESTERASE UR QL STRIP.AUTO: NEGATIVE
LYMPHOCYTES # BLD AUTO: 1.55 10*3/MM3 (ref 0.7–3.1)
LYMPHOCYTES NFR BLD AUTO: 7.3 % (ref 19.6–45.3)
MAGNESIUM SERPL-MCNC: 2.1 MG/DL (ref 1.6–2.4)
MCH RBC QN AUTO: 31.4 PG (ref 26.6–33)
MCHC RBC AUTO-ENTMCNC: 34.6 G/DL (ref 31.5–35.7)
MCV RBC AUTO: 90.8 FL (ref 79–97)
MONOCYTES # BLD AUTO: 1.71 10*3/MM3 (ref 0.1–0.9)
MONOCYTES NFR BLD AUTO: 8.1 % (ref 5–12)
NEUTROPHILS NFR BLD AUTO: 17.7 10*3/MM3 (ref 1.7–7)
NEUTROPHILS NFR BLD AUTO: 84.1 % (ref 42.7–76)
NITRITE UR QL STRIP: NEGATIVE
NRBC BLD AUTO-RTO: 0 /100 WBC (ref 0–0.2)
PH UR STRIP.AUTO: <=5 [PH] (ref 5–8)
PHOSPHATE SERPL-MCNC: 3.5 MG/DL (ref 2.5–4.5)
PLATELET # BLD AUTO: 250 10*3/MM3 (ref 140–450)
PMV BLD AUTO: 9.9 FL (ref 6–12)
POTASSIUM SERPL-SCNC: 3.5 MMOL/L (ref 3.5–5.2)
PROT SERPL-MCNC: 7.3 G/DL (ref 6–8.5)
PROT UR QL STRIP: NEGATIVE
RBC # BLD AUTO: 4.36 10*6/MM3 (ref 3.77–5.28)
SODIUM SERPL-SCNC: 133 MMOL/L (ref 136–145)
SP GR UR STRIP: 1.01 (ref 1–1.03)
UROBILINOGEN UR QL STRIP: NORMAL
WBC NRBC COR # BLD: 21.09 10*3/MM3 (ref 3.4–10.8)
WHOLE BLOOD HOLD COAG: NORMAL
WHOLE BLOOD HOLD SPECIMEN: NORMAL

## 2022-09-13 PROCEDURE — 36415 COLL VENOUS BLD VENIPUNCTURE: CPT | Performed by: REGISTERED NURSE

## 2022-09-13 PROCEDURE — 83605 ASSAY OF LACTIC ACID: CPT | Performed by: REGISTERED NURSE

## 2022-09-13 PROCEDURE — 25010000002 VANCOMYCIN 5 G RECONSTITUTED SOLUTION: Performed by: EMERGENCY MEDICINE

## 2022-09-13 PROCEDURE — 83735 ASSAY OF MAGNESIUM: CPT | Performed by: HOSPITALIST

## 2022-09-13 PROCEDURE — 87804 INFLUENZA ASSAY W/OPTIC: CPT | Performed by: EMERGENCY MEDICINE

## 2022-09-13 PROCEDURE — 99223 1ST HOSP IP/OBS HIGH 75: CPT | Performed by: HOSPITALIST

## 2022-09-13 PROCEDURE — 81003 URINALYSIS AUTO W/O SCOPE: CPT | Performed by: EMERGENCY MEDICINE

## 2022-09-13 PROCEDURE — 71045 X-RAY EXAM CHEST 1 VIEW: CPT

## 2022-09-13 PROCEDURE — 84100 ASSAY OF PHOSPHORUS: CPT | Performed by: HOSPITALIST

## 2022-09-13 PROCEDURE — 99232 SBSQ HOSP IP/OBS MODERATE 35: CPT | Performed by: SURGERY

## 2022-09-13 PROCEDURE — 85025 COMPLETE CBC W/AUTO DIFF WBC: CPT | Performed by: REGISTERED NURSE

## 2022-09-13 PROCEDURE — 87040 BLOOD CULTURE FOR BACTERIA: CPT | Performed by: REGISTERED NURSE

## 2022-09-13 PROCEDURE — 99212 OFFICE O/P EST SF 10 MIN: CPT | Performed by: NURSE PRACTITIONER

## 2022-09-13 PROCEDURE — 99285 EMERGENCY DEPT VISIT HI MDM: CPT

## 2022-09-13 PROCEDURE — U0004 COV-19 TEST NON-CDC HGH THRU: HCPCS | Performed by: EMERGENCY MEDICINE

## 2022-09-13 PROCEDURE — 80053 COMPREHEN METABOLIC PANEL: CPT | Performed by: REGISTERED NURSE

## 2022-09-13 RX ORDER — HYDROCODONE BITARTRATE AND ACETAMINOPHEN 5; 325 MG/1; MG/1
1 TABLET ORAL EVERY 4 HOURS PRN
Status: DISCONTINUED | OUTPATIENT
Start: 2022-09-13 | End: 2022-09-16 | Stop reason: HOSPADM

## 2022-09-13 RX ORDER — NALOXONE HCL 0.4 MG/ML
0.4 VIAL (ML) INJECTION
Status: DISCONTINUED | OUTPATIENT
Start: 2022-09-13 | End: 2022-09-16 | Stop reason: HOSPADM

## 2022-09-13 RX ORDER — NITROGLYCERIN 0.4 MG/1
0.4 TABLET SUBLINGUAL
Status: DISCONTINUED | OUTPATIENT
Start: 2022-09-13 | End: 2022-09-14

## 2022-09-13 RX ORDER — BISACODYL 5 MG/1
5 TABLET, DELAYED RELEASE ORAL DAILY PRN
Status: DISCONTINUED | OUTPATIENT
Start: 2022-09-13 | End: 2022-09-16 | Stop reason: HOSPADM

## 2022-09-13 RX ORDER — ENOXAPARIN SODIUM 100 MG/ML
40 INJECTION SUBCUTANEOUS DAILY
Status: DISCONTINUED | OUTPATIENT
Start: 2022-09-13 | End: 2022-09-16 | Stop reason: HOSPADM

## 2022-09-13 RX ORDER — CHOLECALCIFEROL (VITAMIN D3) 125 MCG
5 CAPSULE ORAL NIGHTLY PRN
Status: DISCONTINUED | OUTPATIENT
Start: 2022-09-13 | End: 2022-09-16 | Stop reason: HOSPADM

## 2022-09-13 RX ORDER — HYDROCODONE BITARTRATE AND ACETAMINOPHEN 7.5; 325 MG/1; MG/1
2 TABLET ORAL EVERY 4 HOURS PRN
Status: DISCONTINUED | OUTPATIENT
Start: 2022-09-13 | End: 2022-09-16 | Stop reason: HOSPADM

## 2022-09-13 RX ORDER — VENLAFAXINE 37.5 MG/1
37.5 TABLET ORAL
Status: DISCONTINUED | OUTPATIENT
Start: 2022-09-14 | End: 2022-09-14

## 2022-09-13 RX ORDER — POLYETHYLENE GLYCOL 3350 17 G/17G
17 POWDER, FOR SOLUTION ORAL DAILY PRN
Status: DISCONTINUED | OUTPATIENT
Start: 2022-09-13 | End: 2022-09-16 | Stop reason: HOSPADM

## 2022-09-13 RX ORDER — BISACODYL 10 MG
10 SUPPOSITORY, RECTAL RECTAL DAILY PRN
Status: DISCONTINUED | OUTPATIENT
Start: 2022-09-13 | End: 2022-09-16 | Stop reason: HOSPADM

## 2022-09-13 RX ORDER — ACETAMINOPHEN 160 MG/5ML
650 SOLUTION ORAL EVERY 4 HOURS PRN
Status: DISCONTINUED | OUTPATIENT
Start: 2022-09-13 | End: 2022-09-16 | Stop reason: HOSPADM

## 2022-09-13 RX ORDER — SODIUM CHLORIDE 9 MG/ML
75 INJECTION, SOLUTION INTRAVENOUS CONTINUOUS
Status: DISCONTINUED | OUTPATIENT
Start: 2022-09-13 | End: 2022-09-14

## 2022-09-13 RX ORDER — AMOXICILLIN 250 MG
2 CAPSULE ORAL 2 TIMES DAILY
Status: DISCONTINUED | OUTPATIENT
Start: 2022-09-13 | End: 2022-09-16 | Stop reason: HOSPADM

## 2022-09-13 RX ORDER — SODIUM CHLORIDE 9 MG/ML
40 INJECTION, SOLUTION INTRAVENOUS AS NEEDED
Status: DISCONTINUED | OUTPATIENT
Start: 2022-09-13 | End: 2022-09-16 | Stop reason: HOSPADM

## 2022-09-13 RX ORDER — ONDANSETRON 4 MG/1
4 TABLET, FILM COATED ORAL EVERY 6 HOURS PRN
Status: DISCONTINUED | OUTPATIENT
Start: 2022-09-13 | End: 2022-09-16 | Stop reason: HOSPADM

## 2022-09-13 RX ORDER — ACETAMINOPHEN 650 MG/1
650 SUPPOSITORY RECTAL EVERY 4 HOURS PRN
Status: DISCONTINUED | OUTPATIENT
Start: 2022-09-13 | End: 2022-09-16 | Stop reason: HOSPADM

## 2022-09-13 RX ORDER — SODIUM CHLORIDE 0.9 % (FLUSH) 0.9 %
10 SYRINGE (ML) INJECTION EVERY 12 HOURS SCHEDULED
Status: DISCONTINUED | OUTPATIENT
Start: 2022-09-13 | End: 2022-09-16 | Stop reason: HOSPADM

## 2022-09-13 RX ORDER — MORPHINE SULFATE 2 MG/ML
1 INJECTION, SOLUTION INTRAMUSCULAR; INTRAVENOUS EVERY 4 HOURS PRN
Status: DISCONTINUED | OUTPATIENT
Start: 2022-09-13 | End: 2022-09-16 | Stop reason: HOSPADM

## 2022-09-13 RX ORDER — ANASTROZOLE 1 MG/1
1 TABLET ORAL DAILY
Status: DISCONTINUED | OUTPATIENT
Start: 2022-09-14 | End: 2022-09-16 | Stop reason: HOSPADM

## 2022-09-13 RX ORDER — SODIUM CHLORIDE 0.9 % (FLUSH) 0.9 %
10 SYRINGE (ML) INJECTION AS NEEDED
Status: DISCONTINUED | OUTPATIENT
Start: 2022-09-13 | End: 2022-09-16 | Stop reason: HOSPADM

## 2022-09-13 RX ORDER — SODIUM CHLORIDE 0.9 % (FLUSH) 0.9 %
10 SYRINGE (ML) INJECTION AS NEEDED
Status: DISCONTINUED | OUTPATIENT
Start: 2022-09-13 | End: 2022-09-13

## 2022-09-13 RX ORDER — ACETAMINOPHEN 325 MG/1
650 TABLET ORAL EVERY 4 HOURS PRN
Status: DISCONTINUED | OUTPATIENT
Start: 2022-09-13 | End: 2022-09-16 | Stop reason: HOSPADM

## 2022-09-13 RX ORDER — HYDROXYZINE HYDROCHLORIDE 25 MG/1
25 TABLET, FILM COATED ORAL 3 TIMES DAILY PRN
Status: DISCONTINUED | OUTPATIENT
Start: 2022-09-13 | End: 2022-09-16 | Stop reason: HOSPADM

## 2022-09-13 RX ORDER — ONDANSETRON 2 MG/ML
4 INJECTION INTRAMUSCULAR; INTRAVENOUS EVERY 6 HOURS PRN
Status: DISCONTINUED | OUTPATIENT
Start: 2022-09-13 | End: 2022-09-16 | Stop reason: HOSPADM

## 2022-09-13 RX ADMIN — ACETAMINOPHEN 650 MG: 325 TABLET ORAL at 23:04

## 2022-09-13 RX ADMIN — SODIUM CHLORIDE 2349 ML: 9 INJECTION, SOLUTION INTRAVENOUS at 18:28

## 2022-09-13 RX ADMIN — VANCOMYCIN HYDROCHLORIDE 1500 MG: 5 INJECTION, POWDER, LYOPHILIZED, FOR SOLUTION INTRAVENOUS at 18:58

## 2022-09-13 NOTE — PROGRESS NOTES
"Chief Complaint    Subjective          History of Present Illness  Marisa Portillo is a 62 y.o. female who presents to [facility] RECON[facility] SURGERY for a follow up  04/15/22 RIGHT BREAST IMPLANT REMOVAL WITH INCISION AND DRAINAGE.     She states she started yesterday with fever 101.7, pain, tenderness and warmth in her left breast. She feels weak and has been vomiting. She started Clindamycin yesterday for staph on her right chest wall. She had Tylenol 2hrs ago and temp is 98.8 orally. She states she feels like she did when she was septic before.     Allergies: Multihance [gadobenate]  Allergies Reconciled.    Review of Systems   All review of system has been reviewed and it  is negative except the ones note above.     Objective     /74 (BP Location: Left arm, Patient Position: Sitting, Cuff Size: Adult)   Pulse 97   Temp 99.1 °F (37.3 °C) (Oral)   Ht 160 cm (63\")   Wt 79.4 kg (175 lb)   SpO2 95%   BMI 31.00 kg/m²     Body mass index is 31 kg/m².    Physical Exam     Pulmonary/Chest  Effort normal.     Breast  Right chest healing with suture intact except 5 mm opening on medial end of incision just under areola draining serosanguineous fluid; left breast with implant intact, breast is warm with erythema and tender    Result Review :     Assessment and Plan      Diagnoses and all orders for this visit:    1. Cellulitis of breast (Primary)    2. Nausea and vomiting, unspecified vomiting type        Plan:   Discussed with Dr. Shelton and patients will need to be admitted to hospital for left breast cellulitis.   Will discuss with hospitalist.   Follow Up     No follow-ups on file.    Patient was given instructions and counseling regarding her condition. Please see specific information pulled into the AVS if appropriate.     Brigitte Chase, APRN  09/13/2022    "

## 2022-09-13 NOTE — TELEPHONE ENCOUNTER
Notified patient that hospital is full but soon as a bed opens they will call her to come in. She is going to take nausea medications and try to keep down oral antibiotics until she can get admitted.

## 2022-09-14 LAB
ANION GAP SERPL CALCULATED.3IONS-SCNC: 10.6 MMOL/L (ref 5–15)
BASOPHILS # BLD AUTO: 0.04 10*3/MM3 (ref 0–0.2)
BASOPHILS NFR BLD AUTO: 0.2 % (ref 0–1.5)
BUN SERPL-MCNC: 10 MG/DL (ref 8–23)
BUN/CREAT SERPL: 15.9 (ref 7–25)
CALCIUM SPEC-SCNC: 8.3 MG/DL (ref 8.6–10.5)
CHLORIDE SERPL-SCNC: 104 MMOL/L (ref 98–107)
CO2 SERPL-SCNC: 23.4 MMOL/L (ref 22–29)
CREAT SERPL-MCNC: 0.63 MG/DL (ref 0.57–1)
DEPRECATED RDW RBC AUTO: 46 FL (ref 37–54)
EGFRCR SERPLBLD CKD-EPI 2021: 99.8 ML/MIN/1.73
EOSINOPHIL # BLD AUTO: 0.02 10*3/MM3 (ref 0–0.4)
EOSINOPHIL NFR BLD AUTO: 0.1 % (ref 0.3–6.2)
ERYTHROCYTE [DISTWIDTH] IN BLOOD BY AUTOMATED COUNT: 13.7 % (ref 12.3–15.4)
GLUCOSE BLDC GLUCOMTR-MCNC: 101 MG/DL (ref 70–99)
GLUCOSE BLDC GLUCOMTR-MCNC: 120 MG/DL (ref 70–99)
GLUCOSE BLDC GLUCOMTR-MCNC: 158 MG/DL (ref 70–99)
GLUCOSE SERPL-MCNC: 123 MG/DL (ref 65–99)
HCT VFR BLD AUTO: 34.6 % (ref 34–46.6)
HGB BLD-MCNC: 11.8 G/DL (ref 12–15.9)
IMM GRANULOCYTES # BLD AUTO: 0.08 10*3/MM3 (ref 0–0.05)
IMM GRANULOCYTES NFR BLD AUTO: 0.5 % (ref 0–0.5)
LYMPHOCYTES # BLD AUTO: 1.52 10*3/MM3 (ref 0.7–3.1)
LYMPHOCYTES NFR BLD AUTO: 8.6 % (ref 19.6–45.3)
MAGNESIUM SERPL-MCNC: 1.8 MG/DL (ref 1.6–2.4)
MCH RBC QN AUTO: 31.1 PG (ref 26.6–33)
MCHC RBC AUTO-ENTMCNC: 34.1 G/DL (ref 31.5–35.7)
MCV RBC AUTO: 91.3 FL (ref 79–97)
MONOCYTES # BLD AUTO: 1.97 10*3/MM3 (ref 0.1–0.9)
MONOCYTES NFR BLD AUTO: 11.1 % (ref 5–12)
NEUTROPHILS NFR BLD AUTO: 14.06 10*3/MM3 (ref 1.7–7)
NEUTROPHILS NFR BLD AUTO: 79.5 % (ref 42.7–76)
NRBC BLD AUTO-RTO: 0 /100 WBC (ref 0–0.2)
PHOSPHATE SERPL-MCNC: 1.7 MG/DL (ref 2.5–4.5)
PLATELET # BLD AUTO: 186 10*3/MM3 (ref 140–450)
PMV BLD AUTO: 10.1 FL (ref 6–12)
POTASSIUM SERPL-SCNC: 3.8 MMOL/L (ref 3.5–5.2)
RBC # BLD AUTO: 3.79 10*6/MM3 (ref 3.77–5.28)
SARS-COV-2 RNA PNL SPEC NAA+PROBE: NOT DETECTED
SODIUM SERPL-SCNC: 138 MMOL/L (ref 136–145)
WBC NRBC COR # BLD: 17.69 10*3/MM3 (ref 3.4–10.8)

## 2022-09-14 PROCEDURE — 97165 OT EVAL LOW COMPLEX 30 MIN: CPT

## 2022-09-14 PROCEDURE — 25010000002 VANCOMYCIN 5 G RECONSTITUTED SOLUTION: Performed by: HOSPITALIST

## 2022-09-14 PROCEDURE — 97161 PT EVAL LOW COMPLEX 20 MIN: CPT

## 2022-09-14 PROCEDURE — 99233 SBSQ HOSP IP/OBS HIGH 50: CPT | Performed by: INTERNAL MEDICINE

## 2022-09-14 PROCEDURE — 80048 BASIC METABOLIC PNL TOTAL CA: CPT | Performed by: HOSPITALIST

## 2022-09-14 PROCEDURE — 82962 GLUCOSE BLOOD TEST: CPT

## 2022-09-14 PROCEDURE — 83735 ASSAY OF MAGNESIUM: CPT | Performed by: HOSPITALIST

## 2022-09-14 PROCEDURE — 85025 COMPLETE CBC W/AUTO DIFF WBC: CPT | Performed by: HOSPITALIST

## 2022-09-14 PROCEDURE — 84100 ASSAY OF PHOSPHORUS: CPT | Performed by: HOSPITALIST

## 2022-09-14 RX ORDER — SODIUM PHOSPHATE IN D5W 15MMOL/250
30 PLASTIC BAG, INJECTION (ML) INTRAVENOUS ONCE
Status: DISCONTINUED | OUTPATIENT
Start: 2022-09-14 | End: 2022-09-14

## 2022-09-14 RX ORDER — HEPARIN SODIUM (PORCINE) LOCK FLUSH IV SOLN 100 UNIT/ML 100 UNIT/ML
5 SOLUTION INTRAVENOUS AS NEEDED
Status: DISCONTINUED | OUTPATIENT
Start: 2022-09-14 | End: 2022-09-16 | Stop reason: HOSPADM

## 2022-09-14 RX ORDER — SODIUM CHLORIDE 0.9 % (FLUSH) 0.9 %
10 SYRINGE (ML) INJECTION EVERY 12 HOURS SCHEDULED
Status: DISCONTINUED | OUTPATIENT
Start: 2022-09-14 | End: 2022-09-16 | Stop reason: HOSPADM

## 2022-09-14 RX ORDER — SODIUM CHLORIDE 0.9 % (FLUSH) 0.9 %
20 SYRINGE (ML) INJECTION AS NEEDED
Status: DISCONTINUED | OUTPATIENT
Start: 2022-09-14 | End: 2022-09-16 | Stop reason: HOSPADM

## 2022-09-14 RX ORDER — FAMOTIDINE 20 MG/1
20 TABLET, FILM COATED ORAL NIGHTLY
Status: DISCONTINUED | OUTPATIENT
Start: 2022-09-14 | End: 2022-09-16 | Stop reason: HOSPADM

## 2022-09-14 RX ORDER — SODIUM CHLORIDE 0.9 % (FLUSH) 0.9 %
10 SYRINGE (ML) INJECTION AS NEEDED
Status: DISCONTINUED | OUTPATIENT
Start: 2022-09-14 | End: 2022-09-16 | Stop reason: HOSPADM

## 2022-09-14 RX ORDER — ERGOCALCIFEROL 1.25 MG/1
50000 CAPSULE ORAL
Status: DISCONTINUED | OUTPATIENT
Start: 2022-09-19 | End: 2022-09-16 | Stop reason: HOSPADM

## 2022-09-14 RX ORDER — SODIUM PHOSPHATE IN D5W 15MMOL/250
15 PLASTIC BAG, INJECTION (ML) INTRAVENOUS
Status: COMPLETED | OUTPATIENT
Start: 2022-09-14 | End: 2022-09-14

## 2022-09-14 RX ORDER — VENLAFAXINE 37.5 MG/1
37.5 TABLET ORAL DAILY
Status: DISCONTINUED | OUTPATIENT
Start: 2022-09-15 | End: 2022-09-16 | Stop reason: HOSPADM

## 2022-09-14 RX ADMIN — HYDROCODONE BITARTRATE AND ACETAMINOPHEN 1 TABLET: 5; 325 TABLET ORAL at 04:43

## 2022-09-14 RX ADMIN — SODIUM CHLORIDE 75 ML/HR: 9 INJECTION, SOLUTION INTRAVENOUS at 01:47

## 2022-09-14 RX ADMIN — VANCOMYCIN HYDROCHLORIDE 750 MG: 5 INJECTION, POWDER, LYOPHILIZED, FOR SOLUTION INTRAVENOUS at 18:08

## 2022-09-14 RX ADMIN — ACETAMINOPHEN 650 MG: 325 TABLET ORAL at 03:50

## 2022-09-14 RX ADMIN — SODIUM PHOSPHATE, MONOBASIC, MONOHYDRATE AND SODIUM PHOSPHATE, DIBASIC, ANHYDROUS 15 MMOL: 276; 142 INJECTION, SOLUTION INTRAVENOUS at 11:08

## 2022-09-14 RX ADMIN — ACETAMINOPHEN 650 MG: 325 TABLET ORAL at 10:35

## 2022-09-14 RX ADMIN — Medication 10 ML: at 09:25

## 2022-09-14 RX ADMIN — ACETAMINOPHEN 650 MG: 325 TABLET ORAL at 18:08

## 2022-09-14 RX ADMIN — VENLAFAXINE HYDROCHLORIDE 37.5 MG: 37.5 TABLET ORAL at 10:35

## 2022-09-14 RX ADMIN — FAMOTIDINE 20 MG: 20 TABLET ORAL at 20:09

## 2022-09-14 RX ADMIN — VENLAFAXINE HYDROCHLORIDE 37.5 MG: 37.5 TABLET ORAL at 14:08

## 2022-09-14 RX ADMIN — SODIUM PHOSPHATE, MONOBASIC, MONOHYDRATE AND SODIUM PHOSPHATE, DIBASIC, ANHYDROUS 15 MMOL: 276; 142 INJECTION, SOLUTION INTRAVENOUS at 14:08

## 2022-09-14 RX ADMIN — ANASTROZOLE 1 MG: 1 TABLET ORAL at 10:36

## 2022-09-14 RX ADMIN — VANCOMYCIN HYDROCHLORIDE 750 MG: 5 INJECTION, POWDER, LYOPHILIZED, FOR SOLUTION INTRAVENOUS at 09:19

## 2022-09-15 LAB
ANION GAP SERPL CALCULATED.3IONS-SCNC: 6.9 MMOL/L (ref 5–15)
BASOPHILS # BLD AUTO: 0.04 10*3/MM3 (ref 0–0.2)
BASOPHILS NFR BLD AUTO: 0.3 % (ref 0–1.5)
BUN SERPL-MCNC: 6 MG/DL (ref 8–23)
BUN/CREAT SERPL: 10.2 (ref 7–25)
CALCIUM SPEC-SCNC: 8.7 MG/DL (ref 8.6–10.5)
CHLORIDE SERPL-SCNC: 105 MMOL/L (ref 98–107)
CO2 SERPL-SCNC: 27.1 MMOL/L (ref 22–29)
CREAT SERPL-MCNC: 0.59 MG/DL (ref 0.57–1)
DEPRECATED RDW RBC AUTO: 45.4 FL (ref 37–54)
EGFRCR SERPLBLD CKD-EPI 2021: 101.4 ML/MIN/1.73
EOSINOPHIL # BLD AUTO: 0.11 10*3/MM3 (ref 0–0.4)
EOSINOPHIL NFR BLD AUTO: 0.9 % (ref 0.3–6.2)
ERYTHROCYTE [DISTWIDTH] IN BLOOD BY AUTOMATED COUNT: 13.5 % (ref 12.3–15.4)
GLUCOSE SERPL-MCNC: 98 MG/DL (ref 65–99)
HCT VFR BLD AUTO: 31.3 % (ref 34–46.6)
HGB BLD-MCNC: 10.7 G/DL (ref 12–15.9)
IMM GRANULOCYTES # BLD AUTO: 0.05 10*3/MM3 (ref 0–0.05)
IMM GRANULOCYTES NFR BLD AUTO: 0.4 % (ref 0–0.5)
LYMPHOCYTES # BLD AUTO: 1.72 10*3/MM3 (ref 0.7–3.1)
LYMPHOCYTES NFR BLD AUTO: 13.9 % (ref 19.6–45.3)
MAGNESIUM SERPL-MCNC: 1.9 MG/DL (ref 1.6–2.4)
MCH RBC QN AUTO: 31.4 PG (ref 26.6–33)
MCHC RBC AUTO-ENTMCNC: 34.2 G/DL (ref 31.5–35.7)
MCV RBC AUTO: 91.8 FL (ref 79–97)
MONOCYTES # BLD AUTO: 1.15 10*3/MM3 (ref 0.1–0.9)
MONOCYTES NFR BLD AUTO: 9.3 % (ref 5–12)
NEUTROPHILS NFR BLD AUTO: 75.2 % (ref 42.7–76)
NEUTROPHILS NFR BLD AUTO: 9.27 10*3/MM3 (ref 1.7–7)
NRBC BLD AUTO-RTO: 0 /100 WBC (ref 0–0.2)
PHOSPHATE SERPL-MCNC: 2.9 MG/DL (ref 2.5–4.5)
PLATELET # BLD AUTO: 166 10*3/MM3 (ref 140–450)
PMV BLD AUTO: 9.8 FL (ref 6–12)
POTASSIUM SERPL-SCNC: 3.4 MMOL/L (ref 3.5–5.2)
RBC # BLD AUTO: 3.41 10*6/MM3 (ref 3.77–5.28)
SODIUM SERPL-SCNC: 139 MMOL/L (ref 136–145)
VANCOMYCIN TROUGH SERPL-MCNC: 6.53 MCG/ML (ref 5–20)
WBC NRBC COR # BLD: 12.34 10*3/MM3 (ref 3.4–10.8)

## 2022-09-15 PROCEDURE — 99232 SBSQ HOSP IP/OBS MODERATE 35: CPT | Performed by: INTERNAL MEDICINE

## 2022-09-15 PROCEDURE — 87205 SMEAR GRAM STAIN: CPT | Performed by: SURGERY

## 2022-09-15 PROCEDURE — 85025 COMPLETE CBC W/AUTO DIFF WBC: CPT | Performed by: HOSPITALIST

## 2022-09-15 PROCEDURE — 25010000002 ENOXAPARIN PER 10 MG: Performed by: HOSPITALIST

## 2022-09-15 PROCEDURE — 87147 CULTURE TYPE IMMUNOLOGIC: CPT | Performed by: SURGERY

## 2022-09-15 PROCEDURE — 84100 ASSAY OF PHOSPHORUS: CPT | Performed by: HOSPITALIST

## 2022-09-15 PROCEDURE — 0 LIDOCAINE 1 % SOLUTION: Performed by: SURGERY

## 2022-09-15 PROCEDURE — 0H9U30Z DRAINAGE OF LEFT BREAST WITH DRAINAGE DEVICE, PERCUTANEOUS APPROACH: ICD-10-PCS | Performed by: SURGERY

## 2022-09-15 PROCEDURE — 87070 CULTURE OTHR SPECIMN AEROBIC: CPT | Performed by: SURGERY

## 2022-09-15 PROCEDURE — 83735 ASSAY OF MAGNESIUM: CPT | Performed by: HOSPITALIST

## 2022-09-15 PROCEDURE — 80048 BASIC METABOLIC PNL TOTAL CA: CPT | Performed by: HOSPITALIST

## 2022-09-15 PROCEDURE — 87075 CULTR BACTERIA EXCEPT BLOOD: CPT | Performed by: SURGERY

## 2022-09-15 PROCEDURE — 25010000002 VANCOMYCIN 5 G RECONSTITUTED SOLUTION: Performed by: HOSPITALIST

## 2022-09-15 PROCEDURE — 87186 SC STD MICRODIL/AGAR DIL: CPT | Performed by: SURGERY

## 2022-09-15 PROCEDURE — 80202 ASSAY OF VANCOMYCIN: CPT | Performed by: HOSPITALIST

## 2022-09-15 RX ORDER — LIDOCAINE HYDROCHLORIDE 10 MG/ML
10 INJECTION, SOLUTION INFILTRATION; PERINEURAL ONCE
Status: COMPLETED | OUTPATIENT
Start: 2022-09-15 | End: 2022-09-15

## 2022-09-15 RX ORDER — POTASSIUM CHLORIDE 750 MG/1
40 CAPSULE, EXTENDED RELEASE ORAL ONCE
Status: COMPLETED | OUTPATIENT
Start: 2022-09-15 | End: 2022-09-15

## 2022-09-15 RX ADMIN — Medication 10 ML: at 21:19

## 2022-09-15 RX ADMIN — ANASTROZOLE 1 MG: 1 TABLET ORAL at 09:22

## 2022-09-15 RX ADMIN — VANCOMYCIN HYDROCHLORIDE 1250 MG: 5 INJECTION, POWDER, LYOPHILIZED, FOR SOLUTION INTRAVENOUS at 17:29

## 2022-09-15 RX ADMIN — LIDOCAINE HYDROCHLORIDE 10 ML: 10 INJECTION, SOLUTION INFILTRATION; PERINEURAL at 12:05

## 2022-09-15 RX ADMIN — VANCOMYCIN HYDROCHLORIDE 750 MG: 5 INJECTION, POWDER, LYOPHILIZED, FOR SOLUTION INTRAVENOUS at 05:41

## 2022-09-15 RX ADMIN — Medication 10 ML: at 09:25

## 2022-09-15 RX ADMIN — ACETAMINOPHEN 650 MG: 325 TABLET ORAL at 21:19

## 2022-09-15 RX ADMIN — POTASSIUM CHLORIDE 40 MEQ: 10 CAPSULE, COATED, EXTENDED RELEASE ORAL at 14:13

## 2022-09-15 RX ADMIN — ACETAMINOPHEN 650 MG: 325 TABLET ORAL at 14:17

## 2022-09-15 RX ADMIN — FAMOTIDINE 20 MG: 20 TABLET ORAL at 21:18

## 2022-09-15 RX ADMIN — Medication 10 ML: at 09:23

## 2022-09-15 RX ADMIN — VENLAFAXINE HYDROCHLORIDE 37.5 MG: 37.5 TABLET ORAL at 09:24

## 2022-09-15 RX ADMIN — HYDROCODONE BITARTRATE AND ACETAMINOPHEN 1 TABLET: 5; 325 TABLET ORAL at 03:18

## 2022-09-15 RX ADMIN — ENOXAPARIN SODIUM 40 MG: 100 INJECTION SUBCUTANEOUS at 09:22

## 2022-09-16 ENCOUNTER — READMISSION MANAGEMENT (OUTPATIENT)
Dept: CALL CENTER | Facility: HOSPITAL | Age: 62
End: 2022-09-16

## 2022-09-16 ENCOUNTER — APPOINTMENT (OUTPATIENT)
Dept: ONCOLOGY | Facility: HOSPITAL | Age: 62
End: 2022-09-16

## 2022-09-16 VITALS
SYSTOLIC BLOOD PRESSURE: 131 MMHG | OXYGEN SATURATION: 98 % | DIASTOLIC BLOOD PRESSURE: 68 MMHG | HEIGHT: 65 IN | TEMPERATURE: 98.6 F | RESPIRATION RATE: 18 BRPM | HEART RATE: 78 BPM | BODY MASS INDEX: 23.99 KG/M2 | WEIGHT: 143.96 LBS

## 2022-09-16 PROBLEM — A41.9 SEPSIS, DUE TO UNSPECIFIED ORGANISM, UNSPECIFIED WHETHER ACUTE ORGAN DYSFUNCTION PRESENT: Status: RESOLVED | Noted: 2022-03-09 | Resolved: 2022-09-16

## 2022-09-16 LAB
ANION GAP SERPL CALCULATED.3IONS-SCNC: 8.5 MMOL/L (ref 5–15)
BASOPHILS # BLD AUTO: 0.03 10*3/MM3 (ref 0–0.2)
BASOPHILS NFR BLD AUTO: 0.4 % (ref 0–1.5)
BUN SERPL-MCNC: 7 MG/DL (ref 8–23)
BUN/CREAT SERPL: 11.1 (ref 7–25)
CALCIUM SPEC-SCNC: 8.7 MG/DL (ref 8.6–10.5)
CHLORIDE SERPL-SCNC: 103 MMOL/L (ref 98–107)
CO2 SERPL-SCNC: 28.5 MMOL/L (ref 22–29)
CREAT SERPL-MCNC: 0.63 MG/DL (ref 0.57–1)
DEPRECATED RDW RBC AUTO: 45.5 FL (ref 37–54)
EGFRCR SERPLBLD CKD-EPI 2021: 99.8 ML/MIN/1.73
EOSINOPHIL # BLD AUTO: 0.19 10*3/MM3 (ref 0–0.4)
EOSINOPHIL NFR BLD AUTO: 2.4 % (ref 0.3–6.2)
ERYTHROCYTE [DISTWIDTH] IN BLOOD BY AUTOMATED COUNT: 13.5 % (ref 12.3–15.4)
GLUCOSE SERPL-MCNC: 107 MG/DL (ref 65–99)
HCT VFR BLD AUTO: 32 % (ref 34–46.6)
HGB BLD-MCNC: 11.1 G/DL (ref 12–15.9)
IMM GRANULOCYTES # BLD AUTO: 0.02 10*3/MM3 (ref 0–0.05)
IMM GRANULOCYTES NFR BLD AUTO: 0.3 % (ref 0–0.5)
LYMPHOCYTES # BLD AUTO: 1.62 10*3/MM3 (ref 0.7–3.1)
LYMPHOCYTES NFR BLD AUTO: 20.5 % (ref 19.6–45.3)
MCH RBC QN AUTO: 31.8 PG (ref 26.6–33)
MCHC RBC AUTO-ENTMCNC: 34.7 G/DL (ref 31.5–35.7)
MCV RBC AUTO: 91.7 FL (ref 79–97)
MONOCYTES # BLD AUTO: 0.7 10*3/MM3 (ref 0.1–0.9)
MONOCYTES NFR BLD AUTO: 8.9 % (ref 5–12)
NEUTROPHILS NFR BLD AUTO: 5.34 10*3/MM3 (ref 1.7–7)
NEUTROPHILS NFR BLD AUTO: 67.5 % (ref 42.7–76)
NRBC BLD AUTO-RTO: 0 /100 WBC (ref 0–0.2)
PLATELET # BLD AUTO: 191 10*3/MM3 (ref 140–450)
PMV BLD AUTO: 9.9 FL (ref 6–12)
POTASSIUM SERPL-SCNC: 3.9 MMOL/L (ref 3.5–5.2)
RBC # BLD AUTO: 3.49 10*6/MM3 (ref 3.77–5.28)
SODIUM SERPL-SCNC: 140 MMOL/L (ref 136–145)
WBC NRBC COR # BLD: 7.9 10*3/MM3 (ref 3.4–10.8)

## 2022-09-16 PROCEDURE — 85025 COMPLETE CBC W/AUTO DIFF WBC: CPT | Performed by: HOSPITALIST

## 2022-09-16 PROCEDURE — 25010000002 HEPARIN LOCK FLUSH PER 10 UNITS: Performed by: INTERNAL MEDICINE

## 2022-09-16 PROCEDURE — 80048 BASIC METABOLIC PNL TOTAL CA: CPT | Performed by: HOSPITALIST

## 2022-09-16 PROCEDURE — 25010000002 ENOXAPARIN PER 10 MG: Performed by: HOSPITALIST

## 2022-09-16 PROCEDURE — 99238 HOSP IP/OBS DSCHRG MGMT 30/<: CPT | Performed by: INTERNAL MEDICINE

## 2022-09-16 PROCEDURE — 25010000002 VANCOMYCIN 5 G RECONSTITUTED SOLUTION: Performed by: HOSPITALIST

## 2022-09-16 RX ADMIN — HEPARIN 500 UNITS: 100 SYRINGE at 16:10

## 2022-09-16 RX ADMIN — Medication 10 ML: at 09:43

## 2022-09-16 RX ADMIN — ANASTROZOLE 1 MG: 1 TABLET ORAL at 09:42

## 2022-09-16 RX ADMIN — ENOXAPARIN SODIUM 40 MG: 100 INJECTION SUBCUTANEOUS at 09:42

## 2022-09-16 RX ADMIN — VENLAFAXINE HYDROCHLORIDE 37.5 MG: 37.5 TABLET ORAL at 09:42

## 2022-09-16 RX ADMIN — VANCOMYCIN HYDROCHLORIDE 1250 MG: 5 INJECTION, POWDER, LYOPHILIZED, FOR SOLUTION INTRAVENOUS at 06:28

## 2022-09-16 RX ADMIN — ACETAMINOPHEN 650 MG: 325 TABLET ORAL at 16:10

## 2022-09-16 RX ADMIN — ACETAMINOPHEN 650 MG: 325 TABLET ORAL at 07:30

## 2022-09-16 NOTE — OUTREACH NOTE
Prep Survey    Flowsheet Row Responses   [facility] patient discharged from? [facility]   Is LACE score < 7 ? No   Emergency Room discharge w/ pulse ox? No   Eligibility [facility]   Hospital [facility]   Date of Admission 09/13/22   Date of Discharge 09/16/22   Discharge Disposition Home or Self Care   Discharge diagnosis sepsis d/t cellulitis left breast, Hx Breast CA with mastectomies & left breast implant   Does the patient have one of the following disease processes/diagnoses(primary or secondary)? Sepsis   Does the patient have Home health ordered? No   Is there a DME ordered? No   Prep survey completed? Yes          TITO STEIN - Registered Nurse

## 2022-09-18 LAB
BACTERIA SPEC AEROBE CULT: ABNORMAL
BACTERIA SPEC AEROBE CULT: NORMAL
BACTERIA SPEC AEROBE CULT: NORMAL
GRAM STN SPEC: ABNORMAL
GRAM STN SPEC: ABNORMAL

## 2022-09-19 ENCOUNTER — TELEPHONE (OUTPATIENT)
Dept: PLASTIC SURGERY | Facility: CLINIC | Age: 62
End: 2022-09-19

## 2022-09-19 ENCOUNTER — TRANSITIONAL CARE MANAGEMENT TELEPHONE ENCOUNTER (OUTPATIENT)
Dept: CALL CENTER | Facility: HOSPITAL | Age: 62
End: 2022-09-19

## 2022-09-19 NOTE — TELEPHONE ENCOUNTER
Patient notes drain is putting out 20 ml per a day and she feels much better. We will see her on Friday but do not want to remove drain yet. Limit use of arm, keep open areas clean and dry and intact. RTC as scheduled but call office for any complications.

## 2022-09-19 NOTE — OUTREACH NOTE
Call Center TCM Note    Flowsheet Row Responses   [facility] patient discharged from? [place]   Does the patient have one of the following disease processes/diagnoses(primary or secondary)? Sepsis   TCM attempt successful? Yes   Call start time 1320   Call end time 1324   Discharge diagnosis sepsis d/t cellulitis left breast, Hx Breast CA with mastectomies & left breast implant   Person spoke with today (if not patient) and relationship Patient   Medication alerts for this patient No med changes   Meds reviewed with patient/caregiver? Yes   Is the patient having any side effects they believe may be caused by any medication additions or changes? No   Does the patient have all medications related to this admission filled (includes all antibiotics, inhalers, nebulizers,steroids,etc.) Yes   Is the patient taking all medications as directed (includes completed medication regime)? Yes   Comments fu with surgeon and scheduled pcp follow up 09/23 with clarence snell   Has home health visited the patient within 72 hours of discharge? N/A   Did the patient receive a copy of their discharge instructions? Yes   Nursing interventions Reviewed instructions with patient, Educated on MyChart   What is the patient's perception of their health status since discharge? Improving   Nursing interventions Nurse provided patient education   Is the patient/caregiver able to teach back TIME? T emperature - higher or lower than normal, M ental Decline - confused, sleepy, difficult to arouse, I nfection - may have signs and symptoms of an infection, E xtremely Ill - severe pain, discomfort, shortness of breath  [Liberty is a RN-  at [employer] [employer] [facility] [employer] [facility]]   Nursing interventions Nurse provided reassurance to patient, Nurse provided patient education   Is patient/caregiver able to teach back steps to recovery at home? Set small, achievable goals for return to baseline health, Rest and regain strength, Talk about feelings with  family/friends   Is the patient/caregiver able to teach back signs and symptoms of worsening condition: Fever   If the patient is a current smoker, are they able to teach back resources for cessation? Not a smoker   Is the patient/caregiver able to teach back the hierarchy of who to call/visit for symptoms/problems? PCP, Specialist, Home health nurse, Urgent Care, ED, 911 Yes   TCM call completed? Yes   Wrap up additional comments Patient is doing well scheduled hospital fu with Dr. Alex Jones RN    9/19/2022, 13:26 EDT

## 2022-09-20 LAB — BACTERIA SPEC ANAEROBE CULT: NORMAL

## 2022-09-20 NOTE — PROGRESS NOTES
"Chief Complaint    Subjective          History of Present Illness  Marisa Portillo is a 62 y.o. female who presents to [facility] for a follow up admit to [facility] 09/13-09/16 for left breast wound & sepsis.     She is here today for follow up, her drain output has been 7.5 in the last 24 hrs x 3 days.    Wound culture on 09/15/22      Wound Culture   Lab   Rare Staphylococcus aureus Abnormal    [facility]               Gram Stain   Lab   Few (2+) WBCs seen  [facility]      Few (2+) Gram positive cocci in pairs [facility]                Susceptibility     Staphylococcus aureus     CAYDEN     Clindamycin 0.25 ug/ml Susceptible     Erythromycin <=0.25 ug/ml Susceptible     Inducible Clindamycin Resistance NEG ug/ml Negative     Oxacillin 0.5 ug/ml Susceptible     Rifampin <=0.5 ug/ml Susceptible     Tetracycline <=1 ug/ml Susceptible     Trimethoprim + Sulfamethoxazole <=10 ug/ml Susceptible     Vancomycin <=0.5 ug/ml Susceptible                                     Allergies: Multihance [gadobenate]  Allergies Reconciled.    Review of Systems   All review of system has been reviewed and it  is negative except the ones note above.     Objective     /80 (BP Location: Left arm, Patient Position: Sitting)   Pulse 84   Ht 165.1 cm (65\")   Wt 64.9 kg (143 lb)   SpO2 98%   BMI 23.80 kg/m²     Body mass index is 23.8 kg/m².    Physical Exam     Pulmonary/Chest  Effort normal.     Breast  Right breast mastectomy incision healing, suture over IMF, 5 mm opening inferior nipple with small granuloma noted, minimal serous drng, left breast redness resolved, incisions healed with no openings, drain intact with serous fluid    Result Review :     Assessment and Plan      Diagnoses and all orders for this visit:    1. Postoperative follow-up (Primary)        Plan:   Removed left breast drain. Output was 7.5CC's. Applied bacitracin ointment with gauze. Removed right breast sutures. " Advised by MD LYUBOV Corral that a RR<60 is acceptable. No IV at the moment. MD advised mom to encourage feeding of about 2oz. Advised by MD Corral to monitor and reassess in an hour. After getting race epi and albuterol, pt unimproved. Pt remains tachypenic and is wheezing. MD ELENI Corral informed. Orderd 2nd race epi being administered now. Will continue to monitor. Pt awake and alert. He is tachycardic and is tachypneic with WOB. No wheezing heard, coarse b/l. Mom says pt is feeding less. MD LYUBOV Corral informed and says he will go to assess and come up with plan of care. Will continue to monitor. Silver nitrated superficial area on right breast. Finish clindamycin. Monitor for redness, infection. RTC Thursday with Dr. Shelton.    Scribed by Grace Franco MA, acting as a scribe for CALOS Robbins, 09/23/22 08:35 EDT.  CALOS Robbins's signature on the note affirms that the note adequately documents the care provided.      Follow Up     No follow-ups on file.    Patient was given instructions and counseling regarding her condition. Please see specific information pulled into the AVS if appropriate.     CALOS Robbins  09/23/2022     Pt awake, alert and calm. He continues to feed. He is with fever, ordered tyl given. Will have patient defervesce and see if pt's HR and labored breathing improves. Safety maintained. Will continue to monitor. BRSS 4

## 2022-09-23 ENCOUNTER — OFFICE VISIT (OUTPATIENT)
Dept: INTERNAL MEDICINE | Facility: CLINIC | Age: 62
End: 2022-09-23

## 2022-09-23 ENCOUNTER — OFFICE VISIT (OUTPATIENT)
Dept: PLASTIC SURGERY | Facility: CLINIC | Age: 62
End: 2022-09-23

## 2022-09-23 VITALS
WEIGHT: 174 LBS | BODY MASS INDEX: 28.99 KG/M2 | OXYGEN SATURATION: 96 % | HEIGHT: 65 IN | SYSTOLIC BLOOD PRESSURE: 138 MMHG | TEMPERATURE: 97.2 F | DIASTOLIC BLOOD PRESSURE: 80 MMHG | HEART RATE: 80 BPM

## 2022-09-23 VITALS
DIASTOLIC BLOOD PRESSURE: 80 MMHG | SYSTOLIC BLOOD PRESSURE: 136 MMHG | BODY MASS INDEX: 23.82 KG/M2 | HEART RATE: 84 BPM | HEIGHT: 65 IN | OXYGEN SATURATION: 98 % | WEIGHT: 143 LBS

## 2022-09-23 DIAGNOSIS — Z09 POSTOPERATIVE FOLLOW-UP: Primary | ICD-10-CM

## 2022-09-23 DIAGNOSIS — Z95.828 PORT-A-CATH IN PLACE: ICD-10-CM

## 2022-09-23 DIAGNOSIS — C50.811 MALIGNANT NEOPLASM OF OVERLAPPING SITES OF RIGHT FEMALE BREAST, UNSPECIFIED ESTROGEN RECEPTOR STATUS: ICD-10-CM

## 2022-09-23 DIAGNOSIS — C50.811 MALIGNANT NEOPLASM OF OVERLAPPING SITES OF RIGHT BREAST IN FEMALE, ESTROGEN RECEPTOR POSITIVE: ICD-10-CM

## 2022-09-23 DIAGNOSIS — G45.9 TIA (TRANSIENT ISCHEMIC ATTACK): ICD-10-CM

## 2022-09-23 DIAGNOSIS — N64.89 RECURRENT SEROMA OF BREAST: Primary | ICD-10-CM

## 2022-09-23 DIAGNOSIS — N61.0 CELLULITIS OF LEFT BREAST: ICD-10-CM

## 2022-09-23 DIAGNOSIS — E78.2 MIXED HYPERLIPIDEMIA: ICD-10-CM

## 2022-09-23 DIAGNOSIS — Z17.0 MALIGNANT NEOPLASM OF OVERLAPPING SITES OF RIGHT BREAST IN FEMALE, ESTROGEN RECEPTOR POSITIVE: ICD-10-CM

## 2022-09-23 DIAGNOSIS — A41.9 SEPSIS, DUE TO UNSPECIFIED ORGANISM, UNSPECIFIED WHETHER ACUTE ORGAN DYSFUNCTION PRESENT: ICD-10-CM

## 2022-09-23 DIAGNOSIS — N61.1 BREAST ABSCESS: ICD-10-CM

## 2022-09-23 PROCEDURE — 99024 POSTOP FOLLOW-UP VISIT: CPT | Performed by: NURSE PRACTITIONER

## 2022-09-23 PROCEDURE — 99214 OFFICE O/P EST MOD 30 MIN: CPT | Performed by: INTERNAL MEDICINE

## 2022-09-23 RX ORDER — DOXYCYCLINE HYCLATE 100 MG/1
100 CAPSULE ORAL 2 TIMES DAILY
Qty: 60 CAPSULE | Refills: 2 | Status: SHIPPED | OUTPATIENT
Start: 2022-09-23 | End: 2022-10-21

## 2022-09-23 NOTE — PROGRESS NOTES
"Chief Complaint/ HPI: --Was recently hospitalized, here for transitional care visit had possible sepsis and infection in the left breast this time which had always been not an issue in previous infections or admissions to the hospital, prior to that, it was staph, aureus, sensitive to most antibiotics, not MRSA, it was also cultured on September 9, 2022 with the same organism, she was in the hospital September 13 through September 16, 2022, with leukocytosis fever tachycardia, left breast abscess cellulitis, status postdrainage placement, had the drain removed September 23, 2022, she had some hypophosphatemia in the hospital mild renal insufficiency,----- she was treated with vancomycin in the hospital but prior to that she had been on clindamycin, and was sent home on clindamycin for finishing course of antibiotics,--finishes around September 25, 2022,      Objective   Vital Signs  Vitals:    09/23/22 1017   BP: 138/80   Pulse: 80   Temp: 97.2 °F (36.2 °C)   SpO2: 96%   Weight: 78.9 kg (174 lb)   Height: 165.1 cm (65\")      Body mass index is 28.96 kg/m².  Review of Systems   Constitutional: Positive for fever.      Physical Exam lungs are clear posterior, cardiac exam regular rhythm without appreciable murmur gallop or rub lower extremities no edema no petechiae on the tops of the feet of the fingers or hands, neck is supple without adenopathy,  Result Review :   Lab Results   Component Value Date    PROBNP 1,085.0 (H) 03/14/2022     CMP    CMP 9/14/22 9/15/22 9/16/22   Glucose 123 (A) 98 107 (A)   BUN 10 6 (A) 7 (A)   Creatinine 0.63 0.59 0.63   Sodium 138 139 140   Potassium 3.8 3.4 (A) 3.9   Chloride 104 105 103   Calcium 8.3 (A) 8.7 8.7   (A) Abnormal value            CBC w/diff    CBC w/Diff 9/14/22 9/15/22 9/16/22   WBC 17.69 (A) 12.34 (A) 7.90   RBC 3.79 3.41 (A) 3.49 (A)   Hemoglobin 11.8 (A) 10.7 (A) 11.1 (A)   Hematocrit 34.6 31.3 (A) 32.0 (A)   MCV 91.3 91.8 91.7   MCH 31.1 31.4 31.8   MCHC 34.1 34.2 " 34.7   RDW 13.7 13.5 13.5   Platelets 186 166 191   Neutrophil Rel % 79.5 (A) 75.2 67.5   Immature Granulocyte Rel % 0.5 0.4 0.3   Lymphocyte Rel % 8.6 (A) 13.9 (A) 20.5   Monocyte Rel % 11.1 9.3 8.9   Eosinophil Rel % 0.1 (A) 0.9 2.4   Basophil Rel % 0.2 0.3 0.4   (A) Abnormal value             Lipid Panel    Lipid Panel 8/1/22   Total Cholesterol 221 (A)   Triglycerides 245 (A)   HDL Cholesterol 61 (A)   VLDL Cholesterol 42 (A)   LDL Cholesterol  118 (A)   LDL/HDL Ratio 1.82   (A) Abnormal value             Lab Results   Component Value Date    TSH 0.651 08/01/2022    TSH 1.400 03/11/2022    TSH 1.200 02/23/2021      Lab Results   Component Value Date    FREET4 1.08 08/01/2022      A1C Last 3 Results    HGBA1C Last 3 Results 3/11/22 8/1/22   Hemoglobin A1C 6.30 (A) 5.80 (A)   (A) Abnormal value                              Visit Diagnoses:    ICD-10-CM ICD-9-CM   1. Recurrent seroma of breast  N64.89 998.13   2. Malignant neoplasm of overlapping sites of right female breast, unspecified estrogen receptor status (HCC)  C50.811 174.8   3. Cellulitis of left breast  N61.0 611.0   4. TIA (transient ischemic attack)  G45.9 435.9   5. Sepsis, due to unspecified organism, unspecified whether acute organ dysfunction present (HCC)  A41.9 038.9     995.91   6. Breast abscess  N61.1 611.0   7. Mixed hyperlipidemia  E78.2 272.2   8. Port-A-Cath placement  Z95.828 V45.89   9. Malignant neoplasm of overlapping sites of right breast in female, estrogen receptor positive (HCC)  C50.811 174.8    Z17.0 V86.0       Assessment and Plan   Diagnoses and all orders for this visit:    1. Recurrent seroma of breast (Primary)  -     Adult Transthoracic Echo Complete W/ Cont if Necessary Per Protocol; Future  -     CBC & Differential; Future  -     Comprehensive Metabolic Panel; Future  -     Sedimentation Rate; Future  -     C-reactive Protein; Future  -     Ambulatory Referral to Infectious Disease    2. Malignant neoplasm of overlapping  sites of right female breast, unspecified estrogen receptor status (HCC)  -     Adult Transthoracic Echo Complete W/ Cont if Necessary Per Protocol; Future  -     CBC & Differential; Future  -     Comprehensive Metabolic Panel; Future  -     Sedimentation Rate; Future  -     C-reactive Protein; Future  -     Ambulatory Referral to Infectious Disease    3. Cellulitis of left breast  -     Adult Transthoracic Echo Complete W/ Cont if Necessary Per Protocol; Future  -     CBC & Differential; Future  -     Comprehensive Metabolic Panel; Future  -     Sedimentation Rate; Future  -     C-reactive Protein; Future  -     Ambulatory Referral to Infectious Disease    4. TIA (transient ischemic attack)  -     Adult Transthoracic Echo Complete W/ Cont if Necessary Per Protocol; Future  -     CBC & Differential; Future  -     Comprehensive Metabolic Panel; Future  -     Sedimentation Rate; Future  -     C-reactive Protein; Future  -     Ambulatory Referral to Infectious Disease    5. Sepsis, due to unspecified organism, unspecified whether acute organ dysfunction present (HCC)  -     Adult Transthoracic Echo Complete W/ Cont if Necessary Per Protocol; Future  -     CBC & Differential; Future  -     Comprehensive Metabolic Panel; Future  -     Sedimentation Rate; Future  -     C-reactive Protein; Future  -     Ambulatory Referral to Infectious Disease    6. Breast abscess  -     Adult Transthoracic Echo Complete W/ Cont if Necessary Per Protocol; Future  -     CBC & Differential; Future  -     Comprehensive Metabolic Panel; Future  -     Sedimentation Rate; Future  -     C-reactive Protein; Future  -     Ambulatory Referral to Infectious Disease    7. Mixed hyperlipidemia  -     Adult Transthoracic Echo Complete W/ Cont if Necessary Per Protocol; Future  -     CBC & Differential; Future  -     Comprehensive Metabolic Panel; Future  -     Sedimentation Rate; Future  -     C-reactive Protein; Future  -     Ambulatory Referral to  Infectious Disease    8. Port-A-Cath placement  -     Adult Transthoracic Echo Complete W/ Cont if Necessary Per Protocol; Future  -     CBC & Differential; Future  -     Comprehensive Metabolic Panel; Future  -     Sedimentation Rate; Future  -     C-reactive Protein; Future  -     Ambulatory Referral to Infectious Disease    9. Malignant neoplasm of overlapping sites of right breast in female, estrogen receptor positive (HCC)  -     Adult Transthoracic Echo Complete W/ Cont if Necessary Per Protocol; Future  -     CBC & Differential; Future  -     Comprehensive Metabolic Panel; Future  -     Sedimentation Rate; Future  -     C-reactive Protein; Future  -     Ambulatory Referral to Infectious Disease    Other orders  -     doxycycline (VIBRAMYCIN) 100 MG capsule; Take 1 capsule by mouth 2 (Two) Times a Day.  Dispense: 60 capsule; Refill: 2        Patient is losing some weight since she had her chemotherapy,     preventive measures, wears her seatbelt, does not smoke, occasional social drink, stays very active, walks steps all day long,    Sepsis, multifactorial to include urinary tract infection and now cellulitis of the right breast and expander implant area, status post surgical debridement removal of pus// fluid, drain placed last night March 11, 2022------- patient's improved clinically,   white count is improving my back to normal March 13,, IV vancomycin added day #4 IV cefepime continues day #5--------------Jada did the surgery Dr. Sandhu, March 11,  antibiotics, we will stop IV fluids March 13,      anemia -- march 2022---most likely due to rehydration, bone marrow suppression, chemo, sepsis, may need blood transfusion,, , blood counts improved, on March 14 we will go ahead and send home without any blood transfusions,, resolved as of August 1, 2022 hemoglobin 13.5 hematocrit 39.7,    Mixed hyperlipidemia good HDL cholesterol no treatment,     Invasive ductal carcinoma right breast previous bilateral  mastectomies with multifocal areas of invasive ductal carcinoma largest area 2.3 cm with positive lymph nodes 4 out of 10 ER and NE positive HER-2 negative,, currently undergoing chemotherapy, finished, April 2022,, still awaiting radiation therapy due to previous infection abscess cellulitis and now seroma treatment, as of August 11, 2022, --- continues and started anastrozole, Arimidex, July 2022 Dr. Mendez,     [facility], appendectomy, Port-A-Cath placement,     ifg, hemoglobin A1c 5.8, August 1, 2022,    Vitamin D deficiency, cont vitamin D 50,000 units weekly,     TIA 2010, MRI/ mrA underwent a Star closure procedure with clipping     Osteoarthritis     Chronic venous insufficiency changes,,    Follow Up   Return in about 4 weeks (around 10/21/2022).  Patient was given instructions and counseling regarding her condition or for health maintenance advice. Please see specific information pulled into the AVS if appropriate.

## 2022-09-28 ENCOUNTER — OFFICE VISIT (OUTPATIENT)
Dept: INFECTIOUS DISEASES | Facility: CLINIC | Age: 62
End: 2022-09-28

## 2022-09-28 VITALS
HEART RATE: 92 BPM | HEIGHT: 65 IN | TEMPERATURE: 97.1 F | BODY MASS INDEX: 29.29 KG/M2 | DIASTOLIC BLOOD PRESSURE: 77 MMHG | RESPIRATION RATE: 16 BRPM | SYSTOLIC BLOOD PRESSURE: 116 MMHG | WEIGHT: 175.8 LBS

## 2022-09-28 DIAGNOSIS — Z90.13 S/P BILATERAL MASTECTOMY: ICD-10-CM

## 2022-09-28 DIAGNOSIS — C50.811 MALIGNANT NEOPLASM OF OVERLAPPING SITES OF RIGHT BREAST IN FEMALE, ESTROGEN RECEPTOR POSITIVE: ICD-10-CM

## 2022-09-28 DIAGNOSIS — Z17.0 MALIGNANT NEOPLASM OF OVERLAPPING SITES OF RIGHT BREAST IN FEMALE, ESTROGEN RECEPTOR POSITIVE: ICD-10-CM

## 2022-09-28 DIAGNOSIS — N61.1 BREAST ABSCESS: ICD-10-CM

## 2022-09-28 DIAGNOSIS — T85.79XA INFECTION OF BREAST IMPLANT, INITIAL ENCOUNTER: Primary | ICD-10-CM

## 2022-09-28 DIAGNOSIS — Z95.828 PORT-A-CATH IN PLACE: ICD-10-CM

## 2022-09-28 DIAGNOSIS — N61.0 CELLULITIS OF LEFT BREAST: ICD-10-CM

## 2022-09-28 PROCEDURE — 99215 OFFICE O/P EST HI 40 MIN: CPT | Performed by: STUDENT IN AN ORGANIZED HEALTH CARE EDUCATION/TRAINING PROGRAM

## 2022-09-28 NOTE — PROGRESS NOTES
Chief Complaint  new patient    Roger Portillo presents to [facility]  History of Present Illness    Patient is a 62-year-old female with past medical history of invasive ductal carcinoma of the right breast status post bilateral mastectomy 12/28/2021 with subsequent reconstruction that required gel implants with mesh placement.  Unfortunately patient developed subsequent signs of infection of the right breast that required I&D of an abscess of the right breast with removal of expander on 3/11/2022.  Measures retained at that time and new implant was placed.  Cultures at that time grew MSSA.  She had a drain retained after that time.  She was placed on 7 days of Keflex and 10 days of doxycycline after this procedure.    She was seen again on 4/15/2022 with removal of the implant and mesh at that time.  It appears she was placed on Bactrim on and off around this time for about 1 month.    Over the next following months she had on and off bouts of cellulitis but nothing overly concerning with further healing of the right breast surgical wound.  It has continued to heal until she has had somewhat of a roadblock to last couple months where she has had continued minor drainage from the inferior wound site.    She then subsequently took another round of clindamycin on 9/9 for worsening drainage that grew MSSA from the right breast.  After completing this 3 days of Keflex she developed worsening pink changes to the left breast which she thought was related to her bra.  She completed a course of Keflex but on 9/15 developed fever and was seen by plastics in the office with subsequent admission to the hospital.  She was found to have left breast cellulitis and started on IV vancomycin in-house.  She had aspiration of a fluid collection around the left breast and subsequent drain placement that grew MSSA.  She was discharged on p.o. clindamycin to complete a course  "after this.  Later the drain was removed with improvement overall in her symptoms.    Today she reports the redness has improved significantly of the left breast and the drain site has healed well.  She continues to have scant drainage on the right inferior breast suture line.  She denies any fevers or chills.  She has been placed on doxycycline recently by her PCP since 9/23.    Objective   Vital Signs:  /77 (BP Location: Left arm, Patient Position: Sitting, Cuff Size: Adult)   Pulse 92   Temp 97.1 °F (36.2 °C)   Resp 16   Ht 165.1 cm (65\")   Wt 79.7 kg (175 lb 12.8 oz)   BMI 29.25 kg/m²   Estimated body mass index is 29.25 kg/m² as calculated from the following:    Height as of this encounter: 165.1 cm (65\").    Weight as of this encounter: 79.7 kg (175 lb 12.8 oz).    Physical Exam  Constitutional:       General: She is not in acute distress.     Appearance: Normal appearance. She is normal weight. She is not ill-appearing.   HENT:      Head: Normocephalic and atraumatic.      Nose: Nose normal. No rhinorrhea.      Mouth/Throat:      Mouth: Mucous membranes are moist.      Pharynx: No oropharyngeal exudate.   Eyes:      General: No scleral icterus.     Extraocular Movements: Extraocular movements intact.      Pupils: Pupils are equal, round, and reactive to light.   Cardiovascular:      Rate and Rhythm: Normal rate and regular rhythm.      Pulses: Normal pulses.      Heart sounds: Normal heart sounds. No murmur heard.  Pulmonary:      Effort: Pulmonary effort is normal.      Breath sounds: Normal breath sounds. No wheezing, rhonchi or rales.   Chest:      Chest wall: Deformity present.   Breasts:      Right: Skin change present. No tenderness.      Left: No tenderness.        Comments: Left breast with prosthesis in place and only minor erythema around the inferior lateral aspect of the left breast.  Right breast status post removal of the implant with inferior surgical site showing scant purulent " drainage.  Abdominal:      General: Abdomen is flat. Bowel sounds are normal.      Palpations: Abdomen is soft.      Tenderness: There is no abdominal tenderness. There is no guarding or rebound.   Musculoskeletal:         General: No swelling or tenderness. Normal range of motion.      Cervical back: Normal range of motion and neck supple. No tenderness.      Right lower leg: No edema.      Left lower leg: No edema.   Skin:     General: Skin is warm and dry.      Findings: No rash.   Neurological:      General: No focal deficit present.      Mental Status: She is alert and oriented to person, place, and time.   Psychiatric:         Mood and Affect: Mood normal.         Behavior: Behavior normal.        Result Review :  The following data was reviewed by: Petr Case DO on 09/28/2022:  Common labs    Common Labs 9/14/22 9/14/22 9/15/22 9/15/22 9/16/22 9/16/22    0514 0514 0540 0540 [id number]   Glucose  123 (A)  98  107 (A)   BUN  10  6 (A)  7 (A)   Creatinine  0.63  0.59  0.63   Sodium  138  139  140   Potassium  3.8  3.4 (A)  3.9   Chloride  104  105  103   Calcium  8.3 (A)  8.7  8.7   WBC 17.69 (A)  12.34 (A)  7.90    Hemoglobin 11.8 (A)  10.7 (A)  11.1 (A)    Hematocrit 34.6  31.3 (A)  32.0 (A)    Platelets 186  166  191    (A) Abnormal value            Data reviewed: Consultant notes From plastic surgery and PCP and Recent hospitalization notes For multiple breast procedures and operative notes       Microbiology reviewed from wound cultures incorporating the left and right breast over the last year.  All with grown MSSA.     Assessment and Plan   Diagnoses and all orders for this visit:    1. Infection of breast implant, initial encounter (HCC) (Primary)    2. Malignant neoplasm of overlapping sites of right breast in female, estrogen receptor positive (HCC)    3. S/P bilateral nipple sparing mastectomy    4. Cellulitis of left breast    5. Breast abscess    6. Port-A-Cath placement    Detailed  discussion today undertaken with the patient and her .  After reviewing her operative history and infection history, I suspect the left breast implant to be involved with ongoing MSSA infection.  She has had episodes of cellulitis however most recently fluid close to the implant was aspirated with MSSA  which is quite concerning for infection of the prosthesis.  Ultimately I think she would be best served by removal of the implant to clear the infection with subsequent antibiotic therapy.    We did discuss other possibilities with antibiotic therapy that could include retention of the prosthesis however I think this would require indefinite antibiotics and the risks would likely outweigh the benefits.    Ultimately she has a desire to get to her radiation therapy as promptly as possible and this has been put off largely due to infectious complications.  In order to achieve the best control of her underlying infections of the bilateral breasts, I think removal of the left prosthesis with IV antibiotic therapy thereafter for 2 to 4 weeks would likely provide the most effective treatment to get her to her radiation.    Patient has follow-up with Dr. Sandhu tomorrow and I will plan to send her my note from today and will plan to follow-up their discussion tomorrow to further come up with an antibiotic plan.  For now I counseled the patient on continuing on doxycycline 100 mg twice daily until we can arrive at a consensus plan.       I spent 71 minutes caring for Marisa on this date of service. This time includes time spent by me in the following activities:preparing for the visit, reviewing tests, obtaining and/or reviewing a separately obtained history, performing a medically appropriate examination and/or evaluation , counseling and educating the patient/family/caregiver, ordering medications, tests, or procedures, referring and communicating with other health care professionals , documenting information in  the medical record, independently interpreting results and communicating that information with the patient/family/caregiver and care coordination  Follow Up   No follow-ups on file.  Patient was given instructions and counseling regarding her condition or for health maintenance advice. Please see specific information pulled into the AVS if appropriate.

## 2022-09-29 ENCOUNTER — PREP FOR SURGERY (OUTPATIENT)
Dept: OTHER | Facility: HOSPITAL | Age: 62
End: 2022-09-29

## 2022-09-29 ENCOUNTER — TELEPHONE (OUTPATIENT)
Dept: PLASTIC SURGERY | Facility: CLINIC | Age: 62
End: 2022-09-29

## 2022-09-29 ENCOUNTER — OFFICE VISIT (OUTPATIENT)
Dept: PLASTIC SURGERY | Facility: CLINIC | Age: 62
End: 2022-09-29

## 2022-09-29 ENCOUNTER — PATIENT ROUNDING (BHMG ONLY) (OUTPATIENT)
Dept: INFECTIOUS DISEASES | Facility: CLINIC | Age: 62
End: 2022-09-29

## 2022-09-29 VITALS
OXYGEN SATURATION: 97 % | DIASTOLIC BLOOD PRESSURE: 83 MMHG | SYSTOLIC BLOOD PRESSURE: 152 MMHG | HEART RATE: 66 BPM | HEIGHT: 65 IN | TEMPERATURE: 97.8 F | BODY MASS INDEX: 29.16 KG/M2 | WEIGHT: 175 LBS

## 2022-09-29 DIAGNOSIS — N61.1 BREAST ABSCESS: Primary | ICD-10-CM

## 2022-09-29 DIAGNOSIS — N61.1 BREAST ABSCESS: ICD-10-CM

## 2022-09-29 DIAGNOSIS — N61.0 CELLULITIS OF LEFT BREAST: Primary | ICD-10-CM

## 2022-09-29 DIAGNOSIS — N61.0 CELLULITIS OF BREAST: Primary | ICD-10-CM

## 2022-09-29 PROCEDURE — 99024 POSTOP FOLLOW-UP VISIT: CPT | Performed by: SURGERY

## 2022-09-29 RX ORDER — CEFAZOLIN SODIUM 2 G/100ML
2 INJECTION, SOLUTION INTRAVENOUS ONCE
Status: CANCELLED | OUTPATIENT
Start: 2022-09-29 | End: 2022-09-29

## 2022-09-29 NOTE — TELEPHONE ENCOUNTER
Patient aware surgery scheduled for 10/04/22, hospital will call the day before with arrival time.

## 2022-09-29 NOTE — PROGRESS NOTES
September 29, 2022           We're always looking for ways to make our patients' experiences even better. Do you have recommendations on ways we may improve?  no    Overall were you satisfied with your first visit to our practice? yes

## 2022-10-03 ENCOUNTER — TELEPHONE (OUTPATIENT)
Dept: ONCOLOGY | Facility: HOSPITAL | Age: 62
End: 2022-10-03

## 2022-10-03 NOTE — TELEPHONE ENCOUNTER
Patient is having surgery with Dr. Case and will be on IV antibiotics for 14 days. She needs an appointment on 10/7 to change out her port needle and have labs drawn. We will schedule this appointment.

## 2022-10-03 NOTE — TELEPHONE ENCOUNTER
Caller: Marisa Portillo    Relationship: Self    Best call back number: [phone]      What was the call regarding: PATIENT WANTED TO SPEAK TO THE NURSE REGARDING IV ONCOLOGY     Do you require a callback: YES

## 2022-10-04 ENCOUNTER — HOSPITAL ENCOUNTER (OUTPATIENT)
Facility: HOSPITAL | Age: 62
Setting detail: HOSPITAL OUTPATIENT SURGERY
Discharge: HOME | End: 2022-10-04
Attending: SURGERY | Admitting: SURGERY

## 2022-10-04 ENCOUNTER — ANESTHESIA (OUTPATIENT)
Dept: PERIOP | Facility: HOSPITAL | Age: 62
End: 2022-10-04

## 2022-10-04 ENCOUNTER — ANESTHESIA EVENT (OUTPATIENT)
Dept: PERIOP | Facility: HOSPITAL | Age: 62
End: 2022-10-04

## 2022-10-04 VITALS
RESPIRATION RATE: 16 BRPM | DIASTOLIC BLOOD PRESSURE: 76 MMHG | WEIGHT: 173.06 LBS | OXYGEN SATURATION: 93 % | TEMPERATURE: 97.6 F | BODY MASS INDEX: 29.55 KG/M2 | SYSTOLIC BLOOD PRESSURE: 142 MMHG | HEIGHT: 64 IN | HEART RATE: 79 BPM

## 2022-10-04 DIAGNOSIS — N61.1 BREAST ABSCESS: ICD-10-CM

## 2022-10-04 PROCEDURE — 25010000002 MIDAZOLAM PER 1 MG: Performed by: ANESTHESIOLOGY

## 2022-10-04 PROCEDURE — 25010000002 ONDANSETRON PER 1 MG: Performed by: NURSE ANESTHETIST, CERTIFIED REGISTERED

## 2022-10-04 PROCEDURE — 25010000002 FENTANYL CITRATE (PF) 50 MCG/ML SOLUTION: Performed by: NURSE ANESTHETIST, CERTIFIED REGISTERED

## 2022-10-04 PROCEDURE — 25010000002 GENTAMICIN PER 80 MG: Performed by: SURGERY

## 2022-10-04 PROCEDURE — 19328 RMVL INTACT BREAST IMPLANT: CPT | Performed by: SURGERY

## 2022-10-04 PROCEDURE — 88302 TISSUE EXAM BY PATHOLOGIST: CPT | Performed by: SURGERY

## 2022-10-04 PROCEDURE — 25010000002 HYDROMORPHONE 1 MG/ML SOLUTION: Performed by: NURSE ANESTHETIST, CERTIFIED REGISTERED

## 2022-10-04 PROCEDURE — 25010000002 PROPOFOL 10 MG/ML EMULSION: Performed by: NURSE ANESTHETIST, CERTIFIED REGISTERED

## 2022-10-04 PROCEDURE — 25010000002 CEFAZOLIN PER 500 MG: Performed by: SURGERY

## 2022-10-04 PROCEDURE — 25010000002 HEPARIN LOCK FLUSH PER 10 UNITS: Performed by: ANESTHESIOLOGY

## 2022-10-04 PROCEDURE — 19370 REVJ PERI-IMPLT CAPSULE BRST: CPT | Performed by: SURGERY

## 2022-10-04 PROCEDURE — 25010000002 DEXAMETHASONE PER 1 MG: Performed by: NURSE ANESTHETIST, CERTIFIED REGISTERED

## 2022-10-04 PROCEDURE — 25010000002 CEFAZOLIN IN DEXTROSE 2-4 GM/100ML-% SOLUTION: Performed by: SURGERY

## 2022-10-04 RX ORDER — CEFAZOLIN SODIUM 2 G/100ML
2 INJECTION, SOLUTION INTRAVENOUS ONCE
Status: COMPLETED | OUTPATIENT
Start: 2022-10-04 | End: 2022-10-04

## 2022-10-04 RX ORDER — SODIUM CHLORIDE 0.9 % (FLUSH) 0.9 %
10 SYRINGE (ML) INJECTION AS NEEDED
Status: DISCONTINUED | OUTPATIENT
Start: 2022-10-04 | End: 2022-10-05 | Stop reason: HOSPADM

## 2022-10-04 RX ORDER — OXYCODONE HYDROCHLORIDE AND ACETAMINOPHEN 5; 325 MG/1; MG/1
1-2 TABLET ORAL EVERY 6 HOURS PRN
Qty: 28 TABLET | Refills: 0 | Status: SHIPPED | OUTPATIENT
Start: 2022-10-04 | End: 2022-10-21

## 2022-10-04 RX ORDER — ONDANSETRON 2 MG/ML
INJECTION INTRAMUSCULAR; INTRAVENOUS AS NEEDED
Status: DISCONTINUED | OUTPATIENT
Start: 2022-10-04 | End: 2022-10-04 | Stop reason: SURG

## 2022-10-04 RX ORDER — MIDAZOLAM HYDROCHLORIDE 1 MG/ML
2 INJECTION INTRAMUSCULAR; INTRAVENOUS ONCE
Status: COMPLETED | OUTPATIENT
Start: 2022-10-04 | End: 2022-10-04

## 2022-10-04 RX ORDER — MEPERIDINE HYDROCHLORIDE 25 MG/ML
12.5 INJECTION INTRAMUSCULAR; INTRAVENOUS; SUBCUTANEOUS
Status: DISCONTINUED | OUTPATIENT
Start: 2022-10-04 | End: 2022-10-05 | Stop reason: HOSPADM

## 2022-10-04 RX ORDER — HEPARIN SODIUM (PORCINE) LOCK FLUSH IV SOLN 100 UNIT/ML 100 UNIT/ML
5 SOLUTION INTRAVENOUS AS NEEDED
Status: DISCONTINUED | OUTPATIENT
Start: 2022-10-04 | End: 2022-10-05 | Stop reason: HOSPADM

## 2022-10-04 RX ORDER — PROMETHAZINE HYDROCHLORIDE 12.5 MG/1
25 TABLET ORAL ONCE AS NEEDED
Status: DISCONTINUED | OUTPATIENT
Start: 2022-10-04 | End: 2022-10-05 | Stop reason: HOSPADM

## 2022-10-04 RX ORDER — DEXMEDETOMIDINE HYDROCHLORIDE 100 UG/ML
INJECTION, SOLUTION INTRAVENOUS AS NEEDED
Status: DISCONTINUED | OUTPATIENT
Start: 2022-10-04 | End: 2022-10-04 | Stop reason: SURG

## 2022-10-04 RX ORDER — PROPOFOL 10 MG/ML
VIAL (ML) INTRAVENOUS AS NEEDED
Status: DISCONTINUED | OUTPATIENT
Start: 2022-10-04 | End: 2022-10-04 | Stop reason: SURG

## 2022-10-04 RX ORDER — ACETAMINOPHEN 650 MG
TABLET, EXTENDED RELEASE ORAL AS NEEDED
Status: DISCONTINUED | OUTPATIENT
Start: 2022-10-04 | End: 2022-10-04 | Stop reason: HOSPADM

## 2022-10-04 RX ORDER — FENTANYL CITRATE 50 UG/ML
INJECTION, SOLUTION INTRAMUSCULAR; INTRAVENOUS AS NEEDED
Status: DISCONTINUED | OUTPATIENT
Start: 2022-10-04 | End: 2022-10-04 | Stop reason: SURG

## 2022-10-04 RX ORDER — PROMETHAZINE HYDROCHLORIDE 25 MG/1
25 SUPPOSITORY RECTAL ONCE AS NEEDED
Status: DISCONTINUED | OUTPATIENT
Start: 2022-10-04 | End: 2022-10-05 | Stop reason: HOSPADM

## 2022-10-04 RX ORDER — ONDANSETRON 4 MG/1
4 TABLET, FILM COATED ORAL DAILY PRN
Qty: 20 TABLET | Refills: 0 | Status: SHIPPED | OUTPATIENT
Start: 2022-10-04 | End: 2022-12-28 | Stop reason: SDUPTHER

## 2022-10-04 RX ORDER — EPHEDRINE SULFATE 50 MG/ML
INJECTION, SOLUTION INTRAVENOUS AS NEEDED
Status: DISCONTINUED | OUTPATIENT
Start: 2022-10-04 | End: 2022-10-04 | Stop reason: SURG

## 2022-10-04 RX ORDER — OXYCODONE HYDROCHLORIDE 5 MG/1
5 TABLET ORAL
Status: DISCONTINUED | OUTPATIENT
Start: 2022-10-04 | End: 2022-10-05 | Stop reason: HOSPADM

## 2022-10-04 RX ORDER — GENTAMICIN SULFATE 40 MG/ML
INJECTION, SOLUTION INTRAMUSCULAR; INTRAVENOUS AS NEEDED
Status: DISCONTINUED | OUTPATIENT
Start: 2022-10-04 | End: 2022-10-04 | Stop reason: HOSPADM

## 2022-10-04 RX ORDER — ONDANSETRON 2 MG/ML
4 INJECTION INTRAMUSCULAR; INTRAVENOUS ONCE AS NEEDED
Status: DISCONTINUED | OUTPATIENT
Start: 2022-10-04 | End: 2022-10-05 | Stop reason: HOSPADM

## 2022-10-04 RX ORDER — CEFAZOLIN SODIUM 1 G/3ML
INJECTION, POWDER, FOR SOLUTION INTRAMUSCULAR; INTRAVENOUS AS NEEDED
Status: DISCONTINUED | OUTPATIENT
Start: 2022-10-04 | End: 2022-10-04 | Stop reason: HOSPADM

## 2022-10-04 RX ORDER — KETAMINE HCL IN NACL, ISO-OSM 100MG/10ML
SYRINGE (ML) INJECTION AS NEEDED
Status: DISCONTINUED | OUTPATIENT
Start: 2022-10-04 | End: 2022-10-04 | Stop reason: SURG

## 2022-10-04 RX ORDER — PROPOFOL 10 MG/ML
VIAL (ML) INTRAVENOUS CONTINUOUS PRN
Status: DISCONTINUED | OUTPATIENT
Start: 2022-10-04 | End: 2022-10-04 | Stop reason: SURG

## 2022-10-04 RX ORDER — ACETAMINOPHEN 500 MG
1000 TABLET ORAL ONCE
Status: COMPLETED | OUTPATIENT
Start: 2022-10-04 | End: 2022-10-04

## 2022-10-04 RX ORDER — SODIUM CHLORIDE, SODIUM LACTATE, POTASSIUM CHLORIDE, CALCIUM CHLORIDE 600; 310; 30; 20 MG/100ML; MG/100ML; MG/100ML; MG/100ML
9 INJECTION, SOLUTION INTRAVENOUS CONTINUOUS PRN
Status: DISCONTINUED | OUTPATIENT
Start: 2022-10-04 | End: 2022-10-05 | Stop reason: HOSPADM

## 2022-10-04 RX ORDER — DEXAMETHASONE SODIUM PHOSPHATE 4 MG/ML
INJECTION, SOLUTION INTRA-ARTICULAR; INTRALESIONAL; INTRAMUSCULAR; INTRAVENOUS; SOFT TISSUE AS NEEDED
Status: DISCONTINUED | OUTPATIENT
Start: 2022-10-04 | End: 2022-10-04 | Stop reason: SURG

## 2022-10-04 RX ORDER — MAGNESIUM HYDROXIDE 1200 MG/15ML
LIQUID ORAL AS NEEDED
Status: DISCONTINUED | OUTPATIENT
Start: 2022-10-04 | End: 2022-10-04 | Stop reason: HOSPADM

## 2022-10-04 RX ADMIN — MIDAZOLAM HYDROCHLORIDE 2 MG: 1 INJECTION, SOLUTION INTRAMUSCULAR; INTRAVENOUS at 16:57

## 2022-10-04 RX ADMIN — PROPOFOL 150 MG: 10 INJECTION, EMULSION INTRAVENOUS at 17:57

## 2022-10-04 RX ADMIN — SODIUM CHLORIDE, POTASSIUM CHLORIDE, SODIUM LACTATE AND CALCIUM CHLORIDE 9 ML/HR: 600; 310; 30; 20 INJECTION, SOLUTION INTRAVENOUS at 16:26

## 2022-10-04 RX ADMIN — ONDANSETRON 4 MG: 2 INJECTION INTRAMUSCULAR; INTRAVENOUS at 21:30

## 2022-10-04 RX ADMIN — DEXAMETHASONE SODIUM PHOSPHATE 4 MG: 4 INJECTION, SOLUTION INTRA-ARTICULAR; INTRALESIONAL; INTRAMUSCULAR; INTRAVENOUS; SOFT TISSUE at 18:02

## 2022-10-04 RX ADMIN — Medication 200 MCG/KG/MIN: at 18:03

## 2022-10-04 RX ADMIN — FENTANYL CITRATE 25 MCG: 50 INJECTION, SOLUTION INTRAMUSCULAR; INTRAVENOUS at 18:02

## 2022-10-04 RX ADMIN — EPHEDRINE SULFATE 15 MG: 50 INJECTION INTRAVENOUS at 18:24

## 2022-10-04 RX ADMIN — FENTANYL CITRATE 75 MCG: 50 INJECTION, SOLUTION INTRAMUSCULAR; INTRAVENOUS at 18:12

## 2022-10-04 RX ADMIN — Medication 20 MG: at 18:35

## 2022-10-04 RX ADMIN — Medication 10 MG: at 18:26

## 2022-10-04 RX ADMIN — HYDROMORPHONE HYDROCHLORIDE 0.5 MG: 1 INJECTION, SOLUTION INTRAMUSCULAR; INTRAVENOUS; SUBCUTANEOUS at 19:48

## 2022-10-04 RX ADMIN — ACETAMINOPHEN 1000 MG: 500 TABLET, FILM COATED ORAL at 16:33

## 2022-10-04 RX ADMIN — Medication 10 ML: at 21:40

## 2022-10-04 RX ADMIN — HEPARIN 500 UNITS: 100 SYRINGE at 21:40

## 2022-10-04 RX ADMIN — DEXMEDETOMIDINE HYDROCHLORIDE 40 MCG: 100 INJECTION, SOLUTION, CONCENTRATE INTRAVENOUS at 18:02

## 2022-10-04 RX ADMIN — OXYCODONE HYDROCHLORIDE 5 MG: 5 TABLET ORAL at 20:11

## 2022-10-04 RX ADMIN — CEFAZOLIN SODIUM 2 G: 2 INJECTION, SOLUTION INTRAVENOUS at 17:55

## 2022-10-04 RX ADMIN — HYDROMORPHONE HYDROCHLORIDE 0.5 MG: 1 INJECTION, SOLUTION INTRAMUSCULAR; INTRAVENOUS; SUBCUTANEOUS at 20:11

## 2022-10-04 RX ADMIN — Medication 20 MG: at 18:45

## 2022-10-04 RX ADMIN — ONDANSETRON 4 MG: 2 INJECTION INTRAMUSCULAR; INTRAVENOUS at 18:02

## 2022-10-04 NOTE — ANESTHESIA PREPROCEDURE EVALUATION
Anesthesia Evaluation     Patient summary reviewed and Nursing notes reviewed   no history of anesthetic complications:  NPO Solid Status: > 8 hours  NPO Liquid Status: > 2 hours           Airway   Mallampati: II  TM distance: >3 FB  Neck ROM: full  No difficulty expected  Dental      Comment: Front incisors crack    Pulmonary - negative pulmonary ROS and normal exam    breath sounds clear to auscultation  Cardiovascular - negative cardio ROS and normal exam  Exercise tolerance: good (4-7 METS)    Rhythm: regular  Rate: normal        Neuro/Psych- negative ROS  GI/Hepatic/Renal/Endo - negative ROS     Musculoskeletal     Abdominal    Substance History - negative use     OB/GYN negative ob/gyn ROS         Other   arthritis,      ROS/Med Hx Other: PAT Nursing Notes unavailable.                   Anesthesia Plan    ASA 3     general       Anesthetic plan, risks, benefits, and alternatives have been provided, discussed and informed consent has been obtained with: patient and spouse/significant other.        CODE STATUS:

## 2022-10-04 NOTE — PROGRESS NOTES
"Chief Complaint  Post-op Follow-up    Subjective          History of Present Illness  Marisa Portillo is a 62 y.o. female who presents to [facility] for Postoperative Follow-Up of BILATERAL BREAST ABSCESS INCISION AND DRAINAGE, WITH LEFT BREAST IMPLANT REMOVAL 10/04/22.  No tenderness, incisions ae doing well. No other concerns    started her on IV antibiotic therapy thereafter for 2 to 4 weeks at there last office visit on 9/28/22    Drain out-put : 8-10 cc every 8 hrs bilaterally, right is still very bloody.     Allergies: Multihance [gadobenate]  Allergies Reconciled.    Review of Systems   All review of system has been reviewed and it  is negative except the ones note above.     Objective     /84   Pulse 74   Temp 98.9 °F (37.2 °C)   Ht 162.6 cm (64\")   Wt 78.5 kg (173 lb)   SpO2 96%   BMI 29.70 kg/m²     Body mass index is 29.7 kg/m².    Physical Exam   Cardiovascular: Normal rate.     Pulmonary/Chest  Effort normal.        Breast   Incisions healing well, mild expected swelling, no erythema, no drainage; bilateral drains intact, right with red serosanguineous fluid, and left with light pink serosanguineous fluid    Result Review :                Assessment and Plan      Diagnoses and all orders for this visit:    1. Postoperative follow-up (Primary)        Plan:  • Changed dressings around drain sites, continue to monitor output until less than 20 cc x 3 days. RTC one week.        Follow Up     No follow-ups on file.    Patient was given instructions and counseling regarding her condition. Please see specific information pulled into the AVS if appropriate.     Brigitte Chase, APRN  10/11/2022  "

## 2022-10-05 NOTE — ANESTHESIA POSTPROCEDURE EVALUATION
Patient: Marisa Portillo    Procedure Summary     Date: 10/04/22 Room / Location: [facility] OR 03 / [facility] MAIN OR    Anesthesia Start: 1754 Anesthesia Stop: 1926    Procedure: BILATERAL BREAST ABSCESS INCISION AND DRAINAGE, WITH LEFT BREAST IMPLANT REMOVAL (Bilateral Breast) Diagnosis:       Breast abscess      (Breast abscess [N61.1])    Surgeons: Lacy Shelton MD Provider: Richard Amos MD    Anesthesia Type: general ASA Status: 3          Anesthesia Type: general    Vitals  Vitals Value Taken Time   /78 10/04/22 2013   Temp 36.1 °C (97 °F) 10/04/22 1925   Pulse 68 10/04/22 2015   Resp 17 10/04/22 2000   SpO2 98 % 10/04/22 2015   Vitals shown include unvalidated device data.        Post Anesthesia Care and Evaluation    Patient location during evaluation: bedside  Patient participation: complete - patient participated  Level of consciousness: awake  Pain management: adequate    Airway patency: patent  Anesthetic complications: No anesthetic complications  PONV Status: none  Cardiovascular status: acceptable and stable  Respiratory status: acceptable  Hydration status: acceptable    Comments: An Anesthesiologist personally participated in the most demanding procedures (including induction and emergence if applicable) in the anesthesia plan, monitored the course of anesthesia administration at frequent intervals and remained physically present and available for immediate diagnosis and treatment of emergencies.

## 2022-10-06 ENCOUNTER — APPOINTMENT (OUTPATIENT)
Dept: RADIATION ONCOLOGY | Facility: HOSPITAL | Age: 62
End: 2022-10-06

## 2022-10-06 LAB
CYTO UR: NORMAL
LAB AP CASE REPORT: NORMAL
LAB AP CLINICAL INFORMATION: NORMAL
PATH REPORT.FINAL DX SPEC: NORMAL
PATH REPORT.GROSS SPEC: NORMAL

## 2022-10-11 ENCOUNTER — OFFICE VISIT (OUTPATIENT)
Dept: PLASTIC SURGERY | Facility: CLINIC | Age: 62
End: 2022-10-11

## 2022-10-11 ENCOUNTER — HOSPITAL ENCOUNTER (OUTPATIENT)
Dept: ONCOLOGY | Facility: HOSPITAL | Age: 62
Discharge: HOME | End: 2022-10-11
Admitting: INTERNAL MEDICINE

## 2022-10-11 VITALS
OXYGEN SATURATION: 96 % | WEIGHT: 173 LBS | HEART RATE: 74 BPM | SYSTOLIC BLOOD PRESSURE: 149 MMHG | DIASTOLIC BLOOD PRESSURE: 84 MMHG | BODY MASS INDEX: 29.53 KG/M2 | HEIGHT: 64 IN | TEMPERATURE: 98.9 F

## 2022-10-11 DIAGNOSIS — C50.811 MALIGNANT NEOPLASM OF OVERLAPPING SITES OF RIGHT BREAST IN FEMALE, ESTROGEN RECEPTOR POSITIVE: Primary | ICD-10-CM

## 2022-10-11 DIAGNOSIS — Z17.0 MALIGNANT NEOPLASM OF OVERLAPPING SITES OF RIGHT BREAST IN FEMALE, ESTROGEN RECEPTOR POSITIVE: Primary | ICD-10-CM

## 2022-10-11 DIAGNOSIS — E78.2 MIXED HYPERLIPIDEMIA: ICD-10-CM

## 2022-10-11 DIAGNOSIS — N61.0 CELLULITIS OF LEFT BREAST: ICD-10-CM

## 2022-10-11 DIAGNOSIS — A41.9 SEPSIS, DUE TO UNSPECIFIED ORGANISM, UNSPECIFIED WHETHER ACUTE ORGAN DYSFUNCTION PRESENT: ICD-10-CM

## 2022-10-11 DIAGNOSIS — Z95.828 PORT-A-CATH IN PLACE: ICD-10-CM

## 2022-10-11 DIAGNOSIS — N64.89 RECURRENT SEROMA OF BREAST: ICD-10-CM

## 2022-10-11 DIAGNOSIS — Z09 POSTOPERATIVE FOLLOW-UP: Primary | ICD-10-CM

## 2022-10-11 DIAGNOSIS — G45.9 TIA (TRANSIENT ISCHEMIC ATTACK): ICD-10-CM

## 2022-10-11 DIAGNOSIS — C50.811 MALIGNANT NEOPLASM OF OVERLAPPING SITES OF RIGHT FEMALE BREAST, UNSPECIFIED ESTROGEN RECEPTOR STATUS: ICD-10-CM

## 2022-10-11 DIAGNOSIS — N61.1 BREAST ABSCESS: ICD-10-CM

## 2022-10-11 LAB
ALBUMIN SERPL-MCNC: 4.2 G/DL (ref 3.5–5.2)
ALBUMIN/GLOB SERPL: 1.6 G/DL
ALP SERPL-CCNC: 60 U/L (ref 39–117)
ALT SERPL W P-5'-P-CCNC: 9 U/L (ref 1–33)
ANION GAP SERPL CALCULATED.3IONS-SCNC: 9.1 MMOL/L (ref 5–15)
AST SERPL-CCNC: 19 U/L (ref 1–32)
BASOPHILS # BLD AUTO: 0.03 10*3/MM3 (ref 0–0.2)
BASOPHILS NFR BLD AUTO: 0.6 % (ref 0–1.5)
BILIRUB SERPL-MCNC: 0.4 MG/DL (ref 0–1.2)
BUN SERPL-MCNC: 12 MG/DL (ref 8–23)
BUN/CREAT SERPL: 17.6 (ref 7–25)
CALCIUM SPEC-SCNC: 9.4 MG/DL (ref 8.6–10.5)
CHLORIDE SERPL-SCNC: 105 MMOL/L (ref 98–107)
CO2 SERPL-SCNC: 27.9 MMOL/L (ref 22–29)
CREAT SERPL-MCNC: 0.68 MG/DL (ref 0.57–1)
CRP SERPL-MCNC: <0.3 MG/DL (ref 0–0.5)
DEPRECATED RDW RBC AUTO: 47 FL (ref 37–54)
EGFRCR SERPLBLD CKD-EPI 2021: 98 ML/MIN/1.73
EOSINOPHIL # BLD AUTO: 0.17 10*3/MM3 (ref 0–0.4)
EOSINOPHIL NFR BLD AUTO: 3.6 % (ref 0.3–6.2)
ERYTHROCYTE [DISTWIDTH] IN BLOOD BY AUTOMATED COUNT: 13.7 % (ref 12.3–15.4)
ERYTHROCYTE [SEDIMENTATION RATE] IN BLOOD: 2 MM/HR (ref 0–30)
GLOBULIN UR ELPH-MCNC: 2.7 GM/DL
GLUCOSE SERPL-MCNC: 108 MG/DL (ref 65–99)
HCT VFR BLD AUTO: 37 % (ref 34–46.6)
HGB BLD-MCNC: 12.5 G/DL (ref 12–15.9)
IMM GRANULOCYTES # BLD AUTO: 0.01 10*3/MM3 (ref 0–0.05)
IMM GRANULOCYTES NFR BLD AUTO: 0.2 % (ref 0–0.5)
LYMPHOCYTES # BLD AUTO: 1.31 10*3/MM3 (ref 0.7–3.1)
LYMPHOCYTES NFR BLD AUTO: 27.8 % (ref 19.6–45.3)
MCH RBC QN AUTO: 31.9 PG (ref 26.6–33)
MCHC RBC AUTO-ENTMCNC: 33.8 G/DL (ref 31.5–35.7)
MCV RBC AUTO: 94.4 FL (ref 79–97)
MONOCYTES # BLD AUTO: 0.41 10*3/MM3 (ref 0.1–0.9)
MONOCYTES NFR BLD AUTO: 8.7 % (ref 5–12)
NEUTROPHILS NFR BLD AUTO: 2.78 10*3/MM3 (ref 1.7–7)
NEUTROPHILS NFR BLD AUTO: 59.1 % (ref 42.7–76)
NRBC BLD AUTO-RTO: 0 /100 WBC (ref 0–0.2)
PLATELET # BLD AUTO: 201 10*3/MM3 (ref 140–450)
PMV BLD AUTO: 10.5 FL (ref 6–12)
POTASSIUM SERPL-SCNC: 3.8 MMOL/L (ref 3.5–5.2)
PROT SERPL-MCNC: 6.9 G/DL (ref 6–8.5)
RBC # BLD AUTO: 3.92 10*6/MM3 (ref 3.77–5.28)
SODIUM SERPL-SCNC: 142 MMOL/L (ref 136–145)
WBC NRBC COR # BLD: 4.71 10*3/MM3 (ref 3.4–10.8)

## 2022-10-11 PROCEDURE — 85652 RBC SED RATE AUTOMATED: CPT | Performed by: INTERNAL MEDICINE

## 2022-10-11 PROCEDURE — 25010000002 HEPARIN LOCK FLUSH PER 10 UNITS: Performed by: INTERNAL MEDICINE

## 2022-10-11 PROCEDURE — 85025 COMPLETE CBC W/AUTO DIFF WBC: CPT | Performed by: INTERNAL MEDICINE

## 2022-10-11 PROCEDURE — 86140 C-REACTIVE PROTEIN: CPT | Performed by: INTERNAL MEDICINE

## 2022-10-11 PROCEDURE — 80053 COMPREHEN METABOLIC PANEL: CPT | Performed by: INTERNAL MEDICINE

## 2022-10-11 PROCEDURE — 99024 POSTOP FOLLOW-UP VISIT: CPT | Performed by: NURSE PRACTITIONER

## 2022-10-11 PROCEDURE — 36591 DRAW BLOOD OFF VENOUS DEVICE: CPT

## 2022-10-11 RX ORDER — HEPARIN SODIUM (PORCINE) LOCK FLUSH IV SOLN 100 UNIT/ML 100 UNIT/ML
500 SOLUTION INTRAVENOUS AS NEEDED
Status: CANCELLED | OUTPATIENT
Start: 2022-10-11

## 2022-10-11 RX ORDER — HEPARIN SODIUM (PORCINE) LOCK FLUSH IV SOLN 100 UNIT/ML 100 UNIT/ML
500 SOLUTION INTRAVENOUS AS NEEDED
Status: DISCONTINUED | OUTPATIENT
Start: 2022-10-11 | End: 2022-10-12 | Stop reason: HOSPADM

## 2022-10-11 RX ORDER — SODIUM CHLORIDE 0.9 % (FLUSH) 0.9 %
20 SYRINGE (ML) INJECTION AS NEEDED
Status: CANCELLED | OUTPATIENT
Start: 2022-10-11

## 2022-10-11 RX ORDER — SODIUM CHLORIDE 0.9 % (FLUSH) 0.9 %
20 SYRINGE (ML) INJECTION AS NEEDED
Status: DISCONTINUED | OUTPATIENT
Start: 2022-10-11 | End: 2022-10-12 | Stop reason: HOSPADM

## 2022-10-11 RX ADMIN — HEPARIN SODIUM (PORCINE) LOCK FLUSH IV SOLN 100 UNIT/ML 500 UNITS: 100 SOLUTION at 14:32

## 2022-10-11 RX ADMIN — Medication 20 ML: at 14:31

## 2022-10-17 ENCOUNTER — CLINICAL SUPPORT (OUTPATIENT)
Dept: PLASTIC SURGERY | Facility: CLINIC | Age: 62
End: 2022-10-17

## 2022-10-17 VITALS — BODY MASS INDEX: 29.68 KG/M2 | HEIGHT: 64 IN

## 2022-10-17 RX ORDER — EPINEPHRINE 0.3 MG/.3ML
INJECTION SUBCUTANEOUS
COMMUNITY
Start: 2022-09-29 | End: 2022-11-15

## 2022-10-17 NOTE — PROGRESS NOTES
"Chief Complaint  Follow-up (Drain removal )    Subjective          History of Present Illness  Marisa Portillo is a 62 y.o. female who presents to [facility] for Postoperative Follow-Up of BILATERAL BREAST ABSCESS INCISION AND DRAINAGE, WITH LEFT BREAST IMPLANT REMOVAL 10/04/22.  No tenderness, incisions ae doing well. No other concerns    started her on IV antibiotic therapy thereafter for 2 to 4 weeks at there last office visit on 9/28/22. Her last round of antibiotics is tomorrow.    Here today for bilateral breast drain removal. Denies pain. Left breast drain site is red and irritated from tape. Drain output has been below 30CC's. Both drains have been putting out an average of 20CC's or below.    Allergies: Multihance [gadobenate]  Allergies Reconciled.    Review of Systems   All review of system has been reviewed and it  is negative except the ones note above.     Objective     Ht 162.6 cm (64.02\")   BMI 29.68 kg/m²     Body mass index is 29.68 kg/m².    Physical Exam   Cardiovascular: Normal rate.     Pulmonary/Chest  Effort normal.        Breasts:  Incisions healing well, mild expected swelling, no erythema, no drainage; bilateral drains intact, right with red serosanguineous fluid, and left with light pink serosanguineous fluid.    Result Review :       Assessment and Plan      There are no diagnoses linked to this encounter.    Plan:  Removed bilateral breast drains, applied gauze and bacitracin oint to drain sites. Keep drain sites clean and dry, apply bacitracin in thin layer daily until healed, continue compression bra, limit upper body activity. RTC at scheduled appointment tomorrow.     Scribed by Grace Franco MA, acting as a scribe for No name on file, 10/17/22 08:23 EDT.  No name on file's signature on the note affirms that the note adequately documents the care provided.      Patient was given instructions and counseling regarding her " condition. Please see specific information pulled into the AVS if appropriate.     Grace Franco MA  10/17/2022

## 2022-10-19 NOTE — PROGRESS NOTES
"Chief Complaint  Post-op Follow-up (2 weeks out)    Subjective          History of Present Illness  Marisa Portillo is a 62 y.o. female who presents to [facility] for Postoperative Follow-Up of BILATERAL BREAST ABSCESS INCISION AND DRAINAGE, WITH LEFT BREAST IMPLANT REMOVAL 10/04/22.    She is here for 2wk follow up, drain was pulled on Monday. Finished IV antibiotics Tuesday. Has a small red reaction on left side of chest from port removal.    Doing well and has no concerns. Sees Dr. Martinez on Monday-25 treatments and Dr. Case on Wednesday.    Allergies: Multihance [gadobenate]  Allergies Reconciled.    Review of Systems   All review of system has been reviewed and it  is negative except the ones note above.     Objective     /78   Pulse 110   Temp 96.9 °F (36.1 °C)   Ht 162.6 cm (64.02\")   Wt 78.5 kg (173 lb)   SpO2 97%   BMI 29.68 kg/m²     Body mass index is 29.68 kg/m².    Physical Exam   Cardiovascular: Normal rate.     Pulmonary/Chest  Effort normal.        Breast   incisions healed, breasts soft, no open areas or drng       Result Review :       Assessment and Plan      Diagnoses and all orders for this visit:    1. Postoperative follow-up (Primary)        Plan:   Continue wearing compression bra for 6 weeks. Monitor for open areas, redness or infection. Apply bacitracin ointment to irritation on left side of chest where tape irritation is present.. RTC in 3 months with Dr. Shelton. Patient is aware to call the office if she has any concerns. Continue follow up with infections disease.    Scribed by Grace Franco MA, acting as a scribe for CALOS Robbins, 10/20/22 11:07 EDT.  CALOS Robbins's signature on the note affirms that the note adequately documents the care provided.    Patient was given instructions and counseling regarding her condition. Please see specific information pulled into the AVS if appropriate.     Brigitte" Salvatore, CALOS  10/20/2022

## 2022-10-20 ENCOUNTER — OFFICE VISIT (OUTPATIENT)
Dept: PLASTIC SURGERY | Facility: CLINIC | Age: 62
End: 2022-10-20

## 2022-10-20 VITALS
WEIGHT: 173 LBS | DIASTOLIC BLOOD PRESSURE: 78 MMHG | SYSTOLIC BLOOD PRESSURE: 127 MMHG | HEIGHT: 64 IN | HEART RATE: 110 BPM | BODY MASS INDEX: 29.53 KG/M2 | OXYGEN SATURATION: 97 % | TEMPERATURE: 96.9 F

## 2022-10-20 DIAGNOSIS — Z09 POSTOPERATIVE FOLLOW-UP: Primary | ICD-10-CM

## 2022-10-20 PROCEDURE — 99024 POSTOP FOLLOW-UP VISIT: CPT | Performed by: NURSE PRACTITIONER

## 2022-10-21 ENCOUNTER — OFFICE VISIT (OUTPATIENT)
Dept: INTERNAL MEDICINE | Facility: CLINIC | Age: 62
End: 2022-10-21

## 2022-10-21 VITALS
TEMPERATURE: 97.3 F | BODY MASS INDEX: 29.67 KG/M2 | HEIGHT: 64 IN | DIASTOLIC BLOOD PRESSURE: 79 MMHG | WEIGHT: 173.8 LBS | SYSTOLIC BLOOD PRESSURE: 139 MMHG | HEART RATE: 74 BPM | OXYGEN SATURATION: 95 %

## 2022-10-21 DIAGNOSIS — N61.1 BREAST ABSCESS: ICD-10-CM

## 2022-10-21 DIAGNOSIS — E78.2 MIXED HYPERLIPIDEMIA: ICD-10-CM

## 2022-10-21 DIAGNOSIS — Z17.0 MALIGNANT NEOPLASM OF OVERLAPPING SITES OF RIGHT BREAST IN FEMALE, ESTROGEN RECEPTOR POSITIVE: ICD-10-CM

## 2022-10-21 DIAGNOSIS — N61.0 CELLULITIS OF LEFT BREAST: ICD-10-CM

## 2022-10-21 DIAGNOSIS — C50.811 MALIGNANT NEOPLASM OF OVERLAPPING SITES OF RIGHT FEMALE BREAST, UNSPECIFIED ESTROGEN RECEPTOR STATUS: Primary | ICD-10-CM

## 2022-10-21 DIAGNOSIS — A41.9 SEPSIS, DUE TO UNSPECIFIED ORGANISM, UNSPECIFIED WHETHER ACUTE ORGAN DYSFUNCTION PRESENT: ICD-10-CM

## 2022-10-21 DIAGNOSIS — Z95.828 PORT-A-CATH IN PLACE: ICD-10-CM

## 2022-10-21 DIAGNOSIS — C50.811 MALIGNANT NEOPLASM OF OVERLAPPING SITES OF RIGHT BREAST IN FEMALE, ESTROGEN RECEPTOR POSITIVE: ICD-10-CM

## 2022-10-21 DIAGNOSIS — N64.89 RECURRENT SEROMA OF BREAST: ICD-10-CM

## 2022-10-21 PROCEDURE — 99213 OFFICE O/P EST LOW 20 MIN: CPT | Performed by: INTERNAL MEDICINE

## 2022-10-21 NOTE — PROGRESS NOTES
"Chief Complaint/ HPI: Here to follow-up with refractory recurrent breast infections, she went to see infectious disease and had recommended that all foreign objects material be removed so she has had those taken out she is had her drains taken out recently incision sites are healing well, no further fevers and she is finished a course of Ancef at home for 2 weeks, October 19, 2022, she did follow-up with her plastic surgeon and had seen the infectious disease expert Dr. Case in [place],    To start xrt soon with dr ryder ---nov 2022    hospitalized, September 13, through September 16, 2022----with leukocytosis fever tachycardia, sepsis, left breast abscess cellulitis, she had a drain placement,, she was put on IV antibiotics at that time clindamycin, she had an elevated white count on admission of 21,000 back on September 13, 2022, she had a left breast drain placement during that hospitalization by her plastic surgeon, we saw her for  transitional care visit September 23, 2022, ---------- , she had previously had problems with her right breast and right breast infections and drainage abscess and had to have reoperations on that area of the left breast and never given her any problems until this admission and this episode of cellulitis,, the staph was MSSA,  status postdrainage placement, had the drain removed September 23, 2022,----- she was treated with vancomycin in the hospital but prior to that she had been on clindamycin, and was sent home on clindamycin for finishing course of antibiotics,--finishes around September 25, 2022,----  Objective   Vital Signs  Vitals:    10/21/22 0851   BP: 139/79   Pulse: 74   Temp: 97.3 °F (36.3 °C)   SpO2: 95%   Weight: 78.8 kg (173 lb 12.8 oz)   Height: 162.6 cm (64.02\")      Body mass index is 29.82 kg/m².  Review of Systems   Constitutional: Negative.    HENT: Negative.    Eyes: Negative.    Respiratory: Negative.    Cardiovascular: Negative.    Gastrointestinal: " Negative.    Endocrine: Negative.    Genitourinary: Negative.    Musculoskeletal: Negative.    Allergic/Immunologic: Negative.    Neurological: Negative.    Hematological: Negative.    Psychiatric/Behavioral: Negative.       Physical Exam  Constitutional:       General: She is not in acute distress.     Appearance: Normal appearance.   HENT:      Head: Normocephalic.      Mouth/Throat:      Mouth: Mucous membranes are moist.   Eyes:      Conjunctiva/sclera: Conjunctivae normal.      Pupils: Pupils are equal, round, and reactive to light.   Cardiovascular:      Rate and Rhythm: Normal rate and regular rhythm.      Pulses: Normal pulses.      Heart sounds: Normal heart sounds.   Pulmonary:      Effort: Pulmonary effort is normal.      Breath sounds: Normal breath sounds.   Abdominal:      General: Abdomen is flat. Bowel sounds are normal.      Palpations: Abdomen is soft.   Musculoskeletal:         General: No swelling. Normal range of motion.      Cervical back: Neck supple.   Skin:     General: Skin is warm and dry.      Coloration: Skin is not jaundiced.   Neurological:      General: No focal deficit present.      Mental Status: She is alert and oriented to person, place, and time. Mental status is at baseline.   Psychiatric:         Mood and Affect: Mood normal.         Behavior: Behavior normal.         Thought Content: Thought content normal.         Judgment: Judgment normal.        Result Review :   Lab Results   Component Value Date    PROBNP 1,085.0 (H) 03/14/2022     CMP    CMP 9/15/22 9/16/22 10/11/22   Glucose 98 107 (A) 108 (A)   BUN 6 (A) 7 (A) 12   Creatinine 0.59 0.63 0.68   Sodium 139 140 142   Potassium 3.4 (A) 3.9 3.8   Chloride 105 103 105   Calcium 8.7 8.7 9.4   Albumin   4.20   Total Bilirubin   0.4   Alkaline Phosphatase   60   AST (SGOT)   19   ALT (SGPT)   9   (A) Abnormal value            CBC w/diff    CBC w/Diff 9/15/22 9/16/22 10/11/22   WBC 12.34 (A) 7.90 4.71   RBC 3.41 (A) 3.49 (A) 3.92    Hemoglobin 10.7 (A) 11.1 (A) 12.5   Hematocrit 31.3 (A) 32.0 (A) 37.0   MCV 91.8 91.7 94.4   MCH 31.4 31.8 31.9   MCHC 34.2 34.7 33.8   RDW 13.5 13.5 13.7   Platelets 166 191 201   Neutrophil Rel % 75.2 67.5 59.1   Immature Granulocyte Rel % 0.4 0.3 0.2   Lymphocyte Rel % 13.9 (A) 20.5 27.8   Monocyte Rel % 9.3 8.9 8.7   Eosinophil Rel % 0.9 2.4 3.6   Basophil Rel % 0.3 0.4 0.6   (A) Abnormal value             Lipid Panel    Lipid Panel 8/1/22   Total Cholesterol 221 (A)   Triglycerides 245 (A)   HDL Cholesterol 61 (A)   VLDL Cholesterol 42 (A)   LDL Cholesterol  118 (A)   LDL/HDL Ratio 1.82   (A) Abnormal value             Lab Results   Component Value Date    TSH 0.651 08/01/2022    TSH 1.400 03/11/2022    TSH 1.200 02/23/2021      Lab Results   Component Value Date    FREET4 1.08 08/01/2022      A1C Last 3 Results    HGBA1C Last 3 Results 3/11/22 8/1/22   Hemoglobin A1C 6.30 (A) 5.80 (A)   (A) Abnormal value                              Visit Diagnoses:    ICD-10-CM ICD-9-CM   1. Malignant neoplasm of overlapping sites of right female breast, unspecified estrogen receptor status (HCC)  C50.811 174.8   2. Mixed hyperlipidemia  E78.2 272.2   3. Sepsis, due to unspecified organism, unspecified whether acute organ dysfunction present (HCC)  A41.9 038.9     995.91   4. Port-A-Cath placement  Z95.828 V45.89   5. Breast abscess  N61.1 611.0   6. Cellulitis of left breast  N61.0 611.0   7. Malignant neoplasm of overlapping sites of right breast in female, estrogen receptor positive (HCC)  C50.811 174.8    Z17.0 V86.0   8. Recurrent seroma of breast  N64.89 998.13       Assessment and Plan   Diagnoses and all orders for this visit:    1. Malignant neoplasm of overlapping sites of right female breast, unspecified estrogen receptor status (HCC) (Primary)  -     Comprehensive Metabolic Panel; Future  -     CBC & Differential; Future  -     Vitamin D,25-Hydroxy; Future  -     Lipid Panel; Future  -     Sedimentation Rate;  Future    2. Mixed hyperlipidemia  -     Comprehensive Metabolic Panel; Future  -     CBC & Differential; Future  -     Vitamin D,25-Hydroxy; Future  -     Lipid Panel; Future  -     Sedimentation Rate; Future    3. Sepsis, due to unspecified organism, unspecified whether acute organ dysfunction present (HCC)  -     Comprehensive Metabolic Panel; Future  -     CBC & Differential; Future  -     Vitamin D,25-Hydroxy; Future  -     Lipid Panel; Future  -     Sedimentation Rate; Future    4. Port-A-Cath placement  -     Comprehensive Metabolic Panel; Future  -     CBC & Differential; Future  -     Vitamin D,25-Hydroxy; Future  -     Lipid Panel; Future  -     Sedimentation Rate; Future    5. Breast abscess  -     Comprehensive Metabolic Panel; Future  -     CBC & Differential; Future  -     Vitamin D,25-Hydroxy; Future  -     Lipid Panel; Future  -     Sedimentation Rate; Future    6. Cellulitis of left breast  -     Comprehensive Metabolic Panel; Future  -     CBC & Differential; Future  -     Vitamin D,25-Hydroxy; Future  -     Lipid Panel; Future  -     Sedimentation Rate; Future    7. Malignant neoplasm of overlapping sites of right breast in female, estrogen receptor positive (HCC)  -     Comprehensive Metabolic Panel; Future  -     CBC & Differential; Future  -     Vitamin D,25-Hydroxy; Future  -     Lipid Panel; Future  -     Sedimentation Rate; Future    8. Recurrent seroma of breast  -     Comprehensive Metabolic Panel; Future  -     CBC & Differential; Future  -     Vitamin D,25-Hydroxy; Future  -     Lipid Panel; Future  -     Sedimentation Rate; Future    Sepsis again with now infection MSSA of the left breast September 13, 2022 requiring hospitalization, treatment with IV antibiotics IV vancomycin drain placement by plastic surgery, she was sent home on the 16th with a drain and clindamycin,, she has done well she was then sent to infectious disease Dr. Case in [place] after I discussed the case with  her and the fact that she continued to have recurrent infections and now both breasts, I spoke with Jacqueline cunningham third  at [facility] in [place] who helped expedite her infectious disease consultation several weeks ago, they recommended removing all of the foreign bodies material in her breast area so that has been done and her drain has recently been removed as of October 21, 2022 she is finished a course of Ancef at home IV 2 weeks and has had no further fevers and she says she feels very good and is getting ready to start radiation therapy in November with Dr. Martinez,    Sepsis, multifactorial to include urinary tract infection and now cellulitis of the right breast and expander implant area, status post surgical debridement removal of pus// fluid, drain placed last night March 11, 2022------- patient's improved clinically,        Mixed hyperlipidemia good HDL cholesterol no treatment,     Invasive ductal carcinoma right breast previous bilateral mastectomies with multifocal areas of invasive ductal carcinoma largest area 2.3 cm with positive lymph nodes 4 out of 10 ER and NC positive HER-2 negative,, currently undergoing chemotherapy, finished, April 2022,, still awaiting radiation therapy due to previous infection abscess cellulitis and now seroma treatment, as of August 11, 2022, --- continues and started anastrozole, Arimidex, July 2022 Dr. Mendez,     [facility], appendectomy, Port-A-Cath placement,     ifg, hemoglobin A1c 5.8, August 1, 2022,    Vitamin D deficiency, cont vitamin D 50,000 units weekly,     TIA 2010, MRI/ mrA underwent a Star closure procedure with clipping     Osteoarthritis     Chronic venous insufficiency changes,,    Follow Up   Return in about 6 months (around 4/21/2023).  Patient was given instructions and counseling regarding her condition or for health maintenance advice. Please see specific information pulled into the AVS if appropriate.

## 2022-10-21 NOTE — PROGRESS NOTES
Follow Up Office Visit      Encounter Date: 10/24/2022   Patient Name: Marisa Portillo  YOB: 1960   Medical Record Number: [id number]   Primary Diagnosis: Malignant neoplasm of overlapping sites of right breast in female, estrogen receptor positive (HCC) [C50.811, Z17.0]     Cancer Staging   Malignant neoplasm of overlapping sites of right breast in female, estrogen receptor positive (HCC)  Staging form: Breast, AJCC 8th Edition  - Pathologic stage from 12/28/2021: Stage IB (pT2, pN2a, cM0, G2, ER+, MT+, HER2-) - Signed by Starr Mendez MD PhD on 1/30/2022    Chief Complaint:    Chief Complaint   Patient presents with   • Follow-up   • Breast Cancer       Oncology/Hematology History Overview Note     ER+ Right Breast Cancer:    Diagnostic mammogram on 11/12/2021: 2 cm asymmetry in the outer central right breast with amorphous calcifications.  Similar appearing group of amorphous calcifications in the outer central right breast.  6 mm asymmetry in the inner right breast.  Right axillary lymphadenopathy.    Ultrasound-guided biopsy on 11/1/2021: Right breast 3 o'clock position, 2 cm from the nipple with invasive ductal carcinoma, grade 2.  Right breast 9 o'clock position, 3 cm from the nipple with invasive ductal carcinoma, grade 2, ER positive (50%), MT positive (3%), HER-2 negative (score 0), Ki-67 14%.  Associated with intermediate grade ductal carcinoma in situ.  Right axillary lymph node positive for breast carcinoma.    CT CAP and bone scan on 12/13/21: negative for metastatic disease    Bilateral mastectomy 12/28/2021: Right breast with multiple (2) foci of invasive ductal carcinoma, largest focus 2.3 cm, grade 2, associated with DCIS with extensive intraductal component, all margins negative, 4/10 lymph nodes involved with no extranodal extension and the largest focus measured at 3.3 cm, ER positive (40%), MT positive (5%), HER-2 negative by IHC (score 0), Ki-67 14%, pT2 pN2a  cM0    Completed 4 cycles of chemotherapy with TC on 4/25/22.  Complicated by a UTI and multiple right breast abscesses causing delay in cycles 3 and 4.     *The pt discontinued HRT in October of 2021 when she had an abnormal screening mammogram     Malignant neoplasm of overlapping sites of right breast in female, estrogen receptor positive (HCC)   12/9/2021 Initial Diagnosis    Malignant neoplasm of overlapping sites of right breast in female, estrogen receptor positive (HCC)     12/28/2021 Cancer Staged    Staging form: Breast, AJCC 8th Edition  - Pathologic stage from 12/28/2021: Stage IB (pT2, pN2a, cM0, G2, ER+, VA+, HER2-) - Signed by Starr Mendez MD PhD on 1/30/2022 1/14/2022 -  Chemotherapy    OP CENTRAL VENOUS ACCESS DEVICE ACCESS, CARE, AND MAINTENANCE (CVAD)     1/31/2022 - 4/26/2022 Chemotherapy    OP BREAST TC DOCEtaxel / Cyclophosphamide     Malignant neoplasm of overlapping sites of right female breast (HCC)   7/18/2022 Initial Diagnosis    Malignant neoplasm of overlapping sites of right female breast (HCC)         History of Present Illness: Marisa Portillo is a 62 y.o. female who [place] [facility] [place] interval follow-up. Since her last visit, she was admitted on 9/13-9/16/2022 for left breast wound and sepsis. She underwent bilateral breast abscess incision and drainage with left breast implant removal 10/4/2022. Plastic Surgery pulled her drain on 10/17/2022 and she completed a two week course of Ancef IV on 10/18/2022. She reports feeling well overall and is eager to begin radiation. She denies fevers or drainage from healing incisions. She has recently followed-up with Plastic Surgery and will soon follow-up with Infectious Disease.     Subjective      Review of Systems: Review of Systems   Constitutional: Positive for fatigue (2/10). Negative for activity change, appetite change and unexpected weight change.   HENT: Negative for congestion, sinus pressure,  sinus pain, sneezing, sore throat, tinnitus and trouble swallowing.    Eyes: Negative for visual disturbance.   Respiratory: Negative for cough, chest tightness, shortness of breath and wheezing.    Cardiovascular: Positive for leg swelling. Negative for chest pain and palpitations.   Gastrointestinal: Negative for abdominal pain, constipation, diarrhea and nausea.   Genitourinary: Negative for difficulty urinating, dysuria, frequency, hematuria and urgency.   Musculoskeletal: Negative for back pain and joint swelling.   Neurological: Negative for dizziness, weakness, light-headedness and headaches.       The following portions of the patient's history were reviewed and updated as appropriate: allergies, current medications, past family history, past medical history, past social history, past surgical history and problem list.    Medications:     Current Outpatient Medications:   •  acetaminophen (TYLENOL) 650 MG 8 hr tablet, Take 650 mg by mouth 2 (Two) Times a Day., Disp: , Rfl:   •  anastrozole (ARIMIDEX) 1 MG tablet, Take 1 tablet by mouth Daily., Disp: 90 tablet, Rfl: 1  •  hydrOXYzine (ATARAX) 25 MG tablet, Take 25 mg by mouth 3 (Three) Times a Day As Needed for Itching., Disp: , Rfl:   •  Loratadine-Pseudoephedrine (CLARITIN-D 24 HOUR PO), Take 1 tablet by mouth Daily., Disp: , Rfl:   •  venlafaxine XR (EFFEXOR-XR) 37.5 MG 24 hr capsule, Take 1 capsule by mouth Daily., Disp: 90 capsule, Rfl: 1  •  Vitamin A 3 MG (75773 UT) capsule, Take 10,000 Units by mouth Daily., Disp: , Rfl:   •  vitamin D (ERGOCALCIFEROL) 1.25 MG (11130 UT) capsule capsule, Take 1 capsule by mouth Every 7 (Seven) Days. (Patient taking differently: Take 1 capsule by mouth Every 7 (Seven) Days. MONDAYS), Disp: 12 capsule, Rfl: 3  •  EPINEPHrine (EPIPEN) 0.3 MG/0.3ML solution auto-injector injection, , Disp: , Rfl:   •  ondansetron (Zofran) 4 MG tablet, Take 1 tablet by mouth Daily As Needed for Nausea or Vomiting., Disp: 20 tablet, Rfl:  0    Allergies:   Allergies   Allergen Reactions   • Multihance [Gadobenate] Hives and Itching     MRI contrast       Measures:  Pain: (on a scale of 0-10)   Pain Score    10/24/22 1005   PainSc: 0-No pain       Quality of Life: 100 - Full Activity   ECOG: (0) Fully active, able to carry on all predisease performance without restriction      Objective     Physical Exam:   Vital Signs:   Vitals:    10/24/22 1005   BP: 127/81   Pulse: 90   Resp: 16   Temp: 98.1 °F (36.7 °C)   TempSrc: Temporal   SpO2: 99%   Weight: 79.3 kg (174 lb 13.2 oz)   PainSc: 0-No pain     Body mass index is 29.99 kg/m².   Wt Readings from Last 3 Encounters:   10/24/22 79.3 kg (174 lb 13.2 oz)   10/21/22 78.8 kg (173 lb 12.8 oz)   10/20/22 78.5 kg (173 lb)       Physical Exam  Vitals reviewed.   Constitutional:       General: She is not in acute distress.     Appearance: Normal appearance. She is normal weight. She is not ill-appearing.   HENT:      Head: Normocephalic and atraumatic.      Mouth/Throat:      Mouth: Mucous membranes are moist.      Pharynx: Oropharynx is clear. No oropharyngeal exudate or posterior oropharyngeal erythema.   Eyes:      Conjunctiva/sclera: Conjunctivae normal.      Pupils: Pupils are equal, round, and reactive to light.   Cardiovascular:      Rate and Rhythm: Normal rate and regular rhythm.      Pulses: Normal pulses.      Heart sounds: Normal heart sounds.      Comments: Left chest Port-A-Cath with surrounding skin C/D/I  Pulmonary:      Effort: Pulmonary effort is normal. No respiratory distress.      Breath sounds: Normal breath sounds.   Chest:          Comments: S/p bilateral nipple sparing mastectomy  Musculoskeletal:         General: Normal range of motion.      Cervical back: Normal range of motion.   Skin:     General: Skin is warm and dry.   Neurological:      General: No focal deficit present.      Mental Status: She is alert and oriented to person, place, and time. Mental status is at baseline.  "  Psychiatric:         Mood and Affect: Mood normal.         Behavior: Behavior normal.         Result Review: I personally reviewed the following data. The pertinent findings are as above in the HPI.    Pathology:   Lab Results   Component Value Date    CLININFO  10/04/2022     Breast abscess      FINALDX  10/04/2022     1.  Left breast capsule, excision:   - Benign fibrous capsule with chronic inflammation    2.  Right breast capsule, excision:   - Benign fibrous capsule and skeletal muscle with chronic inflammation    3.  Left breast implant, explant:   - Breast implant, gross only diagnosis        SYNOPTIC  12/28/2021     INVASIVE CARCINOMA OF THE BREAST: Resection  INVASIVE CARCINOMA OF THE BREAST: COMPLETE EXCISION - 1, 2, 3, 4, 5, 6, 7, 10, 11, 12  8th Edition - Protocol posted: 6/30/2021    SPECIMEN     Procedure:    Total mastectomy      Specimen Laterality:    Right     TUMOR     Histologic Type:    Invasive carcinoma of no special type (ductal)      Histologic Grade (Liberal Histologic Score):           Glandular (Acinar) / Tubular Differentiation:    Score 3        Nuclear Pleomorphism:    Score 2        Mitotic Rate:    Score 2        Overall Grade:    Grade 2 (scores of 6 or 7)      Tumor Size:    Greatest dimension of largest invasive focus (Millimeters): 23 mm     Tumor Focality:    Multiple foci of invasive carcinoma        Number of Foci:    2      Ductal Carcinoma In Situ (DCIS):    Present        :    Positive for extensive intraductal component (EIC)        Architectural Patterns:    Comedo        Architectural Patterns:    Cribriform        Architectural Patterns:    Papillary        Architectural Patterns:    Solid        Nuclear Grade:    Grade III (high)        Necrosis:    Present, central (expansive \"comedo\" necrosis)      Tumor Extent:         Microcalcifications:    Present in invasive carcinoma      Microcalcifications:    Present in non-neoplastic tissue      Treatment Effect in the " Breast:    No known presurgical therapy     MARGINS     Margin Status for Invasive Carcinoma:    All margins negative for invasive carcinoma        Distance from Invasive Carcinoma to Closest Margin:    20 mm       Closest Margin(s) to Invasive Carcinoma:    Posterior      Margin Status for DCIS:    All margins negative for DCIS        Distance from DCIS to Closest Margin:    7 mm       Closest Margin(s) to DCIS:    Posterior     REGIONAL LYMPH NODES     Regional Lymph Node Status:           :    Tumor present in regional lymph node(s)          Number of Lymph Nodes with Macrometastases:    4          Number of Lymph Nodes with Micrometastases:    0          Number of Lymph Nodes with Isolated Tumor Cells:    0          Size of Largest Sofi Metastatic Deposit:    23 mm         Extranodal Extension:    Not identified        Total Number of Lymph Nodes Examined (sentinel and non-sentinel):    10        Number of Ralph Nodes Examined:    10     DISTANT METASTASIS    PATHOLOGIC STAGE CLASSIFICATION (pTNM, AJCC 8th Edition)     Reporting of pT, pN, and (when applicable) pM categories is based on information available to the pathologist at the time the report is issued. As per the AJCC (Chapter 1, 8th Ed.) it is the managing physician’s responsibility to establish the final pathologic stage based upon all pertinent information, including but potentially not limited to this pathology report.     TNM Descriptors:    m (multiple foci of invasive carcinoma)      pT Category:    pT2      pN Category:    pN2a        Breast Biomarker Testing Performed on Previous Biopsy:           Estrogen Receptor (ER) Status:    Positive (greater than 10% of cells demonstrate nuclear positivity)          Percentage of Cells with Nuclear Positivity:    40 %     Breast Biomarker Testing Performed on Previous Biopsy:           Progesterone Receptor (PgR) Status:    Positive          Percentage of Cells with Nuclear Positivity:    5 %      Breast Biomarker Testing Performed on Previous Biopsy:           HER2 (by immunohistochemistry):    Negative (Score 0)      Breast Biomarker Testing Performed on Previous Biopsy:           Ki-67 Percentage of Positive Nuclei:    14 %       Testing Performed on Case Number:    [id number]          Imaging: XR Chest 1 View    Result Date: 9/13/2022    No active cardiopulmonary disease is seen.  Left subclavian Port-A-Cath device is in unchanged position.       DAI SIMMONS MD       Electronically Signed and Approved By: DAI SIMMONS MD on 9/13/2022 at 18:43             DEXA Bone Density Axial    Result Date: 9/7/2022    Normal     ELIGIO GARRIDO MD       Electronically Signed and Approved By: ELIGIO GARRIDO MD on 9/07/2022 at 8:17                Labs:   WBC   Date Value Ref Range Status   10/11/2022 4.71 3.40 - 10.80 10*3/mm3 Final     Hemoglobin   Date Value Ref Range Status   10/11/2022 12.5 12.0 - 15.9 g/dL Final     Hematocrit   Date Value Ref Range Status   10/11/2022 37.0 34.0 - 46.6 % Final     Platelets   Date Value Ref Range Status   10/11/2022 201 140 - 450 10*3/mm3 Final     TSH   Date Value Ref Range Status   08/01/2022 0.651 0.270 - 4.200 uIU/mL Final     Assessment / Plan      Impression: Marisa Portillo is a pleasant 62 y.o. female with cT2 cN1 cM0, invasive ductal carcinoma, grade 2, ER positive/DE positive, HER-2/aries negative of the right breast. There were 2 lesions within the right breast, one at 3:00, 2 cm from the nipple and another at 9:00, 3 cm from the nipple. A right axillary lymph node was biopsied revealing metastatic carcinoma consistent with breast primary. She is status post bilateral nipple sparing mastectomy with right axillary lymph node dissection on 12/28/2021;  pT2 pN2a.  She is status post adjuvant chemotherapy, completed in April 2022. Radiotherapy has been delayed due to recurrent infections and wound healing delay. On 4/15/2022 she underwent right breast incision and drainage  with removal of implant due to development of an abscess. She was then admitted to [facility] 9/13-9/16/22 for left breast infection and sepsis. Infectious Disease suspected the left breast implant to be involved with ongoing MSSA infection. She underwent bilateral breast abscess incision and drainage with left breast implant removal 10/4/2022. She is healing well post-operatively and she completed IV antibiotic therapy on 10/18/2022. She is taking anastrozole and tolerating well.     Discussed with Ms. Portillo that she is clinically doing well and healed from surgery so we will proceed with adjuvant external beam radiotherapy to the right chest wall and axilla. I explained that we would not want to begin radiotherapy sooner than 1 month after any intervention or surgery. To allow more time for continued healing, CT simulation for radiation treatment planning will be scheduled in two weeks. Dr. Martinez will plan to see her on that day prior to her CT.     Assessment/Plan:   Diagnoses and all orders for this visit:    1. Malignant neoplasm of overlapping sites of right breast in female, estrogen receptor positive (HCC) (Primary)  -     Ambulatory Referral to Lymphedema Clinic    2. S/P bilateral nipple sparing mastectomy  -     Ambulatory Referral to Lymphedema Clinic    3. At risk for lymphedema  -     Ambulatory Referral to Lymphedema Clinic       Follow Up:   1.  CT-simulation for treatment planning in 2 weeks.    2.  I will place referral order for Lymphedema Therapy today as she will be at risk for lymphedema. She likely will not begin therapy until completion of radiotherapy.  3. Ms. Portillo was encouraged to contact our office immediately with the development of any skin changes including redness, open areas or drainage. I explained that if any area of her incisions or drain sites were to open up in the next two weeks, then we would have to delay starting radiation.   4. Follow-up as scheduled with Infectious  Diseases, Dr. Petr Case, as scheduled on 10/16/22.  5.  Follow-up with Plastic Surgery as scheduled in January 2023.  6.  Continue on anastrozole and follow-up with Dr. Mendez as scheduled on 11/30/2022.    Return in about 2 weeks (around 11/7/2022) for CT sim.  Marisa Portillo was encouraged to contact me in the interim with any questions or concerns regarding her care.        CALOS Hooper  Radiation Oncology  [facility]    This document has been signed by CALOS Fuentes on October 24, 2022 11:59 EDT

## 2022-10-24 ENCOUNTER — APPOINTMENT (OUTPATIENT)
Dept: RADIATION ONCOLOGY | Facility: HOSPITAL | Age: 62
End: 2022-10-24

## 2022-10-24 ENCOUNTER — OFFICE VISIT (OUTPATIENT)
Dept: RADIATION ONCOLOGY | Facility: HOSPITAL | Age: 62
End: 2022-10-24

## 2022-10-24 VITALS
SYSTOLIC BLOOD PRESSURE: 127 MMHG | RESPIRATION RATE: 16 BRPM | TEMPERATURE: 98.1 F | BODY MASS INDEX: 29.99 KG/M2 | OXYGEN SATURATION: 99 % | HEART RATE: 90 BPM | WEIGHT: 174.82 LBS | DIASTOLIC BLOOD PRESSURE: 81 MMHG

## 2022-10-24 DIAGNOSIS — Z91.89 AT RISK FOR LYMPHEDEMA: ICD-10-CM

## 2022-10-24 DIAGNOSIS — Z17.0 MALIGNANT NEOPLASM OF OVERLAPPING SITES OF RIGHT BREAST IN FEMALE, ESTROGEN RECEPTOR POSITIVE: Primary | ICD-10-CM

## 2022-10-24 DIAGNOSIS — Z90.13 S/P BILATERAL MASTECTOMY: ICD-10-CM

## 2022-10-24 DIAGNOSIS — C50.811 MALIGNANT NEOPLASM OF OVERLAPPING SITES OF RIGHT BREAST IN FEMALE, ESTROGEN RECEPTOR POSITIVE: Primary | ICD-10-CM

## 2022-10-24 PROCEDURE — G0463 HOSPITAL OUTPT CLINIC VISIT: HCPCS | Performed by: NURSE PRACTITIONER

## 2022-10-24 PROCEDURE — 99214 OFFICE O/P EST MOD 30 MIN: CPT | Performed by: NURSE PRACTITIONER

## 2022-10-26 ENCOUNTER — OFFICE VISIT (OUTPATIENT)
Dept: INFECTIOUS DISEASES | Facility: CLINIC | Age: 62
End: 2022-10-26

## 2022-10-26 VITALS
DIASTOLIC BLOOD PRESSURE: 85 MMHG | RESPIRATION RATE: 16 BRPM | TEMPERATURE: 97.1 F | SYSTOLIC BLOOD PRESSURE: 130 MMHG | HEART RATE: 77 BPM

## 2022-10-26 DIAGNOSIS — Z17.0 MALIGNANT NEOPLASM OF OVERLAPPING SITES OF RIGHT BREAST IN FEMALE, ESTROGEN RECEPTOR POSITIVE: ICD-10-CM

## 2022-10-26 DIAGNOSIS — N61.1 BREAST ABSCESS: ICD-10-CM

## 2022-10-26 DIAGNOSIS — C50.811 MALIGNANT NEOPLASM OF OVERLAPPING SITES OF RIGHT BREAST IN FEMALE, ESTROGEN RECEPTOR POSITIVE: ICD-10-CM

## 2022-10-26 DIAGNOSIS — N61.0 CELLULITIS OF LEFT BREAST: Primary | ICD-10-CM

## 2022-10-26 DIAGNOSIS — Z90.13 S/P BILATERAL MASTECTOMY: ICD-10-CM

## 2022-10-26 PROCEDURE — 99213 OFFICE O/P EST LOW 20 MIN: CPT | Performed by: STUDENT IN AN ORGANIZED HEALTH CARE EDUCATION/TRAINING PROGRAM

## 2022-10-26 NOTE — PROGRESS NOTES
Chief Complaint  No chief complaint on file.    Roger Portillo presents to [facility]  History of Present Illness    Patient is a 62-year-old female with past medical history of invasive ductal carcinoma of the right breast status post bilateral mastectomy 12/28/2021 with subsequent reconstruction that required gel implants with mesh placement.  Unfortunately patient developed subsequent signs of infection of the right breast that required I&D of an abscess of the right breast with removal of expander on 3/11/2022.  Mesh retained at that time and new implant was placed.  Cultures at that time grew MSSA.  She had a drain retained after that time.  She was placed on 7 days of Keflex and 10 days of doxycycline after this procedure.    She was seen again on 4/15/2022 with removal of the implant and mesh at that time.  It appears she was placed on Bactrim on and off around this time for about 1 month.    Over the next following months she had on and off bouts of cellulitis but nothing overly concerning with further healing of the right breast surgical wound.  It has continued to heal until she has had hit a roadblock to last couple months where she has had continued minor drainage from the inferior wound site.    She then subsequently took another round of clindamycin on 9/9 for worsening drainage that grew MSSA from the right breast.  After completing this 3 days of Keflex she developed worsening pink changes to the left breast which she thought was related to her bra.  She completed a course of Keflex but on 9/15 developed fever and was seen by plastics in the office with subsequent admission to the hospital.  She was found to have left breast cellulitis and started on IV vancomycin in-house.  She had aspiration of a fluid collection around the left breast and subsequent drain placement that grew MSSA.  She was discharged on p.o. clindamycin to complete a course  "after this.  Later the drain was removed with improvement overall in her symptoms.    On 10/4 patient underwent I&D with implant/mesh removal.  She was then switched to 2 weeks of IV cefazolin 2 g every 8 hours for treatment of the MSSA through 10/18.    Patient reports she tolerated this therapy very well and completed it on 10/18.  She has had no problems since this time and the skin site has not given her any further problems.  She denies any drainage from the site or redness.  She continues to have her port in place but no other lines.  She has been following with plastics and they are pleased with her response per the patient's report.    Objective   Vital Signs:  There were no vitals taken for this visit.  Estimated body mass index is 29.99 kg/m² as calculated from the following:    Height as of 10/21/22: 162.6 cm (64.02\").    Weight as of 10/24/22: 79.3 kg (174 lb 13.2 oz).    Physical Exam  Constitutional:       General: She is not in acute distress.     Appearance: Normal appearance. She is normal weight. She is not ill-appearing.   HENT:      Head: Normocephalic and atraumatic.      Nose: Nose normal. No rhinorrhea.      Mouth/Throat:      Mouth: Mucous membranes are moist.      Pharynx: No oropharyngeal exudate.   Eyes:      General: No scleral icterus.     Extraocular Movements: Extraocular movements intact.      Pupils: Pupils are equal, round, and reactive to light.   Cardiovascular:      Rate and Rhythm: Normal rate and regular rhythm.      Pulses: Normal pulses.      Heart sounds: Normal heart sounds. No murmur heard.  Pulmonary:      Effort: Pulmonary effort is normal.      Breath sounds: Normal breath sounds. No wheezing, rhonchi or rales.   Chest:      Chest wall: Deformity present.   Breasts:     Right: Skin change present. No tenderness.      Left: No tenderness.      Comments: Left chest port site clean and intact.  Postsurgical changes to the left breast area without drainage or " erythema.  Abdominal:      General: Abdomen is flat. Bowel sounds are normal.      Palpations: Abdomen is soft.      Tenderness: There is no abdominal tenderness. There is no guarding or rebound.   Musculoskeletal:         General: No swelling or tenderness. Normal range of motion.      Cervical back: Normal range of motion and neck supple. No tenderness.      Right lower leg: No edema.      Left lower leg: No edema.   Skin:     General: Skin is warm and dry.      Findings: No rash.   Neurological:      General: No focal deficit present.      Mental Status: She is alert and oriented to person, place, and time.   Psychiatric:         Mood and Affect: Mood normal.         Behavior: Behavior normal.        Result Review :  The following data was reviewed by: Petr Case DO on 09/28/2022:  Common labs    Common Labs 9/15/22 9/15/22 9/16/22 9/16/22 10/11/22 10/11/22    0540 0540 0641 0641 1338 1338   Glucose  98  107 (A)  108 (A)   BUN  6 (A)  7 (A)  12   Creatinine  0.59  0.63  0.68   Sodium  139  140  142   Potassium  3.4 (A)  3.9  3.8   Chloride  105  103  105   Calcium  8.7  8.7  9.4   Albumin      4.20   Total Bilirubin      0.4   Alkaline Phosphatase      60   AST (SGOT)      19   ALT (SGPT)      9   WBC 12.34 (A)  7.90  4.71    Hemoglobin 10.7 (A)  11.1 (A)  12.5    Hematocrit 31.3 (A)  32.0 (A)  37.0    Platelets 166  191  201    (A) Abnormal value            Data reviewed: Consultant notes From plastic surgery and PCP and Recent hospitalization notes For multiple breast procedures and operative notes       Microbiology reviewed from wound cultures incorporating the left and right breast over the last year.  All with grown MSSA.     Assessment and Plan   Diagnoses and all orders for this visit:    1. Cellulitis of left breast (Primary)    2. S/P bilateral nipple sparing mastectomy    3. Malignant neoplasm of overlapping sites of right breast in female, estrogen receptor positive (HCC)    4. Breast  abscess    Patient is now status post I&D with implant removal and mesh removal on 10/4.  She is now completed 2 weeks of IV cefazolin 2 g every 8 hours after removal of these prosthetic materials.  At this point she has had great response and will have no further plans for antibiotic treatment at this time.  Plans are for radiation in approximately 2 weeks and this seems very reasonable.  No plans for suppressive antibiotics given the removal of all her prosthetic material.  Patient had a question regarding the need for echo and at this point I would not see any further need for cardiac imaging.       I spent 27 minutes caring for Marisa on this date of service. This time includes time spent by me in the following activities:preparing for the visit, reviewing tests, obtaining and/or reviewing a separately obtained history, performing a medically appropriate examination and/or evaluation , counseling and educating the patient/family/caregiver, ordering medications, tests, or procedures, referring and communicating with other health care professionals , documenting information in the medical record, independently interpreting results and communicating that information with the patient/family/caregiver and care coordination  Follow Up   No follow-ups on file.  Patient was given instructions and counseling regarding her condition or for health maintenance advice. Please see specific information pulled into the AVS if appropriate.

## 2022-10-27 ENCOUNTER — TELEPHONE (OUTPATIENT)
Dept: OCCUPATIONAL THERAPY | Facility: HOSPITAL | Age: 62
End: 2022-10-27

## 2022-10-27 DIAGNOSIS — Z44.9 FITTING AND ADJUSTMENT OF UNSPECIFIED PROSTHETIC DEVICE: ICD-10-CM

## 2022-10-27 DIAGNOSIS — N64.89 DEFORMITY DUE TO MASTECTOMY: ICD-10-CM

## 2022-10-27 DIAGNOSIS — Z90.13 HISTORY OF BILATERAL MASTECTOMY: Primary | ICD-10-CM

## 2022-10-27 DIAGNOSIS — Z90.10 DEFORMITY DUE TO MASTECTOMY: ICD-10-CM

## 2022-10-28 ENCOUNTER — HOSPITAL ENCOUNTER (OUTPATIENT)
Dept: RADIATION ONCOLOGY | Facility: HOSPITAL | Age: 62
Setting detail: RADIATION/ONCOLOGY SERIES
End: 2022-10-28

## 2022-11-03 ENCOUNTER — APPOINTMENT (OUTPATIENT)
Dept: OCCUPATIONAL THERAPY | Facility: HOSPITAL | Age: 62
End: 2022-11-03

## 2022-11-07 ENCOUNTER — APPOINTMENT (OUTPATIENT)
Dept: RADIATION ONCOLOGY | Facility: HOSPITAL | Age: 62
End: 2022-11-07

## 2022-11-07 ENCOUNTER — OFFICE VISIT (OUTPATIENT)
Dept: RADIATION ONCOLOGY | Facility: HOSPITAL | Age: 62
End: 2022-11-07

## 2022-11-07 ENCOUNTER — HOSPITAL ENCOUNTER (OUTPATIENT)
Dept: RADIATION ONCOLOGY | Facility: HOSPITAL | Age: 62
Discharge: HOME | End: 2022-11-07

## 2022-11-07 ENCOUNTER — HOSPITAL ENCOUNTER (OUTPATIENT)
Dept: RADIATION ONCOLOGY | Facility: HOSPITAL | Age: 62
Setting detail: RADIATION/ONCOLOGY SERIES
End: 2022-11-07

## 2022-11-07 VITALS
WEIGHT: 175.93 LBS | DIASTOLIC BLOOD PRESSURE: 89 MMHG | HEART RATE: 92 BPM | SYSTOLIC BLOOD PRESSURE: 145 MMHG | BODY MASS INDEX: 30.18 KG/M2 | OXYGEN SATURATION: 98 % | RESPIRATION RATE: 16 BRPM | TEMPERATURE: 98.3 F

## 2022-11-07 DIAGNOSIS — Z17.0 MALIGNANT NEOPLASM OF OVERLAPPING SITES OF RIGHT BREAST IN FEMALE, ESTROGEN RECEPTOR POSITIVE: Primary | ICD-10-CM

## 2022-11-07 DIAGNOSIS — C50.811 MALIGNANT NEOPLASM OF OVERLAPPING SITES OF RIGHT BREAST IN FEMALE, ESTROGEN RECEPTOR POSITIVE: Primary | ICD-10-CM

## 2022-11-07 DIAGNOSIS — C50.811 MALIGNANT NEOPLASM OF OVERLAPPING SITES OF RIGHT FEMALE BREAST: ICD-10-CM

## 2022-11-07 PROCEDURE — G0463 HOSPITAL OUTPT CLINIC VISIT: HCPCS | Performed by: RADIOLOGY

## 2022-11-07 PROCEDURE — 77334 RADIATION TREATMENT AID(S): CPT | Performed by: RADIOLOGY

## 2022-11-07 PROCEDURE — 99214 OFFICE O/P EST MOD 30 MIN: CPT | Performed by: RADIOLOGY

## 2022-11-07 PROCEDURE — 77290 THER RAD SIMULAJ FIELD CPLX: CPT | Performed by: RADIOLOGY

## 2022-11-07 NOTE — PROGRESS NOTES
Follow Up Office Visit      Encounter Date: 11/07/2022   Patient Name: Marisa Portillo  YOB: 1960   Medical Record Number: [id number]   Primary Diagnosis: Malignant neoplasm of overlapping sites of right breast in female, estrogen receptor positive (HCC) [C50.811, Z17.0]   Cancer Staging: Cancer Staging   Malignant neoplasm of overlapping sites of right breast in female, estrogen receptor positive (HCC)  Staging form: Breast, AJCC 8th Edition  - Pathologic stage from 12/28/2021: Stage IB (pT2, pN2a, cM0, G2, ER+, AZ+, HER2-) - Signed by Starr Mendez MD PhD on 1/30/2022        Chief Complaint:    Chief Complaint   Patient presents with   • Follow-up   • Breast Cancer       History of Present Illness: Marisa Portillo returns to initiate radiation therapy planning.  She reports feeling well overall with the exception of some right hip pain.  She reports history of right hip pain for which she underwent injections per Dr. Kent.  She does have some associated right thigh pain.  She denies any other sites of bony pain but does have bilateral shoulder pain.    Subjective      Review of Systems: Review of Systems   Constitutional: Positive for fatigue (4/10). Negative for appetite change.   Respiratory: Negative for cough and shortness of breath.    Gastrointestinal: Negative for constipation, diarrhea and nausea.   Genitourinary: Negative for difficulty urinating, dysuria, frequency and urgency.   Musculoskeletal: Positive for arthralgias (mainly in right hip and shoulders pain in hip around 4-5/10). Negative for back pain.   Skin: Negative for rash.   Neurological: Negative for dizziness and headaches.   Psychiatric/Behavioral: Positive for sleep disturbance (wakes throughout the night has medication to help that she takes occasionally ).       The following portions of the patient's history were reviewed and updated as appropriate: allergies, current medications, past family history, past  medical history, past social history, past surgical history and problem list.    Medications:     Current Outpatient Medications:   •  acetaminophen (TYLENOL) 650 MG 8 hr tablet, Take 650 mg by mouth 2 (Two) Times a Day., Disp: , Rfl:   •  anastrozole (ARIMIDEX) 1 MG tablet, Take 1 tablet by mouth Daily., Disp: 90 tablet, Rfl: 1  •  hydrOXYzine (ATARAX) 25 MG tablet, Take 25 mg by mouth 3 (Three) Times a Day As Needed for Itching., Disp: , Rfl:   •  Loratadine-Pseudoephedrine (CLARITIN-D 24 HOUR PO), Take 1 tablet by mouth Daily., Disp: , Rfl:   •  venlafaxine XR (EFFEXOR-XR) 37.5 MG 24 hr capsule, Take 1 capsule by mouth Daily., Disp: 90 capsule, Rfl: 1  •  Vitamin A 3 MG (15182 UT) capsule, Take 10,000 Units by mouth Daily., Disp: , Rfl:   •  vitamin D (ERGOCALCIFEROL) 1.25 MG (87078 UT) capsule capsule, Take 1 capsule by mouth Every 7 (Seven) Days. (Patient taking differently: Take 1 capsule by mouth Every 7 (Seven) Days. MONDAYS), Disp: 12 capsule, Rfl: 3  •  EPINEPHrine (EPIPEN) 0.3 MG/0.3ML solution auto-injector injection, , Disp: , Rfl:   •  ondansetron (Zofran) 4 MG tablet, Take 1 tablet by mouth Daily As Needed for Nausea or Vomiting., Disp: 20 tablet, Rfl: 0    Allergies:   Allergies   Allergen Reactions   • Multihance [Gadobenate] Hives and Itching     MRI contrast       ECOG: (0) Fully active, able to carry on all predisease performance without restriction  Quality of Life: 100 - Full Activity     Objective     Physical Exam:   Vital Signs:   Vitals:    11/07/22 1310   BP: 145/89   Pulse: 92   Resp: 16   Temp: 98.3 °F (36.8 °C)   TempSrc: Temporal   SpO2: 98%   Weight: 79.8 kg (175 lb 14.8 oz)   PainSc: 2  Comment: Shoulders and Right hip/thigh     Body mass index is 30.18 kg/m².     Physical Exam  Constitutional:       General: She is not in acute distress.     Appearance: Normal appearance.   HENT:      Head: Normocephalic and atraumatic.      Nose: Nose normal.      Mouth/Throat:      Mouth: Mucous  membranes are moist.      Pharynx: Oropharynx is clear. No oropharyngeal exudate.   Pulmonary:      Effort: Pulmonary effort is normal. No respiratory distress.   Chest:      Comments: Well-healed incisions bilaterally  Abdominal:      General: There is no distension.   Skin:     General: Skin is warm and dry.   Neurological:      General: No focal deficit present.      Mental Status: She is alert and oriented to person, place, and time. Mental status is at baseline.      Cranial Nerves: No cranial nerve deficit.      Gait: Gait normal.   Psychiatric:         Mood and Affect: Mood normal.         Behavior: Behavior normal.         Judgment: Judgment normal.         Radiographs: XR Chest 1 View    Result Date: 9/13/2022    No active cardiopulmonary disease is seen.  Left subclavian Port-A-Cath device is in unchanged position.       DAI SIMMONS MD       Electronically Signed and Approved By: DAI SIMMONS MD on 9/13/2022 at 18:43             DEXA Bone Density Axial    Result Date: 9/7/2022    Normal     ELIGIO GARRIDO MD       Electronically Signed and Approved By: ELIGIO GARRIDO MD on 9/07/2022 at 8:17                  Assessment / Plan          Assessment/Plan:   Marisa Portillo is a 62-year-old female with cT2 cN1 cM0, invasive ductal carcinoma, grade 2, ER positive/DC positive, HER-2/aries negative of the right breast status post bilateral mastectomy with right axillary lymph node dissection;  pT2 pN2a.   She did experience left chest wall wound healing issues with abscess; this has now resolved.    We once again discussed the risks benefits and alternatives to external beam radiotherapy.  Ms. Novoa will undergo CT simulation for treatment planning purposes today.        Dudley Martinez MD  Radiation Oncology  [facility]    This document has been signed by Dudley Martinez MD on November 7, 2022 15:31 EST

## 2022-11-09 ENCOUNTER — HOSPITAL ENCOUNTER (OUTPATIENT)
Dept: RADIATION ONCOLOGY | Facility: HOSPITAL | Age: 62
Discharge: HOME | End: 2022-11-09

## 2022-11-14 ENCOUNTER — HOSPITAL ENCOUNTER (OUTPATIENT)
Dept: OCCUPATIONAL THERAPY | Facility: HOSPITAL | Age: 62
Setting detail: THERAPIES SERIES
Discharge: HOME | End: 2022-11-14

## 2022-11-14 DIAGNOSIS — N64.89 DEFORMITY DUE TO MASTECTOMY: ICD-10-CM

## 2022-11-14 DIAGNOSIS — Z90.13 HISTORY OF MASTECTOMY, BILATERAL: Primary | ICD-10-CM

## 2022-11-14 DIAGNOSIS — Z44.9 FITTING AND ADJUSTMENT OF UNSPECIFIED PROSTHETIC DEVICE: ICD-10-CM

## 2022-11-14 DIAGNOSIS — Z90.10 DEFORMITY DUE TO MASTECTOMY: ICD-10-CM

## 2022-11-14 PROCEDURE — 77338 DESIGN MLC DEVICE FOR IMRT: CPT | Performed by: RADIOLOGY

## 2022-11-14 PROCEDURE — 97165 OT EVAL LOW COMPLEX 30 MIN: CPT | Performed by: OCCUPATIONAL THERAPIST

## 2022-11-14 PROCEDURE — L8000 MASTECTOMY BRA: HCPCS | Performed by: OCCUPATIONAL THERAPIST

## 2022-11-14 PROCEDURE — 77300 RADIATION THERAPY DOSE PLAN: CPT | Performed by: RADIOLOGY

## 2022-11-14 PROCEDURE — L8030 BREAST PROSTHES W/O ADHESIVE: HCPCS | Performed by: OCCUPATIONAL THERAPIST

## 2022-11-14 PROCEDURE — 77301 RADIOTHERAPY DOSE PLAN IMRT: CPT | Performed by: RADIOLOGY

## 2022-11-14 NOTE — THERAPY EVALUATION
Outpatient Occupational Therapy Lymphedema Initial Evaluation   [place]     Patient Name: Marisa Portillo  : 1960  MRN: [id number]  Today's Date: 2022      Visit Date: 2022    Patient Active Problem List   Diagnosis   • Arthritis   • Bladder disorder   • Concussion   • Contact dermatitis and eczema   • Injury, other and unspecified, finger   • Limb swelling   • Seasonal allergic rhinitis   • Vitamin D deficiency   • IFG (impaired fasting glucose)   • Mixed hyperlipidemia   • TIA (transient ischemic attack)   • Malignant neoplasm of overlapping sites of right breast in female, estrogen receptor positive (HCC)   • Port-A-Cath placement   • S/P bilateral nipple sparing mastectomy   • S/P BILATERAL IMMEDIATE BREAST RECONSTRUCTION WITH LEFT PRE PEC PLACEMENT OF SILICONE GEL IMPLANT AND MESH, RIGHT PRE PEC PLACEMENT OF EXPANDER AND MESH   • Sepsis (HCC)   • Breast abscess   • Cellulitis of left breast   • Menopausal hot flushes   • Psychophysiological insomnia   • Recurrent seroma of breast   • Malignant neoplasm of overlapping sites of right female breast (HCC)        Past Medical History:   Diagnosis Date   • Allergies    • Anemia During chemo    • Breast cancer (HCC)    • High cholesterol    • IFG (impaired fasting glucose)    • Osteoarthritis    • S/P BILATERAL IMMEDIATE BREAST RECONSTRUCTION WITH LEFT PRE PEC PLACEMENT OF SILICONE GEL IMPLANT AND MESH, RIGHT PRE PEC PLACEMENT OF EXPANDER AND MESH 2021   • TIA (transient ischemic attack)     no residual   • Vitamin D deficiency         Past Surgical History:   Procedure Laterality Date   • APPENDECTOMY     • BREAST AUGMENTATION Bilateral 2021    Procedure: bilateral breast reconstructon with bilateral mesh placement.   left implant, right tissue expander placement;  Surgeon: Lacy Shelton MD;  Location: [facility] [place] [facility] [place];  Service: Plastics;  Laterality: Bilateral;   • BREAST SURGERY  [id number]    Bilateral Mastectomy with  implant Left expander Right   • BREAST TISSUE EXPANDER REMOVAL INSERTION OF IMPLANT Right 04/15/2022    Procedure: RIGHT BREAST IMPLANT REMOVAL WITH INCISION AND DRAINAGE;  Surgeon: Lacy Shelton MD;  [place] [facility] [place] [facility]  Service: Plastics;  Laterality: Right;   • CEREBRAL ANGIOGRAM      clip placed   • CYST REMOVAL Right     rt wrist   • HYSTERECTOMY      Partial age 31   • INCISION AND DRAINAGE ABSCESS Bilateral 10/4/2022    Procedure: BILATERAL BREAST ABSCESS INCISION AND DRAINAGE, WITH LEFT BREAST IMPLANT REMOVAL;  Surgeon: Lacy Shelton MD;  Location: [facility] MA[facility] OR;  Service: Plastics;  Laterality: Bilateral;   • INCISION AND DRAINAGE OF WOUND Right 03/11/2022    Procedure: INCISION AND DRAINAGE ABSCESS OF RIGHT BREAST WITH EXPANDER REMOVAL AND IMPLANT PLACEMENT;  Surgeon: Lacy Shelton MD;  Location: [facility] OR;  Service: Plastics;  Laterality: Right;   • MASTECTOMY COMPLETE / SIMPLE W/ SENTINEL NODE BIOPSY Bilateral    • PORTACATH PLACEMENT Left 12/28/2021    Procedure: INSERTION OF PORTACATH;  Surgeon: Jacqueline Mcgraw MD;  [place] [facility] [place] [facility]  Service: General;  Laterality: Left;   • SENTINEL NODE BIOPSY Bilateral 12/28/2021    Procedure: BILATERAL BREAST MASTECTOMIES WITH RIGHT SENTINEL NODE BIOPSIES WITH FROZEN SECTIONS;  Surgeon: Jacqueline Mcgraw MD;  Location: [facility];  Service: General;  Laterality: Bilateral;         Visit Dx:     ICD-10-CM ICD-9-CM   1. History of mastectomy, bilateral  Z90.13 V45.71   2. Deformity due to mastectomy  N64.89 611.89    Z90.10    3. Fitting and adjustment of unspecified prosthetic device  Z44.9 V52.9        Patient History     Row Name 11/14/22 1200             History    Chief Complaint --  Breast prosthetics/postmastectomy bra  -TD      Brief Description of Current Complaint [place] had a recent history of breast cancer.  [place] underwent bilateral mastectomies with reconstruction on December 28, 2021.  At that time  the patient had 10 lymph nodes removed.  Patient underwent right breast incision and drainage and removal of right implant on April 15, 2022.  On October 4, 2022 the patient underwent breast debridements of the left breast and breast explantation of the left breast.  -TD         Fall Risk Assessment    Any falls in the past year: No  -TD      Does patient have a fear of falling No  -TD         Services    Are you currently receiving Home Health services No  -TD      Do you plan to receive Home Health services in the near future No  -TD         Daily Activities    Primary Language English  -TD      Are you able to read Yes  -TD      Are you able to write Yes  -TD      How does patient learn best? Listening;Reading;Demonstration;Pictures/Video  -TD         Safety    Are you being hurt, hit, or frightened by anyone at home or in your life? No  -TD      Are you being neglected by a caregiver No  -TD      Have you had any of the following issues with N/A  -TD            User Key  (r) = Recorded By, (t) = Taken By, (c) = Cosigned By    Initials Name Provider Type    Bárbara Acevedo OT Occupational Therapist                     OT Ortho     Row Name 11/14/22 1200             Orthotics & Prosthetics Management    Orthosis Location other (see comments)  Breast prosthesis and mastectomy bra orthosis  -TD      Additional Documentation Orthosis Location (Row)  -TD            User Key  (r) = Recorded By, (t) = Taken By, (c) = Cosigned By    Initials Name Provider Type    Bárbara Acevedo OT Occupational Therapist               OT Mastectomy Care:   Location: Bilateral Chest Wall    Type of Prosthetic: silicone breast form    Reason for Visit/Customer Goal:  Patient would like to achieve symmetry and balance in appearance to improve orthopedic balance as well as improve psychological impact when engaging in community and social activities.    Reason for Prosthetic: Prevent Deformities    Date of Surgery: 12/28/21    Date of  Discharge: 12/29/21    Wearing Schedule: As Tolerated    Prosthetic Site Condition: Intact    Tolerance: Tolerates Well    Product :  Junior    Product Name and Model Number: 400 Natura Xtra Light 2Sn size 8                                                               SN: [id number]                                                               400 Natura Xtra Light 2Sn size 8                                                               SN: [id number]     Garments  Type of Garment Dispensed: Mast Bra w/o Breast Form    Breast : Junior    Product Name:   2- 1152 Power SB 42 B black/grey  2- 12835 Dyan Satin SB 40C nude  2- 1150 Tayler SB 42 B light nude    Number of Garments Dispensed: 6      Therapy Education  Education Details: Pt. was fitted with a (2) 400 Natura Xtra LIght 2Sn size 8 silicone breast prosthesis. See below.  Fit and size are appropriate with no gapping, pinching, or puckering observed.  Pt. states comfort and satisfaction with both bra's selected and with prosthesis.  All devices were checked for quality and safety.  Pt. was educated on the benefits, precautions warranty, care and maintenance for her prosthesis and bras.  Educational handout was provided in the prosthesis box.  Given: Symptoms/condition management, Pain management, HEP  Program: New  How Provided: Verbal  Provided to: Patient  Level of Understanding: Verbalized         OT Goals     Row Name 11/14/22 1215          Time Calculation    OT Goal Re-Cert Due Date 12/14/22  -TD           User Key  (r) = Recorded By, (t) = Taken By, (c) = Cosigned By    Initials Name Provider Type    Bárbara Acevedo OT Occupational Therapist              1. Encounter for Breast Prosthetic and mastectomy bra fitting:  LTG 1:  90 days: To provide balance and symmetry for performance of daily activity, the patient will participate in breast prosthesis and mastectomy bra fitting procedure.   STATUS: New  STG 1a: 30 days:  Patient  will participate in mastectomy bra fit re-evaluation to ensure proper fit for balance and symmetry for improved postural alignment/lymph fluid dynamics to prevent impairment in activities of daily living.   STATUS: New  STG 1b: 30 days:  Patient will participate in breast prosthesis fit re-evaluation 2 years after initial distribution to ensure proper fit for balance and symmetry for improved postural alignment/lymph fluid dynamics to prevent impairment in activities of daily living.  STATUS: New  TREATMENT: Orthotic/Prosthetic Management and Training, [place] Silicone Breast Prosthetic, Mastectomy Bra initial fitting and 12 month re-fitting, ADL/Self Care, Therapeutic Activity, Therapeutic Exercise, and Manual Therapy.       OT Assessment/Plan     Row Name 11/14/22 1211          OT Assessment    Functional Limitations Performance in self-care ADL  -TD     Impairments Impaired lymphatic circulation;Pain  -TD     Assessment Comments Pt presents with decreased balance and symmetry from breast resection that impacts orthopedic balance and symmetry.  Patient will benefit from continued evaluation and of integrity of the silicone breast form as well as the mastectomy bras to ensure proper fit for symmetry and balance of breast prosthesis to reduce orthopedic and lymphatic impairment. The skills of a therapist will be required to safely and effectively implement the following treatment plan to restore maximal level of function.  -TD     OT Diagnosis History of bilateral mastectomies, deformity due to mastectomy, fitting and adjustment for orthosis  -TD     OT Rehab Potential Excellent  -TD     Patient/caregiver participated in establishment of treatment plan and goals Yes  -TD     Patient would benefit from skilled therapy intervention Yes  -TD        OT Plan    OT Frequency Other (comment)  See duration  -TD     Predicted Duration of Therapy Intervention (OT) Patient will be seen once a year for dispensing mastectomy bras  and every 2 years for breast prosthetics  -TD     Planned CPT's? OT EVAL LOW COMPLEXITY: 98845;OT RE-EVAL: 77324;OT THER ACT EA 15 MIN: 14395NX;OT THER PROC EA 15 MIN: 48911HS;OT SELF CARE/MGMT/TRAIN 15 MIN: 16526;OT MANUAL THERAPY EA 15 MIN: 32702;OT BIS XTRACELL FLUID ANALYSIS: 32908;OT ORTHO/PROSTHET CHECKOUT EA 15 MIN: 85877;OT ORTHOTIC MGMT/TRAIN EA 15 MIN: 25114;OT CARE PLAN EA 15 MIN  -TD     Planned Therapy Interventions (Optional Details) home exercise program;orthotic fitting/training;patient/family education;postural re-education;prosthetic fitting/training;ROM (Range of Motion)  -TD     OT Plan Comments Continue plan of care  -TD           User Key  (r) = Recorded By, (t) = Taken By, (c) = Cosigned By    Initials Name Provider Type    TD Bárbara Randall OT Occupational Therapist                          Time Calculation:   Untimed Charges  OT Eval/Re-eval Minutes: 90  Total Minutes  Untimed Charges Total Minutes: 90   Total Minutes: 90     Therapy Charges for Today     Code Description Service Date Service Provider Modifiers Qty    80348986580 HC BREAST PROSTHESIS WO ADHS NATURA AMOENA 11/14/2022 Bárbara Randall OT  2    39761072531 HC OT EVAL LOW COMPLEXITY 4 11/14/2022 Bárbara Randall OT GO 1    47158157553 HC BRA POST/SURG NO/FORM LAURO/ALYSSA/NENITA/POWER/TIFFANY 11/14/2022 Bárbara Randall OT  6                    Bárbara Randall OT  11/14/2022

## 2022-11-15 ENCOUNTER — HOSPITAL ENCOUNTER (OUTPATIENT)
Dept: RADIATION ONCOLOGY | Facility: HOSPITAL | Age: 62
Discharge: HOME | End: 2022-11-15

## 2022-11-15 ENCOUNTER — APPOINTMENT (OUTPATIENT)
Dept: CARDIOLOGY | Facility: HOSPITAL | Age: 62
End: 2022-11-15

## 2022-11-15 VITALS
SYSTOLIC BLOOD PRESSURE: 126 MMHG | WEIGHT: 179.01 LBS | RESPIRATION RATE: 12 BRPM | DIASTOLIC BLOOD PRESSURE: 78 MMHG | HEART RATE: 86 BPM | TEMPERATURE: 97.4 F | BODY MASS INDEX: 30.71 KG/M2 | OXYGEN SATURATION: 95 %

## 2022-11-15 DIAGNOSIS — C50.811 MALIGNANT NEOPLASM OF OVERLAPPING SITES OF RIGHT BREAST IN FEMALE, ESTROGEN RECEPTOR POSITIVE: Primary | ICD-10-CM

## 2022-11-15 DIAGNOSIS — Z17.0 MALIGNANT NEOPLASM OF OVERLAPPING SITES OF RIGHT BREAST IN FEMALE, ESTROGEN RECEPTOR POSITIVE: Primary | ICD-10-CM

## 2022-11-15 LAB
RAD ONC ARIA COURSE ID: NORMAL
RAD ONC ARIA COURSE INTENT: NORMAL
RAD ONC ARIA COURSE LAST TREATMENT DATE: NORMAL
RAD ONC ARIA COURSE START DATE: NORMAL
RAD ONC ARIA COURSE TREATMENT ELAPSED DAYS: 0
RAD ONC ARIA FIRST TREATMENT DATE: NORMAL
RAD ONC ARIA PLAN FRACTIONS TREATED TO DATE: 1
RAD ONC ARIA PLAN ID: NORMAL
RAD ONC ARIA PLAN PRESCRIBED DOSE PER FRACTION: 2 GY
RAD ONC ARIA PLAN PRIMARY REFERENCE POINT: NORMAL
RAD ONC ARIA PLAN TOTAL FRACTIONS PRESCRIBED: 25
RAD ONC ARIA PLAN TOTAL PRESCRIBED DOSE: 5000 CGY
RAD ONC ARIA REFERENCE POINT DOSAGE GIVEN TO DATE: 2 GY
RAD ONC ARIA REFERENCE POINT ID: NORMAL
RAD ONC ARIA REFERENCE POINT SESSION DOSAGE GIVEN: 2 GY

## 2022-11-15 PROCEDURE — 77385: CPT | Performed by: RADIOLOGY

## 2022-11-15 PROCEDURE — 77427 RADIATION TX MANAGEMENT X5: CPT | Performed by: RADIOLOGY

## 2022-11-15 PROCEDURE — 77014 CHG CT GUIDANCE RADIATION THERAPY FLDS PLACEMENT: CPT | Performed by: RADIOLOGY

## 2022-11-15 NOTE — PROGRESS NOTES
On Treatment Visit       Patient: Marisa Portillo   YOB: 1960   Medical Record Number: [id number]     Date of Visit  November 15, 2022   Primary Diagnosis:Malignant neoplasm of overlapping sites of right breast in female, estrogen receptor positive (HCC) [C50.811, Z17.0]  Cancer Staging: Cancer Staging   Malignant neoplasm of overlapping sites of right breast in female, estrogen receptor positive (HCC)  Staging form: Breast, AJCC 8th Edition  - Pathologic stage from 12/28/2021: Stage IB (pT2, pN2a, cM0, G2, ER+, VA+, HER2-) - Signed by Starr Mendez MD PhD on 1/30/2022         was seen today for an on treatment visit.  She is receiving radiation therapy to the right chest wall. She  has received 200 cGy in 1 fractions out of a planned dose of 5000 cGy in 25 fractions.     Today on exam the patient is tolerating radiation therapy well and has no new disease or treatment-related complaints.                                           Review of Systems:   Review of Systems   Constitutional: Positive for fatigue (1-2/10). Negative for appetite change and unexpected weight change.   HENT: Negative for sore throat and trouble swallowing.    Respiratory: Negative for cough and shortness of breath.    Cardiovascular: Negative for chest pain, palpitations and leg swelling.   Gastrointestinal: Negative for constipation, diarrhea, nausea and vomiting.   Endocrine: Positive for heat intolerance. Polyphagia: On Effexor, tolerable.   Genitourinary: Negative for difficulty urinating, dysuria, frequency and urgency.   Musculoskeletal: Positive for arthralgias (Ongoing, r/t osteoarthritis) and joint swelling (Left ankle, ongoing). Negative for back pain and gait problem.        Occasional right sided chest pain (Muscle)     Skin: Negative for color change and rash.   Neurological: Negative for dizziness, weakness and headaches.   Psychiatric/Behavioral: Positive for sleep disturbance (Insomnia, ongoing  since last December.).       Vitals:     Vitals:    11/15/22 1038   BP: 126/78   Pulse: 86   Resp: 12   Temp: 97.4 °F (36.3 °C)   SpO2: 95%       Weight:   Wt Readings from Last 3 Encounters:   11/15/22 81.2 kg (179 lb 0.2 oz)   11/07/22 79.8 kg (175 lb 14.8 oz)   10/24/22 79.3 kg (174 lb 13.2 oz)      Pain:    Pain Score    11/15/22 1038   PainSc: 0-No pain         Physical Exam:  Gen: WD/WN; NAD  HEENT: MMM  Trachea: midline  Chest: symmetric  Resp: normal respiratory effort  Extr: warm, well-perfused  Neuro: awake and alert; no aphasia or neglect    Plan: I have reviewed treatment setup notes, checked and approved the daily guidance images.  I reviewed dose delivery, treatment parameters and deemed them appropriate. We plan to continue radiation therapy as prescribed.          Radiation Oncology    Electronically signed by Dudley Martinez MD 11/15/2022  11:38 EST

## 2022-11-16 ENCOUNTER — HOSPITAL ENCOUNTER (OUTPATIENT)
Dept: RADIATION ONCOLOGY | Facility: HOSPITAL | Age: 62
Discharge: HOME | End: 2022-11-16

## 2022-11-16 LAB
RAD ONC ARIA COURSE ID: NORMAL
RAD ONC ARIA COURSE INTENT: NORMAL
RAD ONC ARIA COURSE LAST TREATMENT DATE: NORMAL
RAD ONC ARIA COURSE START DATE: NORMAL
RAD ONC ARIA COURSE TREATMENT ELAPSED DAYS: 1
RAD ONC ARIA FIRST TREATMENT DATE: NORMAL
RAD ONC ARIA PLAN FRACTIONS TREATED TO DATE: 2
RAD ONC ARIA PLAN ID: NORMAL
RAD ONC ARIA PLAN PRESCRIBED DOSE PER FRACTION: 2 GY
RAD ONC ARIA PLAN PRIMARY REFERENCE POINT: NORMAL
RAD ONC ARIA PLAN TOTAL FRACTIONS PRESCRIBED: 25
RAD ONC ARIA PLAN TOTAL PRESCRIBED DOSE: 5000 CGY
RAD ONC ARIA REFERENCE POINT DOSAGE GIVEN TO DATE: 4 GY
RAD ONC ARIA REFERENCE POINT ID: NORMAL
RAD ONC ARIA REFERENCE POINT SESSION DOSAGE GIVEN: 2 GY

## 2022-11-16 PROCEDURE — 77014 CHG CT GUIDANCE RADIATION THERAPY FLDS PLACEMENT: CPT | Performed by: RADIOLOGY

## 2022-11-16 PROCEDURE — 77385: CPT | Performed by: RADIOLOGY

## 2022-11-17 ENCOUNTER — HOSPITAL ENCOUNTER (OUTPATIENT)
Dept: RADIATION ONCOLOGY | Facility: HOSPITAL | Age: 62
Discharge: HOME | End: 2022-11-17

## 2022-11-17 LAB
RAD ONC ARIA COURSE ID: NORMAL
RAD ONC ARIA COURSE INTENT: NORMAL
RAD ONC ARIA COURSE LAST TREATMENT DATE: NORMAL
RAD ONC ARIA COURSE START DATE: NORMAL
RAD ONC ARIA COURSE TREATMENT ELAPSED DAYS: 2
RAD ONC ARIA FIRST TREATMENT DATE: NORMAL
RAD ONC ARIA PLAN FRACTIONS TREATED TO DATE: 3
RAD ONC ARIA PLAN ID: NORMAL
RAD ONC ARIA PLAN PRESCRIBED DOSE PER FRACTION: 2 GY
RAD ONC ARIA PLAN PRIMARY REFERENCE POINT: NORMAL
RAD ONC ARIA PLAN TOTAL FRACTIONS PRESCRIBED: 25
RAD ONC ARIA PLAN TOTAL PRESCRIBED DOSE: 5000 CGY
RAD ONC ARIA REFERENCE POINT DOSAGE GIVEN TO DATE: 6 GY
RAD ONC ARIA REFERENCE POINT ID: NORMAL
RAD ONC ARIA REFERENCE POINT SESSION DOSAGE GIVEN: 2 GY

## 2022-11-17 PROCEDURE — 77385: CPT | Performed by: RADIOLOGY

## 2022-11-17 PROCEDURE — 77014 CHG CT GUIDANCE RADIATION THERAPY FLDS PLACEMENT: CPT | Performed by: RADIOLOGY

## 2022-11-17 NOTE — PROGRESS NOTES
Pt reports she ordered [employer] Standard 1.0 shakes a few months ago and due to the company recall, she was never able to drink them.  She reports she has been unsuccessful in reaching the company for a replacement case of the shakes.  Oncology RD contacted [employer] rep, Sung Radford, and ordered pt another case to be shipped to her home address (Standard 1.0 oral nutrition shake in vanilla flavor).    Oncology RD remains available as needed.

## 2022-11-18 ENCOUNTER — HOSPITAL ENCOUNTER (OUTPATIENT)
Dept: RADIATION ONCOLOGY | Facility: HOSPITAL | Age: 62
Discharge: HOME | End: 2022-11-18

## 2022-11-18 ENCOUNTER — DOCUMENTATION (OUTPATIENT)
Dept: RADIATION ONCOLOGY | Facility: HOSPITAL | Age: 62
End: 2022-11-18

## 2022-11-18 LAB
RAD ONC ARIA COURSE ID: NORMAL
RAD ONC ARIA COURSE INTENT: NORMAL
RAD ONC ARIA COURSE LAST TREATMENT DATE: NORMAL
RAD ONC ARIA COURSE START DATE: NORMAL
RAD ONC ARIA COURSE TREATMENT ELAPSED DAYS: 3
RAD ONC ARIA FIRST TREATMENT DATE: NORMAL
RAD ONC ARIA PLAN FRACTIONS TREATED TO DATE: 4
RAD ONC ARIA PLAN ID: NORMAL
RAD ONC ARIA PLAN PRESCRIBED DOSE PER FRACTION: 2 GY
RAD ONC ARIA PLAN PRIMARY REFERENCE POINT: NORMAL
RAD ONC ARIA PLAN TOTAL FRACTIONS PRESCRIBED: 25
RAD ONC ARIA PLAN TOTAL PRESCRIBED DOSE: 5000 CGY
RAD ONC ARIA REFERENCE POINT DOSAGE GIVEN TO DATE: 8 GY
RAD ONC ARIA REFERENCE POINT ID: NORMAL
RAD ONC ARIA REFERENCE POINT SESSION DOSAGE GIVEN: 2 GY

## 2022-11-18 PROCEDURE — 77385: CPT | Performed by: RADIOLOGY

## 2022-11-18 PROCEDURE — 77014 CHG CT GUIDANCE RADIATION THERAPY FLDS PLACEMENT: CPT | Performed by: RADIOLOGY

## 2022-11-18 NOTE — PROGRESS NOTES
Diagnosis: Breast Cancer    Reason for Referral: Rounding with patients receiving radiation therapy    INSURANCE: [employer] and [employer] PPO    Distress Score: 2 on 12/9/21 Patient has not increased in distress today.    PHQ Score: 0 as of 8/23/22    Mood: OSW met with patient after her radiation treatment. Patient was very pleasant and good energy level. 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 She did not indicate any concerns with depression or anxiety. OSW provided emotional support through active listening, empathy, and encouragement. Patient is planning to enjoy the holidays with her family.    Previous Cancer TX: No prior Cancer diagnosis reported.    Hobbies: Patient enjoys multiple activities such as gardening, flowers, exercise, meeting friends for lunch or coffee, farming, cooking, and providing care for her mother and mother-in-law.    Support systems: Patient reported she has a very good support with her , 2 daughters who live nearby and son-in-laws, one son who lives in [place] [facility] and 3 grandchildren ages 11, 7, and 5. Patient has tons of friends and [employer] family. Patient stated she was truly blessed with a large support system and a large extended family.     Transportation: Patient drives herself to treatment if needed her  or friends and family would assist.     Finances: Patient reported having sufficient income to meet basic needs and insurance through work. She works 2 days a week at [employer] [place]. OSW reviewed her concerns for health insurance and deductibles, coinsurance, and maximum out of pocket is from year to year. Patient was made aware of monthly payments that can be arranged through the financial office if needed.    Residential status/Who lives in the home: Patient stated she and her  live in their home with no pets.     Home Care Needs: None identified at this time.    Resources/Referrals: OSW  provided patient with her business card and overview of services. Patient expressed appreciation for meeting with  today. Patient was very inspirational in her positive outlook and energetic.

## 2022-11-20 ENCOUNTER — HOSPITAL ENCOUNTER (OUTPATIENT)
Dept: RADIATION ONCOLOGY | Facility: HOSPITAL | Age: 62
Discharge: HOME | End: 2022-11-20

## 2022-11-20 LAB
RAD ONC ARIA COURSE ID: NORMAL
RAD ONC ARIA COURSE INTENT: NORMAL
RAD ONC ARIA COURSE LAST TREATMENT DATE: NORMAL
RAD ONC ARIA COURSE START DATE: NORMAL
RAD ONC ARIA COURSE TREATMENT ELAPSED DAYS: 5
RAD ONC ARIA FIRST TREATMENT DATE: NORMAL
RAD ONC ARIA PLAN FRACTIONS TREATED TO DATE: 5
RAD ONC ARIA PLAN ID: NORMAL
RAD ONC ARIA PLAN PRESCRIBED DOSE PER FRACTION: 2 GY
RAD ONC ARIA PLAN PRIMARY REFERENCE POINT: NORMAL
RAD ONC ARIA PLAN TOTAL FRACTIONS PRESCRIBED: 25
RAD ONC ARIA PLAN TOTAL PRESCRIBED DOSE: 5000 CGY
RAD ONC ARIA REFERENCE POINT DOSAGE GIVEN TO DATE: 10 GY
RAD ONC ARIA REFERENCE POINT ID: NORMAL
RAD ONC ARIA REFERENCE POINT SESSION DOSAGE GIVEN: 2 GY

## 2022-11-20 PROCEDURE — 77336 RADIATION PHYSICS CONSULT: CPT | Performed by: RADIOLOGY

## 2022-11-20 PROCEDURE — 77385: CPT | Performed by: RADIOLOGY

## 2022-11-20 PROCEDURE — 77014 CHG CT GUIDANCE RADIATION THERAPY FLDS PLACEMENT: CPT | Performed by: RADIOLOGY

## 2022-11-21 ENCOUNTER — HOSPITAL ENCOUNTER (OUTPATIENT)
Dept: RADIATION ONCOLOGY | Facility: HOSPITAL | Age: 62
Discharge: HOME | End: 2022-11-21

## 2022-11-21 LAB
RAD ONC ARIA COURSE ID: NORMAL
RAD ONC ARIA COURSE INTENT: NORMAL
RAD ONC ARIA COURSE LAST TREATMENT DATE: NORMAL
RAD ONC ARIA COURSE START DATE: NORMAL
RAD ONC ARIA COURSE TREATMENT ELAPSED DAYS: 6
RAD ONC ARIA FIRST TREATMENT DATE: NORMAL
RAD ONC ARIA PLAN FRACTIONS TREATED TO DATE: 6
RAD ONC ARIA PLAN ID: NORMAL
RAD ONC ARIA PLAN PRESCRIBED DOSE PER FRACTION: 2 GY
RAD ONC ARIA PLAN PRIMARY REFERENCE POINT: NORMAL
RAD ONC ARIA PLAN TOTAL FRACTIONS PRESCRIBED: 25
RAD ONC ARIA PLAN TOTAL PRESCRIBED DOSE: 5000 CGY
RAD ONC ARIA REFERENCE POINT DOSAGE GIVEN TO DATE: 12 GY
RAD ONC ARIA REFERENCE POINT ID: NORMAL
RAD ONC ARIA REFERENCE POINT SESSION DOSAGE GIVEN: 2 GY

## 2022-11-21 PROCEDURE — 77427 RADIATION TX MANAGEMENT X5: CPT | Performed by: RADIOLOGY

## 2022-11-21 PROCEDURE — 77014 CHG CT GUIDANCE RADIATION THERAPY FLDS PLACEMENT: CPT | Performed by: RADIOLOGY

## 2022-11-21 PROCEDURE — 77385: CPT | Performed by: RADIOLOGY

## 2022-11-22 ENCOUNTER — HOSPITAL ENCOUNTER (OUTPATIENT)
Dept: RADIATION ONCOLOGY | Facility: HOSPITAL | Age: 62
Discharge: HOME | End: 2022-11-22

## 2022-11-22 VITALS
TEMPERATURE: 98.2 F | DIASTOLIC BLOOD PRESSURE: 82 MMHG | RESPIRATION RATE: 16 BRPM | HEART RATE: 85 BPM | WEIGHT: 175.93 LBS | SYSTOLIC BLOOD PRESSURE: 135 MMHG | BODY MASS INDEX: 30.18 KG/M2

## 2022-11-22 DIAGNOSIS — C50.811 MALIGNANT NEOPLASM OF OVERLAPPING SITES OF RIGHT BREAST IN FEMALE, ESTROGEN RECEPTOR POSITIVE: Primary | ICD-10-CM

## 2022-11-22 DIAGNOSIS — Z17.0 MALIGNANT NEOPLASM OF OVERLAPPING SITES OF RIGHT BREAST IN FEMALE, ESTROGEN RECEPTOR POSITIVE: Primary | ICD-10-CM

## 2022-11-22 LAB
RAD ONC ARIA COURSE ID: NORMAL
RAD ONC ARIA COURSE INTENT: NORMAL
RAD ONC ARIA COURSE LAST TREATMENT DATE: NORMAL
RAD ONC ARIA COURSE START DATE: NORMAL
RAD ONC ARIA COURSE TREATMENT ELAPSED DAYS: 7
RAD ONC ARIA FIRST TREATMENT DATE: NORMAL
RAD ONC ARIA PLAN FRACTIONS TREATED TO DATE: 7
RAD ONC ARIA PLAN ID: NORMAL
RAD ONC ARIA PLAN PRESCRIBED DOSE PER FRACTION: 2 GY
RAD ONC ARIA PLAN PRIMARY REFERENCE POINT: NORMAL
RAD ONC ARIA PLAN TOTAL FRACTIONS PRESCRIBED: 25
RAD ONC ARIA PLAN TOTAL PRESCRIBED DOSE: 5000 CGY
RAD ONC ARIA REFERENCE POINT DOSAGE GIVEN TO DATE: 14 GY
RAD ONC ARIA REFERENCE POINT ID: NORMAL
RAD ONC ARIA REFERENCE POINT SESSION DOSAGE GIVEN: 2 GY

## 2022-11-22 PROCEDURE — 77385: CPT | Performed by: RADIOLOGY

## 2022-11-22 PROCEDURE — 77014 CHG CT GUIDANCE RADIATION THERAPY FLDS PLACEMENT: CPT | Performed by: RADIOLOGY

## 2022-11-23 ENCOUNTER — HOSPITAL ENCOUNTER (OUTPATIENT)
Dept: RADIATION ONCOLOGY | Facility: HOSPITAL | Age: 62
Discharge: HOME | End: 2022-11-23

## 2022-11-23 LAB
RAD ONC ARIA COURSE ID: NORMAL
RAD ONC ARIA COURSE INTENT: NORMAL
RAD ONC ARIA COURSE LAST TREATMENT DATE: NORMAL
RAD ONC ARIA COURSE START DATE: NORMAL
RAD ONC ARIA COURSE TREATMENT ELAPSED DAYS: 8
RAD ONC ARIA FIRST TREATMENT DATE: NORMAL
RAD ONC ARIA PLAN FRACTIONS TREATED TO DATE: 8
RAD ONC ARIA PLAN ID: NORMAL
RAD ONC ARIA PLAN PRESCRIBED DOSE PER FRACTION: 2 GY
RAD ONC ARIA PLAN PRIMARY REFERENCE POINT: NORMAL
RAD ONC ARIA PLAN TOTAL FRACTIONS PRESCRIBED: 25
RAD ONC ARIA PLAN TOTAL PRESCRIBED DOSE: 5000 CGY
RAD ONC ARIA REFERENCE POINT DOSAGE GIVEN TO DATE: 16 GY
RAD ONC ARIA REFERENCE POINT ID: NORMAL
RAD ONC ARIA REFERENCE POINT SESSION DOSAGE GIVEN: 2 GY

## 2022-11-23 PROCEDURE — 77385: CPT | Performed by: RADIOLOGY

## 2022-11-23 PROCEDURE — 77014 CHG CT GUIDANCE RADIATION THERAPY FLDS PLACEMENT: CPT | Performed by: RADIOLOGY

## 2022-11-28 ENCOUNTER — HOSPITAL ENCOUNTER (OUTPATIENT)
Dept: RADIATION ONCOLOGY | Facility: HOSPITAL | Age: 62
Discharge: HOME | End: 2022-11-28

## 2022-11-28 LAB
RAD ONC ARIA COURSE ID: NORMAL
RAD ONC ARIA COURSE INTENT: NORMAL
RAD ONC ARIA COURSE LAST TREATMENT DATE: NORMAL
RAD ONC ARIA COURSE START DATE: NORMAL
RAD ONC ARIA COURSE TREATMENT ELAPSED DAYS: 13
RAD ONC ARIA FIRST TREATMENT DATE: NORMAL
RAD ONC ARIA PLAN FRACTIONS TREATED TO DATE: 9
RAD ONC ARIA PLAN ID: NORMAL
RAD ONC ARIA PLAN PRESCRIBED DOSE PER FRACTION: 2 GY
RAD ONC ARIA PLAN PRIMARY REFERENCE POINT: NORMAL
RAD ONC ARIA PLAN TOTAL FRACTIONS PRESCRIBED: 25
RAD ONC ARIA PLAN TOTAL PRESCRIBED DOSE: 5000 CGY
RAD ONC ARIA REFERENCE POINT DOSAGE GIVEN TO DATE: 18 GY
RAD ONC ARIA REFERENCE POINT ID: NORMAL
RAD ONC ARIA REFERENCE POINT SESSION DOSAGE GIVEN: 2 GY

## 2022-11-28 PROCEDURE — 77014 CHG CT GUIDANCE RADIATION THERAPY FLDS PLACEMENT: CPT | Performed by: RADIOLOGY

## 2022-11-28 PROCEDURE — 77385: CPT | Performed by: RADIOLOGY

## 2022-11-29 ENCOUNTER — HOSPITAL ENCOUNTER (OUTPATIENT)
Dept: RADIATION ONCOLOGY | Facility: HOSPITAL | Age: 62
Discharge: HOME | End: 2022-11-29

## 2022-11-29 VITALS
DIASTOLIC BLOOD PRESSURE: 85 MMHG | TEMPERATURE: 97 F | SYSTOLIC BLOOD PRESSURE: 142 MMHG | HEART RATE: 96 BPM | WEIGHT: 177 LBS | RESPIRATION RATE: 16 BRPM | OXYGEN SATURATION: 98 % | BODY MASS INDEX: 30.37 KG/M2

## 2022-11-29 DIAGNOSIS — C50.811 MALIGNANT NEOPLASM OF OVERLAPPING SITES OF RIGHT BREAST IN FEMALE, ESTROGEN RECEPTOR POSITIVE: Primary | ICD-10-CM

## 2022-11-29 DIAGNOSIS — Z17.0 MALIGNANT NEOPLASM OF OVERLAPPING SITES OF RIGHT BREAST IN FEMALE, ESTROGEN RECEPTOR POSITIVE: Primary | ICD-10-CM

## 2022-11-29 LAB
RAD ONC ARIA COURSE ID: NORMAL
RAD ONC ARIA COURSE INTENT: NORMAL
RAD ONC ARIA COURSE LAST TREATMENT DATE: NORMAL
RAD ONC ARIA COURSE START DATE: NORMAL
RAD ONC ARIA COURSE TREATMENT ELAPSED DAYS: 14
RAD ONC ARIA FIRST TREATMENT DATE: NORMAL
RAD ONC ARIA PLAN FRACTIONS TREATED TO DATE: 10
RAD ONC ARIA PLAN ID: NORMAL
RAD ONC ARIA PLAN PRESCRIBED DOSE PER FRACTION: 2 GY
RAD ONC ARIA PLAN PRIMARY REFERENCE POINT: NORMAL
RAD ONC ARIA PLAN TOTAL FRACTIONS PRESCRIBED: 25
RAD ONC ARIA PLAN TOTAL PRESCRIBED DOSE: 5000 CGY
RAD ONC ARIA REFERENCE POINT DOSAGE GIVEN TO DATE: 20 GY
RAD ONC ARIA REFERENCE POINT ID: NORMAL
RAD ONC ARIA REFERENCE POINT SESSION DOSAGE GIVEN: 2 GY

## 2022-11-29 PROCEDURE — 77014 CHG CT GUIDANCE RADIATION THERAPY FLDS PLACEMENT: CPT | Performed by: RADIOLOGY

## 2022-11-29 PROCEDURE — 77385: CPT | Performed by: RADIOLOGY

## 2022-11-29 PROCEDURE — 77336 RADIATION PHYSICS CONSULT: CPT | Performed by: RADIOLOGY

## 2022-11-29 NOTE — PROGRESS NOTES
On Treatment Visit       Patient: Marisa Portillo   YOB: 1960   Medical Record Number: [id number]     Date of Visit  November 29, 2022   Primary Diagnosis:Malignant neoplasm of overlapping sites of right breast in female, estrogen receptor positive (HCC) [C50.811, Z17.0]  Cancer Staging: Cancer Staging   Malignant neoplasm of overlapping sites of right breast in female, estrogen receptor positive (HCC)  Staging form: Breast, AJCC 8th Edition  - Pathologic stage from 12/28/2021: Stage IB (pT2, pN2a, cM0, G2, ER+, TX+, HER2-) - Signed by Starr Menedz MD PhD on 1/30/2022         was seen today for an on treatment visit.  She is receiving radiation therapy to the right chest wall. She  has received 2000 cGy in 10 fractions out of a planned dose of 5000 cGy in 25 fractions.     Slight increase in right chest wall redness.  Mild fatigue.  No other complaints                                          Review of Systems:   Review of Systems   Constitutional: Positive for fatigue (1-2/10). Negative for appetite change and unexpected weight change.   HENT: Negative for sore throat and trouble swallowing.    Respiratory: Negative for cough and shortness of breath.    Gastrointestinal: Negative for constipation, diarrhea, nausea and vomiting.   Endocrine: Positive for heat intolerance. Polyphagia: On Effexor, tolerable.   Genitourinary: Negative for difficulty urinating, dysuria, frequency and urgency.   Musculoskeletal: Positive for arthralgias (Ongoing, r/t osteoarthritis) and joint swelling (Left ankle, ongoing). Negative for back pain and gait problem.        Occasional right sided chest pain (Muscle)     Skin: Positive for color change (mild erythema). Negative for rash.   Neurological: Negative for dizziness, weakness and headaches.   Psychiatric/Behavioral: Positive for sleep disturbance (Insomnia, ongoing since last December.).       Vitals:     Vitals:    11/29/22 0757   BP: 142/85    Pulse: 96   Resp: 16   Temp: 97 °F (36.1 °C)   SpO2: 98%       Weight:   Wt Readings from Last 3 Encounters:   11/29/22 80.3 kg (177 lb)   11/22/22 79.8 kg (175 lb 14.8 oz)   11/15/22 81.2 kg (179 lb 0.2 oz)      Pain:    Pain Score    11/29/22 0757   PainSc:   3   PainLoc: Hip         Physical Exam:  Gen: WD/WN; NAD  HEENT: MMM  Trachea: midline  Chest: symmetric; faint rubor persistent  Resp: normal respiratory effort  Extr: warm, well-perfused  Neuro: awake and alert; no aphasia or neglect    Plan: I have reviewed treatment setup notes, checked and approved the daily guidance images.  I reviewed dose delivery, treatment parameters and deemed them appropriate. We plan to continue radiation therapy as prescribed.          Radiation Oncology    Electronically signed by Dudley Martinez MD 11/29/2022  08:30 EST

## 2022-11-30 ENCOUNTER — OFFICE VISIT (OUTPATIENT)
Dept: ONCOLOGY | Facility: HOSPITAL | Age: 62
End: 2022-11-30

## 2022-11-30 ENCOUNTER — NURSE NAVIGATOR (OUTPATIENT)
Dept: ONCOLOGY | Facility: HOSPITAL | Age: 62
End: 2022-11-30

## 2022-11-30 ENCOUNTER — HOSPITAL ENCOUNTER (OUTPATIENT)
Dept: RADIATION ONCOLOGY | Facility: HOSPITAL | Age: 62
Discharge: HOME | End: 2022-11-30

## 2022-11-30 ENCOUNTER — HOSPITAL ENCOUNTER (OUTPATIENT)
Dept: ONCOLOGY | Facility: HOSPITAL | Age: 62
Setting detail: INFUSION SERIES
Discharge: HOME | End: 2022-11-30

## 2022-11-30 VITALS
BODY MASS INDEX: 30.64 KG/M2 | SYSTOLIC BLOOD PRESSURE: 147 MMHG | DIASTOLIC BLOOD PRESSURE: 70 MMHG | TEMPERATURE: 97.8 F | HEART RATE: 95 BPM | OXYGEN SATURATION: 100 % | WEIGHT: 178.57 LBS | RESPIRATION RATE: 16 BRPM

## 2022-11-30 DIAGNOSIS — Z17.0 MALIGNANT NEOPLASM OF OVERLAPPING SITES OF RIGHT BREAST IN FEMALE, ESTROGEN RECEPTOR POSITIVE: Primary | ICD-10-CM

## 2022-11-30 DIAGNOSIS — Z17.0 MALIGNANT NEOPLASM OF OVERLAPPING SITES OF RIGHT BREAST IN FEMALE, ESTROGEN RECEPTOR POSITIVE: ICD-10-CM

## 2022-11-30 DIAGNOSIS — C50.811 MALIGNANT NEOPLASM OF OVERLAPPING SITES OF RIGHT BREAST IN FEMALE, ESTROGEN RECEPTOR POSITIVE: Primary | ICD-10-CM

## 2022-11-30 DIAGNOSIS — R23.2 HOT FLASHES: ICD-10-CM

## 2022-11-30 DIAGNOSIS — C50.811 MALIGNANT NEOPLASM OF OVERLAPPING SITES OF RIGHT BREAST IN FEMALE, ESTROGEN RECEPTOR POSITIVE: ICD-10-CM

## 2022-11-30 LAB
RAD ONC ARIA COURSE ID: NORMAL
RAD ONC ARIA COURSE INTENT: NORMAL
RAD ONC ARIA COURSE LAST TREATMENT DATE: NORMAL
RAD ONC ARIA COURSE START DATE: NORMAL
RAD ONC ARIA COURSE TREATMENT ELAPSED DAYS: 15
RAD ONC ARIA FIRST TREATMENT DATE: NORMAL
RAD ONC ARIA PLAN FRACTIONS TREATED TO DATE: 11
RAD ONC ARIA PLAN ID: NORMAL
RAD ONC ARIA PLAN PRESCRIBED DOSE PER FRACTION: 2 GY
RAD ONC ARIA PLAN PRIMARY REFERENCE POINT: NORMAL
RAD ONC ARIA PLAN TOTAL FRACTIONS PRESCRIBED: 25
RAD ONC ARIA PLAN TOTAL PRESCRIBED DOSE: 5000 CGY
RAD ONC ARIA REFERENCE POINT DOSAGE GIVEN TO DATE: 22 GY
RAD ONC ARIA REFERENCE POINT ID: NORMAL
RAD ONC ARIA REFERENCE POINT SESSION DOSAGE GIVEN: 2 GY

## 2022-11-30 PROCEDURE — 25010000002 HEPARIN LOCK FLUSH PER 10 UNITS: Performed by: INTERNAL MEDICINE

## 2022-11-30 PROCEDURE — 77427 RADIATION TX MANAGEMENT X5: CPT | Performed by: RADIOLOGY

## 2022-11-30 PROCEDURE — 77385: CPT | Performed by: RADIOLOGY

## 2022-11-30 PROCEDURE — 99215 OFFICE O/P EST HI 40 MIN: CPT | Performed by: INTERNAL MEDICINE

## 2022-11-30 PROCEDURE — 96523 IRRIG DRUG DELIVERY DEVICE: CPT

## 2022-11-30 PROCEDURE — G0463 HOSPITAL OUTPT CLINIC VISIT: HCPCS | Performed by: INTERNAL MEDICINE

## 2022-11-30 PROCEDURE — 77014 CHG CT GUIDANCE RADIATION THERAPY FLDS PLACEMENT: CPT | Performed by: RADIOLOGY

## 2022-11-30 RX ORDER — SODIUM CHLORIDE 0.9 % (FLUSH) 0.9 %
20 SYRINGE (ML) INJECTION AS NEEDED
Status: CANCELLED | OUTPATIENT
Start: 2022-11-30

## 2022-11-30 RX ORDER — VENLAFAXINE HYDROCHLORIDE 75 MG/1
75 CAPSULE, EXTENDED RELEASE ORAL DAILY
Qty: 30 CAPSULE | Refills: 3 | Status: SHIPPED | OUTPATIENT
Start: 2022-11-30

## 2022-11-30 RX ORDER — HEPARIN SODIUM (PORCINE) LOCK FLUSH IV SOLN 100 UNIT/ML 100 UNIT/ML
500 SOLUTION INTRAVENOUS AS NEEDED
Status: DISCONTINUED | OUTPATIENT
Start: 2022-11-30 | End: 2022-12-01 | Stop reason: HOSPADM

## 2022-11-30 RX ORDER — HEPARIN SODIUM (PORCINE) LOCK FLUSH IV SOLN 100 UNIT/ML 100 UNIT/ML
500 SOLUTION INTRAVENOUS AS NEEDED
Status: CANCELLED | OUTPATIENT
Start: 2022-11-30

## 2022-11-30 RX ORDER — SODIUM CHLORIDE 0.9 % (FLUSH) 0.9 %
20 SYRINGE (ML) INJECTION AS NEEDED
Status: DISCONTINUED | OUTPATIENT
Start: 2022-11-30 | End: 2022-12-01 | Stop reason: HOSPADM

## 2022-11-30 RX ADMIN — Medication 20 ML: at 08:44

## 2022-11-30 RX ADMIN — HEPARIN SODIUM (PORCINE) LOCK FLUSH IV SOLN 100 UNIT/ML 500 UNITS: 100 SOLUTION at 08:44

## 2022-12-01 ENCOUNTER — HOSPITAL ENCOUNTER (OUTPATIENT)
Dept: RADIATION ONCOLOGY | Facility: HOSPITAL | Age: 62
Setting detail: RADIATION/ONCOLOGY SERIES
End: 2022-12-01
Payer: COMMERCIAL

## 2022-12-01 ENCOUNTER — HOSPITAL ENCOUNTER (OUTPATIENT)
Dept: RADIATION ONCOLOGY | Facility: HOSPITAL | Age: 62
Discharge: HOME | End: 2022-12-01

## 2022-12-01 LAB
RAD ONC ARIA COURSE ID: NORMAL
RAD ONC ARIA COURSE INTENT: NORMAL
RAD ONC ARIA COURSE LAST TREATMENT DATE: NORMAL
RAD ONC ARIA COURSE START DATE: NORMAL
RAD ONC ARIA COURSE TREATMENT ELAPSED DAYS: 16
RAD ONC ARIA FIRST TREATMENT DATE: NORMAL
RAD ONC ARIA PLAN FRACTIONS TREATED TO DATE: 12
RAD ONC ARIA PLAN ID: NORMAL
RAD ONC ARIA PLAN PRESCRIBED DOSE PER FRACTION: 2 GY
RAD ONC ARIA PLAN PRIMARY REFERENCE POINT: NORMAL
RAD ONC ARIA PLAN TOTAL FRACTIONS PRESCRIBED: 25
RAD ONC ARIA PLAN TOTAL PRESCRIBED DOSE: 5000 CGY
RAD ONC ARIA REFERENCE POINT DOSAGE GIVEN TO DATE: 24 GY
RAD ONC ARIA REFERENCE POINT ID: NORMAL
RAD ONC ARIA REFERENCE POINT SESSION DOSAGE GIVEN: 2 GY

## 2022-12-01 PROCEDURE — 77014 CHG CT GUIDANCE RADIATION THERAPY FLDS PLACEMENT: CPT | Performed by: RADIOLOGY

## 2022-12-01 PROCEDURE — 77385: CPT | Performed by: RADIOLOGY

## 2022-12-02 ENCOUNTER — HOSPITAL ENCOUNTER (OUTPATIENT)
Dept: RADIATION ONCOLOGY | Facility: HOSPITAL | Age: 62
Discharge: HOME | End: 2022-12-02

## 2022-12-02 LAB
RAD ONC ARIA COURSE ID: NORMAL
RAD ONC ARIA COURSE INTENT: NORMAL
RAD ONC ARIA COURSE LAST TREATMENT DATE: NORMAL
RAD ONC ARIA COURSE START DATE: NORMAL
RAD ONC ARIA COURSE TREATMENT ELAPSED DAYS: 17
RAD ONC ARIA FIRST TREATMENT DATE: NORMAL
RAD ONC ARIA PLAN FRACTIONS TREATED TO DATE: 13
RAD ONC ARIA PLAN ID: NORMAL
RAD ONC ARIA PLAN PRESCRIBED DOSE PER FRACTION: 2 GY
RAD ONC ARIA PLAN PRIMARY REFERENCE POINT: NORMAL
RAD ONC ARIA PLAN TOTAL FRACTIONS PRESCRIBED: 25
RAD ONC ARIA PLAN TOTAL PRESCRIBED DOSE: 5000 CGY
RAD ONC ARIA REFERENCE POINT DOSAGE GIVEN TO DATE: 26 GY
RAD ONC ARIA REFERENCE POINT ID: NORMAL
RAD ONC ARIA REFERENCE POINT SESSION DOSAGE GIVEN: 2 GY

## 2022-12-02 PROCEDURE — 77385: CPT | Performed by: RADIOLOGY

## 2022-12-02 PROCEDURE — 77014 CHG CT GUIDANCE RADIATION THERAPY FLDS PLACEMENT: CPT | Performed by: RADIOLOGY

## 2022-12-05 ENCOUNTER — HOSPITAL ENCOUNTER (OUTPATIENT)
Dept: RADIATION ONCOLOGY | Facility: HOSPITAL | Age: 62
Discharge: HOME | End: 2022-12-05

## 2022-12-05 LAB
RAD ONC ARIA COURSE ID: NORMAL
RAD ONC ARIA COURSE INTENT: NORMAL
RAD ONC ARIA COURSE LAST TREATMENT DATE: NORMAL
RAD ONC ARIA COURSE START DATE: NORMAL
RAD ONC ARIA COURSE TREATMENT ELAPSED DAYS: 20
RAD ONC ARIA FIRST TREATMENT DATE: NORMAL
RAD ONC ARIA PLAN FRACTIONS TREATED TO DATE: 14
RAD ONC ARIA PLAN ID: NORMAL
RAD ONC ARIA PLAN PRESCRIBED DOSE PER FRACTION: 2 GY
RAD ONC ARIA PLAN PRIMARY REFERENCE POINT: NORMAL
RAD ONC ARIA PLAN TOTAL FRACTIONS PRESCRIBED: 25
RAD ONC ARIA PLAN TOTAL PRESCRIBED DOSE: 5000 CGY
RAD ONC ARIA REFERENCE POINT DOSAGE GIVEN TO DATE: 28 GY
RAD ONC ARIA REFERENCE POINT ID: NORMAL
RAD ONC ARIA REFERENCE POINT SESSION DOSAGE GIVEN: 2 GY

## 2022-12-05 PROCEDURE — 77014 CHG CT GUIDANCE RADIATION THERAPY FLDS PLACEMENT: CPT | Performed by: RADIOLOGY

## 2022-12-05 PROCEDURE — 77385: CPT | Performed by: RADIOLOGY

## 2022-12-05 NOTE — PROGRESS NOTES
"GYN Visit    CC: hot flushes    HPI:   62 y.o.No obstetric history on file. Contraception or HRT: Post menopausal, using no HRT  Pt has concerns she would like to discuss.          History: PMHx, Meds, Allergies, PSHx, Social Hx, and POBHx all reviewed and updated.  Physical Exam   PHYSICAL EXAM:  /80   Pulse 83   Ht 162.6 cm (64.02\")   Wt 80.7 kg (178 lb)   Breastfeeding No   BMI 30.53 kg/m²   General- NAD, alert and oriented, appropriate  Psych- Normal mood, good memory      ASSESSMENT AND PLAN:  Diagnoses and all orders for this visit:    1. Hot flashes due to menopause (Primary)  Overview:  Pt has     Assessment & Plan:  Pt has stopped HRT secondary to diagnosis of breast cancer  Is on effexor xr 75mg PO daily.  States hot flashes are improved but insomnia remains terrible.  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  Dispense: 30 tablet; Refill: 3              Follow Up:  Return in about 6 weeks (around 1/17/2023).          Mini Damian MD  12/06/2022      "

## 2022-12-05 NOTE — PROGRESS NOTES
Answers for HPI/ROS submitted by the patient on 1/6/2022  Have you had these symptoms before?: No  How long have you been having these symptoms?: 1-2 weeks  What is the primary reason for your visit?: Other    Conflicting answers have been found for some questions. Please document the patient's answers manually.     Chief Complaint  Breast Cancer and Post-op    Subjective          History of Present Illness  The patient is here to follow up on bilateral mastectomy with axillary dissection.  They are doing well and have no complaints.  Pathology is shown below:      Clinical Information    Comment:    Malignant neoplasm of overlapping sites of right breast in female, estrogen receptor positive (HCC)      Final Diagnosis   1. [place] node #1, excision:               - One lymph node positive for macrometastatic carcinoma (1/1)               - See synoptic checklist     2. [place] node #2, excision:               - One lymph node positive for macrometastatic carcinoma (1/1)               - See synoptic checklist     3. [place] node #3, excision:               - One lymph node negative for metastatic carcinoma (0/1)               - See synoptic checklist     4. [place] node #4, excision:               - One of two lymph nodes positive for macrometastatic carcinoma (1/2)               - See synoptic checklist     5. [place] node #5, excision:               - One of three lymph nodes positive for macrometastatic carcinoma (1/3)               - See synoptic checklist     6. [place] node #6, excision:               - One lymph node negative for carcinoma (0/1)               - See synoptic checklist     7. [place] node #7, excision:               - One lymph node negative for carcinoma (0/1)               - See synoptic checklist     8. Left breast, mastectomy:               - Fibrocystic change               - Usual ductal hyperplasia     9. Left breast, retroareolar biopsy:               - Intraductal papilloma       "         - Usual ductal hyperplasia     10. 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 Right breast, retroareolar biopsy:               - Negative for carcinoma and carcinoma in situ               - See synoptic checklist     12. Right breast, additional retroareolar tissue, excision:               - Negative for carcinoma and carcinoma in situ               - See synoptic checklist      Electronically signed by Deyvi Crews MD on 12/29/2021 at 1250   Synoptic Checklist     INVASIVE CARCINOMA OF THE BREAST: Resection  8th Edition - Protocol posted: 6/30/2021  INVASIVE CARCINOMA OF THE BREAST: COMPLETE EXCISION - 1, 2, 3, 4, 5, 6, 7, 10, 11, 12  SPECIMEN   Procedure  Total mastectomy    Specimen Laterality  Right    TUMOR   Histologic Type  Invasive carcinoma of no special type (ductal)    Histologic Grade ([place] Histologic Score)     Glandular (Acinar) / Tubular Differentiation  Score 3    Nuclear Pleomorphism  Score 2    Mitotic Rate  Score 2    Overall Grade  Grade 2 (scores of 6 or 7)    Tumor Size  Greatest dimension of largest invasive focus (Millimeters): 23 mm   Tumor Focality  Multiple foci of invasive carcinoma    Number of Foci  2    Ductal Carcinoma In Situ (DCIS)  Present      Positive for extensive intraductal component (EIC)    Architectural Patterns  [place]      Cribriform      Papillary      Solid    Nuclear Grade  Grade III (high)    Necrosis  Present, central (expansive \"comedo\" necrosis)    Tumor Extent     Microcalcifications  Present in invasive carcinoma      Present in non-neoplastic tissue    Treatment Effect in the Breast  No known presurgical therapy    MARGINS   Margin Status for Invasive Carcinoma  All " margins negative for invasive carcinoma    Distance from Invasive Carcinoma to Closest Margin  20 mm   Closest Margin(s) to Invasive Carcinoma  Posterior    Margin Status for DCIS  All margins negative for DCIS    Distance from DCIS to Closest Margin  7 mm   Closest Margin(s) to DCIS  Posterior    REGIONAL LYMPH NODES   Regional Lymph Node Status  Tumor present in regional lymph node(s)    Number of Lymph Nodes with Macrometastases  4    Number of Lymph Nodes with Micrometastases  0    Number of Lymph Nodes with Isolated Tumor Cells  0    Size of Largest Sofi Metastatic Deposit  23 mm   Extranodal Extension  Not identified    Total Number of Lymph Nodes Examined (sentinel and non-sentinel)  10    Number of [place] Nodes Examined  10    PATHOLOGIC STAGE CLASSIFICATION (pTNM, AJCC 8th Edition)   Reporting of pT, pN, and (when applicable) pM categories is based on information available to the pathologist at the time the report is issued. As per the AJCC (Chapter 1, 8th Ed.) it is the managing physician’s responsibility to establish the final pathologic stage based upon all pertinent information, including but potentially not limited to this pathology report.   TNM Descriptors  m (multiple foci of invasive carcinoma)    pT Category  pT2    pN Category  pN2a    Breast Biomarker Testing Performed on Previous Biopsy     Estrogen Receptor (ER) Status  Positive (greater than 10% of cells demonstrate nuclear positivity)    Percentage of Cells with Nuclear Positivity  40 %   Breast Biomarker Testing Performed on Previous Biopsy     Progesterone Receptor (PgR) Status  Positive    Percentage of Cells with Nuclear Positivity  5 %   Breast Biomarker Testing Performed on Previous Biopsy     HER2 (by immunohistochemistry)  Negative (Score 0)    Breast Biomarker Testing Performed on Previous Biopsy     Ki-67 Percentage of Positive Nuclei  14 %   Testing Performed on Case Number  [id number]    .        She has had several washouts of  "reconstructed breast and has been waiting on xrt therapy.  She has now completed 17 treatments.       Objective   Vital Signs:   Resp 16   Ht 162.6 cm (64.02\")   Wt 79.8 kg (176 lb)   BMI 30.20 kg/m²     Physical Exam  Vitals and nursing note reviewed.   Constitutional:       General: She is not in acute distress.     Appearance: Normal appearance. She is well-developed.   HENT:      Head: Normocephalic and atraumatic.   Eyes:      Extraocular Movements: Extraocular movements intact.      Pupils: Pupils are equal, round, and reactive to light.   Cardiovascular:      Pulses: Normal pulses.   Pulmonary:      Effort: Pulmonary effort is normal. No retractions.      Breath sounds: Normal air entry. No wheezing.   Abdominal:      General: There is no distension.      Palpations: Abdomen is soft.      Tenderness: There is no abdominal tenderness.      Hernia: No hernia is present.   Musculoskeletal:         General: No swelling or deformity.      Cervical back: Neck supple.   Skin:     General: Skin is warm and dry.      Findings: No erythema.      Comments: Surgical Incision Healing Well   Neurological:      General: No focal deficit present.      Mental Status: She is alert and oriented to person, place, and time.      Motor: Motor function is intact.   Psychiatric:         Mood and Affect: Mood normal.         Thought Content: Thought content normal.               Assessment and Plan    Diagnoses and all orders for this visit:    1. Malignant neoplasm of overlapping sites of right breast in female, estrogen receptor positive (HCC) (Primary)    Keep apt with oncology and plastics  Followup six months for check up  Finish xrt treatments    Follow Up   Return in about 6 months (around 6/8/2023).  Patient was given instructions and counseling regarding her condition or for health maintenance advice. Please see specific information pulled into the AVS if appropriate.     "

## 2022-12-06 ENCOUNTER — HOSPITAL ENCOUNTER (OUTPATIENT)
Dept: RADIATION ONCOLOGY | Facility: HOSPITAL | Age: 62
Discharge: HOME | End: 2022-12-06

## 2022-12-06 ENCOUNTER — OFFICE VISIT (OUTPATIENT)
Dept: OBSTETRICS AND GYNECOLOGY | Facility: CLINIC | Age: 62
End: 2022-12-06

## 2022-12-06 VITALS
SYSTOLIC BLOOD PRESSURE: 137 MMHG | HEIGHT: 64 IN | DIASTOLIC BLOOD PRESSURE: 80 MMHG | WEIGHT: 178 LBS | HEART RATE: 83 BPM | BODY MASS INDEX: 30.39 KG/M2

## 2022-12-06 VITALS
OXYGEN SATURATION: 98 % | SYSTOLIC BLOOD PRESSURE: 143 MMHG | TEMPERATURE: 97.9 F | RESPIRATION RATE: 16 BRPM | HEART RATE: 98 BPM | DIASTOLIC BLOOD PRESSURE: 78 MMHG | BODY MASS INDEX: 30.6 KG/M2 | WEIGHT: 178.35 LBS

## 2022-12-06 DIAGNOSIS — Z17.0 MALIGNANT NEOPLASM OF OVERLAPPING SITES OF RIGHT BREAST IN FEMALE, ESTROGEN RECEPTOR POSITIVE: Primary | ICD-10-CM

## 2022-12-06 DIAGNOSIS — C50.811 MALIGNANT NEOPLASM OF OVERLAPPING SITES OF RIGHT BREAST IN FEMALE, ESTROGEN RECEPTOR POSITIVE: Primary | ICD-10-CM

## 2022-12-06 DIAGNOSIS — N95.1 HOT FLASHES DUE TO MENOPAUSE: Primary | ICD-10-CM

## 2022-12-06 LAB
RAD ONC ARIA COURSE ID: NORMAL
RAD ONC ARIA COURSE INTENT: NORMAL
RAD ONC ARIA COURSE LAST TREATMENT DATE: NORMAL
RAD ONC ARIA COURSE START DATE: NORMAL
RAD ONC ARIA COURSE TREATMENT ELAPSED DAYS: 21
RAD ONC ARIA FIRST TREATMENT DATE: NORMAL
RAD ONC ARIA PLAN FRACTIONS TREATED TO DATE: 15
RAD ONC ARIA PLAN ID: NORMAL
RAD ONC ARIA PLAN PRESCRIBED DOSE PER FRACTION: 2 GY
RAD ONC ARIA PLAN PRIMARY REFERENCE POINT: NORMAL
RAD ONC ARIA PLAN TOTAL FRACTIONS PRESCRIBED: 25
RAD ONC ARIA PLAN TOTAL PRESCRIBED DOSE: 5000 CGY
RAD ONC ARIA REFERENCE POINT DOSAGE GIVEN TO DATE: 30 GY
RAD ONC ARIA REFERENCE POINT ID: NORMAL
RAD ONC ARIA REFERENCE POINT SESSION DOSAGE GIVEN: 2 GY

## 2022-12-06 PROCEDURE — 77014 CHG CT GUIDANCE RADIATION THERAPY FLDS PLACEMENT: CPT | Performed by: RADIOLOGY

## 2022-12-06 PROCEDURE — 77385: CPT | Performed by: RADIOLOGY

## 2022-12-06 PROCEDURE — 99203 OFFICE O/P NEW LOW 30 MIN: CPT | Performed by: OBSTETRICS & GYNECOLOGY

## 2022-12-06 PROCEDURE — 77336 RADIATION PHYSICS CONSULT: CPT | Performed by: RADIOLOGY

## 2022-12-06 RX ORDER — CLONIDINE HYDROCHLORIDE 0.2 MG/1
0.2 TABLET ORAL
Qty: 30 TABLET | Refills: 3 | Status: SHIPPED | OUTPATIENT
Start: 2022-12-06

## 2022-12-06 NOTE — PROGRESS NOTES
On Treatment Visit       Patient: Marisa Portillo   YOB: 1960   Medical Record Number: [id number]     Date of Visit  December 6, 2022   Primary Diagnosis:Malignant neoplasm of overlapping sites of right breast in female, estrogen receptor positive (HCC) [C50.811, Z17.0]  Cancer Staging: Cancer Staging   Malignant neoplasm of overlapping sites of right breast in female, estrogen receptor positive (HCC)  Staging form: Breast, AJCC 8th Edition  - Pathologic stage from 12/28/2021: Stage IB (pT2, pN2a, cM0, G2, ER+, CO+, HER2-) - Signed by Starr Mendez MD PhD on 1/30/2022         was seen today for an on treatment visit.  She is receiving radiation therapy to the right chest wall. She  has received 3000 cGy in 15 fractions out of a planned dose of 5000 cGy in 25 fractions.     Slight increase in right chest wall redness.  Doing well otherwise with no major complaints                                          Review of Systems:   Review of Systems   Constitutional: Positive for fatigue (4/10). Negative for appetite change and unexpected weight change.   HENT: Negative for sore throat and trouble swallowing.    Respiratory: Negative for cough and shortness of breath.    Gastrointestinal: Positive for diarrhea (Ongoing, related ibs.). Negative for constipation, nausea and vomiting.   Endocrine: Positive for heat intolerance (On Effexor, not much relief but tolerable.).   Genitourinary: Negative for difficulty urinating, dysuria, frequency and urgency.   Musculoskeletal: Positive for arthralgias (Ongoing, r/t osteoarthritis) and joint swelling (Left ankle, ongoing). Negative for back pain and gait problem.        Occasional right sided chest pain (Muscle)     Skin: Positive for color change (mild erythema). Negative for rash.   Neurological: Negative for dizziness, weakness and headaches.   Psychiatric/Behavioral: Positive for sleep disturbance (Insomnia, ongoing since last December.).        Vitals:     Vitals:    12/06/22 0940   BP: 143/78   Pulse: 98   Resp: 16   Temp: 97.9 °F (36.6 °C)   SpO2: 98%       Weight:   Wt Readings from Last 3 Encounters:   12/06/22 80.9 kg (178 lb 5.6 oz)   11/30/22 81 kg (178 lb 9.2 oz)   11/29/22 80.3 kg (177 lb)      Pain:    Pain Score    12/06/22 0940   PainSc: 0-No pain         Physical Exam:  Gen: WD/WN; NAD  HEENT: MMM  Trachea: midline  Chest: symmetric; mild rubor persistent  Resp: normal respiratory effort  Extr: warm, well-perfused  Neuro: awake and alert; no aphasia or neglect    Plan: I have reviewed treatment setup notes, checked and approved the daily guidance images.  I reviewed dose delivery, treatment parameters and deemed them appropriate. We plan to continue radiation therapy as prescribed.      Questions answered regarding long-term follow-up    Radiation Oncology    Electronically signed by Dudley Martinez MD 12/6/2022  10:51 EST

## 2022-12-06 NOTE — ASSESSMENT & PLAN NOTE
Pt has stopped HRT secondary to diagnosis of breast cancer  Is on effexor xr 75mg PO daily.  States hot flashes are improved but insomnia remains terrible.  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

## 2022-12-07 ENCOUNTER — HOSPITAL ENCOUNTER (OUTPATIENT)
Dept: RADIATION ONCOLOGY | Facility: HOSPITAL | Age: 62
Discharge: HOME | End: 2022-12-07

## 2022-12-07 PROBLEM — N95.1 MENOPAUSAL HOT FLUSHES: Status: RESOLVED | Noted: 2022-05-23 | Resolved: 2022-12-07

## 2022-12-07 LAB
RAD ONC ARIA COURSE ID: NORMAL
RAD ONC ARIA COURSE INTENT: NORMAL
RAD ONC ARIA COURSE LAST TREATMENT DATE: NORMAL
RAD ONC ARIA COURSE START DATE: NORMAL
RAD ONC ARIA COURSE TREATMENT ELAPSED DAYS: 22
RAD ONC ARIA FIRST TREATMENT DATE: NORMAL
RAD ONC ARIA PLAN FRACTIONS TREATED TO DATE: 16
RAD ONC ARIA PLAN ID: NORMAL
RAD ONC ARIA PLAN PRESCRIBED DOSE PER FRACTION: 2 GY
RAD ONC ARIA PLAN PRIMARY REFERENCE POINT: NORMAL
RAD ONC ARIA PLAN TOTAL FRACTIONS PRESCRIBED: 25
RAD ONC ARIA PLAN TOTAL PRESCRIBED DOSE: 5000 CGY
RAD ONC ARIA REFERENCE POINT DOSAGE GIVEN TO DATE: 32 GY
RAD ONC ARIA REFERENCE POINT ID: NORMAL
RAD ONC ARIA REFERENCE POINT SESSION DOSAGE GIVEN: 2 GY

## 2022-12-07 PROCEDURE — 77385: CPT | Performed by: RADIOLOGY

## 2022-12-07 PROCEDURE — 77014 CHG CT GUIDANCE RADIATION THERAPY FLDS PLACEMENT: CPT | Performed by: RADIOLOGY

## 2022-12-07 PROCEDURE — 77427 RADIATION TX MANAGEMENT X5: CPT | Performed by: RADIOLOGY

## 2022-12-08 ENCOUNTER — HOSPITAL ENCOUNTER (OUTPATIENT)
Dept: RADIATION ONCOLOGY | Facility: HOSPITAL | Age: 62
Discharge: HOME | End: 2022-12-08

## 2022-12-08 ENCOUNTER — OFFICE VISIT (OUTPATIENT)
Dept: SURGERY | Facility: CLINIC | Age: 62
End: 2022-12-08

## 2022-12-08 VITALS — HEIGHT: 64 IN | RESPIRATION RATE: 16 BRPM | BODY MASS INDEX: 30.05 KG/M2 | WEIGHT: 176 LBS

## 2022-12-08 DIAGNOSIS — Z17.0 MALIGNANT NEOPLASM OF OVERLAPPING SITES OF RIGHT BREAST IN FEMALE, ESTROGEN RECEPTOR POSITIVE: Primary | ICD-10-CM

## 2022-12-08 DIAGNOSIS — C50.811 MALIGNANT NEOPLASM OF OVERLAPPING SITES OF RIGHT BREAST IN FEMALE, ESTROGEN RECEPTOR POSITIVE: Primary | ICD-10-CM

## 2022-12-08 LAB
RAD ONC ARIA COURSE ID: NORMAL
RAD ONC ARIA COURSE INTENT: NORMAL
RAD ONC ARIA COURSE LAST TREATMENT DATE: NORMAL
RAD ONC ARIA COURSE START DATE: NORMAL
RAD ONC ARIA COURSE TREATMENT ELAPSED DAYS: 23
RAD ONC ARIA FIRST TREATMENT DATE: NORMAL
RAD ONC ARIA PLAN FRACTIONS TREATED TO DATE: 17
RAD ONC ARIA PLAN ID: NORMAL
RAD ONC ARIA PLAN PRESCRIBED DOSE PER FRACTION: 2 GY
RAD ONC ARIA PLAN PRIMARY REFERENCE POINT: NORMAL
RAD ONC ARIA PLAN TOTAL FRACTIONS PRESCRIBED: 25
RAD ONC ARIA PLAN TOTAL PRESCRIBED DOSE: 5000 CGY
RAD ONC ARIA REFERENCE POINT DOSAGE GIVEN TO DATE: 34 GY
RAD ONC ARIA REFERENCE POINT ID: NORMAL
RAD ONC ARIA REFERENCE POINT SESSION DOSAGE GIVEN: 2 GY

## 2022-12-08 PROCEDURE — 77014 CHG CT GUIDANCE RADIATION THERAPY FLDS PLACEMENT: CPT | Performed by: RADIOLOGY

## 2022-12-08 PROCEDURE — 99212 OFFICE O/P EST SF 10 MIN: CPT | Performed by: SURGERY

## 2022-12-08 PROCEDURE — 77385: CPT | Performed by: RADIOLOGY

## 2022-12-09 ENCOUNTER — HOSPITAL ENCOUNTER (OUTPATIENT)
Dept: RADIATION ONCOLOGY | Facility: HOSPITAL | Age: 62
Discharge: HOME | End: 2022-12-09

## 2022-12-09 LAB
RAD ONC ARIA COURSE ID: NORMAL
RAD ONC ARIA COURSE INTENT: NORMAL
RAD ONC ARIA COURSE LAST TREATMENT DATE: NORMAL
RAD ONC ARIA COURSE START DATE: NORMAL
RAD ONC ARIA COURSE TREATMENT ELAPSED DAYS: 24
RAD ONC ARIA FIRST TREATMENT DATE: NORMAL
RAD ONC ARIA PLAN FRACTIONS TREATED TO DATE: 18
RAD ONC ARIA PLAN ID: NORMAL
RAD ONC ARIA PLAN PRESCRIBED DOSE PER FRACTION: 2 GY
RAD ONC ARIA PLAN PRIMARY REFERENCE POINT: NORMAL
RAD ONC ARIA PLAN TOTAL FRACTIONS PRESCRIBED: 25
RAD ONC ARIA PLAN TOTAL PRESCRIBED DOSE: 5000 CGY
RAD ONC ARIA REFERENCE POINT DOSAGE GIVEN TO DATE: 36 GY
RAD ONC ARIA REFERENCE POINT ID: NORMAL
RAD ONC ARIA REFERENCE POINT SESSION DOSAGE GIVEN: 2 GY

## 2022-12-09 PROCEDURE — 77014 CHG CT GUIDANCE RADIATION THERAPY FLDS PLACEMENT: CPT | Performed by: RADIOLOGY

## 2022-12-09 PROCEDURE — 77385: CPT | Performed by: RADIOLOGY

## 2022-12-12 ENCOUNTER — HOSPITAL ENCOUNTER (OUTPATIENT)
Dept: RADIATION ONCOLOGY | Facility: HOSPITAL | Age: 62
Discharge: HOME | End: 2022-12-12

## 2022-12-12 LAB
RAD ONC ARIA COURSE ID: NORMAL
RAD ONC ARIA COURSE INTENT: NORMAL
RAD ONC ARIA COURSE LAST TREATMENT DATE: NORMAL
RAD ONC ARIA COURSE START DATE: NORMAL
RAD ONC ARIA COURSE TREATMENT ELAPSED DAYS: 27
RAD ONC ARIA FIRST TREATMENT DATE: NORMAL
RAD ONC ARIA PLAN FRACTIONS TREATED TO DATE: 19
RAD ONC ARIA PLAN ID: NORMAL
RAD ONC ARIA PLAN PRESCRIBED DOSE PER FRACTION: 2 GY
RAD ONC ARIA PLAN PRIMARY REFERENCE POINT: NORMAL
RAD ONC ARIA PLAN TOTAL FRACTIONS PRESCRIBED: 25
RAD ONC ARIA PLAN TOTAL PRESCRIBED DOSE: 5000 CGY
RAD ONC ARIA REFERENCE POINT DOSAGE GIVEN TO DATE: 38 GY
RAD ONC ARIA REFERENCE POINT ID: NORMAL
RAD ONC ARIA REFERENCE POINT SESSION DOSAGE GIVEN: 2 GY

## 2022-12-12 PROCEDURE — 77385: CPT | Performed by: RADIOLOGY

## 2022-12-12 PROCEDURE — 77014 CHG CT GUIDANCE RADIATION THERAPY FLDS PLACEMENT: CPT | Performed by: RADIOLOGY

## 2022-12-13 ENCOUNTER — HOSPITAL ENCOUNTER (OUTPATIENT)
Dept: RADIATION ONCOLOGY | Facility: HOSPITAL | Age: 62
Discharge: HOME | End: 2022-12-13

## 2022-12-13 VITALS
HEART RATE: 78 BPM | SYSTOLIC BLOOD PRESSURE: 148 MMHG | WEIGHT: 176.37 LBS | TEMPERATURE: 98.4 F | RESPIRATION RATE: 16 BRPM | BODY MASS INDEX: 30.26 KG/M2 | DIASTOLIC BLOOD PRESSURE: 73 MMHG | OXYGEN SATURATION: 98 %

## 2022-12-13 DIAGNOSIS — Z17.0 MALIGNANT NEOPLASM OF OVERLAPPING SITES OF RIGHT BREAST IN FEMALE, ESTROGEN RECEPTOR POSITIVE: Primary | ICD-10-CM

## 2022-12-13 DIAGNOSIS — C50.811 MALIGNANT NEOPLASM OF OVERLAPPING SITES OF RIGHT BREAST IN FEMALE, ESTROGEN RECEPTOR POSITIVE: Primary | ICD-10-CM

## 2022-12-13 LAB
RAD ONC ARIA COURSE ID: NORMAL
RAD ONC ARIA COURSE INTENT: NORMAL
RAD ONC ARIA COURSE LAST TREATMENT DATE: NORMAL
RAD ONC ARIA COURSE START DATE: NORMAL
RAD ONC ARIA COURSE TREATMENT ELAPSED DAYS: 28
RAD ONC ARIA FIRST TREATMENT DATE: NORMAL
RAD ONC ARIA PLAN FRACTIONS TREATED TO DATE: 20
RAD ONC ARIA PLAN ID: NORMAL
RAD ONC ARIA PLAN PRESCRIBED DOSE PER FRACTION: 2 GY
RAD ONC ARIA PLAN PRIMARY REFERENCE POINT: NORMAL
RAD ONC ARIA PLAN TOTAL FRACTIONS PRESCRIBED: 25
RAD ONC ARIA PLAN TOTAL PRESCRIBED DOSE: 5000 CGY
RAD ONC ARIA REFERENCE POINT DOSAGE GIVEN TO DATE: 40 GY
RAD ONC ARIA REFERENCE POINT ID: NORMAL
RAD ONC ARIA REFERENCE POINT SESSION DOSAGE GIVEN: 2 GY

## 2022-12-13 PROCEDURE — 77014 CHG CT GUIDANCE RADIATION THERAPY FLDS PLACEMENT: CPT | Performed by: RADIOLOGY

## 2022-12-13 PROCEDURE — 77336 RADIATION PHYSICS CONSULT: CPT | Performed by: RADIOLOGY

## 2022-12-13 PROCEDURE — 77385: CPT | Performed by: RADIOLOGY

## 2022-12-13 NOTE — PROGRESS NOTES
On Treatment Visit       Patient: Marisa Portillo   YOB: 1960   Medical Record Number: [id number]     Date of Visit  December 13, 2022   Primary Diagnosis:Malignant neoplasm of overlapping sites of right breast in female, estrogen receptor positive (HCC) [C50.811, Z17.0]  Cancer Staging: Cancer Staging   Malignant neoplasm of overlapping sites of right breast in female, estrogen receptor positive (HCC)  Staging form: Breast, AJCC 8th Edition  - Pathologic stage from 12/28/2021: Stage IB (pT2, pN2a, cM0, G2, ER+, NM+, HER2-) - Signed by Starr Mendez MD PhD on 1/30/2022         was seen today for an on treatment visit.  She is receiving radiation therapy to the right chest wall. She  has received 4000 cGy in 20 fractions out of a planned dose of 5000 cGy in 25 fractions.     Increase in right chest wall tightness                                          Review of Systems:   Review of Systems   Constitutional: Positive for fatigue (5/10) and unexpected weight change (Down 2lbs since last week.). Negative for appetite change.   HENT: Positive for voice change (r/t congestion). Negative for sore throat and trouble swallowing.    Respiratory: Positive for cough (Productive with thick, white secretions.). Negative for shortness of breath.    Cardiovascular: Positive for leg swelling (ongoing). Negative for chest pain and palpitations.   Gastrointestinal: Positive for diarrhea (Ongoing, related ibs.). Negative for constipation, nausea and vomiting.   Endocrine: Positive for heat intolerance (On Effexor, not much relief but tolerable.).   Genitourinary: Negative for difficulty urinating, dysuria, frequency, hematuria and urgency.        Dark yellow, odorous urine, if hydrates well, it improves..   Musculoskeletal: Positive for arthralgias (Ongoing, r/t osteoarthritis) and joint swelling (Left ankle, ongoing). Negative for back pain and gait problem.        Occasional right sided chest pain  (Muscle)     Skin: Positive for color change (mild erythema). Negative for rash.   Neurological: Negative for dizziness, weakness and headaches.   Psychiatric/Behavioral: Positive for sleep disturbance (Insomnia, ongoing since last December.).       Vitals:     Vitals:    12/13/22 0752   BP: 148/73   Pulse: 78   Resp: 16   Temp: 98.4 °F (36.9 °C)   SpO2: 98%       Weight:   Wt Readings from Last 3 Encounters:   12/13/22 80 kg (176 lb 5.9 oz)   12/08/22 79.8 kg (176 lb)   12/06/22 80.9 kg (178 lb 5.6 oz)      Pain:    Pain Score    12/13/22 0752   PainSc:   1   PainLoc: Comment: chest wall soreness         Physical Exam:  Gen: WD/WN; NAD  HEENT: MMM  Trachea: midline  Chest: symmetric; mild to moderate right breast rubor without desquamation  Resp: normal respiratory effort  Extr: warm, well-perfused  Neuro: awake and alert; no aphasia or neglect    Plan: I have reviewed treatment setup notes, checked and approved the daily guidance images.  I reviewed dose delivery, treatment parameters and deemed them appropriate. We plan to continue radiation therapy as prescribed.      Advised to stretch and maintain activity with regard to soft tissue/muscle fibrosis.  Questions answered regarding organs at risk including heart and lung    Radiation Oncology    Electronically signed by Dudley Martinez MD 12/13/2022  08:40 EST

## 2022-12-14 ENCOUNTER — HOSPITAL ENCOUNTER (OUTPATIENT)
Dept: RADIATION ONCOLOGY | Facility: HOSPITAL | Age: 62
Discharge: HOME | End: 2022-12-14

## 2022-12-14 LAB
RAD ONC ARIA COURSE ID: NORMAL
RAD ONC ARIA COURSE INTENT: NORMAL
RAD ONC ARIA COURSE LAST TREATMENT DATE: NORMAL
RAD ONC ARIA COURSE START DATE: NORMAL
RAD ONC ARIA COURSE TREATMENT ELAPSED DAYS: 29
RAD ONC ARIA FIRST TREATMENT DATE: NORMAL
RAD ONC ARIA PLAN FRACTIONS TREATED TO DATE: 21
RAD ONC ARIA PLAN ID: NORMAL
RAD ONC ARIA PLAN PRESCRIBED DOSE PER FRACTION: 2 GY
RAD ONC ARIA PLAN PRIMARY REFERENCE POINT: NORMAL
RAD ONC ARIA PLAN TOTAL FRACTIONS PRESCRIBED: 25
RAD ONC ARIA PLAN TOTAL PRESCRIBED DOSE: 5000 CGY
RAD ONC ARIA REFERENCE POINT DOSAGE GIVEN TO DATE: 42 GY
RAD ONC ARIA REFERENCE POINT ID: NORMAL
RAD ONC ARIA REFERENCE POINT SESSION DOSAGE GIVEN: 2 GY

## 2022-12-14 PROCEDURE — 77385: CPT | Performed by: RADIOLOGY

## 2022-12-14 PROCEDURE — 77427 RADIATION TX MANAGEMENT X5: CPT | Performed by: RADIOLOGY

## 2022-12-14 PROCEDURE — 77336 RADIATION PHYSICS CONSULT: CPT | Performed by: RADIOLOGY

## 2022-12-14 PROCEDURE — 77014 CHG CT GUIDANCE RADIATION THERAPY FLDS PLACEMENT: CPT | Performed by: RADIOLOGY

## 2022-12-15 ENCOUNTER — HOSPITAL ENCOUNTER (OUTPATIENT)
Dept: RADIATION ONCOLOGY | Facility: HOSPITAL | Age: 62
Discharge: HOME | End: 2022-12-15

## 2022-12-15 LAB
RAD ONC ARIA COURSE ID: NORMAL
RAD ONC ARIA COURSE INTENT: NORMAL
RAD ONC ARIA COURSE LAST TREATMENT DATE: NORMAL
RAD ONC ARIA COURSE START DATE: NORMAL
RAD ONC ARIA COURSE TREATMENT ELAPSED DAYS: 30
RAD ONC ARIA FIRST TREATMENT DATE: NORMAL
RAD ONC ARIA PLAN FRACTIONS TREATED TO DATE: 22
RAD ONC ARIA PLAN ID: NORMAL
RAD ONC ARIA PLAN PRESCRIBED DOSE PER FRACTION: 2 GY
RAD ONC ARIA PLAN PRIMARY REFERENCE POINT: NORMAL
RAD ONC ARIA PLAN TOTAL FRACTIONS PRESCRIBED: 25
RAD ONC ARIA PLAN TOTAL PRESCRIBED DOSE: 5000 CGY
RAD ONC ARIA REFERENCE POINT DOSAGE GIVEN TO DATE: 44 GY
RAD ONC ARIA REFERENCE POINT ID: NORMAL
RAD ONC ARIA REFERENCE POINT SESSION DOSAGE GIVEN: 2 GY

## 2022-12-15 PROCEDURE — 77014 CHG CT GUIDANCE RADIATION THERAPY FLDS PLACEMENT: CPT | Performed by: RADIOLOGY

## 2022-12-15 PROCEDURE — 77385: CPT | Performed by: RADIOLOGY

## 2022-12-16 ENCOUNTER — HOSPITAL ENCOUNTER (OUTPATIENT)
Dept: RADIATION ONCOLOGY | Facility: HOSPITAL | Age: 62
Discharge: HOME | End: 2022-12-16

## 2022-12-16 LAB
RAD ONC ARIA COURSE ID: NORMAL
RAD ONC ARIA COURSE INTENT: NORMAL
RAD ONC ARIA COURSE LAST TREATMENT DATE: NORMAL
RAD ONC ARIA COURSE START DATE: NORMAL
RAD ONC ARIA COURSE TREATMENT ELAPSED DAYS: 31
RAD ONC ARIA FIRST TREATMENT DATE: NORMAL
RAD ONC ARIA PLAN FRACTIONS TREATED TO DATE: 23
RAD ONC ARIA PLAN ID: NORMAL
RAD ONC ARIA PLAN PRESCRIBED DOSE PER FRACTION: 2 GY
RAD ONC ARIA PLAN PRIMARY REFERENCE POINT: NORMAL
RAD ONC ARIA PLAN TOTAL FRACTIONS PRESCRIBED: 25
RAD ONC ARIA PLAN TOTAL PRESCRIBED DOSE: 5000 CGY
RAD ONC ARIA REFERENCE POINT DOSAGE GIVEN TO DATE: 46 GY
RAD ONC ARIA REFERENCE POINT ID: NORMAL
RAD ONC ARIA REFERENCE POINT SESSION DOSAGE GIVEN: 2 GY

## 2022-12-16 PROCEDURE — 77385: CPT | Performed by: RADIOLOGY

## 2022-12-16 PROCEDURE — 77014 CHG CT GUIDANCE RADIATION THERAPY FLDS PLACEMENT: CPT | Performed by: RADIOLOGY

## 2022-12-19 ENCOUNTER — TELEPHONE (OUTPATIENT)
Dept: ONCOLOGY | Facility: HOSPITAL | Age: 62
End: 2022-12-19

## 2022-12-19 ENCOUNTER — HOSPITAL ENCOUNTER (OUTPATIENT)
Dept: RADIATION ONCOLOGY | Facility: HOSPITAL | Age: 62
Discharge: HOME | End: 2022-12-19

## 2022-12-19 LAB
RAD ONC ARIA COURSE ID: NORMAL
RAD ONC ARIA COURSE INTENT: NORMAL
RAD ONC ARIA COURSE LAST TREATMENT DATE: NORMAL
RAD ONC ARIA COURSE START DATE: NORMAL
RAD ONC ARIA COURSE TREATMENT ELAPSED DAYS: 34
RAD ONC ARIA FIRST TREATMENT DATE: NORMAL
RAD ONC ARIA PLAN FRACTIONS TREATED TO DATE: 24
RAD ONC ARIA PLAN ID: NORMAL
RAD ONC ARIA PLAN PRESCRIBED DOSE PER FRACTION: 2 GY
RAD ONC ARIA PLAN PRIMARY REFERENCE POINT: NORMAL
RAD ONC ARIA PLAN TOTAL FRACTIONS PRESCRIBED: 25
RAD ONC ARIA PLAN TOTAL PRESCRIBED DOSE: 5000 CGY
RAD ONC ARIA REFERENCE POINT DOSAGE GIVEN TO DATE: 48 GY
RAD ONC ARIA REFERENCE POINT ID: NORMAL
RAD ONC ARIA REFERENCE POINT SESSION DOSAGE GIVEN: 2 GY

## 2022-12-19 PROCEDURE — 77014 CHG CT GUIDANCE RADIATION THERAPY FLDS PLACEMENT: CPT | Performed by: RADIOLOGY

## 2022-12-19 PROCEDURE — 77385: CPT | Performed by: RADIOLOGY

## 2022-12-19 NOTE — TELEPHONE ENCOUNTER
Caller: Marisa Portillo    Relationship: Self    Best call back number: [phone]    What is the best time to reach you: ANYTIME    Who are you requesting to speak with (clinical staff, provider,  specific staff member): DR BARRETO OR NURSE    What was the call regarding: PLEASE CALL PT TO DISCUSS HER 12/20 APPT - SHE WOULD LIKE TO SPEAK WITH DR BARRETO OR THE NURSE AFTER 11 AM.     Do you require a callback: YES

## 2022-12-20 ENCOUNTER — HOSPITAL ENCOUNTER (OUTPATIENT)
Dept: RADIATION ONCOLOGY | Facility: HOSPITAL | Age: 62
Discharge: HOME | End: 2022-12-20

## 2022-12-20 DIAGNOSIS — Z17.0 MALIGNANT NEOPLASM OF OVERLAPPING SITES OF RIGHT BREAST IN FEMALE, ESTROGEN RECEPTOR POSITIVE: Primary | ICD-10-CM

## 2022-12-20 DIAGNOSIS — C50.811 MALIGNANT NEOPLASM OF OVERLAPPING SITES OF RIGHT BREAST IN FEMALE, ESTROGEN RECEPTOR POSITIVE: Primary | ICD-10-CM

## 2022-12-20 LAB
RAD ONC ARIA COURSE ID: NORMAL
RAD ONC ARIA COURSE INTENT: NORMAL
RAD ONC ARIA COURSE LAST TREATMENT DATE: NORMAL
RAD ONC ARIA COURSE START DATE: NORMAL
RAD ONC ARIA COURSE TREATMENT ELAPSED DAYS: 35
RAD ONC ARIA FIRST TREATMENT DATE: NORMAL
RAD ONC ARIA PLAN FRACTIONS TREATED TO DATE: 25
RAD ONC ARIA PLAN ID: NORMAL
RAD ONC ARIA PLAN PRESCRIBED DOSE PER FRACTION: 2 GY
RAD ONC ARIA PLAN PRIMARY REFERENCE POINT: NORMAL
RAD ONC ARIA PLAN TOTAL FRACTIONS PRESCRIBED: 25
RAD ONC ARIA PLAN TOTAL PRESCRIBED DOSE: 5000 CGY
RAD ONC ARIA REFERENCE POINT DOSAGE GIVEN TO DATE: 50 GY
RAD ONC ARIA REFERENCE POINT ID: NORMAL
RAD ONC ARIA REFERENCE POINT SESSION DOSAGE GIVEN: 2 GY

## 2022-12-20 PROCEDURE — 77014 CHG CT GUIDANCE RADIATION THERAPY FLDS PLACEMENT: CPT | Performed by: RADIOLOGY

## 2022-12-20 PROCEDURE — 77336 RADIATION PHYSICS CONSULT: CPT | Performed by: RADIOLOGY

## 2022-12-20 PROCEDURE — 77385: CPT | Performed by: RADIOLOGY

## 2022-12-23 ENCOUNTER — SPECIALTY PHARMACY (OUTPATIENT)
Dept: PHARMACY | Facility: HOSPITAL | Age: 62
End: 2022-12-23

## 2022-12-23 DIAGNOSIS — C50.811 MALIGNANT NEOPLASM OF OVERLAPPING SITES OF RIGHT BREAST IN FEMALE, ESTROGEN RECEPTOR POSITIVE: Primary | ICD-10-CM

## 2022-12-23 DIAGNOSIS — Z17.0 MALIGNANT NEOPLASM OF OVERLAPPING SITES OF RIGHT BREAST IN FEMALE, ESTROGEN RECEPTOR POSITIVE: Primary | ICD-10-CM

## 2022-12-23 NOTE — PROGRESS NOTES
Oral Chemotherapy - New Referral    Received a referral from Dr. Mendez    Treatment Plan: Verzenio (abemaciclib)/anastrozole  Start date of treatment planned for: As soon as oral specialty medication is available.  Indication: breast cancer  Relevant past treatments: bilateral mastectomy, TC x 4 cycles  Is the therapy appropriate based on treatment guidelines and FDA labeling?: yes  Therapeutic Goals: Continue treatment until progression or intolerable toxicity  Patient can self-administer oral medications: Yes    • Drug-Drug Interactions: The current medication list was reviewed and there are no relevant drug-drug interactions.  • Medication Allergies: The patient has no relevant allergies as it relates to their oral specialty medication  • Review of Labs/Dose Adjustments: The patient's most recent labs were reviewed and are appropriate to start treatment at this dose    Due to some degree of tachyphylaxis as it relates to diarrhea. Provider would like to start at once daily dosing and increase to twice daily dosing based on how patient tolerates. Will send refills for twice daily dosing once patient on standard dosing.      A prescription was released to [facility] specialty pharmacy for   Drug: Verzenio (abemaciclib)  Strength: 100 mg  Directions: Take 1 tablet by mouth once daily for 2 weeks, then take 1 tablet twice a day moving forward  Quantity: 42  Refills: 0        Pharmacy education is not yet scheduled. and CCA and consent will be signed at that time.    Radha Arreola, PharmD, [facility]  Oncology Clinical Pharmacist  12/23/2022  09:48 EST

## 2022-12-27 ENCOUNTER — SPECIALTY PHARMACY (OUTPATIENT)
Dept: PHARMACY | Facility: HOSPITAL | Age: 62
End: 2022-12-27

## 2022-12-27 ENCOUNTER — TELEPHONE (OUTPATIENT)
Dept: ONCOLOGY | Facility: HOSPITAL | Age: 62
End: 2022-12-27

## 2022-12-27 NOTE — TELEPHONE ENCOUNTER
Caller: Marisa Portillo    Relationship: Self    Best call back number: [phone]    Who are you requesting to speak with (clinical staff, provider,  specific staff member): CLINICAL    What was the call regarding: PATIENT CALLING TO VERIFY STATUS OF ORAL CHEMO VERZENIO    Do you require a callback: YES

## 2022-12-28 ENCOUNTER — SPECIALTY PHARMACY (OUTPATIENT)
Dept: PHARMACY | Facility: HOSPITAL | Age: 62
End: 2022-12-28

## 2022-12-28 RX ORDER — ONDANSETRON HYDROCHLORIDE 8 MG/1
8 TABLET, FILM COATED ORAL 3 TIMES DAILY PRN
Qty: 30 TABLET | Refills: 5 | Status: SHIPPED | OUTPATIENT
Start: 2022-12-28

## 2022-12-29 ENCOUNTER — SPECIALTY PHARMACY (OUTPATIENT)
Dept: PHARMACY | Facility: HOSPITAL | Age: 62
End: 2022-12-29

## 2022-12-29 NOTE — PROGRESS NOTES
Drug: abemaciclib  Strength: 100mg tablet  Directions: Take 1 tablet PO daily for 2 weeks then 1 tablet BID moving forward  QTY: 42  RF:0    Released to pharmacy: [facility] [place]    Completed independent double check on medication order/RX.

## 2022-12-29 NOTE — PROGRESS NOTES
Specialty Pharmacy Patient Management Program  Oncology Initial Assessment       Marisa Portillo is a 62 y.o. female with high risk HR positive, HER2 negative, breast cancer seen by an Oncology provider and enrolled in the Oncology Patient Management program offered by [facility].  An initial outreach was conducted, including assessment of therapy appropriateness and specialty medication education for Verzenio (abemaciclib). The patient was introduced to services offered by [facility], including: regular assessments, refill coordination, curbside pick-up or mail order delivery options, prior authorization maintenance, and financial assistance programs as applicable. The patient was also provided with contact information for the pharmacy team.     Regimen: Verzenio (abemaciclib)    Start date of oral specialty medication: As soon as oral specialty medication is available. (Should be delivered tomorrow)    Relevant Past Medical History, Comorbidities, and Vaccines  Relevant medical history and concomitant health conditions were discussed with the patient. The patient's chart has been reviewed for relevant past medical history and comorbid health conditions and updated as necessary.  Vaccines are coordinated by the patient's oncologist and primary care provider.  Past Medical History:   Diagnosis Date   • Allergies    • Anemia During chemo 2022   • Breast cancer (HCC)    • Cervical dysplasia    • Herpes    • High cholesterol    • IFG (impaired fasting glucose)    • Migraine    • Osteoarthritis    • S/P BILATERAL IMMEDIATE BREAST RECONSTRUCTION WITH LEFT PRE PEC PLACEMENT OF SILICONE GEL IMPLANT AND MESH, RIGHT PRE PEC PLACEMENT OF EXPANDER AND MESH 12/29/2021   • TIA (transient ischemic attack)     no residual   • Vitamin D deficiency      Social History     Socioeconomic History   • Marital status:    Tobacco Use   • Smoking status: Never   • Smokeless tobacco:  Never   • Tobacco comments:     Father mother  smoker diring childhood and  adulthood   Vaping Use   • Vaping Use: Never used   Substance and Sexual Activity   • Alcohol use: Yes     Comment: Socially only . Occasionally   • Drug use: Never   • Sexual activity: Yes     Partners: Male     Birth control/protection: Post-menopausal     Comment: Hysterectomy age 33       Allergies  Known allergies and reactions were discussed with the patient. The patient's chart has been reviewed for allergy information and updated as necessary.   Multihance [gadobenate]    Current Medication List  This medication list has been reviewed with the patient and evaluated for any interactions or necessary modifications/recommendations, and updated to include all prescription medications, OTC medications, and supplements the patient is currently taking.  This list reflects what is contained in the patient's profile, which has also been marked as reviewed to communicate to other providers it is the most up to date version of the patient's current medication therapy.   Prior to Admission medications    Medication Sig Start Date End Date Taking? Authorizing Provider   abemaciclib (VERZENIO) 100 mg tablet chemo tablet Take 1 tablet by mouth Take As Directed. Take 1 tablet by mouth once daily for 2 weeks, then take 1 tablet twice daily moving forward. 12/28/22   Starr Mendez MD PhD   acetaminophen (TYLENOL) 650 MG 8 hr tablet Take 650 mg by mouth 2 (Two) Times a Day.    ProviderGeorge MD   anastrozole (ARIMIDEX) 1 MG tablet Take 1 tablet by mouth Daily. 8/23/22   Li Culver APRN   cloNIDine (Catapres) 0.2 MG tablet Take 1 tablet by mouth every night at bedtime. 12/6/22   Mini Damian MD   hydrOXYzine (ATARAX) 25 MG tablet Take 25 mg by mouth At Night As Needed for Itching.    ProviderGeorge MD   Loratadine-Pseudoephedrine (CLARITIN-D 24 HOUR PO) Take 1 tablet by mouth Daily.    George Neville MD    ondansetron (ZOFRAN) 8 MG tablet Take 1 tablet by mouth 3 (Three) Times a Day As Needed for Nausea or Vomiting. 12/28/22   Starr Mendez MD PhD   venlafaxine XR (EFFEXOR-XR) 75 MG 24 hr capsule Take 1 capsule by mouth Daily. 11/30/22   Starr Mendez MD PhD   Vitamin A 3 MG (52604 UT) capsule Take 10,000 Units by mouth Daily.    Provider, MD George   vitamin D (ERGOCALCIFEROL) 1.25 MG (98289 UT) capsule capsule Take 1 capsule by mouth Every 7 (Seven) Days.  Patient taking differently: Take 50,000 Units by mouth Every 7 (Seven) Days. MONDAYS 3/19/22   Judah Johnson MD   OLANZapine (zyPREXA) 5 MG tablet Take 1 tablet by mouth Every Night. 1/14/22 5/23/22  Starr Mendez MD PhD   ondansetron (Zofran) 4 MG tablet Take 1 tablet by mouth Daily As Needed for Nausea or Vomiting. 10/4/22 12/28/22  Lacy Shelton MD   venlafaxine XR (EFFEXOR-XR) 37.5 MG 24 hr capsule Take 1 capsule by mouth Daily. 8/23/22 11/30/22  Li Culver APRN       Drug Interactions  • Reviewed concomitant medications, allergies, labs, comorbidities/medical history, quality of life, and immunization history.   • Drug-drug interactions noted and discussed during education: no significant drug interactions noted. . Reminded the patient to let us know before making any changes or starting any new prescription or OTC medications so we can first assess drug interactions.  • Drug-food interactions noted and discussed during education: Patient was instructed to avoid eating grapefruit and drinking grapefruit juice    Relevant Laboratory Values  Lab Results   Component Value Date    GLUCOSE 108 (H) 10/11/2022    CALCIUM 9.4 10/11/2022     10/11/2022    K 3.8 10/11/2022    CO2 27.9 10/11/2022     10/11/2022    BUN 12 10/11/2022    CREATININE 0.68 10/11/2022    EGFRIFNONA 90 02/21/2022    BCR 17.6 10/11/2022    ANIONGAP 9.1 10/11/2022     Lab Results   Component Value Date    WBC 4.71 10/11/2022    RBC  3.92 10/11/2022    HGB 12.5 10/11/2022    HCT 37.0 10/11/2022    MCV 94.4 10/11/2022    MCH 31.9 10/11/2022    MCHC 33.8 10/11/2022    RDW 13.7 10/11/2022    RDWSD 47.0 10/11/2022    MPV 10.5 10/11/2022     10/11/2022    NEUTRORELPCT 59.1 10/11/2022    LYMPHORELPCT 27.8 10/11/2022    MONORELPCT 8.7 10/11/2022    EOSRELPCT 3.6 10/11/2022    BASORELPCT 0.6 10/11/2022    AUTOIGPER 0.2 10/11/2022    NEUTROABS 2.78 10/11/2022    LYMPHSABS 1.31 10/11/2022    MONOSABS 0.41 10/11/2022    EOSABS 0.17 10/11/2022    BASOSABS 0.03 10/11/2022    AUTOIGNUM 0.01 10/11/2022    NRBC 0.0 10/11/2022       Initial Education Provided for Specialty Medication  The patient has been provided with the following education. All questions and concerns have been addressed prior to the patient receiving the medication, and the patient has verbalized understanding of the education and any materials provided.  Additional patient education shall be provided and documented upon request by the patient, provider or payer.      Provided patient with:   Chemo calendar to help improve adherence., Education sheets about the medication, 24-hour clinic phone number and my contact information and instructions to call should additional questions arise.     Medication Education Sheets Provided: (select all that apply)  • Oral Specialty Medication: Verzenio (abemaciclib)    Other Education Sheets Provided: (select all that apply)  CINV and Diarrhea    TOPICS COMMENTS   Storage and Handling of Oral Specialty Medication Store in the original container, in a dry location out of direct sunlight, and out of reach of children or pets. and Store at room temperature.  Discussed safe handling and what to do with any unused medication.   Administration of Oral Specialty Medication Take with or without food at the same time(s) each day. and Do not crush or chew tablets.   Adherence to Oral Specialty Regimen and Handling Missed Doses Patient is likely to have good  treatment adherence; reinforced the importance of adherence. Reviewed how to address missed doses and to let us know of any missed doses.   Anemia: role of RBC, cause, s/s, ways to manage, role of transfusion Reviewed the role of RBC and the use of transfusions if hemoglobin decreases too much.  Patient to notify us if they experience shortness of breath, dizziness, or palpitations.  Also let patient know they could feel more tired than usual and to try to stay active, but rest if they need to.    Thrombocytopenia: role of platelet, cause, s/s, ways to prevent bleeding, things to avoid, when to seek help Reviewed the role of platelets in blood clotting and when to call clinic (bloody nose that bleeds for 5 mins despite pressure, a cut that won't stop bleeding despite pressure, gums that bleed excessively with brushing or flossing). Recommended using an electric razor, soft bristle toothbrush, and blowing your nose gently.    Neutropenia: role of WBC, cause, infection precautions, s/s of infection, when to call MD Reviewed the role of WBC, good infection prevention practices, and when to call the clinic (temperature 100.4F, sore throat, burning urination, etc)  • COVID Vaccines: 2 doses plus 1 booster  • Flu Vaccine: 2022 flu vaccine received   Nutrition and Appetite Changes:  importance of maintaining healthy diet & weight, ways to manage to improve intake, dietary consult, exercise regimen Discussed risk of decreased appetite, reviewed plan for small more frequent meals. Discussed risk of decreased appetite. Recommended eating smaller, more frequent meals. Instructed the patient to contact clinic if they were losing weight or having difficulty eating enough to maintain their energy level.   Diarrhea: causes, s/s of dehydration, ways to manage, dietary changes, when to call MD Chemotherapy - Discussed risk of diarrhea. Instructed patient that they can use OTC loperamide at first presentation of diarrhea, but call MD  if 4-6 episodes in 24 hours not relieved by OTC loperamide.   Nausea/Vomiting: cause, use of antiemetics, dietary changes, when to call MD • Emetic risk: Low-Minimal  • PRN home meds: Olanzapine Ondansetron    Instructed the patient to take a dose of the PRN medication at the first onset of nausea and if it's not working to call us for additional medications.  Also provided non-drug measures to mitigate nausea.   Mouth Sores: causes, oral care, ways to manage Mouth sores can be prevented by making a mouth wash mixture of salt, baking soda, and water. The patient was instructed to swish and spit four times daily after meals and before bedtime.  Use of a soft bristle toothbrush was recommended.  The patient was instructed to avoid alcohol-containing OTC mouthwashes.    Alopecia: cause, ways to manage, resources Discussed the possibility of hair loss with the patient. Informed patient that they could request a prescription for a wig if desired and most of the cost is usually covered by insurance.   Infertility and Sexuality:  causes, fertility preservation options, sexuality changes, ways to manage, importance of birth control The patient is not of childbearing potential.   Organ Toxicities: cause, s/s, need for diagnostic tests, labs, when to notify MD Discussed potential effects on organ systems, monitoring, diagnostic tests, labs, and when to notify their MD. Discussed the signs/symptoms of the following: hepatotoxicity, lung changes and nephrotoxicity   Home Care: how to manage bodily fluids Counseled on management of soiled linens and proper flush technique.  Discussed how to manage all the side effects at home and advised when to contact the MD office     Adherence and Self-Administration  • Patient demonstrates ability to self-administer medication. No barriers to adherence identified. .  • Methods for Supporting Patient Adherence and/or Self-Administration: dosing calendar  • Expected duration of therapy: 2  years    Goals of Therapy  • Patient Goals of Therapy:   o Consistently take medications as prescribed  o Patient will adhere to medication regimen  o Patient will report any medication side effects to healthcare provider  • Clinical Goals:   o Support patient understanding of medication regimen  o Ensure patient knows the pharmacy contact information  o Schedule regular follow-up to monitor the treatment serious adverse events  o Schedule regular follow-up to confirm medication adherence  o Schedule regular follow-up to monitor disease progression or stabilty    Quality of Life Assessment   The patient's current (pre-therapy) quality of life was discussed with the patient. The QOL segment of this outreach has been reviewed and updated.   • Quality of Life Score: 10/10    Reassessment Plan & Follow-Up  1. Pharmacist to perform regular reassessments no more than (6) months from the previous assessment.  2. Care Coordinator to set up future refill outreaches, coordinate prescription delivery, and escalate clinical questions to pharmacist.   3. Discussed aforementioned material with patient in person, face-to-face, in clinic.   4. Medication availability: patient will receive medication from [facility] retail pharmacy on 12/30  5. Chemo consents/CCA were signed at today's visit.     Additional Plans, Therapy Recommendations or Therapy Problems to Be Addressed: none at this time     Attestation  I attest that the initiated specialty medication(s) are appropriate for the patient based on my assessment.  If the prescribed therapy is at any point deemed not appropriate based on the current or future assessments, a consultation will be initiated with the patient's specialty care provider to determine the best course of action. The revised plan of therapy will be documented along with any additional patient education provided.       Radha Arreola PharmD, [facility]  Oncology Clinical Pharmacist  12/29/2022  10:25 EST

## 2023-01-05 ENCOUNTER — SPECIALTY PHARMACY (OUTPATIENT)
Dept: PHARMACY | Facility: HOSPITAL | Age: 63
End: 2023-01-05
Payer: COMMERCIAL

## 2023-01-05 NOTE — PROGRESS NOTES
Specialty Pharmacy Note: 1 Week Toxicity Check     Marisa Portillo reports starting abemaciclib on 1/1/23. Thus far, patient denies side effects, aside from diarrhea and fatigue, which is manageable and not bothersome to patient. Advised patient to alert office if this changes. Patient also reports muscle aches and pain around neck. Pain is not continuous and patient is currently taking acetaminophen. Encouraged pt to call office if this gets worse or becomes more consistent. Thus far, no missed doses and no medication changes. Advised pt to call office if any changes. Patient expressed understanding and had no additional questions at this time.       Radha Arreola, PharmD, [facility]  Oncology Clinical Pharmacist  1/5/2023  08:48 EST

## 2023-01-06 ENCOUNTER — TELEPHONE (OUTPATIENT)
Dept: ONCOLOGY | Facility: HOSPITAL | Age: 63
End: 2023-01-06

## 2023-01-06 NOTE — TELEPHONE ENCOUNTER
Caller: Marisa Portillo    Relationship: Self    Best call back number: [phone]    What is the best time to reach you: ANYTIME    Who are you requesting to speak with (clinical staff, provider,  specific staff member): SCHEDULING    What was the call regarding: PT WOULD LIKE TO R/S HER 1/19 APPT TIME - SHE CAN COME AT 8-9 AM ON 1/19 OR WOULD NEED TO R/S TO 1/20. PLEASE CALL TO ADVISE.     Do you require a callback: YES

## 2023-01-11 ENCOUNTER — APPOINTMENT (OUTPATIENT)
Dept: OCCUPATIONAL THERAPY | Facility: HOSPITAL | Age: 63
End: 2023-01-11
Payer: COMMERCIAL

## 2023-01-12 ENCOUNTER — PATIENT ROUNDING (BHMG ONLY) (OUTPATIENT)
Dept: RADIATION ONCOLOGY | Facility: HOSPITAL | Age: 63
End: 2023-01-12
Payer: COMMERCIAL

## 2023-01-12 ENCOUNTER — HOSPITAL ENCOUNTER (OUTPATIENT)
Dept: ONCOLOGY | Facility: HOSPITAL | Age: 63
Setting detail: INFUSION SERIES
Discharge: HOME | End: 2023-01-12
Payer: COMMERCIAL

## 2023-01-12 ENCOUNTER — OFFICE VISIT (OUTPATIENT)
Dept: ONCOLOGY | Facility: HOSPITAL | Age: 63
End: 2023-01-12
Payer: COMMERCIAL

## 2023-01-12 VITALS
SYSTOLIC BLOOD PRESSURE: 135 MMHG | BODY MASS INDEX: 31.13 KG/M2 | HEART RATE: 73 BPM | HEIGHT: 64 IN | WEIGHT: 182.32 LBS | RESPIRATION RATE: 16 BRPM | DIASTOLIC BLOOD PRESSURE: 69 MMHG | TEMPERATURE: 97.3 F | OXYGEN SATURATION: 99 %

## 2023-01-12 DIAGNOSIS — Z17.0 MALIGNANT NEOPLASM OF OVERLAPPING SITES OF RIGHT BREAST IN FEMALE, ESTROGEN RECEPTOR POSITIVE: Primary | ICD-10-CM

## 2023-01-12 DIAGNOSIS — C50.811 MALIGNANT NEOPLASM OF OVERLAPPING SITES OF RIGHT BREAST IN FEMALE, ESTROGEN RECEPTOR POSITIVE: ICD-10-CM

## 2023-01-12 DIAGNOSIS — Z17.0 MALIGNANT NEOPLASM OF OVERLAPPING SITES OF RIGHT BREAST IN FEMALE, ESTROGEN RECEPTOR POSITIVE: ICD-10-CM

## 2023-01-12 DIAGNOSIS — C50.811 MALIGNANT NEOPLASM OF OVERLAPPING SITES OF RIGHT FEMALE BREAST, UNSPECIFIED ESTROGEN RECEPTOR STATUS: Primary | ICD-10-CM

## 2023-01-12 DIAGNOSIS — C50.811 MALIGNANT NEOPLASM OF OVERLAPPING SITES OF RIGHT BREAST IN FEMALE, ESTROGEN RECEPTOR POSITIVE: Primary | ICD-10-CM

## 2023-01-12 LAB
ALBUMIN SERPL-MCNC: 4.3 G/DL (ref 3.5–5.2)
ALBUMIN/GLOB SERPL: 1.7 G/DL
ALP SERPL-CCNC: 69 U/L (ref 39–117)
ALT SERPL W P-5'-P-CCNC: 18 U/L (ref 1–33)
ANION GAP SERPL CALCULATED.3IONS-SCNC: 10 MMOL/L (ref 5–15)
AST SERPL-CCNC: 19 U/L (ref 1–32)
BASOPHILS # BLD AUTO: 0.04 10*3/MM3 (ref 0–0.2)
BASOPHILS NFR BLD AUTO: 1.2 % (ref 0–1.5)
BILIRUB SERPL-MCNC: 0.3 MG/DL (ref 0–1.2)
BUN SERPL-MCNC: 16 MG/DL (ref 8–23)
BUN/CREAT SERPL: 15.5 (ref 7–25)
CALCIUM SPEC-SCNC: 9.1 MG/DL (ref 8.6–10.5)
CHLORIDE SERPL-SCNC: 104 MMOL/L (ref 98–107)
CO2 SERPL-SCNC: 27 MMOL/L (ref 22–29)
CREAT SERPL-MCNC: 1.03 MG/DL (ref 0.57–1)
DEPRECATED RDW RBC AUTO: 43.8 FL (ref 37–54)
EGFRCR SERPLBLD CKD-EPI 2021: 61.2 ML/MIN/1.73
EOSINOPHIL # BLD AUTO: 0.18 10*3/MM3 (ref 0–0.4)
EOSINOPHIL NFR BLD AUTO: 5.3 % (ref 0.3–6.2)
ERYTHROCYTE [DISTWIDTH] IN BLOOD BY AUTOMATED COUNT: 13.2 % (ref 12.3–15.4)
GLOBULIN UR ELPH-MCNC: 2.6 GM/DL
GLUCOSE SERPL-MCNC: 106 MG/DL (ref 65–99)
HCT VFR BLD AUTO: 37.7 % (ref 34–46.6)
HGB BLD-MCNC: 13.2 G/DL (ref 12–15.9)
IMM GRANULOCYTES # BLD AUTO: 0 10*3/MM3 (ref 0–0.05)
IMM GRANULOCYTES NFR BLD AUTO: 0 % (ref 0–0.5)
LYMPHOCYTES # BLD AUTO: 0.57 10*3/MM3 (ref 0.7–3.1)
LYMPHOCYTES NFR BLD AUTO: 16.9 % (ref 19.6–45.3)
MCH RBC QN AUTO: 31.9 PG (ref 26.6–33)
MCHC RBC AUTO-ENTMCNC: 35 G/DL (ref 31.5–35.7)
MCV RBC AUTO: 91.1 FL (ref 79–97)
MONOCYTES # BLD AUTO: 0.47 10*3/MM3 (ref 0.1–0.9)
MONOCYTES NFR BLD AUTO: 13.9 % (ref 5–12)
NEUTROPHILS NFR BLD AUTO: 2.11 10*3/MM3 (ref 1.7–7)
NEUTROPHILS NFR BLD AUTO: 62.7 % (ref 42.7–76)
NRBC BLD AUTO-RTO: 0 /100 WBC (ref 0–0.2)
PLATELET # BLD AUTO: 183 10*3/MM3 (ref 140–450)
PMV BLD AUTO: 9.8 FL (ref 6–12)
POTASSIUM SERPL-SCNC: 4.1 MMOL/L (ref 3.5–5.2)
PROT SERPL-MCNC: 6.9 G/DL (ref 6–8.5)
RBC # BLD AUTO: 4.14 10*6/MM3 (ref 3.77–5.28)
SODIUM SERPL-SCNC: 141 MMOL/L (ref 136–145)
WBC NRBC COR # BLD: 3.37 10*3/MM3 (ref 3.4–10.8)

## 2023-01-12 PROCEDURE — 80053 COMPREHEN METABOLIC PANEL: CPT | Performed by: INTERNAL MEDICINE

## 2023-01-12 PROCEDURE — 99214 OFFICE O/P EST MOD 30 MIN: CPT | Performed by: INTERNAL MEDICINE

## 2023-01-12 PROCEDURE — 25010000002 HEPARIN LOCK FLUSH PER 10 UNITS: Performed by: INTERNAL MEDICINE

## 2023-01-12 PROCEDURE — 36591 DRAW BLOOD OFF VENOUS DEVICE: CPT

## 2023-01-12 PROCEDURE — G0463 HOSPITAL OUTPT CLINIC VISIT: HCPCS | Performed by: INTERNAL MEDICINE

## 2023-01-12 PROCEDURE — 85025 COMPLETE CBC W/AUTO DIFF WBC: CPT | Performed by: INTERNAL MEDICINE

## 2023-01-12 RX ORDER — HEPARIN SODIUM (PORCINE) LOCK FLUSH IV SOLN 100 UNIT/ML 100 UNIT/ML
500 SOLUTION INTRAVENOUS AS NEEDED
Status: CANCELLED | OUTPATIENT
Start: 2023-01-12

## 2023-01-12 RX ORDER — SODIUM CHLORIDE 0.9 % (FLUSH) 0.9 %
20 SYRINGE (ML) INJECTION AS NEEDED
Status: DISCONTINUED | OUTPATIENT
Start: 2023-01-12 | End: 2023-01-13 | Stop reason: HOSPADM

## 2023-01-12 RX ORDER — HEPARIN SODIUM (PORCINE) LOCK FLUSH IV SOLN 100 UNIT/ML 100 UNIT/ML
500 SOLUTION INTRAVENOUS AS NEEDED
Status: DISCONTINUED | OUTPATIENT
Start: 2023-01-12 | End: 2023-01-13 | Stop reason: HOSPADM

## 2023-01-12 RX ORDER — SODIUM CHLORIDE 0.9 % (FLUSH) 0.9 %
20 SYRINGE (ML) INJECTION AS NEEDED
Status: CANCELLED | OUTPATIENT
Start: 2023-01-12

## 2023-01-12 RX ADMIN — HEPARIN SODIUM (PORCINE) LOCK FLUSH IV SOLN 100 UNIT/ML 500 UNITS: 100 SOLUTION at 14:20

## 2023-01-12 RX ADMIN — Medication 20 ML: at 14:19

## 2023-01-12 NOTE — PROGRESS NOTES
"Patient completed radiation treatment for breast cancer on 12/20/2022. Following up with patient regarding any concerns the patient may have at this time and receiving feedback in regards to patient over all care while under treatment.     Patient asked about symptoms and side effects that continue to be bothersome. Patient states skin continues to be slightly pink. Patient states that she is doing well over all. Patient continues to moisturize. Patient states she has no blisters or openings to her skin.     Patient states that Pain is at 0 on scale of 0/10.     Patient was asked if there was anything that could've made their experience better at [facility] while they were under treatment. Patient states: \"no, yall all were great!\"    Patient encouraged to call the office if any concerns arise. Patient reminded of Follow up appointment on 2/2/2023 @ 0900      "

## 2023-01-12 NOTE — PROGRESS NOTES
Patient  Marisa Portillo    Location  [facility] HEMATOLOGY & ONCOLOGY    Chief Complaint  Breast Cancer    Referring Provider: Judah Johnson, *  PCP: Judah Johnson MD    Subjective          Oncology/Hematology History Overview Note     ER+ Right Breast Cancer:    Diagnostic mammogram on 11/12/2021: 2 cm asymmetry in the outer central right breast with amorphous calcifications.  Similar appearing group of amorphous calcifications in the outer central right breast.  6 mm asymmetry in the inner right breast.  Right axillary lymphadenopathy.    Ultrasound-guided biopsy on 11/1/2021: Right breast 3 o'clock position, 2 cm from the nipple with invasive ductal carcinoma, grade 2.  Right breast 9 o'clock position, 3 cm from the nipple with invasive ductal carcinoma, grade 2, ER positive (50%), SD positive (3%), HER-2 negative (score 0), Ki-67 14%.  Associated with intermediate grade ductal carcinoma in situ.  Right axillary lymph node positive for breast carcinoma.    CT CAP and bone scan on 12/13/21: negative for metastatic disease    Bilateral mastectomy 12/28/2021: Right breast with multiple (2) foci of invasive ductal carcinoma, largest focus 2.3 cm, grade 2, associated with DCIS with extensive intraductal component, all margins negative, 4/10 lymph nodes involved with no extranodal extension and the largest focus measured at 3.3 cm, ER positive (40%), SD positive (5%), HER-2 negative by IHC (score 0), Ki-67 14%, pT2 pN2a cM0    Completed 4 cycles of chemotherapy with TC on 4/25/22.  Complicated by a UTI and multiple right breast abscesses causing delay in cycles 3 and 4.     Radiation was also delayed due to infection and wound healing. She finishes December 20th.     *The pt discontinued HRT in October of 2021 when she had an abnormal screening mammogram     Malignant neoplasm of overlapping sites of right breast in female, estrogen receptor positive (HCC)   12/9/2021 Initial  Diagnosis    Malignant neoplasm of overlapping sites of right breast in female, estrogen receptor positive (HCC)     12/28/2021 Cancer Staged    Staging form: Breast, AJCC 8th Edition  - Pathologic stage from 12/28/2021: Stage IB (pT2, pN2a, cM0, G2, ER+, MS+, HER2-) - Signed by Starr Mendez MD PhD on 1/30/2022 1/14/2022 -  Chemotherapy    OP CENTRAL VENOUS ACCESS DEVICE ACCESS, CARE, AND MAINTENANCE (CVAD)     1/31/2022 - 4/26/2022 Chemotherapy    OP BREAST TC DOCEtaxel / Cyclophosphamide     12/27/2022 -  Chemotherapy    OP BREAST Abemaciclib      Malignant neoplasm of overlapping sites of right female breast (HCC)   7/18/2022 Initial Diagnosis    Malignant neoplasm of overlapping sites of right female breast (HCC)     11/7/2022 -  Radiation    RADIATION THERAPY Treatment Details (Noted on 11/7/2022)  Site: Right Chest wall  Technique: 3D CRT  Goal: No goal specified  Planned Treatment Start Date: No planned start date specified         HPI  Patient comes in today for follow-up after starting minocycline.  She has only had a few episodes of diarrhea and taking Imodium 1 time.  She does have some joint pain.  She plans to see her orthopedic surgeon soon, Dr. Herman.  He occasionally gives her injections into her right hip for bursitis.    Review of Systems   Constitutional: Positive for fatigue (4/10). Negative for appetite change, diaphoresis, fever, unexpected weight gain and unexpected weight loss.   HENT: Negative for hearing loss, mouth sores, sore throat, swollen glands, trouble swallowing and voice change.    Eyes: Negative for blurred vision.   Respiratory: Negative for cough, shortness of breath and wheezing.    Cardiovascular: Negative for chest pain and palpitations.   Gastrointestinal: Positive for diarrhea. Negative for abdominal pain, blood in stool, constipation, nausea and vomiting.   Endocrine: Negative for cold intolerance and heat intolerance.   Genitourinary: Negative for difficulty  urinating, dysuria, frequency, hematuria and urinary incontinence.   Musculoskeletal: Positive for back pain (2/10). Negative for arthralgias and myalgias.   Skin: Negative for rash, skin lesions and wound.   Neurological: Negative for dizziness, seizures, weakness, numbness and headache.   Hematological: Does not bruise/bleed easily.   Psychiatric/Behavioral: Negative for depressed mood. The patient is not nervous/anxious.    All other systems reviewed and are negative.      Past Medical History:   Diagnosis Date   • Allergies    • Anemia During chemo 2022   • Breast cancer (HCC)    • Cervical dysplasia    • Herpes    • High cholesterol    • IFG (impaired fasting glucose)    • Migraine    • Osteoarthritis    • S/P BILATERAL IMMEDIATE BREAST RECONSTRUCTION WITH LEFT PRE PEC PLACEMENT OF SILICONE GEL IMPLANT AND MESH, RIGHT PRE PEC PLACEMENT OF EXPANDER AND MESH 12/29/2021   • TIA (transient ischemic attack)     no residual   • Vitamin D deficiency      Past Surgical History:   Procedure Laterality Date   • APPENDECTOMY     • BREAST AUGMENTATION Bilateral 12/28/2021    Procedure: bilateral breast reconstructon with bilateral mesh placement.   left implant, right tissue expander placement;  Surgeon: Lacy Shelton MD;  Location: [facility] [place];  Service: Plastics;  Laterality: Bilateral;   • BREAST SURGERY  [id number]    Bilateral Mastectomy with implant Left expander Right   • BREAST TISSUE EXPANDER REMOVAL INSERTION OF IMPLANT Right 04/15/2022    Procedure: RIGHT BREAST IMPLANT REMOVAL WITH INCISION AND DRAINAGE;  Surgeon: Lacy Shelton MD;  Location: [facility] [place];  Service: Plastics;  Laterality: Right;   • CEREBRAL ANGIOGRAM      clip placed   • CYST REMOVAL Right     rt wrist   • HYSTERECTOMY      Partial age 31   • INCISION AND DRAINAGE ABSCESS Bilateral 10/4/2022    Procedure: BILATERAL BREAST ABSCESS INCISION AND DRAINAGE, WITH LEFT BREAST IMPLANT REMOVAL;  Surgeon: Lacy Shelton MD;   Location: [facility] MA[facility] OR;  Service: Plastics;  Laterality: Bilateral;   • INCISION AND DRAINAGE OF WOUND Right 03/11/2022    Procedure: INCISION AND DRAINAGE ABSCESS OF RIGHT BREAST WITH EXPANDER REMOVAL AND IMPLANT PLACEMENT;  Surgeon: Lacy Shelton MD;  Location: [facility] MAIN OR;  Service: Plastics;  Laterality: Right;   • MASTECTOMY COMPLETE / SIMPLE W/ SENTINEL NODE BIOPSY Bilateral    • PORTACATH PLACEMENT Left 12/28/2021    Procedure: INSERTION OF PORTACATH;  Surgeon: Jacqueline Mcgraw MD;  [place] [facility] [place] [facility] [place]  Service: General;  Laterality: Left;   • SENTINEL NODE BIOPSY Bilateral 12/28/2021    Procedure: BILATERAL BREAST MASTECTOMIES WITH RIGHT SENTINEL NODE BIOPSIES WITH FROZEN SECTIONS;  Surgeon: Jacqueline Mcgraw MD;  Location: [facility];  Service: General;  Laterality: Bilateral;     Social History     Socioeconomic History   • Marital status:    Tobacco Use   • Smoking status: Never   • Smokeless tobacco: Never   • Tobacco comments:     Father mother  smoker diring childhood and  adulthood   Vaping Use   • Vaping Use: Never used   Substance and Sexual Activity   • Alcohol use: Yes     Comment: Socially only . Occasionally   • Drug use: Never   • Sexual activity: Yes     Partners: Male     Birth control/protection: Post-menopausal     Comment: Hysterectomy age 33     Family History   Problem Relation Age of Onset   • Hypertension Mother    • Diabetes Mother    • Cancer Mother    • Arthritis Mother         Osteo and RA   • Breast cancer Mother    • Hypertension Father    • Diabetes Father    • Cancer Father    • Arthritis Father    • Liver cancer Father    • Lung cancer Father    • Asthma Father    • COPD Father    • Heart disease Father    • Kidney disease Father    • Breast cancer Maternal Aunt    • Malig Hyperthermia Neg Hx        Objective   Physical Exam  General: Alert, cooperative, no acute distress, well appearing  Eyes: Anicteric sclera, PERRLA  Respiratory:  normal respiratory effort  Cardiovascular: no lower extremity edema  Skin: Normal tone, no rash, no lesions  Psychiatric: Appropriate affect, intact judgment  Neurologic: No focal sensory or motor deficits, normal cognition   Musculoskeletal: Normal muscle strength and tone  Extremities: No clubbing, cyanosis, or deformities    There were no vitals filed for this visit.            PHQ-9 Total Score:         Result Review :   The following data was reviewed by: Shruthi Ramírez MA on 01/12/2023:  Lab Results   Component Value Date    HGB 12.5 10/11/2022    HCT 37.0 10/11/2022    MCV 94.4 10/11/2022     10/11/2022    WBC 4.71 10/11/2022    NEUTROABS 2.78 10/11/2022    LYMPHSABS 1.31 10/11/2022    MONOSABS 0.41 10/11/2022    EOSABS 0.17 10/11/2022    BASOSABS 0.03 10/11/2022     Lab Results   Component Value Date    GLUCOSE 108 (H) 10/11/2022    BUN 12 10/11/2022    CREATININE 0.68 10/11/2022     10/11/2022    K 3.8 10/11/2022     10/11/2022    CO2 27.9 10/11/2022    CALCIUM 9.4 10/11/2022    PROTEINTOT 6.9 10/11/2022    ALBUMIN 4.20 10/11/2022    BILITOT 0.4 10/11/2022    ALKPHOS 60 10/11/2022    AST 19 10/11/2022    ALT 9 10/11/2022          Assessment and Plan    There are no diagnoses linked to this encounter.      ER+ Right breast Cancer: pT2N2a, s/p bilateral mastectomy.  The pt completed 4 cycles of TC.  Her treatment course was complicated by right breast abscess. Radiation was delayed until December 2022.  She is on anastrozole and started a low dose of abemiciclib, 100mg.  She will continue on this dose for two more weeks to see if her joint pain improves and then I will try to increase her dose to 100mg BID.  She will f/u in 2 weeks for labs and toxicity check.       Menopausal symptoms: Secondary to anastrozole.  She is currently on venlafaxine 75mg daily.        Patient was given instructions and counseling regarding her condition or for health maintenance advice. Please see specific  information pulled into the AVS if appropriate.

## 2023-01-17 ENCOUNTER — TELEPHONE (OUTPATIENT)
Dept: ONCOLOGY | Facility: HOSPITAL | Age: 63
End: 2023-01-17

## 2023-01-17 NOTE — TELEPHONE ENCOUNTER
Caller: Marisa Portillo    Relationship to patient: Self    Best call back number: [phone]    Type of visit: FOLLOW UP    Requested date: ANYTIME,01/31, 02/01, 02/06, 02/07, OR 02/02 AFTER 9AM      If rescheduling, when is the original appointment: 01/27    Additional notes: PLEASE CALL ONCE R/S.

## 2023-01-20 ENCOUNTER — HOSPITAL ENCOUNTER (OUTPATIENT)
Dept: OCCUPATIONAL THERAPY | Facility: HOSPITAL | Age: 63
Setting detail: THERAPIES SERIES
Discharge: HOME | End: 2023-01-20
Payer: COMMERCIAL

## 2023-01-20 DIAGNOSIS — C50.811 MALIGNANT NEOPLASM OF OVERLAPPING SITES OF RIGHT BREAST IN FEMALE, ESTROGEN RECEPTOR POSITIVE: ICD-10-CM

## 2023-01-20 DIAGNOSIS — Z91.89 AT RISK FOR LYMPHEDEMA: ICD-10-CM

## 2023-01-20 DIAGNOSIS — Z90.13 HISTORY OF MASTECTOMY, BILATERAL: Primary | ICD-10-CM

## 2023-01-20 DIAGNOSIS — Z17.0 MALIGNANT NEOPLASM OF OVERLAPPING SITES OF RIGHT BREAST IN FEMALE, ESTROGEN RECEPTOR POSITIVE: ICD-10-CM

## 2023-01-20 PROCEDURE — 93702 BIS XTRACELL FLUID ANALYSIS: CPT

## 2023-01-20 PROCEDURE — 97535 SELF CARE MNGMENT TRAINING: CPT

## 2023-01-20 NOTE — THERAPY RE-EVALUATION
Outpatient Occupational Therapy Lymphedema Re-Evaluation   [place]     Patient Name: Marisa Portillo  : 1960  MRN: [id number]  Today's Date: 2023      Visit Date: 2023    Patient Active Problem List   Diagnosis   • Arthritis   • Bladder disorder   • Concussion   • Contact dermatitis and eczema   • Injury, other and unspecified, finger   • Limb swelling   • Seasonal allergic rhinitis   • Vitamin D deficiency   • IFG (impaired fasting glucose)   • Mixed hyperlipidemia   • TIA (transient ischemic attack)   • Malignant neoplasm of overlapping sites of right breast in female, estrogen receptor positive (HCC)   • Port-A-Cath placement   • S/P bilateral nipple sparing mastectomy   • S/P BILATERAL IMMEDIATE BREAST RECONSTRUCTION WITH LEFT PRE PEC PLACEMENT OF SILICONE GEL IMPLANT AND MESH, RIGHT PRE PEC PLACEMENT OF EXPANDER AND MESH   • Sepsis (HCC)   • Breast abscess   • Cellulitis of left breast   • Psychophysiological insomnia   • Recurrent seroma of breast   • Malignant neoplasm of overlapping sites of right female breast (HCC)   • Hot flashes due to menopause        Past Medical History:   Diagnosis Date   • Allergies    • Anemia During chemo    • Breast cancer (HCC)    • Cervical dysplasia    • Herpes    • High cholesterol    • IFG (impaired fasting glucose)    • Migraine    • Osteoarthritis    • S/P BILATERAL IMMEDIATE BREAST RECONSTRUCTION WITH LEFT PRE PEC PLACEMENT OF SILICONE GEL IMPLANT AND MESH, RIGHT PRE PEC PLACEMENT OF EXPANDER AND MESH 2021   • TIA (transient ischemic attack)     no residual   • Vitamin D deficiency         Past Surgical History:   Procedure Laterality Date   • APPENDECTOMY     • BREAST AUGMENTATION Bilateral 2021    Procedure: bilateral breast reconstructon with bilateral mesh placement.   left implant, right tissue expander placement;  Surgeon: Lacy Shelton MD;  Location: [facility] [place];  Service: Plastics;  Laterality: Bilateral;   • BREAST  SURGERY  [id number]    Bilateral Mastectomy with implant Left expander Right   • BREAST TISSUE EXPANDER REMOVAL INSERTION OF IMPLANT Right 04/15/2022    Procedure: RIGHT BREAST IMPLANT REMOVAL WITH INCISION AND DRAINAGE;  Surgeon: Lacy Shelton MD;  [place] [facility] [place] [facility] [place]  Service: Plastics;  Laterality: Right;   • CEREBRAL ANGIOGRAM      clip placed   • CYST REMOVAL Right     rt wrist   • HYSTERECTOMY      Partial age 31   • INCISION AND DRAINAGE ABSCESS Bilateral 10/4/2022    Procedure: BILATERAL BREAST ABSCESS INCISION AND DRAINAGE, WITH LEFT BREAST IMPLANT REMOVAL;  Surgeon: Lacy Shelton MD;  Location: [facility] MAIN OR;  Service: Plastics;  Laterality: Bilateral;   • INCISION AND DRAINAGE OF WOUND Right 03/11/2022    Procedure: INCISION AND DRAINAGE ABSCESS OF RIGHT BREAST WITH EXPANDER REMOVAL AND IMPLANT PLACEMENT;  Surgeon: Lacy Shelton MD;  Location: [facility] OR;  Service: Plastics;  Laterality: Right;   • MASTECTOMY COMPLETE / SIMPLE W/ SENTINEL NODE BIOPSY Bilateral    • PORTACATH PLACEMENT Left 12/28/2021    Procedure: INSERTION OF PORTACATH;  Surgeon: Jacqueline Mcgraw MD;  [place] [facility] [place] [facility] [place];  Service: General;  Laterality: Left;   • SENTINEL NODE BIOPSY Bilateral 12/28/2021    Procedure: BILATERAL BREAST MASTECTOMIES WITH RIGHT SENTINEL NODE BIOPSIES WITH FROZEN SECTIONS;  Surgeon: Jacqueline Mcgraw MD;  Location: [facility];  Service: General;  Laterality: Bilateral;         Visit Dx:     ICD-10-CM ICD-9-CM   1. History of mastectomy, bilateral  Z90.13 V45.71   2. At risk for lymphedema  Z91.89 V49.89   3. Malignant neoplasm of overlapping sites of right breast in female, estrogen receptor positive (HCC)  C50.811 174.8    Z17.0 V86.0            Lymphedema     Row Name 01/20/23 0900             Subjective Pain    Able to rate subjective pain? yes  -SF      Pre-Treatment Pain Level 0  -SF      Post-Treatment Pain Level 0  -SF         Subjective Comments     "Subjective Comments Patient reports finishing radiation in December. Patient reports some tightness in R axilla  -SF         Lymphedema Assessment    Lymphedema Classification RUE:;at risk/stage 0  -SF      Lymphedema Cancer Related Sx bilateral;simple mastectomy;sentinel node biopsy;other (comment)  -SF      Lymph Nodes Removed # 10  -SF      Positive Lymph Nodes # 4  -SF      Chemo Received yes  -SF      Chemo Treatments #/Timeframe TC DOCEtaxel / Cyclophosphamide  -SF      Radiation Therapy Received yes  -SF      Radiation Treatments #/Timeframe 25  -SF         LLIS - Physical Concerns    The amount of pain associated with my lymphedema is: 0  -SF      The amount of limb heaviness associated with my lymphedema is: 0  -SF      The amount of skin tightness associated with my lymphedema is: 0  -SF      The size of my swollen limb(s) seems: 0  -SF      Lymphedema affects the movement of my swollen limb(s): 0  -SF      The strength in my swollen limb(s) is: 0  -SF         LLIS - Psychosocial Concerns    Lymphedema affects my body image (i.e., \"how I think I look\"). 0  -SF      Lymphedema affects my socializing with others. 0  -SF      Lymphedema affects my intimate relations with spouse or partner (rate 0 if not applicable 0  -SF      Lymphedema \"gets me down\" (i.e., depression, frustration, or anger) 0  -SF      I must rely on others for help due to my lymphedema. 0  -SF      I know what to do to manage my lymphedema 1  -SF         LLIS - Functional Concerns    Lymphedema affects my ability to perform self-care activities (i.e. eating, dressing, hygiene) 0  -SF      Lymphedema affects my ability to perform routine home or work-related activities. 0  -SF      Lymphedema affects my performance of preferred leisure activities. 0  -SF      Lymphedema affects proper fit of clothing/shoes 0  -SF      Lymphedema affects my sleep 0  -SF         Skin Changes/Observations    Location/Assessment Upper Quadrant  -SF      Upper " Quadrant Conditions right:;clean  -SF      Upper Quadrant Color/Pigment right:  slight discoloring from radiation noted  -SF         L-Dex Bioimpedence Screening    L-Dex Measurement Extremity RUE  -SF      L-Dex Patient Position Standing  -SF      L-Dex UE Dominate Side Right  -SF      L-Dex UE At Risk Side Right  -SF      L-Dex UE Pre Surgical Value Yes  -SF      L-Dex UE Score -4.7  -SF      L-Dex UE Baseline Score -4.5  -SF      L-Dex UE Value Change -0.2  -SF      $ L-Dex Charge yes  -SF         Lymphedema Life Impact Scale Totals    A.  Total Q1 - Q17 (Do not include Q18) 1  -SF      B.  Total number of questions answered (Q1-Q17) 17  -SF      C. Divide A by B 0.06  -SF      D. Multiple C by 25 1.5  -SF            User Key  (r) = Recorded By, (t) = Taken By, (c) = Cosigned By    Initials Name Provider Type    Faustina Henderson OT Occupational Therapist              GOALS:   1. Post Breast Surgery Care/at risk for Lymphedema  LTG 1: 90 days:  As an indicator of no exacerbation of lymphedema staging, the patient will present with an L-Dex score less than 7 points from preoperative baseline.              STATUS: met. ongoing  STG 1a:   30 days: To prevent exacerbation of mixed edema to lymphedema, patient will utilize the 2 postsurgical compression garments daily.                 STATUS: met.  STG 1b: 30 days: Patient will be independent with self-manual lymphatic massage.               STATUS: met. ongoing  STG 1c: 30 days:  Patient will be independent with identification of signs and symptoms of lymphedema exasperation per stoplight to recovery education handout.              STATUS: met. ongoing  STG 1 d: 30 days: Patient will be independent with HEP to prevent advancement in lymphedema staging.              STATUS: met. Ongoing.  TREATMENT:  Self Care/ADL retraining, Therapeutic Activity, Neuromuscular Re-education, Therapeutic Exercise, Bioimpedence Fluid Analysis, Post-Surgical compression garement [address]  [facility]/ [place] [employer] Kit 9890K, Orthotic Management and training,  and Manual Therapy.  Therapy Education  Education Details: OT provided review of education in use of stop light tool, which patient is well verses in.  OT instructed patient to continue current HEP, and added doorway stretch exercise to be done gently for low pectoralis and pectoralis minor.  Pt demonstrated properly. Patient demonstated all HEP exercises properly.  She was asked to focus on external rotation bilaterally since slight reduction in motion is noted.  Given: HEP, Symptoms/condition management  Program: Reinforced, New  How Provided: Verbal, Demonstration, Written  Provided to: Patient  Level of Understanding: Teach back education performed, Verbalized, Demonstrated  91301 - OT Self Care/Mgmt Minutes: 15          Therapy Education  Education Details: Patient was educated to continue with self manual lymphatic drainage to promote healing following radiation.  Patient was encouraged to perform scar massage over right chest wall.  Patient was encouraged to continue with HEP due to tightness in right axilla as well as continue with bye-bye exercises.  Patient was educated to keep skin well hydrated during winter months as well as weather change impacting lymphatic functioning.  Patient was encouraged to follow-up with lymphedema clinic before 3-month appointment if experiencing an exacerbation in symptoms.  Given: Symptoms/condition management, Edema management  Program: Reinforced, New  How Provided: Verbal  Provided to: Patient  Level of Understanding: Verbalized  58236 - OT Self Care/Mgmt Minutes: 30         OT Goals     Row Name 01/20/23 1112          Time Calculation    OT Goal Re-Cert Due Date 02/19/23  -           User Key  (r) = Recorded By, (t) = Taken By, (c) = Cosigned By    Initials Name Provider Type    Faustina Henderson OT Occupational Therapist                 OT Assessment/Plan     Row Name 01/20/23 1115           OT Assessment    Functional Limitations Other (comment)  -SF     Impairments Impaired lymphatic circulation  -SF     Assessment Comments Patient presents with normalized lymphatic functioning on this date.  Patient reports silicone breast form and mastectomy bras provide symmetry and continue to well.  Patient will continue to benefit from skilled occupational therapy to further evaluate lymphatic functioning and prevent advancement in lymphedema staging.  -SF     OT Diagnosis At risk for lymphedema  -SF     OT Rehab Potential Excellent  -SF     Patient/caregiver participated in establishment of treatment plan and goals Yes  -SF     Patient would benefit from skilled therapy intervention Yes  -SF        OT Plan    OT Frequency Other (comment)  -SF     Predicted Duration of Therapy Intervention (OT) Patient will be reevaluated every 3 months years 1 through 3 and every 6 months years 4 and 5.  Patient will also be seen once a year for dispensing mastectomy bras and every 2 years for breast prosthetics  -SF     Planned CPT's? OT EVAL LOW COMPLEXITY: 19090;OT RE-EVAL: 80681;OT THER ACT EA 15 MIN: 20048FL;OT THER PROC EA 15 MIN: [id number];OT SELF CARE/MGMT/TRAIN 15 MIN: 37265;OT MANUAL THERAPY EA 15 MIN: 00111;OT BIS XTRACELL FLUID ANALYSIS: 46511;OT ORTHO/PROSTHET CHECKOUT EA 15 MIN: 81862;OT ORTHOTIC MGMT/TRAIN EA 15 MIN: 28298;OT CARE PLAN EA 15 MIN  -SF     Planned Therapy Interventions (Optional Details) home exercise program;orthotic fitting/training;patient/family education;postural re-education;prosthetic fitting/training;ROM (Range of Motion)  -SF     OT Plan Comments Continue with POC  -SF           User Key  (r) = Recorded By, (t) = Taken By, (c) = Cosigned By    Initials Name Provider Type    Faustina Henderson OT Occupational Therapist                          Time Calculation:   Timed Charges  60503 - OT Self Care/Mgmt Minutes: 30  Total Minutes  Timed Charges Total Minutes: 30   Total Minutes: 30      Therapy Charges for Today     Code Description Service Date Service Provider Modifiers Qty    51414278441 HC PT BIS XTRACELL FLUID ANALYSIS 1/20/2023 Faustina Guthrie OT  1    [id number] HC OT SELF CARE/MGMT/TRAIN EA 15 MIN 1/20/2023 Faustina Guthrie OT GO 2                    Faustina Guthrie OT  1/20/2023

## 2023-01-23 ENCOUNTER — SPECIALTY PHARMACY (OUTPATIENT)
Dept: PHARMACY | Facility: HOSPITAL | Age: 63
End: 2023-01-23
Payer: COMMERCIAL

## 2023-01-24 ENCOUNTER — PATIENT OUTREACH (OUTPATIENT)
Dept: ONCOLOGY | Facility: HOSPITAL | Age: 63
End: 2023-01-24
Payer: COMMERCIAL

## 2023-01-24 ENCOUNTER — SPECIALTY PHARMACY (OUTPATIENT)
Dept: PHARMACY | Facility: HOSPITAL | Age: 63
End: 2023-01-24
Payer: COMMERCIAL

## 2023-01-24 ENCOUNTER — OFFICE VISIT (OUTPATIENT)
Dept: OBSTETRICS AND GYNECOLOGY | Facility: CLINIC | Age: 63
End: 2023-01-24
Payer: COMMERCIAL

## 2023-01-24 VITALS
HEART RATE: 71 BPM | HEIGHT: 64 IN | WEIGHT: 182 LBS | BODY MASS INDEX: 31.07 KG/M2 | DIASTOLIC BLOOD PRESSURE: 81 MMHG | SYSTOLIC BLOOD PRESSURE: 142 MMHG

## 2023-01-24 DIAGNOSIS — Z01.419 ENCOUNTER FOR GYNECOLOGICAL EXAMINATION WITHOUT ABNORMAL FINDING: Primary | ICD-10-CM

## 2023-01-24 LAB
DEVELOPER EXPIRATION DATE: NORMAL
DEVELOPER LOT NUMBER: NORMAL
EXPIRATION DATE: NORMAL
FECAL OCCULT BLOOD SCREEN, POC: NEGATIVE
Lab: NORMAL
NEGATIVE CONTROL: NEGATIVE
POSITIVE CONTROL: POSITIVE

## 2023-01-24 PROCEDURE — 99396 PREV VISIT EST AGE 40-64: CPT | Performed by: OBSTETRICS & GYNECOLOGY

## 2023-01-24 PROCEDURE — 82274 ASSAY TEST FOR BLOOD FECAL: CPT | Performed by: OBSTETRICS & GYNECOLOGY

## 2023-01-24 NOTE — PROGRESS NOTES
"Well Woman Visit    CC: Annual well woman exam       HPI:   62 y.o.No obstetric history on file. Contraception or HRT: Post menopausal, using no HRT  Menses:  none  Pain:  None  Incontinence concerns: No  Hx of abnormal pap:  No  Pt has no complaints today. has been undergoing chemo and radiation for breast cancer. Doing well.      History: PMHx, Meds, Allergies, PSHx, Social Hx, and POBHx all reviewed and updated.      PHYSICAL EXAM:  /81   Pulse 71   Ht 162.6 cm (64.02\")   Wt 82.6 kg (182 lb)   BMI 31.22 kg/m²   General- NAD, alert and oriented, appropriate  Psych- Normal mood, good memory  Neck- No masses, no thyroid enlargement  CV- Regular rhythm, no murnurs  Resp- CTA to bases, no wheezes  Abdomen- Soft, non distended, non tender, no masses    Breast left-  S/p mastectomy  Breast right- s/p mastectomy     External genitalia- Normal female, no lesions  Urethra/meatus- Normal, no masses, non tender, no prolapse  Bladder- Normal, no masses, non tender, no prolapse  Vagina- + atrophy, no lesions, no discharge, no prolapse  Cvx- Absent  Uterus- Absent  Adnexa- No mass, non tender  Anus/Rectum/Perineum- Normal appearance, no mass, good sphincter tone, no hemorrhoids, no prolapse , Hemoccult neg    Lymphatic- No palpable neck, axillary, or groin nodes  Ext- No edema, no cyanosis    Skin- No lesions, no rashes, no acanthosis nigricans        ASSESSMENT and PLAN:  WWE    Diagnoses and all orders for this visit:    1. Encounter for gynecological examination without abnormal finding (Primary)  -     POC Occult Blood Stool        Domestic violence/abuse screen: negative    Depression screen: no SI    Preventative:   BREAST HEALTH- Monthly self breast exam importance and how to reviewed. MMG and/or MRI (prn) reviewed per society guidelines and her individual history. Mammo/MRI screen: Already up to date.  CERVICAL CANCER Screening- Reviewed current ASCCP guidelines for screening w and wo cotest HPV, age specific. "  Screen: Not medically needed.  COLON CANCER Screening- Reviewed current medical society guidelines and options.  Colonoscopy screen:  Already up to date.  SEXUAL HEALTH: Declines STD screening.  BONE HEALTH- Reviewed current medical society guidelines and options for testing, calcium and vit D intake.  Weight bearing exercise.  DEXA: Already up to date.  VACCINATIONS Recommended: Flu annually, Zoster (49yo and older).  Importance discussed, risk being unvaccinated reviewed.  Questions answered  Smoking status- NON SMOKER.  Importance of avoiding second hand smoke.  Follow up PCP/Specialist PMHx and Labs      She understands the importance of having any ordered tests to be performed in a timely fashion.  She is encouraged to review her results online and/or contact or office if she has questions.     Follow Up:  Return if symptoms worsen or fail to improve.      Yamini Michel, APRN  01/24/2023

## 2023-01-24 NOTE — SIGNIFICANT NOTE
Called to check in with Mrs. Portillo today.  She has completed radiation but has now been started on oral chemo and will continue for at least one yr, maybe two. Agreed to stay in contact and will schedule for Survivorship once she has completed her oral chemotherapy.

## 2023-01-27 ENCOUNTER — SPECIALTY PHARMACY (OUTPATIENT)
Dept: PHARMACY | Facility: HOSPITAL | Age: 63
End: 2023-01-27
Payer: COMMERCIAL

## 2023-01-27 DIAGNOSIS — C50.811 MALIGNANT NEOPLASM OF OVERLAPPING SITES OF RIGHT BREAST IN FEMALE, ESTROGEN RECEPTOR POSITIVE: ICD-10-CM

## 2023-01-27 DIAGNOSIS — Z17.0 MALIGNANT NEOPLASM OF OVERLAPPING SITES OF RIGHT BREAST IN FEMALE, ESTROGEN RECEPTOR POSITIVE: ICD-10-CM

## 2023-01-27 NOTE — PROGRESS NOTES
Re: Refills of Oral Specialty Medication - Verzenio (abemaciclib)    • Drug-Drug Interactions: The current medication list was reviewed and there are no relevant drug-drug interactions.  • Medication Allergies: The patient has no relevant allergies as it relates to their oral specialty medication  • Review of Labs/Dose Adjustments: NO DOSE CHANGE - I reviewed the most recent note and labs and the patient will continue without any dose changes.  I sent refills as described below.    Drug: Verzenio (abemaciclib)  Strength: 100 mg  Directions: Take 1 tablet by mouth twice a day  Quantity: 60  Refills: 11  Pharmacy prescription sent to: [facility]      Radha Arreola PharmD, [facility]  Oncology Clinical Pharmacist  1/27/2023  09:19 EST

## 2023-02-01 DIAGNOSIS — R23.2 HOT FLASHES: ICD-10-CM

## 2023-02-01 RX ORDER — VENLAFAXINE HYDROCHLORIDE 37.5 MG/1
37.5 CAPSULE, EXTENDED RELEASE ORAL DAILY
Qty: 90 CAPSULE | Refills: 1 | OUTPATIENT
Start: 2023-02-01

## 2023-02-02 ENCOUNTER — OFFICE VISIT (OUTPATIENT)
Dept: RADIATION ONCOLOGY | Facility: HOSPITAL | Age: 63
End: 2023-02-02
Payer: COMMERCIAL

## 2023-02-02 ENCOUNTER — SPECIALTY PHARMACY (OUTPATIENT)
Dept: PHARMACY | Facility: HOSPITAL | Age: 63
End: 2023-02-02
Payer: COMMERCIAL

## 2023-02-02 VITALS
RESPIRATION RATE: 16 BRPM | DIASTOLIC BLOOD PRESSURE: 72 MMHG | BODY MASS INDEX: 31.2 KG/M2 | WEIGHT: 181.88 LBS | SYSTOLIC BLOOD PRESSURE: 116 MMHG | TEMPERATURE: 97.5 F | HEART RATE: 78 BPM | OXYGEN SATURATION: 100 %

## 2023-02-02 DIAGNOSIS — Z79.811 USE OF ANASTROZOLE (ARIMIDEX): ICD-10-CM

## 2023-02-02 DIAGNOSIS — Z91.89 AT RISK FOR LYMPHEDEMA: ICD-10-CM

## 2023-02-02 DIAGNOSIS — Z08 ENCOUNTER FOR FOLLOW-UP EXAMINATION AFTER COMPLETED TREATMENT FOR MALIGNANT NEOPLASM: ICD-10-CM

## 2023-02-02 DIAGNOSIS — C50.811 MALIGNANT NEOPLASM OF OVERLAPPING SITES OF RIGHT BREAST IN FEMALE, ESTROGEN RECEPTOR POSITIVE: Primary | ICD-10-CM

## 2023-02-02 DIAGNOSIS — Z90.13 S/P BILATERAL MASTECTOMY: ICD-10-CM

## 2023-02-02 DIAGNOSIS — Z92.3 STATUS POST RADIATION THERAPY WITHIN FOUR TO TWELVE WEEKS: ICD-10-CM

## 2023-02-02 DIAGNOSIS — C77.3 SECONDARY MALIGNANT NEOPLASM OF AXILLARY NODE: ICD-10-CM

## 2023-02-02 DIAGNOSIS — Z17.0 MALIGNANT NEOPLASM OF OVERLAPPING SITES OF RIGHT BREAST IN FEMALE, ESTROGEN RECEPTOR POSITIVE: Primary | ICD-10-CM

## 2023-02-02 PROCEDURE — G0463 HOSPITAL OUTPT CLINIC VISIT: HCPCS | Performed by: NURSE PRACTITIONER

## 2023-02-02 PROCEDURE — 99024 POSTOP FOLLOW-UP VISIT: CPT | Performed by: NURSE PRACTITIONER

## 2023-02-03 ENCOUNTER — OFFICE VISIT (OUTPATIENT)
Dept: ONCOLOGY | Facility: HOSPITAL | Age: 63
End: 2023-02-03
Payer: COMMERCIAL

## 2023-02-03 ENCOUNTER — LAB (OUTPATIENT)
Dept: ONCOLOGY | Facility: HOSPITAL | Age: 63
End: 2023-02-03
Payer: COMMERCIAL

## 2023-02-03 VITALS
DIASTOLIC BLOOD PRESSURE: 71 MMHG | SYSTOLIC BLOOD PRESSURE: 113 MMHG | BODY MASS INDEX: 30.9 KG/M2 | TEMPERATURE: 96.9 F | OXYGEN SATURATION: 99 % | WEIGHT: 181 LBS | HEIGHT: 64 IN | HEART RATE: 70 BPM | RESPIRATION RATE: 16 BRPM

## 2023-02-03 DIAGNOSIS — C50.811 MALIGNANT NEOPLASM OF OVERLAPPING SITES OF RIGHT FEMALE BREAST, UNSPECIFIED ESTROGEN RECEPTOR STATUS: Primary | ICD-10-CM

## 2023-02-03 DIAGNOSIS — C50.811 MALIGNANT NEOPLASM OF OVERLAPPING SITES OF RIGHT FEMALE BREAST, UNSPECIFIED ESTROGEN RECEPTOR STATUS: ICD-10-CM

## 2023-02-03 LAB
ALBUMIN SERPL-MCNC: 4.2 G/DL (ref 3.5–5.2)
ALBUMIN/GLOB SERPL: 1.8 G/DL
ALP SERPL-CCNC: 62 U/L (ref 39–117)
ALT SERPL W P-5'-P-CCNC: 13 U/L (ref 1–33)
ANION GAP SERPL CALCULATED.3IONS-SCNC: 8.5 MMOL/L (ref 5–15)
AST SERPL-CCNC: 15 U/L (ref 1–32)
BASOPHILS # BLD AUTO: 0.04 10*3/MM3 (ref 0–0.2)
BASOPHILS NFR BLD AUTO: 1.5 % (ref 0–1.5)
BILIRUB SERPL-MCNC: 0.5 MG/DL (ref 0–1.2)
BUN SERPL-MCNC: 16 MG/DL (ref 8–23)
BUN/CREAT SERPL: 17.2 (ref 7–25)
CALCIUM SPEC-SCNC: 9.5 MG/DL (ref 8.6–10.5)
CHLORIDE SERPL-SCNC: 103 MMOL/L (ref 98–107)
CO2 SERPL-SCNC: 25.5 MMOL/L (ref 22–29)
CREAT SERPL-MCNC: 0.93 MG/DL (ref 0.57–1)
DEPRECATED RDW RBC AUTO: 46.8 FL (ref 37–54)
EGFRCR SERPLBLD CKD-EPI 2021: 69.2 ML/MIN/1.73
EOSINOPHIL # BLD AUTO: 0.19 10*3/MM3 (ref 0–0.4)
EOSINOPHIL NFR BLD AUTO: 7.1 % (ref 0.3–6.2)
ERYTHROCYTE [DISTWIDTH] IN BLOOD BY AUTOMATED COUNT: 13.8 % (ref 12.3–15.4)
GLOBULIN UR ELPH-MCNC: 2.4 GM/DL
GLUCOSE SERPL-MCNC: 104 MG/DL (ref 65–99)
HCT VFR BLD AUTO: 36.2 % (ref 34–46.6)
HGB BLD-MCNC: 12.3 G/DL (ref 12–15.9)
IMM GRANULOCYTES # BLD AUTO: 0 10*3/MM3 (ref 0–0.05)
IMM GRANULOCYTES NFR BLD AUTO: 0 % (ref 0–0.5)
LYMPHOCYTES # BLD AUTO: 0.54 10*3/MM3 (ref 0.7–3.1)
LYMPHOCYTES NFR BLD AUTO: 20.3 % (ref 19.6–45.3)
MCH RBC QN AUTO: 31.6 PG (ref 26.6–33)
MCHC RBC AUTO-ENTMCNC: 34 G/DL (ref 31.5–35.7)
MCV RBC AUTO: 93.1 FL (ref 79–97)
MONOCYTES # BLD AUTO: 0.4 10*3/MM3 (ref 0.1–0.9)
MONOCYTES NFR BLD AUTO: 15 % (ref 5–12)
NEUTROPHILS NFR BLD AUTO: 1.49 10*3/MM3 (ref 1.7–7)
NEUTROPHILS NFR BLD AUTO: 56.1 % (ref 42.7–76)
PLATELET # BLD AUTO: 180 10*3/MM3 (ref 140–450)
PMV BLD AUTO: 9.3 FL (ref 6–12)
POTASSIUM SERPL-SCNC: 4.3 MMOL/L (ref 3.5–5.2)
PROT SERPL-MCNC: 6.6 G/DL (ref 6–8.5)
RBC # BLD AUTO: 3.89 10*6/MM3 (ref 3.77–5.28)
SODIUM SERPL-SCNC: 137 MMOL/L (ref 136–145)
WBC NRBC COR # BLD: 2.66 10*3/MM3 (ref 3.4–10.8)

## 2023-02-03 PROCEDURE — G0463 HOSPITAL OUTPT CLINIC VISIT: HCPCS | Performed by: INTERNAL MEDICINE

## 2023-02-03 PROCEDURE — 36415 COLL VENOUS BLD VENIPUNCTURE: CPT

## 2023-02-03 PROCEDURE — 99214 OFFICE O/P EST MOD 30 MIN: CPT | Performed by: INTERNAL MEDICINE

## 2023-02-03 PROCEDURE — 85025 COMPLETE CBC W/AUTO DIFF WBC: CPT

## 2023-02-03 PROCEDURE — 80053 COMPREHEN METABOLIC PANEL: CPT

## 2023-02-03 NOTE — PROGRESS NOTES
[facility]    [facility]    Chief Complaint  Malignant neoplasm of overlapping sites of right breast in     Referring Provider: Judah Johnson, *  PCP: Judah Johnson MD    Subjective          Oncology/Hematology History Overview Note     ER+ Right Breast Cancer:    Diagnostic mammogram on 11/12/2021: 2 cm asymmetry in the outer central right breast with amorphous calcifications.  Similar appearing group of amorphous calcifications in the outer central right breast.  6 mm asymmetry in the inner right breast.  Right axillary lymphadenopathy.    Ultrasound-guided biopsy on 11/1/2021: Right breast 3 o'clock position, 2 cm from the nipple with invasive ductal carcinoma, grade 2.  Right breast 9 o'clock position, 3 cm from the nipple with invasive ductal carcinoma, grade 2, ER positive (50%), IN positive (3%), HER-2 negative (score 0), Ki-67 14%.  Associated with intermediate grade ductal carcinoma in situ.  Right axillary lymph node positive for breast carcinoma.    CT CAP and bone scan on 12/13/21: negative for metastatic disease    Bilateral mastectomy 12/28/2021: Right breast with multiple (2) foci of invasive ductal carcinoma, largest focus 2.3 cm, grade 2, associated with DCIS with extensive intraductal component, all margins negative, 4/10 lymph nodes involved with no extranodal extension and the largest focus measured at 3.3 cm, ER positive (40%), IN positive (5%), HER-2 negative by IHC (score 0), Ki-67 14%, pT2 pN2a cM0    Completed 4 cycles of chemotherapy with TC on 4/25/22.  Complicated by a UTI and multiple right breast abscesses causing delay in cycles 3 and 4.     Radiation was also delayed due to infection and wound healing. She finishes December 20th.     *The pt discontinued HRT in October of 2021 when she had an abnormal screening mammogram     Malignant neoplasm of overlapping sites of right breast in female, estrogen  receptor positive (HCC)   12/9/2021 Initial Diagnosis    Malignant neoplasm of overlapping sites of right breast in female, estrogen receptor positive (HCC)     12/28/2021 Cancer Staged    Staging form: Breast, AJCC 8th Edition  - Pathologic stage from 12/28/2021: Stage IB (pT2, pN2a, cM0, G2, ER+, NE+, HER2-) - Signed by Starr Mendez MD PhD on 1/30/2022 1/14/2022 -  Chemotherapy    OP CENTRAL VENOUS ACCESS DEVICE ACCESS, CARE, AND MAINTENANCE (CVAD)     1/31/2022 - 4/26/2022 Chemotherapy    OP BREAST TC DOCEtaxel / Cyclophosphamide     11/15/2022 - 12/20/2022 Radiation    Radiation OncologyTreatment Course:  Marisa Portillo received 5000 cGy in 25 fractions to right chest wall and axillary levels 1 through 3.      12/27/2022 -  Chemotherapy    OP BREAST Abemaciclib      Malignant neoplasm of overlapping sites of right female breast (HCC)   7/18/2022 Initial Diagnosis    Malignant neoplasm of overlapping sites of right female breast (HCC)         HPI  Patient came in today for toxicity check while on abemaciclib.  So far she is tolerating it well.  She started the medication on January 1 and increase to full dose on February 1.  She occasionally has loose stool but no significant diarrhea.  Her only complaint is that she is gaining weight because she has not been able to exercise much due to fatigue.  This fatigue has been chronic since her treatment.    Review of Systems   Constitutional: Positive for fatigue (4/10). Negative for appetite change, diaphoresis, fever, unexpected weight gain and unexpected weight loss.   HENT: Negative for hearing loss, sore throat and voice change.    Eyes: Negative for blurred vision, double vision, pain, redness and visual disturbance.   Respiratory: Negative for cough, shortness of breath and wheezing.    Cardiovascular: Negative for chest pain, palpitations and leg swelling.   Gastrointestinal: Positive for diarrhea.   Endocrine: Negative for cold intolerance, heat  intolerance, polydipsia and polyuria.   Genitourinary: Negative for decreased urine volume, difficulty urinating, frequency and urinary incontinence.   Musculoskeletal: Negative for arthralgias, back pain, joint swelling and myalgias.   Skin: Negative for color change, rash, skin lesions and wound.   Neurological: Negative for dizziness, seizures, numbness and headache.   Hematological: Negative for adenopathy. Does not bruise/bleed easily.   Psychiatric/Behavioral: Negative for depressed mood. The patient is not nervous/anxious.    All other systems reviewed and are negative.      Past Medical History:   Diagnosis Date   • Allergies    • Anemia During chemo 2022   • Breast cancer (HCC)    • Cervical dysplasia    • Herpes    • High cholesterol    • IFG (impaired fasting glucose)    • Migraine    • Osteoarthritis    • S/P BILATERAL IMMEDIATE BREAST RECONSTRUCTION WITH LEFT PRE PEC PLACEMENT OF SILICONE GEL IMPLANT AND MESH, RIGHT PRE PEC PLACEMENT OF EXPANDER AND MESH 12/29/2021   • TIA (transient ischemic attack)     no residual   • Vitamin D deficiency      Past Surgical History:   Procedure Laterality Date   • APPENDECTOMY     • BREAST AUGMENTATION Bilateral 12/28/2021    Procedure: bilateral breast reconstructon with bilateral mesh placement.   left implant, right tissue expander placement;  Surgeon: Lacy Shelton MD;  Location: [facility];  Service: Plastics;  Laterality: Bilateral;   • BREAST SURGERY  [id number]    Bilateral Mastectomy with implant Left expander Right   • BREAST TISSUE EXPANDER REMOVAL INSERTION OF IMPLANT Right 04/15/2022    Procedure: RIGHT BREAST IMPLANT REMOVAL WITH INCISION AND DRAINAGE;  Surgeon: Lacy Shelton MD;  Location: [facility] [place];  Service: Plastics;  Laterality: Right;   • CEREBRAL ANGIOGRAM      clip placed   • CYST REMOVAL Right     rt wrist   • HYSTERECTOMY      Partial age 31   • INCISION AND DRAINAGE ABSCESS Bilateral 10/4/2022    Procedure: BILATERAL  BREAST ABSCESS INCISION AND DRAINAGE, WITH LEFT BREAST IMPLANT REMOVAL;  Surgeon: Lacy Shelton MD;  Location: [facility] OR;  Service: Plastics;  Laterality: Bilateral;   • INCISION AND DRAINAGE OF WOUND Right 03/11/2022    Procedure: INCISION AND DRAINAGE ABSCESS OF RIGHT BREAST WITH EXPANDER REMOVAL AND IMPLANT PLACEMENT;  Surgeon: Lacy Shelton MD;  Location: [facility];  Service: Plastics;  Laterality: Right;   • MASTECTOMY COMPLETE / SIMPLE W/ SENTINEL NODE BIOPSY Bilateral    • PORTACATH PLACEMENT Left 12/28/2021    Procedure: INSERTION OF PORTACATH;  Surgeon: Jacqueline Mcgraw MD;  Location: [facility];  Service: General;  Laterality: Left;   • SENTINEL NODE BIOPSY Bilateral 12/28/2021    Procedure: BILATERAL BREAST MASTECTOMIES WITH RIGHT SENTINEL NODE BIOPSIES WITH FROZEN SECTIONS;  Surgeon: Jacqueline Mcgraw MD;  Location: [facility];  Service: General;  Laterality: Bilateral;     Social History     Socioeconomic History   • Marital status:    Tobacco Use   • Smoking status: Never   • Smokeless tobacco: Never   • Tobacco comments:     Father mother  smoker diring childhood and  adulthood   Vaping Use   • Vaping Use: Never used   Substance and Sexual Activity   • Alcohol use: Yes     Comment: Socially only . Occasionally   • Drug use: Never   • Sexual activity: Yes     Partners: Male     Birth control/protection: Post-menopausal     Comment: Hysterectomy age 33     Family History   Problem Relation Age of Onset   • Hypertension Mother    • Diabetes Mother    • Cancer Mother    • Arthritis Mother         Osteo and RA   • Breast cancer Mother    • Hypertension Father    • Diabetes Father    • Cancer Father    • Arthritis Father    • Liver cancer Father    • Lung cancer Father    • Asthma Father    • COPD Father    • Heart disease Father    • Kidney disease Father    • Breast cancer Maternal Aunt    • Malig Hyperthermia Neg Hx        Objective   Physical  "Exam  General: Alert, cooperative, no acute distress  Eyes: Anicteric sclera, PERRLA  Respiratory: normal respiratory effort  Cardiovascular: no lower extremity edema  Skin: Normal tone, no rash, no lesions  Psychiatric: Appropriate affect, intact judgment  Neurologic: No focal sensory or motor deficits, normal cognition   Musculoskeletal: Normal muscle strength and tone  Extremities: No clubbing, cyanosis, or deformities    Vitals:    02/03/23 0937   BP: 113/71   Pulse: 70   Resp: 16   Temp: 96.9 °F (36.1 °C)   SpO2: 99%   Weight: 82.1 kg (181 lb)   Height: 162.6 cm (64.02\")   PainSc: 0-No pain     ECOG score: 0         PHQ-9 Total Score:         Result Review :   The following data was reviewed by: Starr Mendez MD PhD on 02/03/2023:  Lab Results   Component Value Date    HGB 12.3 02/03/2023    HCT 36.2 02/03/2023    MCV 93.1 02/03/2023     02/03/2023    WBC 2.66 (L) 02/03/2023    NEUTROABS 1.49 (L) 02/03/2023    LYMPHSABS 0.54 (L) 02/03/2023    MONOSABS 0.40 02/03/2023    EOSABS 0.19 02/03/2023    BASOSABS 0.04 02/03/2023     Lab Results   Component Value Date    GLUCOSE 104 (H) 02/03/2023    BUN 16 02/03/2023    CREATININE 0.93 02/03/2023     02/03/2023    K 4.3 02/03/2023     02/03/2023    CO2 25.5 02/03/2023    CALCIUM 9.5 02/03/2023    PROTEINTOT 6.6 02/03/2023    ALBUMIN 4.2 02/03/2023    BILITOT 0.5 02/03/2023    ALKPHOS 62 02/03/2023    AST 15 02/03/2023    ALT 13 02/03/2023          Assessment and Plan    Diagnoses and all orders for this visit:    1. Malignant neoplasm of overlapping sites of right female breast, unspecified estrogen receptor status (HCC) (Primary)  -     CBC & Differential; Future  -     Comprehensive Metabolic Panel; Future  -     CBC & Differential; Future  -     Comprehensive Metabolic Panel; Future  -     CBC & Differential; Future  -     Comprehensive Metabolic Panel; Future        ER+ Right breast Cancer: pT2N2a, s/p bilateral mastectomy.  The pt completed 4 " cycles of TC.  Her treatment course was complicated by breast abscesses. Radiation was delayed until December 2022.  She is tolerating anastrozole and abemiciclib.   Her CBC and CMP shows no evidence of severe toxicity.  She has only mild neutropenia. She will come in for repeat labs in 2 weeks and I will follow up with her in 4 weeks with labs again and toxicity check.     Menopausal symptoms: Secondary to anastrozole.  She is currently on venlafaxine 75mg daily.        Patient was given instructions and counseling regarding her condition or for health maintenance advice. Please see specific information pulled into the AVS if appropriate.

## 2023-02-06 ENCOUNTER — SPECIALTY PHARMACY (OUTPATIENT)
Dept: PHARMACY | Facility: HOSPITAL | Age: 63
End: 2023-02-06
Payer: COMMERCIAL

## 2023-02-06 NOTE — PROGRESS NOTES
"Chief Complaint  Follow-up (Follow-Up of BILATERAL BREAST ABSCESS INCISION AND DRAINAGE, WITH LEFT BREAST IMPLANT REMOVAL 10/04/22./)    Subjective          History of Present Illness  Marisa Portillo is a 62 y.o. female who presents to [facility] for Postoperative Follow-Up of BILATERAL BREAST ABSCESS INCISION AND DRAINAGE, WITH LEFT BREAST IMPLANT REMOVAL 10/04/22.    Doing well.  Completed radiation 12/20/22 and started chemo drug.  Patient is concerned about doing implants again.  She feels that she is too prone to infections.    Allergies: Multihance [gadobenate]  Allergies Reconciled.    Review of Systems   All review of system has been reviewed and it  is negative except the ones note above.     Objective     /73 (BP Location: Left arm, Patient Position: Sitting)   Pulse 76   Temp 99.5 °F (37.5 °C) (Temporal)   Ht 162.6 cm (64.02\")   Wt 80.6 kg (177 lb 9.6 oz)   SpO2 91%   BMI 30.47 kg/m²     Body mass index is 30.47 kg/m².    Physical Exam   Cardiovascular: Normal rate.     Pulmonary/Chest  Effort normal.        Breast:   Right breast with minimal radiation dermatitis.   Absence of breasts bilaterally.   No signs of infection.        Result Review :       Assessment and Plan      Diagnoses and all orders for this visit:    1. Breast reconstruction deformity (Primary)        Plan:     Fat graft from bilateral flanks and upper abdomen, transfer to bilateral breast in Nov/Dec 2023  Will refer to  Dr. Dorene Crain for a GISELA flap from abdomen to breast.    Patient was given instructions and counseling regarding her condition. Please see specific information pulled into the AVS if appropriate.   Scribed by Zenobia Macedo, acting as a scribe for Lacy Shelton MD, 02/15/23 15:59 EST.  Lacy Shelton MD's signature on the note affirms that the note adequately documents the care provided.  .[email or web address]  Lacy Shelton MD  02/15/2023  "

## 2023-02-07 ENCOUNTER — OFFICE VISIT (OUTPATIENT)
Dept: INTERNAL MEDICINE | Facility: CLINIC | Age: 63
End: 2023-02-07
Payer: COMMERCIAL

## 2023-02-07 VITALS
TEMPERATURE: 98.7 F | OXYGEN SATURATION: 96 % | SYSTOLIC BLOOD PRESSURE: 136 MMHG | HEIGHT: 64 IN | BODY MASS INDEX: 31.21 KG/M2 | WEIGHT: 182.8 LBS | HEART RATE: 86 BPM | DIASTOLIC BLOOD PRESSURE: 79 MMHG

## 2023-02-07 DIAGNOSIS — Z98.890 S/P BREAST RECONSTRUCTION: ICD-10-CM

## 2023-02-07 DIAGNOSIS — N61.1 BREAST ABSCESS: ICD-10-CM

## 2023-02-07 DIAGNOSIS — E55.9 VITAMIN D DEFICIENCY: ICD-10-CM

## 2023-02-07 DIAGNOSIS — Z95.828 PORT-A-CATH IN PLACE: ICD-10-CM

## 2023-02-07 DIAGNOSIS — G45.9 TIA (TRANSIENT ISCHEMIC ATTACK): ICD-10-CM

## 2023-02-07 DIAGNOSIS — M19.90 ARTHRITIS: ICD-10-CM

## 2023-02-07 DIAGNOSIS — J30.2 SEASONAL ALLERGIC RHINITIS, UNSPECIFIED TRIGGER: ICD-10-CM

## 2023-02-07 DIAGNOSIS — E78.2 MIXED HYPERLIPIDEMIA: Primary | ICD-10-CM

## 2023-02-07 DIAGNOSIS — R73.01 IFG (IMPAIRED FASTING GLUCOSE): ICD-10-CM

## 2023-02-07 DIAGNOSIS — C50.811 MALIGNANT NEOPLASM OF OVERLAPPING SITES OF RIGHT BREAST IN FEMALE, ESTROGEN RECEPTOR POSITIVE: ICD-10-CM

## 2023-02-07 DIAGNOSIS — Z17.0 MALIGNANT NEOPLASM OF OVERLAPPING SITES OF RIGHT BREAST IN FEMALE, ESTROGEN RECEPTOR POSITIVE: ICD-10-CM

## 2023-02-07 DIAGNOSIS — N61.0 CELLULITIS OF LEFT BREAST: ICD-10-CM

## 2023-02-07 PROCEDURE — 99214 OFFICE O/P EST MOD 30 MIN: CPT | Performed by: INTERNAL MEDICINE

## 2023-02-07 NOTE — PROGRESS NOTES
"CHIEF COMPLAINT:  Hyperlipidemia and Follow-up (Patient here for routine follow up, no new issues )      HPI: Patient recently started on new anticancer medication Verzenio,    Fatigue, did have some diarrhea, feeling some better after starting the medication recently,    Objective   Vital Signs  Vitals:    02/07/23 0900   BP: 136/79   Pulse: 86   Temp: 98.7 °F (37.1 °C)   SpO2: 96%   Weight: 82.9 kg (182 lb 12.8 oz)   Height: 162.6 cm (64.02\")      Body mass index is 31.36 kg/m².  Review of Systems   Constitutional: Negative.    HENT: Negative.    Eyes: Negative.    Respiratory: Negative.    Cardiovascular: Negative.    Gastrointestinal: Negative.    Endocrine: Negative.    Genitourinary: Negative.    Musculoskeletal: Negative.    Allergic/Immunologic: Negative.    Neurological: Negative.    Hematological: Negative.    Psychiatric/Behavioral: Negative.       Physical Exam  Constitutional:       General: She is not in acute distress.     Appearance: Normal appearance.   HENT:      Head: Normocephalic.      Mouth/Throat:      Mouth: Mucous membranes are moist.   Eyes:      Conjunctiva/sclera: Conjunctivae normal.      Pupils: Pupils are equal, round, and reactive to light.   Cardiovascular:      Rate and Rhythm: Normal rate and regular rhythm.      Pulses: Normal pulses.      Heart sounds: Normal heart sounds.   Pulmonary:      Effort: Pulmonary effort is normal.      Breath sounds: Normal breath sounds.   Abdominal:      General: Abdomen is flat. Bowel sounds are normal.      Palpations: Abdomen is soft.   Musculoskeletal:         General: No swelling. Normal range of motion.      Cervical back: Neck supple.   Skin:     General: Skin is warm and dry.      Coloration: Skin is not jaundiced.   Neurological:      General: No focal deficit present.      Mental Status: She is alert and oriented to person, place, and time. Mental status is at baseline.   Psychiatric:         Mood and Affect: Mood normal.         Behavior: " Behavior normal.         Thought Content: Thought content normal.         Judgment: Judgment normal.        Result Review :   Lab Results   Component Value Date    PROBNP 1,085.0 (H) 03/14/2022     CMP    CMP 10/11/22 1/12/23 2/3/23   Glucose 108 (A) 106 (A) 104 (A)   BUN 12 16 16   Creatinine 0.68 1.03 (A) 0.93   eGFR 98.0 61.2 69.2   Sodium 142 141 137   Potassium 3.8 4.1 4.3   Chloride 105 104 103   Calcium 9.4 9.1 9.5   Total Protein 6.9 6.9 6.6   Albumin 4.20 4.3 4.2   Globulin 2.7 2.6 2.4   Total Bilirubin 0.4 0.3 0.5   Alkaline Phosphatase 60 69 62   AST (SGOT) 19 19 15   ALT (SGPT) 9 18 13   Albumin/Globulin Ratio 1.6 1.7 1.8   BUN/Creatinine Ratio 17.6 15.5 17.2   Anion Gap 9.1 10.0 8.5   (A) Abnormal value       Comments are available for some flowsheets but are not being displayed.           CBC w/diff    CBC w/Diff 10/11/22 1/12/23 2/3/23   WBC 4.71 3.37 (A) 2.66 (A)   RBC 3.92 4.14 3.89   Hemoglobin 12.5 13.2 12.3   Hematocrit 37.0 37.7 36.2   MCV 94.4 91.1 93.1   MCH 31.9 31.9 31.6   MCHC 33.8 35.0 34.0   RDW 13.7 13.2 13.8   Platelets 201 183 180   Neutrophil Rel % 59.1 62.7 56.1   Immature Granulocyte Rel % 0.2 0.0 0.0   Lymphocyte Rel % 27.8 16.9 (A) 20.3   Monocyte Rel % 8.7 13.9 (A) 15.0 (A)   Eosinophil Rel % 3.6 5.3 7.1 (A)   Basophil Rel % 0.6 1.2 1.5   (A) Abnormal value             Lipid Panel    Lipid Panel 8/1/22   Total Cholesterol 221 (A)   Triglycerides 245 (A)   HDL Cholesterol 61 (A)   VLDL Cholesterol 42 (A)   LDL Cholesterol  118 (A)   LDL/HDL Ratio 1.82   (A) Abnormal value             Lab Results   Component Value Date    TSH 0.651 08/01/2022    TSH 1.400 03/11/2022    TSH 1.200 02/23/2021      Lab Results   Component Value Date    FREET4 1.08 08/01/2022      A1C Last 3 Results    HGBA1C Last 3 Results 3/11/22 8/1/22   Hemoglobin A1C 6.30 (A) 5.80 (A)   (A) Abnormal value                              Visit Diagnoses:    ICD-10-CM ICD-9-CM   1. Mixed hyperlipidemia  E78.2 272.2   2.  Vitamin D deficiency  E55.9 268.9   3. Malignant neoplasm of overlapping sites of right breast in female, estrogen receptor positive (HCC)  C50.811 174.8    Z17.0 V86.0   4. Port-A-Cath placement  Z95.828 V45.89   5. Breast abscess  N61.1 611.0   6. Seasonal allergic rhinitis, unspecified trigger  J30.2 477.9   7. S/P BILATERAL IMMEDIATE BREAST RECONSTRUCTION WITH LEFT PRE PEC PLACEMENT OF SILICONE GEL IMPLANT AND MESH, RIGHT PRE PEC PLACEMENT OF EXPANDER AND MESH  Z98.890 V43.82   8. Cellulitis of left breast  N61.0 611.0   9. IFG (impaired fasting glucose)  R73.01 790.21   10. TIA (transient ischemic attack)  G45.9 435.9   11. Arthritis  M19.90 716.90       Assessment and Plan   Diagnoses and all orders for this visit:    1. Mixed hyperlipidemia (Primary)  -     Lipid Panel; Future  -     Comprehensive Metabolic Panel; Future  -     Hemoglobin A1c; Future  -     CBC & Differential; Future  -     Hepatitis C Antibody; Future    2. Vitamin D deficiency  -     Lipid Panel; Future  -     Comprehensive Metabolic Panel; Future  -     Hemoglobin A1c; Future  -     CBC & Differential; Future  -     Hepatitis C Antibody; Future    3. Malignant neoplasm of overlapping sites of right breast in female, estrogen receptor positive (HCC)  -     Lipid Panel; Future  -     Comprehensive Metabolic Panel; Future  -     Hemoglobin A1c; Future  -     CBC & Differential; Future  -     Hepatitis C Antibody; Future    4. Port-A-Cath placement  -     Lipid Panel; Future  -     Comprehensive Metabolic Panel; Future  -     Hemoglobin A1c; Future  -     CBC & Differential; Future  -     Hepatitis C Antibody; Future    5. Breast abscess  -     Lipid Panel; Future  -     Comprehensive Metabolic Panel; Future  -     Hemoglobin A1c; Future  -     CBC & Differential; Future  -     Hepatitis C Antibody; Future    6. Seasonal allergic rhinitis, unspecified trigger  -     Lipid Panel; Future  -     Comprehensive Metabolic Panel; Future  -      Hemoglobin A1c; Future  -     CBC & Differential; Future  -     Hepatitis C Antibody; Future    7. S/P BILATERAL IMMEDIATE BREAST RECONSTRUCTION WITH LEFT PRE PEC PLACEMENT OF SILICONE GEL IMPLANT AND MESH, RIGHT PRE PEC PLACEMENT OF EXPANDER AND MESH  -     Lipid Panel; Future  -     Comprehensive Metabolic Panel; Future  -     Hemoglobin A1c; Future  -     CBC & Differential; Future  -     Hepatitis C Antibody; Future    8. Cellulitis of left breast  -     Lipid Panel; Future  -     Comprehensive Metabolic Panel; Future  -     Hemoglobin A1c; Future  -     CBC & Differential; Future  -     Hepatitis C Antibody; Future    9. IFG (impaired fasting glucose)  -     Lipid Panel; Future  -     Comprehensive Metabolic Panel; Future  -     Hemoglobin A1c; Future  -     CBC & Differential; Future  -     Hepatitis C Antibody; Future    10. TIA (transient ischemic attack)  -     Lipid Panel; Future  -     Comprehensive Metabolic Panel; Future  -     Hemoglobin A1c; Future  -     CBC & Differential; Future  -     Hepatitis C Antibody; Future    11. Arthritis  -     Lipid Panel; Future  -     Comprehensive Metabolic Panel; Future  -     Hemoglobin A1c; Future  -     CBC & Differential; Future  -     Hepatitis C Antibody; Future    Muscle cramps, patient can continue magnesium and over-the-counter potassium supplement daily with plenty of fluids,    Leukopenia,--white count 2.6 6 February 2023 would recheck again in a month or so, discussed use of vaccines which have no contraindications for her getting including a Prevnar 20 and COVID by valent booster,    Hot flashes postmenopausal, started on clonidine to help by her gynecologist and seems to be working, February 2023, cont effexor xr 75 mg qd     Sepsis again with now infection MSSA of the left breast September 13, 2022 requiring hospitalization, treatment with IV antibiotics IV vancomycin drain placement by plastic surgery, she was sent home on the 16th with a drain and  clindamycin,, she has done well she was then sent to infectious disease Dr. Case in [place] after I discussed the case with her and the fact that she continued to have recurrent infections and now both breasts, I spoke with Jacqueline cunningham third  at [facility] in [place] who helped expedite her infectious disease consultation several weeks ago, they recommended removing all of the foreign bodies material in her breast area so that has been done and her drain has recently been removed as of October 21, 2022 she is finished a course of Ancef at home IV 2 weeks and has had no further fevers and she says she feels very good and is getting ready to start radiation therapy in November with Dr. Martinez,    Sepsis, multifactorial to include urinary tract infection and now cellulitis of the right breast and expander implant area, status post surgical debridement removal of pus// fluid, drain placed last night March 11, 2022------- patient's improved clinically,        Mixed hyperlipidemia good HDL cholesterol no treatment,     Invasive ductal carcinoma right breast -------previous bilateral mastectomies with multifocal areas of invasive ductal carcinoma largest area 2.3 cm with positive lymph nodes 4 out of 10 ER and NM positive HER-2 negative,,--------------- underwent chemotherapy, finished, April 2022,, finished December 20, 2022 radiation therapy due to previous infection abscess cellulitis and now seroma treatment, as of August 11, 2022, --- continues and started anastrozole, Arimidex, July 2022 Dr. Mendez,, continues on Verzenio 100 mg twice a day,, started January 1, 2023,     MAGUI, appendectomy, Port-A-Cath placement,     ifg, hemoglobin A1c 5.8, August 1, 2022,    Vitamin D deficiency, cont vitamin D 50,000 units weekly,     TIA 2010, MRI/ mrA underwent a Star closure procedure with clipping patient is okay to restart aspirin 81 mg twice a week     Osteoarthritis uses Tylenol,     Chronic venous  insufficiency changes    Follow Up   Return in about 6 months (around 8/7/2023).  Patient was given instructions and counseling regarding her condition or for health maintenance advice. Please see specific information pulled into the AVS if appropriate.

## 2023-02-15 ENCOUNTER — OFFICE VISIT (OUTPATIENT)
Dept: PLASTIC SURGERY | Facility: CLINIC | Age: 63
End: 2023-02-15
Payer: COMMERCIAL

## 2023-02-15 VITALS
HEIGHT: 64 IN | BODY MASS INDEX: 30.32 KG/M2 | HEART RATE: 76 BPM | DIASTOLIC BLOOD PRESSURE: 73 MMHG | OXYGEN SATURATION: 91 % | SYSTOLIC BLOOD PRESSURE: 120 MMHG | TEMPERATURE: 99.5 F | WEIGHT: 177.6 LBS

## 2023-02-15 DIAGNOSIS — N65.0 BREAST RECONSTRUCTION DEFORMITY: Primary | ICD-10-CM

## 2023-02-15 PROCEDURE — 99213 OFFICE O/P EST LOW 20 MIN: CPT | Performed by: SURGERY

## 2023-02-16 PROBLEM — N65.0 BREAST RECONSTRUCTION DEFORMITY: Status: ACTIVE | Noted: 2023-02-16

## 2023-02-17 ENCOUNTER — HOSPITAL ENCOUNTER (OUTPATIENT)
Dept: ONCOLOGY | Facility: HOSPITAL | Age: 63
Discharge: HOME | End: 2023-02-17
Admitting: INTERNAL MEDICINE
Payer: COMMERCIAL

## 2023-02-17 DIAGNOSIS — C50.811 MALIGNANT NEOPLASM OF OVERLAPPING SITES OF RIGHT FEMALE BREAST, UNSPECIFIED ESTROGEN RECEPTOR STATUS: ICD-10-CM

## 2023-02-17 DIAGNOSIS — C50.811 MALIGNANT NEOPLASM OF OVERLAPPING SITES OF RIGHT BREAST IN FEMALE, ESTROGEN RECEPTOR POSITIVE: Primary | ICD-10-CM

## 2023-02-17 DIAGNOSIS — Z17.0 MALIGNANT NEOPLASM OF OVERLAPPING SITES OF RIGHT BREAST IN FEMALE, ESTROGEN RECEPTOR POSITIVE: Primary | ICD-10-CM

## 2023-02-17 LAB
ALBUMIN SERPL-MCNC: 4.3 G/DL (ref 3.5–5.2)
ALBUMIN/GLOB SERPL: 1.9 G/DL
ALP SERPL-CCNC: 61 U/L (ref 39–117)
ALT SERPL W P-5'-P-CCNC: 13 U/L (ref 1–33)
ANION GAP SERPL CALCULATED.3IONS-SCNC: 9 MMOL/L (ref 5–15)
AST SERPL-CCNC: 15 U/L (ref 1–32)
BASOPHILS # BLD AUTO: 0.05 10*3/MM3 (ref 0–0.2)
BASOPHILS NFR BLD AUTO: 1.9 % (ref 0–1.5)
BILIRUB SERPL-MCNC: 0.5 MG/DL (ref 0–1.2)
BUN SERPL-MCNC: 17 MG/DL (ref 8–23)
BUN/CREAT SERPL: 18.7 (ref 7–25)
CALCIUM SPEC-SCNC: 9.6 MG/DL (ref 8.6–10.5)
CHLORIDE SERPL-SCNC: 104 MMOL/L (ref 98–107)
CO2 SERPL-SCNC: 26 MMOL/L (ref 22–29)
CREAT SERPL-MCNC: 0.91 MG/DL (ref 0.57–1)
DEPRECATED RDW RBC AUTO: 47.3 FL (ref 37–54)
EGFRCR SERPLBLD CKD-EPI 2021: 71 ML/MIN/1.73
EOSINOPHIL # BLD AUTO: 0.17 10*3/MM3 (ref 0–0.4)
EOSINOPHIL NFR BLD AUTO: 6.5 % (ref 0.3–6.2)
ERYTHROCYTE [DISTWIDTH] IN BLOOD BY AUTOMATED COUNT: 14 % (ref 12.3–15.4)
GLOBULIN UR ELPH-MCNC: 2.3 GM/DL
GLUCOSE SERPL-MCNC: 96 MG/DL (ref 65–99)
HCT VFR BLD AUTO: 34.6 % (ref 34–46.6)
HGB BLD-MCNC: 12.1 G/DL (ref 12–15.9)
IMM GRANULOCYTES # BLD AUTO: 0 10*3/MM3 (ref 0–0.05)
IMM GRANULOCYTES NFR BLD AUTO: 0 % (ref 0–0.5)
LYMPHOCYTES # BLD AUTO: 0.63 10*3/MM3 (ref 0.7–3.1)
LYMPHOCYTES NFR BLD AUTO: 24.2 % (ref 19.6–45.3)
MCH RBC QN AUTO: 32.4 PG (ref 26.6–33)
MCHC RBC AUTO-ENTMCNC: 35 G/DL (ref 31.5–35.7)
MCV RBC AUTO: 92.5 FL (ref 79–97)
MONOCYTES # BLD AUTO: 0.36 10*3/MM3 (ref 0.1–0.9)
MONOCYTES NFR BLD AUTO: 13.8 % (ref 5–12)
NEUTROPHILS NFR BLD AUTO: 1.39 10*3/MM3 (ref 1.7–7)
NEUTROPHILS NFR BLD AUTO: 53.6 % (ref 42.7–76)
PLATELET # BLD AUTO: 171 10*3/MM3 (ref 140–450)
PMV BLD AUTO: 9.3 FL (ref 6–12)
POTASSIUM SERPL-SCNC: 4 MMOL/L (ref 3.5–5.2)
PROT SERPL-MCNC: 6.6 G/DL (ref 6–8.5)
RBC # BLD AUTO: 3.74 10*6/MM3 (ref 3.77–5.28)
SODIUM SERPL-SCNC: 139 MMOL/L (ref 136–145)
WBC NRBC COR # BLD: 2.6 10*3/MM3 (ref 3.4–10.8)

## 2023-02-17 PROCEDURE — 36591 DRAW BLOOD OFF VENOUS DEVICE: CPT

## 2023-02-17 PROCEDURE — 25010000002 HEPARIN LOCK FLUSH PER 10 UNITS: Performed by: INTERNAL MEDICINE

## 2023-02-17 PROCEDURE — 85025 COMPLETE CBC W/AUTO DIFF WBC: CPT | Performed by: INTERNAL MEDICINE

## 2023-02-17 PROCEDURE — 80053 COMPREHEN METABOLIC PANEL: CPT | Performed by: INTERNAL MEDICINE

## 2023-02-17 RX ORDER — SODIUM CHLORIDE 0.9 % (FLUSH) 0.9 %
20 SYRINGE (ML) INJECTION AS NEEDED
Status: DISCONTINUED | OUTPATIENT
Start: 2023-02-17 | End: 2023-02-18 | Stop reason: HOSPADM

## 2023-02-17 RX ORDER — HEPARIN SODIUM (PORCINE) LOCK FLUSH IV SOLN 100 UNIT/ML 100 UNIT/ML
500 SOLUTION INTRAVENOUS AS NEEDED
Status: DISCONTINUED | OUTPATIENT
Start: 2023-02-17 | End: 2023-02-18 | Stop reason: HOSPADM

## 2023-02-17 RX ORDER — HEPARIN SODIUM (PORCINE) LOCK FLUSH IV SOLN 100 UNIT/ML 100 UNIT/ML
500 SOLUTION INTRAVENOUS AS NEEDED
Status: CANCELLED | OUTPATIENT
Start: 2023-02-17

## 2023-02-17 RX ORDER — SODIUM CHLORIDE 0.9 % (FLUSH) 0.9 %
20 SYRINGE (ML) INJECTION AS NEEDED
Status: CANCELLED | OUTPATIENT
Start: 2023-02-17

## 2023-02-17 RX ADMIN — Medication 20 ML: at 09:18

## 2023-02-17 RX ADMIN — HEPARIN SODIUM (PORCINE) LOCK FLUSH IV SOLN 100 UNIT/ML 500 UNITS: 100 SOLUTION at 09:18

## 2023-02-23 DIAGNOSIS — N65.0 BREAST RECONSTRUCTION DEFORMITY: Primary | ICD-10-CM

## 2023-02-24 ENCOUNTER — TELEPHONE (OUTPATIENT)
Dept: ONCOLOGY | Facility: HOSPITAL | Age: 63
End: 2023-02-24

## 2023-02-24 ENCOUNTER — SPECIALTY PHARMACY (OUTPATIENT)
Dept: PHARMACY | Facility: HOSPITAL | Age: 63
End: 2023-02-24
Payer: COMMERCIAL

## 2023-02-24 NOTE — PROGRESS NOTES
Specialty Pharmacy Patient Management Program  Oncology Refill Outreach      Marisa is a 63 y.o. female contacted today regarding refills of her medication(s).    Specialty medication(s) and dose(s) confirmed: abemaciclib (VERZENIO) 100 mg tablet chemo. Will be filled at [place] [facility].    Other medications being refilled: N/A    Refill Questions    Flowsheet Row Most Recent Value   Changes to allergies? No   Changes to medications? Yes   New conditions since last clinic visit Yes   Unplanned office visit, urgent care, ED, or hospital admission in the last 4 weeks  No   How does patient/caregiver feel medication is working? Good   Financial problems or insurance changes  No   Since the previous refill, were any specialty medication doses or scheduled injections missed or delayed?  No   Does this patient require a clinical escalation to a pharmacist? No          Delivery Questions    Flowsheet Row Most Recent Value   Delivery method [employer]   Delivery address correct? Yes   Delivery phone number [phone]   Preferred delivery time? Anytime   Number of medications in delivery 1   Medication being filled and delivered abemaciclib (VERZENIO) 100 mg tablet chemo   Doses left of specialty medications 10 days   Is there any medication that is due not being filled? No   Supplies needed? No supplies needed   Cooler needed? No   Do any medications need mixed or dated? No   Copay form of payment Credit card on file   Additional comments zero copay   Questions or concerns for the pharmacist? Yes   Explain any questions or concerns for the pharmacist Stated she has been having cramps in her legs, chest, and jaw   Are any medications first time fills? No                 Follow-up: 21 days     Jennifer Tovar  Care Coordinator, [facility]  2/24/2023  14:03 EST

## 2023-02-27 ENCOUNTER — SPECIALTY PHARMACY (OUTPATIENT)
Dept: PHARMACY | Facility: HOSPITAL | Age: 63
End: 2023-02-27
Payer: COMMERCIAL

## 2023-02-27 DIAGNOSIS — Z17.0 MALIGNANT NEOPLASM OF OVERLAPPING SITES OF RIGHT BREAST IN FEMALE, ESTROGEN RECEPTOR POSITIVE: ICD-10-CM

## 2023-02-27 DIAGNOSIS — C50.811 MALIGNANT NEOPLASM OF OVERLAPPING SITES OF RIGHT BREAST IN FEMALE, ESTROGEN RECEPTOR POSITIVE: ICD-10-CM

## 2023-02-27 DIAGNOSIS — C50.811 MALIGNANT NEOPLASM OF OVERLAPPING SITES OF RIGHT BREAST IN FEMALE, ESTROGEN RECEPTOR POSITIVE: Primary | ICD-10-CM

## 2023-02-27 DIAGNOSIS — Z17.0 MALIGNANT NEOPLASM OF OVERLAPPING SITES OF RIGHT BREAST IN FEMALE, ESTROGEN RECEPTOR POSITIVE: Primary | ICD-10-CM

## 2023-03-03 ENCOUNTER — OFFICE VISIT (OUTPATIENT)
Dept: ONCOLOGY | Facility: HOSPITAL | Age: 63
End: 2023-03-03
Payer: COMMERCIAL

## 2023-03-03 ENCOUNTER — HOSPITAL ENCOUNTER (OUTPATIENT)
Dept: ONCOLOGY | Facility: HOSPITAL | Age: 63
Discharge: HOME | End: 2023-03-03
Admitting: INTERNAL MEDICINE
Payer: COMMERCIAL

## 2023-03-03 VITALS
SYSTOLIC BLOOD PRESSURE: 148 MMHG | DIASTOLIC BLOOD PRESSURE: 76 MMHG | OXYGEN SATURATION: 99 % | TEMPERATURE: 97 F | WEIGHT: 180.12 LBS | HEART RATE: 82 BPM | RESPIRATION RATE: 16 BRPM | BODY MASS INDEX: 30.9 KG/M2

## 2023-03-03 DIAGNOSIS — Z17.0 MALIGNANT NEOPLASM OF OVERLAPPING SITES OF RIGHT BREAST IN FEMALE, ESTROGEN RECEPTOR POSITIVE: Primary | ICD-10-CM

## 2023-03-03 DIAGNOSIS — Z17.0 MALIGNANT NEOPLASM OF OVERLAPPING SITES OF RIGHT BREAST IN FEMALE, ESTROGEN RECEPTOR POSITIVE: ICD-10-CM

## 2023-03-03 DIAGNOSIS — C50.811 MALIGNANT NEOPLASM OF OVERLAPPING SITES OF RIGHT BREAST IN FEMALE, ESTROGEN RECEPTOR POSITIVE: ICD-10-CM

## 2023-03-03 DIAGNOSIS — C50.811 MALIGNANT NEOPLASM OF OVERLAPPING SITES OF RIGHT FEMALE BREAST, UNSPECIFIED ESTROGEN RECEPTOR STATUS: Primary | ICD-10-CM

## 2023-03-03 DIAGNOSIS — C50.811 MALIGNANT NEOPLASM OF OVERLAPPING SITES OF RIGHT BREAST IN FEMALE, ESTROGEN RECEPTOR POSITIVE: Primary | ICD-10-CM

## 2023-03-03 LAB
ALBUMIN SERPL-MCNC: 4.4 G/DL (ref 3.5–5.2)
ALBUMIN/GLOB SERPL: 1.8 G/DL
ALP SERPL-CCNC: 62 U/L (ref 39–117)
ALT SERPL W P-5'-P-CCNC: 12 U/L (ref 1–33)
ANION GAP SERPL CALCULATED.3IONS-SCNC: 8.3 MMOL/L (ref 5–15)
AST SERPL-CCNC: 17 U/L (ref 1–32)
BASOPHILS # BLD AUTO: 0.03 10*3/MM3 (ref 0–0.2)
BASOPHILS NFR BLD AUTO: 0.9 % (ref 0–1.5)
BILIRUB SERPL-MCNC: 0.3 MG/DL (ref 0–1.2)
BUN SERPL-MCNC: 20 MG/DL (ref 8–23)
BUN/CREAT SERPL: 22.7 (ref 7–25)
CALCIUM SPEC-SCNC: 9.5 MG/DL (ref 8.6–10.5)
CHLORIDE SERPL-SCNC: 104 MMOL/L (ref 98–107)
CO2 SERPL-SCNC: 25.7 MMOL/L (ref 22–29)
CREAT SERPL-MCNC: 0.88 MG/DL (ref 0.57–1)
DEPRECATED RDW RBC AUTO: 50 FL (ref 37–54)
EGFRCR SERPLBLD CKD-EPI 2021: 73.5 ML/MIN/1.73
EOSINOPHIL # BLD AUTO: 0.17 10*3/MM3 (ref 0–0.4)
EOSINOPHIL NFR BLD AUTO: 5.3 % (ref 0.3–6.2)
ERYTHROCYTE [DISTWIDTH] IN BLOOD BY AUTOMATED COUNT: 14.5 % (ref 12.3–15.4)
GLOBULIN UR ELPH-MCNC: 2.4 GM/DL
GLUCOSE SERPL-MCNC: 86 MG/DL (ref 65–99)
HCT VFR BLD AUTO: 34 % (ref 34–46.6)
HGB BLD-MCNC: 12 G/DL (ref 12–15.9)
IMM GRANULOCYTES # BLD AUTO: 0 10*3/MM3 (ref 0–0.05)
IMM GRANULOCYTES NFR BLD AUTO: 0 % (ref 0–0.5)
LYMPHOCYTES # BLD AUTO: 0.77 10*3/MM3 (ref 0.7–3.1)
LYMPHOCYTES NFR BLD AUTO: 24 % (ref 19.6–45.3)
MCH RBC QN AUTO: 33.1 PG (ref 26.6–33)
MCHC RBC AUTO-ENTMCNC: 35.3 G/DL (ref 31.5–35.7)
MCV RBC AUTO: 93.9 FL (ref 79–97)
MONOCYTES # BLD AUTO: 0.51 10*3/MM3 (ref 0.1–0.9)
MONOCYTES NFR BLD AUTO: 15.9 % (ref 5–12)
NEUTROPHILS NFR BLD AUTO: 1.73 10*3/MM3 (ref 1.7–7)
NEUTROPHILS NFR BLD AUTO: 53.9 % (ref 42.7–76)
PLATELET # BLD AUTO: 184 10*3/MM3 (ref 140–450)
PMV BLD AUTO: 9.2 FL (ref 6–12)
POTASSIUM SERPL-SCNC: 4.1 MMOL/L (ref 3.5–5.2)
PROT SERPL-MCNC: 6.8 G/DL (ref 6–8.5)
RBC # BLD AUTO: 3.62 10*6/MM3 (ref 3.77–5.28)
SODIUM SERPL-SCNC: 138 MMOL/L (ref 136–145)
WBC NRBC COR # BLD: 3.21 10*3/MM3 (ref 3.4–10.8)

## 2023-03-03 PROCEDURE — 25010000002 HEPARIN LOCK FLUSH PER 10 UNITS: Performed by: INTERNAL MEDICINE

## 2023-03-03 PROCEDURE — 80053 COMPREHEN METABOLIC PANEL: CPT | Performed by: INTERNAL MEDICINE

## 2023-03-03 PROCEDURE — 36591 DRAW BLOOD OFF VENOUS DEVICE: CPT

## 2023-03-03 PROCEDURE — 99214 OFFICE O/P EST MOD 30 MIN: CPT | Performed by: INTERNAL MEDICINE

## 2023-03-03 PROCEDURE — 85025 COMPLETE CBC W/AUTO DIFF WBC: CPT | Performed by: INTERNAL MEDICINE

## 2023-03-03 RX ORDER — HEPARIN SODIUM (PORCINE) LOCK FLUSH IV SOLN 100 UNIT/ML 100 UNIT/ML
500 SOLUTION INTRAVENOUS AS NEEDED
Status: DISCONTINUED | OUTPATIENT
Start: 2023-03-03 | End: 2023-03-04 | Stop reason: HOSPADM

## 2023-03-03 RX ORDER — SODIUM CHLORIDE 0.9 % (FLUSH) 0.9 %
20 SYRINGE (ML) INJECTION AS NEEDED
Status: DISCONTINUED | OUTPATIENT
Start: 2023-03-03 | End: 2023-03-04 | Stop reason: HOSPADM

## 2023-03-03 RX ORDER — SODIUM CHLORIDE 0.9 % (FLUSH) 0.9 %
20 SYRINGE (ML) INJECTION AS NEEDED
Status: CANCELLED | OUTPATIENT
Start: 2023-03-03

## 2023-03-03 RX ORDER — HEPARIN SODIUM (PORCINE) LOCK FLUSH IV SOLN 100 UNIT/ML 100 UNIT/ML
500 SOLUTION INTRAVENOUS AS NEEDED
Status: CANCELLED | OUTPATIENT
Start: 2023-03-03

## 2023-03-03 RX ADMIN — HEPARIN SODIUM (PORCINE) LOCK FLUSH IV SOLN 100 UNIT/ML 500 UNITS: 100 SOLUTION at 09:49

## 2023-03-03 RX ADMIN — Medication 20 ML: at 09:49

## 2023-03-03 NOTE — PROGRESS NOTES
Chief Complaint  Breast Cancer    Starr Mendez MD P*  Judah Johnson MD    Subjective            Oncology/Hematology History Overview Note     ER+ Right Breast Cancer:    Diagnostic mammogram on 11/12/2021: 2 cm asymmetry in the outer central right breast with amorphous calcifications.  Similar appearing group of amorphous calcifications in the outer central right breast.  6 mm asymmetry in the inner right breast.  Right axillary lymphadenopathy.    Ultrasound-guided biopsy on 11/1/2021: Right breast 3 o'clock position, 2 cm from the nipple with invasive ductal carcinoma, grade 2.  Right breast 9 o'clock position, 3 cm from the nipple with invasive ductal carcinoma, grade 2, ER positive (50%), MD positive (3%), HER-2 negative (score 0), Ki-67 14%.  Associated with intermediate grade ductal carcinoma in situ.  Right axillary lymph node positive for breast carcinoma.    CT CAP and bone scan on 12/13/21: negative for metastatic disease    Bilateral mastectomy 12/28/2021: Right breast with multiple (2) foci of invasive ductal carcinoma, largest focus 2.3 cm, grade 2, associated with DCIS with extensive intraductal component, all margins negative, 4/10 lymph nodes involved with no extranodal extension and the largest focus measured at 3.3 cm, ER positive (40%), MD positive (5%), HER-2 negative by IHC (score 0), Ki-67 14%, pT2 pN2a cM0    Completed 4 cycles of chemotherapy with TC on 4/25/22.  Complicated by a UTI and multiple right breast abscesses causing delay in cycles 3 and 4.     Radiation was also delayed due to infection and wound healing. She finishes December 20th.     *The pt discontinued HRT in October of 2021 when she had an abnormal screening mammogram     Malignant neoplasm of overlapping sites of right breast in female, estrogen receptor positive   12/9/2021 Initial Diagnosis    Malignant neoplasm of overlapping sites of right breast in female, estrogen receptor positive (HCC)     12/28/2021  Cancer Staged    Staging form: Breast, AJCC 8th Edition  - Pathologic stage from 12/28/2021: Stage IB (pT2, pN2a, cM0, G2, ER+, WY+, HER2-) - Signed by Starr Mendez MD PhD on 1/30/2022 1/14/2022 -  Chemotherapy    OP CENTRAL VENOUS ACCESS DEVICE ACCESS, CARE, AND MAINTENANCE (CVAD)     1/31/2022 - 4/26/2022 Chemotherapy    OP BREAST TC DOCEtaxel / Cyclophosphamide     11/15/2022 - 12/20/2022 Radiation    Radiation OncologyTreatment Course:  Marisa Portillo received 5000 cGy in 25 fractions to right chest wall and axillary levels 1 through 3.      12/27/2022 -  Chemotherapy    OP BREAST Abemaciclib      Malignant neoplasm of overlapping sites of right female breast   7/18/2022 Initial Diagnosis    Malignant neoplasm of overlapping sites of right female breast (HCC)         Patient comes in today for follow-up while on abemaciclib.  She did have an episode of nausea, vomiting, diarrhea but believes it was due to a viral illness.  Now she is feeling much better.  She has been having some muscle cramps and her primary care provider started her on potassium supplements.  She was already taking magnesium.      Review of Systems   Constitutional: Positive for fatigue. Negative for appetite change, diaphoresis, fever, unexpected weight gain and unexpected weight loss.   HENT: Negative for hearing loss, mouth sores, sore throat, swollen glands, trouble swallowing and voice change.    Eyes: Negative for blurred vision.   Respiratory: Negative for cough, shortness of breath and wheezing.    Cardiovascular: Negative for chest pain and palpitations.   Gastrointestinal: Negative for abdominal pain, blood in stool, constipation, diarrhea, nausea and vomiting.   Endocrine: Negative for cold intolerance and heat intolerance.   Genitourinary: Negative for difficulty urinating, dysuria, frequency, hematuria and urinary incontinence.   Musculoskeletal: Negative for arthralgias, back pain and myalgias.   Skin: Negative for  rash, skin lesions and wound.   Neurological: Negative for dizziness, seizures, weakness, numbness and headache.   Hematological: Does not bruise/bleed easily.   Psychiatric/Behavioral: Negative for depressed mood. The patient is not nervous/anxious.      Current Outpatient Medications on File Prior to Visit   Medication Sig Dispense Refill   • abemaciclib (VERZENIO) 100 mg tablet chemo tablet Take 1 tablet by mouth 2 (Two) Times a Day. 56 tablet 5   • acetaminophen (TYLENOL) 650 MG 8 hr tablet Take 1 tablet by mouth 2 (Two) Times a Day.     • Aspirin 81 MG capsule Take 81 mg by mouth 2 (Two) Times a Week.     • cloNIDine (Catapres) 0.2 MG tablet Take 1 tablet by mouth every night at bedtime. 30 tablet 3   • hydrOXYzine (ATARAX) 25 MG tablet Take 1 tablet by mouth At Night As Needed for Itching.     • Loratadine-Pseudoephedrine (CLARITIN-D 24 HOUR PO) Take 1 tablet by mouth Daily.     • ondansetron (ZOFRAN) 8 MG tablet Take 1 tablet by mouth 3 (Three) Times a Day As Needed for Nausea or Vomiting. 30 tablet 5   • potassium bicarbonate (K-LYTE) 25 MEQ disintegrating tablet Take 1 tablet by mouth 2 (Two) Times a Day.     • venlafaxine XR (EFFEXOR-XR) 75 MG 24 hr capsule Take 1 capsule by mouth Daily. 30 capsule 3   • Vitamin A 3 MG (39993 UT) capsule Take 1 capsule by mouth Daily.     • [DISCONTINUED] OLANZapine (zyPREXA) 5 MG tablet Take 1 tablet by mouth Every Night. 30 tablet 2     No current facility-administered medications on file prior to visit.       Allergies   Allergen Reactions   • Multihance [Gadobenate] Hives and Itching     MRI contrast     Past Medical History:   Diagnosis Date   • Allergies    • Anemia During chemo 2022   • Breast cancer    • Cervical dysplasia    • Herpes    • High cholesterol    • IFG (impaired fasting glucose)    • Migraine    • Osteoarthritis    • S/P BILATERAL IMMEDIATE BREAST RECONSTRUCTION WITH LEFT PRE PEC PLACEMENT OF SILICONE GEL IMPLANT AND MESH, RIGHT PRE PEC PLACEMENT OF  EXPANDER AND MESH 12/29/2021   • TIA (transient ischemic attack)     no residual   • Vitamin D deficiency      Past Surgical History:   Procedure Laterality Date   • APPENDECTOMY     • BREAST AUGMENTATION Bilateral 12/28/2021    Procedure: bilateral breast reconstructon with bilateral mesh placement.   left implant, right tissue expander placement;  Surgeon: Lacy Shelton MD;  Location: [facility] OR [facility] [place], [place];  Service: Plastics;  Laterality: Bilateral;   • BREAST SURGERY  [id number]    Bilateral Mastectomy with implant Left expander Right   • BREAST TISSUE EXPANDER REMOVAL INSERTION OF IMPLANT Right 04/15/2022    Procedure: RIGHT BREAST IMPLANT REMOVAL WITH INCISION AND DRAINAGE;  Surgeon: Lacy Shelton MD;  Location: [facility] OR [facility] [place], [place];  Service: Plastics;  Laterality: Right;   • CEREBRAL ANGIOGRAM      clip placed   • CYST REMOVAL Right     rt wrist   • HYSTERECTOMY      Partial age 31   • INCISION AND DRAINAGE ABSCESS Bilateral 10/4/2022    Procedure: BILATERAL BREAST ABSCESS INCISION AND DRAINAGE, WITH LEFT BREAST IMPLANT REMOVAL;  Surgeon: Lacy Shelton MD;  Location: [facility] MAIN OR;  Service: Plastics;  Laterality: Bilateral;   • INCISION AND DRAINAGE OF WOUND Right 03/11/2022    Procedure: INCISION AND DRAINAGE ABSCESS OF RIGHT BREAST WITH EXPANDER REMOVAL AND IMPLANT PLACEMENT;  Surgeon: Lacy Shelton MD;  Location: [facility] MAIN OR;  Service: Plastics;  Laterality: Right;   • MASTECTOMY COMPLETE / SIMPLE W/ SENTINEL NODE BIOPSY Bilateral    • PORTACATH PLACEMENT Left 12/28/2021    Procedure: INSERTION OF PORTACATH;  Surgeon: Jacqueline Mcgraw MD;  Location: [facility] OR [facility] [place], [place];  Service: General;  Laterality: Left;   • SENTINEL NODE BIOPSY Bilateral 12/28/2021    Procedure: BILATERAL BREAST MASTECTOMIES WITH RIGHT SENTINEL NODE BIOPSIES WITH FROZEN SECTIONS;  Surgeon: Jacqueline Mcgraw MD;  Location: [facility] [place], [place];  Service: General;  Laterality: Bilateral;     Social History      Socioeconomic History   • Marital status:    Tobacco Use   • Smoking status: Never   • Smokeless tobacco: Never   • Tobacco comments:     Father mother  smoker diring childhood and  adulthood   Vaping Use   • Vaping Use: Never used   Substance and Sexual Activity   • Alcohol use: Yes     Comment: Socially only . Occasionally   • Drug use: Never   • Sexual activity: Yes     Partners: Male     Birth control/protection: Post-menopausal     Comment: Hysterectomy age 33     Family History   Problem Relation Age of Onset   • Hypertension Mother    • Diabetes Mother    • Cancer Mother    • Arthritis Mother         Osteo and RA   • Breast cancer Mother    • Hypertension Father    • Diabetes Father    • Cancer Father    • Arthritis Father    • Liver cancer Father    • Lung cancer Father    • Asthma Father    • COPD Father    • Heart disease Father    • Kidney disease Father    • Breast cancer Maternal Aunt    • Malig Hyperthermia Neg Hx        Objective   Physical Exam   General: Alert, cooperative, no acute distress  Eyes: Anicteric sclera, PERRLA  Respiratory: normal respiratory effort  Cardiovascular: no lower extremity edema  Skin: Normal tone, no rash, no lesions  Psychiatric: Appropriate affect, intact judgment  Neurologic: No focal sensory or motor deficits, normal cognition   Musculoskeletal: Normal muscle strength and tone  Extremities: No clubbing, cyanosis, or deformities    Vitals:    03/03/23 0843   BP: 148/76   Pulse: 82   Resp: 16   Temp: 97 °F (36.1 °C)   TempSrc: Temporal   SpO2: 99%   Weight: 81.7 kg (180 lb 1.9 oz)   PainSc: 0-No pain     ECOG score: 0         PHQ-9 Total Score:           Result Review :   The following data was reviewed by: Starr Mendez MD PhD on 03/03/2023:  Lab Results   Component Value Date    HGB 12.0 03/03/2023    HCT 34.0 03/03/2023    MCV 93.9 03/03/2023     03/03/2023    WBC 3.21 (L) 03/03/2023    NEUTROABS 1.73 03/03/2023    LYMPHSABS 0.77 03/03/2023     MONOSABS 0.51 03/03/2023    EOSABS 0.17 03/03/2023    BASOSABS 0.03 03/03/2023     Lab Results   Component Value Date    GLUCOSE 86 03/03/2023    BUN 20 03/03/2023    CREATININE 0.88 03/03/2023     03/03/2023    K 4.1 03/03/2023     03/03/2023    CO2 25.7 03/03/2023    CALCIUM 9.5 03/03/2023    PROTEINTOT 6.8 03/03/2023    ALBUMIN 4.4 03/03/2023    BILITOT 0.3 03/03/2023    ALKPHOS 62 03/03/2023    AST 17 03/03/2023    ALT 12 03/03/2023     Lab Results   Component Value Date    MG 1.9 09/15/2022    PHOS 2.9 09/15/2022    FREET4 1.08 08/01/2022    TSH 0.651 08/01/2022     Lab Results   Component Value Date    IRON 65 03/13/2022    LABIRON 35 03/13/2022    TRANSFERRIN 125 (L) 03/13/2022    TIBC 186 (L) 03/13/2022     Lab Results   Component Value Date    ZVPKIWYH85 >2,000 (H) 03/11/2022    FOLATE 16.80 03/11/2022     No results found for: PSA, CEA, AFP, ,           Assessment and Plan    Diagnoses and all orders for this visit:    1. Malignant neoplasm of overlapping sites of right breast in female, estrogen receptor positive (Primary)  -     CBC & Differential; Future  -     Comprehensive Metabolic Panel; Future        ER+ Right breast Cancer: pT2N2a, s/p bilateral mastectomy.  The pt completed 4 cycles of TC.  Her treatment course was complicated by breast abscesses. Radiation was delayed until December 2022.  She is tolerating anastrozole and abemiciclib fairly well without significant toxicity.   Her CBC and CMP are essentially normal.  I will follow up with her again in 1 month for repeat labs and toxicity check.     Menopausal symptoms: Secondary to anastrozole.  She is currently on venlafaxine 75mg daily.      Patient was given instructions and counseling regarding her condition or for health maintenance advice. Please see specific information pulled into the AVS if appropriate.     Starr Mendez MD PhD    4/3/2023

## 2023-03-13 NOTE — PROGRESS NOTES
GYN Visit    CC: hot flashes    HPI:   63 y.o.No obstetric history on file. Contraception or HRT: Post menopausal, using no HRT, s/p HYST, still has one or both ovaries, benign indications  Pt has concerns she would like to discuss.          History: PMHx, Meds, Allergies, PSHx, Social Hx, and POBHx all reviewed and updated.  Physical Exam   PHYSICAL EXAM:  /78   Pulse 80   Wt 82.1 kg (181 lb)   BMI 31.05 kg/m²   General- NAD, alert and oriented, appropriate  Psych- Normal mood, good memory      ASSESSMENT AND PLAN:  Diagnoses and all orders for this visit:    1. Hot flashes due to menopause (Primary)  Assessment & Plan:  Started clonidine for hot flashes last appointment  States the medicine is working very well and she is now getting much better sleep  Will continue clonidine      2. Genitourinary syndrome of menopause  Assessment & Plan:  Patient with symptoms of GSM and subsequent dyspareunia  Patient is currently on an aromatase inhibitor as well as a chemo pill to reduce her risk of recurrence of breast cancer  We discussed nonhormonal treatment of GSM including Revaree moisturizing vaginal suppositories every 2 to 3 days and the addition of personal lubricant with intimacy.  We also discussed the role that pelvic floor physical therapy and possible dilator therapy could have in the future if her symptoms are not well controlled        Counseling: Sexual health and wellbeing following the diagnosis of breast cancer        Follow Up:  No follow-ups on file.          Mini Damian MD  03/14/2023

## 2023-03-14 ENCOUNTER — OFFICE VISIT (OUTPATIENT)
Dept: OBSTETRICS AND GYNECOLOGY | Facility: CLINIC | Age: 63
End: 2023-03-14
Payer: COMMERCIAL

## 2023-03-14 VITALS
DIASTOLIC BLOOD PRESSURE: 78 MMHG | WEIGHT: 181 LBS | BODY MASS INDEX: 31.05 KG/M2 | SYSTOLIC BLOOD PRESSURE: 122 MMHG | HEART RATE: 80 BPM

## 2023-03-14 DIAGNOSIS — N95.8 GENITOURINARY SYNDROME OF MENOPAUSE: ICD-10-CM

## 2023-03-14 DIAGNOSIS — N95.1 HOT FLASHES DUE TO MENOPAUSE: Primary | ICD-10-CM

## 2023-03-14 PROCEDURE — 99213 OFFICE O/P EST LOW 20 MIN: CPT | Performed by: OBSTETRICS & GYNECOLOGY

## 2023-03-14 NOTE — ASSESSMENT & PLAN NOTE
Started clonidine for hot flashes last appointment  States the medicine is working very well and she is now getting much better sleep  Will continue clonidine

## 2023-03-16 PROBLEM — N95.8 GENITOURINARY SYNDROME OF MENOPAUSE: Status: ACTIVE | Noted: 2023-03-16

## 2023-03-16 NOTE — ASSESSMENT & PLAN NOTE
Patient with symptoms of GSM and subsequent dyspareunia  Patient is currently on an aromatase inhibitor as well as a chemo pill to reduce her risk of recurrence of breast cancer  We discussed nonhormonal treatment of GSM including Revaree moisturizing vaginal suppositories every 2 to 3 days and the addition of personal lubricant with intimacy.  We also discussed the role that pelvic floor physical therapy and possible dilator therapy could have in the future if her symptoms are not well controlled

## 2023-03-22 ENCOUNTER — SPECIALTY PHARMACY (OUTPATIENT)
Dept: PHARMACY | Facility: HOSPITAL | Age: 63
End: 2023-03-22
Payer: COMMERCIAL

## 2023-03-22 NOTE — PROGRESS NOTES
" Specialty Pharmacy Patient Management Program  Oncology Refill Outreach      Marisa \"Liberty\" is a 63 y.o. female contacted today regarding refills of her medication(s).    Specialty medication(s) and dose(s) confirmed: abemaciclib (VERZENIO) 100 mg tablet chemo tablet. Sent to [place] pharmacy to be mailed to patient home.     Other medications being refilled: N/A    Refill Questions    Flowsheet Row Most Recent Value   Changes to allergies? No   Changes to medications? No   New conditions since last clinic visit No   Unplanned office visit, urgent care, ED, or hospital admission in the last 4 weeks  No   How does patient/caregiver feel medication is working? Very good   Financial problems or insurance changes  No   Since the previous refill, were any specialty medication doses or scheduled injections missed or delayed?  No   Does this patient require a clinical escalation to a pharmacist? No          Delivery Questions    Flowsheet Row Most Recent Value   Delivery method FedEx   Delivery address correct? Yes   Delivery phone number [phone]   Preferred delivery time? Anytime   Number of medications in delivery 1   Medication being filled and delivered abemaciclib (VERZENIO) 100 mg tablet chemo tablet   Doses left of specialty medications 7 DAYS   Is there any medication that is due not being filled? No   Supplies needed? No supplies needed   Cooler needed? No   Do any medications need mixed or dated? No   Copay form of payment Credit card on file   Additional comments ZERO COPAY   Questions or concerns for the pharmacist? No   Explain any questions or concerns for the pharmacist N/A   Are any medications first time fills? No                 Follow-up: 21 days     Jennifer Tovar  Care Coordinator, [facility]  3/22/2023  09:47 EDT        "

## 2023-03-28 RX ORDER — ERGOCALCIFEROL 1.25 MG/1
50000 CAPSULE ORAL
Qty: 4 CAPSULE | Refills: 11 | Status: SHIPPED | OUTPATIENT
Start: 2023-03-28

## 2023-03-31 DIAGNOSIS — C50.811 MALIGNANT NEOPLASM OF OVERLAPPING SITES OF RIGHT FEMALE BREAST, UNSPECIFIED ESTROGEN RECEPTOR STATUS: ICD-10-CM

## 2023-03-31 RX ORDER — ANASTROZOLE 1 MG/1
1 TABLET ORAL DAILY
Qty: 90 TABLET | Refills: 1 | Status: SHIPPED | OUTPATIENT
Start: 2023-03-31

## 2023-04-03 ENCOUNTER — SPECIALTY PHARMACY (OUTPATIENT)
Dept: PHARMACY | Facility: HOSPITAL | Age: 63
End: 2023-04-03
Payer: COMMERCIAL

## 2023-04-04 ENCOUNTER — OFFICE VISIT (OUTPATIENT)
Dept: ONCOLOGY | Facility: HOSPITAL | Age: 63
End: 2023-04-04
Payer: COMMERCIAL

## 2023-04-04 ENCOUNTER — HOSPITAL ENCOUNTER (OUTPATIENT)
Dept: ONCOLOGY | Facility: HOSPITAL | Age: 63
Discharge: HOME | End: 2023-04-04
Admitting: INTERNAL MEDICINE
Payer: COMMERCIAL

## 2023-04-04 VITALS
HEART RATE: 85 BPM | BODY MASS INDEX: 30.98 KG/M2 | RESPIRATION RATE: 16 BRPM | SYSTOLIC BLOOD PRESSURE: 113 MMHG | WEIGHT: 181.44 LBS | HEIGHT: 64 IN | DIASTOLIC BLOOD PRESSURE: 65 MMHG | OXYGEN SATURATION: 98 % | TEMPERATURE: 97.1 F

## 2023-04-04 DIAGNOSIS — Z17.0 MALIGNANT NEOPLASM OF OVERLAPPING SITES OF RIGHT BREAST IN FEMALE, ESTROGEN RECEPTOR POSITIVE: Primary | ICD-10-CM

## 2023-04-04 DIAGNOSIS — Z17.0 MALIGNANT NEOPLASM OF OVERLAPPING SITES OF RIGHT BREAST IN FEMALE, ESTROGEN RECEPTOR POSITIVE: ICD-10-CM

## 2023-04-04 DIAGNOSIS — C50.811 MALIGNANT NEOPLASM OF OVERLAPPING SITES OF RIGHT BREAST IN FEMALE, ESTROGEN RECEPTOR POSITIVE: ICD-10-CM

## 2023-04-04 DIAGNOSIS — C50.811 MALIGNANT NEOPLASM OF OVERLAPPING SITES OF RIGHT BREAST IN FEMALE, ESTROGEN RECEPTOR POSITIVE: Primary | ICD-10-CM

## 2023-04-04 LAB
ALBUMIN SERPL-MCNC: 4.1 G/DL (ref 3.5–5.2)
ALBUMIN/GLOB SERPL: 1.9 G/DL
ALP SERPL-CCNC: 62 U/L (ref 39–117)
ALT SERPL W P-5'-P-CCNC: 14 U/L (ref 1–33)
ANION GAP SERPL CALCULATED.3IONS-SCNC: 11.3 MMOL/L (ref 5–15)
AST SERPL-CCNC: 16 U/L (ref 1–32)
BASOPHILS # BLD AUTO: 0.05 10*3/MM3 (ref 0–0.2)
BASOPHILS NFR BLD AUTO: 2 % (ref 0–1.5)
BILIRUB SERPL-MCNC: 0.4 MG/DL (ref 0–1.2)
BUN SERPL-MCNC: 16 MG/DL (ref 8–23)
BUN/CREAT SERPL: 19 (ref 7–25)
CALCIUM SPEC-SCNC: 9.4 MG/DL (ref 8.6–10.5)
CHLORIDE SERPL-SCNC: 107 MMOL/L (ref 98–107)
CO2 SERPL-SCNC: 23.7 MMOL/L (ref 22–29)
CREAT SERPL-MCNC: 0.84 MG/DL (ref 0.57–1)
DEPRECATED RDW RBC AUTO: 50.8 FL (ref 37–54)
EGFRCR SERPLBLD CKD-EPI 2021: 77.7 ML/MIN/1.73
EOSINOPHIL # BLD AUTO: 0.14 10*3/MM3 (ref 0–0.4)
EOSINOPHIL NFR BLD AUTO: 5.6 % (ref 0.3–6.2)
ERYTHROCYTE [DISTWIDTH] IN BLOOD BY AUTOMATED COUNT: 14.3 % (ref 12.3–15.4)
GLOBULIN UR ELPH-MCNC: 2.2 GM/DL
GLUCOSE SERPL-MCNC: 109 MG/DL (ref 65–99)
HCT VFR BLD AUTO: 33.9 % (ref 34–46.6)
HGB BLD-MCNC: 11.7 G/DL (ref 12–15.9)
IMM GRANULOCYTES # BLD AUTO: 0 10*3/MM3 (ref 0–0.05)
IMM GRANULOCYTES NFR BLD AUTO: 0 % (ref 0–0.5)
LARGE PLATELETS: NORMAL
LYMPHOCYTES # BLD AUTO: 0.49 10*3/MM3 (ref 0.7–3.1)
LYMPHOCYTES NFR BLD AUTO: 19.6 % (ref 19.6–45.3)
MAGNESIUM SERPL-MCNC: 1.9 MG/DL (ref 1.6–2.4)
MCH RBC QN AUTO: 33.5 PG (ref 26.6–33)
MCHC RBC AUTO-ENTMCNC: 34.5 G/DL (ref 31.5–35.7)
MCV RBC AUTO: 97.1 FL (ref 79–97)
MONOCYTES # BLD AUTO: 0.31 10*3/MM3 (ref 0.1–0.9)
MONOCYTES NFR BLD AUTO: 12.4 % (ref 5–12)
NEUTROPHILS NFR BLD AUTO: 1.51 10*3/MM3 (ref 1.7–7)
NEUTROPHILS NFR BLD AUTO: 60.4 % (ref 42.7–76)
PHOSPHATE SERPL-MCNC: 3.2 MG/DL (ref 2.5–4.5)
PLAT MORPH BLD: NORMAL
PLATELET # BLD AUTO: 163 10*3/MM3 (ref 140–450)
PMV BLD AUTO: 9.6 FL (ref 6–12)
POTASSIUM SERPL-SCNC: 3.9 MMOL/L (ref 3.5–5.2)
PROT SERPL-MCNC: 6.3 G/DL (ref 6–8.5)
RBC # BLD AUTO: 3.49 10*6/MM3 (ref 3.77–5.28)
RBC MORPH BLD: NORMAL
SMALL PLATELETS BLD QL SMEAR: ADEQUATE
SODIUM SERPL-SCNC: 142 MMOL/L (ref 136–145)
WBC MORPH BLD: NORMAL
WBC NRBC COR # BLD: 2.5 10*3/MM3 (ref 3.4–10.8)

## 2023-04-04 PROCEDURE — 83735 ASSAY OF MAGNESIUM: CPT | Performed by: INTERNAL MEDICINE

## 2023-04-04 PROCEDURE — G0463 HOSPITAL OUTPT CLINIC VISIT: HCPCS | Performed by: INTERNAL MEDICINE

## 2023-04-04 PROCEDURE — 25010000002 HEPARIN LOCK FLUSH PER 10 UNITS: Performed by: INTERNAL MEDICINE

## 2023-04-04 PROCEDURE — 84100 ASSAY OF PHOSPHORUS: CPT | Performed by: INTERNAL MEDICINE

## 2023-04-04 PROCEDURE — 80053 COMPREHEN METABOLIC PANEL: CPT | Performed by: INTERNAL MEDICINE

## 2023-04-04 PROCEDURE — 85025 COMPLETE CBC W/AUTO DIFF WBC: CPT | Performed by: INTERNAL MEDICINE

## 2023-04-04 PROCEDURE — 85007 BL SMEAR W/DIFF WBC COUNT: CPT | Performed by: INTERNAL MEDICINE

## 2023-04-04 PROCEDURE — 36591 DRAW BLOOD OFF VENOUS DEVICE: CPT

## 2023-04-04 RX ORDER — HEPARIN SODIUM (PORCINE) LOCK FLUSH IV SOLN 100 UNIT/ML 100 UNIT/ML
500 SOLUTION INTRAVENOUS AS NEEDED
Status: DISCONTINUED | OUTPATIENT
Start: 2023-04-04 | End: 2023-04-05 | Stop reason: HOSPADM

## 2023-04-04 RX ORDER — SODIUM CHLORIDE 0.9 % (FLUSH) 0.9 %
20 SYRINGE (ML) INJECTION AS NEEDED
OUTPATIENT
Start: 2023-04-04

## 2023-04-04 RX ORDER — SODIUM CHLORIDE 0.9 % (FLUSH) 0.9 %
20 SYRINGE (ML) INJECTION AS NEEDED
Status: DISCONTINUED | OUTPATIENT
Start: 2023-04-04 | End: 2023-04-05 | Stop reason: HOSPADM

## 2023-04-04 RX ORDER — HEPARIN SODIUM (PORCINE) LOCK FLUSH IV SOLN 100 UNIT/ML 100 UNIT/ML
500 SOLUTION INTRAVENOUS AS NEEDED
OUTPATIENT
Start: 2023-04-04

## 2023-04-04 RX ADMIN — Medication 20 ML: at 09:30

## 2023-04-04 RX ADMIN — HEPARIN SODIUM (PORCINE) LOCK FLUSH IV SOLN 100 UNIT/ML 500 UNITS: 100 SOLUTION at 09:30

## 2023-04-04 NOTE — PROGRESS NOTES
[facility]    Location  [facility] HEM[facility]OLOG[facility]OGY    Chief Complaint  Malignant neoplasm of overlapping sites of right breast in     Referring Provider: Starr Mendez MD PhD  PCP: Judah Johnson MD    Subjective          Oncology/Hematology History Overview Note     ER+ Right Breast Cancer:    Diagnostic mammogram on 11/12/2021: 2 cm asymmetry in the outer central right breast with amorphous calcifications.  Similar appearing group of amorphous calcifications in the outer central right breast.  6 mm asymmetry in the inner right breast.  Right axillary lymphadenopathy.    Ultrasound-guided biopsy on 11/1/2021: Right breast 3 o'clock position, 2 cm from the nipple with invasive ductal carcinoma, grade 2.  Right breast 9 o'clock position, 3 cm from the nipple with invasive ductal carcinoma, grade 2, ER positive (50%), MA positive (3%), HER-2 negative (score 0), Ki-67 14%.  Associated with intermediate grade ductal carcinoma in situ.  Right axillary lymph node positive for breast carcinoma.    CT CAP and bone scan on 12/13/21: negative for metastatic disease    Bilateral mastectomy 12/28/2021: Right breast with multiple (2) foci of invasive ductal carcinoma, largest focus 2.3 cm, grade 2, associated with DCIS with extensive intraductal component, all margins negative, 4/10 lymph nodes involved with no extranodal extension and the largest focus measured at 3.3 cm, ER positive (40%), MA positive (5%), HER-2 negative by IHC (score 0), Ki-67 14%, pT2 pN2a cM0    Completed 4 cycles of chemotherapy with TC on 4/25/22.  Complicated by a UTI and multiple right breast abscesses causing delay in cycles 3 and 4.     Radiation was also delayed due to infection and wound healing. She finishes December 20th.     She started Abemiciclib on 1/1/23.    *The pt discontinued HRT in October of 2021 when she had an abnormal screening mammogram     Malignant neoplasm of overlapping sites  of right breast in female, estrogen receptor positive   12/9/2021 Initial Diagnosis    Malignant neoplasm of overlapping sites of right breast in female, estrogen receptor positive (HCC)     12/28/2021 Cancer Staged    Staging form: Breast, AJCC 8th Edition  - Pathologic stage from 12/28/2021: Stage IB (pT2, pN2a, cM0, G2, ER+, FL+, HER2-) - Signed by Starr Mendez MD PhD on 1/30/2022 1/14/2022 -  Chemotherapy    OP CENTRAL VENOUS ACCESS DEVICE ACCESS, CARE, AND MAINTENANCE (CVAD)     1/31/2022 - 4/26/2022 Chemotherapy    OP BREAST TC DOCEtaxel / Cyclophosphamide     11/15/2022 - 12/20/2022 Radiation    Radiation OncologyTreatment Course:  Marisa Portillo received 5000 cGy in 25 fractions to right chest wall and axillary levels 1 through 3.      12/27/2022 -  Chemotherapy    OP BREAST Abemaciclib      Malignant neoplasm of overlapping sites of right female breast   7/18/2022 Initial Diagnosis    Malignant neoplasm of overlapping sites of right female breast (HCC)         HPI  Patient comes in today for follow-up and toxicity check.  She has been abemaciclib since 1 January.  She is still experiencing some intermittent diarrhea.  Sometimes it comes on without warning.  It even occurs at 2:00 in the morning on occasion.    Review of Systems   Constitutional: Positive for fatigue (4/10). Negative for appetite change, diaphoresis, fever, unexpected weight gain and unexpected weight loss.   HENT: Negative for hearing loss, mouth sores, sore throat, swollen glands, trouble swallowing and voice change.    Eyes: Negative for blurred vision.   Respiratory: Negative for cough, shortness of breath and wheezing.    Cardiovascular: Negative for chest pain and palpitations.   Gastrointestinal: Positive for diarrhea, nausea and vomiting. Negative for abdominal pain, blood in stool and constipation.   Endocrine: Negative for cold intolerance and heat intolerance.   Genitourinary: Negative for difficulty urinating,  dysuria, frequency, hematuria and urinary incontinence.   Musculoskeletal: Negative for arthralgias, back pain and myalgias.   Skin: Negative for rash, skin lesions and wound.   Neurological: Negative for dizziness, seizures, weakness, numbness and headache.   Hematological: Does not bruise/bleed easily.   Psychiatric/Behavioral: Negative for depressed mood. The patient is not nervous/anxious.    All other systems reviewed and are negative.      Past Medical History:   Diagnosis Date   • Allergies    • Anemia During chemo 2022   • Breast cancer    • Cervical dysplasia    • Herpes    • High cholesterol    • IFG (impaired fasting glucose)    • Migraine    • Osteoarthritis    • S/P BILATERAL IMMEDIATE BREAST RECONSTRUCTION WITH LEFT PRE PEC PLACEMENT OF SILICONE GEL IMPLANT AND MESH, RIGHT PRE PEC PLACEMENT OF EXPANDER AND MESH 12/29/2021   • TIA (transient ischemic attack)     no residual   • Vitamin D deficiency      Past Surgical History:   Procedure Laterality Date   • APPENDECTOMY     • BREAST AUGMENTATION Bilateral 12/28/2021    Procedure: bilateral breast reconstructon with bilateral mesh placement.   left implant, right tissue expander placement;  Surgeon: Lacy Shelton MD;  Location: [facility] [place];  Service: Plastics;  Laterality: Bilateral;   • BREAST SURGERY  [id number]    Bilateral Mastectomy with implant Left expander Right   • BREAST TISSUE EXPANDER REMOVAL INSERTION OF IMPLANT Right 04/15/2022    Procedure: RIGHT BREAST IMPLANT REMOVAL WITH INCISION AND DRAINAGE;  Surgeon: Lacy Shelton MD;  Location: [facility] [place];  Service: Plastics;  Laterality: Right;   • CEREBRAL ANGIOGRAM      clip placed   • CYST REMOVAL Right     rt wrist   • HYSTERECTOMY      Partial age 31   • INCISION AND DRAINAGE ABSCESS Bilateral 10/4/2022    Procedure: BILATERAL BREAST ABSCESS INCISION AND DRAINAGE, WITH LEFT BREAST IMPLANT REMOVAL;  Surgeon: Lacy Shelton MD;  Location: [facility] MAIN OR;  Service:  Plastics;  Laterality: Bilateral;   • INCISION AND DRAINAGE OF WOUND Right 03/11/2022    Procedure: INCISION AND DRAINAGE ABSCESS OF RIGHT BREAST WITH EXPANDER REMOVAL AND IMPLANT PLACEMENT;  Surgeon: Lacy Shelton MD;  Location: [facility] MAIN OR;  Service: Plastics;  Laterality: Right;   • MASTECTOMY COMPLETE / SIMPLE W/ SENTINEL NODE BIOPSY Bilateral    • PORTACATH PLACEMENT Left 12/28/2021    Procedure: INSERTION OF PORTACATH;  Surgeon: Jacqueline Mcgraw MD;  Location: [facility] OR [facility] [place];  Service: General;  Laterality: Left;   • SENTINEL NODE BIOPSY Bilateral 12/28/2021    Procedure: BILATERAL BREAST MASTECTOMIES WITH RIGHT SENTINEL NODE BIOPSIES WITH FROZEN SECTIONS;  Surgeon: Jacqueline Mcgraw MD;  Location: [facility];  Service: General;  Laterality: Bilateral;     Social History     Socioeconomic History   • Marital status:    Tobacco Use   • Smoking status: Never   • Smokeless tobacco: Never   • Tobacco comments:     Father mother  smoker diring childhood and  adulthood   Vaping Use   • Vaping Use: Never used   Substance and Sexual Activity   • Alcohol use: Yes     Comment: Socially only . Occasionally   • Drug use: Never   • Sexual activity: Yes     Partners: Male     Birth control/protection: Post-menopausal     Comment: Hysterectomy age 33     Family History   Problem Relation Age of Onset   • Hypertension Mother    • Diabetes Mother    • Cancer Mother    • Arthritis Mother         Osteo and RA   • Breast cancer Mother    • Hypertension Father    • Diabetes Father    • Cancer Father    • Arthritis Father    • Liver cancer Father    • Lung cancer Father    • Asthma Father    • COPD Father    • Heart disease Father    • Kidney disease Father    • Breast cancer Maternal Aunt    • Malig Hyperthermia Neg Hx        Objective   Physical Exam  General: Alert, cooperative, no acute distress  Eyes: Anicteric sclera, PERRLA  Respiratory: normal respiratory effort  Cardiovascular: no lower  "extremity edema  Skin: Normal tone, no rash, no lesions  Psychiatric: Appropriate affect, intact judgment  Neurologic: No focal sensory or motor deficits, normal cognition   Musculoskeletal: Normal muscle strength and tone  Extremities: No clubbing, cyanosis, or deformities    Vitals:    04/04/23 0956   BP: 113/65   Pulse: 85   Resp: 16   Temp: 97.1 °F (36.2 °C)   SpO2: 98%   Weight: 82.3 kg (181 lb 7 oz)   Height: 162.6 cm (64.02\")   PainSc: 0-No pain     ECOG score: 0         PHQ-9 Total Score:         Result Review :   The following data was reviewed by: Starr Mendez MD PhD on 04/04/2023:  Lab Results   Component Value Date    HGB 11.7 (L) 04/04/2023    HCT 33.9 (L) 04/04/2023    MCV 97.1 (H) 04/04/2023     04/04/2023    WBC 2.50 (L) 04/04/2023    NEUTROABS 1.51 (L) 04/04/2023    LYMPHSABS 0.49 (L) 04/04/2023    MONOSABS 0.31 04/04/2023    EOSABS 0.14 04/04/2023    BASOSABS 0.05 04/04/2023     Lab Results   Component Value Date    GLUCOSE 109 (H) 04/04/2023    BUN 16 04/04/2023    CREATININE 0.84 04/04/2023     04/04/2023    K 3.9 04/04/2023     04/04/2023    CO2 23.7 04/04/2023    CALCIUM 9.4 04/04/2023    PROTEINTOT 6.3 04/04/2023    ALBUMIN 4.1 04/04/2023    BILITOT 0.4 04/04/2023    ALKPHOS 62 04/04/2023    AST 16 04/04/2023    ALT 14 04/04/2023          Assessment and Plan    Diagnoses and all orders for this visit:    1. Malignant neoplasm of overlapping sites of right breast in female, estrogen receptor positive  -     CBC & Differential; Future  -     Comprehensive Metabolic Panel; Future  -     Magnesium; Future  -     Phosphorus; Future        ER+ Right breast Cancer: pT2N2a, s/p bilateral mastectomy.  The pt completed 4 cycles of TC.  Her treatment course was complicated by breast abscesses. Radiation was delayed until December 2022.  She is tolerating anastrozole and abemiciclib which she started on 1/1/23. fairly well without significant toxicity.   Her CBC and CMP are " essentially normal.  I will follow up with her again in 2 months for repeat labs and toxicity check.     Menopausal symptoms: Secondary to anastrozole.  She is currently on venlafaxine 75mg daily.      Patient was given instructions and counseling regarding her condition or for health maintenance advice. Please see specific information pulled into the AVS if appropriate.

## 2023-04-14 ENCOUNTER — SPECIALTY PHARMACY (OUTPATIENT)
Dept: PHARMACY | Facility: HOSPITAL | Age: 63
End: 2023-04-14
Payer: COMMERCIAL

## 2023-04-14 NOTE — PROGRESS NOTES
" Specialty Pharmacy Patient Management Program  Oncology Refill Outreach      Marisa \"Liberty\" is a 63 y.o. female contacted today regarding refills of her medication(s).    Specialty medication(s) and dose(s) confirmed: abemaciclib (VERZENIO) 100 mg tablet chemo tablet. Sent to [place] pharmacy.    Other medications being refilled: N/A    Refill Questions    Flowsheet Row Most Recent Value   Changes to allergies? No   Changes to medications? No   New conditions since last clinic visit No   Unplanned office visit, urgent care, ED, or hospital admission in the last 4 weeks  No   How does patient/caregiver feel medication is working? Very good   Financial problems or insurance changes  No   Since the previous refill, were any specialty medication doses or scheduled injections missed or delayed?  No   Does this patient require a clinical escalation to a pharmacist? No          Delivery Questions    Flowsheet Row Most Recent Value   Delivery method FedEx   Delivery address correct? Yes   Delivery phone number [phone]   Preferred delivery time? Anytime   Number of medications in delivery 1   Medication being filled and delivered abemaciclib (VERZENIO) 100 mg tablet chemo tablet   Doses left of specialty medications 2 WEEKS   Is there any medication that is due not being filled? No   Supplies needed? No supplies needed   Cooler needed? No   Do any medications need mixed or dated? No   Copay form of payment Credit card on file   Additional comments Zero copay--Copay card on file   Questions or concerns for the pharmacist? No   Explain any questions or concerns for the pharmacist N/A   Are any medications first time fills? No                 Follow-up: 21 days     Jennifer Tovar  Care Coordinator, [facility]  4/14/2023  08:38 EDT        "

## 2023-04-25 DIAGNOSIS — N95.1 HOT FLASHES DUE TO MENOPAUSE: ICD-10-CM

## 2023-04-25 RX ORDER — CLONIDINE HYDROCHLORIDE 0.2 MG/1
0.2 TABLET ORAL
Qty: 30 TABLET | Refills: 3 | Status: SHIPPED | OUTPATIENT
Start: 2023-04-25

## 2023-04-25 NOTE — TELEPHONE ENCOUNTER
Patient requesting refill on Clonidine.  Last seen 3/14/23.  Last filled 12/6/22 Clondine # 30 with 3 refills. Next appointment 1/25/24  
never used

## 2023-05-02 NOTE — PROGRESS NOTES
Follow Up Office Visit      Encounter Date: 05/05/2023   Patient Name: Marisa Portillo  YOB: 1960   Medical Record Number: [id number]   Primary Diagnosis: Malignant neoplasm of overlapping sites of right breast in female, estrogen receptor positive [C50.811, Z17.0]     Cancer Staging   Malignant neoplasm of overlapping sites of right breast in female, estrogen receptor positive  Staging form: Breast, AJCC 8th Edition  - Pathologic stage from 12/28/2021: Stage IB (pT2, pN2a, cM0, G2, ER+, GA+, HER2-) - Signed by Starr Mendez MD PhD on 1/30/2022    Radiation Completion Date:  12/20/2022    Chief Complaint:    Chief Complaint   Patient presents with   • Follow-up   • Breast Cancer       Oncology/Hematology History Overview Note     ER+ Right Breast Cancer:    Diagnostic mammogram on 11/12/2021: 2 cm asymmetry in the outer central right breast with amorphous calcifications.  Similar appearing group of amorphous calcifications in the outer central right breast.  6 mm asymmetry in the inner right breast.  Right axillary lymphadenopathy.    Ultrasound-guided biopsy on 11/1/2021: Right breast 3 o'clock position, 2 cm from the nipple with invasive ductal carcinoma, grade 2.  Right breast 9 o'clock position, 3 cm from the nipple with invasive ductal carcinoma, grade 2, ER positive (50%), GA positive (3%), HER-2 negative (score 0), Ki-67 14%.  Associated with intermediate grade ductal carcinoma in situ.  Right axillary lymph node positive for breast carcinoma.    CT CAP and bone scan on 12/13/21: negative for metastatic disease    Bilateral mastectomy 12/28/2021: Right breast with multiple (2) foci of invasive ductal carcinoma, largest focus 2.3 cm, grade 2, associated with DCIS with extensive intraductal component, all margins negative, 4/10 lymph nodes involved with no extranodal extension and the largest focus measured at 3.3 cm, ER positive (40%), GA positive (5%), HER-2 negative by IHC (score  0), Ki-67 14%, pT2 pN2a cM0    Completed 4 cycles of chemotherapy with TC on 4/25/22.  Complicated by a UTI and multiple right breast abscesses causing delay in cycles 3 and 4.     Radiation was also delayed due to infection and wound healing. She finishes December 20th.     She started Abemiciclib on 1/1/23.    *The pt discontinued HRT in October of 2021 when she had an abnormal screening mammogram     Malignant neoplasm of overlapping sites of right breast in female, estrogen receptor positive   12/9/2021 Initial Diagnosis    Malignant neoplasm of overlapping sites of right breast in female, estrogen receptor positive (HCC)     12/28/2021 Cancer Staged    Staging form: Breast, AJCC 8th Edition  - Pathologic stage from 12/28/2021: Stage IB (pT2, pN2a, cM0, G2, ER+, ND+, HER2-) - Signed by Starr Mendez MD PhD on 1/30/2022 1/14/2022 -  Chemotherapy    OP CENTRAL VENOUS ACCESS DEVICE ACCESS, CARE, AND MAINTENANCE (CVAD)     1/31/2022 - 4/26/2022 Chemotherapy    OP BREAST TC DOCEtaxel / Cyclophosphamide     11/15/2022 - 12/20/2022 Radiation    Radiation OncologyTreatment Course:  Marisa Portillo received 5000 cGy in 25 fractions to right chest wall and axillary levels 1 through 3.      12/27/2022 -  Chemotherapy    OP BREAST Abemaciclib      Malignant neoplasm of overlapping sites of right female breast   7/18/2022 Initial Diagnosis    Malignant neoplasm of overlapping sites of right female breast (HCC)         History of Present Illness: Marisa Portillo is a 63 y.o. female who returns to [facility] for routine 3-month follow-up. She reports feeling well overall although she does experience right-sided chest wall tenderness and intermittent twinges of pain. She also notices some mild right-sided swelling in anterior chest. She was on vacation and missed her appointment with [facility] and has had a difficulty with getting rescheduled. She is currently on venlafaxine 75 mg daily and  clonidine 0.2 mg qhs which is beneficial for the hot flashes she experiences secondary to use of anastrozole. She continues to experience intermittent abdominal cramping and diarrhea related to abemaciclib, but states this is somewhat improved. She is going to see Plastic Surgery Dr. Crain in June for consultation although she remains undecided as to whether or not she desires to pursue reconstruction.     Subjective      Review of Systems: Review of Systems   Constitutional: Positive for fatigue (5/10).   HENT: Positive for postnasal drip (allergy related). Negative for congestion, sinus pressure, sinus pain, sore throat, tinnitus and trouble swallowing.    Eyes: Negative for visual disturbance.   Respiratory: Negative for cough and shortness of breath.    Cardiovascular: Negative for chest pain, palpitations and leg swelling.   Gastrointestinal: Positive for abdominal pain (cramping due to Chemo tablet), diarrhea (due to Chemo pill) and nausea (occasional).   Genitourinary: Negative for difficulty urinating, dysuria, frequency, hematuria and urgency.   Musculoskeletal: Positive for arthralgias (joint pain).   Skin: Negative.    Neurological: Positive for headaches (occasional). Negative for dizziness.   Psychiatric/Behavioral: Positive for sleep disturbance (trouble falling asleep and staying asleep).       The following portions of the patient's history were reviewed and updated as appropriate: allergies, current medications, past family history, past medical history, past social history, past surgical history and problem list.    Medications:     Current Outpatient Medications:   •  abemaciclib (VERZENIO) 100 mg tablet chemo tablet, Take 1 tablet by mouth 2 (Two) Times a Day., Disp: 56 tablet, Rfl: 5  •  acetaminophen (TYLENOL) 650 MG 8 hr tablet, Take 1 tablet by mouth 2 (Two) Times a Day., Disp: , Rfl:   •  anastrozole (ARIMIDEX) 1 MG tablet, TAKE 1 TABLET BY MOUTH DAILY., Disp: 90 tablet, Rfl: 1  •  Aspirin  81 MG capsule, Take 81 mg by mouth 2 (Two) Times a Week., Disp: , Rfl:   •  cloNIDine (CATAPRES) 0.2 MG tablet, TAKE 1 TABLET BY MOUTH EVERY NIGHT AT BEDTIME., Disp: 30 tablet, Rfl: 3  •  hydrOXYzine (ATARAX) 25 MG tablet, Take 1 tablet by mouth At Night As Needed for Itching., Disp: , Rfl:   •  Loratadine-Pseudoephedrine (CLARITIN-D 24 HOUR PO), Take 1 tablet by mouth Daily., Disp: , Rfl:   •  potassium bicarbonate (K-LYTE) 25 MEQ disintegrating tablet, Take 1 tablet by mouth 2 (Two) Times a Day., Disp: , Rfl:   •  venlafaxine XR (EFFEXOR-XR) 75 MG 24 hr capsule, Take 1 capsule by mouth Daily., Disp: 30 capsule, Rfl: 3  •  Vitamin A 3 MG (97690 UT) capsule, Take 1 capsule by mouth Daily., Disp: , Rfl:   •  vitamin D (ERGOCALCIFEROL) 1.25 MG (09935 UT) capsule capsule, TAKE 1 CAPSULE BY MOUTH EVERY 7 (SEVEN) DAYS., Disp: 4 capsule, Rfl: 11  •  ondansetron (ZOFRAN) 8 MG tablet, Take 1 tablet by mouth 3 (Three) Times a Day As Needed for Nausea or Vomiting. (Patient not taking: Reported on 5/5/2023), Disp: 30 tablet, Rfl: 5    Allergies:   Allergies   Allergen Reactions   • Multihance [Gadobenate] Hives and Itching     MRI contrast     Measures:  Quality of Life: 100 - Full Activity   ECOG score: 0  ECOG: (0) Fully active, able to carry on all predisease performance without restriction  Pain: (on a scale of 0-10)   Pain Score    05/05/23 0850   PainSc: 0-No pain         Objective     Physical Exam:   Vital Signs:   Vitals:    05/05/23 0850   BP: 130/80   Pulse: 83   Resp: 16   Temp: 97.7 °F (36.5 °C)   TempSrc: Temporal   SpO2: 96%   Weight: 83.5 kg (184 lb 1.4 oz)   PainSc: 0-No pain     Body mass index is 31.58 kg/m².   Wt Readings from Last 3 Encounters:   05/05/23 83.5 kg (184 lb 1.4 oz)   04/04/23 82.3 kg (181 lb 7 oz)   03/14/23 82.1 kg (181 lb)     Physical Exam  Vitals reviewed.   Constitutional:       General: She is not in acute distress.     Appearance: Normal appearance. She is normal weight. She is not  ill-appearing.   HENT:      Head: Normocephalic and atraumatic.      Mouth/Throat:      Mouth: Mucous membranes are moist.      Pharynx: Oropharynx is clear. No oropharyngeal exudate or posterior oropharyngeal erythema.   Eyes:      Conjunctiva/sclera: Conjunctivae normal.      Pupils: Pupils are equal, round, and reactive to light.   Cardiovascular:      Rate and Rhythm: Normal rate and regular rhythm.      Pulses: Normal pulses.      Heart sounds: Normal heart sounds.   Pulmonary:      Effort: Pulmonary effort is normal. No respiratory distress.      Breath sounds: Normal breath sounds. No wheezing or rhonchi.   Chest:   Breasts:     Right: Absent. No swelling, bleeding, inverted nipple, mass, nipple discharge or tenderness (mild anterior chest wall tenderness to palpation). Skin change: chest wall mild hyperpigmentation.      Left: Absent. No swelling, bleeding, inverted nipple, mass, nipple discharge or tenderness.          Comments: S/p bilateral nipple sparing mastectomy without implants  Musculoskeletal:         General: Normal range of motion.      Cervical back: Normal range of motion.   Lymphadenopathy:      Upper Body:      Right upper body: No supraclavicular or axillary adenopathy.      Left upper body: No supraclavicular or axillary adenopathy.   Skin:     General: Skin is warm and dry.   Neurological:      General: No focal deficit present.      Mental Status: She is alert and oriented to person, place, and time. Mental status is at baseline.   Psychiatric:         Mood and Affect: Mood normal.         Behavior: Behavior normal.       Result Review: I independently reviewed the following data. The pertinent findings are as above in the HPI.    Pathology:   Lab Results   Component Value Date    CLININFO  10/04/2022     Breast abscess      FINALDX  10/04/2022     1.  Left breast capsule, excision:   - Benign fibrous capsule with chronic inflammation    2.  Right breast capsule, excision:   - Benign  "fibrous capsule and skeletal muscle with chronic inflammation    3.  Left breast implant, explant:   - Breast implant, gross only diagnosis        SYNOPTIC  12/28/2021     INVASIVE CARCINOMA OF THE BREAST: Resection  INVASIVE CARCINOMA OF THE BREAST: COMPLETE EXCISION - 1, 2, 3, 4, 5, 6, 7, 10, 11, 12  8th Edition - Protocol posted: 6/30/2021    SPECIMEN     Procedure:    Total mastectomy      Specimen Laterality:    Right     TUMOR     Histologic Type:    Invasive carcinoma of no special type (ductal)      Histologic Grade (Jacinto Histologic Score):           Glandular (Acinar) / Tubular Differentiation:    Score 3        Nuclear Pleomorphism:    Score 2        Mitotic Rate:    Score 2        Overall Grade:    Grade 2 (scores of 6 or 7)      Tumor Size:    Greatest dimension of largest invasive focus (Millimeters): 23 mm     Tumor Focality:    Multiple foci of invasive carcinoma        Number of Foci:    2      Ductal Carcinoma In Situ (DCIS):    Present        :    Positive for extensive intraductal component (EIC)        Architectural Patterns:    Comedo        Architectural Patterns:    Cribriform        Architectural Patterns:    Papillary        Architectural Patterns:    Solid        Nuclear Grade:    Grade III (high)        Necrosis:    Present, central (expansive \"comedo\" necrosis)      Tumor Extent:         Microcalcifications:    Present in invasive carcinoma      Microcalcifications:    Present in non-neoplastic tissue      Treatment Effect in the Breast:    No known presurgical therapy     MARGINS     Margin Status for Invasive Carcinoma:    All margins negative for invasive carcinoma        Distance from Invasive Carcinoma to Closest Margin:    20 mm       Closest Margin(s) to Invasive Carcinoma:    Posterior      Margin Status for DCIS:    All margins negative for DCIS        Distance from DCIS to Closest Margin:    7 mm       Closest Margin(s) to DCIS:    Posterior     REGIONAL LYMPH NODES     " Regional Lymph Node Status:           :    Tumor present in regional lymph node(s)          Number of Lymph Nodes with Macrometastases:    4          Number of Lymph Nodes with Micrometastases:    0          Number of Lymph Nodes with Isolated Tumor Cells:    0          Size of Largest Sofi Metastatic Deposit:    23 mm         Extranodal Extension:    Not identified        Total Number of Lymph Nodes Examined (sentinel and non-sentinel):    10        Number of [place] Nodes Examined:    10     DISTANT METASTASIS    PATHOLOGIC STAGE CLASSIFICATION (pTNM, AJCC 8th Edition)     Reporting of pT, pN, and (when applicable) pM categories is based on information available to the pathologist at the time the report is issued. As per the AJCC (Chapter 1, 8th Ed.) it is the managing physician’s responsibility to establish the final pathologic stage based upon all pertinent information, including but potentially not limited to this pathology report.     TNM Descriptors:    m (multiple foci of invasive carcinoma)      pT Category:    pT2      pN Category:    pN2a        Breast Biomarker Testing Performed on Previous Biopsy:           Estrogen Receptor (ER) Status:    Positive (greater than 10% of cells demonstrate nuclear positivity)          Percentage of Cells with Nuclear Positivity:    40 %     Breast Biomarker Testing Performed on Previous Biopsy:           Progesterone Receptor (PgR) Status:    Positive          Percentage of Cells with Nuclear Positivity:    5 %     Breast Biomarker Testing Performed on Previous Biopsy:           HER2 (by immunohistochemistry):    Negative (Score 0)      Breast Biomarker Testing Performed on Previous Biopsy:           Ki-67 Percentage of Positive Nuclei:    14 %       Testing Performed on Case Number:    [id number]        Imaging: No radiology results for the last 90 days.     Labs:   WBC   Date Value Ref Range Status   04/04/2023 2.50 (L) 3.40 - 10.80 10*3/mm3 Final     Hemoglobin   Date  Value Ref Range Status   04/04/2023 11.7 (L) 12.0 - 15.9 g/dL Final     Hematocrit   Date Value Ref Range Status   04/04/2023 33.9 (L) 34.0 - 46.6 % Final     Platelets   Date Value Ref Range Status   04/04/2023 163 140 - 450 10*3/mm3 Final     TSH   Date Value Ref Range Status   08/01/2022 0.651 0.270 - 4.200 uIU/mL Final     Assessment / Plan      Impression: Marisa Portillo is a pleasant 63 y.o. female with cT2 cN1 cM0, invasive ductal carcinoma, grade 2, ER positive/NJ positive, HER-2/aries negative of the right breast. There were 2 lesions within the right breast, one at 3:00, 2 cm from the nipple and another at 9:00, 3 cm from the nipple. A right axillary lymph node was biopsied revealing metastatic carcinoma consistent with breast primary. She is status post bilateral nipple sparing mastectomy with right axillary lymph node dissection on 12/28/2021;  pT2 pN2a cM0. She is status post adjuvant chemotherapy, completed in April 2022. Radiotherapy was delayed due to recurrent infections requiring removal of bilateral breast implants. She is status post external beam radiotherapy to the right chest wall and axillary levels 1 through 3, completed on 12/20/2022. She is taking anastrozole and abemiciclib as managed by Dr. Mendez and tolerating well. She is currently taking venlafaxine and clonidine which is beneficial for the hot flashes she experiences secondary to use of anastrozole.    Assessment/Plan:   Diagnoses and all orders for this visit:    1. Malignant neoplasm of overlapping sites of right breast in female, estrogen receptor positive (Primary)    2. Secondary malignant neoplasm of axillary node    3. Personal history of radiation therapy    4. S/P bilateral nipple sparing mastectomy    5. At risk for lymphedema    6. Use of anastrozole (Arimidex)    7. Scar condition and fibrosis of skin       Follow Up:   1. Return for follow-up in 6 months. She is scheduled for consultation with Dr. Dorene Crain with   [facility] on 6/6/2023. She remains undecided as to whether or not she desires to pursue reconstruction. I recommended she avoid any reconstruction procedures within the first year after completion of radiotherapy.   2. Follow-up with Dr. Mendez as scheduled on 6/7/23.  3. Follow-up with Dr. Mcgraw as scheduled on 6/8/23.  4. I have asked clinic staff to contact Lymphedema Therapy to schedule her to be seen as soon as possible for lymphedema, fibrosis and axillary cording. She was on vacation therefore missed her appointment in April.     Return in about 6 months (around 11/5/2023) for Office Visit.  Marisa Portillo was encouraged to contact me in the interim with any questions or concerns regarding her care.        CALOS Hooper  Radiation Oncology  [facility]    This document has been signed by CALOS Fuentes on May 5, 2023 09:32 EDT

## 2023-05-05 ENCOUNTER — OFFICE VISIT (OUTPATIENT)
Dept: RADIATION ONCOLOGY | Facility: HOSPITAL | Age: 63
End: 2023-05-05
Payer: COMMERCIAL

## 2023-05-05 VITALS
OXYGEN SATURATION: 96 % | BODY MASS INDEX: 31.58 KG/M2 | HEART RATE: 83 BPM | RESPIRATION RATE: 16 BRPM | WEIGHT: 184.08 LBS | DIASTOLIC BLOOD PRESSURE: 80 MMHG | TEMPERATURE: 97.7 F | SYSTOLIC BLOOD PRESSURE: 130 MMHG

## 2023-05-05 DIAGNOSIS — L90.5 SCAR CONDITION AND FIBROSIS OF SKIN: ICD-10-CM

## 2023-05-05 DIAGNOSIS — Z17.0 MALIGNANT NEOPLASM OF OVERLAPPING SITES OF RIGHT BREAST IN FEMALE, ESTROGEN RECEPTOR POSITIVE: Primary | ICD-10-CM

## 2023-05-05 DIAGNOSIS — Z79.811 USE OF ANASTROZOLE (ARIMIDEX): ICD-10-CM

## 2023-05-05 DIAGNOSIS — C50.811 MALIGNANT NEOPLASM OF OVERLAPPING SITES OF RIGHT BREAST IN FEMALE, ESTROGEN RECEPTOR POSITIVE: Primary | ICD-10-CM

## 2023-05-05 DIAGNOSIS — Z92.3 PERSONAL HISTORY OF RADIATION THERAPY: ICD-10-CM

## 2023-05-05 DIAGNOSIS — Z91.89 AT RISK FOR LYMPHEDEMA: ICD-10-CM

## 2023-05-05 DIAGNOSIS — C77.3 SECONDARY MALIGNANT NEOPLASM OF AXILLARY NODE: ICD-10-CM

## 2023-05-05 DIAGNOSIS — Z90.13 S/P BILATERAL MASTECTOMY: ICD-10-CM

## 2023-05-05 PROCEDURE — G0463 HOSPITAL OUTPT CLINIC VISIT: HCPCS | Performed by: NURSE PRACTITIONER

## 2023-05-10 ENCOUNTER — SPECIALTY PHARMACY (OUTPATIENT)
Dept: PHARMACY | Facility: HOSPITAL | Age: 63
End: 2023-05-10
Payer: COMMERCIAL

## 2023-05-10 NOTE — PROGRESS NOTES
" Specialty Pharmacy Patient Management Program  Oncology Refill Outreach      Marisa \"Liberty\" is a 63 y.o. female contacted today regarding refills of her medication(s).    Specialty medication(s) and dose(s) confirmed: abemaciclib (VERZENIO) 100 mg tablet chemo tablet. Sent to [facility].    Other medications being refilled: N/A    Refill Questions    Flowsheet Row Most Recent Value   Changes to allergies? No   Changes to medications? No   New conditions since last clinic visit No   Unplanned office visit, urgent care, ED, or hospital admission in the last 4 weeks  No   How does patient/caregiver feel medication is working? Good   Financial problems or insurance changes  No   Since the previous refill, were any specialty medication doses or scheduled injections missed or delayed?  No   Does this patient require a clinical escalation to a pharmacist? No          Delivery Questions    Flowsheet Row Most Recent Value   Delivery method FedEx   Delivery address correct? Yes   Delivery phone number [phone]   Preferred delivery time? Anytime   Number of medications in delivery 1   Medication being filled and delivered abemaciclib (VERZENIO) 100 mg tablet chemo tablet   Doses left of specialty medications 3 weeks   Is there any medication that is due not being filled? No   Supplies needed? No supplies needed   Cooler needed? No   Do any medications need mixed or dated? No   Copay form of payment Credit card on file   Additional comments Zero copay. Copay card on file   Questions or concerns for the pharmacist? No   Explain any questions or concerns for the pharmacist N/A   Are any medications first time fills? No                 Follow-up: 21 days     Jennifer Tovar  Care Coordinator, [facility]  5/10/2023  07:51 EDT        "

## 2023-05-12 DIAGNOSIS — R23.2 HOT FLASHES: ICD-10-CM

## 2023-05-15 ENCOUNTER — TELEPHONE (OUTPATIENT)
Dept: ONCOLOGY | Facility: HOSPITAL | Age: 63
End: 2023-05-15

## 2023-05-15 RX ORDER — VENLAFAXINE HYDROCHLORIDE 75 MG/1
75 CAPSULE, EXTENDED RELEASE ORAL DAILY
Qty: 90 CAPSULE | Refills: 3 | OUTPATIENT
Start: 2023-05-15

## 2023-05-15 RX ORDER — VENLAFAXINE HYDROCHLORIDE 75 MG/1
75 CAPSULE, EXTENDED RELEASE ORAL DAILY
Qty: 90 CAPSULE | Refills: 3 | Status: SHIPPED | OUTPATIENT
Start: 2023-05-15

## 2023-05-15 NOTE — TELEPHONE ENCOUNTER
"  Caller: Marisa Portillo \"Liberty\"    Relationship to patient: SELF    Best call back number: [phone]    Chief complaint: DEATH IN THE FAMILY     Type of visit: AMANDO FLUSH     Requested date: CALL TO R/S     If rescheduling, when is the original appointment:5/16/2023    "

## 2023-05-17 ENCOUNTER — HOSPITAL ENCOUNTER (OUTPATIENT)
Dept: OCCUPATIONAL THERAPY | Facility: HOSPITAL | Age: 63
Setting detail: THERAPIES SERIES
Discharge: HOME | End: 2023-05-17
Payer: COMMERCIAL

## 2023-05-17 DIAGNOSIS — Z91.89 AT RISK FOR LYMPHEDEMA: ICD-10-CM

## 2023-05-17 DIAGNOSIS — M25.60 JOINT STIFFNESS: ICD-10-CM

## 2023-05-17 DIAGNOSIS — L90.5 SCAR CONDITION AND FIBROSIS OF SKIN: ICD-10-CM

## 2023-05-17 DIAGNOSIS — Z17.0 MALIGNANT NEOPLASM OF UPPER-OUTER QUADRANT OF RIGHT BREAST IN FEMALE, ESTROGEN RECEPTOR POSITIVE: Primary | ICD-10-CM

## 2023-05-17 DIAGNOSIS — R52 PAIN: ICD-10-CM

## 2023-05-17 DIAGNOSIS — C50.411 MALIGNANT NEOPLASM OF UPPER-OUTER QUADRANT OF RIGHT BREAST IN FEMALE, ESTROGEN RECEPTOR POSITIVE: Primary | ICD-10-CM

## 2023-05-17 PROCEDURE — 97140 MANUAL THERAPY 1/> REGIONS: CPT | Performed by: OCCUPATIONAL THERAPIST

## 2023-05-17 PROCEDURE — 93702 BIS XTRACELL FLUID ANALYSIS: CPT | Performed by: OCCUPATIONAL THERAPIST

## 2023-06-06 ENCOUNTER — SPECIALTY PHARMACY (OUTPATIENT)
Dept: PHARMACY | Facility: HOSPITAL | Age: 63
End: 2023-06-06
Payer: COMMERCIAL

## 2023-06-06 DIAGNOSIS — Z17.0 MALIGNANT NEOPLASM OF OVERLAPPING SITES OF RIGHT BREAST IN FEMALE, ESTROGEN RECEPTOR POSITIVE: ICD-10-CM

## 2023-06-06 DIAGNOSIS — C50.811 MALIGNANT NEOPLASM OF OVERLAPPING SITES OF RIGHT BREAST IN FEMALE, ESTROGEN RECEPTOR POSITIVE: ICD-10-CM

## 2023-06-06 NOTE — PROGRESS NOTES
Re: Refills of Oral Specialty Medication - Verzenio (abemaciclib)    Drug-Drug Interactions: The current medication list was reviewed and there are no relevant drug-drug interactions.  Medication Allergies: The patient has no relevant allergies as it relates to their oral specialty medication  Review of Labs/Dose Adjustments: NO DOSE CHANGE - I reviewed the most recent note and labs and the patient will continue without any dose changes.  I sent refills as described below.    Drug: Verzenio (abemaciclib)  Strength: 100 mg  Directions: Take 1 tablet by mouth twice a day  Quantity: 56  Refills: 5  Pharmacy prescription sent to: [facility]      Radha Arreola, PharmD, BCPS  Oncology Clinical Pharmacist  6/6/2023  12:43 EDT

## 2023-06-07 ENCOUNTER — SPECIALTY PHARMACY (OUTPATIENT)
Dept: PHARMACY | Facility: HOSPITAL | Age: 63
End: 2023-06-07
Payer: COMMERCIAL

## 2023-06-07 ENCOUNTER — OFFICE VISIT (OUTPATIENT)
Dept: ONCOLOGY | Facility: HOSPITAL | Age: 63
End: 2023-06-07
Payer: COMMERCIAL

## 2023-06-07 ENCOUNTER — HOSPITAL ENCOUNTER (OUTPATIENT)
Dept: ONCOLOGY | Facility: HOSPITAL | Age: 63
Discharge: HOME | End: 2023-06-07
Admitting: INTERNAL MEDICINE
Payer: COMMERCIAL

## 2023-06-07 ENCOUNTER — TELEPHONE (OUTPATIENT)
Dept: ONCOLOGY | Facility: HOSPITAL | Age: 63
End: 2023-06-07

## 2023-06-07 VITALS
TEMPERATURE: 97.3 F | OXYGEN SATURATION: 98 % | WEIGHT: 182.1 LBS | BODY MASS INDEX: 31.09 KG/M2 | HEART RATE: 80 BPM | HEIGHT: 64 IN | RESPIRATION RATE: 16 BRPM | SYSTOLIC BLOOD PRESSURE: 126 MMHG | DIASTOLIC BLOOD PRESSURE: 71 MMHG

## 2023-06-07 DIAGNOSIS — C50.811 MALIGNANT NEOPLASM OF OVERLAPPING SITES OF RIGHT BREAST IN FEMALE, ESTROGEN RECEPTOR POSITIVE: Primary | ICD-10-CM

## 2023-06-07 DIAGNOSIS — C50.811 MALIGNANT NEOPLASM OF OVERLAPPING SITES OF RIGHT FEMALE BREAST, UNSPECIFIED ESTROGEN RECEPTOR STATUS: ICD-10-CM

## 2023-06-07 DIAGNOSIS — Z17.0 MALIGNANT NEOPLASM OF OVERLAPPING SITES OF RIGHT BREAST IN FEMALE, ESTROGEN RECEPTOR POSITIVE: Primary | ICD-10-CM

## 2023-06-07 DIAGNOSIS — C50.811 MALIGNANT NEOPLASM OF OVERLAPPING SITES OF RIGHT FEMALE BREAST, UNSPECIFIED ESTROGEN RECEPTOR STATUS: Primary | ICD-10-CM

## 2023-06-07 LAB
ALBUMIN SERPL-MCNC: 4.2 G/DL (ref 3.5–5.2)
ALBUMIN SERPL-MCNC: 4.2 G/DL (ref 3.5–5.2)
ALBUMIN/GLOB SERPL: 1.7 G/DL
ALBUMIN/GLOB SERPL: 1.8 G/DL
ALP SERPL-CCNC: 61 U/L (ref 39–117)
ALP SERPL-CCNC: 62 U/L (ref 39–117)
ALT SERPL W P-5'-P-CCNC: 12 U/L (ref 1–33)
ALT SERPL W P-5'-P-CCNC: 14 U/L (ref 1–33)
ANION GAP SERPL CALCULATED.3IONS-SCNC: 7.7 MMOL/L (ref 5–15)
ANION GAP SERPL CALCULATED.3IONS-SCNC: 9.1 MMOL/L (ref 5–15)
AST SERPL-CCNC: 17 U/L (ref 1–32)
AST SERPL-CCNC: 17 U/L (ref 1–32)
BASOPHILS # BLD AUTO: 0.06 10*3/MM3 (ref 0–0.2)
BASOPHILS NFR BLD AUTO: 2 % (ref 0–1.5)
BILIRUB SERPL-MCNC: 0.4 MG/DL (ref 0–1.2)
BILIRUB SERPL-MCNC: 0.4 MG/DL (ref 0–1.2)
BUN SERPL-MCNC: 14 MG/DL (ref 8–23)
BUN SERPL-MCNC: 15 MG/DL (ref 8–23)
BUN/CREAT SERPL: 15.7 (ref 7–25)
BUN/CREAT SERPL: 18.5 (ref 7–25)
CALCIUM SPEC-SCNC: 9.3 MG/DL (ref 8.6–10.5)
CALCIUM SPEC-SCNC: 9.5 MG/DL (ref 8.6–10.5)
CHLORIDE SERPL-SCNC: 102 MMOL/L (ref 98–107)
CHLORIDE SERPL-SCNC: 104 MMOL/L (ref 98–107)
CO2 SERPL-SCNC: 25.9 MMOL/L (ref 22–29)
CO2 SERPL-SCNC: 27.3 MMOL/L (ref 22–29)
CREAT SERPL-MCNC: 0.81 MG/DL (ref 0.57–1)
CREAT SERPL-MCNC: 0.89 MG/DL (ref 0.57–1)
DEPRECATED RDW RBC AUTO: 48.7 FL (ref 37–54)
EGFRCR SERPLBLD CKD-EPI 2021: 72.5 ML/MIN/1.73
EGFRCR SERPLBLD CKD-EPI 2021: 81.2 ML/MIN/1.73
EOSINOPHIL # BLD AUTO: 0.22 10*3/MM3 (ref 0–0.4)
EOSINOPHIL NFR BLD AUTO: 7.3 % (ref 0.3–6.2)
ERYTHROCYTE [DISTWIDTH] IN BLOOD BY AUTOMATED COUNT: 13.1 % (ref 12.3–15.4)
GLOBULIN UR ELPH-MCNC: 2.3 GM/DL
GLOBULIN UR ELPH-MCNC: 2.5 GM/DL
GLUCOSE SERPL-MCNC: 93 MG/DL (ref 65–99)
GLUCOSE SERPL-MCNC: 99 MG/DL (ref 65–99)
HCT VFR BLD AUTO: 36.5 % (ref 34–46.6)
HGB BLD-MCNC: 12.3 G/DL (ref 12–15.9)
IMM GRANULOCYTES # BLD AUTO: 0.01 10*3/MM3 (ref 0–0.05)
IMM GRANULOCYTES NFR BLD AUTO: 0.3 % (ref 0–0.5)
LYMPHOCYTES # BLD AUTO: 0.65 10*3/MM3 (ref 0.7–3.1)
LYMPHOCYTES NFR BLD AUTO: 21.7 % (ref 19.6–45.3)
MAGNESIUM SERPL-MCNC: 2.1 MG/DL (ref 1.6–2.4)
MAGNESIUM SERPL-MCNC: 2.1 MG/DL (ref 1.6–2.4)
MCH RBC QN AUTO: 33.9 PG (ref 26.6–33)
MCHC RBC AUTO-ENTMCNC: 33.7 G/DL (ref 31.5–35.7)
MCV RBC AUTO: 100.6 FL (ref 79–97)
MONOCYTES # BLD AUTO: 0.37 10*3/MM3 (ref 0.1–0.9)
MONOCYTES NFR BLD AUTO: 12.3 % (ref 5–12)
NEUTROPHILS NFR BLD AUTO: 1.69 10*3/MM3 (ref 1.7–7)
NEUTROPHILS NFR BLD AUTO: 56.4 % (ref 42.7–76)
PHOSPHATE SERPL-MCNC: 3.1 MG/DL (ref 2.5–4.5)
PLATELET # BLD AUTO: 191 10*3/MM3 (ref 140–450)
PMV BLD AUTO: 9.6 FL (ref 6–12)
POTASSIUM SERPL-SCNC: 4.3 MMOL/L (ref 3.5–5.2)
POTASSIUM SERPL-SCNC: 4.4 MMOL/L (ref 3.5–5.2)
PROT SERPL-MCNC: 6.5 G/DL (ref 6–8.5)
PROT SERPL-MCNC: 6.7 G/DL (ref 6–8.5)
RBC # BLD AUTO: 3.63 10*6/MM3 (ref 3.77–5.28)
SODIUM SERPL-SCNC: 137 MMOL/L (ref 136–145)
SODIUM SERPL-SCNC: 139 MMOL/L (ref 136–145)
WBC NRBC COR # BLD: 3 10*3/MM3 (ref 3.4–10.8)

## 2023-06-07 PROCEDURE — 80053 COMPREHEN METABOLIC PANEL: CPT | Performed by: INTERNAL MEDICINE

## 2023-06-07 PROCEDURE — 83735 ASSAY OF MAGNESIUM: CPT | Performed by: INTERNAL MEDICINE

## 2023-06-07 PROCEDURE — G0463 HOSPITAL OUTPT CLINIC VISIT: HCPCS | Performed by: INTERNAL MEDICINE

## 2023-06-07 PROCEDURE — 85025 COMPLETE CBC W/AUTO DIFF WBC: CPT | Performed by: INTERNAL MEDICINE

## 2023-06-07 PROCEDURE — 25010000002 HEPARIN LOCK FLUSH PER 10 UNITS: Performed by: INTERNAL MEDICINE

## 2023-06-07 PROCEDURE — 84100 ASSAY OF PHOSPHORUS: CPT | Performed by: INTERNAL MEDICINE

## 2023-06-07 PROCEDURE — 99214 OFFICE O/P EST MOD 30 MIN: CPT | Performed by: INTERNAL MEDICINE

## 2023-06-07 PROCEDURE — 36591 DRAW BLOOD OFF VENOUS DEVICE: CPT

## 2023-06-07 RX ORDER — SODIUM CHLORIDE 0.9 % (FLUSH) 0.9 %
20 SYRINGE (ML) INJECTION AS NEEDED
Status: DISCONTINUED | OUTPATIENT
Start: 2023-06-07 | End: 2023-06-08 | Stop reason: HOSPADM

## 2023-06-07 RX ORDER — SODIUM CHLORIDE 0.9 % (FLUSH) 0.9 %
20 SYRINGE (ML) INJECTION AS NEEDED
OUTPATIENT
Start: 2023-06-07

## 2023-06-07 RX ORDER — HEPARIN SODIUM (PORCINE) LOCK FLUSH IV SOLN 100 UNIT/ML 100 UNIT/ML
500 SOLUTION INTRAVENOUS AS NEEDED
Status: DISCONTINUED | OUTPATIENT
Start: 2023-06-07 | End: 2023-06-08 | Stop reason: HOSPADM

## 2023-06-07 RX ORDER — HEPARIN SODIUM (PORCINE) LOCK FLUSH IV SOLN 100 UNIT/ML 100 UNIT/ML
500 SOLUTION INTRAVENOUS AS NEEDED
OUTPATIENT
Start: 2023-06-07

## 2023-06-07 RX ORDER — MAGNESIUM GLUCONATE 27 MG(500)
250 TABLET ORAL DAILY
COMMUNITY

## 2023-06-07 RX ADMIN — Medication 20 ML: at 09:41

## 2023-06-07 RX ADMIN — HEPARIN SODIUM (PORCINE) LOCK FLUSH IV SOLN 100 UNIT/ML 500 UNITS: 100 SOLUTION at 09:41

## 2023-06-07 NOTE — TELEPHONE ENCOUNTER
"  Caller: Marisa Portillo \"Liberty\"    Relationship: Self    Best call back number: [phone]    What is the best time to reach you: ASAP    Who are you requesting to speak with (clinical staff, provider,  specific staff member): CLINICAL    Do you know the name of the person who called: PETER    What was the call regarding: PT RETURNING A CALL TO PETER  PT IS IN OFFICE WITH DR BARRETO THIS MORNING    Is it okay if the provider responds through MyChart: N/A        "

## 2023-06-07 NOTE — PROGRESS NOTES
[facility]    Location  [facility] HEMATOLOGY [facility]OG[facility]    Chief Complaint  Malignant neoplasm of overlapping sites of right breast in     Referring Provider: Judah Johnson, *  PCP: Judah Johnson MD    Subjective          Oncology/Hematology History Overview Note     ER+ Right Breast Cancer:    Diagnostic mammogram on 11/12/2021: 2 cm asymmetry in the outer central right breast with amorphous calcifications.  Similar appearing group of amorphous calcifications in the outer central right breast.  6 mm asymmetry in the inner right breast.  Right axillary lymphadenopathy.    Ultrasound-guided biopsy on 11/1/2021: Right breast 3 o'clock position, 2 cm from the nipple with invasive ductal carcinoma, grade 2.  Right breast 9 o'clock position, 3 cm from the nipple with invasive ductal carcinoma, grade 2, ER positive (50%), SD positive (3%), HER-2 negative (score 0), Ki-67 14%.  Associated with intermediate grade ductal carcinoma in situ.  Right axillary lymph node positive for breast carcinoma.    CT CAP and bone scan on 12/13/21: negative for metastatic disease    Bilateral mastectomy 12/28/2021: Right breast with multiple (2) foci of invasive ductal carcinoma, largest focus 2.3 cm, grade 2, associated with DCIS with extensive intraductal component, all margins negative, 4/10 lymph nodes involved with no extranodal extension and the largest focus measured at 3.3 cm, ER positive (40%), SD positive (5%), HER-2 negative by IHC (score 0), Ki-67 14%, pT2 pN2a cM0    Completed 4 cycles of chemotherapy with TC on 4/25/22.  Complicated by a UTI and multiple right breast abscesses causing delay in cycles 3 and 4.     Radiation was also delayed due to infection and wound healing. She finishes December 20th.     She started Abemiciclib on 1/1/23.    *The pt discontinued HRT in October of 2021 when she had an abnormal screening mammogram     Malignant neoplasm of overlapping sites  of right breast in female, estrogen receptor positive   12/9/2021 Initial Diagnosis    Malignant neoplasm of overlapping sites of right breast in female, estrogen receptor positive (HCC)     12/28/2021 Cancer Staged    Staging form: Breast, AJCC 8th Edition  - Pathologic stage from 12/28/2021: Stage IB (pT2, pN2a, cM0, G2, ER+, OK+, HER2-) - Signed by Starr Mendez MD PhD on 1/30/2022 1/14/2022 -  Chemotherapy    OP CENTRAL VENOUS ACCESS DEVICE ACCESS, CARE, AND MAINTENANCE (CVAD)       1/31/2022 - 4/26/2022 Chemotherapy    OP BREAST TC DOCEtaxel / Cyclophosphamide       11/15/2022 - 12/20/2022 Radiation    Radiation OncologyTreatment Course:  Marisa Portillo received 5000 cGy in 25 fractions to right chest wall and axillary levels 1 through 3.      12/27/2022 -  Chemotherapy    OP BREAST Abemaciclib        Malignant neoplasm of overlapping sites of right female breast   7/18/2022 Initial Diagnosis    Malignant neoplasm of overlapping sites of right female breast (HCC)         History of Present Illness  Patient comes in today for follow-up while on abemaciclib.  She plans to have reconstruction by Dr. Breann rainey of [place] in January.  She continues to take medication as recommended.  She occasionally has bouts of diarrhea and does have fatigue but is otherwise okay.  She does not wish to make any changes.    Review of Systems   Constitutional:  Positive for fatigue (4/10). Negative for appetite change, diaphoresis, fever, unexpected weight gain and unexpected weight loss.   HENT:  Negative for hearing loss, mouth sores, sore throat, swollen glands, trouble swallowing and voice change.    Eyes:  Negative for blurred vision.   Respiratory:  Negative for cough, shortness of breath and wheezing.    Cardiovascular:  Negative for chest pain and palpitations.   Gastrointestinal:  Negative for abdominal pain, blood in stool, constipation, diarrhea, nausea and vomiting.   Endocrine: Negative for cold  intolerance and heat intolerance.   Genitourinary:  Negative for difficulty urinating, dysuria, frequency, hematuria and urinary incontinence.   Musculoskeletal:  Negative for arthralgias, back pain and myalgias.   Skin:  Negative for rash, skin lesions and wound.   Neurological:  Negative for dizziness, seizures, weakness, numbness and headache.   Hematological:  Does not bruise/bleed easily.   Psychiatric/Behavioral:  Negative for depressed mood. The patient is not nervous/anxious.    All other systems reviewed and are negative.    Past Medical History:   Diagnosis Date    Allergies     Anemia During chemo 2022    Breast cancer     Cervical dysplasia     Herpes     High cholesterol     IFG (impaired fasting glucose)     Migraine     Osteoarthritis     S/P BILATERAL IMMEDIATE BREAST RECONSTRUCTION WITH LEFT PRE PEC PLACEMENT OF SILICONE GEL IMPLANT AND MESH, RIGHT PRE PEC PLACEMENT OF EXPANDER AND MESH 12/29/2021    TIA (transient ischemic attack)     no residual    Vitamin D deficiency      Past Surgical History:   Procedure Laterality Date    APPENDECTOMY      BREAST AUGMENTATION Bilateral 12/28/2021    Procedure: bilateral breast reconstructon with bilateral mesh placement.   left implant, right tissue expander placement;  Surgeon: Lacy Shelton MD;  Location: [facility] [place];  Service: Plastics;  Laterality: Bilateral;    BREAST SURGERY  [id number]    Bilateral Mastectomy with implant Left expander Right    BREAST TISSUE EXPANDER REMOVAL INSERTION OF IMPLANT Right 04/15/2022    Procedure: RIGHT BREAST IMPLANT REMOVAL WITH INCISION AND DRAINAGE;  Surgeon: Lacy Shelton MD;  Location: [facility];  Service: Plastics;  Laterality: Right;    CEREBRAL ANGIOGRAM      clip placed    CYST REMOVAL Right     rt wrist    HYSTERECTOMY      Partial age 31    INCISION AND DRAINAGE ABSCESS Bilateral 10/4/2022    Procedure: BILATERAL BREAST ABSCESS INCISION AND DRAINAGE, WITH LEFT BREAST IMPLANT REMOVAL;   Surgeon: Lacy Shelton MD;  Location: [facility] MAIN OR;  Service: Plastics;  Laterality: Bilateral;    INCISION AND DRAINAGE OF WOUND Right 03/11/2022    Procedure: INCISION AND DRAINAGE ABSCESS OF RIGHT BREAST WITH EXPANDER REMOVAL AND IMPLANT PLACEMENT;  Surgeon: Lacy Shelton MD;  Location: [facility] MAIN OR;  Service: Plastics;  Laterality: Right;    MASTECTOMY COMPLETE / SIMPLE W/ SENTINEL NODE BIOPSY Bilateral     PORTACATH PLACEMENT Left 12/28/2021    Procedure: INSERTION OF PORTACATH;  Surgeon: Jacqueline Mcgraw MD;  [place] [facility] [place] [facility] [place]  Service: General;  Laterality: Left;    SENTINEL NODE BIOPSY Bilateral 12/28/2021    Procedure: BILATERAL BREAST MASTECTOMIES WITH RIGHT SENTINEL NODE BIOPSIES WITH FROZEN SECTIONS;  Surgeon: Jacqueline Mcgraw MD;  [place] [facility] [place] [facility] [place]  Service: General;  Laterality: Bilateral;     Social History     Socioeconomic History    Marital status:    Tobacco Use    Smoking status: Never    Smokeless tobacco: Never    Tobacco comments:     Father mother  smoker diring childhood and  adulthood   Vaping Use    Vaping Use: Never used   Substance and Sexual Activity    Alcohol use: Yes     Comment: Socially only . Occasionally    Drug use: Never    Sexual activity: Yes     Partners: Male     Birth control/protection: Post-menopausal     Comment: Hysterectomy age 33     Family History   Problem Relation Age of Onset    Hypertension Mother     Diabetes Mother     Cancer Mother     Arthritis Mother         Osteo and RA    Breast cancer Mother     Hypertension Father     Diabetes Father     Cancer Father     Arthritis Father     Liver cancer Father     Lung cancer Father     Asthma Father     COPD Father     Heart disease Father     Kidney disease Father     Breast cancer Maternal Aunt     Malig Hyperthermia Neg Hx        Objective   Physical Exam  General: Alert, cooperative, no acute distress  Eyes: Anicteric sclera, PERRLA  Respiratory: normal  "respiratory effort  Cardiovascular: no lower extremity edema  Skin: Normal tone, no rash, no lesions  Psychiatric: Appropriate affect, intact judgment  Neurologic: No focal sensory or motor deficits, normal cognition   Musculoskeletal: Normal muscle strength and tone  Extremities: No clubbing, cyanosis, or deformities    Vitals:    06/07/23 1033   BP: 126/71   Pulse: 80   Resp: 16   Temp: 97.3 °F (36.3 °C)   SpO2: 98%   Weight: 82.6 kg (182 lb 1.6 oz)   Height: 162.6 cm (64.02\")   PainSc: 0-No pain     ECOG score: 0         PHQ-9 Total Score:         Result Review :   The following data was reviewed by: Starr Mendez MD PhD on 06/07/2023:  Lab Results   Component Value Date    HGB 12.3 06/07/2023    HCT 36.5 06/07/2023    .6 (H) 06/07/2023     06/07/2023    WBC 3.00 (L) 06/07/2023    NEUTROABS 1.69 (L) 06/07/2023    LYMPHSABS 0.65 (L) 06/07/2023    MONOSABS 0.37 06/07/2023    EOSABS 0.22 06/07/2023    BASOSABS 0.06 06/07/2023     Lab Results   Component Value Date    GLUCOSE 99 06/07/2023    GLUCOSE 93 06/07/2023    BUN 15 06/07/2023    BUN 14 06/07/2023    CREATININE 0.81 06/07/2023    CREATININE 0.89 06/07/2023     06/07/2023     06/07/2023    K 4.4 06/07/2023    K 4.3 06/07/2023     06/07/2023     06/07/2023    CO2 27.3 06/07/2023    CO2 25.9 06/07/2023    CALCIUM 9.5 06/07/2023    CALCIUM 9.3 06/07/2023    PROTEINTOT 6.7 06/07/2023    PROTEINTOT 6.5 06/07/2023    ALBUMIN 4.2 06/07/2023    ALBUMIN 4.2 06/07/2023    BILITOT 0.4 06/07/2023    BILITOT 0.4 06/07/2023    ALKPHOS 61 06/07/2023    ALKPHOS 62 06/07/2023    AST 17 06/07/2023    AST 17 06/07/2023    ALT 14 06/07/2023    ALT 12 06/07/2023     Lab Results   Component Value Date    IRON 65 03/13/2022    LABIRON 35 03/13/2022    TRANSFERRIN 125 (L) 03/13/2022    TIBC 186 (L) 03/13/2022     Lab Results   Component Value Date    BIUGWFCP71 >2,000 (H) 03/11/2022    FOLATE 16.80 03/11/2022     No results found for: PSA, " CEA, AFP, ,        Assessment and Plan    Diagnoses and all orders for this visit:    1. Malignant neoplasm of overlapping sites of right female breast, unspecified estrogen receptor status [C50.811 (ICD-10-CM)] (Primary)  -     CBC & Differential; Future  -     Comprehensive Metabolic Panel; Future  -     Magnesium; Future  -     Phosphorus; Future        ER+ Right breast Cancer: pT2N2a, s/p bilateral mastectomy.  The pt completed 4 cycles of TC.  Her treatment course was complicated by breast abscesses. Radiation was delayed until December 2022.  She is tolerating anastrozole and abemiciclib which she started on 1/1/23.  She continues to tolerate treatment fairly well without significant toxicity.  Her CBC and CMP are essentially normal.  I will follow up with her again in 3 months for repeat labs and toxicity check.     Menopausal symptoms: Secondary to anastrozole.  She is currently on venlafaxine 75mg daily.      Patient was given instructions and counseling regarding her condition or for health maintenance advice. Please see specific information pulled into the AVS if appropriate.     Starr Mendez MD PhD    7/24/2023

## 2023-06-07 NOTE — PROGRESS NOTES
Specialty Pharmacy Patient Management Program  Oncology Reassessment     Patient is a 63 y.o. female with ER + Right Breast Cancer seen by an Oncology provider and enrolled in the Oncology Patient Management program offered by [facility].  A follow-up outreach was conducted in person, face-to-face to assess continued therapy appropriateness, medication adherence, and side effect incidence and management for Verzenio (abemaciclib).       Relevant Past Medical History and Comorbidities  Relevant medical history and concomitant health conditions were discussed with the patient. The patient's chart has been reviewed for relevant past medical history and comorbid health conditions and updated as necessary.   Past Medical History:   Diagnosis Date    Allergies     Anemia During chemo 2022    Breast cancer     Cervical dysplasia     Herpes     High cholesterol     IFG (impaired fasting glucose)     Migraine     Osteoarthritis     S/P BILATERAL IMMEDIATE BREAST RECONSTRUCTION WITH LEFT PRE PEC PLACEMENT OF SILICONE GEL IMPLANT AND MESH, RIGHT PRE PEC PLACEMENT OF EXPANDER AND MESH 12/29/2021    TIA (transient ischemic attack)     no residual    Vitamin D deficiency      Social History     Socioeconomic History    Marital status:    Tobacco Use    Smoking status: Never    Smokeless tobacco: Never    Tobacco comments:     Father mother  smoker diring childhood and  adulthood   Vaping Use    Vaping Use: Never used   Substance and Sexual Activity    Alcohol use: Yes     Comment: Socially only . Occasionally    Drug use: Never    Sexual activity: Yes     Partners: Male     Birth control/protection: Post-menopausal     Comment: Hysterectomy age 33       Allergies  Known allergies and reactions were discussed with the patient. The patient's chart has been reviewed for allergy information and updated as necessary.   Multihance [gadobenate]    Relevant Laboratory Values  Lab Results    Component Value Date    GLUCOSE 109 (H) 04/04/2023    CALCIUM 9.4 04/04/2023     04/04/2023    K 3.9 04/04/2023    CO2 23.7 04/04/2023     04/04/2023    BUN 16 04/04/2023    CREATININE 0.84 04/04/2023    EGFRIFNONA 90 02/21/2022    BCR 19.0 04/04/2023    ANIONGAP 11.3 04/04/2023     Lab Results   Component Value Date    WBC 2.50 (L) 04/04/2023    RBC 3.49 (L) 04/04/2023    HGB 11.7 (L) 04/04/2023    HCT 33.9 (L) 04/04/2023    MCV 97.1 (H) 04/04/2023    MCH 33.5 (H) 04/04/2023    MCHC 34.5 04/04/2023    RDW 14.3 04/04/2023    RDWSD 50.8 04/04/2023    MPV 9.6 04/04/2023     04/04/2023    NEUTRORELPCT 60.4 04/04/2023    LYMPHORELPCT 19.6 04/04/2023    MONORELPCT 12.4 (H) 04/04/2023    EOSRELPCT 5.6 04/04/2023    BASORELPCT 2.0 (H) 04/04/2023    AUTOIGPER 0.0 04/04/2023    NEUTROABS 1.51 (L) 04/04/2023    LYMPHSABS 0.49 (L) 04/04/2023    MONOSABS 0.31 04/04/2023    EOSABS 0.14 04/04/2023    BASOSABS 0.05 04/04/2023    AUTOIGNUM 0.00 04/04/2023    NRBC 0.0 01/12/2023        Current Medication List  This medication list has been reviewed with the patient and evaluated for any interactions or necessary modifications/recommendations, and updated to include all prescription medications, OTC medications, and supplements the patient is currently taking.  This list reflects what is contained in the patient's profile, which has also been marked as reviewed to communicate to other providers it is the most up to date version of the patient's current medication therapy.     Current Outpatient Medications:     abemaciclib (VERZENIO) 100 mg tablet chemo tablet, Take 1 tablet by mouth 2 (Two) Times a Day., Disp: 56 tablet, Rfl: 5    acetaminophen (TYLENOL) 650 MG 8 hr tablet, Take 1 tablet by mouth 2 (Two) Times a Day., Disp: , Rfl:     anastrozole (ARIMIDEX) 1 MG tablet, TAKE 1 TABLET BY MOUTH DAILY., Disp: 90 tablet, Rfl: 1    Aspirin 81 MG capsule, Take 81 mg by mouth 2 (Two) Times a Week., Disp: , Rfl:      cloNIDine (CATAPRES) 0.2 MG tablet, TAKE 1 TABLET BY MOUTH EVERY NIGHT AT BEDTIME., Disp: 30 tablet, Rfl: 3    hydrOXYzine (ATARAX) 25 MG tablet, Take 1 tablet by mouth At Night As Needed for Itching., Disp: , Rfl:     Loratadine-Pseudoephedrine (CLARITIN-D 24 HOUR PO), Take 1 tablet by mouth Daily., Disp: , Rfl:     ondansetron (ZOFRAN) 8 MG tablet, Take 1 tablet by mouth 3 (Three) Times a Day As Needed for Nausea or Vomiting., Disp: 30 tablet, Rfl: 5    potassium bicarbonate (K-LYTE) 25 MEQ disintegrating tablet, Take 1 tablet by mouth 2 (Two) Times a Day., Disp: , Rfl:     venlafaxine XR (EFFEXOR-XR) 75 MG 24 hr capsule, Take 1 capsule by mouth Daily., Disp: 90 capsule, Rfl: 3    Vitamin A 3 MG (75432 UT) capsule, Take 1 capsule by mouth Daily., Disp: , Rfl:     vitamin D (ERGOCALCIFEROL) 1.25 MG (76242 UT) capsule capsule, TAKE 1 CAPSULE BY MOUTH EVERY 7 (SEVEN) DAYS., Disp: 4 capsule, Rfl: 11    magnesium gluconate (MAGONATE) 500 MG tablet, Take 250 mg by mouth Daily., Disp: , Rfl:   No current facility-administered medications for this visit.    Facility-Administered Medications Ordered in Other Visits:     heparin injection 500 Units, 500 Units, Intravenous, PRN, Starr Mendez MD PhD    sodium chloride 0.9 % flush 20 mL, 20 mL, Intravenous, PRN, Starr Mendez MD PhD    Drug Interactions  Claritin D/ Venlafaxine can increase tachycardic effect of venlafaxine (Educated patient on the symptoms to be aware)   Claritin D/Hydroxyzine can increase CNS depression (Educated patient to be cautious of the symptoms)   Patient is using both medications as needed      Adverse Drug Reactions  Adverse Reactions Experienced:   Fatigue  Diarrhea (comes and goes per the pt)  Abdominal cramps (occasionally when waking up in the AM, but is tolerable)  Muscle cramps (at the flank, thigh, leg and jaw)   Plan for ADR Management:   Diarrhea- patient has been managing with hydration and understood to use loperamide if  diarrhea is intolerable. (Patient wishes to minimize use of medications)   Also discussed that diarrhea may be worse due to Magnesium use.  Muscle cramps- Patient has been taking Magnesium and Potassium to help with the muscle cramps. Patient stated that she is tolerating the symptoms with the current management. Educated the patient to let us know if the cramps get worsen.     Hospitalizations and Urgent Care Since Last Assessment  Hospitalizations or Admissions: 0    ED Visits: 0   Urgent Office Visits: 0     Adherence and Self-Administration  Approximate Number of Doses Missed Since Last Assessment: 0   Ongoing or New Barriers to Patient Adherence and/or Self-Administration: None known   Methods for Supporting Patient Adherence and/or Self-Administration: Continue current method    Goals of Therapy  Progress Toward Meeting Patient-Identified Goals of Therapy:  Goal Met  Patient-Identified Goals  Consistently take medications as prescribed  Patient will adhere to medication regimen  Patient will report any medication side effects to healthcare provider    Progress Toward Meeting Clinical Goals or Therapeutic Targets: Goal Met  Clinical Goals or Therapeutic Targets  Support patient understanding of medication regimen  Ensure patient knows the pharmacy contact information  Schedule regular follow-up to monitor the treatment serious adverse events  Schedule regular follow-up to confirm medication adherence  Schedule regular follow-up to monitor disease progression or stabilty    Quality of Life Assessment   Quality of Life related to the patient's specialty therapy was discussed with the patient. The QOL segment of this outreach has been reviewed and updated.   Quality of Life Score: 8    Reassessment Plan & Follow-Up  Pharmacist to continue to perform regular reassessments no more than (6) months from the previous assessment.  Care Coordinator to facilitate future refill outreaches, coordinate prescription delivery,  and escalate clinical questions to pharmacist.     Additional Plans, Therapy Recommendations or Therapy Problems to Be Addressed: Continue to monitor muscle cramps at the next assessment.  Recent Provider Changes:  None noted at this time     Attestation  I attest that the specialty medication(s) addressed above are appropriate for the patient based on my reassessment.  If the prescribed therapy is at any point deemed not appropriate based on the current or future assessments, a consultation will be initiated with the patient's specialty care provider to determine the best course of action. The revised plan of therapy will be documented along with any additional patient education provided.     Amita Arellano, PharmD Candidate 2024    Radha Arreola, PharmD, [facility]  Oncology Clinical Pharmacist  6/7/2023  09:39 EDT

## 2023-06-07 NOTE — PROGRESS NOTES
Answers for HPI/ROS submitted by the patient on 1/6/2022  Have you had these symptoms before?: No  How long have you been having these symptoms?: 1-2 weeks  What is the primary reason for your visit?: Other    Conflicting answers have been found for some questions. Please document the patient's answers manually.     Chief Complaint  Follow-up (Breast Cancer)    Subjective          History of Present Illness  The patient is here to follow up on bilateral mastectomy with axillary dissection.  They are doing well and have no complaints.  Pathology is shown below:      Clinical Information    Comment:    Malignant neoplasm of overlapping sites of right breast in female, estrogen receptor positive (HCC)      Final Diagnosis   1. [place] node #1, excision:               - One lymph node positive for macrometastatic carcinoma (1/1)               - See synoptic checklist     2. [place] node #2, excision:               - One lymph node positive for macrometastatic carcinoma (1/1)               - See synoptic checklist     3. [place] node #3, excision:               - One lymph node negative for metastatic carcinoma (0/1)               - See synoptic checklist     4. [place] node #4, excision:               - One of two lymph nodes positive for macrometastatic carcinoma (1/2)               - See synoptic checklist     5. [place] node #5, excision:               - One of three lymph nodes positive for macrometastatic carcinoma (1/3)               - See synoptic checklist     6. [place] node #6, excision:               - One lymph node negative for carcinoma (0/1)               - See synoptic checklist     7. [place] node #7, excision:               - One lymph node negative for carcinoma (0/1)               - See synoptic checklist     8. Left breast, mastectomy:               - Fibrocystic change               - Usual ductal hyperplasia     9. Left breast, retroareolar biopsy:               - Intraductal papilloma       "         - Usual ductal hyperplasia     10. Right breast, mastectomy:               - Invasive carcinoma of no special type (ductal)               - Intermediate and high grade ductal carcinoma in situ (DCIS)               - Intraductal papilloma               - Usual ductal hyperplasia               - Biopsy site changes               - See synoptic checklist     11. Right breast, retroareolar biopsy:               - Negative for carcinoma and carcinoma in situ               - See synoptic checklist     12. Right breast, additional retroareolar tissue, excision:               - Negative for carcinoma and carcinoma in situ               - See synoptic checklist      Electronically signed by Deyvi Crews MD on 12/29/2021 at 1250   Synoptic Checklist     INVASIVE CARCINOMA OF THE BREAST: Resection  8th Edition - Protocol posted: 6/30/2021  INVASIVE CARCINOMA OF THE BREAST: COMPLETE EXCISION - 1, 2, 3, 4, 5, 6, 7, 10, 11, 12  SPECIMEN   Procedure  Total mastectomy    Specimen Laterality  Right    TUMOR   Histologic Type  Invasive carcinoma of no special type (ductal)    Histologic Grade ([place] Histologic Score)     Glandular (Acinar) / Tubular Differentiation  Score 3    Nuclear Pleomorphism  Score 2    Mitotic Rate  Score 2    Overall Grade  Grade 2 (scores of 6 or 7)    Tumor Size  Greatest dimension of largest invasive focus (Millimeters): 23 mm   Tumor Focality  Multiple foci of invasive carcinoma    Number of Foci  2    Ductal Carcinoma In Situ (DCIS)  Present      Positive for extensive intraductal component (EIC)    Architectural Patterns  [place]      Cribriform      Papillary      Solid    Nuclear Grade  Grade III (high)    Necrosis  Present, central (expansive \"comedo\" necrosis)    Tumor Extent     Microcalcifications  Present in invasive carcinoma      Present in non-neoplastic tissue    Treatment Effect in the Breast  No known presurgical therapy    MARGINS   Margin Status for Invasive Carcinoma  All " margins negative for invasive carcinoma    Distance from Invasive Carcinoma to Closest Margin  20 mm   Closest Margin(s) to Invasive Carcinoma  Posterior    Margin Status for DCIS  All margins negative for DCIS    Distance from DCIS to Closest Margin  7 mm   Closest Margin(s) to DCIS  Posterior    REGIONAL LYMPH NODES   Regional Lymph Node Status  Tumor present in regional lymph node(s)    Number of Lymph Nodes with Macrometastases  4    Number of Lymph Nodes with Micrometastases  0    Number of Lymph Nodes with Isolated Tumor Cells  0    Size of Largest Sofi Metastatic Deposit  23 mm   Extranodal Extension  Not identified    Total Number of Lymph Nodes Examined (sentinel and non-sentinel)  10    Number of [place] Nodes Examined  10    PATHOLOGIC STAGE CLASSIFICATION (pTNM, AJCC 8th Edition)   Reporting of pT, pN, and (when applicable) pM categories is based on information available to the pathologist at the time the report is issued. As per the AJCC (Chapter 1, 8th Ed.) it is the managing physician’s responsibility to establish the final pathologic stage based upon all pertinent information, including but potentially not limited to this pathology report.   TNM Descriptors  m (multiple foci of invasive carcinoma)    pT Category  pT2    pN Category  pN2a    Breast Biomarker Testing Performed on Previous Biopsy     Estrogen Receptor (ER) Status  Positive (greater than 10% of cells demonstrate nuclear positivity)    Percentage of Cells with Nuclear Positivity  40 %   Breast Biomarker Testing Performed on Previous Biopsy     Progesterone Receptor (PgR) Status  Positive    Percentage of Cells with Nuclear Positivity  5 %   Breast Biomarker Testing Performed on Previous Biopsy     HER2 (by immunohistochemistry)  Negative (Score 0)    Breast Biomarker Testing Performed on Previous Biopsy     Ki-67 Percentage of Positive Nuclei  14 %   Testing Performed on Case Number  [id number]    .        She has had several washouts of  "reconstructed breast and has been waiting on xrt therapy.  She has now completed it now.      Objective   Vital Signs:   Resp 16   Ht 162.6 cm (64\")   Wt 83.6 kg (184 lb 3.2 oz)   BMI 31.62 kg/m²     Physical Exam  Vitals and nursing note reviewed.   Constitutional:       General: She is not in acute distress.     Appearance: Normal appearance. She is well-developed.   HENT:      Head: Normocephalic and atraumatic.   Eyes:      Extraocular Movements: Extraocular movements intact.      Pupils: Pupils are equal, round, and reactive to light.   Cardiovascular:      Pulses: Normal pulses.   Pulmonary:      Effort: Pulmonary effort is normal. No retractions.      Breath sounds: Normal air entry. No wheezing.   Abdominal:      General: There is no distension.      Palpations: Abdomen is soft.      Tenderness: There is no abdominal tenderness.      Hernia: No hernia is present.   Musculoskeletal:         General: No swelling or deformity.      Cervical back: Neck supple.   Skin:     General: Skin is warm and dry.      Findings: No erythema.      Comments: Surgical Incision Healing Well   Neurological:      General: No focal deficit present.      Mental Status: She is alert and oriented to person, place, and time.      Motor: Motor function is intact.   Psychiatric:         Mood and Affect: Mood normal.         Thought Content: Thought content normal.             Assessment and Plan    Diagnoses and all orders for this visit:    1. S/P BILATERAL IMMEDIATE BREAST RECONSTRUCTION WITH LEFT PRE PEC PLACEMENT OF SILICONE GEL IMPLANT AND MESH, RIGHT PRE PEC PLACEMENT OF EXPANDER AND MESH (Primary)    Keep apt with oncology and plastics- going for possible GISELA flaps in 2/24  Followup one year      Follow Up   Return in about 1 year (around 6/8/2024).  Patient was given instructions and counseling regarding her condition or for health maintenance advice. Please see specific information pulled into the AVS if appropriate.     "

## 2023-06-08 ENCOUNTER — SPECIALTY PHARMACY (OUTPATIENT)
Dept: PHARMACY | Facility: HOSPITAL | Age: 63
End: 2023-06-08
Payer: COMMERCIAL

## 2023-06-08 ENCOUNTER — OFFICE VISIT (OUTPATIENT)
Dept: SURGERY | Facility: CLINIC | Age: 63
End: 2023-06-08
Payer: COMMERCIAL

## 2023-06-08 VITALS — RESPIRATION RATE: 16 BRPM | HEIGHT: 64 IN | WEIGHT: 184.2 LBS | BODY MASS INDEX: 31.45 KG/M2

## 2023-06-08 DIAGNOSIS — Z98.890 S/P BREAST RECONSTRUCTION: Primary | ICD-10-CM

## 2023-06-13 ENCOUNTER — SPECIALTY PHARMACY (OUTPATIENT)
Dept: PHARMACY | Facility: HOSPITAL | Age: 63
End: 2023-06-13
Payer: COMMERCIAL

## 2023-06-13 NOTE — PROGRESS NOTES
" Specialty Pharmacy Patient Management Program  Oncology Refill Outreach      Marisa \"Liberty\" is a 63 y.o. female contacted today regarding refills of her medication(s).    Specialty medication(s) and dose(s) confirmed: abemaciclib (VERZENIO) 100 mg tablet chemo tablet. Sent to [facility]. Will be mailed to patient home.     Other medications being refilled: N/A    Refill Questions      Flowsheet Row Most Recent Value   Changes to allergies? No   Changes to medications? No   New conditions since last clinic visit No   Unplanned office visit, urgent care, ED, or hospital admission in the last 4 weeks  No   How does patient/caregiver feel medication is working? Good   Financial problems or insurance changes  No   Since the previous refill, were any specialty medication doses or scheduled injections missed or delayed?  No   Does this patient require a clinical escalation to a pharmacist? No            Delivery Questions      Flowsheet Row Most Recent Value   Delivery method [employer]   Delivery address correct? Yes   Delivery phone number [phone]   Preferred delivery time? Anytime   Number of medications in delivery 1   Medication being filled and delivered abemaciclib (VERZENIO) 100 mg tablet chemo tablet   Doses left of specialty medications 7   Is there any medication that is due not being filled? No   Supplies needed? No supplies needed   Cooler needed? No   Do any medications need mixed or dated? No   Copay form of payment Credit card on file   Additional comments Zero copay, copay card on file   Questions or concerns for the pharmacist? No   Explain any questions or concerns for the pharmacist N/A   Are any medications first time fills? No                   Follow-up: 21 days     Jennifer Tovar  Care Coordinator, [facility]  6/13/2023  09:11 EDT          "

## 2023-07-24 ENCOUNTER — HOSPITAL ENCOUNTER (OUTPATIENT)
Dept: ONCOLOGY | Facility: HOSPITAL | Age: 63
Discharge: HOME | End: 2023-07-24
Admitting: INTERNAL MEDICINE
Payer: COMMERCIAL

## 2023-07-24 DIAGNOSIS — C50.811 MALIGNANT NEOPLASM OF OVERLAPPING SITES OF RIGHT BREAST IN FEMALE, ESTROGEN RECEPTOR POSITIVE: Primary | ICD-10-CM

## 2023-07-24 DIAGNOSIS — Z17.0 MALIGNANT NEOPLASM OF OVERLAPPING SITES OF RIGHT BREAST IN FEMALE, ESTROGEN RECEPTOR POSITIVE: Primary | ICD-10-CM

## 2023-07-24 PROCEDURE — 96523 IRRIG DRUG DELIVERY DEVICE: CPT

## 2023-07-24 PROCEDURE — 25010000002 HEPARIN LOCK FLUSH PER 10 UNITS: Performed by: INTERNAL MEDICINE

## 2023-07-24 RX ORDER — HEPARIN SODIUM (PORCINE) LOCK FLUSH IV SOLN 100 UNIT/ML 100 UNIT/ML
500 SOLUTION INTRAVENOUS AS NEEDED
OUTPATIENT
Start: 2023-07-24

## 2023-07-24 RX ORDER — SODIUM CHLORIDE 0.9 % (FLUSH) 0.9 %
20 SYRINGE (ML) INJECTION AS NEEDED
OUTPATIENT
Start: 2023-07-24

## 2023-07-24 RX ORDER — HEPARIN SODIUM (PORCINE) LOCK FLUSH IV SOLN 100 UNIT/ML 100 UNIT/ML
500 SOLUTION INTRAVENOUS AS NEEDED
Status: DISCONTINUED | OUTPATIENT
Start: 2023-07-24 | End: 2023-07-25 | Stop reason: HOSPADM

## 2023-07-24 RX ORDER — SODIUM CHLORIDE 0.9 % (FLUSH) 0.9 %
20 SYRINGE (ML) INJECTION AS NEEDED
Status: DISCONTINUED | OUTPATIENT
Start: 2023-07-24 | End: 2023-07-25 | Stop reason: HOSPADM

## 2023-07-24 RX ADMIN — HEPARIN SODIUM (PORCINE) LOCK FLUSH IV SOLN 100 UNIT/ML 500 UNITS: 100 SOLUTION at 14:10

## 2023-07-24 RX ADMIN — Medication 20 ML: at 14:10

## 2023-08-03 ENCOUNTER — SPECIALTY PHARMACY (OUTPATIENT)
Dept: PHARMACY | Facility: HOSPITAL | Age: 63
End: 2023-08-03
Payer: COMMERCIAL

## 2023-08-08 ENCOUNTER — LAB (OUTPATIENT)
Dept: LAB | Facility: HOSPITAL | Age: 63
End: 2023-08-08
Payer: COMMERCIAL

## 2023-08-08 DIAGNOSIS — J30.2 SEASONAL ALLERGIC RHINITIS, UNSPECIFIED TRIGGER: ICD-10-CM

## 2023-08-08 DIAGNOSIS — N61.0 CELLULITIS OF LEFT BREAST: ICD-10-CM

## 2023-08-08 DIAGNOSIS — C50.811 MALIGNANT NEOPLASM OF OVERLAPPING SITES OF RIGHT FEMALE BREAST, UNSPECIFIED ESTROGEN RECEPTOR STATUS: ICD-10-CM

## 2023-08-08 DIAGNOSIS — Z95.828 PORT-A-CATH IN PLACE: ICD-10-CM

## 2023-08-08 DIAGNOSIS — R73.01 IFG (IMPAIRED FASTING GLUCOSE): ICD-10-CM

## 2023-08-08 DIAGNOSIS — G45.9 TIA (TRANSIENT ISCHEMIC ATTACK): ICD-10-CM

## 2023-08-08 DIAGNOSIS — N61.1 BREAST ABSCESS: ICD-10-CM

## 2023-08-08 DIAGNOSIS — Z98.890 S/P BREAST RECONSTRUCTION: ICD-10-CM

## 2023-08-08 DIAGNOSIS — E55.9 VITAMIN D DEFICIENCY: ICD-10-CM

## 2023-08-08 DIAGNOSIS — N64.89 RECURRENT SEROMA OF BREAST: ICD-10-CM

## 2023-08-08 DIAGNOSIS — E78.2 MIXED HYPERLIPIDEMIA: ICD-10-CM

## 2023-08-08 DIAGNOSIS — M19.90 ARTHRITIS: ICD-10-CM

## 2023-08-08 DIAGNOSIS — Z17.0 MALIGNANT NEOPLASM OF OVERLAPPING SITES OF RIGHT BREAST IN FEMALE, ESTROGEN RECEPTOR POSITIVE: ICD-10-CM

## 2023-08-08 DIAGNOSIS — A41.9 SEPSIS, DUE TO UNSPECIFIED ORGANISM, UNSPECIFIED WHETHER ACUTE ORGAN DYSFUNCTION PRESENT: ICD-10-CM

## 2023-08-08 DIAGNOSIS — C50.811 MALIGNANT NEOPLASM OF OVERLAPPING SITES OF RIGHT BREAST IN FEMALE, ESTROGEN RECEPTOR POSITIVE: ICD-10-CM

## 2023-08-08 LAB
25(OH)D3 SERPL-MCNC: 44.8 NG/ML (ref 30–100)
ALBUMIN SERPL-MCNC: 5 G/DL (ref 3.5–5.2)
ALBUMIN/GLOB SERPL: 2.3 G/DL
ALP SERPL-CCNC: 65 U/L (ref 39–117)
ALT SERPL W P-5'-P-CCNC: 15 U/L (ref 1–33)
ANION GAP SERPL CALCULATED.3IONS-SCNC: 9 MMOL/L (ref 5–15)
AST SERPL-CCNC: 21 U/L (ref 1–32)
BASOPHILS # BLD AUTO: 0.05 10*3/MM3 (ref 0–0.2)
BASOPHILS NFR BLD AUTO: 1.1 % (ref 0–1.5)
BILIRUB SERPL-MCNC: 0.5 MG/DL (ref 0–1.2)
BUN SERPL-MCNC: 16 MG/DL (ref 8–23)
BUN/CREAT SERPL: 18 (ref 7–25)
CALCIUM SPEC-SCNC: 9.7 MG/DL (ref 8.6–10.5)
CHLORIDE SERPL-SCNC: 105 MMOL/L (ref 98–107)
CHOLEST SERPL-MCNC: 225 MG/DL (ref 0–200)
CO2 SERPL-SCNC: 27 MMOL/L (ref 22–29)
CREAT SERPL-MCNC: 0.89 MG/DL (ref 0.57–1)
DEPRECATED RDW RBC AUTO: 46.9 FL (ref 37–54)
EGFRCR SERPLBLD CKD-EPI 2021: 72.5 ML/MIN/1.73
EOSINOPHIL # BLD AUTO: 0.13 10*3/MM3 (ref 0–0.4)
EOSINOPHIL NFR BLD AUTO: 2.9 % (ref 0.3–6.2)
ERYTHROCYTE [DISTWIDTH] IN BLOOD BY AUTOMATED COUNT: 12.9 % (ref 12.3–15.4)
ERYTHROCYTE [SEDIMENTATION RATE] IN BLOOD: 2 MM/HR (ref 0–30)
GLOBULIN UR ELPH-MCNC: 2.2 GM/DL
GLUCOSE SERPL-MCNC: 94 MG/DL (ref 65–99)
HBA1C MFR BLD: 5.5 % (ref 4.8–5.6)
HCT VFR BLD AUTO: 36.6 % (ref 34–46.6)
HCV AB SER DONR QL: NORMAL
HDLC SERPL-MCNC: 79 MG/DL (ref 40–60)
HGB BLD-MCNC: 12.7 G/DL (ref 12–15.9)
IMM GRANULOCYTES # BLD AUTO: 0.01 10*3/MM3 (ref 0–0.05)
IMM GRANULOCYTES NFR BLD AUTO: 0.2 % (ref 0–0.5)
LDLC SERPL CALC-MCNC: 121 MG/DL (ref 0–100)
LDLC/HDLC SERPL: 1.48 {RATIO}
LYMPHOCYTES # BLD AUTO: 0.97 10*3/MM3 (ref 0.7–3.1)
LYMPHOCYTES NFR BLD AUTO: 21.6 % (ref 19.6–45.3)
MCH RBC QN AUTO: 34.5 PG (ref 26.6–33)
MCHC RBC AUTO-ENTMCNC: 34.7 G/DL (ref 31.5–35.7)
MCV RBC AUTO: 99.5 FL (ref 79–97)
MONOCYTES # BLD AUTO: 0.53 10*3/MM3 (ref 0.1–0.9)
MONOCYTES NFR BLD AUTO: 11.8 % (ref 5–12)
NEUTROPHILS NFR BLD AUTO: 2.8 10*3/MM3 (ref 1.7–7)
NEUTROPHILS NFR BLD AUTO: 62.4 % (ref 42.7–76)
NRBC BLD AUTO-RTO: 0.2 /100 WBC (ref 0–0.2)
PLATELET # BLD AUTO: 189 10*3/MM3 (ref 140–450)
PMV BLD AUTO: 9.9 FL (ref 6–12)
POTASSIUM SERPL-SCNC: 3.9 MMOL/L (ref 3.5–5.2)
PROT SERPL-MCNC: 7.2 G/DL (ref 6–8.5)
RBC # BLD AUTO: 3.68 10*6/MM3 (ref 3.77–5.28)
SODIUM SERPL-SCNC: 141 MMOL/L (ref 136–145)
TRIGL SERPL-MCNC: 145 MG/DL (ref 0–150)
VLDLC SERPL-MCNC: 25 MG/DL (ref 5–40)
WBC NRBC COR # BLD: 4.49 10*3/MM3 (ref 3.4–10.8)

## 2023-08-08 PROCEDURE — 36415 COLL VENOUS BLD VENIPUNCTURE: CPT

## 2023-08-08 PROCEDURE — 83036 HEMOGLOBIN GLYCOSYLATED A1C: CPT

## 2023-08-08 PROCEDURE — 85025 COMPLETE CBC W/AUTO DIFF WBC: CPT

## 2023-08-08 PROCEDURE — 80061 LIPID PANEL: CPT

## 2023-08-08 PROCEDURE — 82306 VITAMIN D 25 HYDROXY: CPT

## 2023-08-08 PROCEDURE — 86803 HEPATITIS C AB TEST: CPT

## 2023-08-08 PROCEDURE — 80053 COMPREHEN METABOLIC PANEL: CPT

## 2023-08-08 PROCEDURE — 85652 RBC SED RATE AUTOMATED: CPT

## 2023-08-16 ENCOUNTER — OFFICE VISIT (OUTPATIENT)
Dept: INTERNAL MEDICINE | Facility: CLINIC | Age: 63
End: 2023-08-16
Payer: COMMERCIAL

## 2023-08-16 ENCOUNTER — TELEPHONE (OUTPATIENT)
Dept: INTERNAL MEDICINE | Facility: CLINIC | Age: 63
End: 2023-08-16

## 2023-08-16 VITALS
BODY MASS INDEX: 30.52 KG/M2 | OXYGEN SATURATION: 95 % | HEART RATE: 93 BPM | SYSTOLIC BLOOD PRESSURE: 134 MMHG | TEMPERATURE: 97.7 F | WEIGHT: 178.8 LBS | DIASTOLIC BLOOD PRESSURE: 82 MMHG | HEIGHT: 64 IN

## 2023-08-16 DIAGNOSIS — C50.811 MALIGNANT NEOPLASM OF OVERLAPPING SITES OF RIGHT BREAST IN FEMALE, ESTROGEN RECEPTOR POSITIVE: ICD-10-CM

## 2023-08-16 DIAGNOSIS — Z00.00 PHYSICAL EXAM, ANNUAL: Primary | ICD-10-CM

## 2023-08-16 DIAGNOSIS — G45.9 TIA (TRANSIENT ISCHEMIC ATTACK): ICD-10-CM

## 2023-08-16 DIAGNOSIS — Z98.890 S/P BREAST RECONSTRUCTION: ICD-10-CM

## 2023-08-16 DIAGNOSIS — E55.9 VITAMIN D DEFICIENCY: ICD-10-CM

## 2023-08-16 DIAGNOSIS — R73.01 IFG (IMPAIRED FASTING GLUCOSE): ICD-10-CM

## 2023-08-16 DIAGNOSIS — E78.2 MIXED HYPERLIPIDEMIA: ICD-10-CM

## 2023-08-16 DIAGNOSIS — I73.9 PAD (PERIPHERAL ARTERY DISEASE): ICD-10-CM

## 2023-08-16 DIAGNOSIS — Z17.0 MALIGNANT NEOPLASM OF OVERLAPPING SITES OF RIGHT BREAST IN FEMALE, ESTROGEN RECEPTOR POSITIVE: ICD-10-CM

## 2023-08-16 NOTE — PROGRESS NOTES
"CHIEF COMPLAINT/ HPI:  Annual Exam (PATIENT IS HERE FOR HER ANNUAL VISIT)    Patient been having some diffuse arthralgias myalgias, right hip right thigh, right upper back area scapular lesion, some soreness under her right arm at times, being concerned,          Objective   Vital Signs  Vitals:    08/16/23 0901   BP: 134/82   Pulse: 93   Temp: 97.7 øF (36.5 øC)   SpO2: 95%   Weight: 81.1 kg (178 lb 12.8 oz)   Height: 162.6 cm (64.02\")      Body mass index is 30.68 kg/mý.  Review of Systems   Constitutional: Negative.    HENT: Negative.     Eyes: Negative.    Respiratory: Negative.     Cardiovascular: Negative.    Gastrointestinal: Negative.    Endocrine: Negative.    Genitourinary: Negative.    Musculoskeletal: Negative.  Positive for arthralgias, back pain and myalgias.   Allergic/Immunologic: Negative.    Neurological: Negative.    Hematological: Negative.    Psychiatric/Behavioral: Negative.      Physical Exam  Constitutional:       General: She is not in acute distress.     Appearance: Normal appearance. She is obese.   HENT:      Head: Normocephalic.      Mouth/Throat:      Mouth: Mucous membranes are moist.   Eyes:      Conjunctiva/sclera: Conjunctivae normal.      Pupils: Pupils are equal, round, and reactive to light.   Cardiovascular:      Rate and Rhythm: Normal rate and regular rhythm.      Pulses: Normal pulses.      Heart sounds: Normal heart sounds.   Pulmonary:      Effort: Pulmonary effort is normal.      Breath sounds: Normal breath sounds.   Abdominal:      General: Bowel sounds are normal.      Palpations: Abdomen is soft.   Musculoskeletal:         General: No swelling. Normal range of motion.      Cervical back: Neck supple.   Skin:     General: Skin is warm and dry.      Coloration: Skin is not jaundiced.   Neurological:      General: No focal deficit present.      Mental Status: She is alert and oriented to person, place, and time. Mental status is at baseline.   Psychiatric:         Mood and " Affect: Mood normal.         Behavior: Behavior normal.         Thought Content: Thought content normal.         Judgment: Judgment normal.      Result Review :   Lab Results   Component Value Date    PROBNP 1,085.0 (H) 03/14/2022     CMP          4/4/2023    09:23 6/7/2023    09:16 8/8/2023    07:15   CMP   Glucose 109  99     93  94    BUN 16  15     14  16    Creatinine 0.84  0.81     0.89  0.89    EGFR 77.7  81.2     72.5  72.5    Sodium 142  139     137  141    Potassium 3.9  4.4     4.3  3.9    Chloride 107  104     102  105    Calcium 9.4  9.5     9.3  9.7    Total Protein 6.3  6.7     6.5  7.2    Albumin 4.1  4.2     4.2  5.0    Globulin 2.2  2.5     2.3  2.2    Total Bilirubin 0.4  0.4     0.4  0.5    Alkaline Phosphatase 62  61     62  65    AST (SGOT) 16  17     17  21    ALT (SGPT) 14  14     12  15    Albumin/Globulin Ratio 1.9  1.7     1.8  2.3    BUN/Creatinine Ratio 19.0  18.5     15.7  18.0    Anion Gap 11.3  7.7     9.1  9.0      CBC w/diff          4/4/2023    09:23 6/7/2023    09:16 8/8/2023    07:15   CBC w/Diff   WBC 2.50  3.00  4.49    RBC 3.49  3.63  3.68    Hemoglobin 11.7  12.3  12.7    Hematocrit 33.9  36.5  36.6    MCV 97.1  100.6  99.5    MCH 33.5  33.9  34.5    MCHC 34.5  33.7  34.7    RDW 14.3  13.1  12.9    Platelets 163  191  189    Neutrophil Rel % 60.4  56.4  62.4    Immature Granulocyte Rel % 0.0  0.3  0.2    Lymphocyte Rel % 19.6  21.7  21.6    Monocyte Rel % 12.4  12.3  11.8    Eosinophil Rel % 5.6  7.3  2.9    Basophil Rel % 2.0  2.0  1.1       Lipid Panel          8/8/2023    07:15   Lipid Panel   Total Cholesterol 225    Triglycerides 145    HDL Cholesterol 79    VLDL Cholesterol 25    LDL Cholesterol  121    LDL/HDL Ratio 1.48       Lab Results   Component Value Date    TSH 0.651 08/01/2022    TSH 1.400 03/11/2022    TSH 1.200 02/23/2021      Lab Results   Component Value Date    FREET4 1.08 08/01/2022      A1C Last 3 Results          8/8/2023    07:15   HGBA1C Last 3  Results   Hemoglobin A1C 5.50                        Visit Diagnoses:    ICD-10-CM ICD-9-CM   1. Physical exam, annual  Z00.00 V70.0   2. Mixed hyperlipidemia  E78.2 272.2   3. IFG (impaired fasting glucose)  R73.01 790.21   4. S/P BILATERAL IMMEDIATE BREAST RECONSTRUCTION WITH LEFT PRE PEC PLACEMENT OF SILICONE GEL IMPLANT AND MESH, RIGHT PRE PEC PLACEMENT OF EXPANDER AND MESH  Z98.890 V43.82   5. Malignant neoplasm of overlapping sites of right breast in female, estrogen receptor positive  C50.811 174.8    Z17.0 V86.0   6. TIA (transient ischemic attack)  G45.9 435.9   7. Vitamin D deficiency  E55.9 268.9   8. PAD (peripheral artery disease)  I73.9 443.9       Assessment and Plan   Diagnoses and all orders for this visit:    1. Physical exam, annual (Primary)  -     XR Chest PA & Lateral; Future  -     XR Shoulder 2+ View Right; Future  -     XR Pelvis 1 or 2 View; Future  -     XR Hip With or Without Pelvis 2 - 3 View Right; Future  -     XR Hip With or Without Pelvis 2 - 3 View Left; Future  -     XR Femur 2 View Right; Future  -     XR Spine Lumbar 2 or 3 View; Future  -     XR Spine Thoracic 3 View; Future  -     Comprehensive Metabolic Panel; Future  -     CBC & Differential; Future  -     Lipid Panel; Future  -     Sedimentation Rate; Future    2. Mixed hyperlipidemia  -     XR Chest PA & Lateral; Future  -     XR Shoulder 2+ View Right; Future  -     XR Pelvis 1 or 2 View; Future  -     XR Hip With or Without Pelvis 2 - 3 View Right; Future  -     XR Hip With or Without Pelvis 2 - 3 View Left; Future  -     XR Femur 2 View Right; Future  -     XR Spine Lumbar 2 or 3 View; Future  -     XR Spine Thoracic 3 View; Future  -     Comprehensive Metabolic Panel; Future  -     CBC & Differential; Future  -     Lipid Panel; Future  -     Sedimentation Rate; Future    3. IFG (impaired fasting glucose)  -     XR Chest PA & Lateral; Future  -     XR Shoulder 2+ View Right; Future  -     XR Pelvis 1 or 2 View; Future  -      XR Hip With or Without Pelvis 2 - 3 View Right; Future  -     XR Hip With or Without Pelvis 2 - 3 View Left; Future  -     XR Femur 2 View Right; Future  -     XR Spine Lumbar 2 or 3 View; Future  -     XR Spine Thoracic 3 View; Future  -     Comprehensive Metabolic Panel; Future  -     CBC & Differential; Future  -     Lipid Panel; Future  -     Sedimentation Rate; Future    4. S/P BILATERAL IMMEDIATE BREAST RECONSTRUCTION WITH LEFT PRE PEC PLACEMENT OF SILICONE GEL IMPLANT AND MESH, RIGHT PRE PEC PLACEMENT OF EXPANDER AND MESH  -     XR Chest PA & Lateral; Future  -     XR Shoulder 2+ View Right; Future  -     XR Pelvis 1 or 2 View; Future  -     XR Hip With or Without Pelvis 2 - 3 View Right; Future  -     XR Hip With or Without Pelvis 2 - 3 View Left; Future  -     XR Femur 2 View Right; Future  -     XR Spine Lumbar 2 or 3 View; Future  -     XR Spine Thoracic 3 View; Future  -     Comprehensive Metabolic Panel; Future  -     CBC & Differential; Future  -     Lipid Panel; Future  -     Sedimentation Rate; Future    5. Malignant neoplasm of overlapping sites of right breast in female, estrogen receptor positive  -     XR Chest PA & Lateral; Future  -     XR Shoulder 2+ View Right; Future  -     XR Pelvis 1 or 2 View; Future  -     XR Hip With or Without Pelvis 2 - 3 View Right; Future  -     XR Hip With or Without Pelvis 2 - 3 View Left; Future  -     XR Femur 2 View Right; Future  -     XR Spine Lumbar 2 or 3 View; Future  -     XR Spine Thoracic 3 View; Future  -     Comprehensive Metabolic Panel; Future  -     CBC & Differential; Future  -     Lipid Panel; Future  -     Sedimentation Rate; Future    6. TIA (transient ischemic attack)  -     XR Chest PA & Lateral; Future  -     XR Shoulder 2+ View Right; Future  -     XR Pelvis 1 or 2 View; Future  -     XR Hip With or Without Pelvis 2 - 3 View Right; Future  -     XR Hip With or Without Pelvis 2 - 3 View Left; Future  -     XR Femur 2 View Right;  Future  -     XR Spine Lumbar 2 or 3 View; Future  -     XR Spine Thoracic 3 View; Future  -     Comprehensive Metabolic Panel; Future  -     CBC & Differential; Future  -     Lipid Panel; Future  -     Sedimentation Rate; Future    7. Vitamin D deficiency  -     XR Chest PA & Lateral; Future  -     XR Shoulder 2+ View Right; Future  -     XR Pelvis 1 or 2 View; Future  -     XR Hip With or Without Pelvis 2 - 3 View Right; Future  -     XR Hip With or Without Pelvis 2 - 3 View Left; Future  -     XR Femur 2 View Right; Future  -     XR Spine Lumbar 2 or 3 View; Future  -     XR Spine Thoracic 3 View; Future  -     Comprehensive Metabolic Panel; Future  -     CBC & Differential; Future  -     Lipid Panel; Future  -     Sedimentation Rate; Future    8. PAD (peripheral artery disease)  -     Ambulatory Referral to Vascular Surgery  -     Doppler Arterial Multi Level Lower Extremity - Bilateral CAR; Future             Annual exam checkup, preventive measures discussed, patient wears her seatbelt she is active she works on a regular basis does not smoke,, does not drink, discussed continued vitamin supplementations dietary interventions, August 16, 2023    Diffuse arthralgias myalgias, right hip, right thigh left hip, right upper back area, etiology is unclear, consider x-rays, August 16, 2023, sed rate equals 2 August 8, 2023----WILL DO XRAY OF HIPS, PELVIL, BACK AND FEMUR R , CXR,     leukopenia,--white count 2.6  February 2023, --- Labs August 8, 2023 reviewed, improved,    Peripheral arterial disease?,  Left leg, recommend vascular surgery second opinion repeat arterial evaluations,    Hot flashes postmenopausal, started on clonidine to help by her gynecologist and seems to be working, February 2023, cont effexor xr 75 mg qd     Sepsis --infection MSSA of the left breast September 13, 2022 requiring hospitalization, treatment with IV antibiotics IV vancomycin drain placement by plastic surgery, she was sent home on  the 16th with a drain and clindamycin,, she has done well she was then sent to infectious disease Dr. Case in [place] after I discussed the case with her and the fact that she continued to have recurrent infections and now both breasts, I spoke with Jacqueline cunningham third  at [facility] in [place] who helped expedite her infectious disease consultation several weeks ago, they recommended removing all of the foreign bodies material in her breast area so that has been done and her drain has recently been removed as of October 21, 2022 she is finished a course of Ancef at home IV 2 weeks and has had no further fevers and she says she feels very good and is getting ready to start radiation therapy in November with Dr. Martinez,    Sepsis, multifactorial to include urinary tract infection and now cellulitis of the right breast and expander implant area, status post surgical debridement removal of pus// fluid, drain placed last night March 11, 2022------- patient's improved clinically,        Mixed hyperlipidemia good HDL cholesterol no treatment,, August 8, 2023     Invasive ductal carcinoma right breast -------previous bilateral mastectomies with multifocal areas of invasive ductal carcinoma largest area 2.3 cm with positive lymph nodes 4 out of 10 ER and NC positive HER-2 negative,,--------------- underwent chemotherapy, finished, April 2022,, finished December 20, 2022 radiation therapy due to previous infection abscess cellulitis and now seroma treatment, as of August 11, 2022, --- continues letrozole, July 2022 Dr. Mendez,, continues Verzenio 100 mg twice a day,, started January 1, 2023,     MAGUI, appendectomy, Port-A-Cath placement,     ifg, hemoglobin A1c 5.5 August 8, 2023    Vitamin D deficiency, cont vitamin D 50,000 units weekly,     TIA 2010, MRI/ mrA underwent a Star closure procedure with clipping patient is okay to restart aspirin 81 mg twice a week     Osteoarthritis uses Tylenol,     Chronic  venous insufficiency changes    Follow Up   Return in about 6 months (around 2/16/2024).  Patient was given instructions and counseling regarding her condition or for health maintenance advice. Please see specific information pulled into the AVS if appropriate.

## 2023-08-16 NOTE — TELEPHONE ENCOUNTER
Yes, I see no contraindication to getting any of those vaccines however I would probably separate the COVID-vaccine from the other 2 by about a month if possible and let her know that there is a new COVID-vaccine coming out hopefully by the end of this year they have already announced it but they have not said when they are going to release it and who is going to be given to but high risk patient should be getting it and she would be a candidate and I would probably wait until then to get it since it will have a new vaccine and variance and it

## 2023-08-16 NOTE — TELEPHONE ENCOUNTER
Patient would like to know if she should get the Covid vaccine, pneumoncoccal vaccine, and the flu vaccine as she is on the chemo tablet

## 2023-08-23 ENCOUNTER — TELEPHONE (OUTPATIENT)
Dept: ONCOLOGY | Facility: HOSPITAL | Age: 63
End: 2023-08-23
Payer: COMMERCIAL

## 2023-08-23 NOTE — TELEPHONE ENCOUNTER
Patient reports generalized arthralgias, especially in right axilla, right upper back, bilateral hips and thighs.  Discussed with Dr. Mendez. Instructed patient to stop Letrozole for a few days to see if this helped her symptoms. Patient to call back on Friday to let us know if symptoms improved or not.

## 2023-08-28 ENCOUNTER — TELEPHONE (OUTPATIENT)
Dept: ONCOLOGY | Facility: HOSPITAL | Age: 63
End: 2023-08-28
Payer: COMMERCIAL

## 2023-08-28 DIAGNOSIS — C50.811 MALIGNANT NEOPLASM OF OVERLAPPING SITES OF RIGHT BREAST IN FEMALE, ESTROGEN RECEPTOR POSITIVE: Primary | ICD-10-CM

## 2023-08-28 DIAGNOSIS — Z17.0 MALIGNANT NEOPLASM OF OVERLAPPING SITES OF RIGHT BREAST IN FEMALE, ESTROGEN RECEPTOR POSITIVE: Primary | ICD-10-CM

## 2023-08-28 RX ORDER — DIPHENHYDRAMINE HCL 25 MG
50 CAPSULE ORAL TAKE AS DIRECTED
Qty: 2 CAPSULE | Refills: 0 | Status: SHIPPED | OUTPATIENT
Start: 2023-08-28

## 2023-08-28 RX ORDER — PREDNISONE 50 MG/1
50 TABLET ORAL TAKE AS DIRECTED
Qty: 3 TABLET | Refills: 0 | Status: SHIPPED | OUTPATIENT
Start: 2023-08-28

## 2023-08-28 NOTE — TELEPHONE ENCOUNTER
Patient reports no improvement of arthralgias when stopping Letrozole for 3 days. Dr. Mendez advised to continue to hold Letrozole and also hold Verzenio until follow up visit in 2 weeks. We will order CT and Bone scan to evaluate for any malignancy. Patient voiced understanding.

## 2023-08-30 ENCOUNTER — SPECIALTY PHARMACY (OUTPATIENT)
Dept: PHARMACY | Facility: HOSPITAL | Age: 63
End: 2023-08-30
Payer: COMMERCIAL

## 2023-09-05 ENCOUNTER — HOSPITAL ENCOUNTER (OUTPATIENT)
Dept: NUCLEAR MEDICINE | Facility: HOSPITAL | Age: 63
Discharge: HOME | End: 2023-09-05
Payer: COMMERCIAL

## 2023-09-05 ENCOUNTER — HOSPITAL ENCOUNTER (OUTPATIENT)
Dept: CT IMAGING | Facility: HOSPITAL | Age: 63
Discharge: HOME | End: 2023-09-05
Payer: COMMERCIAL

## 2023-09-05 DIAGNOSIS — C50.811 MALIGNANT NEOPLASM OF OVERLAPPING SITES OF RIGHT BREAST IN FEMALE, ESTROGEN RECEPTOR POSITIVE: ICD-10-CM

## 2023-09-05 DIAGNOSIS — Z17.0 MALIGNANT NEOPLASM OF OVERLAPPING SITES OF RIGHT BREAST IN FEMALE, ESTROGEN RECEPTOR POSITIVE: ICD-10-CM

## 2023-09-05 PROCEDURE — 71260 CT THORAX DX C+: CPT

## 2023-09-05 PROCEDURE — 78306 BONE IMAGING WHOLE BODY: CPT

## 2023-09-05 PROCEDURE — A9503 TC99M MEDRONATE: HCPCS | Performed by: INTERNAL MEDICINE

## 2023-09-05 PROCEDURE — 74177 CT ABD & PELVIS W/CONTRAST: CPT

## 2023-09-05 PROCEDURE — 0 TECHNETIUM MEDRONATE KIT: Performed by: INTERNAL MEDICINE

## 2023-09-05 PROCEDURE — 25510000001 IOPAMIDOL PER 1 ML: Performed by: INTERNAL MEDICINE

## 2023-09-05 RX ORDER — TC 99M MEDRONATE 20 MG/10ML
20.5 INJECTION, POWDER, LYOPHILIZED, FOR SOLUTION INTRAVENOUS
Status: COMPLETED | OUTPATIENT
Start: 2023-09-05 | End: 2023-09-05

## 2023-09-05 RX ADMIN — IOPAMIDOL 100 ML: 755 INJECTION, SOLUTION INTRAVENOUS at 12:45

## 2023-09-05 RX ADMIN — TC 99M MEDRONATE 20.5 MILLICURIE: 20 INJECTION, POWDER, LYOPHILIZED, FOR SOLUTION INTRAVENOUS at 12:00

## 2023-09-06 ENCOUNTER — HOSPITAL ENCOUNTER (OUTPATIENT)
Dept: ONCOLOGY | Facility: HOSPITAL | Age: 63
Discharge: HOME | End: 2023-09-06
Admitting: INTERNAL MEDICINE
Payer: COMMERCIAL

## 2023-09-06 ENCOUNTER — SPECIALTY PHARMACY (OUTPATIENT)
Dept: PHARMACY | Facility: HOSPITAL | Age: 63
End: 2023-09-06
Payer: COMMERCIAL

## 2023-09-06 ENCOUNTER — OFFICE VISIT (OUTPATIENT)
Dept: ONCOLOGY | Facility: HOSPITAL | Age: 63
End: 2023-09-06
Payer: COMMERCIAL

## 2023-09-06 VITALS
HEIGHT: 64 IN | HEART RATE: 65 BPM | RESPIRATION RATE: 16 BRPM | BODY MASS INDEX: 30.52 KG/M2 | DIASTOLIC BLOOD PRESSURE: 72 MMHG | WEIGHT: 178.79 LBS | TEMPERATURE: 97.3 F | OXYGEN SATURATION: 98 % | SYSTOLIC BLOOD PRESSURE: 119 MMHG

## 2023-09-06 DIAGNOSIS — C50.811 MALIGNANT NEOPLASM OF OVERLAPPING SITES OF RIGHT BREAST IN FEMALE, ESTROGEN RECEPTOR POSITIVE: Primary | ICD-10-CM

## 2023-09-06 DIAGNOSIS — M25.551 ACUTE HIP PAIN, RIGHT: ICD-10-CM

## 2023-09-06 DIAGNOSIS — Z17.0 MALIGNANT NEOPLASM OF OVERLAPPING SITES OF RIGHT BREAST IN FEMALE, ESTROGEN RECEPTOR POSITIVE: Primary | ICD-10-CM

## 2023-09-06 DIAGNOSIS — C50.811 MALIGNANT NEOPLASM OF OVERLAPPING SITES OF RIGHT FEMALE BREAST, UNSPECIFIED ESTROGEN RECEPTOR STATUS: Primary | ICD-10-CM

## 2023-09-06 PROCEDURE — 25010000002 HEPARIN LOCK FLUSH PER 10 UNITS: Performed by: INTERNAL MEDICINE

## 2023-09-06 PROCEDURE — G0463 HOSPITAL OUTPT CLINIC VISIT: HCPCS | Performed by: INTERNAL MEDICINE

## 2023-09-06 PROCEDURE — 96523 IRRIG DRUG DELIVERY DEVICE: CPT

## 2023-09-06 RX ORDER — SODIUM CHLORIDE 0.9 % (FLUSH) 0.9 %
20 SYRINGE (ML) INJECTION AS NEEDED
Status: DISCONTINUED | OUTPATIENT
Start: 2023-09-06 | End: 2023-09-07 | Stop reason: HOSPADM

## 2023-09-06 RX ORDER — SODIUM CHLORIDE 0.9 % (FLUSH) 0.9 %
20 SYRINGE (ML) INJECTION AS NEEDED
OUTPATIENT
Start: 2023-09-06

## 2023-09-06 RX ORDER — HEPARIN SODIUM (PORCINE) LOCK FLUSH IV SOLN 100 UNIT/ML 100 UNIT/ML
500 SOLUTION INTRAVENOUS AS NEEDED
OUTPATIENT
Start: 2023-09-06

## 2023-09-06 RX ORDER — HEPARIN SODIUM (PORCINE) LOCK FLUSH IV SOLN 100 UNIT/ML 100 UNIT/ML
500 SOLUTION INTRAVENOUS AS NEEDED
Status: DISCONTINUED | OUTPATIENT
Start: 2023-09-06 | End: 2023-09-07 | Stop reason: HOSPADM

## 2023-09-06 RX ADMIN — Medication 20 ML: at 11:36

## 2023-09-06 RX ADMIN — HEPARIN SODIUM (PORCINE) LOCK FLUSH IV SOLN 100 UNIT/ML 500 UNITS: 100 SOLUTION at 11:36

## 2023-09-06 NOTE — PROGRESS NOTES
[facility]    [facility]  [facility] HEM[facility]OLOG[facility]OGY    Chief Complaint  Malignant neoplasm of overlapping sites of right female mateusz    Referring Provider: Judah Johnson, *  PCP: Judah Johnson MD    Subjective          Oncology/Hematology History Overview Note     ER+ Right Breast Cancer:    Diagnostic mammogram on 11/12/2021: 2 cm asymmetry in the outer central right breast with amorphous calcifications.  Similar appearing group of amorphous calcifications in the outer central right breast.  6 mm asymmetry in the inner right breast.  Right axillary lymphadenopathy.    Ultrasound-guided biopsy on 11/1/2021: Right breast 3 o'clock position, 2 cm from the nipple with invasive ductal carcinoma, grade 2.  Right breast 9 o'clock position, 3 cm from the nipple with invasive ductal carcinoma, grade 2, ER positive (50%), MD positive (3%), HER-2 negative (score 0), Ki-67 14%.  Associated with intermediate grade ductal carcinoma in situ.  Right axillary lymph node positive for breast carcinoma.    CT CAP and bone scan on 12/13/21: negative for metastatic disease    Bilateral mastectomy 12/28/2021: Right breast with multiple (2) foci of invasive ductal carcinoma, largest focus 2.3 cm, grade 2, associated with DCIS with extensive intraductal component, all margins negative, 4/10 lymph nodes involved with no extranodal extension and the largest focus measured at 3.3 cm, ER positive (40%), MD positive (5%), HER-2 negative by IHC (score 0), Ki-67 14%, pT2 pN2a cM0    Completed 4 cycles of chemotherapy with TC on 4/25/22.  Complicated by a UTI and multiple right breast abscesses causing delay in cycles 3 and 4.     Radiation was also delayed due to infection and wound healing. She finishes December 20th.     She started Abemiciclib on 1/1/23.    *The pt discontinued HRT in October of 2021 when she had an abnormal screening mammogram     Malignant neoplasm of overlapping sites  of right breast in female, estrogen receptor positive   12/9/2021 Initial Diagnosis    Malignant neoplasm of overlapping sites of right breast in female, estrogen receptor positive (HCC)     12/28/2021 Cancer Staged    Staging form: Breast, AJCC 8th Edition  - Pathologic stage from 12/28/2021: Stage IB (pT2, pN2a, cM0, G2, ER+, CO+, HER2-) - Signed by Starr Mendez MD PhD on 1/30/2022 1/14/2022 -  Chemotherapy    OP CENTRAL VENOUS ACCESS DEVICE ACCESS, CARE, AND MAINTENANCE (CVAD)       1/31/2022 - 4/26/2022 Chemotherapy    OP BREAST TC DOCEtaxel / Cyclophosphamide       11/15/2022 - 12/20/2022 Radiation    Radiation OncologyTreatment Course:  Marisa Portillo received 5000 cGy in 25 fractions to right chest wall and axillary levels 1 through 3.      12/27/2022 -  Chemotherapy    OP BREAST Abemaciclib        Malignant neoplasm of overlapping sites of right female breast   7/18/2022 Initial Diagnosis    Malignant neoplasm of overlapping sites of right female breast (HCC)         History of Present Illness  Patient comes in today for toxicity check while on abemaciclib and letrozole.  She held the medication for a week and a half and does not feel remarkably better.  She has intermittent but severe diarrhea that started with abemaciclib treatment.  Fortunately, recent CT CAP with contrast and bone scan were negative on 9/5/2023.  The steroid she got prior to her CT scan have significantly helped her joint pain.    Review of Systems   Constitutional:  Positive for fatigue. Negative for appetite change, diaphoresis, fever, unexpected weight gain and unexpected weight loss.   HENT:  Negative for hearing loss, mouth sores, sore throat, swollen glands, trouble swallowing and voice change.    Eyes:  Negative for blurred vision.   Respiratory:  Negative for cough, shortness of breath and wheezing.    Cardiovascular:  Negative for chest pain and palpitations.   Gastrointestinal:  Negative for abdominal pain, blood in  stool, constipation, diarrhea, nausea and vomiting.   Endocrine: Negative for cold intolerance and heat intolerance.   Genitourinary:  Negative for difficulty urinating, dysuria, frequency, hematuria and urinary incontinence.   Musculoskeletal:  Positive for arthralgias. Negative for back pain and myalgias.   Skin:  Negative for rash, skin lesions and wound.   Neurological:  Negative for dizziness, seizures, weakness, numbness and headache.   Hematological:  Does not bruise/bleed easily.   Psychiatric/Behavioral:  Negative for depressed mood. The patient is not nervous/anxious.    All other systems reviewed and are negative.    Past Medical History:   Diagnosis Date    Allergies     Anemia During chemo 2022    Breast cancer     Cervical dysplasia     Herpes     High cholesterol     IFG (impaired fasting glucose)     Migraine     Osteoarthritis     S/P BILATERAL IMMEDIATE BREAST RECONSTRUCTION WITH LEFT PRE PEC PLACEMENT OF SILICONE GEL IMPLANT AND MESH, RIGHT PRE PEC PLACEMENT OF EXPANDER AND MESH 12/29/2021    TIA (transient ischemic attack)     no residual    Vitamin D deficiency      Past Surgical History:   Procedure Laterality Date    APPENDECTOMY      BREAST AUGMENTATION Bilateral 12/28/2021    Procedure: bilateral breast reconstructon with bilateral mesh placement.   left implant, right tissue expander placement;  Surgeon: Lacy Shelton MD;  Location: [facility] OR [facility];  Service: Plastics;  Laterality: Bilateral;    BREAST SURGERY  [id number]    Bilateral Mastectomy with implant Left expander Right    BREAST TISSUE EXPANDER REMOVAL INSERTION OF IMPLANT Right 04/15/2022    Procedure: RIGHT BREAST IMPLANT REMOVAL WITH INCISION AND DRAINAGE;  Surgeon: Lacy Shelton MD;  Location: [facility] OR [facility];  Service: Plastics;  Laterality: Right;    CEREBRAL ANGIOGRAM      clip placed    CYST REMOVAL Right     rt wrist    HYSTERECTOMY      Partial age 31    INCISION AND DRAINAGE ABSCESS Bilateral 10/4/2022     Procedure: BILATERAL BREAST ABSCESS INCISION AND DRAINAGE, WITH LEFT BREAST IMPLANT REMOVAL;  Surgeon: Lacy Shelotn MD;  Location: [facility] MAIN OR;  Service: Plastics;  Laterality: Bilateral;    INCISION AND DRAINAGE OF WOUND Right 03/11/2022    Procedure: INCISION AND DRAINAGE ABSCESS OF RIGHT BREAST WITH EXPANDER REMOVAL AND IMPLANT PLACEMENT;  Surgeon: Lacy Shelton MD;  Location: [facility] MAIN OR;  Service: Plastics;  Laterality: Right;    MASTECTOMY COMPLETE / SIMPLE W/ SENTINEL NODE BIOPSY Bilateral     PORTACATH PLACEMENT Left 12/28/2021    Procedure: INSERTION OF PORTACATH;  Surgeon: Jacqueline Mcgraw MD;  Location: [facility] OR [facility];  Service: General;  Laterality: Left;    SENTINEL NODE BIOPSY Bilateral 12/28/2021    Procedure: BILATERAL BREAST MASTECTOMIES WITH RIGHT SENTINEL NODE BIOPSIES WITH FROZEN SECTIONS;  Surgeon: Jacqueline Mcgraw MD;  Location: [facility] OR [facility];  Service: General;  Laterality: Bilateral;     Social History     Socioeconomic History    Marital status:    Tobacco Use    Smoking status: Never    Smokeless tobacco: Never    Tobacco comments:     Father mother  smoker diring childhood and  adulthood   Vaping Use    Vaping Use: Never used   Substance and Sexual Activity    Alcohol use: Yes     Comment: Socially only . Occasionally    Drug use: Never    Sexual activity: Yes     Partners: Male     Birth control/protection: Post-menopausal     Comment: Hysterectomy age 33     Family History   Problem Relation Age of Onset    Hypertension Mother     Diabetes Mother     Cancer Mother     Arthritis Mother         Osteo and RA    Breast cancer Mother     Hypertension Father     Diabetes Father     Cancer Father     Arthritis Father     Liver cancer Father     Lung cancer Father     Asthma Father     COPD Father     Heart disease Father     Kidney disease Father     Breast cancer Maternal Aunt     Malig Hyperthermia Neg Hx        Objective   Physical Exam  General:  "Alert, cooperative, no acute distress  Eyes: Anicteric sclera, PERRLA  Respiratory: normal respiratory effort  Cardiovascular: no lower extremity edema  Skin: Normal tone, no rash, no lesions  Psychiatric: Appropriate affect, intact judgment  Neurologic: No focal sensory or motor deficits, normal cognition   Musculoskeletal: Normal muscle strength and tone  Extremities: No clubbing, cyanosis, or deformities    Vitals:    09/06/23 1015   BP: 119/72   Pulse: 65   Resp: 16   Temp: 97.3 °F (36.3 °C)   SpO2: 98%   Weight: 81.1 kg (178 lb 12.7 oz)   Height: 162.6 cm (64.02\")   PainSc:   3   PainLoc: Hip  Comment: ELEANOR hip     ECOG score: 0         PHQ-9 Total Score:         Result Review :   The following data was reviewed by: Starr Mendez MD PhD on 09/06/2023:  Lab Results   Component Value Date    HGB 12.7 08/08/2023    HCT 36.6 08/08/2023    MCV 99.5 (H) 08/08/2023     08/08/2023    WBC 4.49 08/08/2023    NEUTROABS 2.80 08/08/2023    LYMPHSABS 0.97 08/08/2023    MONOSABS 0.53 08/08/2023    EOSABS 0.13 08/08/2023    BASOSABS 0.05 08/08/2023     Lab Results   Component Value Date    GLUCOSE 94 08/08/2023    BUN 16 08/08/2023    CREATININE 0.89 08/08/2023     08/08/2023    K 3.9 08/08/2023     08/08/2023    CO2 27.0 08/08/2023    CALCIUM 9.7 08/08/2023    PROTEINTOT 7.2 08/08/2023    ALBUMIN 5.0 08/08/2023    BILITOT 0.5 08/08/2023    ALKPHOS 65 08/08/2023    AST 21 08/08/2023    ALT 15 08/08/2023     Lab Results   Component Value Date    IRON 65 03/13/2022    LABIRON 35 03/13/2022    TRANSFERRIN 125 (L) 03/13/2022    TIBC 186 (L) 03/13/2022     Lab Results   Component Value Date    VHZGXYZC00 >2,000 (H) 03/11/2022    FOLATE 16.80 03/11/2022     No results found for: PSA, CEA, AFP, ,        Assessment and Plan    Diagnoses and all orders for this visit:    1. Malignant neoplasm of overlapping sites of right female breast, unspecified estrogen receptor status [C50.811 (ICD-10-CM)] " (Primary)  -     CBC & Differential; Future  -     Comprehensive Metabolic Panel; Future    2. Acute hip pain, right  -     Ambulatory Referral to Orthopedic Surgery          ER+ Right breast Cancer: pT2N2a, s/p bilateral mastectomy.  The pt completed 4 cycles of TC.  Her treatment course was complicated by breast abscesses. Radiation was delayed until December 2022.  She is tolerating anastrozole and abemiciclib which she started on 1/1/23 with the exception of occasional severe bouts of diarrhea.  I discussed switching the patient to ribociclib which now has data on the benefit in the adjuvant setting. She will consider this and talk with the next oncologist she will see in 2 months.  For now she will continue with acyclovir and letrozole.    Menopausal symptoms: Secondary to letrozole.  She is currently on venlafaxine 75mg daily.      Hip pain: Likely secondary to bursitis.  I encouraged the patient to follow-up with Dr. Kent and orthopedics.      Patient was given instructions and counseling regarding her condition or for health maintenance advice. Please see specific information pulled into the AVS if appropriate.     Starr Mendez MD PhD    9/17/2023

## 2023-09-14 ENCOUNTER — OFFICE VISIT (OUTPATIENT)
Dept: ORTHOPEDIC SURGERY | Facility: CLINIC | Age: 63
End: 2023-09-14
Payer: COMMERCIAL

## 2023-09-14 VITALS
WEIGHT: 180 LBS | HEIGHT: 64 IN | BODY MASS INDEX: 30.73 KG/M2 | DIASTOLIC BLOOD PRESSURE: 82 MMHG | OXYGEN SATURATION: 97 % | HEART RATE: 77 BPM | SYSTOLIC BLOOD PRESSURE: 126 MMHG

## 2023-09-14 DIAGNOSIS — M70.61 TROCHANTERIC BURSITIS OF RIGHT HIP: ICD-10-CM

## 2023-09-14 DIAGNOSIS — M25.551 RIGHT HIP PAIN: Primary | ICD-10-CM

## 2023-09-14 RX ADMIN — LIDOCAINE HYDROCHLORIDE 5 ML: 10 INJECTION, SOLUTION INFILTRATION; PERINEURAL at 10:13

## 2023-09-14 RX ADMIN — TRIAMCINOLONE ACETONIDE 40 MG: 40 INJECTION, SUSPENSION INTRA-ARTICULAR; INTRAMUSCULAR at 10:13

## 2023-09-14 NOTE — PROGRESS NOTES
"Chief Complaint  Initial Evaluation of the Right Hip     Subjective      Marisa Portillo presents to [facility] for initial evaluation of the right hip. She has soreness.  The pain goes in the thigh and she has difficulty with stairs.  She notes some pain with ambulation and prolonged sitting. Her pain is on the lateral aspect of the hip.      Allergies   Allergen Reactions    Multihance [Gadobenate] Hives and Itching     MRI contrast    Contrast Dye (Echo Or Unknown Ct/Mr) Hives        Social History     Socioeconomic History    Marital status:    Tobacco Use    Smoking status: Never    Smokeless tobacco: Never    Tobacco comments:     Father mother  smoker diring childhood and  adulthood   Vaping Use    Vaping Use: Never used   Substance and Sexual Activity    Alcohol use: Yes     Comment: Socially only . Occasionally    Drug use: Never    Sexual activity: Yes     Partners: Male     Birth control/protection: Post-menopausal     Comment: Hysterectomy age 33        I reviewed the patient's chief complaint, history of present illness, review of systems, past medical history, surgical history, family history, social history, medications, and allergy list.     Review of Systems     Constitutional: Denies fevers, chills, weight loss  Cardiovascular: Denies chest pain, shortness of breath  Skin: Denies rashes, acute skin changes  Neurologic: Denies headache, loss of consciousness        Vital Signs:   /82 (BP Location: Left arm, Patient Position: Sitting, Cuff Size: Adult)   Pulse 77   Ht 162.6 cm (64.02\")   Wt 81.6 kg (180 lb)   SpO2 97%   BMI 30.88 kg/m²          Physical Exam  General: Alert. No acute distress    Ortho Exam        RIGHT HIP  No skin discoloration, atrophy or swelling. Positive EHL, FHL, GS, and TA. Sensation intact to all 5 nerves of the foot. Negative straight leg raise. Leg lengths appear equal. Ambulates with Non-antalgic gait Negative Queta. " Negative Mirela. Good strength to hamstrings, quadriceps, dorsiflexors, and plantar flexors. Knee Extensor Mechanism  intact        Large Joint Arthrocentesis: R greater trochanteric bursa  Date/Time: 9/14/2023 10:13 AM  Consent given by: patient  Site marked: site marked  Timeout: Immediately prior to procedure a time out was called to verify the correct patient, procedure, equipment, support staff and site/side marked as required   Supporting Documentation  Indications: pain   Procedure Details  Location: hip - R greater trochanteric bursa  Needle gauge: 21G.  Medications administered: 5 mL lidocaine 1 %; 40 mg triamcinolone acetonide 40 MG/ML  Patient tolerance: patient tolerated the procedure well with no immediate complications        Imaging Results (Most Recent)       Procedure Component Value Units Date/Time    XR Hip With or Without Pelvis 2 - 3 View Right [[id number]] Resulted: 09/14/23 0959     Updated: 09/14/23 1004             Result Review :     X-Ray Report:  Right hip X-Ray  Indication: Evaluation of the right hip  AP/Lateral view(s)  Findings: Mild arthritis.   Prior studies available for comparison: no                  Assessment and Plan     Diagnoses and all orders for this visit:    1. Right hip pain (Primary)  -     XR Hip With or Without Pelvis 2 - 3 View Right    2. Trochanteric bursitis of right hip        Discussed the treatment plan with the patient. I reviewed the X-rays that were obtained today with the patient.     Discussed the risks and benefits of conservative measures.  The patient expressed understanding and wished to proceed with a right hip steroid injection.      HEP exercises.     Call or return if worsening symptoms.    Follow Up     PRN      Patient was given instructions and counseling regarding her condition or for health maintenance advice. Please see specific information pulled into the AVS if appropriate.     Scribed for Bob Knet MD by Pat Ramirez MA.  09/14/23    09:58 EDT    I have personally performed the services described in this document as scribed by the above individual and it is both accurate and complete. Bob Kent MD 09/15/23

## 2023-09-15 RX ORDER — TRIAMCINOLONE ACETONIDE 40 MG/ML
40 INJECTION, SUSPENSION INTRA-ARTICULAR; INTRAMUSCULAR
Status: COMPLETED | OUTPATIENT
Start: 2023-09-14 | End: 2023-09-14

## 2023-09-15 RX ORDER — LIDOCAINE HYDROCHLORIDE 10 MG/ML
5 INJECTION, SOLUTION INFILTRATION; PERINEURAL
Status: COMPLETED | OUTPATIENT
Start: 2023-09-14 | End: 2023-09-14

## 2023-09-21 ENCOUNTER — OFFICE VISIT (OUTPATIENT)
Dept: VASCULAR SURGERY | Facility: HOSPITAL | Age: 63
End: 2023-09-21
Payer: COMMERCIAL

## 2023-09-21 ENCOUNTER — HOSPITAL ENCOUNTER (OUTPATIENT)
Dept: CARDIOLOGY | Facility: HOSPITAL | Age: 63
Discharge: HOME | End: 2023-09-21
Payer: COMMERCIAL

## 2023-09-21 VITALS
HEART RATE: 62 BPM | TEMPERATURE: 98.2 F | DIASTOLIC BLOOD PRESSURE: 80 MMHG | RESPIRATION RATE: 18 BRPM | SYSTOLIC BLOOD PRESSURE: 126 MMHG | OXYGEN SATURATION: 98 %

## 2023-09-21 DIAGNOSIS — M79.89 LEFT LEG SWELLING: Primary | ICD-10-CM

## 2023-09-21 DIAGNOSIS — I73.9 PAD (PERIPHERAL ARTERY DISEASE): ICD-10-CM

## 2023-09-21 LAB
BH CV LOWER ARTERIAL LEFT ABI RATIO: 1.25
BH CV LOWER ARTERIAL LEFT DORSALIS PEDIS SYS MAX: 156
BH CV LOWER ARTERIAL LEFT GREAT TOE SYS MAX: 96
BH CV LOWER ARTERIAL LEFT POST TIBIAL SYS MAX: 161
BH CV LOWER ARTERIAL LEFT TBI RATIO: 0.74
BH CV LOWER ARTERIAL RIGHT ABI RATIO: 1.26
BH CV LOWER ARTERIAL RIGHT DORSALIS PEDIS SYS MAX: 154
BH CV LOWER ARTERIAL RIGHT GREAT TOE SYS MAX: 117
BH CV LOWER ARTERIAL RIGHT POST TIBIAL SYS MAX: 162
BH CV LOWER ARTERIAL RIGHT TBI RATIO: 0.91
UPPER ARTERIAL LEFT ARM BRACHIAL SYS MAX: 129

## 2023-09-21 PROCEDURE — 93923 UPR/LXTR ART STDY 3+ LVLS: CPT

## 2023-09-21 NOTE — PROGRESS NOTES
[facility] Vascular Surgery New Patient Office Note     Date of Encounter: 09/21/2023     MRN Number: [id number]  Name: Marisa Portillo  Phone Number: [phone]     Referred By: Judah Johnson,*  PCP: Judah Johnson MD    Chief Complaint:    Chief Complaint   Patient presents with    Peripheral Vascular Disease     Patient is here as a new patient with a chief complaint of left leg swelling and some pain. Patient had an arterial study today.         Subjective      History of Present Illness:    Marisa Portillo is a 63 y.o. female presents as a referral from Dr. Johnson with an arterial Doppler performed today.  Her biggest complaint is left leg swelling and discomfort.  She does wear compression hose and has for many years.  She states that several years ago she seen a vascular doctor in [place] (Dr. De Jesus) and had a venous duplex with reflux performed and was told that she needed a venous stent.  She is currently taking PO chemo medication for breast cancer. She has had a mastectomy and was treated with dual therapy IV chemo and radiation. Her last IV chemo was in April and last radiation treatment was Dec. 2022. No history of any DVT. She does take a daily aspirin. She denies any claudication or ischemic rest pain and is NOT a smoker.     Review of Systems:  ROS  Review of Systems   Constitutional: Negative.   HENT: Negative.    Cardiovascular: Left leg swelling.    Respiratory: Negative.    Skin: Negative.    Musculoskeletal: Negative.    Gastrointestinal: Negative.    Neurological: Negative.    Psychiatric/Behavioral: Negative.     I have reviewed the following portions of the patient's history: problem list, current medications, allergies, past surgical history, past medical history, past social history, past family history, and ROS and confirm it's accurate.    Allergies:  Allergies   Allergen Reactions    Multihance [Gadobenate] Hives and Itching     MRI contrast     Contrast Dye (Echo Or Unknown Ct/Mr) Hives       Medications:      Current Outpatient Medications:     abemaciclib (VERZENIO) 100 mg tablet chemo tablet, Take 1 tablet by mouth 2 (Two) Times a Day., Disp: 56 tablet, Rfl: 5    acetaminophen (TYLENOL) 650 MG 8 hr tablet, Take 1 tablet by mouth 2 (Two) Times a Day., Disp: , Rfl:     Aspirin 81 MG capsule, Take 81 mg by mouth 2 (Two) Times a Week., Disp: , Rfl:     cloNIDine (CATAPRES) 0.2 MG tablet, TAKE 1 TABLET BY MOUTH EVERY NIGHT AT BEDTIME., Disp: 30 tablet, Rfl: 3    diphenhydrAMINE (BENADRYL) 25 mg capsule, Take 2 capsules by mouth Take As Directed. Take 2 capsules 1 hour prior to CT scan, Disp: 2 capsule, Rfl: 0    hydrOXYzine (ATARAX) 25 MG tablet, Take 1 tablet by mouth At Night As Needed for Itching., Disp: , Rfl:     letrozole (FEMARA) 2.5 MG tablet, Take 1 tablet by mouth Daily., Disp: 90 tablet, Rfl: 1    Loratadine-Pseudoephedrine (CLARITIN-D 24 HOUR PO), Take 1 tablet by mouth Daily., Disp: , Rfl:     magnesium gluconate (MAGONATE) 500 MG tablet, Take 250 mg by mouth Daily., Disp: , Rfl:     ondansetron (ZOFRAN) 8 MG tablet, Take 1 tablet by mouth 3 (Three) Times a Day As Needed for Nausea or Vomiting., Disp: 30 tablet, Rfl: 5    potassium bicarbonate (K-LYTE) 25 MEQ disintegrating tablet, Take 1 tablet by mouth 2 (Two) Times a Day., Disp: , Rfl:     predniSONE (DELTASONE) 50 MG tablet, Take 1 tablet by mouth Take As Directed. Take 1 tablet 13hrs, 7hrs, and 1 hr prior to CT scan, Disp: 3 tablet, Rfl: 0    venlafaxine XR (EFFEXOR-XR) 75 MG 24 hr capsule, Take 1 capsule by mouth Daily., Disp: 90 capsule, Rfl: 3    Vitamin A 3 MG (12224 UT) capsule, Take 1 capsule by mouth Daily., Disp: , Rfl:     vitamin D (ERGOCALCIFEROL) 1.25 MG (75419 UT) capsule capsule, TAKE 1 CAPSULE BY MOUTH EVERY 7 (SEVEN) DAYS., Disp: 4 capsule, Rfl: 11    History:   Past Medical History:   Diagnosis Date    Allergies     Anemia During chemo 2022    Breast cancer     Cervical  dysplasia     Herpes     High cholesterol     IFG (impaired fasting glucose)     Migraine     Osteoarthritis     S/P BILATERAL IMMEDIATE BREAST RECONSTRUCTION WITH LEFT PRE PEC PLACEMENT OF SILICONE GEL IMPLANT AND MESH, RIGHT PRE PEC PLACEMENT OF EXPANDER AND MESH 12/29/2021    TIA (transient ischemic attack)     no residual    Vitamin D deficiency        Past Surgical History:   Procedure Laterality Date    APPENDECTOMY      BREAST AUGMENTATION Bilateral 12/28/2021    Procedure: bilateral breast reconstructon with bilateral mesh placement.   left implant, right tissue expander placement;  Surgeon: Lacy Shelton MD;  Location: [facility] OR [facility] [place];  Service: Plastics;  Laterality: Bilateral;    BREAST SURGERY  [id number]    Bilateral Mastectomy with implant Left expander Right    BREAST TISSUE EXPANDER REMOVAL INSERTION OF IMPLANT Right 04/15/2022    Procedure: RIGHT BREAST IMPLANT REMOVAL WITH INCISION AND DRAINAGE;  Surgeon: Lacy Shelton MD;  Location: [facility] OR [facility] [place];  Service: Plastics;  Laterality: Right;    CEREBRAL ANGIOGRAM      clip placed    CYST REMOVAL Right     rt wrist    HYSTERECTOMY      Partial age 31    INCISION AND DRAINAGE ABSCESS Bilateral 10/4/2022    Procedure: BILATERAL BREAST ABSCESS INCISION AND DRAINAGE, WITH LEFT BREAST IMPLANT REMOVAL;  Surgeon: Lacy Shelton MD;  Location: [facility];  Service: Plastics;  Laterality: Bilateral;    INCISION AND DRAINAGE OF WOUND Right 03/11/2022    Procedure: INCISION AND DRAINAGE ABSCESS OF RIGHT BREAST WITH EXPANDER REMOVAL AND IMPLANT PLACEMENT;  Surgeon: Lacy Shelton MD;  Location: [facility];  Service: Plastics;  Laterality: Right;    MASTECTOMY COMPLETE / SIMPLE W/ SENTINEL NODE BIOPSY Bilateral     PORTACATH PLACEMENT Left 12/28/2021    Procedure: INSERTION OF PORTACATH;  Surgeon: Jacqueline Mcgraw MD;  Location: [facility] OR [facility] [place];  Service: General;  Laterality: Left;    SENTINEL NODE BIOPSY Bilateral 12/28/2021     Procedure: BILATERAL BREAST MASTECTOMIES WITH RIGHT SENTINEL NODE BIOPSIES WITH FROZEN SECTIONS;  Surgeon: Jacqueline Mcgraw MD;  [place] [facility] [place] [facility] [place]  Service: General;  Laterality: Bilateral;       Social History     Socioeconomic History    Marital status:    Tobacco Use    Smoking status: Never    Smokeless tobacco: Never    Tobacco comments:     Father mother  smoker diring childhood and  adulthood   Vaping Use    Vaping Use: Never used   Substance and Sexual Activity    Alcohol use: Yes     Comment: Socially only . Occasionally    Drug use: Never    Sexual activity: Yes     Partners: Male     Birth control/protection: Post-menopausal     Comment: Hysterectomy age 33        Family History   Problem Relation Age of Onset    Hypertension Mother     Diabetes Mother     Cancer Mother     Arthritis Mother         Osteo and RA    Breast cancer Mother     Hypertension Father     Diabetes Father     Cancer Father     Arthritis Father     Liver cancer Father     Lung cancer Father     Asthma Father     COPD Father     Heart disease Father     Kidney disease Father     Breast cancer Maternal Aunt     Malig Hyperthermia Neg Hx        Objective     Physical Exam:  Vitals:    09/21/23 1515   BP: 126/80   BP Location: Left arm   Patient Position: Sitting   Cuff Size: Large Adult   Pulse: 62   Resp: 18   Temp: 98.2 °F (36.8 °C)   TempSrc: Temporal   SpO2: 98%   PainSc: 0-No pain      There is no height or weight on file to calculate BMI.    Physical Exam   Physical Exam  Constitutional:       Appearance: Normal appearance.   HENT:      Head: Normocephalic.   Cardiovascular:      Rate and Rhythm: Normal rate.      Pulses: Normal pulses.      Comments: Bilateral lower extremities: Palpable pedal pulses.  Pulmonary:      Effort: Pulmonary effort is normal.   Musculoskeletal:         General: Normal range of motion.      Cervical back: Normal range of motion.   Skin:     General: Skin is warm and dry.       Capillary Refill: Capillary refill takes less than 2 seconds.      Comments: Left lower extremity: Mild hyperpigmentation, telangiectasia and mild edema.  Neurological:      General: No focal deficit present.      Mental Status: Alert and oriented to person, place, and time.   Psychiatric:         Mood and Affect: Mood normal.         Behavior: Behavior normal.  Imaging/Labs:  I have reviewed the CT of the abdomen and pelvis and 9/5/2023.  CT Chest With Contrast Diagnostic    Result Date: 9/5/2023    1. Postoperative changes of prior bilateral mastectomy and right axillary lymph node dissection. 2. Mild subpleural fibrosis in the right lung presumably representing post radiation changes. 3. No evidence of tumor recurrence in the chest, abdomen, or pelvis. 4. Incidental hepatic cysts     Chai Stewart MD       Electronically Signed and Approved By: Chai Stewart MD on 9/05/2023 at 13:43             NM Bone Scan Whole Body    Result Date: 9/6/2023   No convincing evidence of osseous metastatic disease.     LEANDRO BISHOP MD       Electronically Signed and Approved By: LEANDRO BISHOP MD on 9/06/2023 at 8:23             CT Abdomen Pelvis With Contrast    Result Date: 9/5/2023    1. Postoperative changes of prior bilateral mastectomy and right axillary lymph node dissection. 2. Mild subpleural fibrosis in the right lung presumably representing post radiation changes. 3. No evidence of tumor recurrence in the chest, abdomen, or pelvis. 4. Incidental hepatic cysts     Chai Stewart MD       Electronically Signed and Approved By: Chai Stewart MD on 9/05/2023 at 13:43                 Assessment / Plan      Assessment / Plan:  Diagnoses and all orders for this visit:    1. Left leg swelling (Primary)    Ms. Portillo has bilateral lower extremity edema with the left significantly worse that the right. She has seen previous surgeons in [place] and was recommended thigh high compression stockings and stent placement  however, she had not been able to follow up with them through the pandemic and given her recent breast cancer treatments. She had a CT of the abdomen and pelvis on 09/05/23, I am going to consult with Dr. Gonzalez to evaluate for possible iliac vein compression and I will call her with the resutls. She is going to try to obtain her records from the surgeon in [place].and then we will then plan accordingly.    I have answered all of her questions and she is in agreement with the plan at this time.  Thank you for allowing me to participate in your patient's care.    Follow Up:   No follow-ups on file.   Or sooner for any further concerns or worsening sign and symptoms. If unable to reach us in the office please dial 911 or go to the nearest emergency department.      CALOS Robles  [facility] Vascular Surgery

## 2023-09-22 ENCOUNTER — TELEPHONE (OUTPATIENT)
Dept: VASCULAR SURGERY | Facility: HOSPITAL | Age: 63
End: 2023-09-22
Payer: COMMERCIAL

## 2023-09-22 ENCOUNTER — TELEPHONE (OUTPATIENT)
Dept: ONCOLOGY | Facility: HOSPITAL | Age: 63
End: 2023-09-22
Payer: COMMERCIAL

## 2023-09-22 NOTE — TELEPHONE ENCOUNTER
"  Caller: Marisa Portillo \"Liberty\"    Relationship to patient: Self    Best call back number: [phone]    Chief complaint: PT WANTED TO RESCHEDULE THE TIME OF HER APPOINTMENT    Type of visit: AMANDO FLU     Requested date: 10-24-23 BETWEEN 8AM AND 9:30     If rescheduling, when is the original appointment: 10-24-23     "

## 2023-09-22 NOTE — TELEPHONE ENCOUNTER
I called and spoke with the patient, I reviewed the CT of the AB/pel with Dr. Gonzalez and we se no evidence of iliac vein compression that would be contributing to her swelling. She is going to try to obtain her notes and testing from the vascular surgeon in [place] and forward them to us to review. At this time she should continue to wear her compression support hose to help control the swelling.

## 2023-09-25 ENCOUNTER — SPECIALTY PHARMACY (OUTPATIENT)
Dept: PHARMACY | Facility: HOSPITAL | Age: 63
End: 2023-09-25

## 2023-09-25 NOTE — PROGRESS NOTES
" Specialty Pharmacy Patient Management Program  Oncology Refill Outreach      Marisa \"Liberty\" is a 63 y.o. female contacted today regarding refills of her medication(s).    Specialty medication(s) and dose(s) confirmed: abemaciclib (VERZENIO) 100 mg tablet chemo tablet     Other medications being refilled: N/A    Refill Questions      Flowsheet Row Most Recent Value   Changes to allergies? No   Changes to medications? No   New conditions since last clinic visit No   Unplanned office visit, urgent care, ED, or hospital admission in the last 4 weeks  No   How does patient/caregiver feel medication is working? Very good   Financial problems or insurance changes  No   Since the previous refill, were any specialty medication doses or scheduled injections missed or delayed?  No   Does this patient require a clinical escalation to a pharmacist? No            Delivery Questions      Flowsheet Row Most Recent Value   Delivery method [employer]   Delivery address correct? Yes   Delivery phone number [phone]   Preferred delivery time? Anytime   Number of medications in delivery 1   Medication being filled and delivered abemaciclib (VERZENIO) 100 mg tablet chemo tablet   Doses left of specialty medications 10   Is there any medication that is due not being filled? No   Supplies needed? No supplies needed   Cooler needed? No   Do any medications need mixed or dated? No   Copay form of payment Credit card on file   Additional comments Zero copay   Questions or concerns for the pharmacist? No   Explain any questions or concerns for the pharmacist N/A   Are any medications first time fills? No                   Follow-up: 21 days     Jennifer Diggs  Care Coordinator, [facility]  9/25/2023  16:01 EDT          "

## 2023-10-18 ENCOUNTER — HOSPITAL ENCOUNTER (OUTPATIENT)
Dept: OCCUPATIONAL THERAPY | Facility: HOSPITAL | Age: 63
Setting detail: THERAPIES SERIES
Discharge: HOME | End: 2023-10-18
Payer: COMMERCIAL

## 2023-10-18 DIAGNOSIS — Z91.89 AT RISK FOR LYMPHEDEMA: Primary | ICD-10-CM

## 2023-10-18 DIAGNOSIS — Z17.0 MALIGNANT NEOPLASM OF OVERLAPPING SITES OF RIGHT BREAST IN FEMALE, ESTROGEN RECEPTOR POSITIVE: ICD-10-CM

## 2023-10-18 DIAGNOSIS — Z90.13 HISTORY OF MASTECTOMY, BILATERAL: ICD-10-CM

## 2023-10-18 DIAGNOSIS — C50.811 MALIGNANT NEOPLASM OF OVERLAPPING SITES OF RIGHT BREAST IN FEMALE, ESTROGEN RECEPTOR POSITIVE: ICD-10-CM

## 2023-10-18 DIAGNOSIS — C50.411 MALIGNANT NEOPLASM OF UPPER-OUTER QUADRANT OF RIGHT BREAST IN FEMALE, ESTROGEN RECEPTOR POSITIVE: ICD-10-CM

## 2023-10-18 DIAGNOSIS — Z17.0 MALIGNANT NEOPLASM OF UPPER-OUTER QUADRANT OF RIGHT BREAST IN FEMALE, ESTROGEN RECEPTOR POSITIVE: ICD-10-CM

## 2023-10-18 PROCEDURE — 93702 BIS XTRACELL FLUID ANALYSIS: CPT

## 2023-10-18 NOTE — THERAPY RE-EVALUATION
Outpatient Occupational Therapy Lymphedema Re-Evaluation  ERIC [facility]     Patient Name: Marisa Portillo  : 1960  MRN: [id number]  Today's Date: 10/18/2023      Visit Date: 10/18/2023    Patient Active Problem List   Diagnosis    Arthritis    Bladder disorder    Concussion    Contact dermatitis and eczema    Injury, other and unspecified, finger    Limb swelling    Seasonal allergic rhinitis    Vitamin D deficiency    IFG (impaired fasting glucose)    Mixed hyperlipidemia    TIA (transient ischemic attack)    Malignant neoplasm of overlapping sites of right breast in female, estrogen receptor positive    Port-A-Cath placement    S/P bilateral nipple sparing mastectomy    S/P BILATERAL IMMEDIATE BREAST RECONSTRUCTION WITH LEFT PRE PEC PLACEMENT OF SILICONE GEL IMPLANT AND MESH, RIGHT PRE PEC PLACEMENT OF EXPANDER AND MESH    Sepsis    Breast abscess    Cellulitis of left breast    Psychophysiological insomnia    Recurrent seroma of breast    Malignant neoplasm of overlapping sites of right female breast    Hot flashes due to menopause    Breast reconstruction deformity    Genitourinary syndrome of menopause    Physical exam, annual    PAD (peripheral artery disease)        Past Medical History:   Diagnosis Date    Allergies     Anemia During chemo     Breast cancer     Cervical dysplasia     Herpes     High cholesterol     IFG (impaired fasting glucose)     Migraine     Osteoarthritis     S/P BILATERAL IMMEDIATE BREAST RECONSTRUCTION WITH LEFT PRE PEC PLACEMENT OF SILICONE GEL IMPLANT AND MESH, RIGHT PRE PEC PLACEMENT OF EXPANDER AND MESH 2021    TIA (transient ischemic attack)     no residual    Vitamin D deficiency         Past Surgical History:   Procedure Laterality Date    APPENDECTOMY      BREAST AUGMENTATION Bilateral 2021    Procedure: bilateral breast reconstructon with bilateral mesh placement.   left implant, right tissue expander placement;  Surgeon: Lacy Shelton MD;   [place] [facility] [place] [facility] [place] [facility] [place]  Service: Plastics;  Laterality: Bilateral;    BREAST SURGERY  [id number]    Bilateral Mastectomy with implant Left expander Right    BREAST TISSUE EXPANDER REMOVAL INSERTION OF IMPLANT Right 04/15/2022    Procedure: RIGHT BREAST IMPLANT REMOVAL WITH INCISION AND DRAINAGE;  Surgeon: Lacy Shelton MD;  [place] [facility] [place] [facility] [place] [facility] [place]  Service: Plastics;  Laterality: Right;    CEREBRAL ANGIOGRAM      clip placed    CYST REMOVAL Right     rt wrist    HYSTERECTOMY      Partial age 31    INCISION AND DRAINAGE ABSCESS Bilateral 10/4/2022    Procedure: BILATERAL BREAST ABSCESS INCISION AND DRAINAGE, WITH LEFT BREAST IMPLANT REMOVAL;  Surgeon: Lacy Shelton MD;  Location: [facility] MAIN OR;  Service: Plastics;  Laterality: Bilateral;    INCISION AND DRAINAGE OF WOUND Right 03/11/2022    Procedure: INCISION AND DRAINAGE ABSCESS OF RIGHT BREAST WITH EXPANDER REMOVAL AND IMPLANT PLACEMENT;  Surgeon: Lacy Shelton MD;  Location: [facility] MAIN OR;  Service: Plastics;  Laterality: Right;    MASTECTOMY COMPLETE / SIMPLE W/ SENTINEL NODE BIOPSY Bilateral     PORTACATH PLACEMENT Left 12/28/2021    Procedure: INSERTION OF PORTACATH;  Surgeon: Jacqueline Mcgraw MD;  [place] [facility] [place] [facility] [place] [facility] [place]  Service: General;  Laterality: Left;    SENTINEL NODE BIOPSY Bilateral 12/28/2021    Procedure: BILATERAL BREAST MASTECTOMIES WITH RIGHT SENTINEL NODE BIOPSIES WITH FROZEN SECTIONS;  Surgeon: Jacqueline Mcgraw MD;  [place] [facility] [place] [facility] [place] [facility] [place]  Service: General;  Laterality: Bilateral;         Visit Dx:     ICD-10-CM ICD-9-CM   1. At risk for lymphedema  Z91.89 V49.89   2. History of mastectomy, bilateral  Z90.13 V45.71   3. Malignant neoplasm of overlapping sites of right breast in female, estrogen receptor positive  C50.811 174.8    Z17.0 V86.0   4. Malignant neoplasm of upper-outer quadrant of right breast in female, estrogen receptor positive  C50.411 174.4    Z17.0 V86.0        Patient History       Row  Name 10/18/23 0900             History    Chief Complaint Numbness;Pain  -SF      Date Current Problem(s) Began 12/28/21  -SF      Brief Description of Current Complaint B mastectomy with SNLB x10  -SF      Patient/Caregiver Goals Return to prior level of function;Return to work  -SF      Hand Dominance right-handed  -SF      What clinical tests have you had for this problem? X-ray;CT scan;MRI;Blood Work;Mammogram  -SF      Are you or can you be pregnant No  -SF         Fall Risk Assessment    Any falls in the past year: No  -SF      Does patient have a fear of falling No  -SF         Services    Prior Rehab/Home Health Experiences No  -SF      Are you currently receiving Home Health services No  -SF      Do you plan to receive Home Health services in the near future No  -SF         Daily Activities    Primary Language English  -SF      Are you able to read Yes  -SF      Are you able to write Yes  -SF      How does patient learn best? Listening;Reading;Demonstration;Pictures/Video  -SF      Barriers to learning None  -SF         Safety    Are you being hurt, hit, or frightened by anyone at home or in your life? No  -SF      Are you being neglected by a caregiver No  -SF      Have you had any of the following issues with N/A  -SF                User Key  (r) = Recorded By, (t) = Taken By, (c) = Cosigned By      Initials Name Provider Type    Faustina Henderson OT Occupational Therapist                     Lymphedema       Row Name 10/18/23 0900             Subjective Pain    Able to rate subjective pain? yes  -SF      Pre-Treatment Pain Level 0  -SF      Post-Treatment Pain Level 0  -SF         Lymphedema Assessment    Lymphedema Classification RUE:;at risk/stage 0  -SF      Lymphedema Cancer Related Sx bilateral;simple mastectomy;sentinel node biopsy;other (comment)  -SF      Lymphedema Surgery Comments 12/28/2021  -SF      Lymph Nodes Removed # 10  -SF      Positive Lymph Nodes # 4  -SF      Chemo Received yes  -SF    "   Radiation Therapy Received yes  -SF      Radiation Treatments #/Timeframe 25  -SF         LLIS - Physical Concerns    The amount of pain associated with my lymphedema is: 0  -SF      The amount of limb heaviness associated with my lymphedema is: 0  -SF      The amount of skin tightness associated with my lymphedema is: 0  -SF      The size of my swollen limb(s) seems: 0  -SF      Lymphedema affects the movement of my swollen limb(s): 0  -SF      The strength in my swollen limb(s) is: 0  -SF         LLIS - Psychosocial Concerns    Lymphedema affects my body image (i.e., \"how I think I look\"). 0  -SF      Lymphedema affects my socializing with others. 0  -SF      Lymphedema affects my intimate relations with spouse or partner (rate 0 if not applicable 0  -SF      Lymphedema \"gets me down\" (i.e., depression, frustration, or anger) 0  -SF      I must rely on others for help due to my lymphedema. 0  -SF      I know what to do to manage my lymphedema 1  -SF         LLIS - Functional Concerns    Lymphedema affects my ability to perform self-care activities (i.e. eating, dressing, hygiene) 0  -SF      Lymphedema affects my ability to perform routine home or work-related activities. 0  -SF      Lymphedema affects my performance of preferred leisure activities. 0  -SF      Lymphedema affects proper fit of clothing/shoes 0  -SF      Lymphedema affects my sleep 0  -SF         General ROM    GENERAL ROM COMMENTS BUE WFL  -SF         MMT (Manual Muscle Testing)    General MMT Comments BUE WFL  -SF         L-Dex Bioimpedence Screening    L-Dex Measurement Extremity RUE  -SF      L-Dex Patient Position Standing  -SF      L-Dex UE Dominate Side Right  -SF      L-Dex UE At Risk Side Right  -SF      L-Dex UE Pre Surgical Value Yes  -SF      L-Dex UE Score -0.4  -SF      L-Dex UE Baseline Score -4.5  -SF      L-Dex UE Value Change 4.1  -SF      $ L-Dex Charge yes  -SF         Lymphedema Life Impact Scale Totals    A.  Total Q1 - Q17 " (Do not include Q18) 1  -SF      B.  Total number of questions answered (Q1-Q17) 17  -SF      C. Divide A by B 0.06  -SF      D. Multiple C by 25 1.5  -SF                User Key  (r) = Recorded By, (t) = Taken By, (c) = Cosigned By      Initials Name Provider Type    Faustina Henderson OT Occupational Therapist                      Therapy Education  Education Details: Review of HEP w/ additional exercises provided on this date addressing rotator cuff and scapular mobility. Patient was also educated on barometric pressure changes impacting lymphatic system.  Given: HEP, Symptoms/condition management  Program: Reinforced, New  How Provided: Verbal, Demonstration, Written  Provided to: Patient  Level of Understanding: Verbalized, Demonstrated  47642 - OT Self Care/Mgmt Minutes: 25         OT Goals       Row Name 10/18/23 1002          Time Calculation    OT Goal Re-Cert Due Date 11/17/23  -               User Key  (r) = Recorded By, (t) = Taken By, (c) = Cosigned By      Initials Name Provider Type    Faustina Henderson OT Occupational Therapist                  GOALS:   1. Post Breast Surgery Care/at risk for Lymphedema  LTG 1: 90 days:  As an indicator of no exacerbation of lymphedema staging, the patient will present with an L-Dex score less than 7 points from preoperative baseline.              STATUS: met. ongoing  STG 1a:   30 days: To prevent exacerbation of mixed edema to lymphedema, patient will utilize the 2 postsurgical compression garments daily.                 STATUS: met.  STG 1b: 30 days: Patient will be independent with self-manual lymphatic massage.               STATUS: met. ongoing  STG 1c: 30 days:  Patient will be independent with identification of signs and symptoms of lymphedema exasperation per stoplight to recovery education handout.              STATUS: met. ongoing  STG 1 d: 30 days: Patient will be independent with HEP to prevent advancement in lymphedema staging.              STATUS: met.  Ongoing.  TREATMENT:  Self Care/ADL retraining, Therapeutic Activity, Neuromuscular Re-education, Therapeutic Exercise, Bioimpedence Fluid Analysis, Post-Surgical compression garement [id number] [facility] [employer] [id number], Orthotic Management and training,  and Manual Therapy.  Therapy Education  Education Details: OT provided review of education in use of stop light tool, which patient is well verses in.  OT instructed patient to continue current HEP, and added doorway stretch exercise to be done gently for low pectoralis and pectoralis minor.  Pt demonstrated properly. Patient demonstated all HEP exercises properly.  She was asked to focus on external rotation bilaterally since slight reduction in motion is noted.     OT Assessment/Plan       Row Name 10/18/23 0957          OT Assessment    Functional Limitations Limitations in functional capacity and performance  -SF     Impairments Impaired lymphatic circulation  -SF     Assessment Comments Patient is demonstrating normalized lymphatic functioning on this date. Patient is still interested in pursuing reconstructive surgery however, is looking to find a new surgeon due to her surgeon moving. Patient will continue to benefit from skilled occupational therapy to further evalute ongoing lymphatic functioning to prevent advancement in lymphedema staging.  -SF     OT Diagnosis At risk for lymphedema  -SF     OT Rehab Potential Good  -SF     Patient/caregiver participated in establishment of treatment plan and goals Yes  -SF     Patient would benefit from skilled therapy intervention Yes  -SF        OT Plan    OT Frequency Other (comment)  -SF     Predicted Duration of Therapy Intervention (OT) Patient will be reevaluated every 3 months years 1-3 and every 6 months years 4-5. Patient will also be seen once a year for dispencing mastectomy bras and every 2 years for breast prothetics.  -SF     Planned CPT's? OT EVAL LOW COMPLEXITY: 63740;OT RE-EVAL:  56160;OT THER ACT EA 15 MIN: 86597UQ;OT THER PROC EA 15 MIN: 24187LV;OT SELF CARE/MGMT/TRAIN 15 MIN: 91007;OT MANUAL THERAPY EA 15 MIN: 96710;OT BIS XTRACELL FLUID ANALYSIS: 38369;OT ORTHO/PROSTHET CHECKOUT EA 15 MIN: 07073;OT ORTHOTIC MGMT/TRAIN EA 15 MIN: 01694;OT CARE PLAN EA 15 MIN  -SF     Planned Therapy Interventions (Optional Details) home exercise program;orthotic fitting/training;patient/family education;postural re-education;prosthetic fitting/training;ROM (Range of Motion)  -SF     OT Plan Comments Continue w/ POC  -SF               User Key  (r) = Recorded By, (t) = Taken By, (c) = Cosigned By      Initials Name Provider Type    SF Faustina Guthrie OT Occupational Therapist                              Time Calculation:   Timed Charges  38892 - OT Self Care/Mgmt Minutes: 25  Total Minutes  Timed Charges Total Minutes: 25   Total Minutes: 25     Therapy Charges for Today       Code Description Service Date Service Provider Modifiers Qty    79867650896 HC PT BIS XTRACELL FLUID ANALYSIS 10/18/2023 Faustina Guthrie OT  1                      Faustina Guthrie OT  10/18/2023

## 2023-10-23 ENCOUNTER — SPECIALTY PHARMACY (OUTPATIENT)
Dept: PHARMACY | Facility: HOSPITAL | Age: 63
End: 2023-10-23
Payer: COMMERCIAL

## 2023-10-23 NOTE — PROGRESS NOTES
" Specialty Pharmacy Patient Management Program  Oncology Refill Outreach      Marisa \"Librety\" is a 63 y.o. female contacted today regarding refills of her medication(s).    Specialty medication(s) and dose(s) confirmed: abemaciclib (VERZENIO) 100 mg tablet chemo tablet.    Other medications being refilled: N/A    Refill Questions      Flowsheet Row Most Recent Value   Changes to allergies? No   Changes to medications? No   New conditions since last clinic visit No   Unplanned office visit, urgent care, ED, or hospital admission in the last 4 weeks  No   How does patient/caregiver feel medication is working? Very good   Financial problems or insurance changes  No   Since the previous refill, were any specialty medication doses or scheduled injections missed or delayed?  No   Does this patient require a clinical escalation to a pharmacist? No            Delivery Questions      Flowsheet Row Most Recent Value   Delivery method [employer]   Delivery address correct? Yes   Delivery phone number [phone]   Preferred delivery time? Anytime   Number of medications in delivery 1   Medication(s) being filled and delivered Abemaciclib   Doses left of specialty medications 14   Is there any medication that is due not being filled? No   Supplies needed? No supplies needed   Cooler needed? No   Do any medications need mixed or dated? No   Copay form of payment Credit card on file   Additional comments Zero copay   Questions or concerns for the pharmacist? No   Explain any questions or concerns for the pharmacist N/A   Are any medications first time fills? No                   Follow-up: 21 days     Jennifer Diggs  Care Coordinator, [facility]  10/23/2023  14:30 EDT          "

## 2023-10-26 ENCOUNTER — HOSPITAL ENCOUNTER (OUTPATIENT)
Dept: ONCOLOGY | Facility: HOSPITAL | Age: 63
Discharge: HOME | End: 2023-10-26
Admitting: INTERNAL MEDICINE
Payer: COMMERCIAL

## 2023-10-26 DIAGNOSIS — Z17.0 MALIGNANT NEOPLASM OF OVERLAPPING SITES OF RIGHT BREAST IN FEMALE, ESTROGEN RECEPTOR POSITIVE: Primary | ICD-10-CM

## 2023-10-26 DIAGNOSIS — C50.811 MALIGNANT NEOPLASM OF OVERLAPPING SITES OF RIGHT BREAST IN FEMALE, ESTROGEN RECEPTOR POSITIVE: Primary | ICD-10-CM

## 2023-10-26 DIAGNOSIS — Z45.2 ENCOUNTER FOR ADJUSTMENT OR MANAGEMENT OF VASCULAR ACCESS DEVICE: ICD-10-CM

## 2023-10-26 PROCEDURE — 25010000002 HEPARIN LOCK FLUSH PER 10 UNITS: Performed by: INTERNAL MEDICINE

## 2023-10-26 PROCEDURE — 96523 IRRIG DRUG DELIVERY DEVICE: CPT

## 2023-10-26 RX ORDER — SODIUM CHLORIDE 0.9 % (FLUSH) 0.9 %
20 SYRINGE (ML) INJECTION AS NEEDED
Status: CANCELLED | OUTPATIENT
Start: 2023-10-26

## 2023-10-26 RX ORDER — HEPARIN SODIUM (PORCINE) LOCK FLUSH IV SOLN 100 UNIT/ML 100 UNIT/ML
500 SOLUTION INTRAVENOUS AS NEEDED
Status: DISCONTINUED | OUTPATIENT
Start: 2023-10-26 | End: 2023-10-27 | Stop reason: HOSPADM

## 2023-10-26 RX ORDER — HEPARIN SODIUM (PORCINE) LOCK FLUSH IV SOLN 100 UNIT/ML 100 UNIT/ML
500 SOLUTION INTRAVENOUS AS NEEDED
Status: CANCELLED | OUTPATIENT
Start: 2023-10-26

## 2023-10-26 RX ORDER — HEPARIN SODIUM (PORCINE) LOCK FLUSH IV SOLN 100 UNIT/ML 100 UNIT/ML
500 SOLUTION INTRAVENOUS AS NEEDED
OUTPATIENT
Start: 2023-10-26

## 2023-10-26 RX ORDER — SODIUM CHLORIDE 0.9 % (FLUSH) 0.9 %
20 SYRINGE (ML) INJECTION AS NEEDED
Status: DISCONTINUED | OUTPATIENT
Start: 2023-10-26 | End: 2023-10-27 | Stop reason: HOSPADM

## 2023-10-26 RX ORDER — SODIUM CHLORIDE 0.9 % (FLUSH) 0.9 %
20 SYRINGE (ML) INJECTION AS NEEDED
OUTPATIENT
Start: 2023-10-26

## 2023-10-26 RX ADMIN — Medication 20 ML: at 10:04

## 2023-10-26 RX ADMIN — HEPARIN SODIUM (PORCINE) LOCK FLUSH IV SOLN 100 UNIT/ML 500 UNITS: 100 SOLUTION at 10:04

## 2023-11-06 ENCOUNTER — OFFICE VISIT (OUTPATIENT)
Dept: ONCOLOGY | Facility: HOSPITAL | Age: 63
End: 2023-11-06
Payer: COMMERCIAL

## 2023-11-06 ENCOUNTER — HOSPITAL ENCOUNTER (OUTPATIENT)
Dept: ONCOLOGY | Facility: HOSPITAL | Age: 63
Discharge: HOME | End: 2023-11-06
Admitting: INTERNAL MEDICINE
Payer: COMMERCIAL

## 2023-11-06 VITALS
HEIGHT: 64 IN | SYSTOLIC BLOOD PRESSURE: 135 MMHG | HEART RATE: 84 BPM | BODY MASS INDEX: 30.94 KG/M2 | WEIGHT: 181.22 LBS | DIASTOLIC BLOOD PRESSURE: 74 MMHG | TEMPERATURE: 98 F | OXYGEN SATURATION: 96 % | RESPIRATION RATE: 18 BRPM

## 2023-11-06 DIAGNOSIS — Z45.2 ENCOUNTER FOR ADJUSTMENT OR MANAGEMENT OF VASCULAR ACCESS DEVICE: Primary | ICD-10-CM

## 2023-11-06 DIAGNOSIS — Z17.0 MALIGNANT NEOPLASM OF OVERLAPPING SITES OF RIGHT BREAST IN FEMALE, ESTROGEN RECEPTOR POSITIVE: Primary | ICD-10-CM

## 2023-11-06 DIAGNOSIS — M79.10 MYALGIA: ICD-10-CM

## 2023-11-06 DIAGNOSIS — C50.811 MALIGNANT NEOPLASM OF OVERLAPPING SITES OF RIGHT BREAST IN FEMALE, ESTROGEN RECEPTOR POSITIVE: Primary | ICD-10-CM

## 2023-11-06 DIAGNOSIS — C50.811 MALIGNANT NEOPLASM OF OVERLAPPING SITES OF RIGHT FEMALE BREAST, UNSPECIFIED ESTROGEN RECEPTOR STATUS: ICD-10-CM

## 2023-11-06 LAB
ALBUMIN SERPL-MCNC: 4.3 G/DL (ref 3.5–5.2)
ALBUMIN/GLOB SERPL: 2 G/DL
ALP SERPL-CCNC: 62 U/L (ref 39–117)
ALT SERPL W P-5'-P-CCNC: 15 U/L (ref 1–33)
ANION GAP SERPL CALCULATED.3IONS-SCNC: 3.1 MMOL/L (ref 5–15)
AST SERPL-CCNC: 16 U/L (ref 1–32)
BASOPHILS # BLD AUTO: 0.05 10*3/MM3 (ref 0–0.2)
BASOPHILS NFR BLD AUTO: 1.8 % (ref 0–1.5)
BILIRUB SERPL-MCNC: 0.5 MG/DL (ref 0–1.2)
BUN SERPL-MCNC: 16 MG/DL (ref 8–23)
BUN/CREAT SERPL: 18.6 (ref 7–25)
CALCIUM SPEC-SCNC: 9.2 MG/DL (ref 8.6–10.5)
CHLORIDE SERPL-SCNC: 105 MMOL/L (ref 98–107)
CO2 SERPL-SCNC: 29.9 MMOL/L (ref 22–29)
CREAT SERPL-MCNC: 0.86 MG/DL (ref 0.57–1)
DEPRECATED RDW RBC AUTO: 49.9 FL (ref 37–54)
EGFRCR SERPLBLD CKD-EPI 2021: 75.5 ML/MIN/1.73
EOSINOPHIL # BLD AUTO: 0.13 10*3/MM3 (ref 0–0.4)
EOSINOPHIL NFR BLD AUTO: 4.8 % (ref 0.3–6.2)
ERYTHROCYTE [DISTWIDTH] IN BLOOD BY AUTOMATED COUNT: 13 % (ref 12.3–15.4)
GLOBULIN UR ELPH-MCNC: 2.1 GM/DL
GLUCOSE SERPL-MCNC: 118 MG/DL (ref 65–99)
HCT VFR BLD AUTO: 37.9 % (ref 34–46.6)
HGB BLD-MCNC: 12.7 G/DL (ref 12–15.9)
IMM GRANULOCYTES # BLD AUTO: 0 10*3/MM3 (ref 0–0.05)
IMM GRANULOCYTES NFR BLD AUTO: 0 % (ref 0–0.5)
LYMPHOCYTES # BLD AUTO: 0.63 10*3/MM3 (ref 0.7–3.1)
LYMPHOCYTES NFR BLD AUTO: 23.2 % (ref 19.6–45.3)
MCH RBC QN AUTO: 34.3 PG (ref 26.6–33)
MCHC RBC AUTO-ENTMCNC: 33.5 G/DL (ref 31.5–35.7)
MCV RBC AUTO: 102.4 FL (ref 79–97)
MONOCYTES # BLD AUTO: 0.26 10*3/MM3 (ref 0.1–0.9)
MONOCYTES NFR BLD AUTO: 9.6 % (ref 5–12)
NEUTROPHILS NFR BLD AUTO: 1.65 10*3/MM3 (ref 1.7–7)
NEUTROPHILS NFR BLD AUTO: 60.6 % (ref 42.7–76)
PLATELET # BLD AUTO: 177 10*3/MM3 (ref 140–450)
PMV BLD AUTO: 9.5 FL (ref 6–12)
POTASSIUM SERPL-SCNC: 4.2 MMOL/L (ref 3.5–5.2)
PROT SERPL-MCNC: 6.4 G/DL (ref 6–8.5)
RBC # BLD AUTO: 3.7 10*6/MM3 (ref 3.77–5.28)
SODIUM SERPL-SCNC: 138 MMOL/L (ref 136–145)
WBC NRBC COR # BLD: 2.72 10*3/MM3 (ref 3.4–10.8)

## 2023-11-06 PROCEDURE — 25010000002 HEPARIN LOCK FLUSH PER 10 UNITS: Performed by: INTERNAL MEDICINE

## 2023-11-06 PROCEDURE — 36591 DRAW BLOOD OFF VENOUS DEVICE: CPT

## 2023-11-06 PROCEDURE — 80053 COMPREHEN METABOLIC PANEL: CPT | Performed by: INTERNAL MEDICINE

## 2023-11-06 PROCEDURE — 85025 COMPLETE CBC W/AUTO DIFF WBC: CPT | Performed by: INTERNAL MEDICINE

## 2023-11-06 RX ORDER — HEPARIN SODIUM (PORCINE) LOCK FLUSH IV SOLN 100 UNIT/ML 100 UNIT/ML
500 SOLUTION INTRAVENOUS AS NEEDED
Status: DISCONTINUED | OUTPATIENT
Start: 2023-11-06 | End: 2023-11-07 | Stop reason: HOSPADM

## 2023-11-06 RX ORDER — SODIUM CHLORIDE 0.9 % (FLUSH) 0.9 %
20 SYRINGE (ML) INJECTION AS NEEDED
OUTPATIENT
Start: 2023-11-06

## 2023-11-06 RX ORDER — SODIUM CHLORIDE 0.9 % (FLUSH) 0.9 %
20 SYRINGE (ML) INJECTION AS NEEDED
Status: DISCONTINUED | OUTPATIENT
Start: 2023-11-06 | End: 2023-11-07 | Stop reason: HOSPADM

## 2023-11-06 RX ORDER — HEPARIN SODIUM (PORCINE) LOCK FLUSH IV SOLN 100 UNIT/ML 100 UNIT/ML
500 SOLUTION INTRAVENOUS AS NEEDED
OUTPATIENT
Start: 2023-11-06

## 2023-11-06 RX ADMIN — HEPARIN SODIUM (PORCINE) LOCK FLUSH IV SOLN 100 UNIT/ML 500 UNITS: 100 SOLUTION at 10:34

## 2023-11-06 RX ADMIN — Medication 20 ML: at 10:33

## 2023-11-06 NOTE — PROGRESS NOTES
Caller:     Relationship to patient:     Best call back number: [phone]    Chief complaint: PT WANTED TO RESCHEDULE THE TIME OF HER APPOINTMENT    Type of visit: PORT FLUSH     Requested date: 10-24-23 BETWEEN 8AM AND 9:30     If rescheduling, when is the original appointment: 10-24-23   Chief Complaint  Breast Cancer    Judah Johnson,*  Judah Johnson MD    Records Obtained:  Records of the patients history were reviewed and summarized in detail.      Subjective     {Problem List  Visit Diagnosis   Encounters  Notes  Medications  Labs  Result Review Imaging  Media :23}     Marisa Portillo presents to [facility] HEMATOLOGY & ONCOLOGY for ***  History of Present Illness    Cancer Staging   Malignant neoplasm of overlapping sites of right breast in female, estrogen receptor positive  Staging form: Breast, AJCC 8th Edition  - Pathologic stage from 12/28/2021: Stage IB (pT2, pN2a, cM0, G2, ER+, NC+, HER2-) - Signed by Starr Mendez MD PhD on 1/30/2022        Oncology/Hematology History Overview Note     ER+ Right Breast Cancer:    Diagnostic mammogram on 11/12/2021: 2 cm asymmetry in the outer central right breast with amorphous calcifications.  Similar appearing group of amorphous calcifications in the outer central right breast.  6 mm asymmetry in the inner right breast.  Right axillary lymphadenopathy.    Ultrasound-guided biopsy on 11/1/2021: Right breast 3 o'clock position, 2 cm from the nipple with invasive ductal carcinoma, grade 2.  Right breast 9 o'clock position, 3 cm from the nipple with invasive ductal carcinoma, grade 2, ER positive (50%), NC positive (3%), HER-2 negative (score 0), Ki-67 14%.  Associated with intermediate grade ductal carcinoma in situ.  Right axillary lymph node positive for breast carcinoma.    CT CAP and bone scan on 12/13/21: negative for metastatic disease    Bilateral mastectomy 12/28/2021: Right breast with multiple (2) foci  of invasive ductal carcinoma, largest focus 2.3 cm, grade 2, associated with DCIS with extensive intraductal component, all margins negative, 4/10 lymph nodes involved with no extranodal extension and the largest focus measured at 3.3 cm, ER positive (40%), WV positive (5%), HER-2 negative by IHC (score 0), Ki-67 14%, pT2 pN2a cM0    Completed 4 cycles of chemotherapy with TC on 4/25/22.  Complicated by a UTI and multiple right breast abscesses causing delay in cycles 3 and 4.     Radiation was also delayed due to infection and wound healing. She finishes December 20th.     She started Abemiciclib on 1/1/23.    *The pt discontinued HRT in October of 2021 when she had an abnormal screening mammogram     Malignant neoplasm of overlapping sites of right breast in female, estrogen receptor positive   12/9/2021 Initial Diagnosis    Malignant neoplasm of overlapping sites of right breast in female, estrogen receptor positive (HCC)     12/28/2021 Cancer Staged    Staging form: Breast, AJCC 8th Edition  - Pathologic stage from 12/28/2021: Stage IB (pT2, pN2a, cM0, G2, ER+, WV+, HER2-) - Signed by Starr Mendez MD PhD on 1/30/2022 1/14/2022 -  Chemotherapy    OP CENTRAL VENOUS ACCESS DEVICE ACCESS, CARE, AND MAINTENANCE (CVAD)     1/31/2022 - 4/26/2022 Chemotherapy    OP BREAST TC DOCEtaxel / Cyclophosphamide     11/15/2022 - 12/20/2022 Radiation    Radiation OncologyTreatment Course:  Marisa Portillo received 5000 cGy in 25 fractions to right chest wall and axillary levels 1 through 3.      12/27/2022 -  Chemotherapy    OP BREAST Abemaciclib      Malignant neoplasm of overlapping sites of right female breast   7/18/2022 Initial Diagnosis    Malignant neoplasm of overlapping sites of right female breast (HCC)         Review of Systems   All other systems reviewed and are negative.      Current Outpatient Medications on File Prior to Visit   Medication Sig Dispense Refill    abemaciclib (VERZENIO) 100 mg tablet  chemo tablet Take 1 tablet by mouth 2 (Two) Times a Day. 56 tablet 5    acetaminophen (TYLENOL) 650 MG 8 hr tablet Take 1 tablet by mouth 2 (Two) Times a Day.      Aspirin 81 MG capsule Take 81 mg by mouth 2 (Two) Times a Week.      cloNIDine (CATAPRES) 0.2 MG tablet TAKE 1 TABLET BY MOUTH EVERY NIGHT AT BEDTIME. 30 tablet 3    diphenhydrAMINE (BENADRYL) 25 mg capsule Take 2 capsules by mouth Take As Directed. Take 2 capsules 1 hour prior to CT scan 2 capsule 0    hydrOXYzine (ATARAX) 25 MG tablet Take 1 tablet by mouth At Night As Needed for Itching.      letrozole (FEMARA) 2.5 MG tablet Take 1 tablet by mouth Daily. 90 tablet 1    Loratadine-Pseudoephedrine (CLARITIN-D 24 HOUR PO) Take 1 tablet by mouth Daily.      magnesium gluconate (MAGONATE) 500 MG tablet Take 250 mg by mouth Daily.      ondansetron (ZOFRAN) 8 MG tablet Take 1 tablet by mouth 3 (Three) Times a Day As Needed for Nausea or Vomiting. 30 tablet 5    potassium bicarbonate (K-LYTE) 25 MEQ disintegrating tablet Take 1 tablet by mouth 2 (Two) Times a Day.      predniSONE (DELTASONE) 50 MG tablet Take 1 tablet by mouth Take As Directed. Take 1 tablet 13hrs, 7hrs, and 1 hr prior to CT scan 3 tablet 0    venlafaxine XR (EFFEXOR-XR) 75 MG 24 hr capsule Take 1 capsule by mouth Daily. 90 capsule 3    Vitamin A 3 MG (71117 UT) capsule Take 1 capsule by mouth Daily.      vitamin D (ERGOCALCIFEROL) 1.25 MG (89608 UT) capsule capsule TAKE 1 CAPSULE BY MOUTH EVERY 7 (SEVEN) DAYS. 4 capsule 11    [DISCONTINUED] OLANZapine (zyPREXA) 5 MG tablet Take 1 tablet by mouth Every Night. 30 tablet 2     No current facility-administered medications on file prior to visit.       Allergies   Allergen Reactions    Multihance [Gadobenate] Hives and Itching     MRI contrast    Contrast Dye (Echo Or Unknown Ct/Mr) Hives     Past Medical History:   Diagnosis Date    Allergies     Anemia During chemo 2022    Breast cancer     Cervical dysplasia     Herpes     High cholesterol      IFG (impaired fasting glucose)     Migraine     Osteoarthritis     S/P BILATERAL IMMEDIATE BREAST RECONSTRUCTION WITH LEFT PRE PEC PLACEMENT OF SILICONE GEL IMPLANT AND MESH, RIGHT PRE PEC PLACEMENT OF EXPANDER AND MESH 12/29/2021    TIA (transient ischemic attack)     no residual    Vitamin D deficiency      Past Surgical History:   Procedure Laterality Date    APPENDECTOMY      BREAST AUGMENTATION Bilateral 12/28/2021    Procedure: bilateral breast reconstructon with bilateral mesh placement.   left implant, right tissue expander placement;  Surgeon: Lacy Shelton MD;  [place] [facility] [place] [facility] [place]  Service: Plastics;  Laterality: Bilateral;    BREAST SURGERY  [id number]    Bilateral Mastectomy with implant Left expander Right    BREAST TISSUE EXPANDER REMOVAL INSERTION OF IMPLANT Right 04/15/2022    Procedure: RIGHT BREAST IMPLANT REMOVAL WITH INCISION AND DRAINAGE;  Surgeon: Lacy Shelton MD;  [place] [facility] [place] [facility] [place]  Service: Plastics;  Laterality: Right;    CEREBRAL ANGIOGRAM      clip placed    CYST REMOVAL Right     rt wrist    HYSTERECTOMY      Partial age 31    INCISION AND DRAINAGE ABSCESS Bilateral 10/4/2022    Procedure: BILATERAL BREAST ABSCESS INCISION AND DRAINAGE, WITH LEFT BREAST IMPLANT REMOVAL;  Surgeon: Lacy Shelton MD;  Location: [facility] [place] [facility];  Service: Plastics;  Laterality: Bilateral;    INCISION AND DRAINAGE OF WOUND Right 03/11/2022    Procedure: INCISION AND DRAINAGE ABSCESS OF RIGHT BREAST WITH EXPANDER REMOVAL AND IMPLANT PLACEMENT;  Surgeon: Lacy Shelton MD;  Location: [facility] OR;  Service: Plastics;  Laterality: Right;    MASTECTOMY COMPLETE / SIMPLE W/ SENTINEL NODE BIOPSY Bilateral     PORTACATH PLACEMENT Left 12/28/2021    Procedure: INSERTION OF PORTACATH;  Surgeon: Jacqueline Mcgraw MD;  Location: [facility] [place];  Service: General;  Laterality: Left;    SENTINEL NODE BIOPSY Bilateral 12/28/2021    Procedure: BILATERAL BREAST MASTECTOMIES  WITH RIGHT SENTINEL NODE BIOPSIES WITH FROZEN SECTIONS;  Surgeon: Jacqueline Mcgraw MD;  Location: [facility] [place] [facility] [place];  Service: General;  Laterality: Bilateral;     Social History     Socioeconomic History    Marital status:    Tobacco Use    Smoking status: Never    Smokeless tobacco: Never    Tobacco comments:     Father mother  smoker diring childhood and  adulthood   Vaping Use    Vaping Use: Never used   Substance and Sexual Activity    Alcohol use: Yes     Comment: Socially only . Occasionally    Drug use: Never    Sexual activity: Yes     Partners: Male     Birth control/protection: Post-menopausal     Comment: Hysterectomy age 33     Family History   Problem Relation Age of Onset    Hypertension Mother     Diabetes Mother     Cancer Mother     Arthritis Mother         Osteo and RA    Breast cancer Mother     Hypertension Father     Diabetes Father     Cancer Father     Arthritis Father     Liver cancer Father     Lung cancer Father     Asthma Father     COPD Father     Heart disease Father     Kidney disease Father     Breast cancer Maternal Aunt     Malig Hyperthermia Neg Hx      Immunization History   Administered Date(s) Administered    COVID-19 (MODERNA) 1st,2nd,3rd Dose Monovalent 12/30/2020, 01/26/2021    COVID-19 (MODERNA) Monovalent Original Booster 12/30/2020, 01/26/2021, 01/24/2022       Objective   Physical Exam    There were no vitals filed for this visit.            ECOG: {findings; ecog performance status:81343}  PHQ-9 Total Score:         The patient {is/is not:99770}  experiencing fatigue.     PT/OT Functional Screening: {PT fx screen (Optional):69964}  Speech Functional Screening: {Speech fx screen (Optional):34411}  Rehab to be ordered: {Rehab to be ordered (Optional):05912}        Result Review :{Labs  Result Review  Imaging  Med Tab  Media  Procedures :23}   The following data was reviewed by: Judah Juarez MD on 11/06/23 :  Lab Results   Component Value Date     HGB 12.7 08/08/2023    HCT 36.6 08/08/2023    MCV 99.5 (H) 08/08/2023     08/08/2023    WBC 4.49 08/08/2023    NEUTROABS 2.80 08/08/2023    LYMPHSABS 0.97 08/08/2023    MONOSABS 0.53 08/08/2023    EOSABS 0.13 08/08/2023    BASOSABS 0.05 08/08/2023     Lab Results   Component Value Date    GLUCOSE 94 08/08/2023    BUN 16 08/08/2023    CREATININE 0.89 08/08/2023     08/08/2023    K 3.9 08/08/2023     08/08/2023    CO2 27.0 08/08/2023    CALCIUM 9.7 08/08/2023    PROTEINTOT 7.2 08/08/2023    ALBUMIN 5.0 08/08/2023    BILITOT 0.5 08/08/2023    ALKPHOS 65 08/08/2023    AST 21 08/08/2023    ALT 15 08/08/2023       No radiology results for the last 30 days.        Assessment and Plan {CC Problem List  Visit Diagnosis   ROS  Review (Popup)  Health Maintenance  Quality  BestPractice  Medications  SmartSets  SnapShot Encounters  Media :23}   There are no diagnoses linked to this encounter.    {Time Spent (Optional):59641}    Patient Follow Up: ***  Patient was given instructions and counseling regarding her condition or for health maintenance advice. Please see specific information pulled into the AVS if appropriate.

## 2023-11-06 NOTE — PROGRESS NOTES
Patient  Marisa Portillo    Location  [facility] HEMATOLOGY & ONCOLOGY    Chief Complaint  Breast Cancer    Referring Provider: Judah Johnson, *  PCP: Judah Johnson MD    Subjective          Oncology/Hematology History Overview Note     ER+ Right Breast Cancer:    Diagnostic mammogram on 11/12/2021: 2 cm asymmetry in the outer central right breast with amorphous calcifications.  Similar appearing group of amorphous calcifications in the outer central right breast.  6 mm asymmetry in the inner right breast.  Right axillary lymphadenopathy.    Ultrasound-guided biopsy on 11/1/2021: Right breast 3 o'clock position, 2 cm from the nipple with invasive ductal carcinoma, grade 2.  Right breast 9 o'clock position, 3 cm from the nipple with invasive ductal carcinoma, grade 2, ER positive (50%), DE positive (3%), HER-2 negative (score 0), Ki-67 14%.  Associated with intermediate grade ductal carcinoma in situ.  Right axillary lymph node positive for breast carcinoma.    CT CAP and bone scan on 12/13/21: negative for metastatic disease    Bilateral mastectomy 12/28/2021: Right breast with multiple (2) foci of invasive ductal carcinoma, largest focus 2.3 cm, grade 2, associated with DCIS with extensive intraductal component, all margins negative, 4/10 lymph nodes involved with no extranodal extension and the largest focus measured at 3.3 cm, ER positive (40%), DE positive (5%), HER-2 negative by IHC (score 0), Ki-67 14%, pT2 pN2a cM0    Completed 4 cycles of chemotherapy with TC on 4/25/22.  Complicated by a UTI and multiple right breast abscesses causing delay in cycles 3 and 4.     Radiation was also delayed due to infection and wound healing. She finishes December 20th.     She started Abemiciclib on 1/1/23.    *The pt discontinued HRT in October of 2021 when she had an abnormal screening mammogram     Malignant neoplasm of overlapping sites of right breast in female, estrogen receptor  positive   12/9/2021 Initial Diagnosis    Malignant neoplasm of overlapping sites of right breast in female, estrogen receptor positive (HCC)     12/28/2021 Cancer Staged    Staging form: Breast, AJCC 8th Edition  - Pathologic stage from 12/28/2021: Stage IB (pT2, pN2a, cM0, G2, ER+, IN+, HER2-) - Signed by Starr Mendez MD PhD on 1/30/2022 1/14/2022 -  Chemotherapy    OP CENTRAL VENOUS ACCESS DEVICE ACCESS, CARE, AND MAINTENANCE (CVAD)     1/31/2022 - 4/26/2022 Chemotherapy    OP BREAST TC DOCEtaxel / Cyclophosphamide     11/15/2022 - 12/20/2022 Radiation    Radiation OncologyTreatment Course:  Marisa Portillo received 5000 cGy in 25 fractions to right chest wall and axillary levels 1 through 3.      12/27/2022 -  Chemotherapy    OP BREAST Abemaciclib      Malignant neoplasm of overlapping sites of right female breast   7/18/2022 Initial Diagnosis    Malignant neoplasm of overlapping sites of right female breast (HCC)         History of Present Illness  Patient comes in today for toxicity check while on abemaciclib and letrozole. She reports the diarrhea has not been as frequent. She wathces her diet and drinks plenty of water and that has seemed to help. She has had worsening muscle cramps that can be on upper legs, back and neck. She is on magnesium 250 mg supplement. She denies any fevers, chills, infections. Menopausal symptoms stable. Uses clonidine to help with sleep. Right hip pain due to bursitis improved after hip injection.     Review of Systems   Constitutional:  Positive for fatigue. Negative for appetite change, diaphoresis, fever, unexpected weight gain and unexpected weight loss.   HENT:  Negative for hearing loss, mouth sores, sore throat, swollen glands, trouble swallowing and voice change.    Eyes:  Negative for blurred vision.   Respiratory:  Negative for cough, shortness of breath and wheezing.    Cardiovascular:  Negative for chest pain and palpitations.   Gastrointestinal:  Negative  for abdominal pain, blood in stool, constipation, diarrhea, nausea and vomiting.   Endocrine: Negative for cold intolerance and heat intolerance.   Genitourinary:  Negative for difficulty urinating, dysuria, frequency, hematuria and urinary incontinence.   Musculoskeletal:  Positive for arthralgias. Negative for back pain and myalgias.   Skin:  Negative for rash, skin lesions and wound.   Neurological:  Negative for dizziness, seizures, weakness, numbness and headache.   Hematological:  Does not bruise/bleed easily.   Psychiatric/Behavioral:  Negative for depressed mood. The patient is not nervous/anxious.    All other systems reviewed and are negative.      Past Medical History:   Diagnosis Date    Allergies     Anemia During chemo 2022    Breast cancer     Cervical dysplasia     Herpes     High cholesterol     IFG (impaired fasting glucose)     Migraine     Osteoarthritis     S/P BILATERAL IMMEDIATE BREAST RECONSTRUCTION WITH LEFT PRE PEC PLACEMENT OF SILICONE GEL IMPLANT AND MESH, RIGHT PRE PEC PLACEMENT OF EXPANDER AND MESH 12/29/2021    TIA (transient ischemic attack)     no residual    Vitamin D deficiency      Past Surgical History:   Procedure Laterality Date    APPENDECTOMY      BREAST AUGMENTATION Bilateral 12/28/2021    Procedure: bilateral breast reconstructon with bilateral mesh placement.   left implant, right tissue expander placement;  Surgeon: Lacy Shelton MD;  Location: [facility] [place] [facility] [place];  Service: Plastics;  Laterality: Bilateral;    BREAST SURGERY  [id number]    Bilateral Mastectomy with implant Left expander Right    BREAST TISSUE EXPANDER REMOVAL INSERTION OF IMPLANT Right 04/15/2022    Procedure: RIGHT BREAST IMPLANT REMOVAL WITH INCISION AND DRAINAGE;  Surgeon: Lacy Shelton MD;  Location: [facility] [place];  Service: Plastics;  Laterality: Right;    CEREBRAL ANGIOGRAM      clip placed    CYST REMOVAL Right     rt wrist    HYSTERECTOMY      Partial age 31    INCISION AND DRAINAGE  ABSCESS Bilateral 10/4/2022    Procedure: BILATERAL BREAST ABSCESS INCISION AND DRAINAGE, WITH LEFT BREAST IMPLANT REMOVAL;  Surgeon: Lacy Shelton MD;  Location: [facility] [facility];  Service: Plastics;  Laterality: Bilateral;    INCISION AND DRAINAGE OF WOUND Right 03/11/2022    Procedure: INCISION AND DRAINAGE ABSCESS OF RIGHT BREAST WITH EXPANDER REMOVAL AND IMPLANT PLACEMENT;  Surgeon: Lacy Shelton MD;  Location: [facility] [place] [facility];  Service: Plastics;  Laterality: Right;    MASTECTOMY COMPLETE / SIMPLE W/ SENTINEL NODE BIOPSY Bilateral     PORTACATH PLACEMENT Left 12/28/2021    Procedure: INSERTION OF PORTACATH;  Surgeon: Jacqueline Mcgraw MD;  Location: [facility] [facility];  Service: General;  Laterality: Left;    SENTINEL NODE BIOPSY Bilateral 12/28/2021    Procedure: BILATERAL BREAST MASTECTOMIES WITH RIGHT SENTINEL NODE BIOPSIES WITH FROZEN SECTIONS;  Surgeon: Jacqueline Mcgraw MD;  Location: [facility] [place] [facility];  Service: General;  Laterality: Bilateral;     Social History     Socioeconomic History    Marital status:    Tobacco Use    Smoking status: Never    Smokeless tobacco: Never    Tobacco comments:     Father mother  smoker diring childhood and  adulthood   Vaping Use    Vaping Use: Never used   Substance and Sexual Activity    Alcohol use: Yes     Comment: Socially only . Occasionally    Drug use: Never    Sexual activity: Yes     Partners: Male     Birth control/protection: Post-menopausal     Comment: Hysterectomy age 33     Family History   Problem Relation Age of Onset    Hypertension Mother     Diabetes Mother     Cancer Mother     Arthritis Mother         Osteo and RA    Breast cancer Mother     Hypertension Father     Diabetes Father     Cancer Father     Arthritis Father     Liver cancer Father     Lung cancer Father     Asthma Father     COPD Father     Heart disease Father     Kidney disease Father     Breast cancer Maternal Aunt     Malig Hyperthermia Neg Hx   "      Objective   Physical Exam  General: Alert, cooperative, no acute distress  Eyes: Anicteric sclera, PERRLA  Respiratory: normal respiratory effort  Cardiovascular: no lower extremity edema  Skin: Normal tone, no rash, no lesions  Psychiatric: Appropriate affect, intact judgment  Neurologic: No focal sensory or motor deficits, normal cognition   Musculoskeletal: Normal muscle strength and tone      Vitals:    11/06/23 1039   BP: 135/74   Pulse: 84   Resp: 18   Temp: 98 °F (36.7 °C)   TempSrc: Temporal   SpO2: 96%   Weight: 82.2 kg (181 lb 3.5 oz)   Height: 162.6 cm (64.02\")   PainSc: 0-No pain     ECOG score: 0         PHQ-9 Total Score:         Result Review :   The following data was reviewed by: Judah Juarez MD on 11/06/23:  Lab Results   Component Value Date    HGB 12.7 08/08/2023    HCT 36.6 08/08/2023    MCV 99.5 (H) 08/08/2023     08/08/2023    WBC 4.49 08/08/2023    NEUTROABS 2.80 08/08/2023    LYMPHSABS 0.97 08/08/2023    MONOSABS 0.53 08/08/2023    EOSABS 0.13 08/08/2023    BASOSABS 0.05 08/08/2023     Lab Results   Component Value Date    GLUCOSE 94 08/08/2023    BUN 16 08/08/2023    CREATININE 0.89 08/08/2023     08/08/2023    K 3.9 08/08/2023     08/08/2023    CO2 27.0 08/08/2023    CALCIUM 9.7 08/08/2023    PROTEINTOT 7.2 08/08/2023    ALBUMIN 5.0 08/08/2023    BILITOT 0.5 08/08/2023    ALKPHOS 65 08/08/2023    AST 21 08/08/2023    ALT 15 08/08/2023     Lab Results   Component Value Date    IRON 65 03/13/2022    LABIRON 35 03/13/2022    TRANSFERRIN 125 (L) 03/13/2022    TIBC 186 (L) 03/13/2022     Lab Results   Component Value Date    [id number] >2,000 (H) 03/11/2022    FOLATE 16.80 03/11/2022     No results found for: \"PSA\", \"CEA\", \"AFP\", \"\", \"\"    Labs personally reviewed. No anemia. WBC normal.     Vascular surgery note personally reviewed.       Assessment and Plan    Diagnoses and all orders for this visit:    1. Malignant neoplasm of overlapping sites of " right breast in female, estrogen receptor positive (Primary)  -     CBC & Differential; Future  -     Comprehensive Metabolic Panel; Future    2. Myalgia        ER+ Right breast Cancer: pT2N2a, s/p bilateral mastectomy.  The pt completed 4 cycles of TC.  Her treatment course was complicated by breast abscesses. Radiation was delayed until December 2022.  She is tolerating anastrozole and abemiciclib which she started on 1/1/23 with the exception of occasional severe bouts of diarrhea.  Diarrhea has since improved.  Plan for 2 years of abemaciclib. Plan to continue with abemaciclib and letrozole at current dosages. Continue to monitor for severe toxicities with frequent labs and office visits.      Menopausal symptoms: Secondary to letrozole. She is currently on venlafaxine 75mg daily.      Muscle cramps  Not likely due to abemaciclib. Ok for her to double her mag supplement    Patient follow up: 3 months  Patient was given instructions and counseling regarding her condition or for health maintenance advice. Please see specific information pulled into the AVS if appropriate.

## 2023-11-13 ENCOUNTER — SPECIALTY PHARMACY (OUTPATIENT)
Dept: PHARMACY | Facility: HOSPITAL | Age: 63
End: 2023-11-13
Payer: COMMERCIAL

## 2023-11-15 ENCOUNTER — SPECIALTY PHARMACY (OUTPATIENT)
Dept: PHARMACY | Facility: HOSPITAL | Age: 63
End: 2023-11-15
Payer: COMMERCIAL

## 2023-11-15 NOTE — PROGRESS NOTES
" Specialty Pharmacy Patient Management Program  Oncology Refill Outreach      Marisa \"Liberty\" is a 63 y.o. female contacted today regarding refills of her medication(s).    Specialty medication(s) and dose(s) confirmed: abemaciclib (VERZENIO) 100 mg tablet chemo tablet     Other medications being refilled: N/A    Refill Questions      Flowsheet Row Most Recent Value   Changes to allergies? No   Changes to medications? No   New conditions since last clinic visit No   Unplanned office visit, urgent care, ED, or hospital admission in the last 4 weeks  No   How does patient/caregiver feel medication is working? Very good   Financial problems or insurance changes  No   Since the previous refill, were any specialty medication doses or scheduled injections missed or delayed?  No   Does this patient require a clinical escalation to a pharmacist? No            Delivery Questions      Flowsheet Row Most Recent Value   Delivery method [employer]   Delivery address correct? Yes   Delivery phone number [phone]   Preferred delivery time? Anytime   Number of medications in delivery 1   Medication(s) being filled and delivered Abemaciclib   Doses left of specialty medications 10   Is there any medication that is due not being filled? No   Supplies needed? No supplies needed   Cooler needed? No   Do any medications need mixed or dated? No   Copay form of payment Credit card on file   Additional comments Zero copay   Questions or concerns for the pharmacist? No   Explain any questions or concerns for the pharmacist N/A   Are any medications first time fills? No                   Follow-up: 21 days     Jennifer Diggs  Care Coordinator, [facility]  11/15/2023  08:42 EST          "

## 2023-11-28 ENCOUNTER — SPECIALTY PHARMACY (OUTPATIENT)
Dept: PHARMACY | Facility: HOSPITAL | Age: 63
End: 2023-11-28
Payer: COMMERCIAL

## 2023-11-28 NOTE — PROGRESS NOTES
Specialty Pharmacy Patient Management Program  Oncology Reassessment     Patient is a 63 y.o. female with ER+ Breast Cancer seen by an Oncology provider and enrolled in the Oncology Patient Management program offered by [facility].  A follow-up outreach was conducted to assess continued therapy appropriateness, medication adherence, and side effect incidence and management for Verzenio (abemaciclib).       Relevant Past Medical History and Comorbidities  Relevant medical history and concomitant health conditions were discussed with the patient. The patient's chart has been reviewed for relevant past medical history and comorbid health conditions and updated as necessary.   Past Medical History:   Diagnosis Date    Allergies     Anemia During chemo 2022    Breast cancer     Cervical dysplasia     Herpes     High cholesterol     IFG (impaired fasting glucose)     Migraine     Osteoarthritis     S/P BILATERAL IMMEDIATE BREAST RECONSTRUCTION WITH LEFT PRE PEC PLACEMENT OF SILICONE GEL IMPLANT AND MESH, RIGHT PRE PEC PLACEMENT OF EXPANDER AND MESH 12/29/2021    TIA (transient ischemic attack)     no residual    Vitamin D deficiency      Social History     Socioeconomic History    Marital status:    Tobacco Use    Smoking status: Never    Smokeless tobacco: Never    Tobacco comments:     Father mother  smoker diring childhood and  adulthood   Vaping Use    Vaping Use: Never used   Substance and Sexual Activity    Alcohol use: Yes     Comment: Socially only . Occasionally    Drug use: Never    Sexual activity: Yes     Partners: Male     Birth control/protection: Post-menopausal     Comment: Hysterectomy age 33       Allergies  Known allergies and reactions were discussed with the patient. The patient's chart has been reviewed for allergy information and updated as necessary.   Multihance [gadobenate] and Contrast dye (echo or unknown ct/mr)    Relevant Laboratory Values  Lab Results    Component Value Date    GLUCOSE 118 (H) 11/06/2023    CALCIUM 9.2 11/06/2023     11/06/2023    K 4.2 11/06/2023    CO2 29.9 (H) 11/06/2023     11/06/2023    BUN 16 11/06/2023    CREATININE 0.86 11/06/2023    EGFRIFNONA 90 02/21/2022    BCR 18.6 11/06/2023    ANIONGAP 3.1 (L) 11/06/2023     Lab Results   Component Value Date    WBC 2.72 (L) 11/06/2023    RBC 3.70 (L) 11/06/2023    HGB 12.7 11/06/2023    HCT 37.9 11/06/2023    .4 (H) 11/06/2023    MCH 34.3 (H) 11/06/2023    MCHC 33.5 11/06/2023    RDW 13.0 11/06/2023    RDWSD 49.9 11/06/2023    MPV 9.5 11/06/2023     11/06/2023    NEUTRORELPCT 60.6 11/06/2023    LYMPHORELPCT 23.2 11/06/2023    MONORELPCT 9.6 11/06/2023    EOSRELPCT 4.8 11/06/2023    BASORELPCT 1.8 (H) 11/06/2023    AUTOIGPER 0.0 11/06/2023    NEUTROABS 1.65 (L) 11/06/2023    LYMPHSABS 0.63 (L) 11/06/2023    MONOSABS 0.26 11/06/2023    EOSABS 0.13 11/06/2023    BASOSABS 0.05 11/06/2023    AUTOIGNUM 0.00 11/06/2023    NRBC 0.2 08/08/2023        The above labs have been reviewed. The following labs are outside of normal limits: Glucose is slightly elevated, and WBC are decreased No dose adjustments are needed for the oral specialty medication(s) based on the labs.    Current Medication List  This medication list has been reviewed with the patient and evaluated for any interactions or necessary modifications/recommendations, and updated to include all prescription medications, OTC medications, and supplements the patient is currently taking.  This list reflects what is contained in the patient's profile, which has also been marked as reviewed to communicate to other providers it is the most up to date version of the patient's current medication therapy.     Current Outpatient Medications:     abemaciclib (VERZENIO) 100 mg tablet chemo tablet, Take 1 tablet by mouth 2 (Two) Times a Day., Disp: 56 tablet, Rfl: 5    acetaminophen (TYLENOL) 650 MG 8 hr tablet, Take 1 tablet by  mouth 2 (Two) Times a Day., Disp: , Rfl:     Aspirin 81 MG capsule, Take 81 mg by mouth 2 (Two) Times a Week., Disp: , Rfl:     cloNIDine (CATAPRES) 0.2 MG tablet, TAKE 1 TABLET BY MOUTH EVERY NIGHT AT BEDTIME., Disp: 30 tablet, Rfl: 3    diphenhydrAMINE (BENADRYL) 25 mg capsule, Take 2 capsules by mouth Take As Directed. Take 2 capsules 1 hour prior to CT scan, Disp: 2 capsule, Rfl: 0    hydrOXYzine (ATARAX) 25 MG tablet, Take 1 tablet by mouth At Night As Needed for Itching., Disp: , Rfl:     letrozole (FEMARA) 2.5 MG tablet, Take 1 tablet by mouth Daily., Disp: 90 tablet, Rfl: 1    Loratadine-Pseudoephedrine (CLARITIN-D 24 HOUR PO), Take 1 tablet by mouth Daily., Disp: , Rfl:     magnesium gluconate (MAGONATE) 500 MG tablet, Take 250 mg by mouth Daily., Disp: , Rfl:     ondansetron (ZOFRAN) 8 MG tablet, Take 1 tablet by mouth 3 (Three) Times a Day As Needed for Nausea or Vomiting., Disp: 30 tablet, Rfl: 5    potassium bicarbonate (K-LYTE) 25 MEQ disintegrating tablet, Take 1 tablet by mouth 2 (Two) Times a Day., Disp: , Rfl:     predniSONE (DELTASONE) 50 MG tablet, Take 1 tablet by mouth Take As Directed. Take 1 tablet 13hrs, 7hrs, and 1 hr prior to CT scan, Disp: 3 tablet, Rfl: 0    venlafaxine XR (EFFEXOR-XR) 75 MG 24 hr capsule, Take 1 capsule by mouth Daily., Disp: 90 capsule, Rfl: 3    Vitamin A 3 MG (46018 UT) capsule, Take 1 capsule by mouth Daily., Disp: , Rfl:     vitamin D (ERGOCALCIFEROL) 1.25 MG (52660 UT) capsule capsule, TAKE 1 CAPSULE BY MOUTH EVERY 7 (SEVEN) DAYS., Disp: 4 capsule, Rfl: 11  No current facility-administered medications for this visit.    Drug Interactions  No significant drug interactions with specialty medication.     Adverse Drug Reactions  Adverse Reactions Experienced: Occasional diarrhea and fatigue   Plan for ADR Management: Diarrhea is so sporadic patient doesn't want to take medication, currently is able to manage. Discussed balancing activities and rest to manage fatigue.  Currently fatigue doesn't not prevent her from doing anything she needs to.     Hospitalizations and Urgent Care Since Last Assessment  Hospitalizations or Admissions: none    ED Visits: none   Urgent Office Visits: none     Adherence and Self-Administration  Approximate Number of Doses Missed Since Last Assessment: 0   Ongoing or New Barriers to Patient Adherence and/or Self-Administration: none   Methods for Supporting Patient Adherence and/or Self-Administration: continue current method    Goals of Therapy  Progress Toward Meeting Patient-Identified Goals of Therapy:  Goal Met  Patient-Identified Goals  Consistently take medications as prescribed  Patient will adhere to medication regimen  Patient will report any medication side effects to healthcare provider    Progress Toward Meeting Clinical Goals or Therapeutic Targets: Goal Met  Clinical Goals or Therapeutic Targets  Support patient understanding of medication regimen  Ensure patient knows the pharmacy contact information  Schedule regular follow-up to monitor the treatment serious adverse events  Schedule regular follow-up to confirm medication adherence  Schedule regular follow-up to monitor disease progression or stabilty    Quality of Life Assessment   Quality of Life related to the patient's specialty therapy was discussed with the patient. The QOL segment of this outreach has been reviewed and updated.   Quality of Life Score: 5    Reassessment Plan & Follow-Up  Pharmacist to continue to perform regular reassessments no more than (6) months from the previous assessment.  Care Coordinator to facilitate future refill outreaches, coordinate prescription delivery, and escalate clinical questions to pharmacist.     Additional Plans, Therapy Recommendations or Therapy Problems to Be Addressed: none at this time      Attestation  I attest the patient was actively involved in and has agreed to the above plan of care. If the prescribed therapy is at any point  deemed not appropriate based on the current or future assessments, a consultation will be initiated with the patient's specialty care provider to determine the best course of action. The revised plan of therapy will be documented along with any additional patient education provided.     Radha Arreola PharmD, [facility]  Oncology Clinical Pharmacist  11/28/2023  08:48 EST

## 2023-12-12 ENCOUNTER — HOSPITAL ENCOUNTER (OUTPATIENT)
Dept: ONCOLOGY | Facility: HOSPITAL | Age: 63
Discharge: HOME | End: 2023-12-12
Admitting: INTERNAL MEDICINE
Payer: COMMERCIAL

## 2023-12-12 DIAGNOSIS — Z45.2 ENCOUNTER FOR ADJUSTMENT OR MANAGEMENT OF VASCULAR ACCESS DEVICE: Primary | ICD-10-CM

## 2023-12-12 PROCEDURE — 87635 SARS-COV-2 COVID-19 AMP PRB: CPT | Performed by: NURSE PRACTITIONER

## 2023-12-12 PROCEDURE — 96523 IRRIG DRUG DELIVERY DEVICE: CPT

## 2023-12-12 PROCEDURE — 25010000002 HEPARIN LOCK FLUSH PER 10 UNITS: Performed by: INTERNAL MEDICINE

## 2023-12-12 RX ORDER — SODIUM CHLORIDE 0.9 % (FLUSH) 0.9 %
20 SYRINGE (ML) INJECTION AS NEEDED
Status: DISCONTINUED | OUTPATIENT
Start: 2023-12-12 | End: 2023-12-13 | Stop reason: HOSPADM

## 2023-12-12 RX ORDER — SODIUM CHLORIDE 0.9 % (FLUSH) 0.9 %
20 SYRINGE (ML) INJECTION AS NEEDED
OUTPATIENT
Start: 2023-12-12

## 2023-12-12 RX ORDER — HEPARIN SODIUM (PORCINE) LOCK FLUSH IV SOLN 100 UNIT/ML 100 UNIT/ML
500 SOLUTION INTRAVENOUS AS NEEDED
OUTPATIENT
Start: 2023-12-12

## 2023-12-12 RX ORDER — HEPARIN SODIUM (PORCINE) LOCK FLUSH IV SOLN 100 UNIT/ML 100 UNIT/ML
500 SOLUTION INTRAVENOUS AS NEEDED
Status: DISCONTINUED | OUTPATIENT
Start: 2023-12-12 | End: 2023-12-13 | Stop reason: HOSPADM

## 2023-12-12 RX ADMIN — HEPARIN SODIUM (PORCINE) LOCK FLUSH IV SOLN 100 UNIT/ML 500 UNITS: 100 SOLUTION at 15:11

## 2023-12-12 RX ADMIN — Medication 20 ML: at 15:11

## 2023-12-13 ENCOUNTER — TELEPHONE (OUTPATIENT)
Dept: URGENT CARE | Facility: CLINIC | Age: 63
End: 2023-12-13
Payer: COMMERCIAL

## 2023-12-13 ENCOUNTER — SPECIALTY PHARMACY (OUTPATIENT)
Dept: PHARMACY | Facility: HOSPITAL | Age: 63
End: 2023-12-13
Payer: COMMERCIAL

## 2023-12-13 DIAGNOSIS — C50.811 MALIGNANT NEOPLASM OF OVERLAPPING SITES OF RIGHT BREAST IN FEMALE, ESTROGEN RECEPTOR POSITIVE: ICD-10-CM

## 2023-12-13 DIAGNOSIS — Z17.0 MALIGNANT NEOPLASM OF OVERLAPPING SITES OF RIGHT BREAST IN FEMALE, ESTROGEN RECEPTOR POSITIVE: ICD-10-CM

## 2023-12-13 NOTE — PROGRESS NOTES
" Specialty Pharmacy Patient Management Program  Oncology Refill Outreach      Mraisa \"Liberty\" is a 63 y.o. female contacted today regarding refills of her medication(s).    Specialty medication(s) and dose(s) confirmed: abemaciclib (VERZENIO) 100 mg tablet chemo tablet     Other medications being refilled: N/A    Refill Questions      Flowsheet Row Most Recent Value   Changes to allergies? No   Changes to medications? No   New conditions or infections since last clinic visit No   Unplanned office visit, urgent care, ED, or hospital admission in the last 4 weeks  No   How does patient/caregiver feel medication is working? Good   Financial problems or insurance changes  No   Since the previous refill, were any specialty medication doses or scheduled injections missed or delayed?  No   Does this patient require a clinical escalation to a pharmacist? No            Delivery Questions      Flowsheet Row Most Recent Value   Delivery method [employer]   Delivery address verified with patient/caregiver? Yes   Delivery address Home   Number of medications in delivery 1   Medication(s) being filled and delivered Abemaciclib   Doses left of specialty medications 14   Copay verified? Yes   Copay amount 0   Copay form of payment No copayment ($0)                   Follow-up: 21 days     Jennifer Diggs  Care Coordinator, [facility]  12/13/2023  14:37 EST          "

## 2023-12-13 NOTE — PROGRESS NOTES
Re: Refills of Oral Specialty Medication - Verzenio (abemaciclib)    Drug-Drug Interactions: The current medication list was reviewed and there are no relevant drug-drug interactions with the specialty medication.  Medication Allergies: The patient has no relevant allergies as it relates to their oral specialty medication  Review of Labs/Dose Adjustments: NO DOSE CHANGE - I reviewed the most recent note and labs and the patient will continue without any dose changes.  I sent refills as described below.    Drug: Verzenio (abemaciclib)  Strength: 100 mg  Directions: Take 1 tablet by mouth twice a day  Quantity: 56  Refills: 11  Pharmacy prescription sent to: [facility]      Radha Arreola PharmD, [facility]  Oncology Clinical Pharmacist  12/13/2023  08:19 EST

## 2023-12-13 NOTE — TELEPHONE ENCOUNTER
----- Message from Zo Ge MD sent at 12/12/2023  9:02 PM EST -----  Please call the patient regarding negative Covid PCR test.

## 2023-12-19 NOTE — TELEPHONE ENCOUNTER
Caller: Marisa Portillo    Relationship to patient: Self    Best call back number: [phone]    Chief complaint: PT WANTED TO RESCHEDULE    Type of visit: FOLLOW UP    Requested date: 3-3-23 AROUND 8AM     If rescheduling, when is the original appointment: 3-3-23     Additional notes:PLEASE CALL TO CONFIRM             Endorses muscle spasms and cough plus recent weight loss

## 2024-01-08 ENCOUNTER — SPECIALTY PHARMACY (OUTPATIENT)
Dept: PHARMACY | Facility: HOSPITAL | Age: 64
End: 2024-01-08
Payer: COMMERCIAL

## 2024-01-11 DIAGNOSIS — N95.1 HOT FLASHES DUE TO MENOPAUSE: ICD-10-CM

## 2024-01-11 RX ORDER — CLONIDINE HYDROCHLORIDE 0.2 MG/1
0.2 TABLET ORAL
Qty: 30 TABLET | Refills: 0 | Status: SHIPPED | OUTPATIENT
Start: 2024-01-11

## 2024-01-11 NOTE — TELEPHONE ENCOUNTER
Approved refill request on patients catapres. Patient has an annual scheduled with Yamini on 01/25

## 2024-01-12 ENCOUNTER — SPECIALTY PHARMACY (OUTPATIENT)
Dept: PHARMACY | Facility: HOSPITAL | Age: 64
End: 2024-01-12
Payer: COMMERCIAL

## 2024-01-12 NOTE — PROGRESS NOTES
" Specialty Pharmacy Patient Management Program  Oncology Refill Outreach      Marisa \"Liberty\" is a 63 y.o. female contacted today regarding refills of her medication(s).    Specialty medication(s) and dose(s) confirmed: abemaciclib (VERZENIO) 100 mg tablet chemo tablet. [facility] will mail to patient home.    Other medications being refilled: N/A    Refill Questions      Flowsheet Row Most Recent Value   Changes to allergies? No   Changes to medications? No   New conditions or infections since last clinic visit No   Unplanned office visit, urgent care, ED, or hospital admission in the last 4 weeks  No   How does patient/caregiver feel medication is working? Good   Financial problems or insurance changes  No   Since the previous refill, were any specialty medication doses or scheduled injections missed or delayed?  Yes   If yes, please provide the amount 1   Why were doses missed? Went to sleep early   Does this patient require a clinical escalation to a pharmacist? No            Delivery Questions      Flowsheet Row Most Recent Value   Delivery method [employer]   Delivery address verified with patient/caregiver? Yes   Delivery address Home   Number of medications in delivery 1   Medication(s) being filled and delivered Abemaciclib   Doses left of specialty medications 14   Copay verified? Yes   Copay amount 0 Copay card   Copay form of payment No copayment ($0)                   Follow-up: 21 days     Jennifer Diggs  Care Coordinator, [facility]  1/12/2024  09:26 EST          "

## 2024-01-24 ENCOUNTER — APPOINTMENT (OUTPATIENT)
Dept: OCCUPATIONAL THERAPY | Facility: HOSPITAL | Age: 64
End: 2024-01-24
Payer: COMMERCIAL

## 2024-01-25 ENCOUNTER — OFFICE VISIT (OUTPATIENT)
Dept: OBSTETRICS AND GYNECOLOGY | Facility: CLINIC | Age: 64
End: 2024-01-25
Payer: COMMERCIAL

## 2024-01-25 ENCOUNTER — HOSPITAL ENCOUNTER (OUTPATIENT)
Dept: OCCUPATIONAL THERAPY | Facility: HOSPITAL | Age: 64
Setting detail: THERAPIES SERIES
Discharge: HOME | End: 2024-01-25
Payer: COMMERCIAL

## 2024-01-25 ENCOUNTER — TELEPHONE (OUTPATIENT)
Dept: OCCUPATIONAL THERAPY | Facility: HOSPITAL | Age: 64
End: 2024-01-25
Payer: COMMERCIAL

## 2024-01-25 VITALS
SYSTOLIC BLOOD PRESSURE: 126 MMHG | HEART RATE: 93 BPM | DIASTOLIC BLOOD PRESSURE: 80 MMHG | BODY MASS INDEX: 30.75 KG/M2 | WEIGHT: 184.6 LBS | HEIGHT: 65 IN

## 2024-01-25 DIAGNOSIS — N64.89 DEFORMITY DUE TO MASTECTOMY: ICD-10-CM

## 2024-01-25 DIAGNOSIS — Z90.10 DEFORMITY DUE TO MASTECTOMY: ICD-10-CM

## 2024-01-25 DIAGNOSIS — L98.9 SKIN LESION OF RIGHT LEG: ICD-10-CM

## 2024-01-25 DIAGNOSIS — Z17.0 MALIGNANT NEOPLASM OF OVERLAPPING SITES OF RIGHT BREAST IN FEMALE, ESTROGEN RECEPTOR POSITIVE: ICD-10-CM

## 2024-01-25 DIAGNOSIS — Z01.419 ENCOUNTER FOR GYNECOLOGICAL EXAMINATION WITHOUT ABNORMAL FINDING: Primary | ICD-10-CM

## 2024-01-25 DIAGNOSIS — C50.811 MALIGNANT NEOPLASM OF OVERLAPPING SITES OF RIGHT FEMALE BREAST, UNSPECIFIED ESTROGEN RECEPTOR STATUS: Primary | ICD-10-CM

## 2024-01-25 DIAGNOSIS — Z90.13 HISTORY OF MASTECTOMY, BILATERAL: ICD-10-CM

## 2024-01-25 DIAGNOSIS — C50.411 MALIGNANT NEOPLASM OF UPPER-OUTER QUADRANT OF RIGHT BREAST IN FEMALE, ESTROGEN RECEPTOR POSITIVE: ICD-10-CM

## 2024-01-25 DIAGNOSIS — Z44.9 FITTING AND ADJUSTMENT OF UNSPECIFIED PROSTHETIC DEVICE: ICD-10-CM

## 2024-01-25 DIAGNOSIS — Z17.0 MALIGNANT NEOPLASM OF UPPER-OUTER QUADRANT OF RIGHT BREAST IN FEMALE, ESTROGEN RECEPTOR POSITIVE: ICD-10-CM

## 2024-01-25 DIAGNOSIS — Z91.89 AT RISK FOR LYMPHEDEMA: Primary | ICD-10-CM

## 2024-01-25 DIAGNOSIS — C50.811 MALIGNANT NEOPLASM OF OVERLAPPING SITES OF RIGHT BREAST IN FEMALE, ESTROGEN RECEPTOR POSITIVE: ICD-10-CM

## 2024-01-25 PROCEDURE — 97535 SELF CARE MNGMENT TRAINING: CPT

## 2024-01-25 PROCEDURE — 93702 BIS XTRACELL FLUID ANALYSIS: CPT

## 2024-01-25 RX ORDER — CLONIDINE HYDROCHLORIDE 0.2 MG/1
0.2 TABLET ORAL
Qty: 90 TABLET | Refills: 4 | Status: SHIPPED | OUTPATIENT
Start: 2024-01-25

## 2024-01-25 NOTE — PROGRESS NOTES
"Well Woman Visit    CC: Annual well woman exam       HPI:   63 y.o. Contraception or HRT: s/p HYST, still has one or both ovaries, benign indications  Menses:   none  Pain:  None  Incontinence concerns: No  Hx of abnormal pap:  No  Pt has no complaints today. Still undergoing chemo treatments. Taking clonidine for hot flashes and night sweats and desires to continue it.       History: PMHx, Meds, Allergies, PSHx, Social Hx, and POBHx all reviewed and updated.      PHYSICAL EXAM:  /80   Pulse 93   Ht 165.1 cm (65\")   Wt 83.7 kg (184 lb 9.6 oz)   BMI 30.72 kg/m²   General- NAD, alert and oriented, appropriate  Psych- Normal mood, good memory  Neck- No masses, no thyroid enlargement  CV- Regular rhythm, no murnurs  Resp- CTA to bases, no wheezes  Abdomen- Soft, non distended, non tender, no masses    Breast left-  s/p mastectomy  Breast right- s/p mastectomy    External genitalia- Normal female, no lesions  Urethra/meatus- Normal, no masses, non tender, no prolapse  Bladder- Normal, no masses, non tender, no prolapse  Vagina- Normal, no atrophy, no lesions, no discharge, no prolapse  Cvx- Absent  Uterus- Absent  Adnexa- No mass, non tender  Anus/Rectum/Perineum- Normal appearance, no mass, good sphincter tone, no hemorrhoids, no prolapse , Hemoccult neg    Lymphatic- No palpable neck, axillary, or groin nodes  Ext- No edema, no cyanosis    Skin- No rashes, no acanthosis nigricans, dime size erythematous flaky lesion noted to right lower leg, pt states it has been there for awhile and she requests a referral to derm for an eval        ASSESSMENT and PLAN:  WWE    Diagnoses and all orders for this visit:    1. Encounter for gynecological examination without abnormal finding (Primary)  -     POC Occult Blood Stool  -     cloNIDine (CATAPRES) 0.2 MG tablet; Take 1 tablet by mouth every night at bedtime.  Dispense: 90 tablet; Refill: 4    2. Skin lesion of right leg  -     Ambulatory Referral to " Dermatology        Domestic violence/abuse screen: negative    Depression screen: no SI    Preventative:   BREAST HEALTH- Monthly self breast exam importance and how to reviewed. MMG and/or MRI (prn) reviewed per society guidelines and her individual history. Mammo/MRI screen: Not medically needed.  CERVICAL CANCER Screening- Reviewed current ASCCP guidelines for screening w and wo cotest HPV, age specific.  Screen: Not medically needed.  COLON CANCER Screening- Reviewed current medical society guidelines and options.  Colonoscopy screen:  Already up to date.  SEXUAL HEALTH: Declines STD screening.  BONE HEALTH- Reviewed current medical society guidelines and options for testing, calcium and vit D intake.  Weight bearing exercise.  DEXA: Already up to date.  VACCINATIONS Recommended: Flu annually, Zoster (51yo and older).  Importance discussed, risk being unvaccinated reviewed.  Questions answered  Smoking status- NON SMOKER.  Importance of avoiding second hand smoke.  Follow up PCP/Specialist PMHx and Labs  [facility] : pt reports she had testing previously and it was negative      She understands the importance of having any ordered tests to be performed in a timely fashion.  She is encouraged to review her results online and/or contact or office if she has questions.     Follow Up:  Return in about 1 year (around 1/25/2025) for Annual physical.      Yamini Michel, APRN  01/25/2024

## 2024-01-25 NOTE — THERAPY RE-EVALUATION
Outpatient Occupational Therapy Lymphedema Re-Evaluation  [facility]     Patient Name: Marisa Portillo  : 1960  MRN: [id number]  Today's Date: 2024      Visit Date: 2024    Patient Active Problem List   Diagnosis    Arthritis    Bladder disorder    Concussion    Contact dermatitis and eczema    Injury, other and unspecified, finger    Limb swelling    Seasonal allergic rhinitis    Vitamin D deficiency    IFG (impaired fasting glucose)    Mixed hyperlipidemia    TIA (transient ischemic attack)    Malignant neoplasm of overlapping sites of right breast in female, estrogen receptor positive    Port-A-Cath placement    S/P bilateral nipple sparing mastectomy    S/P BILATERAL IMMEDIATE BREAST RECONSTRUCTION WITH LEFT PRE PEC PLACEMENT OF SILICONE GEL IMPLANT AND MESH, RIGHT PRE PEC PLACEMENT OF EXPANDER AND MESH    Sepsis    Breast abscess    Cellulitis of left breast    Psychophysiological insomnia    Recurrent seroma of breast    Malignant neoplasm of overlapping sites of right female breast    Hot flashes due to menopause    Breast reconstruction deformity    Genitourinary syndrome of menopause    Physical exam, annual    PAD (peripheral artery disease)    Encounter for adjustment or management of vascular access device        Past Medical History:   Diagnosis Date    Abnormal Pap smear of cervix     Allergies     Anemia During chemo     Breast cancer     Cervical dysplasia     Herpes     High cholesterol     IFG (impaired fasting glucose)     Migraine     Osteoarthritis     S/P BILATERAL IMMEDIATE BREAST RECONSTRUCTION WITH LEFT PRE PEC PLACEMENT OF SILICONE GEL IMPLANT AND MESH, RIGHT PRE PEC PLACEMENT OF EXPANDER AND MESH 2021    TIA (transient ischemic attack)     no residual    Vitamin D deficiency         Past Surgical History:   Procedure Laterality Date    APPENDECTOMY      BREAST AUGMENTATION Bilateral 2021    Procedure: bilateral breast reconstructon with bilateral mesh  placement.   left implant, right tissue expander placement;  Surgeon: Lacy Shelton MD;  Location: [facility] OR [facility] [place];  Service: Plastics;  Laterality: Bilateral;    BREAST SURGERY  [id number]    Bilateral Mastectomy with implant Left expander Right    BREAST TISSUE EXPANDER REMOVAL INSERTION OF IMPLANT Right 04/15/2022    Procedure: RIGHT BREAST IMPLANT REMOVAL WITH INCISION AND DRAINAGE;  Surgeon: Lacy Shelton MD;  Location: [facility] OR [facility] [place];  Service: Plastics;  Laterality: Right;    CEREBRAL ANGIOGRAM      clip placed    CYST REMOVAL Right     rt wrist    HYSTERECTOMY      Partial age 31    INCISION AND DRAINAGE ABSCESS Bilateral 10/4/2022    Procedure: BILATERAL BREAST ABSCESS INCISION AND DRAINAGE, WITH LEFT BREAST IMPLANT REMOVAL;  Surgeon: Lacy Shelton MD;  Location: [facility] MAIN OR;  Service: Plastics;  Laterality: Bilateral;    INCISION AND DRAINAGE OF WOUND Right 03/11/2022    Procedure: INCISION AND DRAINAGE ABSCESS OF RIGHT BREAST WITH EXPANDER REMOVAL AND IMPLANT PLACEMENT;  Surgeon: Lacy Shelton MD;  Location: [facility] MAIN OR;  Service: Plastics;  Laterality: Right;    MASTECTOMY COMPLETE / SIMPLE W/ SENTINEL NODE BIOPSY Bilateral     PORTACATH PLACEMENT Left 12/28/2021    Procedure: INSERTION OF PORTACATH;  Surgeon: Jacqueline Mcgraw MD;  Location: [facility] OR [facility] [place];  Service: General;  Laterality: Left;    SENTINEL NODE BIOPSY Bilateral 12/28/2021    Procedure: BILATERAL BREAST MASTECTOMIES WITH RIGHT SENTINEL NODE BIOPSIES WITH FROZEN SECTIONS;  Surgeon: Jacqueline Mcgraw MD;  Location: [facility] OR [facility] [place];  Service: General;  Laterality: Bilateral;         Visit Dx:     ICD-10-CM ICD-9-CM   1. At risk for lymphedema  Z91.89 V49.89   2. History of mastectomy, bilateral  Z90.13 V45.71   3. Malignant neoplasm of overlapping sites of right breast in female, estrogen receptor positive  C50.811 174.8    Z17.0 V86.0   4. Malignant neoplasm of upper-outer quadrant of right breast in  female, estrogen receptor positive  C50.411 174.4    Z17.0 V86.0        Patient History       Row Name 01/25/24 0900             History    Chief Complaint Numbness;Pain  Lymphedema surveillance program  -SF      Date Current Problem(s) Began 12/28/21  -SF      Brief Description of Current Complaint B mastectomy with SNLB x10  -SF      Patient/Caregiver Goals Return to prior level of function;Return to work  -SF      Hand Dominance right-handed  -SF      What clinical tests have you had for this problem? X-ray;CT scan;MRI;Blood Work;Mammogram  -SF      Are you or can you be pregnant No  -SF         Fall Risk Assessment    Any falls in the past year: No  -SF      Does patient have a fear of falling No  -SF         Services    Prior Rehab/Home Health Experiences No  -SF      Are you currently receiving Home Health services No  -SF      Do you plan to receive Home Health services in the near future No  -SF         Daily Activities    Primary Language English  -SF      Are you able to read Yes  -SF      Are you able to write Yes  -SF      How does patient learn best? Listening;Reading;Demonstration;Pictures/Video  -SF      Barriers to learning None  -SF      Pt Participated in POC and Goals Yes  -SF         Safety    Are you being hurt, hit, or frightened by anyone at home or in your life? No  -SF      Are you being neglected by a caregiver No  -SF      Have you had any of the following issues with N/A  -SF                User Key  (r) = Recorded By, (t) = Taken By, (c) = Cosigned By      Initials Name Provider Type    Faustina Henderson OT Occupational Therapist                     Lymphedema       Row Name 01/25/24 0900             Subjective Pain    Able to rate subjective pain? yes  -SF      Pre-Treatment Pain Level 0  -SF      Post-Treatment Pain Level 0  -SF         Subjective    Subjective Comments Patient reports plastic surgeon has moved and she is looking at other physicians for reconstruction.  Patient reports  "she is needing new mastectomy bras.  Patient reports tenderness in right chest wall and right axilla  -SF         Lymphedema Assessment    Lymphedema Classification RUE:;at risk/stage 0  -SF      Lymphedema Cancer Related Sx bilateral;simple mastectomy;sentinel node biopsy;other (comment)  -SF      Lymphedema Surgery Comments 12/28/2021  -SF      Lymph Nodes Removed # 10  -SF      Positive Lymph Nodes # 4  -SF      Chemo Received yes  -SF      Radiation Therapy Received yes  -SF      Radiation Treatments #/Timeframe 25  -SF         LLIS - Physical Concerns    The amount of pain associated with my lymphedema is: 0  -SF      The amount of limb heaviness associated with my lymphedema is: 0  -SF      The amount of skin tightness associated with my lymphedema is: 0  -SF      The size of my swollen limb(s) seems: 0  -SF      Lymphedema affects the movement of my swollen limb(s): 0  -SF      The strength in my swollen limb(s) is: 0  -SF         LLIS - Psychosocial Concerns    Lymphedema affects my body image (i.e., \"how I think I look\"). 0  -SF      Lymphedema affects my socializing with others. 0  -SF      Lymphedema affects my intimate relations with spouse or partner (rate 0 if not applicable 0  -SF      Lymphedema \"gets me down\" (i.e., depression, frustration, or anger) 0  -SF      I must rely on others for help due to my lymphedema. 0  -SF      I know what to do to manage my lymphedema 1  -SF         LLIS - Functional Concerns    Lymphedema affects my ability to perform self-care activities (i.e. eating, dressing, hygiene) 0  -SF      Lymphedema affects my ability to perform routine home or work-related activities. 0  -SF      Lymphedema affects my performance of preferred leisure activities. 0  -SF      Lymphedema affects proper fit of clothing/shoes 0  -SF      Lymphedema affects my sleep 0  -SF         Posture/Observations    Posture- WNL Posture is WNL  -SF         General ROM    GENERAL ROM COMMENTS BUE  -SF      "    MMT (Manual Muscle Testing)    General MMT Comments BUE  -SF         Lymphedema Edema Assessment    Edema Assessment Comment Minimal amount of fluid noted in right chest wall and in right axilla possibly due to to scar tissue.  -SF         L-Dex Bioimpedence Screening    L-Dex Measurement Extremity RUE  -SF      L-Dex Patient Position Standing  -SF      L-Dex UE Dominate Side Right  -SF      L-Dex UE At Risk Side Right  -SF      L-Dex UE Pre Surgical Value Yes  -SF      L-Dex UE Score -3.2  -SF      L-Dex UE Baseline Score -4.5  -SF      L-Dex UE Value Change 1.3  -SF      $ L-Dex Charge yes  -SF         Lymphedema Life Impact Scale Totals    A.  Total Q1 - Q17 (Do not include Q18) 1  -SF      B.  Total number of questions answered (Q1-Q17) 17  -SF      C. Divide A by B 0.06  -SF      D. Multiple C by 25 1.5  -SF                User Key  (r) = Recorded By, (t) = Taken By, (c) = Cosigned By      Initials Name Provider Type    Faustina Henderson OT Occupational Therapist                            Therapy Education  Education Details: Review of stoplight program.  Review of self MLD due to tenderness in right chest wall.  Given: Symptoms/condition management, Edema management  Program: Reinforced  How Provided: Verbal  Provided to: Patient  Level of Understanding: Verbalized  59404 - OT Self Care/Mgmt Minutes: 25         OT Goals       Row Name 01/25/24 1226          Time Calculation    OT Goal Re-Cert Due Date 02/24/24  -               User Key  (r) = Recorded By, (t) = Taken By, (c) = Cosigned By      Initials Name Provider Type    Faustina Henderson OT Occupational Therapist                  GOALS:   1. Post Breast Surgery Care/at risk for Lymphedema  LTG 1: 90 days:  As an indicator of no exacerbation of lymphedema staging, the patient will present with an L-Dex score less than 7 points from preoperative baseline.              STATUS: met. ongoing  STG 1a:   30 days: To prevent exacerbation of mixed edema to  lymphedema, patient will utilize the 2 postsurgical compression garments daily.                 STATUS: met.  STG 1b: 30 days: Patient will be independent with self-manual lymphatic massage.               STATUS: met. ongoing  STG 1c: 30 days:  Patient will be independent with identification of signs and symptoms of lymphedema exasperation per stoplight to recovery education handout.              STATUS: met. ongoing  STG 1 d: 30 days: Patient will be independent with HEP to prevent advancement in lymphedema staging.              STATUS: met. Ongoing.  TREATMENT:  Self Care/ADL retraining, Therapeutic Activity, Neuromuscular Re-education, Therapeutic Exercise, Bioimpedence Fluid Analysis, Post-Surgical compression garement [id number] Zip-ST-High/ [employer] Cam[employer] Kit 2860K, Orthotic Management and training,  and Manual Therapy.  Therapy Education  Education Details: OT provided review of education in use of stop light tool, which patient is well verses in.  OT instructed patient to continue current HEP, and added doorway stretch exercise to be done gently for low pectoralis and pectoralis minor.  Pt demonstrated properly. Patient demonstated all HEP exercises properly.  She was asked to focus on external rotation bilaterally since slight reduction in motion is noted.     OT Assessment/Plan       Row Name 01/25/24 1224          OT Assessment    Functional Limitations Limitations in functional capacity and performance  -SF     Impairments Impaired lymphatic circulation  -SF     Assessment Comments Patient is demonstrating normalized lymphatic functioning on this date.  Review of self MLD to address discomfort in right chest wall.  Patient will continue to benefit from skilled occupational therapy to further evaluate ongoing lymphatic functioning to prevent advancement in lymphedema staging.  -SF     OT Diagnosis At risk for lymphedema  -SF     OT Rehab Potential Good  -SF     Patient/caregiver participated in  establishment of treatment plan and goals Yes  -SF        OT Plan    OT Frequency Other (comment)  -SF     Predicted Duration of Therapy Intervention (OT) Patient will be reevaluated every 3 months years 1 through 3 and every 6 months years 4 through 5.  Patient will be seen once a year for dispensing mastectomy bras and every 2 for breast prosthetics.  -SF     Planned CPT's? OT EVAL LOW COMPLEXITY: 02548;OT RE-EVAL: 37248;OT THER ACT EA 15 MIN: 54500IV;OT THER PROC EA 15 MIN: 62250LG;OT SELF CARE/MGMT/TRAIN 15 MIN: 51379;OT MANUAL THERAPY EA 15 MIN: 94704;OT BIS XTRACELL FLUID ANALYSIS: 95345;OT ORTHO/PROSTHET CHECKOUT EA 15 MIN: 40586;OT ORTHOTIC MGMT/TRAIN EA 15 MIN: 80386;OT CARE PLAN EA 15 MIN  -SF     Planned Therapy Interventions (Optional Details) home exercise program;orthotic fitting/training;patient/family education;postural re-education;prosthetic fitting/training;ROM (Range of Motion)  -SF     OT Plan Comments Continue POC  -SF               User Key  (r) = Recorded By, (t) = Taken By, (c) = Cosigned By      Initials Name Provider Type    SF Faustina Guthrie OT Occupational Therapist                              Time Calculation:   Timed Charges  13114 - OT Self Care/Mgmt Minutes: 25  Total Minutes  Timed Charges Total Minutes: 25   Total Minutes: 25     Therapy Charges for Today       Code Description Service Date Service Provider Modifiers Qty    36975603368 HC PT BIS XTRACELL FLUID ANALYSIS 1/25/2024 Faustina Guthrie OT  1    98183108242 HC OT SELF CARE/MGMT/TRAIN EA 15 MIN 1/25/2024 Faustina Guthrie OT GO 2                      Faustina Guthrie OT  1/25/2024

## 2024-02-05 ENCOUNTER — SPECIALTY PHARMACY (OUTPATIENT)
Dept: PHARMACY | Facility: HOSPITAL | Age: 64
End: 2024-02-05
Payer: COMMERCIAL

## 2024-02-06 ENCOUNTER — OFFICE VISIT (OUTPATIENT)
Dept: ONCOLOGY | Facility: HOSPITAL | Age: 64
End: 2024-02-06
Payer: COMMERCIAL

## 2024-02-06 ENCOUNTER — HOSPITAL ENCOUNTER (OUTPATIENT)
Dept: ONCOLOGY | Facility: HOSPITAL | Age: 64
Discharge: HOME | End: 2024-02-06
Admitting: INTERNAL MEDICINE
Payer: COMMERCIAL

## 2024-02-06 VITALS
HEART RATE: 104 BPM | WEIGHT: 183.2 LBS | SYSTOLIC BLOOD PRESSURE: 148 MMHG | OXYGEN SATURATION: 97 % | RESPIRATION RATE: 18 BRPM | DIASTOLIC BLOOD PRESSURE: 81 MMHG | BODY MASS INDEX: 30.49 KG/M2 | TEMPERATURE: 97.4 F

## 2024-02-06 DIAGNOSIS — E78.2 MIXED HYPERLIPIDEMIA: ICD-10-CM

## 2024-02-06 DIAGNOSIS — R25.2 MUSCLE CRAMP: ICD-10-CM

## 2024-02-06 DIAGNOSIS — R73.01 IFG (IMPAIRED FASTING GLUCOSE): ICD-10-CM

## 2024-02-06 DIAGNOSIS — C50.811 MALIGNANT NEOPLASM OF OVERLAPPING SITES OF RIGHT BREAST IN FEMALE, ESTROGEN RECEPTOR POSITIVE: ICD-10-CM

## 2024-02-06 DIAGNOSIS — Z45.2 ENCOUNTER FOR ADJUSTMENT OR MANAGEMENT OF VASCULAR ACCESS DEVICE: Primary | ICD-10-CM

## 2024-02-06 DIAGNOSIS — K59.1 FUNCTIONAL DIARRHEA: ICD-10-CM

## 2024-02-06 DIAGNOSIS — N95.1 MENOPAUSAL SYMPTOM: ICD-10-CM

## 2024-02-06 DIAGNOSIS — Z17.0 MALIGNANT NEOPLASM OF OVERLAPPING SITES OF RIGHT BREAST IN FEMALE, ESTROGEN RECEPTOR POSITIVE: Primary | ICD-10-CM

## 2024-02-06 DIAGNOSIS — C50.811 MALIGNANT NEOPLASM OF OVERLAPPING SITES OF RIGHT BREAST IN FEMALE, ESTROGEN RECEPTOR POSITIVE: Primary | ICD-10-CM

## 2024-02-06 DIAGNOSIS — Z17.0 MALIGNANT NEOPLASM OF OVERLAPPING SITES OF RIGHT BREAST IN FEMALE, ESTROGEN RECEPTOR POSITIVE: ICD-10-CM

## 2024-02-06 DIAGNOSIS — E55.9 VITAMIN D DEFICIENCY: ICD-10-CM

## 2024-02-06 DIAGNOSIS — Z98.890 S/P BREAST RECONSTRUCTION: ICD-10-CM

## 2024-02-06 DIAGNOSIS — Z00.00 PHYSICAL EXAM, ANNUAL: ICD-10-CM

## 2024-02-06 DIAGNOSIS — G45.9 TIA (TRANSIENT ISCHEMIC ATTACK): ICD-10-CM

## 2024-02-06 LAB
ALBUMIN SERPL-MCNC: 4.6 G/DL (ref 3.5–5.2)
ALBUMIN/GLOB SERPL: 2 G/DL
ALP SERPL-CCNC: 64 U/L (ref 39–117)
ALT SERPL W P-5'-P-CCNC: 11 U/L (ref 1–33)
ANION GAP SERPL CALCULATED.3IONS-SCNC: 8.2 MMOL/L (ref 5–15)
AST SERPL-CCNC: 15 U/L (ref 1–32)
BASOPHILS # BLD AUTO: 0.04 10*3/MM3 (ref 0–0.2)
BASOPHILS NFR BLD AUTO: 1.1 % (ref 0–1.5)
BILIRUB SERPL-MCNC: 0.5 MG/DL (ref 0–1.2)
BUN SERPL-MCNC: 17 MG/DL (ref 8–23)
BUN/CREAT SERPL: 17 (ref 7–25)
CALCIUM SPEC-SCNC: 9.9 MG/DL (ref 8.6–10.5)
CHLORIDE SERPL-SCNC: 104 MMOL/L (ref 98–107)
CHOLEST SERPL-MCNC: 234 MG/DL (ref 0–200)
CO2 SERPL-SCNC: 27.8 MMOL/L (ref 22–29)
CREAT SERPL-MCNC: 1 MG/DL (ref 0.57–1)
DEPRECATED RDW RBC AUTO: 48.1 FL (ref 37–54)
EGFRCR SERPLBLD CKD-EPI 2021: 63 ML/MIN/1.73
EOSINOPHIL # BLD AUTO: 0.1 10*3/MM3 (ref 0–0.4)
EOSINOPHIL NFR BLD AUTO: 2.7 % (ref 0.3–6.2)
ERYTHROCYTE [DISTWIDTH] IN BLOOD BY AUTOMATED COUNT: 12.9 % (ref 12.3–15.4)
ERYTHROCYTE [SEDIMENTATION RATE] IN BLOOD: 1 MM/HR (ref 0–30)
GLOBULIN UR ELPH-MCNC: 2.3 GM/DL
GLUCOSE SERPL-MCNC: 147 MG/DL (ref 65–99)
HCT VFR BLD AUTO: 37 % (ref 34–46.6)
HDLC SERPL-MCNC: 73 MG/DL (ref 40–60)
HGB BLD-MCNC: 12.7 G/DL (ref 12–15.9)
IMM GRANULOCYTES # BLD AUTO: 0.01 10*3/MM3 (ref 0–0.05)
IMM GRANULOCYTES NFR BLD AUTO: 0.3 % (ref 0–0.5)
LDLC SERPL CALC-MCNC: 138 MG/DL (ref 0–100)
LDLC/HDLC SERPL: 1.84 {RATIO}
LYMPHOCYTES # BLD AUTO: 0.96 10*3/MM3 (ref 0.7–3.1)
LYMPHOCYTES NFR BLD AUTO: 26 % (ref 19.6–45.3)
MCH RBC QN AUTO: 34.7 PG (ref 26.6–33)
MCHC RBC AUTO-ENTMCNC: 34.3 G/DL (ref 31.5–35.7)
MCV RBC AUTO: 101.1 FL (ref 79–97)
MONOCYTES # BLD AUTO: 0.48 10*3/MM3 (ref 0.1–0.9)
MONOCYTES NFR BLD AUTO: 13 % (ref 5–12)
NEUTROPHILS NFR BLD AUTO: 2.1 10*3/MM3 (ref 1.7–7)
NEUTROPHILS NFR BLD AUTO: 56.9 % (ref 42.7–76)
PLATELET # BLD AUTO: 193 10*3/MM3 (ref 140–450)
PMV BLD AUTO: 9.7 FL (ref 6–12)
POTASSIUM SERPL-SCNC: 3.6 MMOL/L (ref 3.5–5.2)
PROT SERPL-MCNC: 6.9 G/DL (ref 6–8.5)
RBC # BLD AUTO: 3.66 10*6/MM3 (ref 3.77–5.28)
SODIUM SERPL-SCNC: 140 MMOL/L (ref 136–145)
TRIGL SERPL-MCNC: 132 MG/DL (ref 0–150)
VLDLC SERPL-MCNC: 23 MG/DL (ref 5–40)
WBC NRBC COR # BLD AUTO: 3.69 10*3/MM3 (ref 3.4–10.8)

## 2024-02-06 PROCEDURE — 25010000002 HEPARIN LOCK FLUSH PER 10 UNITS: Performed by: INTERNAL MEDICINE

## 2024-02-06 PROCEDURE — 99214 OFFICE O/P EST MOD 30 MIN: CPT | Performed by: INTERNAL MEDICINE

## 2024-02-06 PROCEDURE — 85652 RBC SED RATE AUTOMATED: CPT | Performed by: INTERNAL MEDICINE

## 2024-02-06 PROCEDURE — 80061 LIPID PANEL: CPT | Performed by: INTERNAL MEDICINE

## 2024-02-06 PROCEDURE — 36591 DRAW BLOOD OFF VENOUS DEVICE: CPT

## 2024-02-06 PROCEDURE — 80053 COMPREHEN METABOLIC PANEL: CPT | Performed by: INTERNAL MEDICINE

## 2024-02-06 PROCEDURE — 85025 COMPLETE CBC W/AUTO DIFF WBC: CPT | Performed by: INTERNAL MEDICINE

## 2024-02-06 RX ORDER — DIPHENOXYLATE HYDROCHLORIDE AND ATROPINE SULFATE 2.5; .025 MG/1; MG/1
1 TABLET ORAL 4 TIMES DAILY PRN
Qty: 40 TABLET | Refills: 2 | Status: SHIPPED | OUTPATIENT
Start: 2024-02-06

## 2024-02-06 RX ORDER — HEPARIN SODIUM (PORCINE) LOCK FLUSH IV SOLN 100 UNIT/ML 100 UNIT/ML
500 SOLUTION INTRAVENOUS AS NEEDED
OUTPATIENT
Start: 2024-02-06

## 2024-02-06 RX ORDER — SODIUM CHLORIDE 0.9 % (FLUSH) 0.9 %
20 SYRINGE (ML) INJECTION AS NEEDED
OUTPATIENT
Start: 2024-02-06

## 2024-02-06 RX ORDER — HEPARIN SODIUM (PORCINE) LOCK FLUSH IV SOLN 100 UNIT/ML 100 UNIT/ML
500 SOLUTION INTRAVENOUS AS NEEDED
Status: DISCONTINUED | OUTPATIENT
Start: 2024-02-06 | End: 2024-02-07 | Stop reason: HOSPADM

## 2024-02-06 RX ORDER — SODIUM CHLORIDE 0.9 % (FLUSH) 0.9 %
20 SYRINGE (ML) INJECTION AS NEEDED
Status: DISCONTINUED | OUTPATIENT
Start: 2024-02-06 | End: 2024-02-07 | Stop reason: HOSPADM

## 2024-02-06 RX ADMIN — Medication 20 ML: at 09:30

## 2024-02-06 RX ADMIN — HEPARIN SODIUM (PORCINE) LOCK FLUSH IV SOLN 100 UNIT/ML 500 UNITS: 100 SOLUTION at 09:30

## 2024-02-06 NOTE — PROGRESS NOTES
[facility]    Location  [facility] HEMATOLOGY & ONCOLOGY    Chief Complaint  Malignant neoplasm of overlapping sites of right breast in     Referring Provider: Judah Johnson, *  PCP: Judah Johnson MD    Subjective          Oncology/Hematology History Overview Note     ER+ Right Breast Cancer:    Diagnostic mammogram on 11/12/2021: 2 cm asymmetry in the outer central right breast with amorphous calcifications.  Similar appearing group of amorphous calcifications in the outer central right breast.  6 mm asymmetry in the inner right breast.  Right axillary lymphadenopathy.    Ultrasound-guided biopsy on 11/1/2021: Right breast 3 o'clock position, 2 cm from the nipple with invasive ductal carcinoma, grade 2.  Right breast 9 o'clock position, 3 cm from the nipple with invasive ductal carcinoma, grade 2, ER positive (50%), MS positive (3%), HER-2 negative (score 0), Ki-67 14%.  Associated with intermediate grade ductal carcinoma in situ.  Right axillary lymph node positive for breast carcinoma.    CT CAP and bone scan on 12/13/21: negative for metastatic disease    Bilateral mastectomy 12/28/2021: Right breast with multiple (2) foci of invasive ductal carcinoma, largest focus 2.3 cm, grade 2, associated with DCIS with extensive intraductal component, all margins negative, 4/10 lymph nodes involved with no extranodal extension and the largest focus measured at 3.3 cm, ER positive (40%), MS positive (5%), HER-2 negative by IHC (score 0), Ki-67 14%, pT2 pN2a cM0    Completed 4 cycles of chemotherapy with TC on 4/25/22.  Complicated by a UTI and multiple right breast abscesses causing delay in cycles 3 and 4.     Radiation was also delayed due to infection and wound healing. She finishes December 20th.     She started Abemiciclib on 1/1/23.    *The pt discontinued HRT in October of 2021 when she had an abnormal screening mammogram     Malignant neoplasm of overlapping sites  of right breast in female, estrogen receptor positive   12/9/2021 Initial Diagnosis    Malignant neoplasm of overlapping sites of right breast in female, estrogen receptor positive (HCC)     12/28/2021 Cancer Staged    Staging form: Breast, AJCC 8th Edition  - Pathologic stage from 12/28/2021: Stage IB (pT2, pN2a, cM0, G2, ER+, RI+, HER2-) - Signed by Starr Mendez MD PhD on 1/30/2022 1/14/2022 -  Chemotherapy    OP CENTRAL VENOUS ACCESS DEVICE ACCESS, CARE, AND MAINTENANCE (CVAD)     1/31/2022 - 4/26/2022 Chemotherapy    OP BREAST TC DOCEtaxel / Cyclophosphamide     11/15/2022 - 12/20/2022 Radiation    Radiation OncologyTreatment Course:  Marisa Portillo received 5000 cGy in 25 fractions to right chest wall and axillary levels 1 through 3.      12/27/2022 -  Chemotherapy    OP BREAST Abemaciclib      Malignant neoplasm of overlapping sites of right female breast   7/18/2022 Initial Diagnosis    Malignant neoplasm of overlapping sites of right female breast (HCC)         History of Present Illness  Patient comes in today for toxicity check while on abemaciclib and letrozole. She reports the diarrhea has not been as frequent. She does have diarrhea up to 6-8 times a day 1 to 2 times per week. She reports imodium causes cramping. She drinks gatorade to help. Muscle cramps still occur but not as often on the magnesium, main location in jaw and neck as well as between shoulder blades. Does have some soreness in her right chest/axilla area, following with OT for this. Told it was scar tissue related. Reports chronic fatigue. Fatigue limits her ability to exercise. She denies any fevers, chills, infections. Menopausal symptoms stable. On effexor for hot flashes which helps. Uses clonidine to help with sleep.     Review of Systems   Constitutional:  Positive for fatigue. Negative for appetite change, diaphoresis, fever, unexpected weight gain and unexpected weight loss.   HENT:  Negative for hearing loss, mouth  sores, sore throat, swollen glands, trouble swallowing and voice change.    Eyes:  Negative for blurred vision.   Respiratory:  Negative for cough, shortness of breath and wheezing.    Cardiovascular:  Negative for chest pain and palpitations.   Gastrointestinal:  Negative for abdominal pain, blood in stool, constipation, diarrhea, nausea and vomiting.   Endocrine: Negative for cold intolerance and heat intolerance.   Genitourinary:  Negative for difficulty urinating, dysuria, frequency, hematuria and urinary incontinence.   Musculoskeletal:  Positive for arthralgias. Negative for back pain and myalgias.   Skin:  Negative for rash, skin lesions and wound.   Neurological:  Negative for dizziness, seizures, weakness, numbness and headache.   Hematological:  Does not bruise/bleed easily.   Psychiatric/Behavioral:  Negative for depressed mood. The patient is not nervous/anxious.    All other systems reviewed and are negative.      Past Medical History:   Diagnosis Date    Abnormal Pap smear of cervix     Allergies     Anemia During chemo 2022    Breast cancer     Cervical dysplasia     Herpes     High cholesterol     IFG (impaired fasting glucose)     Migraine     Osteoarthritis     S/P BILATERAL IMMEDIATE BREAST RECONSTRUCTION WITH LEFT PRE PEC PLACEMENT OF SILICONE GEL IMPLANT AND MESH, RIGHT PRE PEC PLACEMENT OF EXPANDER AND MESH 12/29/2021    TIA (transient ischemic attack)     no residual    Vitamin D deficiency      Past Surgical History:   Procedure Laterality Date    APPENDECTOMY      BREAST AUGMENTATION Bilateral 12/28/2021    Procedure: bilateral breast reconstructon with bilateral mesh placement.   left implant, right tissue expander placement;  Surgeon: Lacy Shelton MD;  Location: [facility];  Service: Plastics;  Laterality: Bilateral;    BREAST SURGERY  [id number]    Bilateral Mastectomy with implant Left expander Right    BREAST TISSUE EXPANDER REMOVAL INSERTION OF IMPLANT Right 04/15/2022     Procedure: RIGHT BREAST IMPLANT REMOVAL WITH INCISION AND DRAINAGE;  Surgeon: Lacy Shelton MD;  Location: [facility] [place];  Service: Plastics;  Laterality: Right;    CEREBRAL ANGIOGRAM      clip placed    CYST REMOVAL Right     rt wrist    HYSTERECTOMY      Partial age 31    INCISION AND DRAINAGE ABSCESS Bilateral 10/4/2022    Procedure: BILATERAL BREAST ABSCESS INCISION AND DRAINAGE, WITH LEFT BREAST IMPLANT REMOVAL;  Surgeon: Lacy Shelton MD;  Location: [facility] MAIN OR;  Service: Plastics;  Laterality: Bilateral;    INCISION AND DRAINAGE OF WOUND Right 03/11/2022    Procedure: INCISION AND DRAINAGE ABSCESS OF RIGHT BREAST WITH EXPANDER REMOVAL AND IMPLANT PLACEMENT;  Surgeon: Lacy Shelton MD;  Location: [facility] MAIN OR;  Service: Plastics;  Laterality: Right;    MASTECTOMY COMPLETE / SIMPLE W/ SENTINEL NODE BIOPSY Bilateral     PORTACATH PLACEMENT Left 12/28/2021    Procedure: INSERTION OF PORTACATH;  Surgeon: Jacqueline Mcgraw MD;  Location: [facility] [place];  Service: General;  Laterality: Left;    SENTINEL NODE BIOPSY Bilateral 12/28/2021    Procedure: BILATERAL BREAST MASTECTOMIES WITH RIGHT SENTINEL NODE BIOPSIES WITH FROZEN SECTIONS;  Surgeon: Jacqueline Mcgraw MD;  Location: [facility] [place];  Service: General;  Laterality: Bilateral;     Social History     Socioeconomic History    Marital status:    Tobacco Use    Smoking status: Never    Smokeless tobacco: Never    Tobacco comments:     Father mother  smoker diring childhood and  adulthood   Vaping Use    Vaping Use: Never used   Substance and Sexual Activity    Alcohol use: Yes     Comment: Socially only . Occasionally    Drug use: Never    Sexual activity: Yes     Partners: Male     Birth control/protection: Post-menopausal     Comment: Hysterectomy age 33     Family History   Problem Relation Age of Onset    Hypertension Father     Diabetes Father     Cancer Father     Arthritis Father     Liver cancer Father      Lung cancer Father     Asthma Father     COPD Father     Heart disease Father     Kidney disease Father     Hypertension Mother     Diabetes Mother     Cancer Mother     Arthritis Mother         Osteo and RA    Breast cancer Mother     Breast cancer Maternal Aunt     Malig Hyperthermia Neg Hx     Ovarian cancer Neg Hx     Uterine cancer Neg Hx     Colon cancer Neg Hx     Melanoma Neg Hx     Prostate cancer Neg Hx        Objective   Physical Exam  General: Alert, cooperative, no acute distress  Eyes: Anicteric sclera, PERRLA  Respiratory: normal respiratory effort  Cardiovascular: no lower extremity edema  Skin: Normal tone, no rash, no lesions  Psychiatric: Appropriate affect, intact judgment  Neurologic: No focal sensory or motor deficits, normal cognition   Musculoskeletal: Normal muscle strength and tone      Vitals:    02/06/24 0949   BP: 148/81   Pulse: 104   Resp: 18   Temp: 97.4 °F (36.3 °C)   TempSrc: Temporal   SpO2: 97%   Weight: 83.1 kg (183 lb 3.2 oz)   PainSc: 0-No pain                 PHQ-9 Total Score:         Result Review :   The following data was reviewed by: Judah Juarez MD on 02/06/24:  Lab Results   Component Value Date    HGB 12.7 02/06/2024    HCT 37.0 02/06/2024    .1 (H) 02/06/2024     02/06/2024    WBC 3.69 02/06/2024    NEUTROABS 2.10 02/06/2024    LYMPHSABS 0.96 02/06/2024    MONOSABS 0.48 02/06/2024    EOSABS 0.10 02/06/2024    BASOSABS 0.04 02/06/2024     Lab Results   Component Value Date    GLUCOSE 147 (H) 02/06/2024    BUN 17 02/06/2024    CREATININE 1.00 02/06/2024     02/06/2024    K 3.6 02/06/2024     02/06/2024    CO2 27.8 02/06/2024    CALCIUM 9.9 02/06/2024    PROTEINTOT 6.9 02/06/2024    ALBUMIN 4.6 02/06/2024    BILITOT 0.5 02/06/2024    ALKPHOS 64 02/06/2024    AST 15 02/06/2024    ALT 11 02/06/2024     Lab Results   Component Value Date    IRON 65 03/13/2022    LABIRON 35 03/13/2022    TRANSFERRIN 125 (L) 03/13/2022    TIBC 186 (L)  "03/13/2022     Lab Results   Component Value Date    AWYTHDSN55 >2,000 (H) 03/11/2022    FOLATE 16.80 03/11/2022     No results found for: \"PSA\", \"CEA\", \"AFP\", \"\", \"\"    Labs personally reviewed. No anemia. WBC normal. CMP normal.     Gynecology note personally reviewed         Assessment and Plan    Diagnoses and all orders for this visit:    1. Malignant neoplasm of overlapping sites of right breast in female, estrogen receptor positive (Primary)  -     CBC & Differential; Future  -     Comprehensive Metabolic Panel; Future    2. Functional diarrhea  -     diphenoxylate-atropine (LOMOTIL) 2.5-0.025 MG per tablet; Take 1 tablet by mouth 4 (Four) Times a Day As Needed for Diarrhea.  Dispense: 40 tablet; Refill: 2    3. Muscle cramp    4. Menopausal symptom          ER+ Right breast Cancer: pT2N2a, s/p bilateral mastectomy.  The pt completed 4 cycles of TC.  Her treatment course was complicated by breast abscesses. Radiation was delayed until December 2022.  She is tolerating anastrozole and abemiciclib which she started on 1/1/23 with the exception of occasional severe bouts of diarrhea. Diarrhea improved but still occurs 1-2 times per week. Currently on 100 mg BID dosing of the abemaciclib. Plan for 2 years of abemaciclib (due to finish 1/2025). Plan to continue with abemaciclib and letrozole at current dosages. Continue to monitor for severe toxicities with frequent labs and office visits.    Diarrhea  2/2 abemaciclib. Can be up to 6-8 in a day, occurs 1 to 2 times per week. She is going to log her events. Imodium causing cramping. Start as needed lomotil. Can dose reduce abemaciclib if needed; discussed with patient today, she would like ot try the lomotil and keep abemaciclib at current dose.      Menopausal symptoms: Secondary to letrozole. She is currently on venlafaxine 75mg daily.  Also on clonidine     Muscle cramps  Not likely due to abemaciclib. In jaw and neck. Ok to continue mag supplement. " Mag has improved the frequency. Also suggested massage and/or acupuncture.     Patient follow up: 3 months  Patient was given instructions and counseling regarding her condition or for health maintenance advice. Please see specific information pulled into the AVS if appropriate.

## 2024-02-07 ENCOUNTER — SPECIALTY PHARMACY (OUTPATIENT)
Dept: PHARMACY | Facility: HOSPITAL | Age: 64
End: 2024-02-07
Payer: COMMERCIAL

## 2024-02-13 ENCOUNTER — HOSPITAL ENCOUNTER (OUTPATIENT)
Dept: OCCUPATIONAL THERAPY | Facility: HOSPITAL | Age: 64
Setting detail: THERAPIES SERIES
Discharge: HOME | End: 2024-02-13
Payer: COMMERCIAL

## 2024-02-15 RX ORDER — LETROZOLE 2.5 MG/1
2.5 TABLET, FILM COATED ORAL DAILY
Qty: 90 TABLET | Refills: 1 | Status: SHIPPED | OUTPATIENT
Start: 2024-02-15

## 2024-02-21 ENCOUNTER — SPECIALTY PHARMACY (OUTPATIENT)
Dept: PHARMACY | Facility: HOSPITAL | Age: 64
End: 2024-02-21
Payer: COMMERCIAL

## 2024-02-21 NOTE — PROGRESS NOTES
" Specialty Pharmacy Patient Management Program  Oncology Refill Outreach      Marisa \"Liberty\" is a 64 y.o. female contacted today regarding refills of her medication(s).    Specialty medication(s) and dose(s) confirmed: Completed refill outreach. [facility] will mail abemaciclib (VERZENIO) 100 mg tablet chemo tablet to patient's home.    Other medications being refilled: N/A    Refill Questions      Flowsheet Row Most Recent Value   Changes to allergies? No   Changes to medications? No   New conditions or infections since last clinic visit No   Unplanned office visit, urgent care, ED, or hospital admission in the last 4 weeks  No   How does patient/caregiver feel medication is working? Good   Financial problems or insurance changes  No   If yes, please provide the amount N/A   Why were doses missed? N/A   Does this patient require a clinical escalation to a pharmacist? No            Delivery Questions      Flowsheet Row Most Recent Value   Delivery method FedEx   Delivery address verified with patient/caregiver? Yes   Delivery address Home   Number of medications in delivery 1   Medication(s) being filled and delivered Abemaciclib   Doses left of specialty medications 14   Copay verified? Yes   Copay amount 0 Copay card   Copay form of payment No copayment ($0)                   Follow-up: 21 days     Jennifer Diggs  Care Coordinator, [facility]  2/21/2024  08:18 EST          "

## 2024-02-23 ENCOUNTER — TELEPHONE (OUTPATIENT)
Dept: PLASTIC SURGERY | Facility: CLINIC | Age: 64
End: 2024-02-23
Payer: COMMERCIAL

## 2024-02-23 DIAGNOSIS — N65.0 BREAST RECONSTRUCTION DEFORMITY: Primary | ICD-10-CM

## 2024-02-23 NOTE — TELEPHONE ENCOUNTER
Caller: ALONSO OLVERA    Relationship: SELF     Best call back number: [phone]    What is the medical concern/diagnosis: RECON SURGERY     What specialty or service is being requested:     What is the provider, practice or medical service name:     What is the office location: [place]     What is the office phone number:     Any additional details: PT WAS REFERRED TO A  AND SHE IS NO LONGER WITH [facility]. SHE WANTED TO SEE WHO ELSE  THINKS SHE SHOULD GO TO OR ANOTHER PROVIDER AT THAT OFFICE. PLEASE GIVE HER A CALL BACK TO DISCUSS ASAP.

## 2024-02-26 ENCOUNTER — TELEPHONE (OUTPATIENT)
Dept: PLASTIC SURGERY | Facility: CLINIC | Age: 64
End: 2024-02-26
Payer: COMMERCIAL

## 2024-03-11 ENCOUNTER — TELEPHONE (OUTPATIENT)
Dept: ONCOLOGY | Facility: HOSPITAL | Age: 64
End: 2024-03-11

## 2024-03-11 NOTE — TELEPHONE ENCOUNTER
"  Caller: Marisa Portillo \"Liberty\"    Relationship to patient: Self    Best call back number: [phone]    Chief complaint: PT CALLED TO RESCHEDULE     Type of visit: PORT FLUSH     Requested date: 3-15-24 MORNING    If rescheduling, when is the original appointment: 3-12-24     "

## 2024-03-13 ENCOUNTER — TELEPHONE (OUTPATIENT)
Dept: ONCOLOGY | Facility: HOSPITAL | Age: 64
End: 2024-03-13
Payer: COMMERCIAL

## 2024-03-13 RX ORDER — SULFAMETHOXAZOLE AND TRIMETHOPRIM 800; 160 MG/1; MG/1
1 TABLET ORAL 2 TIMES DAILY
Qty: 14 TABLET | Refills: 0 | Status: SHIPPED | OUTPATIENT
Start: 2024-03-13 | End: 2024-03-20

## 2024-03-13 NOTE — TELEPHONE ENCOUNTER
"    Caller: Marisa Portillo \"Liberty\"    Relationship: Self    Best call back number: [phone]     What medication are you requesting: ANTIBIOTICS FOR UTI    What are your current symptoms: LOW GRADE TEMP, LOWER BACK PAIN, FREQUENT AND URGENT URINATION    How long have you been experiencing symptoms: 3    Have you had these symptoms before:    [x] Yes  [] No    Have you been treated for these symptoms before:   [x] Yes  [] No    If a prescription is needed, what is your preferred pharmacy and phone number: [facility] [address] [place] [address] [facility] [address] [place] [address] & [facility] [phone] [facility] [phone] FX     Additional notes:  PATIENT WILL NOT BE BACK TO [place] UNTIL FRIDAY  "

## 2024-03-18 ENCOUNTER — HOSPITAL ENCOUNTER (OUTPATIENT)
Dept: ONCOLOGY | Facility: HOSPITAL | Age: 64
Discharge: HOME | End: 2024-03-18
Admitting: INTERNAL MEDICINE
Payer: COMMERCIAL

## 2024-03-18 DIAGNOSIS — Z45.2 ENCOUNTER FOR ADJUSTMENT OR MANAGEMENT OF VASCULAR ACCESS DEVICE: Primary | ICD-10-CM

## 2024-03-18 PROCEDURE — 96523 IRRIG DRUG DELIVERY DEVICE: CPT

## 2024-03-18 PROCEDURE — 25010000002 HEPARIN LOCK FLUSH PER 10 UNITS: Performed by: INTERNAL MEDICINE

## 2024-03-18 RX ORDER — SODIUM CHLORIDE 0.9 % (FLUSH) 0.9 %
20 SYRINGE (ML) INJECTION AS NEEDED
Status: DISCONTINUED | OUTPATIENT
Start: 2024-03-18 | End: 2024-03-19 | Stop reason: HOSPADM

## 2024-03-18 RX ORDER — HEPARIN SODIUM (PORCINE) LOCK FLUSH IV SOLN 100 UNIT/ML 100 UNIT/ML
500 SOLUTION INTRAVENOUS AS NEEDED
Status: DISCONTINUED | OUTPATIENT
Start: 2024-03-18 | End: 2024-03-19 | Stop reason: HOSPADM

## 2024-03-18 RX ORDER — SODIUM CHLORIDE 0.9 % (FLUSH) 0.9 %
20 SYRINGE (ML) INJECTION AS NEEDED
OUTPATIENT
Start: 2024-03-18

## 2024-03-18 RX ORDER — HEPARIN SODIUM (PORCINE) LOCK FLUSH IV SOLN 100 UNIT/ML 100 UNIT/ML
500 SOLUTION INTRAVENOUS AS NEEDED
OUTPATIENT
Start: 2024-03-18

## 2024-03-18 RX ADMIN — HEPARIN 500 UNITS: 100 SYRINGE at 08:14

## 2024-03-18 RX ADMIN — Medication 20 ML: at 08:14

## 2024-03-26 ENCOUNTER — SPECIALTY PHARMACY (OUTPATIENT)
Dept: PHARMACY | Facility: HOSPITAL | Age: 64
End: 2024-03-26
Payer: COMMERCIAL

## 2024-03-26 NOTE — PROGRESS NOTES
" Specialty Pharmacy Patient Management Program  Oncology Refill Outreach      Marisa \"Liberty\" is a 64 y.o. female contacted today regarding refills of her medication(s).    Specialty medication(s) and dose(s) confirmed: abemaciclib (VERZENIO) 100 mg tablet chemo tablet     Other medications being refilled: N/A    Refill Questions      Flowsheet Row Most Recent Value   Changes to allergies? No   Changes to medications? No   New conditions or infections since last clinic visit No   Unplanned office visit, urgent care, ED, or hospital admission in the last 4 weeks  No   How does patient/caregiver feel medication is working? Good   Financial problems or insurance changes  No   Since the previous refill, were any specialty medication doses or scheduled injections missed or delayed?  No   If yes, please provide the amount N/A   Why were doses missed? N/A   Does this patient require a clinical escalation to a pharmacist? No            Delivery Questions      Flowsheet Row Most Recent Value   Delivery method FedEx   Delivery address verified with patient/caregiver? Yes   Delivery address Home   Number of medications in delivery 1   Medication(s) being filled and delivered Abemaciclib   Doses left of specialty medications 12   Copay verified? Yes   Copay amount 0 Copay card                   Follow-up: 21 days     Jennifer Diggs  Care Coordinator, [facility]  3/26/2024  10:01 EDT          "

## 2024-03-28 RX ORDER — ERGOCALCIFEROL 1.25 MG/1
50000 CAPSULE ORAL
Qty: 4 CAPSULE | Refills: 11 | Status: SHIPPED | OUTPATIENT
Start: 2024-03-28

## 2024-04-10 ENCOUNTER — TELEPHONE (OUTPATIENT)
Dept: INTERNAL MEDICINE | Age: 64
End: 2024-04-10
Payer: COMMERCIAL

## 2024-04-10 ENCOUNTER — TELEPHONE (OUTPATIENT)
Dept: ONCOLOGY | Facility: HOSPITAL | Age: 64
End: 2024-04-10
Payer: COMMERCIAL

## 2024-04-10 ENCOUNTER — LAB (OUTPATIENT)
Dept: LAB | Facility: HOSPITAL | Age: 64
End: 2024-04-10
Payer: COMMERCIAL

## 2024-04-10 DIAGNOSIS — N32.9 BLADDER DISORDER: Primary | ICD-10-CM

## 2024-04-10 DIAGNOSIS — N32.9 BLADDER DISORDER: ICD-10-CM

## 2024-04-10 LAB
ALBUMIN UR-MCNC: 4.4 MG/DL
BACTERIA UR QL AUTO: ABNORMAL /HPF
BILIRUB UR QL STRIP: NEGATIVE
CLARITY UR: CLEAR
COLOR UR: YELLOW
CREAT UR-MCNC: 236.2 MG/DL
GLUCOSE UR STRIP-MCNC: NEGATIVE MG/DL
HGB UR QL STRIP.AUTO: NEGATIVE
HOLD SPECIMEN: NORMAL
HYALINE CASTS UR QL AUTO: ABNORMAL /LPF
KETONES UR QL STRIP: NEGATIVE
LEUKOCYTE ESTERASE UR QL STRIP.AUTO: ABNORMAL
MICROALBUMIN/CREAT UR: 18.6 MG/G (ref 0–29)
NITRITE UR QL STRIP: NEGATIVE
PH UR STRIP.AUTO: 5.5 [PH] (ref 5–8)
PROT UR QL STRIP: ABNORMAL
RBC # UR STRIP: ABNORMAL /HPF
REF LAB TEST METHOD: ABNORMAL
SP GR UR STRIP: 1.03 (ref 1–1.03)
SQUAMOUS #/AREA URNS HPF: ABNORMAL /HPF
UROBILINOGEN UR QL STRIP: ABNORMAL
WBC # UR STRIP: ABNORMAL /HPF

## 2024-04-10 PROCEDURE — 81001 URINALYSIS AUTO W/SCOPE: CPT

## 2024-04-10 PROCEDURE — 87086 URINE CULTURE/COLONY COUNT: CPT

## 2024-04-10 PROCEDURE — 82043 UR ALBUMIN QUANTITATIVE: CPT

## 2024-04-10 PROCEDURE — 82570 ASSAY OF URINE CREATININE: CPT

## 2024-04-10 NOTE — TELEPHONE ENCOUNTER
"  Caller: Marisa Portillo \"Liberty\"    Relationship: Self    Best call back number: [phone]     What medication are you requesting: ANTIBIOTICS    What are your current symptoms: STATED SHE GOT A URINE ANALYSIS DONE AND IT CAME BACK WITH BACTERIA IN IT.     Have you had these symptoms before:    [x] Yes  [] No    Have you been treated for these symptoms before:   [x] Yes  [] No    If a prescription is needed, what is your preferred pharmacy and phone number: [facility] [address] [place] [address] [facility] [address] AT [place] [address] & [facility] [phone] [facility] [phone] FX       "

## 2024-04-10 NOTE — TELEPHONE ENCOUNTER
The pt called and requested an antibiotic for UTI s/s. This nurse instructed the pt to see her PCP and do a UA. The pt voiced understanding.

## 2024-04-11 ENCOUNTER — TELEPHONE (OUTPATIENT)
Dept: INTERNAL MEDICINE | Age: 64
End: 2024-04-11
Payer: COMMERCIAL

## 2024-04-11 RX ORDER — NITROFURANTOIN 25; 75 MG/1; MG/1
100 CAPSULE ORAL 2 TIMES DAILY
Qty: 14 CAPSULE | Refills: 0 | Status: SHIPPED | OUTPATIENT
Start: 2024-04-11 | End: 2024-04-18

## 2024-04-11 NOTE — TELEPHONE ENCOUNTER
I have let pt know of this, she voiced understanding. Dr. Levine ordered this UA.   I have sent RX over.

## 2024-04-11 NOTE — TELEPHONE ENCOUNTER
Call patient tell her there was a urine test that was done by someone and it showed a trace amount of leukocytes no bacteria were present, but she did have a few white blood cells, normally this does not indicate an infection, it might indicate inflammation in the bladder, we can give her an antibiotic if she would like and if she is having symptoms,   but if the symptoms continue then we would want to recheck it and wait for the culture,    Call in Macrobid 100 mg twice a day #14 no refills------- but make sure her symptoms improve if not make her come in and we will talk to her about this issue

## 2024-04-12 LAB — BACTERIA SPEC AEROBE CULT: ABNORMAL

## 2024-04-18 ENCOUNTER — SPECIALTY PHARMACY (OUTPATIENT)
Dept: PHARMACY | Facility: HOSPITAL | Age: 64
End: 2024-04-18
Payer: COMMERCIAL

## 2024-04-18 NOTE — PROGRESS NOTES
" Specialty Pharmacy Patient Management Program  Oncology Refill Outreach      Marisa \"Liberty\" is a 64 y.o. female contacted today regarding refills of her medication(s).    Specialty medication(s) and dose(s) confirmed: Completed refill outreach. [facility] will mail abemaciclib (VERZENIO) 100 mg tablet chemo tablet once ready to dispense.     Other medications being refilled: N/A    Refill Questions      Flowsheet Row Most Recent Value   Changes to allergies? No   Changes to medications? No   New conditions or infections since last clinic visit No   Unplanned office visit, urgent care, ED, or hospital admission in the last 4 weeks  No   How does patient/caregiver feel medication is working? Fair   Financial problems or insurance changes  No   Since the previous refill, were any specialty medication doses or scheduled injections missed or delayed?  Yes   If yes, please provide the amount 4   Why were doses missed? Pt went out of town and forgot to pack them.   Does this patient require a clinical escalation to a pharmacist? Yes            Delivery Questions      Flowsheet Row Most Recent Value   Delivery method FedEx   Delivery address verified with patient/caregiver? Yes   Delivery address Home   Number of medications in delivery 1   Medication(s) being filled and delivered Abemaciclib   Doses left of specialty medications 12   Copay verified? Yes   Copay amount 0 Copay card   Copay form of payment No copayment ($0)                   Follow-up: 21 days     Jennifer Diggs  Care Coordinator, [facility]  4/18/2024  13:28 EDT          "

## 2024-04-22 ENCOUNTER — SPECIALTY PHARMACY (OUTPATIENT)
Dept: PHARMACY | Facility: HOSPITAL | Age: 64
End: 2024-04-22
Payer: COMMERCIAL

## 2024-04-26 ENCOUNTER — TELEPHONE (OUTPATIENT)
Dept: ONCOLOGY | Facility: HOSPITAL | Age: 64
End: 2024-04-26

## 2024-04-26 NOTE — TELEPHONE ENCOUNTER
"  Caller: Marisa Portillo \"Liberty\"    Relationship: Self    Best call back number: [phone]    What is the best time to reach you: ANYTIME THIS MORNING    Who are you requesting to speak with (clinical staff, provider,  specific staff member): PETER IN THE  PHARMACY         What was the call regarding:     HAD A QUESTION ABOUT PRESCRIPTIONS     "

## 2024-05-03 ENCOUNTER — OFFICE VISIT (OUTPATIENT)
Dept: ONCOLOGY | Facility: HOSPITAL | Age: 64
End: 2024-05-03
Payer: COMMERCIAL

## 2024-05-03 ENCOUNTER — HOSPITAL ENCOUNTER (OUTPATIENT)
Dept: ONCOLOGY | Facility: HOSPITAL | Age: 64
Discharge: HOME | End: 2024-05-03
Admitting: INTERNAL MEDICINE
Payer: COMMERCIAL

## 2024-05-03 ENCOUNTER — APPOINTMENT (OUTPATIENT)
Dept: PHARMACY | Facility: HOSPITAL | Age: 64
End: 2024-05-03
Payer: COMMERCIAL

## 2024-05-03 ENCOUNTER — SPECIALTY PHARMACY (OUTPATIENT)
Dept: PHARMACY | Facility: HOSPITAL | Age: 64
End: 2024-05-03
Payer: COMMERCIAL

## 2024-05-03 VITALS
BODY MASS INDEX: 29.89 KG/M2 | TEMPERATURE: 97.3 F | RESPIRATION RATE: 18 BRPM | WEIGHT: 179.6 LBS | SYSTOLIC BLOOD PRESSURE: 130 MMHG | OXYGEN SATURATION: 99 % | HEART RATE: 75 BPM | DIASTOLIC BLOOD PRESSURE: 71 MMHG

## 2024-05-03 DIAGNOSIS — Z17.0 MALIGNANT NEOPLASM OF OVERLAPPING SITES OF RIGHT BREAST IN FEMALE, ESTROGEN RECEPTOR POSITIVE: ICD-10-CM

## 2024-05-03 DIAGNOSIS — Z17.0 MALIGNANT NEOPLASM OF OVERLAPPING SITES OF RIGHT BREAST IN FEMALE, ESTROGEN RECEPTOR POSITIVE: Primary | ICD-10-CM

## 2024-05-03 DIAGNOSIS — K52.1 CHEMOTHERAPY INDUCED DIARRHEA: ICD-10-CM

## 2024-05-03 DIAGNOSIS — R25.2 MUSCLE CRAMP: ICD-10-CM

## 2024-05-03 DIAGNOSIS — Z45.2 ENCOUNTER FOR ADJUSTMENT OR MANAGEMENT OF VASCULAR ACCESS DEVICE: Primary | ICD-10-CM

## 2024-05-03 DIAGNOSIS — C50.811 MALIGNANT NEOPLASM OF OVERLAPPING SITES OF RIGHT BREAST IN FEMALE, ESTROGEN RECEPTOR POSITIVE: ICD-10-CM

## 2024-05-03 DIAGNOSIS — T45.1X5A CHEMOTHERAPY INDUCED NEUTROPENIA: ICD-10-CM

## 2024-05-03 DIAGNOSIS — C50.811 MALIGNANT NEOPLASM OF OVERLAPPING SITES OF RIGHT BREAST IN FEMALE, ESTROGEN RECEPTOR POSITIVE: Primary | ICD-10-CM

## 2024-05-03 DIAGNOSIS — D70.1 CHEMOTHERAPY INDUCED NEUTROPENIA: ICD-10-CM

## 2024-05-03 DIAGNOSIS — T45.1X5A CHEMOTHERAPY INDUCED DIARRHEA: ICD-10-CM

## 2024-05-03 DIAGNOSIS — N95.1 MENOPAUSAL HOT FLUSHES: ICD-10-CM

## 2024-05-03 LAB
ALBUMIN SERPL-MCNC: 4.2 G/DL (ref 3.5–5.2)
ALBUMIN/GLOB SERPL: 2.1 G/DL
ALP SERPL-CCNC: 60 U/L (ref 39–117)
ALT SERPL W P-5'-P-CCNC: 14 U/L (ref 1–33)
ANION GAP SERPL CALCULATED.3IONS-SCNC: 3.2 MMOL/L (ref 5–15)
AST SERPL-CCNC: 18 U/L (ref 1–32)
BASOPHILS # BLD AUTO: 0.05 10*3/MM3 (ref 0–0.2)
BASOPHILS NFR BLD AUTO: 1.9 % (ref 0–1.5)
BILIRUB SERPL-MCNC: 0.4 MG/DL (ref 0–1.2)
BUN SERPL-MCNC: 14 MG/DL (ref 8–23)
BUN/CREAT SERPL: 15.2 (ref 7–25)
CALCIUM SPEC-SCNC: 8.5 MG/DL (ref 8.6–10.5)
CHLORIDE SERPL-SCNC: 107 MMOL/L (ref 98–107)
CO2 SERPL-SCNC: 27.8 MMOL/L (ref 22–29)
CREAT SERPL-MCNC: 0.92 MG/DL (ref 0.57–1)
DEPRECATED RDW RBC AUTO: 49.6 FL (ref 37–54)
EGFRCR SERPLBLD CKD-EPI 2021: 69.2 ML/MIN/1.73
EOSINOPHIL # BLD AUTO: 0.13 10*3/MM3 (ref 0–0.4)
EOSINOPHIL NFR BLD AUTO: 5 % (ref 0.3–6.2)
ERYTHROCYTE [DISTWIDTH] IN BLOOD BY AUTOMATED COUNT: 12.9 % (ref 12.3–15.4)
GLOBULIN UR ELPH-MCNC: 2 GM/DL
GLUCOSE SERPL-MCNC: 108 MG/DL (ref 65–99)
HCT VFR BLD AUTO: 34.8 % (ref 34–46.6)
HGB BLD-MCNC: 11.8 G/DL (ref 12–15.9)
IMM GRANULOCYTES # BLD AUTO: 0 10*3/MM3 (ref 0–0.05)
IMM GRANULOCYTES NFR BLD AUTO: 0 % (ref 0–0.5)
LYMPHOCYTES # BLD AUTO: 0.78 10*3/MM3 (ref 0.7–3.1)
LYMPHOCYTES NFR BLD AUTO: 30 % (ref 19.6–45.3)
MCH RBC QN AUTO: 34.7 PG (ref 26.6–33)
MCHC RBC AUTO-ENTMCNC: 33.9 G/DL (ref 31.5–35.7)
MCV RBC AUTO: 102.4 FL (ref 79–97)
MONOCYTES # BLD AUTO: 0.36 10*3/MM3 (ref 0.1–0.9)
MONOCYTES NFR BLD AUTO: 13.8 % (ref 5–12)
NEUTROPHILS NFR BLD AUTO: 1.28 10*3/MM3 (ref 1.7–7)
NEUTROPHILS NFR BLD AUTO: 49.3 % (ref 42.7–76)
PLATELET # BLD AUTO: 163 10*3/MM3 (ref 140–450)
PMV BLD AUTO: 9.6 FL (ref 6–12)
POTASSIUM SERPL-SCNC: 4 MMOL/L (ref 3.5–5.2)
PROT SERPL-MCNC: 6.2 G/DL (ref 6–8.5)
RBC # BLD AUTO: 3.4 10*6/MM3 (ref 3.77–5.28)
SODIUM SERPL-SCNC: 138 MMOL/L (ref 136–145)
WBC NRBC COR # BLD AUTO: 2.6 10*3/MM3 (ref 3.4–10.8)

## 2024-05-03 PROCEDURE — 85025 COMPLETE CBC W/AUTO DIFF WBC: CPT | Performed by: INTERNAL MEDICINE

## 2024-05-03 PROCEDURE — 36591 DRAW BLOOD OFF VENOUS DEVICE: CPT

## 2024-05-03 PROCEDURE — 25010000002 HEPARIN LOCK FLUSH PER 10 UNITS: Performed by: INTERNAL MEDICINE

## 2024-05-03 PROCEDURE — 80053 COMPREHEN METABOLIC PANEL: CPT | Performed by: INTERNAL MEDICINE

## 2024-05-03 RX ORDER — SODIUM CHLORIDE 0.9 % (FLUSH) 0.9 %
20 SYRINGE (ML) INJECTION AS NEEDED
Status: DISCONTINUED | OUTPATIENT
Start: 2024-05-03 | End: 2024-05-04 | Stop reason: HOSPADM

## 2024-05-03 RX ORDER — HEPARIN SODIUM (PORCINE) LOCK FLUSH IV SOLN 100 UNIT/ML 100 UNIT/ML
500 SOLUTION INTRAVENOUS AS NEEDED
OUTPATIENT
Start: 2024-05-03

## 2024-05-03 RX ORDER — SODIUM CHLORIDE 0.9 % (FLUSH) 0.9 %
20 SYRINGE (ML) INJECTION AS NEEDED
OUTPATIENT
Start: 2024-05-03

## 2024-05-03 RX ORDER — HEPARIN SODIUM (PORCINE) LOCK FLUSH IV SOLN 100 UNIT/ML 100 UNIT/ML
500 SOLUTION INTRAVENOUS AS NEEDED
Status: DISCONTINUED | OUTPATIENT
Start: 2024-05-03 | End: 2024-05-04 | Stop reason: HOSPADM

## 2024-05-03 RX ORDER — LORATADINE 10 MG/1
10 TABLET ORAL DAILY
COMMUNITY

## 2024-05-03 RX ADMIN — Medication 20 ML: at 11:06

## 2024-05-03 RX ADMIN — HEPARIN 500 UNITS: 100 SYRINGE at 11:06

## 2024-05-03 NOTE — PROGRESS NOTES
[facility]    [facility]    Chief Complaint  Malignant neoplasm of overlapping sites of right breast in     Referring Provider: Judah Johnson, *  PCP: Judah Johnson MD    Subjective          Oncology/Hematology History Overview Note     ER+ Right Breast Cancer:    Diagnostic mammogram on 11/12/2021: 2 cm asymmetry in the outer central right breast with amorphous calcifications.  Similar appearing group of amorphous calcifications in the outer central right breast.  6 mm asymmetry in the inner right breast.  Right axillary lymphadenopathy.    Ultrasound-guided biopsy on 11/1/2021: Right breast 3 o'clock position, 2 cm from the nipple with invasive ductal carcinoma, grade 2.  Right breast 9 o'clock position, 3 cm from the nipple with invasive ductal carcinoma, grade 2, ER positive (50%), NE positive (3%), HER-2 negative (score 0), Ki-67 14%.  Associated with intermediate grade ductal carcinoma in situ.  Right axillary lymph node positive for breast carcinoma.    CT CAP and bone scan on 12/13/21: negative for metastatic disease    Bilateral mastectomy 12/28/2021: Right breast with multiple (2) foci of invasive ductal carcinoma, largest focus 2.3 cm, grade 2, associated with DCIS with extensive intraductal component, all margins negative, 4/10 lymph nodes involved with no extranodal extension and the largest focus measured at 3.3 cm, ER positive (40%), NE positive (5%), HER-2 negative by IHC (score 0), Ki-67 14%, pT2 pN2a cM0    Completed 4 cycles of chemotherapy with TC on 4/25/22.  Complicated by a UTI and multiple right breast abscesses causing delay in cycles 3 and 4.     Radiation was also delayed due to infection and wound healing. She finishes December 20th.     She started Abemiciclib on 1/1/23.    *The pt discontinued HRT in October of 2021 when she had an abnormal screening mammogram     Malignant neoplasm of overlapping sites  of right breast in female, estrogen receptor positive   12/9/2021 Initial Diagnosis    Malignant neoplasm of overlapping sites of right breast in female, estrogen receptor positive (HCC)     12/28/2021 Cancer Staged    Staging form: Breast, AJCC 8th Edition  - Pathologic stage from 12/28/2021: Stage IB (pT2, pN2a, cM0, G2, ER+, AK+, HER2-) - Signed by Starr Mendez MD PhD on 1/30/2022 1/14/2022 -  Chemotherapy    OP CENTRAL VENOUS ACCESS DEVICE ACCESS, CARE, AND MAINTENANCE (CVAD)     1/31/2022 - 4/26/2022 Chemotherapy    OP BREAST TC DOCEtaxel / Cyclophosphamide     11/15/2022 - 12/20/2022 Radiation    Radiation OncologyTreatment Course:  Marisa Portillo received 5000 cGy in 25 fractions to right chest wall and axillary levels 1 through 3.      12/27/2022 -  Chemotherapy    OP BREAST Abemaciclib      Malignant neoplasm of overlapping sites of right female breast   7/18/2022 Initial Diagnosis    Malignant neoplasm of overlapping sites of right female breast (HCC)         History of Present Illness  Patient comes in today for toxicity check while on abemaciclib and letrozole. Started as needed lomotil last visit. Using a few times per week. This has helped her diarrhea not be as severe.  Muscle cramps still occur but not as often. Takes magnesium supplement. She denies any fevers, chills, infections. No nausea, vomiting. No bone pain. Going to  for consideration of breast reconstruction, has that appointment in July.  Menopausal symptoms stable. On effexor for hot flashes which helps. Uses clonidine to help with sleep.     Review of Systems   Constitutional:  Positive for fatigue. Negative for appetite change, diaphoresis, fever, unexpected weight gain and unexpected weight loss.   HENT:  Negative for hearing loss, mouth sores, sore throat, swollen glands, trouble swallowing and voice change.    Eyes:  Negative for blurred vision.   Respiratory:  Negative for cough, shortness of breath and wheezing.     Cardiovascular:  Negative for chest pain and palpitations.   Gastrointestinal:  Negative for abdominal pain, blood in stool, constipation, diarrhea, nausea and vomiting.   Endocrine: Negative for cold intolerance and heat intolerance.   Genitourinary:  Negative for difficulty urinating, dysuria, frequency, hematuria and urinary incontinence.   Musculoskeletal:  Positive for arthralgias. Negative for back pain and myalgias.   Skin:  Negative for rash, skin lesions and wound.   Neurological:  Negative for dizziness, seizures, weakness, numbness and headache.   Hematological:  Does not bruise/bleed easily.   Psychiatric/Behavioral:  Negative for depressed mood. The patient is not nervous/anxious.    All other systems reviewed and are negative.      Past Medical History:   Diagnosis Date    Abnormal Pap smear of cervix     Allergies     Anemia During chemo 2022    Breast cancer     Cervical dysplasia     Herpes     High cholesterol     IFG (impaired fasting glucose)     Migraine     Osteoarthritis     S/P BILATERAL IMMEDIATE BREAST RECONSTRUCTION WITH LEFT PRE PEC PLACEMENT OF SILICONE GEL IMPLANT AND MESH, RIGHT PRE PEC PLACEMENT OF EXPANDER AND MESH 12/29/2021    TIA (transient ischemic attack)     no residual    Vitamin D deficiency      Past Surgical History:   Procedure Laterality Date    APPENDECTOMY      BREAST AUGMENTATION Bilateral 12/28/2021    Procedure: bilateral breast reconstructon with bilateral mesh placement.   left implant, right tissue expander placement;  Surgeon: Lacy Shelton MD;  [place] [facility] [place] [facility] [place]  Service: Plastics;  Laterality: Bilateral;    BREAST SURGERY  [id number]    Bilateral Mastectomy with implant Left expander Right    BREAST TISSUE EXPANDER REMOVAL INSERTION OF IMPLANT Right 04/15/2022    Procedure: RIGHT BREAST IMPLANT REMOVAL WITH INCISION AND DRAINAGE;  Surgeon: Lacy Shelton MD;  [place] [facility] [place] [facility] [place]  Service: Plastics;  Laterality: Right;    CEREBRAL  ANGIOGRAM      clip placed    CYST REMOVAL Right     rt wrist    HYSTERECTOMY      Partial age 31    INCISION AND DRAINAGE ABSCESS Bilateral 10/4/2022    Procedure: BILATERAL BREAST ABSCESS INCISION AND DRAINAGE, WITH LEFT BREAST IMPLANT REMOVAL;  Surgeon: Lacy Shelton MD;  Location: [facility] MAIN OR;  Service: Plastics;  Laterality: Bilateral;    INCISION AND DRAINAGE OF WOUND Right 03/11/2022    Procedure: INCISION AND DRAINAGE ABSCESS OF RIGHT BREAST WITH EXPANDER REMOVAL AND IMPLANT PLACEMENT;  Surgeon: Lacy Shelton MD;  Location: [facility] MAIN OR;  Service: Plastics;  Laterality: Right;    MASTECTOMY COMPLETE / SIMPLE W/ SENTINEL NODE BIOPSY Bilateral     PORTACATH PLACEMENT Left 12/28/2021    Procedure: INSERTION OF PORTACATH;  Surgeon: Jacqueline Mcgraw MD;  [place] [facility] [place] [facility] [place]  Service: General;  Laterality: Left;    SENTINEL NODE BIOPSY Bilateral 12/28/2021    Procedure: BILATERAL BREAST MASTECTOMIES WITH RIGHT SENTINEL NODE BIOPSIES WITH FROZEN SECTIONS;  Surgeon: Jacqueline Mcgraw MD;  [place] [facility] [place] [facility] [place]  Service: General;  Laterality: Bilateral;     Social History     Socioeconomic History    Marital status:    Tobacco Use    Smoking status: Never    Smokeless tobacco: Never    Tobacco comments:     Father mother  smoker diring childhood and  adulthood   Vaping Use    Vaping status: Never Used   Substance and Sexual Activity    Alcohol use: Yes     Comment: Socially only . Occasionally    Drug use: Never    Sexual activity: Yes     Partners: Male     Birth control/protection: Post-menopausal     Comment: Hysterectomy age 33     Family History   Problem Relation Age of Onset    Hypertension Father     Diabetes Father     Cancer Father     Arthritis Father     Liver cancer Father     Lung cancer Father     Asthma Father     COPD Father     Heart disease Father     Kidney disease Father     Hypertension Mother     Diabetes Mother     Cancer Mother     Arthritis  "Mother         Osteo and RA    Breast cancer Mother     Breast cancer Maternal Aunt     Malig Hyperthermia Neg Hx     Ovarian cancer Neg Hx     Uterine cancer Neg Hx     Colon cancer Neg Hx     Melanoma Neg Hx     Prostate cancer Neg Hx        Objective   Physical Exam  General: Alert, cooperative, no acute distress  Eyes: Anicteric sclera, PERRLA  Respiratory: normal respiratory effort  Cardiovascular: no lower extremity edema  Skin: Normal tone, no rash, no lesions  Psychiatric: Appropriate affect, intact judgment  Neurologic: No focal sensory or motor deficits, normal cognition   Musculoskeletal: Normal muscle strength and tone      Vitals:    05/03/24 1114   Pulse: 75   Resp: 18   Temp: 97.3 °F (36.3 °C)   TempSrc: Temporal   SpO2: 99%   Weight: 81.5 kg (179 lb 9.6 oz)   PainSc: 0-No pain                   PHQ-9 Total Score:         Result Review :   The following data was reviewed by: Judah Juarez MD on 05/03/24:  Lab Results   Component Value Date    HGB 11.8 (L) 05/03/2024    HCT 34.8 05/03/2024    .4 (H) 05/03/2024     05/03/2024    WBC 2.60 (L) 05/03/2024    NEUTROABS 1.28 (L) 05/03/2024    LYMPHSABS 0.78 05/03/2024    MONOSABS 0.36 05/03/2024    EOSABS 0.13 05/03/2024    BASOSABS 0.05 05/03/2024     Lab Results   Component Value Date    GLUCOSE 108 (H) 05/03/2024    BUN 14 05/03/2024    CREATININE 0.92 05/03/2024     05/03/2024    K 4.0 05/03/2024     05/03/2024    CO2 27.8 05/03/2024    CALCIUM 8.5 (L) 05/03/2024    PROTEINTOT 6.2 05/03/2024    ALBUMIN 4.2 05/03/2024    BILITOT 0.4 05/03/2024    ALKPHOS 60 05/03/2024    AST 18 05/03/2024    ALT 14 05/03/2024     Lab Results   Component Value Date    IRON 65 03/13/2022    LABIRON 35 03/13/2022    TRANSFERRIN 125 (L) 03/13/2022    TIBC 186 (L) 03/13/2022     Lab Results   Component Value Date    RYNPYOPA18 >2,000 (H) 03/11/2022    FOLATE 16.80 03/11/2022     No results found for: \"PSA\", \"CEA\", \"AFP\", \"\", " "\"\"    Labs personally reviewed. Mild anemia. Anc 1.28. CMP normal.          Assessment and Plan    Diagnoses and all orders for this visit:    1. Malignant neoplasm of overlapping sites of right breast in female, estrogen receptor positive (Primary)  -     CBC & Differential; Future  -     Comprehensive Metabolic Panel; Future    2. Chemotherapy induced diarrhea    3. Chemotherapy induced neutropenia    4. Menopausal hot flushes    5. Muscle cramp        ER+ Right breast Cancer: pT2N2a, s/p bilateral mastectomy.  The pt completed 4 cycles of TC.  Her treatment course was complicated by breast abscesses. Radiation was delayed until December 2022.  She is tolerating anastrozole and abemiciclib (started on 1/1/23) with the exception of occasional severe bouts of diarrhea. Diarrhea improved but still occurs 1-2 times per week, better now that she is on lomotil. Currently on 100 mg BID dosing of the abemaciclib. Plan for 2 years of abemaciclib (due to finish 1/2025). Plan to continue with abemaciclib and letrozole at current dosages. Patient agreeable to plan. Continue to monitor for severe toxicities with frequent labs and office visits.    Diarrhea  2/2 abemaciclib.  Imodium causes cramping. PRN lomotil added and has helped. Can dose reduce abemaciclib if needed.    Menopausal symptoms: Secondary to letrozole. She is currently on venlafaxine 75mg daily.  Also on clonidine.     Muscle cramps  Not likely due to abemaciclib. In jaw and neck. Ok to continue mag supplement. Mag has improved the frequency. Also suggested massage and/or acupuncture.     Neutropenia  2/2 Verzenio. Has occurred off and on in the past. Overall stable. No dose adjustment required. Repeat CBC 3 months.     Patient follow up: 3 months  Patient was given instructions and counseling regarding her condition or for health maintenance advice. Please see specific information pulled into the AVS if appropriate.           "

## 2024-05-03 NOTE — PROGRESS NOTES
Specialty Pharmacy Patient Management Program  Oncology Reassessment     Patient is a 64 y.o. female with ER+ Breast cancer seen by an Oncology provider and enrolled in the Oncology Patient Management program offered by [facility].  A follow-up outreach was conducted in person, face-to-face to assess continued therapy appropriateness, medication adherence, and side effect incidence and management for abemaciclib.       Relevant Past Medical History and Comorbidities  Relevant medical history and concomitant health conditions were discussed with the patient. The patient's chart has been reviewed for relevant past medical history and comorbid health conditions and updated as necessary.   Past Medical History:   Diagnosis Date    Abnormal Pap smear of cervix     Allergies     Anemia During chemo 2022    Breast cancer     Cervical dysplasia     Herpes     High cholesterol     IFG (impaired fasting glucose)     Migraine     Osteoarthritis     S/P BILATERAL IMMEDIATE BREAST RECONSTRUCTION WITH LEFT PRE PEC PLACEMENT OF SILICONE GEL IMPLANT AND MESH, RIGHT PRE PEC PLACEMENT OF EXPANDER AND MESH 12/29/2021    TIA (transient ischemic attack)     no residual    Vitamin D deficiency      Social History     Socioeconomic History    Marital status:    Tobacco Use    Smoking status: Never    Smokeless tobacco: Never    Tobacco comments:     Father mother  smoker diring childhood and  adulthood   Vaping Use    Vaping status: Never Used   Substance and Sexual Activity    Alcohol use: Yes     Comment: Socially only . Occasionally    Drug use: Never    Sexual activity: Yes     Partners: Male     Birth control/protection: Post-menopausal     Comment: Hysterectomy age 33       Allergies  Known allergies and reactions were discussed with the patient. The patient's chart has been reviewed for allergy information and updated as necessary.   Multihance [gadobenate] and Contrast dye (echo or unknown  ct/mr)    Relevant Laboratory Values  Lab Results   Component Value Date    GLUCOSE 108 (H) 05/03/2024    CALCIUM 8.5 (L) 05/03/2024     05/03/2024    K 4.0 05/03/2024    CO2 27.8 05/03/2024     05/03/2024    BUN 14 05/03/2024    CREATININE 0.92 05/03/2024    EGFRIFNONA 90 02/21/2022    BCR 15.2 05/03/2024    ANIONGAP 3.2 (L) 05/03/2024     Lab Results   Component Value Date    WBC 2.60 (L) 05/03/2024    RBC 3.40 (L) 05/03/2024    HGB 11.8 (L) 05/03/2024    HCT 34.8 05/03/2024    .4 (H) 05/03/2024    MCH 34.7 (H) 05/03/2024    MCHC 33.9 05/03/2024    RDW 12.9 05/03/2024    RDWSD 49.6 05/03/2024    MPV 9.6 05/03/2024     05/03/2024    NEUTRORELPCT 49.3 05/03/2024    LYMPHORELPCT 30.0 05/03/2024    MONORELPCT 13.8 (H) 05/03/2024    EOSRELPCT 5.0 05/03/2024    BASORELPCT 1.9 (H) 05/03/2024    AUTOIGPER 0.0 05/03/2024    NEUTROABS 1.28 (L) 05/03/2024    LYMPHSABS 0.78 05/03/2024    MONOSABS 0.36 05/03/2024    EOSABS 0.13 05/03/2024    BASOSABS 0.05 05/03/2024    AUTOIGNUM 0.00 05/03/2024    NRBC 0.2 08/08/2023        The above labs have been reviewed. The following labs are outside of normal limits: Hgb, WBC, and ANC are low but not warranting dose change. No dose adjustments are needed for the oral specialty medication(s) based on the labs.    Current Medication List  This medication list has been reviewed with the patient and evaluated for any interactions or necessary modifications/recommendations, and updated to include all prescription medications, OTC medications, and supplements the patient is currently taking.  This list reflects what is contained in the patient's profile, which has also been marked as reviewed to communicate to other providers it is the most up to date version of the patient's current medication therapy.     Current Outpatient Medications:     abemaciclib (VERZENIO) 100 mg tablet chemo tablet, Take 1 tablet by mouth 2 (Two) Times a Day., Disp: 56 tablet, Rfl: 11     acetaminophen (TYLENOL) 650 MG 8 hr tablet, Take 1 tablet by mouth 2 (Two) Times a Day., Disp: , Rfl:     Aspirin 81 MG capsule, Take 81 mg by mouth 2 (Two) Times a Week., Disp: , Rfl:     cloNIDine (CATAPRES) 0.2 MG tablet, Take 1 tablet by mouth every night at bedtime., Disp: 90 tablet, Rfl: 4    diphenoxylate-atropine (LOMOTIL) 2.5-0.025 MG per tablet, Take 1 tablet by mouth 4 (Four) Times a Day As Needed for Diarrhea., Disp: 40 tablet, Rfl: 2    letrozole (FEMARA) 2.5 MG tablet, Take 1 tablet by mouth Daily., Disp: 90 tablet, Rfl: 1    loratadine (Claritin) 10 MG tablet, Take 1 tablet by mouth Daily., Disp: , Rfl:     Loratadine-Pseudoephedrine (CLARITIN-D 24 HOUR PO), Take 1 tablet by mouth Daily., Disp: , Rfl:     magnesium gluconate (MAGONATE) 500 MG tablet, Take 250 mg by mouth Daily., Disp: , Rfl:     ondansetron (ZOFRAN) 8 MG tablet, Take 1 tablet by mouth 3 (Three) Times a Day As Needed for Nausea or Vomiting., Disp: 30 tablet, Rfl: 5    venlafaxine XR (EFFEXOR-XR) 75 MG 24 hr capsule, Take 1 capsule by mouth Daily., Disp: 90 capsule, Rfl: 3    Vitamin A 3 MG (66282 UT) capsule, Take 1 capsule by mouth Daily., Disp: , Rfl:     vitamin D (ERGOCALCIFEROL) 1.25 MG (13810 UT) capsule capsule, TAKE 1 CAPSULE BY MOUTH EVERY 7 (SEVEN) DAYS., Disp: 4 capsule, Rfl: 11    hydrOXYzine (ATARAX) 25 MG tablet, Take 1 tablet by mouth At Night As Needed for Itching. (Patient not taking: Reported on 5/3/2024), Disp: , Rfl:   No current facility-administered medications for this visit.    Facility-Administered Medications Ordered in Other Visits:     heparin injection 500 Units, 500 Units, Intravenous, PRN, Judah Juarez MD, 500 Units at 05/03/24 1106    sodium chloride 0.9 % flush 20 mL, 20 mL, Intravenous, PRN, Judah Juarez MD, 20 mL at 05/03/24 1106    Drug Interactions  No significant DDI  - Interaction between venlafaxine and pseudoephedrine is not a concern since patient only take Claritin D prn  in Spring and Fall. However I did educated on monitoring BP and chest pain when taking both medications.     Adverse Drug Reactions  Adverse Reactions Experienced: Patient continues to report diarrhea that occurs at random as well as significant fatigue.   Plan for ADR Management: Lomotil is helping to manage diarrhea when she takes it, but patient does report difficulty in knowing when to take it since she may only have 1 episode or several episodes of diarrhea at any given time. Also discussed trying to do something active daily even when feeling fatigue. Patient works nights and is on her feel a lot when she works which may add to the amount of fatigue she feels on her days off.      Hospitalizations and Urgent Care Since Last Assessment  Hospitalizations or Admissions: none    ED Visits: none   Urgent Office Visits: 12/2023 for a URTI     Adherence and Self-Administration  Approximate Number of Doses Missed Since Last Assessment: 2   Ongoing or New Barriers to Patient Adherence and/or Self-Administration: Patient missed 2 doses due to going out of town and forgetting to pack medication. Other than that she has never forgotten a dose.   Methods for Supporting Patient Adherence and/or Self-Administration: Continue current methods.    Goals of Therapy  Progress Toward Meeting Patient-Identified Goals of Therapy:  Goal Met  Patient-Identified Goals  Consistently take medications as prescribed  Patient will adhere to medication regimen  Patient will report any medication side effects to healthcare provider    Progress Toward Meeting Clinical Goals or Therapeutic Targets: Goal Met  Clinical Goals or Therapeutic Targets  Support patient understanding of medication regimen  Ensure patient knows the pharmacy contact information  Schedule regular follow-up to monitor the treatment serious adverse events  Schedule regular follow-up to confirm medication adherence  Schedule regular follow-up to monitor disease progression  or stabilty    Quality of Life Assessment   Quality of Life related to the patient's specialty therapy was discussed with the patient. The QOL segment of this outreach has been reviewed and updated.   Quality of Life Score: 10    Reassessment Plan & Follow-Up  Pharmacist to continue to perform regular reassessments no more than (6) months from the previous assessment.  Care Coordinator to facilitate future refill outreaches, coordinate prescription delivery, and escalate clinical questions to pharmacist.     Additional Plans, Therapy Recommendations or Therapy Problems to Be Addressed: none at this time      Attestation  I attest the patient was actively involved in and has agreed to the above plan of care. If the prescribed therapy is at any point deemed not appropriate based on the current or future assessments, a consultation will be initiated with the patient's specialty care provider to determine the best course of action. The revised plan of therapy will be documented along with any additional patient education provided.     Radha Arreola, PharmD, [facility]  Oncology Clinical Pharmacist  5/3/2024  11:54 EDT

## 2024-05-06 ENCOUNTER — SPECIALTY PHARMACY (OUTPATIENT)
Dept: PHARMACY | Facility: HOSPITAL | Age: 64
End: 2024-05-06
Payer: COMMERCIAL

## 2024-05-15 ENCOUNTER — SPECIALTY PHARMACY (OUTPATIENT)
Dept: PHARMACY | Facility: HOSPITAL | Age: 64
End: 2024-05-15
Payer: COMMERCIAL

## 2024-05-15 NOTE — PROGRESS NOTES
" Specialty Pharmacy Patient Management Program  Oncology Refill Outreach      Marisa \"Liberty\" is a 64 y.o. female contacted today regarding refills of her medication(s).    Specialty medication(s) and dose(s) confirmed: Completed refill outreach. [facility] will mail abemaciclib (VERZENIO) 100 mg tablet chemo tablet once ready to dispense.     Other medications being refilled: N/A    Refill Questions      Flowsheet Row Most Recent Value   Changes to allergies? No   Changes to medications? No   New conditions or infections since last clinic visit Yes   If yes, please describe  UTI   Unplanned office visit, urgent care, ED, or hospital admission in the last 4 weeks  Yes   How does patient/caregiver feel medication is working? Good   Financial problems or insurance changes  No   Since the previous refill, were any specialty medication doses or scheduled injections missed or delayed?  No   Does this patient require a clinical escalation to a pharmacist? No            Delivery Questions      Flowsheet Row Most Recent Value   Delivery method [employer]   Delivery address verified with patient/caregiver? Yes   Delivery address Home   Number of medications in delivery 1   Medication(s) being filled and delivered Abemaciclib   Doses left of specialty medications 3   Copay verified? Yes   Copay amount 0 Copay card   Copay form of payment No copayment ($0)                   Follow-up: 21 days     Jennifer Diggs  Care Coordinator, [facility]  5/15/2024  16:00 EDT          "

## 2024-05-20 ENCOUNTER — HOSPITAL ENCOUNTER (OUTPATIENT)
Dept: OCCUPATIONAL THERAPY | Facility: HOSPITAL | Age: 64
Setting detail: THERAPIES SERIES
Discharge: HOME | End: 2024-05-20
Payer: COMMERCIAL

## 2024-05-20 DIAGNOSIS — Z17.0 MALIGNANT NEOPLASM OF UPPER-OUTER QUADRANT OF RIGHT BREAST IN FEMALE, ESTROGEN RECEPTOR POSITIVE: ICD-10-CM

## 2024-05-20 DIAGNOSIS — L90.5 SCAR CONDITION AND FIBROSIS OF SKIN: ICD-10-CM

## 2024-05-20 DIAGNOSIS — Z91.89 AT RISK FOR LYMPHEDEMA: Primary | ICD-10-CM

## 2024-05-20 DIAGNOSIS — R52 PAIN: ICD-10-CM

## 2024-05-20 DIAGNOSIS — M25.60 JOINT STIFFNESS: ICD-10-CM

## 2024-05-20 DIAGNOSIS — C50.411 MALIGNANT NEOPLASM OF UPPER-OUTER QUADRANT OF RIGHT BREAST IN FEMALE, ESTROGEN RECEPTOR POSITIVE: ICD-10-CM

## 2024-05-20 PROCEDURE — L8000 MASTECTOMY BRA: HCPCS | Performed by: OCCUPATIONAL THERAPIST

## 2024-05-20 PROCEDURE — 97535 SELF CARE MNGMENT TRAINING: CPT | Performed by: OCCUPATIONAL THERAPIST

## 2024-05-20 PROCEDURE — 93702 BIS XTRACELL FLUID ANALYSIS: CPT | Performed by: OCCUPATIONAL THERAPIST

## 2024-05-20 PROCEDURE — L8015 EXT BREASTPROSTHESIS GARMENT: HCPCS | Performed by: OCCUPATIONAL THERAPIST

## 2024-05-20 NOTE — THERAPY RE-EVALUATION
Outpatient Occupational Therapy Lymphedema Re-Evaluation  ER[facility]     Patient Name: Marisa Portillo  : 1960  MRN: [id number]  Today's Date: 2024      Visit Date: 2024    Patient Active Problem List   Diagnosis    Arthritis    Bladder disorder    Concussion    Contact dermatitis and eczema    Injury, other and unspecified, finger    Limb swelling    Seasonal allergic rhinitis    Vitamin D deficiency    IFG (impaired fasting glucose)    Mixed hyperlipidemia    TIA (transient ischemic attack)    Malignant neoplasm of overlapping sites of right breast in female, estrogen receptor positive    Port-A-Cath placement    S/P bilateral nipple sparing mastectomy    S/P BILATERAL IMMEDIATE BREAST RECONSTRUCTION WITH LEFT PRE PEC PLACEMENT OF SILICONE GEL IMPLANT AND MESH, RIGHT PRE PEC PLACEMENT OF EXPANDER AND MESH    Sepsis    Breast abscess    Cellulitis of left breast    Psychophysiological insomnia    Recurrent seroma of breast    Malignant neoplasm of overlapping sites of right female breast    Hot flashes due to menopause    Breast reconstruction deformity    Genitourinary syndrome of menopause    Physical exam, annual    PAD (peripheral artery disease)    Encounter for adjustment or management of vascular access device        Past Medical History:   Diagnosis Date    Abnormal Pap smear of cervix     Allergies     Anemia During chemo     Breast cancer     Cervical dysplasia     Herpes     High cholesterol     IFG (impaired fasting glucose)     Migraine     Osteoarthritis     S/P BILATERAL IMMEDIATE BREAST RECONSTRUCTION WITH LEFT PRE PEC PLACEMENT OF SILICONE GEL IMPLANT AND MESH, RIGHT PRE PEC PLACEMENT OF EXPANDER AND MESH 2021    TIA (transient ischemic attack)     no residual    Vitamin D deficiency         Past Surgical History:   Procedure Laterality Date    APPENDECTOMY      BREAST AUGMENTATION Bilateral 2021    Procedure: bilateral breast reconstructon with bilateral mesh  placement.   left implant, right tissue expander placement;  Surgeon: Lacy Shelton MD;  Location: [facility] OR [facility] [place];  Service: Plastics;  Laterality: Bilateral;    BREAST SURGERY  [id number]    Bilateral Mastectomy with implant Left expander Right    BREAST TISSUE EXPANDER REMOVAL INSERTION OF IMPLANT Right 04/15/2022    Procedure: RIGHT BREAST IMPLANT REMOVAL WITH INCISION AND DRAINAGE;  Surgeon: Lacy Shelton MD;  Location: [facility] OR [facility] [place];  Service: Plastics;  Laterality: Right;    CEREBRAL ANGIOGRAM      clip placed    CYST REMOVAL Right     rt wrist    HYSTERECTOMY      Partial age 31    INCISION AND DRAINAGE ABSCESS Bilateral 10/4/2022    Procedure: BILATERAL BREAST ABSCESS INCISION AND DRAINAGE, WITH LEFT BREAST IMPLANT REMOVAL;  Surgeon: Lacy Shelton MD;  Location: [facility] MAIN OR;  Service: Plastics;  Laterality: Bilateral;    INCISION AND DRAINAGE OF WOUND Right 03/11/2022    Procedure: INCISION AND DRAINAGE ABSCESS OF RIGHT BREAST WITH EXPANDER REMOVAL AND IMPLANT PLACEMENT;  Surgeon: Lacy Shelton MD;  Location: [facility] MAIN OR;  Service: Plastics;  Laterality: Right;    MASTECTOMY COMPLETE / SIMPLE W/ SENTINEL NODE BIOPSY Bilateral     PORTACATH PLACEMENT Left 12/28/2021    Procedure: INSERTION OF PORTACATH;  Surgeon: Jacqueline Mcgraw MD;  Location: [facility] OR [facility] [place];  Service: General;  Laterality: Left;    SENTINEL NODE BIOPSY Bilateral 12/28/2021    Procedure: BILATERAL BREAST MASTECTOMIES WITH RIGHT SENTINEL NODE BIOPSIES WITH FROZEN SECTIONS;  Surgeon: Jacqueline Mcgraw MD;  Location: [facility] OR [facility] [place];  Service: General;  Laterality: Bilateral;         Visit Dx:     ICD-10-CM ICD-9-CM   1. At risk for lymphedema  Z91.89 V49.89   2. Scar condition and fibrosis of skin  L90.5 709.2   3. Joint stiffness  M25.60 719.50   4. Pain  R52 780.96   5. Malignant neoplasm of upper-outer quadrant of right breast in female, estrogen receptor positive  C50.411 174.4    Z17.0 V86.0         Patient History       Row Name 05/20/24 0800             History    Chief Complaint Numbness;Pain  Lymphedema surveillance program  -TD      Date Current Problem(s) Began 12/28/21  -TD      Brief Description of Current Complaint B mastectomy with SNLB x10  -TD      Patient/Caregiver Goals Return to prior level of function;Return to work  -TD      Hand Dominance right-handed  -TD      What clinical tests have you had for this problem? X-ray;CT scan;MRI;Blood Work;Mammogram  -TD      Are you or can you be pregnant No  -TD         Fall Risk Assessment    Any falls in the past year: No  -TD      Does patient have a fear of falling No  -TD         Services    Prior Rehab/Home Health Experiences No  -TD      Are you currently receiving Home Health services No  -TD      Do you plan to receive Home Health services in the near future No  -TD         Daily Activities    Primary Language English  -TD      Are you able to read Yes  -TD      Are you able to write Yes  -TD      How does patient learn best? Listening;Reading;Demonstration;Pictures/Video  -TD      Barriers to learning None  -TD      Pt Participated in POC and Goals Yes  -TD         Safety    Are you being hurt, hit, or frightened by anyone at home or in your life? No  -TD      Are you being neglected by a caregiver No  -TD      Have you had any of the following issues with N/A  -TD                User Key  (r) = Recorded By, (t) = Taken By, (c) = Cosigned By      Initials Name Provider Type    TD Bárbara Randall OT Occupational Therapist                     Lymphedema       Row Name 05/20/24 0800             Subjective Pain    Able to rate subjective pain? yes  -TD      Pre-Treatment Pain Level 3  -TD      Post-Treatment Pain Level 3  -TD      Subjective Pain Comment Patient is having right axilla pain.  -TD         Subjective    Subjective Comments Patient has right axilla pain into the right axilla.  -TD         Lymphedema Assessment    Lymphedema  "Classification RUE:;at risk/stage 0  -TD      Lymphedema Cancer Related Sx bilateral;simple mastectomy;sentinel node biopsy;other (comment)  -TD      Lymphedema Surgery Comments 12/28/2021  -TD      Lymph Nodes Removed # 10  -TD      Positive Lymph Nodes # 4  -TD      Chemo Received yes  -TD      Radiation Therapy Received yes  -TD      Radiation Treatments #/Timeframe 25  -TD         LLIS - Physical Concerns    The amount of pain associated with my lymphedema is: 0  -TD      The amount of limb heaviness associated with my lymphedema is: 0  -TD      The amount of skin tightness associated with my lymphedema is: 0  -TD      The size of my swollen limb(s) seems: 0  -TD      Lymphedema affects the movement of my swollen limb(s): 0  -TD      The strength in my swollen limb(s) is: 0  -TD         LLIS - Psychosocial Concerns    Lymphedema affects my body image (i.e., \"how I think I look\"). 0  -TD      Lymphedema affects my socializing with others. 0  -TD      Lymphedema affects my intimate relations with spouse or partner (rate 0 if not applicable 0  -TD      Lymphedema \"gets me down\" (i.e., depression, frustration, or anger) 0  -TD      I must rely on others for help due to my lymphedema. 0  -TD      I know what to do to manage my lymphedema 1  -TD         LLIS - Functional Concerns    Lymphedema affects my ability to perform self-care activities (i.e. eating, dressing, hygiene) 0  -TD      Lymphedema affects my ability to perform routine home or work-related activities. 0  -TD      Lymphedema affects my performance of preferred leisure activities. 0  -TD      Lymphedema affects proper fit of clothing/shoes 0  -TD      Lymphedema affects my sleep 0  -TD         Posture/Observations    Posture- WNL Posture is WNL  -TD         General ROM    GENERAL ROM COMMENTS BUE are WFL with tightness at end ranges.  -TD         MMT (Manual Muscle Testing)    General MMT Comments BUE are WFL  -TD         Skin Changes/Observations    " Location/Assessment Upper Quadrant  -TD      Upper Quadrant Conditions right:;clean  -TD      Upper Quadrant Color/Pigment right:;normal  -TD      Skin Observations Comment Patient has increased scar tissue in the right axilla and right chest wall since last evaluation.  Patient will be brought in for treatment to address scar tissue.  -TD         L-Dex Bioimpedence Screening    L-Dex Measurement Extremity RUE  -TD      L-Dex Patient Position Standing  -TD      L-Dex UE Dominate Side Right  -TD      L-Dex UE At Risk Side Right  -TD      L-Dex UE Pre Surgical Value Yes  -TD      L-Dex UE Score -3.8  -TD      L-Dex UE Baseline Score -4.5  -TD      L-Dex UE Value Change 0.7  -TD      $ L-Dex Charge yes  -TD         Lymphedema Life Impact Scale Totals    A.  Total Q1 - Q17 (Do not include Q18) 1  -TD      B.  Total number of questions answered (Q1-Q17) 17  -TD      C. Divide A by B 0.06  -TD      D. Multiple C by 25 1.5  -TD                User Key  (r) = Recorded By, (t) = Taken By, (c) = Cosigned By      Initials Name Provider Type    TD Bárbara Randall, OT Occupational Therapist                     OT Ortho       Row Name 05/20/24 0900             Orthotics & Prosthetics Management    Orthosis Location other (see comments)  Breast prostheses and postmastectomy orthosis  -TD      Additional Documentation Orthosis Location (Row)  -TD                User Key  (r) = Recorded By, (t) = Taken By, (c) = Cosigned By      Initials Name Provider Type    TD Bárbara Randall, OT Occupational Therapist                  OT Mastectomy Care:   Location:        Bilateral Chest Wall     Type of Prosthetic: silicone breast form     Reason for Visit/Customer Goal:  Patient would like to achieve symmetry and balance in appearance to improve orthopedic balance as well as improve psychological impact when engaging in community and social activities.     Reason for Prosthetic: Prevent Deformities     Date of Surgery: 12/28/21     Date of  Discharge: 12/29/21     Wearing Schedule:   As Tolerated     Prosthetic Site Condition:   Intact     Tolerance:      Tolerates Well     Product :  Junior     Product Name and Model Number:   (Issued: 11/14/22)  400 Natura Xtra Light 2Sn size 8  SN: [id number]   400 Natura Xtra Light 2Sn size 8   SN: [id number]     Garments  Type of Garment Dispensed:          Mast Bra w/o Breast Form     Breast : Junior     Product Name:   2- 1152 Power SB 42 B black/ge  1- Shirley Nude LA/B  1- Shirley Black LA/B  1- Tayler DKGY 42B  1- [place] SZ16 WHT     Number of Garments Dispensed:   6      Therapy Education  Education Details: Reviewed stretches and movement this date.  Fit and size are appropriate with no gapping, pinching, or puckering observed.  Pt. states comfort and satisfaction with both bra's selected and with prosthesis.  All devices were checked for quality and safety.  Pt. was educated on the benefits, precautions warranty, care and maintenance for her prosthesis and bras.  Educational handout was provided in the prosthesis box.  Given: Symptoms/condition management, Edema management  Program: Reinforced  How Provided: Verbal  Provided to: Patient  Level of Understanding: Verbalized  86697 - OT Self Care/Mgmt Minutes: 30         OT Goals       Row Name 05/20/24 1002          Time Calculation    OT Goal Re-Cert Due Date 06/19/24  -TD               User Key  (r) = Recorded By, (t) = Taken By, (c) = Cosigned By      Initials Name Provider Type    Bárbara Acevedo OT Occupational Therapist                GOALS:   1. Post Breast Surgery Care/at risk for Lymphedema  LTG 1: 90 days:  As an indicator of no exacerbation of lymphedema staging, the patient will present with an L-Dex score less than 7 points from preoperative baseline.              STATUS: met. ongoing  STG 1a:   30 days: To prevent exacerbation of mixed edema to lymphedema, patient will utilize the 2 postsurgical compression  garments daily.                 STATUS: met.  STG 1b: 30 days: Patient will be independent with self-manual lymphatic massage.               STATUS: met. ongoing  STG 1c: 30 days:  Patient will be independent with identification of signs and symptoms of lymphedema exasperation per stoplight to recovery education handout.              STATUS: met. ongoing  STG 1 d: 30 days: Patient will be independent with HEP to prevent advancement in lymphedema staging.              STATUS: met. Ongoing.  TREATMENT:  Self Care/ADL retraining, Therapeutic Activity, Neuromuscular Re-education, Therapeutic Exercise, Bioimpedence Fluid Analysis, Post-Surgical compression garement 6[address] Zip-ST-High/ [place] Camisole Kit 2860K, Orthotic Management and training,  and Manual Therapy.  Therapy Education  Education Details: OT provided review of education in use of stop light tool, which patient is well verses in.  OT instructed patient to continue current HEP, and added doorway stretch exercise to be done gently for low pectoralis and pectoralis minor.  Pt demonstrated properly. Patient demonstated all HEP exercises properly.  She was asked to focus on external rotation bilaterally since slight reduction in motion is noted.     OT Assessment/Plan       Row Name 05/20/24 0830          OT Assessment    Functional Limitations Limitations in functional capacity and performance  -TD     Impairments Impaired lymphatic circulation  -TD     Assessment Comments Marisa has increased scar tissue and tightness in the right axilla and right chest wall.  Patient would benefit from treatment to address scar tissue in the chest wall.   Pt presents with decreased balance and symmetry from breast resection that impacts orthopedic balance and symmetry.  Patient will benefit from continued evaluation and of integrity of the silicone breast form as well as the mastectomy bras to ensure proper fit for symmetry and balance of breast prosthesis to reduce  orthopedic and lymphatic impairment. The skills of a therapist will be required to safely and effectively implement the following treatment plan to restore maximal level of function. Patient will continue to benefit from skilled occupational therapy to further evaluate ongoing lymphatic functioning to prevent advancement in lymphedema staging.  -TD     OT Diagnosis at risk for lymphedema  -TD     OT Rehab Potential Good  -TD     Patient/caregiver participated in establishment of treatment plan and goals Yes  -TD     Patient would benefit from skilled therapy intervention Yes  -TD        OT Plan    OT Frequency 1x/week;2x/week  see duration  -TD     Predicted Duration of Therapy Intervention (OT) 8 weeks  -TD     Planned CPT's? OT EVAL LOW COMPLEXITY: 68777;OT RE-EVAL: 24155;OT THER ACT EA 15 MIN: 57782XP;OT THER PROC EA 15 MIN: 85695KC;OT SELF CARE/MGMT/TRAIN 15 MIN: 76887;OT MANUAL THERAPY EA 15 MIN: 46707;OT BIS XTRACELL FLUID ANALYSIS: 57402;OT ORTHO/PROSTHET CHECKOUT EA 15 MIN: 27193;OT ORTHOTIC MGMT/TRAIN EA 15 MIN: 44174;OT CARE PLAN EA 15 MIN  -TD     Planned Therapy Interventions (Optional Details) home exercise program;orthotic fitting/training;patient/family education;postural re-education;prosthetic fitting/training;ROM (Range of Motion)  -TD     OT Plan Comments contiue POC  -TD               User Key  (r) = Recorded By, (t) = Taken By, (c) = Cosigned By      Initials Name Provider Type    TD Bárbara Randall OT Occupational Therapist                              Time Calculation:   Timed Charges  98350 - OT Self Care/Mgmt Minutes: 30  Total Minutes  Timed Charges Total Minutes: 30   Total Minutes: 30     Therapy Charges for Today       Code Description Service Date Service Provider Modifiers Qty    [id number]  PT BIS XTRACELL FLUID ANALYSIS 5/20/2024 Bárbara Randall OT  1    [id number] HC OT SELF CARE/MGMT/TRAIN EA 15 MIN 5/20/2024 Bárbara Randall OT GO 2    [id number]  BRA POST/SURG NO/FORM  BLANCA/GARY/ANMOL/SULTANA/GLORIA 5/20/2024 Bárbara Randall, OT  5    [id number] [facility] SER[facility] 5/20/2024 Bárbara Randall OT  1                      Bárbara Randall, LORRAINE  5/20/2024

## 2024-05-29 ENCOUNTER — HOSPITAL ENCOUNTER (OUTPATIENT)
Dept: OCCUPATIONAL THERAPY | Facility: HOSPITAL | Age: 64
Setting detail: THERAPIES SERIES
Discharge: HOME | End: 2024-05-29
Payer: COMMERCIAL

## 2024-05-29 DIAGNOSIS — M25.60 JOINT STIFFNESS: ICD-10-CM

## 2024-05-29 DIAGNOSIS — Z91.89 AT RISK FOR LYMPHEDEMA: Primary | ICD-10-CM

## 2024-05-29 DIAGNOSIS — L90.5 SCAR CONDITION AND FIBROSIS OF SKIN: ICD-10-CM

## 2024-05-29 DIAGNOSIS — R52 PAIN: ICD-10-CM

## 2024-05-29 DIAGNOSIS — Z17.0 MALIGNANT NEOPLASM OF UPPER-OUTER QUADRANT OF RIGHT BREAST IN FEMALE, ESTROGEN RECEPTOR POSITIVE: ICD-10-CM

## 2024-05-29 DIAGNOSIS — C50.411 MALIGNANT NEOPLASM OF UPPER-OUTER QUADRANT OF RIGHT BREAST IN FEMALE, ESTROGEN RECEPTOR POSITIVE: ICD-10-CM

## 2024-05-29 PROCEDURE — 97140 MANUAL THERAPY 1/> REGIONS: CPT | Performed by: OCCUPATIONAL THERAPIST

## 2024-05-29 NOTE — THERAPY TREATMENT NOTE
Outpatient Occupational Therapy Lymphedema Treatment Note  [facility]     Patient Name: Marisa Portillo  : 1960  MRN: [id number]  Today's Date: 2024      Visit Date: 2024    Patient Active Problem List   Diagnosis    Arthritis    Bladder disorder    Concussion    Contact dermatitis and eczema    Injury, other and unspecified, finger    Limb swelling    Seasonal allergic rhinitis    Vitamin D deficiency    IFG (impaired fasting glucose)    Mixed hyperlipidemia    TIA (transient ischemic attack)    Malignant neoplasm of overlapping sites of right breast in female, estrogen receptor positive    Port-A-Cath placement    S/P bilateral nipple sparing mastectomy    S/P BILATERAL IMMEDIATE BREAST RECONSTRUCTION WITH LEFT PRE PEC PLACEMENT OF SILICONE GEL IMPLANT AND MESH, RIGHT PRE PEC PLACEMENT OF EXPANDER AND MESH    Sepsis    Breast abscess    Cellulitis of left breast    Psychophysiological insomnia    Recurrent seroma of breast    Malignant neoplasm of overlapping sites of right female breast    Hot flashes due to menopause    Breast reconstruction deformity    Genitourinary syndrome of menopause    Physical exam, annual    PAD (peripheral artery disease)    Encounter for adjustment or management of vascular access device        Past Medical History:   Diagnosis Date    Abnormal Pap smear of cervix     Allergies     Anemia During chemo     Breast cancer     Cervical dysplasia     Herpes     High cholesterol     IFG (impaired fasting glucose)     Migraine     Osteoarthritis     S/P BILATERAL IMMEDIATE BREAST RECONSTRUCTION WITH LEFT PRE PEC PLACEMENT OF SILICONE GEL IMPLANT AND MESH, RIGHT PRE PEC PLACEMENT OF EXPANDER AND MESH 2021    TIA (transient ischemic attack)     no residual    Vitamin D deficiency         Past Surgical History:   Procedure Laterality Date    APPENDECTOMY      BREAST AUGMENTATION Bilateral 2021    Procedure: bilateral breast reconstructon with bilateral mesh  placement.   left implant, right tissue expander placement;  Surgeon: Lacy Shelton MD;  [place] [facility] [place] [facility] [place]: [place]astics;  Laterality: Bilateral;    BREAST SURGERY  [id number]    Bilateral Mastectomy with implant Left expander Right    BREAST TISSUE EXPANDER REMOVAL INSERTION OF IMPLANT Right 04/15/2022    Procedure: RIGHT BREAST IMPLANT REMOVAL WITH INCISION AND DRAINAGE;  Surgeon: Lacy Shelton MD;  [place] [facility] [place] [facility] [place]  Service: Plastics;  Laterality: Right;    CEREBRAL ANGIOGRAM      clip placed    CYST REMOVAL Right     rt wrist    HYSTERECTOMY      Partial age 31    INCISION AND DRAINAGE ABSCESS Bilateral 10/4/2022    Procedure: BILATERAL BREAST ABSCESS INCISION AND DRAINAGE, WITH LEFT BREAST IMPLANT REMOVAL;  Surgeon: Lacy Shelton MD;  Location: [facility] MAIN OR;  Service: Plastics;  Laterality: Bilateral;    INCISION AND DRAINAGE OF WOUND Right 03/11/2022    Procedure: INCISION AND DRAINAGE ABSCESS OF RIGHT BREAST WITH EXPANDER REMOVAL AND IMPLANT PLACEMENT;  Surgeon: Lacy Shelton MD;  Location: [facility] MAIN OR;  Service: Plastics;  Laterality: Right;    MASTECTOMY COMPLETE / SIMPLE W/ SENTINEL NODE BIOPSY Bilateral     PORTACATH PLACEMENT Left 12/28/2021    Procedure: INSERTION OF PORTACATH;  Surgeon: Jacqueline Mcgraw MD;  [place] [facility] [place] [facility] [place]  Service: General;  Laterality: Left;    SENTINEL NODE BIOPSY Bilateral 12/28/2021    Procedure: BILATERAL BREAST MASTECTOMIES WITH RIGHT SENTINEL NODE BIOPSIES WITH FROZEN SECTIONS;  Surgeon: Jacqueline Mcgraw MD;  [place] [facility] [place] [facility] [place]  Service: General;  Laterality: Bilateral;         Visit Dx:      ICD-10-CM ICD-9-CM   1. At risk for lymphedema  Z91.89 V49.89   2. Scar condition and fibrosis of skin  L90.5 709.2   3. Joint stiffness  M25.60 719.50   4. Pain  R52 780.96   5. Malignant neoplasm of upper-outer quadrant of right breast in female, estrogen receptor positive  C50.411 174.4    Z17.0 V86.0         Lymphedema       Row Name 05/29/24 0800             Subjective Pain    Able to rate subjective pain? yes  -TD      Pre-Treatment Pain Level 3  -TD      Post-Treatment Pain Level 3  -TD      Subjective Pain Comment Patient is having pain in right chest wall  -TD         Subjective    Subjective Comments Patient feels tightness in the right chest wall  -TD         Lymphedema Assessment    Lymphedema Classification RUE:;at risk/stage 0  -TD      Lymphedema Cancer Related Sx bilateral;simple mastectomy;sentinel node biopsy;other (comment)  -TD      Lymphedema Surgery Comments 12/28/21  -TD      Lymph Nodes Removed # 10  -TD      Positive Lymph Nodes # 4  -TD      Chemo Received yes  -TD      Radiation Therapy Received yes  -TD      Radiation Treatments #/Timeframe 25  -TD         Skin Changes/Observations    Location/Assessment Upper Quadrant  -TD      Upper Quadrant Conditions right:;clean  -TD      Upper Quadrant Color/Pigment right:;normal;radiation fibrosis  -TD      Skin Observations Comment Patient has scar tissue on the right chest wall.  -TD         Manual Lymphatic Drainage    Manual Lymphatic Drainage initial sequence;opened regional lymph nodes;opened anastamoses  -TD      Initial Sequence short neck;cervical  -TD      Opened Regional Lymph Nodes axillary;inguinal;paraspinal  -TD      Axillary left  -TD      Inguinal right  -TD      Opened Anastamoses anterior axillo-axillary;axillo-inguinal  -TD      Axillo-Inguinal right  -TD      Manual Therapy Therapist perform scraping of the right chest wall with concentration on the pectoral minor.  Therapist also performed vibration across the pectoral monitor to increase movement.  Patient reported that she felt relief after first session.  -TD                User Key  (r) = Recorded By, (t) = Taken By, (c) = Cosigned By      Initials Name Provider Type    Bárbara Acevedo OT Occupational Therapist                             OT Assessment/Plan       Row Name  05/29/24 0846          OT Assessment    Functional Limitations Limitations in functional capacity and performance  -TD     Impairments Impaired lymphatic circulation  -TD     Assessment Comments Therapist performs Karsh scraping to address radiation fibrosis in the right chest wall.  Therapist then focused on pectoral minor with vibration and stretching to increase movement.  Patient tolerated treatment well and reported relief after treatment.  The patient will continue to benefit from skilled occupational therapy to prevent increased aging of lymphedema, increased pain, and decreased range of motion.  -TD     OT Diagnosis At risk for lymphedema  -TD     OT Rehab Potential Good  -TD     Patient/caregiver participated in establishment of treatment plan and goals Yes  -TD     Patient would benefit from skilled therapy intervention Yes  -TD        OT Plan    OT Plan Comments Continue plan of care  -TD               User Key  (r) = Recorded By, (t) = Taken By, (c) = Cosigned By      Initials Name Provider Type    Bárbara Acevedo OT Occupational Therapist                        Manual Rx (Last 36 Hours)       Manual Treatments       Row Name 05/29/24 0847             Total Minutes    57891 - OT Manual Therapy Minutes 25  -TD                User Key  (r) = Recorded By, (t) = Taken By, (c) = Cosigned By      Initials Name Provider Type    Bárbara Acevedo, OT Occupational Therapist                      Therapy Education  Education Details: Patient was educated on continued stretching and treatment plan this date.  Given: Symptoms/condition management, Edema management  Program: New, Reinforced  How Provided: Verbal  Provided to: Patient  Level of Understanding: Verbalized                Time Calculation:   Timed Charges  61020 - OT Manual Therapy Minutes: 25  Total Minutes  Timed Charges Total Minutes: 25   Total Minutes: 25     Therapy Charges for Today       Code Description Service Date Service Provider Modifiers Qty     [id number]  OT MANUAL THERAPY EA 15 MIN 5/29/2024 Bárbara Randall OT GO 2                        Bárbara Randall OT  5/29/2024

## 2024-05-30 ENCOUNTER — HOSPITAL ENCOUNTER (OUTPATIENT)
Dept: OCCUPATIONAL THERAPY | Facility: HOSPITAL | Age: 64
Setting detail: THERAPIES SERIES
Discharge: HOME | End: 2024-05-30
Payer: COMMERCIAL

## 2024-05-30 DIAGNOSIS — C50.411 MALIGNANT NEOPLASM OF UPPER-OUTER QUADRANT OF RIGHT BREAST IN FEMALE, ESTROGEN RECEPTOR POSITIVE: ICD-10-CM

## 2024-05-30 DIAGNOSIS — L90.5 SCAR CONDITION AND FIBROSIS OF SKIN: ICD-10-CM

## 2024-05-30 DIAGNOSIS — M25.60 JOINT STIFFNESS: ICD-10-CM

## 2024-05-30 DIAGNOSIS — Z17.0 MALIGNANT NEOPLASM OF UPPER-OUTER QUADRANT OF RIGHT BREAST IN FEMALE, ESTROGEN RECEPTOR POSITIVE: ICD-10-CM

## 2024-05-30 DIAGNOSIS — R52 PAIN: ICD-10-CM

## 2024-05-30 DIAGNOSIS — Z91.89 AT RISK FOR LYMPHEDEMA: Primary | ICD-10-CM

## 2024-05-30 PROCEDURE — 97140 MANUAL THERAPY 1/> REGIONS: CPT | Performed by: OCCUPATIONAL THERAPIST

## 2024-05-30 NOTE — THERAPY TREATMENT NOTE
Outpatient Occupational Therapy Lymphedema Treatment Note  [facility]     Patient Name: Marisa Portillo  : 1960  MRN: [id number]  Today's Date: 2024      Visit Date: 2024    Patient Active Problem List   Diagnosis    Arthritis    Bladder disorder    Concussion    Contact dermatitis and eczema    Injury, other and unspecified, finger    Limb swelling    Seasonal allergic rhinitis    Vitamin D deficiency    IFG (impaired fasting glucose)    Mixed hyperlipidemia    TIA (transient ischemic attack)    Malignant neoplasm of overlapping sites of right breast in female, estrogen receptor positive    Port-A-Cath placement    S/P bilateral nipple sparing mastectomy    S/P BILATERAL IMMEDIATE BREAST RECONSTRUCTION WITH LEFT PRE PEC PLACEMENT OF SILICONE GEL IMPLANT AND MESH, RIGHT PRE PEC PLACEMENT OF EXPANDER AND MESH    Sepsis    Breast abscess    Cellulitis of left breast    Psychophysiological insomnia    Recurrent seroma of breast    Malignant neoplasm of overlapping sites of right female breast    Hot flashes due to menopause    Breast reconstruction deformity    Genitourinary syndrome of menopause    Physical exam, annual    PAD (peripheral artery disease)    Encounter for adjustment or management of vascular access device        Past Medical History:   Diagnosis Date    Abnormal Pap smear of cervix     Allergies     Anemia During chemo     Breast cancer     Cervical dysplasia     Herpes     High cholesterol     IFG (impaired fasting glucose)     Migraine     Osteoarthritis     S/P BILATERAL IMMEDIATE BREAST RECONSTRUCTION WITH LEFT PRE PEC PLACEMENT OF SILICONE GEL IMPLANT AND MESH, RIGHT PRE PEC PLACEMENT OF EXPANDER AND MESH 2021    TIA (transient ischemic attack)     no residual    Vitamin D deficiency         Past Surgical History:   Procedure Laterality Date    APPENDECTOMY      BREAST AUGMENTATION Bilateral 2021    Procedure: bilateral breast reconstructon with bilateral mesh  placement.   left implant, right tissue expander placement;  Surgeon: Lacy Shelton MD;  [place] [facility] [place] [facility] [place]  Service: Plastics;  Laterality: Bilateral;    BREAST SURGERY  [id number]    Bilateral Mastectomy with implant Left expander Right    BREAST TISSUE EXPANDER REMOVAL INSERTION OF IMPLANT Right 04/15/2022    Procedure: RIGHT BREAST IMPLANT REMOVAL WITH INCISION AND DRAINAGE;  Surgeon: Lacy Shelton MD;  [place] [facility] [place] [facility] [place]  Service: Plastics;  Laterality: Right;    CEREBRAL ANGIOGRAM      clip placed    CYST REMOVAL Right     rt wrist    HYSTERECTOMY      Partial age 31    INCISION AND DRAINAGE ABSCESS Bilateral 10/4/2022    Procedure: BILATERAL BREAST ABSCESS INCISION AND DRAINAGE, WITH LEFT BREAST IMPLANT REMOVAL;  Surgeon: Lacy Shelton MD;  Location: [facility] MAIN OR;  Service: Plastics;  Laterality: Bilateral;    INCISION AND DRAINAGE OF WOUND Right 03/11/2022    Procedure: INCISION AND DRAINAGE ABSCESS OF RIGHT BREAST WITH EXPANDER REMOVAL AND IMPLANT PLACEMENT;  Surgeon: Lacy Shelton MD;  Location: [facility] MAIN OR;  Service: Plastics;  Laterality: Right;    MASTECTOMY COMPLETE / SIMPLE W/ SENTINEL NODE BIOPSY Bilateral     PORTACATH PLACEMENT Left 12/28/2021    Procedure: INSERTION OF PORTACATH;  Surgeon: Jacqueline Mcgraw MD;  [place] [facility] [place] [facility] [place]  Service: General;  Laterality: Left;    SENTINEL NODE BIOPSY Bilateral 12/28/2021    Procedure: BILATERAL BREAST MASTECTOMIES WITH RIGHT SENTINEL NODE BIOPSIES WITH FROZEN SECTIONS;  Surgeon: Jacqueline Mcgraw MD;  [place] [facility] [place] [facility] [place]  Service: General;  Laterality: Bilateral;         Visit Dx:      ICD-10-CM ICD-9-CM   1. At risk for lymphedema  Z91.89 V49.89   2. Scar condition and fibrosis of skin  L90.5 709.2   3. Joint stiffness  M25.60 719.50   4. Pain  R52 780.96   5. Malignant neoplasm of upper-outer quadrant of right breast in female, estrogen receptor positive  C50.411 174.4    Z17.0 V86.0         Lymphedema       Row Name 05/30/24 0800             Subjective Pain    Able to rate subjective pain? yes  -TD      Pre-Treatment Pain Level 1  -TD      Post-Treatment Pain Level 1  -TD         Subjective    Subjective Comments Pt reports her movement is better in the RUE  -TD         Lymphedema Assessment    Lymphedema Classification RUE:;at risk/stage 0  -TD      Lymphedema Cancer Related Sx bilateral;simple mastectomy;sentinel node biopsy;other (comment)  -TD      Lymphedema Surgery Comments 12/28/2021  -TD      Lymph Nodes Removed # 10  -TD      Positive Lymph Nodes # 4  -TD      Chemo Received yes  -TD      Radiation Therapy Received yes  -TD      Radiation Treatments #/Timeframe 25  -TD         Skin Changes/Observations    Location/Assessment Upper Quadrant  -TD      Upper Quadrant Conditions right:;clean  -TD      Upper Quadrant Color/Pigment right:;normal;radiation fibrosis  -TD      Skin Observations Comment Small amount of fluid noted at the 3 o'clock position on the right breast  -TD         Manual Lymphatic Drainage    Manual Lymphatic Drainage initial sequence;opened regional lymph nodes;opened anastamoses  -TD      Initial Sequence short neck;cervical;abdomen;diaphragmatic breathing  -TD      Cervical right;left  -TD      Abdomen superficial  -TD      Diaphragmatic Breathing x10  -TD      Opened Regional Lymph Nodes axillary;inguinal;paraspinal  -TD      Axillary left  -TD      Inguinal right  -TD      Opened Anastamoses anterior axillo-axillary;axillo-inguinal  -TD      Axillo-Inguinal right  -TD      Manual Therapy MLD completed on medial chest wall and scraping and stretching of the right axilla and right chest wall.  -TD                User Key  (r) = Recorded By, (t) = Taken By, (c) = Cosigned By      Initials Name Provider Type    TD Bárbara Randall, OT Occupational Therapist                             OT Assessment/Plan       Row Name 05/30/24 0820          OT Assessment    Functional  Limitations Limitations in functional capacity and performance  -TD     Impairments Impaired lymphatic circulation  -TD     Assessment Comments Therapist completed MLD at the medial chest wall and then performedscraping to address radiation fibrosis in the right chest wall. Pt reported her movement is better in the right axilla.  Patient tolerated treatment well and reported relief after treatment. The patient will continue to benefit from skilled occupational therapy to prevent increased aging of lymphedema, increased pain, and decreased range of motion  -TD     OT Diagnosis at risk for lympehdema  -TD     OT Rehab Potential Good  -TD     Patient/caregiver participated in establishment of treatment plan and goals Yes  -TD     Patient would benefit from skilled therapy intervention Yes  -TD        OT Plan    OT Plan Comments continue POC  -TD               User Key  (r) = Recorded By, (t) = Taken By, (c) = Cosigned By      Initials Name Provider Type    Bárbara Acevedo OT Occupational Therapist                        Manual Rx (Last 36 Hours)       Manual Treatments       Row Name 05/30/24 0846             Total Minutes    10816 - OT Manual Therapy Minutes 25  -TD                User Key  (r) = Recorded By, (t) = Taken By, (c) = Cosigned By      Initials Name Provider Type    Bárbara Acevedo OT Occupational Therapist                      Therapy Education  Education Details: Reviewed MLD and scar management of the chest wall.  Given: Symptoms/condition management, Edema management  Program: New, Reinforced  How Provided: Verbal  Provided to: Patient  Level of Understanding: Verbalized                Time Calculation:   Timed Charges  11896 - OT Manual Therapy Minutes: 25  Total Minutes  Timed Charges Total Minutes: 25   Total Minutes: 25     Therapy Charges for Today       Code Description Service Date Service Provider Modifiers Qty    [id number]  OT MANUAL THERAPY EA 15 MIN 5/30/2024 Bárbara Randall OT GO 2                         Bárbara Randall, OT  5/30/2024

## 2024-06-03 ENCOUNTER — APPOINTMENT (OUTPATIENT)
Dept: OCCUPATIONAL THERAPY | Facility: HOSPITAL | Age: 64
End: 2024-06-03
Payer: COMMERCIAL

## 2024-06-05 ENCOUNTER — HOSPITAL ENCOUNTER (OUTPATIENT)
Dept: OCCUPATIONAL THERAPY | Facility: HOSPITAL | Age: 64
Setting detail: THERAPIES SERIES
Discharge: HOME | End: 2024-06-05
Payer: COMMERCIAL

## 2024-06-05 DIAGNOSIS — M25.60 JOINT STIFFNESS: ICD-10-CM

## 2024-06-05 DIAGNOSIS — Z17.0 MALIGNANT NEOPLASM OF UPPER-OUTER QUADRANT OF RIGHT BREAST IN FEMALE, ESTROGEN RECEPTOR POSITIVE: ICD-10-CM

## 2024-06-05 DIAGNOSIS — C50.411 MALIGNANT NEOPLASM OF UPPER-OUTER QUADRANT OF RIGHT BREAST IN FEMALE, ESTROGEN RECEPTOR POSITIVE: ICD-10-CM

## 2024-06-05 DIAGNOSIS — R52 PAIN: ICD-10-CM

## 2024-06-05 DIAGNOSIS — Z91.89 AT RISK FOR LYMPHEDEMA: Primary | ICD-10-CM

## 2024-06-05 DIAGNOSIS — L90.5 SCAR CONDITION AND FIBROSIS OF SKIN: ICD-10-CM

## 2024-06-05 PROCEDURE — 97140 MANUAL THERAPY 1/> REGIONS: CPT | Performed by: OCCUPATIONAL THERAPIST

## 2024-06-05 NOTE — THERAPY TREATMENT NOTE
Outpatient Occupational Therapy Lymphedema Treatment Note  ER[facility]aca     Patient Name: Marisa Portillo  : 1960  MRN: [id number]  Today's Date: 2024      Visit Date: 2024    Patient Active Problem List   Diagnosis    Arthritis    Bladder disorder    Concussion    Contact dermatitis and eczema    Injury, other and unspecified, finger    Limb swelling    Seasonal allergic rhinitis    Vitamin D deficiency    IFG (impaired fasting glucose)    Mixed hyperlipidemia    TIA (transient ischemic attack)    Malignant neoplasm of overlapping sites of right breast in female, estrogen receptor positive    Port-A-Cath placement    S/P bilateral nipple sparing mastectomy    S/P BILATERAL IMMEDIATE BREAST RECONSTRUCTION WITH LEFT PRE PEC PLACEMENT OF SILICONE GEL IMPLANT AND MESH, RIGHT PRE PEC PLACEMENT OF EXPANDER AND MESH    Sepsis    Breast abscess    Cellulitis of left breast    Psychophysiological insomnia    Recurrent seroma of breast    Malignant neoplasm of overlapping sites of right female breast    Hot flashes due to menopause    Breast reconstruction deformity    Genitourinary syndrome of menopause    Physical exam, annual    PAD (peripheral artery disease)    Encounter for adjustment or management of vascular access device        Past Medical History:   Diagnosis Date    Abnormal Pap smear of cervix     Allergies     Anemia During chemo     Breast cancer     Cervical dysplasia     Herpes     High cholesterol     IFG (impaired fasting glucose)     Migraine     Osteoarthritis     S/P BILATERAL IMMEDIATE BREAST RECONSTRUCTION WITH LEFT PRE PEC PLACEMENT OF SILICONE GEL IMPLANT AND MESH, RIGHT PRE PEC PLACEMENT OF EXPANDER AND MESH 2021    TIA (transient ischemic attack)     no residual    Vitamin D deficiency         Past Surgical History:   Procedure Laterality Date    APPENDECTOMY      BREAST AUGMENTATION Bilateral 2021    Procedure: bilateral breast reconstructon with bilateral mesh  placement.   left implant, right tissue expander placement;  Surgeon: Lacy Shelton MD;  [facility] [place] [facility];  Service: Plastics;  Laterality: Bilateral;    BREAST SURGERY  [id number]    Bilateral Mastectomy with implant Left expander Right    BREAST TISSUE EXPANDER REMOVAL INSERTION OF IMPLANT Right 04/15/2022    Procedure: RIGHT BREAST IMPLANT REMOVAL WITH INCISION AND DRAINAGE;  Surgeon: Lacy Shelton MD;  [facility] [place] [facility];  Service: Plastics;  Laterality: Right;    CEREBRAL ANGIOGRAM      clip placed    CYST REMOVAL Right     rt wrist    HYSTERECTOMY      Partial age 31    INCISION AND DRAINAGE ABSCESS Bilateral 10/4/2022    Procedure: BILATERAL BREAST ABSCESS INCISION AND DRAINAGE, WITH LEFT BREAST IMPLANT REMOVAL;  Surgeon: Lacy Shelton MD;  Location: [facility] MAIN OR;  Service: Plastics;  Laterality: Bilateral;    INCISION AND DRAINAGE OF WOUND Right 03/11/2022    Procedure: INCISION AND DRAINAGE ABSCESS OF RIGHT BREAST WITH EXPANDER REMOVAL AND IMPLANT PLACEMENT;  Surgeon: Lacy Shelton MD;  Location: [facility] MAIN OR;  Service: Plastics;  Laterality: Right;    MASTECTOMY COMPLETE / SIMPLE W/ SENTINEL NODE BIOPSY Bilateral     PORTACATH PLACEMENT Left 12/28/2021    Procedure: INSERTION OF PORTACATH;  Surgeon: Jacqueline Mcgraw MD;  [facility] [place] [facility];  Service: General;  Laterality: Left;    SENTINEL NODE BIOPSY Bilateral 12/28/2021    Procedure: BILATERAL BREAST MASTECTOMIES WITH RIGHT SENTINEL NODE BIOPSIES WITH FROZEN SECTIONS;  Surgeon: Jacqueline Mcgraw MD;  [facility] [place] [facility];  Service: General;  Laterality: Bilateral;         Visit Dx:      ICD-10-CM ICD-9-CM   1. At risk for lymphedema  Z91.89 V49.89   2. Scar condition and fibrosis of skin  L90.5 709.2   3. Joint stiffness  M25.60 719.50   4. Pain  R52 780.96   5. Malignant neoplasm of upper-outer quadrant of right breast in female, estrogen receptor positive  C50.411 174.4    Z17.0 V86.0         Lymphedema       Row Name 06/05/24 0800             Subjective Pain    Able to rate subjective pain? yes  -TD      Pre-Treatment Pain Level 1  -TD      Post-Treatment Pain Level 1  -TD         Subjective    Subjective Comments Pt feels better but continues to have tightness in the right axilla.  -TD         Lymphedema Assessment    Lymphedema Classification RUE:;at risk/stage 0  -TD      Lymphedema Cancer Related Sx bilateral;simple mastectomy;sentinel node biopsy;other (comment)  -TD      Lymphedema Surgery Comments 12/28/2021  -TD      Lymph Nodes Removed # 10  -TD      Positive Lymph Nodes # 4  -TD      Chemo Received yes  -TD      Radiation Therapy Received yes  -TD      Radiation Treatments #/Timeframe 25  -TD         Manual Lymphatic Drainage    Manual Lymphatic Drainage initial sequence;opened regional lymph nodes;opened anastamoses  -TD      Initial Sequence short neck;cervical;abdomen;diaphragmatic breathing  -TD      Cervical right;left  -TD      Abdomen superficial  -TD      Opened Regional Lymph Nodes axillary;inguinal;paraspinal  -TD      Axillary left  -TD      Inguinal right  -TD      Opened Anastamoses anterior axillo-axillary;axillo-inguinal  -TD      Axillo-Inguinal right  -TD      Manual Therapy Pt targeted the pectoral minor this date and focused on decreasing scar tissue with vibration and scraping.  -TD                User Key  (r) = Recorded By, (t) = Taken By, (c) = Cosigned By      Initials Name Provider Type    TD Bárbara Randall OT Occupational Therapist                             OT Assessment/Plan       Row Name 06/05/24 0859          OT Assessment    Functional Limitations Limitations in functional capacity and performance  -TD     Impairments Impaired lymphatic circulation  -TD     Assessment Comments Therapist completed MLD at the medial chest wall and then performedscraping to address radiation fibrosis in the right chest wall. Therapist targeted pectoral minor muscle to  increase movemend and decreased tightness.  The patient will continue to benefit from skilled occupational therapy to prevent increased aging of lymphedema, increased pain, and decreased range of motion  -TD     OT Diagnosis at risk for lymphedema  -TD     OT Rehab Potential Good  -TD     Patient/caregiver participated in establishment of treatment plan and goals Yes  -TD     Patient would benefit from skilled therapy intervention Yes  -TD        OT Plan    OT Plan Comments continue POC  -TD               User Key  (r) = Recorded By, (t) = Taken By, (c) = Cosigned By      Initials Name Provider Type    Bárbara Acevedo OT Occupational Therapist                        Manual Rx (Last 36 Hours)       Manual Treatments       Row Name 06/05/24 0900             Total Minutes    83967 - OT Manual Therapy Minutes 25  -TD                User Key  (r) = Recorded By, (t) = Taken By, (c) = Cosigned By      Initials Name Provider Type    Bárbara Acevedo OT Occupational Therapist                      Therapy Education  Education Details: Reviewed stretches for pectoral minor muscle.  Given: Symptoms/condition management, Edema management  Program: Reinforced  How Provided: Verbal  Provided to: Patient  Level of Understanding: Verbalized                Time Calculation:   Timed Charges  49862 - OT Manual Therapy Minutes: 25  Total Minutes  Timed Charges Total Minutes: 25   Total Minutes: 25     Therapy Charges for Today       Code Description Service Date Service Provider Modifiers Qty    12572061913 HC OT MANUAL THERAPY EA 15 MIN 6/5/2024 Bárbara Randall OT GO 2                        Bárbara Randall OT  6/5/2024

## 2024-06-06 ENCOUNTER — HOSPITAL ENCOUNTER (OUTPATIENT)
Dept: OCCUPATIONAL THERAPY | Facility: HOSPITAL | Age: 64
Setting detail: THERAPIES SERIES
Discharge: HOME | End: 2024-06-06
Payer: COMMERCIAL

## 2024-06-06 DIAGNOSIS — L90.5 SCAR CONDITION AND FIBROSIS OF SKIN: ICD-10-CM

## 2024-06-06 DIAGNOSIS — R52 PAIN: ICD-10-CM

## 2024-06-06 DIAGNOSIS — Z17.0 MALIGNANT NEOPLASM OF UPPER-OUTER QUADRANT OF RIGHT BREAST IN FEMALE, ESTROGEN RECEPTOR POSITIVE: ICD-10-CM

## 2024-06-06 DIAGNOSIS — M25.60 JOINT STIFFNESS: ICD-10-CM

## 2024-06-06 DIAGNOSIS — C50.411 MALIGNANT NEOPLASM OF UPPER-OUTER QUADRANT OF RIGHT BREAST IN FEMALE, ESTROGEN RECEPTOR POSITIVE: ICD-10-CM

## 2024-06-06 DIAGNOSIS — Z91.89 AT RISK FOR LYMPHEDEMA: Primary | ICD-10-CM

## 2024-06-06 PROCEDURE — 97140 MANUAL THERAPY 1/> REGIONS: CPT | Performed by: OCCUPATIONAL THERAPIST

## 2024-06-06 NOTE — THERAPY TREATMENT NOTE
Outpatient Occupational Therapy Lymphedema Treatment Note  ER[facility] Vaca     Patient Name: Marisa Portillo  : 1960  MRN: [id number]  Today's Date: 2024      Visit Date: 2024    Patient Active Problem List   Diagnosis    Arthritis    Bladder disorder    Concussion    Contact dermatitis and eczema    Injury, other and unspecified, finger    Limb swelling    Seasonal allergic rhinitis    Vitamin D deficiency    IFG (impaired fasting glucose)    Mixed hyperlipidemia    TIA (transient ischemic attack)    Malignant neoplasm of overlapping sites of right breast in female, estrogen receptor positive    Port-A-Cath placement    S/P bilateral nipple sparing mastectomy    S/P BILATERAL IMMEDIATE BREAST RECONSTRUCTION WITH LEFT PRE PEC PLACEMENT OF SILICONE GEL IMPLANT AND MESH, RIGHT PRE PEC PLACEMENT OF EXPANDER AND MESH    Sepsis    Breast abscess    Cellulitis of left breast    Psychophysiological insomnia    Recurrent seroma of breast    Malignant neoplasm of overlapping sites of right female breast    Hot flashes due to menopause    Breast reconstruction deformity    Genitourinary syndrome of menopause    Physical exam, annual    PAD (peripheral artery disease)    Encounter for adjustment or management of vascular access device        Past Medical History:   Diagnosis Date    Abnormal Pap smear of cervix     Allergies     Anemia During chemo     Breast cancer     Cervical dysplasia     Herpes     High cholesterol     IFG (impaired fasting glucose)     Migraine     Osteoarthritis     S/P BILATERAL IMMEDIATE BREAST RECONSTRUCTION WITH LEFT PRE PEC PLACEMENT OF SILICONE GEL IMPLANT AND MESH, RIGHT PRE PEC PLACEMENT OF EXPANDER AND MESH 2021    TIA (transient ischemic attack)     no residual    Vitamin D deficiency         Past Surgical History:   Procedure Laterality Date    APPENDECTOMY      BREAST AUGMENTATION Bilateral 2021    Procedure: bilateral breast reconstructon with bilateral mesh  placement.   left implant, right tissue expander placement;  Surgeon: Lacy Shelton MD;  Location: [facility] [place];  Service: Plastics;  Laterality: Bilateral;    BREAST SURGERY  [id number]    Bilateral Mastectomy with implant Left expander Right    BREAST TISSUE EXPANDER REMOVAL INSERTION OF IMPLANT Right 04/15/2022    Procedure: RIGHT BREAST IMPLANT REMOVAL WITH INCISION AND DRAINAGE;  Surgeon: Lacy Shelton MD;  Location: [facility] [place];  Service: Plastics;  Laterality: Right;    CEREBRAL ANGIOGRAM      clip placed    CYST REMOVAL Right     rt wrist    HYSTERECTOMY      Partial age 31    INCISION AND DRAINAGE ABSCESS Bilateral 10/4/2022    Procedure: BILATERAL BREAST ABSCESS INCISION AND DRAINAGE, WITH LEFT BREAST IMPLANT REMOVAL;  Surgeon: Lacy Shelton MD;  Location: [facility] MAIN OR;  Service: Plastics;  Laterality: Bilateral;    INCISION AND DRAINAGE OF WOUND Right 03/11/2022    Procedure: INCISION AND DRAINAGE ABSCESS OF RIGHT BREAST WITH EXPANDER REMOVAL AND IMPLANT PLACEMENT;  Surgeon: Lacy Shelton MD;  Location: [facility] MAIN OR;  Service: Plastics;  Laterality: Right;    MASTECTOMY COMPLETE / SIMPLE W/ SENTINEL NODE BIOPSY Bilateral     PORTACATH PLACEMENT Left 12/28/2021    Procedure: INSERTION OF PORTACATH;  Surgeon: Jacqueline Mcgraw MD;  Location: [facility] [place];  Service: General;  Laterality: Left;    SENTINEL NODE BIOPSY Bilateral 12/28/2021    Procedure: BILATERAL BREAST MASTECTOMIES WITH RIGHT SENTINEL NODE BIOPSIES WITH FROZEN SECTIONS;  Surgeon: Jacqueline Mcgraw MD;  Location: [facility] [place];  Service: General;  Laterality: Bilateral;         Visit Dx:      ICD-10-CM ICD-9-CM   1. At risk for lymphedema  Z91.89 V49.89   2. Scar condition and fibrosis of skin  L90.5 709.2   3. Joint stiffness  M25.60 719.50   4. Pain  R52 780.96   5. Malignant neoplasm of upper-outer quadrant of right breast in female, estrogen receptor positive  C50.411 174.4    Z17.0 V86.0         Lymphedema       Row Name 06/06/24 0900             Subjective Pain    Able to rate subjective pain? yes  -TD      Pre-Treatment Pain Level 1  -TD      Post-Treatment Pain Level 1  -TD         Subjective    Subjective Comments Pt is feeling much better today  -TD         Lymphedema Assessment    Lymphedema Classification RUE:;at risk/stage 0  -TD      Lymphedema Cancer Related Sx bilateral;simple mastectomy;sentinel node biopsy;other (comment)  -TD      Lymphedema Surgery Comments 12/28/2021  -TD      Lymph Nodes Removed # 10  -TD      Positive Lymph Nodes # 4  -TD      Chemo Received yes  -TD      Radiation Therapy Received yes  -TD      Radiation Treatments #/Timeframe 25  -TD         Manual Lymphatic Drainage    Manual Lymphatic Drainage initial sequence;opened regional lymph nodes;opened anastamoses  -TD      Initial Sequence short neck;cervical;abdomen;diaphragmatic breathing  -TD      Cervical right;left  -TD      Abdomen superficial  -TD      Opened Regional Lymph Nodes axillary;inguinal;paraspinal  -TD      Axillary left  -TD      Inguinal right  -TD      Opened Anastamoses anterior axillo-axillary;axillo-inguinal  -TD      Axillo-Inguinal right  -TD      Manual Therapy Patient presented today with axilla much softer and able to stretch.  Therapist performed stretching while performing scar scraping and vibration.  Patient reported that she felt much looser than ever before.  -TD                User Key  (r) = Recorded By, (t) = Taken By, (c) = Cosigned By      Initials Name Provider Type    TD Bárbara Randall OT Occupational Therapist                             OT Assessment/Plan       Row Name 06/06/24 0932          OT Assessment    Functional Limitations Limitations in functional capacity and performance  -TD     Impairments Impaired lymphatic circulation  -TD     Assessment Comments Marisa presented with much softer tissue today and the ability to stretch.  Patient tolerated treatment well the  patient will continue to benefit from skilled occupational therapy to prevent increased aging of lymphedema, increased pain, and decreased range of motion  -TD     OT Diagnosis At risk for lymphedema  -TD     OT Rehab Potential Good  -TD     Patient/caregiver participated in establishment of treatment plan and goals Yes  -TD     Patient would benefit from skilled therapy intervention Yes  -TD        OT Plan    OT Plan Comments continue POC  -TD               User Key  (r) = Recorded By, (t) = Taken By, (c) = Cosigned By      Initials Name Provider Type    Bárbara Acevedo OT Occupational Therapist                        Manual Rx (Last 36 Hours)       Manual Treatments       Row Name 06/06/24 0934             Total Minutes    02854 - OT Manual Therapy Minutes 25  -TD                User Key  (r) = Recorded By, (t) = Taken By, (c) = Cosigned By      Initials Name Provider Type    TD Bárbara Randall OT Occupational Therapist                      Therapy Education  Education Details: Reviewed MLD and stretching exercises  Given: Symptoms/condition management, Edema management  Program: Reinforced  How Provided: Verbal  Provided to: Patient  Level of Understanding: Verbalized                Time Calculation:   Timed Charges  20878 - OT Manual Therapy Minutes: 25  Total Minutes  Timed Charges Total Minutes: 25   Total Minutes: 25     Therapy Charges for Today       Code Description Service Date Service Provider Modifiers Qty    18786515144 HC OT MANUAL THERAPY EA 15 MIN 6/6/2024 Bárbara Randall OT GO 2                        Bárbara Randall OT  6/6/2024

## 2024-06-10 ENCOUNTER — APPOINTMENT (OUTPATIENT)
Dept: OCCUPATIONAL THERAPY | Facility: HOSPITAL | Age: 64
End: 2024-06-10
Payer: COMMERCIAL

## 2024-06-10 ENCOUNTER — OFFICE VISIT (OUTPATIENT)
Dept: SURGERY | Facility: CLINIC | Age: 64
End: 2024-06-10
Payer: COMMERCIAL

## 2024-06-10 VITALS — BODY MASS INDEX: 29.66 KG/M2 | RESPIRATION RATE: 18 BRPM | HEIGHT: 65 IN | WEIGHT: 178 LBS

## 2024-06-10 DIAGNOSIS — C50.811 MALIGNANT NEOPLASM OF OVERLAPPING SITES OF RIGHT FEMALE BREAST, UNSPECIFIED ESTROGEN RECEPTOR STATUS: Primary | ICD-10-CM

## 2024-06-10 DIAGNOSIS — R23.2 HOT FLASHES: ICD-10-CM

## 2024-06-10 PROCEDURE — 99212 OFFICE O/P EST SF 10 MIN: CPT | Performed by: SURGERY

## 2024-06-10 NOTE — PROGRESS NOTES
Answers for HPI/ROS submitted by the patient on 1/6/2022  Have you had these symptoms before?: No  How long have you been having these symptoms?: 1-2 weeks  What is the primary reason for your visit?: Other    Conflicting answers have been found for some questions. Please document the patient's answers manually.     Chief Complaint  Annual Exam    Subjective          History of Present Illness  The patient is here to follow up on bilateral mastectomy with axillary dissection.  They are doing well and have no complaints.  Pathology is shown below:      Clinical Information    Comment:    Malignant neoplasm of overlapping sites of right breast in female, estrogen receptor positive (HCC)      Final Diagnosis   1. Coleman node #1, excision:               - One lymph node positive for macrometastatic carcinoma (1/1)               - See synoptic checklist     2. Coleman node #2, excision:               - One lymph node positive for macrometastatic carcinoma (1/1)               - See synoptic checklist     3. Coleman node #3, excision:               - One lymph node negative for metastatic carcinoma (0/1)               - See synoptic checklist     4. Coleman node #4, excision:               - One of two lymph nodes positive for macrometastatic carcinoma (1/2)               - See synoptic checklist     5. Coleman node #5, excision:               - One of three lymph nodes positive for macrometastatic carcinoma (1/3)               - See synoptic checklist     6. Coleman node #6, excision:               - One lymph node negative for carcinoma (0/1)               - See synoptic checklist     7. Coleman node #7, excision:               - One lymph node negative for carcinoma (0/1)               - See synoptic checklist     8. Left breast, mastectomy:               - Fibrocystic change               - Usual ductal hyperplasia     9. Left breast, retroareolar biopsy:               - Intraductal papilloma               -  "Usual ductal hyperplasia     10. Right breast, mastectomy:               - Invasive carcinoma of no special type (ductal)               - Intermediate and high grade ductal carcinoma in situ (DCIS)               - Intraductal papilloma               - Usual ductal hyperplasia               - Biopsy site changes               - See synoptic checklist     11. Right breast, retroareolar biopsy:               - Negative for carcinoma and carcinoma in situ               - See synoptic checklist     12. Right breast, additional retroareolar tissue, excision:               - Negative for carcinoma and carcinoma in situ               - See synoptic checklist      Electronically signed by Deyvi Crews MD on 12/29/2021 at 1250   Synoptic Checklist     INVASIVE CARCINOMA OF THE BREAST: Resection  8th Edition - Protocol posted: 6/30/2021  INVASIVE CARCINOMA OF THE BREAST: COMPLETE EXCISION - 1, 2, 3, 4, 5, 6, 7, 10, 11, 12  SPECIMEN   Procedure  Total mastectomy    Specimen Laterality  Right    TUMOR   Histologic Type  Invasive carcinoma of no special type (ductal)    Histologic Grade (Cummings Histologic Score)     Glandular (Acinar) / Tubular Differentiation  Score 3    Nuclear Pleomorphism  Score 2    Mitotic Rate  Score 2    Overall Grade  Grade 2 (scores of 6 or 7)    Tumor Size  Greatest dimension of largest invasive focus (Millimeters): 23 mm   Tumor Focality  Multiple foci of invasive carcinoma    Number of Foci  2    Ductal Carcinoma In Situ (DCIS)  Present      Positive for extensive intraductal component (EIC)    Architectural Patterns  Comedo      Cribriform      Papillary      Solid    Nuclear Grade  Grade III (high)    Necrosis  Present, central (expansive \"comedo\" necrosis)    Tumor Extent     Microcalcifications  Present in invasive carcinoma      Present in non-neoplastic tissue    Treatment Effect in the Breast  No known presurgical therapy    MARGINS   Margin Status for Invasive Carcinoma  All margins " negative for invasive carcinoma    Distance from Invasive Carcinoma to Closest Margin  20 mm   Closest Margin(s) to Invasive Carcinoma  Posterior    Margin Status for DCIS  All margins negative for DCIS    Distance from DCIS to Closest Margin  7 mm   Closest Margin(s) to DCIS  Posterior    REGIONAL LYMPH NODES   Regional Lymph Node Status  Tumor present in regional lymph node(s)    Number of Lymph Nodes with Macrometastases  4    Number of Lymph Nodes with Micrometastases  0    Number of Lymph Nodes with Isolated Tumor Cells  0    Size of Largest Sofi Metastatic Deposit  23 mm   Extranodal Extension  Not identified    Total Number of Lymph Nodes Examined (sentinel and non-sentinel)  10    Number of Jennings Nodes Examined  10    PATHOLOGIC STAGE CLASSIFICATION (pTNM, AJCC 8th Edition)   Reporting of pT, pN, and (when applicable) pM categories is based on information available to the pathologist at the time the report is issued. As per the AJCC (Chapter 1, 8th Ed.) it is the managing physician’s responsibility to establish the final pathologic stage based upon all pertinent information, including but potentially not limited to this pathology report.   TNM Descriptors  m (multiple foci of invasive carcinoma)    pT Category  pT2    pN Category  pN2a    Breast Biomarker Testing Performed on Previous Biopsy     Estrogen Receptor (ER) Status  Positive (greater than 10% of cells demonstrate nuclear positivity)    Percentage of Cells with Nuclear Positivity  40 %   Breast Biomarker Testing Performed on Previous Biopsy     Progesterone Receptor (PgR) Status  Positive    Percentage of Cells with Nuclear Positivity  5 %   Breast Biomarker Testing Performed on Previous Biopsy     HER2 (by immunohistochemistry)  Negative (Score 0)    Breast Biomarker Testing Performed on Previous Biopsy     Ki-67 Percentage of Positive Nuclei  14 %   Testing Performed on Case Number  [id number]    .        She has had several washouts of  "reconstructed breast and has been waiting on xrt therapy.  She has now completed it now.      Objective   Vital Signs:   Resp 18   Ht 165.1 cm (65\")   Wt 80.7 kg (178 lb)   BMI 29.62 kg/m²     Physical Exam  Vitals and nursing note reviewed.   Constitutional:       General: She is not in acute distress.     Appearance: Normal appearance. She is well-developed.   HENT:      Head: Normocephalic and atraumatic.   Eyes:      Extraocular Movements: Extraocular movements intact.      Pupils: Pupils are equal, round, and reactive to light.   Cardiovascular:      Pulses: Normal pulses.   Pulmonary:      Effort: Pulmonary effort is normal. No retractions.      Breath sounds: Normal air entry. No wheezing.   Abdominal:      General: There is no distension.      Palpations: Abdomen is soft.      Tenderness: There is no abdominal tenderness.      Hernia: No hernia is present.   Musculoskeletal:         General: No swelling or deformity.      Cervical back: Neck supple.   Skin:     General: Skin is warm and dry.      Findings: No erythema.      Comments: Surgical Incision Healing Well   Neurological:      General: No focal deficit present.      Mental Status: She is alert and oriented to person, place, and time.      Motor: Motor function is intact.   Psychiatric:         Mood and Affect: Mood normal.         Thought Content: Thought content normal.               Assessment and Plan    Diagnoses and all orders for this visit:    1. Malignant neoplasm of overlapping sites of right female breast, unspecified estrogen receptor status (Primary)    Keep apt with oncology and plastics- going for possible GISELA flaps later after completes chemotherapy  Followup one year      Follow Up   Return in about 1 year (around 6/10/2025).  Patient was given instructions and counseling regarding her condition or for health maintenance advice. Please see specific information pulled into the AVS if appropriate.     "

## 2024-06-11 ENCOUNTER — SPECIALTY PHARMACY (OUTPATIENT)
Dept: PHARMACY | Facility: HOSPITAL | Age: 64
End: 2024-06-11
Payer: COMMERCIAL

## 2024-06-11 NOTE — PROGRESS NOTES
" Specialty Pharmacy Patient Management Program  Oncology Refill Outreach      Marisa \"Liberty\" is a 64 y.o. female contacted today regarding refills of her medication(s).    Specialty medication(s) and dose(s) confirmed: Completed refill outreach. [facility] will mail abemaciclib (VERZENIO) 100 mg tablet chemo tablet once ready to dispense.     Other medications being refilled: N/A    Refill Questions      Flowsheet Row Most Recent Value   Changes to allergies? No   Changes to medications? No   New conditions or infections since last clinic visit No   Unplanned office visit, urgent care, ED, or hospital admission in the last 4 weeks  Yes   How does patient/caregiver feel medication is working? Fair   Financial problems or insurance changes  No   Since the previous refill, were any specialty medication doses or scheduled injections missed or delayed?  No   Does this patient require a clinical escalation to a pharmacist? No            Delivery Questions      Flowsheet Row Most Recent Value   Delivery method [employer]   Delivery address verified with patient/caregiver? Yes   Delivery address Home   Number of medications in delivery 1   Medication(s) being filled and delivered Abemaciclib   Doses left of specialty medications 3 weeks   Copay verified? Yes   Copay amount 0 Copay card   Copay form of payment No copayment ($0)                   Follow-up: 21 days     Jennifer Diggs  Care Coordinator, [facility]  6/11/2024  10:22 EDT          "

## 2024-06-12 ENCOUNTER — HOSPITAL ENCOUNTER (OUTPATIENT)
Dept: OCCUPATIONAL THERAPY | Facility: HOSPITAL | Age: 64
Setting detail: THERAPIES SERIES
Discharge: HOME | End: 2024-06-12
Payer: COMMERCIAL

## 2024-06-12 DIAGNOSIS — M25.60 JOINT STIFFNESS: ICD-10-CM

## 2024-06-12 DIAGNOSIS — C50.411 MALIGNANT NEOPLASM OF UPPER-OUTER QUADRANT OF RIGHT BREAST IN FEMALE, ESTROGEN RECEPTOR POSITIVE: ICD-10-CM

## 2024-06-12 DIAGNOSIS — R52 PAIN: ICD-10-CM

## 2024-06-12 DIAGNOSIS — Z17.0 MALIGNANT NEOPLASM OF UPPER-OUTER QUADRANT OF RIGHT BREAST IN FEMALE, ESTROGEN RECEPTOR POSITIVE: ICD-10-CM

## 2024-06-12 DIAGNOSIS — Z91.89 AT RISK FOR LYMPHEDEMA: Primary | ICD-10-CM

## 2024-06-12 DIAGNOSIS — L90.5 SCAR CONDITION AND FIBROSIS OF SKIN: ICD-10-CM

## 2024-06-12 PROCEDURE — 97140 MANUAL THERAPY 1/> REGIONS: CPT | Performed by: OCCUPATIONAL THERAPIST

## 2024-06-12 RX ORDER — VENLAFAXINE HYDROCHLORIDE 75 MG/1
75 CAPSULE, EXTENDED RELEASE ORAL DAILY
Qty: 30 CAPSULE | Refills: 11 | Status: SHIPPED | OUTPATIENT
Start: 2024-06-12

## 2024-06-12 NOTE — THERAPY TREATMENT NOTE
Outpatient Occupational Therapy Lymphedema Treatment Note  [facility]     Patient Name: Marisa Portillo  : 1960  MRN: [id number]  Today's Date: 2024      Visit Date: 2024    Patient Active Problem List   Diagnosis    Arthritis    Bladder disorder    Concussion    Contact dermatitis and eczema    Injury, other and unspecified, finger    Limb swelling    Seasonal allergic rhinitis    Vitamin D deficiency    IFG (impaired fasting glucose)    Mixed hyperlipidemia    TIA (transient ischemic attack)    Malignant neoplasm of overlapping sites of right breast in female, estrogen receptor positive    Port-A-Cath placement    S/P bilateral nipple sparing mastectomy    S/P BILATERAL IMMEDIATE BREAST RECONSTRUCTION WITH LEFT PRE PEC PLACEMENT OF SILICONE GEL IMPLANT AND MESH, RIGHT PRE PEC PLACEMENT OF EXPANDER AND MESH    Sepsis    Breast abscess    Cellulitis of left breast    Psychophysiological insomnia    Recurrent seroma of breast    Malignant neoplasm of overlapping sites of right female breast    Hot flashes due to menopause    Breast reconstruction deformity    Genitourinary syndrome of menopause    Physical exam, annual    PAD (peripheral artery disease)    Encounter for adjustment or management of vascular access device        Past Medical History:   Diagnosis Date    Abnormal Pap smear of cervix     Allergies     Anemia During chemo     Breast cancer     Cervical dysplasia     Herpes     High cholesterol     IFG (impaired fasting glucose)     Migraine     Osteoarthritis     S/P BILATERAL IMMEDIATE BREAST RECONSTRUCTION WITH LEFT PRE PEC PLACEMENT OF SILICONE GEL IMPLANT AND MESH, RIGHT PRE PEC PLACEMENT OF EXPANDER AND MESH 2021    TIA (transient ischemic attack)     no residual    Vitamin D deficiency         Past Surgical History:   Procedure Laterality Date    APPENDECTOMY      BREAST AUGMENTATION Bilateral 2021    Procedure: bilateral breast reconstructon with bilateral mesh  placement.   left implant, right tissue expander placement;  Surgeon: Lacy Shelton MD;  Location: [facility] OR [facility];  Service: Plastics;  Laterality: Bilateral;    BREAST SURGERY  [id number]    Bilateral Mastectomy with implant Left expander Right    BREAST TISSUE EXPANDER REMOVAL INSERTION OF IMPLANT Right 04/15/2022    Procedure: RIGHT BREAST IMPLANT REMOVAL WITH INCISION AND DRAINAGE;  Surgeon: Lacy Shelton MD;  Location: [facility] OR [facility];  Service: Plastics;  Laterality: Right;    CEREBRAL ANGIOGRAM      clip placed    CYST REMOVAL Right     rt wrist    HYSTERECTOMY      Partial age 31    INCISION AND DRAINAGE ABSCESS Bilateral 10/4/2022    Procedure: BILATERAL BREAST ABSCESS INCISION AND DRAINAGE, WITH LEFT BREAST IMPLANT REMOVAL;  Surgeon: Lacy Shelton MD;  Location: [facility] MAIN OR;  Service: Plastics;  Laterality: Bilateral;    INCISION AND DRAINAGE OF WOUND Right 03/11/2022    Procedure: INCISION AND DRAINAGE ABSCESS OF RIGHT BREAST WITH EXPANDER REMOVAL AND IMPLANT PLACEMENT;  Surgeon: Lacy Shelton MD;  Location: [facility] MAIN OR;  Service: Plastics;  Laterality: Right;    MASTECTOMY COMPLETE / SIMPLE W/ SENTINEL NODE BIOPSY Bilateral     PORTACATH PLACEMENT Left 12/28/2021    Procedure: INSERTION OF PORTACATH;  Surgeon: Jacqueline Mcgraw MD;  Location: [facility] OR [facility];  Service: General;  Laterality: Left;    SENTINEL NODE BIOPSY Bilateral 12/28/2021    Procedure: BILATERAL BREAST MASTECTOMIES WITH RIGHT SENTINEL NODE BIOPSIES WITH FROZEN SECTIONS;  Surgeon: Jacqueline Mcgraw MD;  Location: [facility] OR [facility];  Service: General;  Laterality: Bilateral;         Visit Dx:      ICD-10-CM ICD-9-CM   1. At risk for lymphedema  Z91.89 V49.89   2. Scar condition and fibrosis of skin  L90.5 709.2   3. Joint stiffness  M25.60 719.50   4. Pain  R52 780.96   5. Malignant neoplasm of upper-outer quadrant of right breast in female, estrogen receptor positive  C50.411 174.4    Z17.0 V86.0         Lymphedema       Row Name 06/12/24 1200             Subjective Pain    Able to rate subjective pain? yes  -TD      Pre-Treatment Pain Level 1  -TD      Post-Treatment Pain Level 1  -TD         Subjective    Subjective Comments Pt reports she feels better  -TD         Lymphedema Assessment    Lymphedema Classification RUE:;at risk/stage 0  -TD      Lymphedema Cancer Related Sx bilateral;simple mastectomy;sentinel node biopsy;other (comment)  -TD      Lymphedema Surgery Comments 12/28/2021  -TD      Lymph Nodes Removed # 10  -TD      Positive Lymph Nodes # 4  -TD      Chemo Received yes  -TD      Radiation Therapy Received yes  -TD      Radiation Treatments #/Timeframe 25  -TD         Manual Lymphatic Drainage    Manual Lymphatic Drainage initial sequence;opened regional lymph nodes;opened anastamoses  -TD      Initial Sequence short neck;cervical;abdomen;diaphragmatic breathing  -TD      Cervical right;left  -TD      Abdomen superficial  -TD      Opened Regional Lymph Nodes axillary;inguinal;paraspinal  -TD      Axillary left  -TD      Inguinal right  -TD      Opened Anastamoses anterior axillo-axillary;axillo-inguinal  -TD      Axillo-Inguinal right  -TD      Manual Therapy therapist completed scar massage on the left axilla with vibration and scraping.  -TD                User Key  (r) = Recorded By, (t) = Taken By, (c) = Cosigned By      Initials Name Provider Type    TD Bárbara Randall OT Occupational Therapist                             OT Assessment/Plan       Row Name 06/12/24 1258          OT Assessment    Functional Limitations Limitations in functional capacity and performance  -TD     Impairments Impaired lymphatic circulation  -TD     Assessment Comments Pt tolerated treatment well.  Pt continues to have softened in the right axilla.   Patient tolerated treatment well the patient will continue to benefit from skilled occupational therapy to prevent increased aging of lymphedema, increased pain, and  decreased range of motion  -TD     OT Diagnosis at risk for lymphedema  -TD     OT Rehab Potential Good  -TD     Patient/caregiver participated in establishment of treatment plan and goals Yes  -TD     Patient would benefit from skilled therapy intervention Yes  -TD        OT Plan    OT Plan Comments continue POC  -TD               User Key  (r) = Recorded By, (t) = Taken By, (c) = Cosigned By      Initials Name Provider Type    TD Bárbara Randall, OT Occupational Therapist                        Manual Rx (Last 36 Hours)       Manual Treatments       Row Name 06/12/24 1300             Total Minutes    88878 - OT Manual Therapy Minutes 25  -TD                User Key  (r) = Recorded By, (t) = Taken By, (c) = Cosigned By      Initials Name Provider Type    TD Bárbara Randall, OT Occupational Therapist                      Therapy Education  Education Details: Pt was given door way extension stretches and over head pectoral stretches.  Given: Symptoms/condition management, Edema management  Program: New  How Provided: Verbal, Demonstration  Provided to: Patient  Level of Understanding: Verbalized, Teach back education performed                Time Calculation:   Timed Charges  87036 - OT Manual Therapy Minutes: 25  Total Minutes  Timed Charges Total Minutes: 25   Total Minutes: 25     Therapy Charges for Today       Code Description Service Date Service Provider Modifiers Qty    [id number]  OT MANUAL THERAPY EA 15 MIN 6/12/2024 Bárbara Randall OT GO 2                        Bárbara Randall OT  6/12/2024

## 2024-06-14 ENCOUNTER — TELEPHONE (OUTPATIENT)
Dept: ONCOLOGY | Facility: HOSPITAL | Age: 64
End: 2024-06-14

## 2024-06-14 NOTE — TELEPHONE ENCOUNTER
"  Caller: Marisa Portillo \"Liberty\"    Relationship to patient: Self    Best call back number: [phone]    Chief complaint: PT NEEDS TO RESCHEDULE     Type of visit: PORT FLU     Requested date: MONDAY 6-17-24     If rescheduling, when is the original appointment: 6-14-24     "

## 2024-06-17 ENCOUNTER — HOSPITAL ENCOUNTER (OUTPATIENT)
Dept: OCCUPATIONAL THERAPY | Facility: HOSPITAL | Age: 64
Setting detail: THERAPIES SERIES
Discharge: HOME | End: 2024-06-17
Payer: COMMERCIAL

## 2024-06-17 DIAGNOSIS — C50.411 MALIGNANT NEOPLASM OF UPPER-OUTER QUADRANT OF RIGHT BREAST IN FEMALE, ESTROGEN RECEPTOR POSITIVE: ICD-10-CM

## 2024-06-17 DIAGNOSIS — Z91.89 AT RISK FOR LYMPHEDEMA: Primary | ICD-10-CM

## 2024-06-17 DIAGNOSIS — M25.60 JOINT STIFFNESS: ICD-10-CM

## 2024-06-17 DIAGNOSIS — L90.5 SCAR CONDITION AND FIBROSIS OF SKIN: ICD-10-CM

## 2024-06-17 DIAGNOSIS — R52 PAIN: ICD-10-CM

## 2024-06-17 DIAGNOSIS — Z17.0 MALIGNANT NEOPLASM OF UPPER-OUTER QUADRANT OF RIGHT BREAST IN FEMALE, ESTROGEN RECEPTOR POSITIVE: ICD-10-CM

## 2024-06-17 PROCEDURE — 97140 MANUAL THERAPY 1/> REGIONS: CPT | Performed by: OCCUPATIONAL THERAPIST

## 2024-06-17 NOTE — THERAPY TREATMENT NOTE
Outpatient Occupational Therapy Lymphedema Treatment Note  [facility]     Patient Name: Marisa Portillo  : 1960  MRN: [id number]  Today's Date: 2024      Visit Date: 2024    Patient Active Problem List   Diagnosis    Arthritis    Bladder disorder    Concussion    Contact dermatitis and eczema    Injury, other and unspecified, finger    Limb swelling    Seasonal allergic rhinitis    Vitamin D deficiency    IFG (impaired fasting glucose)    Mixed hyperlipidemia    TIA (transient ischemic attack)    Malignant neoplasm of overlapping sites of right breast in female, estrogen receptor positive    Port-A-Cath placement    S/P bilateral nipple sparing mastectomy    S/P BILATERAL IMMEDIATE BREAST RECONSTRUCTION WITH LEFT PRE PEC PLACEMENT OF SILICONE GEL IMPLANT AND MESH, RIGHT PRE PEC PLACEMENT OF EXPANDER AND MESH    Sepsis    Breast abscess    Cellulitis of left breast    Psychophysiological insomnia    Recurrent seroma of breast    Malignant neoplasm of overlapping sites of right female breast    Hot flashes due to menopause    Breast reconstruction deformity    Genitourinary syndrome of menopause    Physical exam, annual    PAD (peripheral artery disease)    Encounter for adjustment or management of vascular access device        Past Medical History:   Diagnosis Date    Abnormal Pap smear of cervix     Allergies     Anemia During chemo     Breast cancer     Cervical dysplasia     Herpes     High cholesterol     IFG (impaired fasting glucose)     Migraine     Osteoarthritis     S/P BILATERAL IMMEDIATE BREAST RECONSTRUCTION WITH LEFT PRE PEC PLACEMENT OF SILICONE GEL IMPLANT AND MESH, RIGHT PRE PEC PLACEMENT OF EXPANDER AND MESH 2021    TIA (transient ischemic attack)     no residual    Vitamin D deficiency         Past Surgical History:   Procedure Laterality Date    APPENDECTOMY      BREAST AUGMENTATION Bilateral 2021    Procedure: bilateral breast reconstructon with bilateral mesh  placement.   left implant, right tissue expander placement;  Surgeon: Lacy Shelton MD;  [facility];  Service: Plastics;  Laterality: Bilateral;    BREAST SURGERY  [id number]    Bilateral Mastectomy with implant Left expander Right    BREAST TISSUE EXPANDER REMOVAL INSERTION OF IMPLANT Right 04/15/2022    Procedure: RIGHT BREAST IMPLANT REMOVAL WITH INCISION AND DRAINAGE;  Surgeon: Lacy Shelton MD;  [facility] [place] [facility];  Service: Plastics;  Laterality: Right;    CEREBRAL ANGIOGRAM      clip placed    CYST REMOVAL Right     rt wrist    HYSTERECTOMY      Partial age 31    INCISION AND DRAINAGE ABSCESS Bilateral 10/4/2022    Procedure: BILATERAL BREAST ABSCESS INCISION AND DRAINAGE, WITH LEFT BREAST IMPLANT REMOVAL;  Surgeon: Lacy Shelton MD;  Location: [facility] MAIN OR;  Service: Plastics;  Laterality: Bilateral;    INCISION AND DRAINAGE OF WOUND Right 03/11/2022    Procedure: INCISION AND DRAINAGE ABSCESS OF RIGHT BREAST WITH EXPANDER REMOVAL AND IMPLANT PLACEMENT;  Surgeon: Lacy Shelton MD;  Location: [facility] MAIN OR;  Service: Plastics;  Laterality: Right;    MASTECTOMY COMPLETE / SIMPLE W/ SENTINEL NODE BIOPSY Bilateral     PORTACATH PLACEMENT Left 12/28/2021    Procedure: INSERTION OF PORTACATH;  Surgeon: Jacqueline Mcgraw MD;  [facility] [place] [facility];  Service: General;  Laterality: Left;    SENTINEL NODE BIOPSY Bilateral 12/28/2021    Procedure: BILATERAL BREAST MASTECTOMIES WITH RIGHT SENTINEL NODE BIOPSIES WITH FROZEN SECTIONS;  Surgeon: Jacqueline Mcgraw MD;  [facility] [place] [facility];  Service: General;  Laterality: Bilateral;         Visit Dx:      ICD-10-CM ICD-9-CM   1. At risk for lymphedema  Z91.89 V49.89   2. Scar condition and fibrosis of skin  L90.5 709.2   3. Joint stiffness  M25.60 719.50   4. Pain  R52 780.96   5. Malignant neoplasm of upper-outer quadrant of right breast in female, estrogen receptor positive  C50.411 174.4    Z17.0 V86.0         Lymphedema       Row Name 06/17/24 0800             Subjective Pain    Able to rate subjective pain? yes  -TD      Pre-Treatment Pain Level 0  -TD      Post-Treatment Pain Level 0  -TD         Subjective    Subjective Comments Pt feels much better and is staying stretched.  -TD         Lymphedema Assessment    Lymphedema Classification RUE:;at risk/stage 0  -TD      Lymphedema Cancer Related Sx bilateral;simple mastectomy;sentinel node biopsy;other (comment)  -TD      Lymphedema Surgery Comments 12/28/2021  -TD      Lymph Nodes Removed # 10  -TD      Positive Lymph Nodes # 4  -TD      Chemo Received yes  -TD      Radiation Therapy Received yes  -TD      Radiation Treatments #/Timeframe 25  -TD         Manual Lymphatic Drainage    Manual Lymphatic Drainage initial sequence;opened regional lymph nodes;opened anastamoses  -TD      Initial Sequence short neck;cervical;abdomen;diaphragmatic breathing  -TD      Cervical right;left  -TD      Abdomen superficial  -TD      Opened Regional Lymph Nodes axillary;inguinal;paraspinal  -TD      Axillary left  -TD      Inguinal right  -TD      Opened Anastamoses anterior axillo-axillary;axillo-inguinal  -TD      Axillo-Inguinal right  -TD      Manual Therapy Therapist completed stretching of the right axilla with vibration and scraping.  -TD                User Key  (r) = Recorded By, (t) = Taken By, (c) = Cosigned By      Initials Name Provider Type    TD Bárbara Randall, OT Occupational Therapist                             OT Assessment/Plan       Row Name 06/17/24 0879          OT Assessment    Functional Limitations Limitations in functional capacity and performance  -TD     Impairments Impaired lymphatic circulation  -TD     Assessment Comments Pt feels like she is back at baseline for movemetn and feels she is ready to be put back on surveillance program.  Patient will continue to benefit from skilled occupational therapy to prevent increased aging of lymphedema,  increased pain, and decreased range of motion  -TD     OT Diagnosis at risk for lymphedema  -TD     OT Rehab Potential Good  -TD     Patient/caregiver participated in establishment of treatment plan and goals Yes  -TD     Patient would benefit from skilled therapy intervention Yes  -TD        OT Plan    OT Plan Comments continue POC  -TD               User Key  (r) = Recorded By, (t) = Taken By, (c) = Cosigned By      Initials Name Provider Type    TD Bárbara Randall, OT Occupational Therapist                        Manual Rx (Last 36 Hours)       Manual Treatments       Row Name 06/17/24 0840             Total Minutes    32174 - OT Manual Therapy Minutes 25  -TD                User Key  (r) = Recorded By, (t) = Taken By, (c) = Cosigned By      Initials Name Provider Type    TD Bárbara Randall, OT Occupational Therapist                      Therapy Education  Education Details: Therapist reviewed streches this date.  Given: Symptoms/condition management, Edema management  Program: New  How Provided: Verbal, Demonstration  Provided to: Patient  Level of Understanding: Verbalized, Teach back education performed                Time Calculation:   Timed Charges  49480 - OT Manual Therapy Minutes: 25  Total Minutes  Timed Charges Total Minutes: 25   Total Minutes: 25     Therapy Charges for Today       Code Description Service Date Service Provider Modifiers Qty    [id number]  OT MANUAL THERAPY EA 15 MIN 6/17/2024 Bárbara Randall OT GO 2                        Bárbara Randall OT  6/17/2024

## 2024-06-19 ENCOUNTER — APPOINTMENT (OUTPATIENT)
Dept: OCCUPATIONAL THERAPY | Facility: HOSPITAL | Age: 64
End: 2024-06-19
Payer: COMMERCIAL

## 2024-06-26 ENCOUNTER — HOSPITAL ENCOUNTER (OUTPATIENT)
Dept: ONCOLOGY | Facility: HOSPITAL | Age: 64
Discharge: HOME | End: 2024-06-26
Payer: COMMERCIAL

## 2024-06-26 DIAGNOSIS — Z45.2 ENCOUNTER FOR ADJUSTMENT OR MANAGEMENT OF VASCULAR ACCESS DEVICE: Primary | ICD-10-CM

## 2024-06-26 PROCEDURE — 25010000002 HEPARIN LOCK FLUSH PER 10 UNITS: Performed by: INTERNAL MEDICINE

## 2024-06-26 PROCEDURE — 96523 IRRIG DRUG DELIVERY DEVICE: CPT

## 2024-06-26 RX ORDER — SODIUM CHLORIDE 0.9 % (FLUSH) 0.9 %
20 SYRINGE (ML) INJECTION AS NEEDED
Status: DISCONTINUED | OUTPATIENT
Start: 2024-06-26 | End: 2024-06-27 | Stop reason: HOSPADM

## 2024-06-26 RX ORDER — HEPARIN SODIUM (PORCINE) LOCK FLUSH IV SOLN 100 UNIT/ML 100 UNIT/ML
500 SOLUTION INTRAVENOUS AS NEEDED
Status: DISCONTINUED | OUTPATIENT
Start: 2024-06-26 | End: 2024-06-27 | Stop reason: HOSPADM

## 2024-06-26 RX ORDER — SODIUM CHLORIDE 0.9 % (FLUSH) 0.9 %
20 SYRINGE (ML) INJECTION AS NEEDED
OUTPATIENT
Start: 2024-06-26

## 2024-06-26 RX ORDER — HEPARIN SODIUM (PORCINE) LOCK FLUSH IV SOLN 100 UNIT/ML 100 UNIT/ML
500 SOLUTION INTRAVENOUS AS NEEDED
OUTPATIENT
Start: 2024-06-26

## 2024-06-26 RX ADMIN — HEPARIN 500 UNITS: 100 SYRINGE at 11:28

## 2024-06-26 RX ADMIN — Medication 20 ML: at 11:28

## 2024-07-02 ENCOUNTER — SPECIALTY PHARMACY (OUTPATIENT)
Dept: PHARMACY | Facility: HOSPITAL | Age: 64
End: 2024-07-02
Payer: COMMERCIAL

## 2024-07-30 RX ORDER — LETROZOLE 2.5 MG/1
2.5 TABLET, FILM COATED ORAL DAILY
Qty: 90 TABLET | Refills: 1 | Status: SHIPPED | OUTPATIENT
Start: 2024-07-30

## 2024-08-02 ENCOUNTER — SPECIALTY PHARMACY (OUTPATIENT)
Dept: PHARMACY | Facility: HOSPITAL | Age: 64
End: 2024-08-02
Payer: COMMERCIAL

## 2024-08-02 NOTE — PROGRESS NOTES
" Specialty Pharmacy Patient Management Program  Oncology Refill Outreach      Marisa \"Liberty\" is a 64 y.o. female contacted today regarding refills of her medication(s).    Specialty medication(s) and dose(s) confirmed: Completed refill outreach. [facility] will mail abemaciclib (VERZENIO) 100 mg tablet chemo tablet once ready to dispense.     Other medications being refilled: N/A    Refill Questions      Flowsheet Row Most Recent Value   Changes to allergies? No   Changes to medications? No   New conditions or infections since last clinic visit No   Unplanned office visit, urgent care, ED, or hospital admission in the last 4 weeks  No   How does patient/caregiver feel medication is working? Good   Financial problems or insurance changes  No   Since the previous refill, were any specialty medication doses or scheduled injections missed or delayed?  No   Does this patient require a clinical escalation to a pharmacist? No            Delivery Questions      Flowsheet Row Most Recent Value   Delivery method [employer]   Delivery address verified with patient/caregiver? Yes   Delivery address Home   Number of medications in delivery 1   Medication(s) being filled and delivered Abemaciclib   Doses left of specialty medications 15   Copay verified? Yes   Copay amount 0 Copay card   Copay form of payment No copayment ($0)   Ship Date 08/05/24   Delivery Date 08/06/24   Signature Required No                   Follow-up: 21 days     Jennifer Diggs  Care Coordinator, [facility]  8/2/2024  08:41 EDT          "

## 2024-08-05 ENCOUNTER — OFFICE VISIT (OUTPATIENT)
Dept: ONCOLOGY | Facility: HOSPITAL | Age: 64
End: 2024-08-05
Payer: COMMERCIAL

## 2024-08-05 ENCOUNTER — HOSPITAL ENCOUNTER (OUTPATIENT)
Dept: ONCOLOGY | Facility: HOSPITAL | Age: 64
Discharge: HOME | End: 2024-08-05
Payer: COMMERCIAL

## 2024-08-05 VITALS
TEMPERATURE: 98.7 F | HEIGHT: 65 IN | DIASTOLIC BLOOD PRESSURE: 64 MMHG | RESPIRATION RATE: 16 BRPM | HEART RATE: 82 BPM | WEIGHT: 177.91 LBS | SYSTOLIC BLOOD PRESSURE: 122 MMHG | OXYGEN SATURATION: 98 % | BODY MASS INDEX: 29.64 KG/M2

## 2024-08-05 DIAGNOSIS — K52.1 CHEMOTHERAPY INDUCED DIARRHEA: ICD-10-CM

## 2024-08-05 DIAGNOSIS — Z78.0 POST-MENOPAUSAL: ICD-10-CM

## 2024-08-05 DIAGNOSIS — T45.1X5A CHEMOTHERAPY INDUCED DIARRHEA: ICD-10-CM

## 2024-08-05 DIAGNOSIS — K59.1 FUNCTIONAL DIARRHEA: ICD-10-CM

## 2024-08-05 DIAGNOSIS — Z17.0 MALIGNANT NEOPLASM OF OVERLAPPING SITES OF RIGHT BREAST IN FEMALE, ESTROGEN RECEPTOR POSITIVE: ICD-10-CM

## 2024-08-05 DIAGNOSIS — C50.811 MALIGNANT NEOPLASM OF OVERLAPPING SITES OF RIGHT BREAST IN FEMALE, ESTROGEN RECEPTOR POSITIVE: ICD-10-CM

## 2024-08-05 DIAGNOSIS — C50.811 MALIGNANT NEOPLASM OF OVERLAPPING SITES OF RIGHT BREAST IN FEMALE, ESTROGEN RECEPTOR POSITIVE: Primary | ICD-10-CM

## 2024-08-05 DIAGNOSIS — Z45.2 ENCOUNTER FOR ADJUSTMENT OR MANAGEMENT OF VASCULAR ACCESS DEVICE: Primary | ICD-10-CM

## 2024-08-05 DIAGNOSIS — Z17.0 MALIGNANT NEOPLASM OF OVERLAPPING SITES OF RIGHT BREAST IN FEMALE, ESTROGEN RECEPTOR POSITIVE: Primary | ICD-10-CM

## 2024-08-05 LAB
ALBUMIN SERPL-MCNC: 4.2 G/DL (ref 3.5–5.2)
ALBUMIN/GLOB SERPL: 1.8 G/DL
ALP SERPL-CCNC: 64 U/L (ref 39–117)
ALT SERPL W P-5'-P-CCNC: 14 U/L (ref 1–33)
ANION GAP SERPL CALCULATED.3IONS-SCNC: 7.8 MMOL/L (ref 5–15)
AST SERPL-CCNC: 16 U/L (ref 1–32)
BASOPHILS # BLD AUTO: 0.04 10*3/MM3 (ref 0–0.2)
BASOPHILS NFR BLD AUTO: 1.3 % (ref 0–1.5)
BILIRUB SERPL-MCNC: 0.7 MG/DL (ref 0–1.2)
BUN SERPL-MCNC: 17 MG/DL (ref 8–23)
BUN/CREAT SERPL: 20 (ref 7–25)
CALCIUM SPEC-SCNC: 9.3 MG/DL (ref 8.6–10.5)
CHLORIDE SERPL-SCNC: 104 MMOL/L (ref 98–107)
CO2 SERPL-SCNC: 26.2 MMOL/L (ref 22–29)
CREAT SERPL-MCNC: 0.85 MG/DL (ref 0.57–1)
DEPRECATED RDW RBC AUTO: 50.7 FL (ref 37–54)
EGFRCR SERPLBLD CKD-EPI 2021: 76.1 ML/MIN/1.73
EOSINOPHIL # BLD AUTO: 0.08 10*3/MM3 (ref 0–0.4)
EOSINOPHIL NFR BLD AUTO: 2.6 % (ref 0.3–6.2)
ERYTHROCYTE [DISTWIDTH] IN BLOOD BY AUTOMATED COUNT: 13.3 % (ref 12.3–15.4)
GLOBULIN UR ELPH-MCNC: 2.3 GM/DL
GLUCOSE SERPL-MCNC: 96 MG/DL (ref 65–99)
HCT VFR BLD AUTO: 36.6 % (ref 34–46.6)
HGB BLD-MCNC: 12.3 G/DL (ref 12–15.9)
IMM GRANULOCYTES # BLD AUTO: 0.01 10*3/MM3 (ref 0–0.05)
IMM GRANULOCYTES NFR BLD AUTO: 0.3 % (ref 0–0.5)
LYMPHOCYTES # BLD AUTO: 0.84 10*3/MM3 (ref 0.7–3.1)
LYMPHOCYTES NFR BLD AUTO: 27.1 % (ref 19.6–45.3)
MCH RBC QN AUTO: 34.2 PG (ref 26.6–33)
MCHC RBC AUTO-ENTMCNC: 33.6 G/DL (ref 31.5–35.7)
MCV RBC AUTO: 101.7 FL (ref 79–97)
MONOCYTES # BLD AUTO: 0.37 10*3/MM3 (ref 0.1–0.9)
MONOCYTES NFR BLD AUTO: 11.9 % (ref 5–12)
NEUTROPHILS NFR BLD AUTO: 1.76 10*3/MM3 (ref 1.7–7)
NEUTROPHILS NFR BLD AUTO: 56.8 % (ref 42.7–76)
PLATELET # BLD AUTO: 177 10*3/MM3 (ref 140–450)
PMV BLD AUTO: 9.6 FL (ref 6–12)
POTASSIUM SERPL-SCNC: 4.1 MMOL/L (ref 3.5–5.2)
PROT SERPL-MCNC: 6.5 G/DL (ref 6–8.5)
RBC # BLD AUTO: 3.6 10*6/MM3 (ref 3.77–5.28)
SODIUM SERPL-SCNC: 138 MMOL/L (ref 136–145)
WBC NRBC COR # BLD AUTO: 3.1 10*3/MM3 (ref 3.4–10.8)

## 2024-08-05 PROCEDURE — 80053 COMPREHEN METABOLIC PANEL: CPT | Performed by: INTERNAL MEDICINE

## 2024-08-05 PROCEDURE — 85025 COMPLETE CBC W/AUTO DIFF WBC: CPT | Performed by: INTERNAL MEDICINE

## 2024-08-05 PROCEDURE — 36591 DRAW BLOOD OFF VENOUS DEVICE: CPT

## 2024-08-05 PROCEDURE — 99214 OFFICE O/P EST MOD 30 MIN: CPT | Performed by: INTERNAL MEDICINE

## 2024-08-05 PROCEDURE — 25010000002 HEPARIN LOCK FLUSH PER 10 UNITS: Performed by: INTERNAL MEDICINE

## 2024-08-05 RX ORDER — HEPARIN SODIUM (PORCINE) LOCK FLUSH IV SOLN 100 UNIT/ML 100 UNIT/ML
500 SOLUTION INTRAVENOUS AS NEEDED
OUTPATIENT
Start: 2024-08-05

## 2024-08-05 RX ORDER — HEPARIN SODIUM (PORCINE) LOCK FLUSH IV SOLN 100 UNIT/ML 100 UNIT/ML
500 SOLUTION INTRAVENOUS AS NEEDED
Status: DISCONTINUED | OUTPATIENT
Start: 2024-08-05 | End: 2024-08-06 | Stop reason: HOSPADM

## 2024-08-05 RX ORDER — SODIUM CHLORIDE 0.9 % (FLUSH) 0.9 %
20 SYRINGE (ML) INJECTION AS NEEDED
OUTPATIENT
Start: 2024-08-05

## 2024-08-05 RX ORDER — ACYCLOVIR 50 MG/G
1 OINTMENT TOPICAL
Qty: 15 G | Refills: 3 | Status: SHIPPED | OUTPATIENT
Start: 2024-08-05

## 2024-08-05 RX ORDER — SODIUM CHLORIDE 0.9 % (FLUSH) 0.9 %
20 SYRINGE (ML) INJECTION AS NEEDED
Status: DISCONTINUED | OUTPATIENT
Start: 2024-08-05 | End: 2024-08-06 | Stop reason: HOSPADM

## 2024-08-05 RX ORDER — DIPHENOXYLATE HYDROCHLORIDE AND ATROPINE SULFATE 2.5; .025 MG/1; MG/1
1 TABLET ORAL 4 TIMES DAILY PRN
Qty: 40 TABLET | Refills: 2 | Status: SHIPPED | OUTPATIENT
Start: 2024-08-05

## 2024-08-05 RX ADMIN — Medication 20 ML: at 13:48

## 2024-08-05 RX ADMIN — HEPARIN 500 UNITS: 100 SYRINGE at 13:49

## 2024-08-05 NOTE — PROGRESS NOTES
[facility]    Location  [facility] HEMATOLOGY & ONCOLOGY    Chief Complaint  FOLLOW UP 2 - breast ca- ORAL CHEMO    Referring Provider: Judah Johnson, *  PCP: Judah Johnson MD    Subjective          Oncology/Hematology History Overview Note     ER+ Right Breast Cancer:    Diagnostic mammogram on 11/12/2021: 2 cm asymmetry in the outer central right breast with amorphous calcifications.  Similar appearing group of amorphous calcifications in the outer central right breast.  6 mm asymmetry in the inner right breast.  Right axillary lymphadenopathy.    Ultrasound-guided biopsy on 11/1/2021: Right breast 3 o'clock position, 2 cm from the nipple with invasive ductal carcinoma, grade 2.  Right breast 9 o'clock position, 3 cm from the nipple with invasive ductal carcinoma, grade 2, ER positive (50%), SC positive (3%), HER-2 negative (score 0), Ki-67 14%.  Associated with intermediate grade ductal carcinoma in situ.  Right axillary lymph node positive for breast carcinoma.    CT CAP and bone scan on 12/13/21: negative for metastatic disease    Bilateral mastectomy 12/28/2021: Right breast with multiple (2) foci of invasive ductal carcinoma, largest focus 2.3 cm, grade 2, associated with DCIS with extensive intraductal component, all margins negative, 4/10 lymph nodes involved with no extranodal extension and the largest focus measured at 3.3 cm, ER positive (40%), SC positive (5%), HER-2 negative by IHC (score 0), Ki-67 14%, pT2 pN2a cM0    Completed 4 cycles of chemotherapy with TC on 4/25/22.  Complicated by a UTI and multiple right breast abscesses causing delay in cycles 3 and 4.     Radiation was also delayed due to infection and wound healing. She finishes December 20th.     She started Abemiciclib on 1/1/23.    *The pt discontinued HRT in October of 2021 when she had an abnormal screening mammogram     Malignant neoplasm of overlapping sites of right breast in  female, estrogen receptor positive   12/9/2021 Initial Diagnosis    Malignant neoplasm of overlapping sites of right breast in female, estrogen receptor positive (HCC)     12/28/2021 Cancer Staged    Staging form: Breast, AJCC 8th Edition  - Pathologic stage from 12/28/2021: Stage IB (pT2, pN2a, cM0, G2, ER+, NM+, HER2-) - Signed by Starr Mendez MD PhD on 1/30/2022 1/14/2022 -  Chemotherapy    OP CENTRAL VENOUS ACCESS DEVICE ACCESS, CARE, AND MAINTENANCE (CVAD)     1/31/2022 - 4/26/2022 Chemotherapy    OP BREAST TC DOCEtaxel / Cyclophosphamide     11/15/2022 - 12/20/2022 Radiation    Radiation OncologyTreatment Course:  Marisa Portillo received 5000 cGy in 25 fractions to right chest wall and axillary levels 1 through 3.      12/27/2022 -  Chemotherapy    OP BREAST Abemaciclib      Malignant neoplasm of overlapping sites of right female breast   7/18/2022 Initial Diagnosis    Malignant neoplasm of overlapping sites of right female breast (HCC)         History of Present Illness  Patient comes in today for toxicity check while on abemaciclib and letrozole.  Reports her diarrhea is much improved on Lomotil.  She reports she had only had to take it a few times every couple weeks.  She still has unusual muscle cramps in jaw, neck, and scapula among other areas.  She also has been getting recurrent fever blisters.  No nausea or vomiting.  No fevers or chills.  No bleeding.  No breast related concerns today.    Review of Systems   Constitutional:  Positive for fatigue. Negative for appetite change, diaphoresis, fever, unexpected weight gain and unexpected weight loss.   HENT:  Negative for hearing loss, mouth sores, sore throat, swollen glands, trouble swallowing and voice change.    Eyes:  Negative for blurred vision.   Respiratory:  Negative for cough, shortness of breath and wheezing.    Cardiovascular:  Negative for chest pain and palpitations.   Gastrointestinal:  Negative for abdominal pain, blood in  stool, constipation, diarrhea, nausea and vomiting.   Endocrine: Negative for cold intolerance and heat intolerance.   Genitourinary:  Negative for difficulty urinating, dysuria, frequency, hematuria and urinary incontinence.   Musculoskeletal:  Positive for arthralgias. Negative for back pain and myalgias.   Skin:  Negative for rash, skin lesions and wound.   Neurological:  Negative for dizziness, seizures, weakness, numbness and headache.   Hematological:  Does not bruise/bleed easily.   Psychiatric/Behavioral:  Negative for depressed mood. The patient is not nervous/anxious.    All other systems reviewed and are negative.      Past Medical History:   Diagnosis Date    Abnormal Pap smear of cervix     Allergies     Anemia During chemo 2022    Breast cancer     Cervical dysplasia     Herpes     High cholesterol     IFG (impaired fasting glucose)     Migraine     Osteoarthritis     S/P BILATERAL IMMEDIATE BREAST RECONSTRUCTION WITH LEFT PRE PEC PLACEMENT OF SILICONE GEL IMPLANT AND MESH, RIGHT PRE PEC PLACEMENT OF EXPANDER AND MESH 12/29/2021    TIA (transient ischemic attack)     no residual    Vitamin D deficiency      Past Surgical History:   Procedure Laterality Date    APPENDECTOMY      BREAST AUGMENTATION Bilateral 12/28/2021    Procedure: bilateral breast reconstructon with bilateral mesh placement.   left implant, right tissue expander placement;  Surgeon: Lacy Shelton MD;  [place] [facility] [place] [facility] [place]: [place];  Laterality: Bilateral;    BREAST SURGERY  [id number]    Bilateral Mastectomy with implant Left expander Right    BREAST TISSUE EXPANDER REMOVAL INSERTION OF IMPLANT Right 04/15/2022    Procedure: RIGHT BREAST IMPLANT REMOVAL WITH INCISION AND DRAINAGE;  Surgeon: Lacy Shelton MD;  [place] [facility] [place] [facility] [place]  Service: [place];  Laterality: Right;    CEREBRAL ANGIOGRAM      clip placed    CYST REMOVAL Right     rt wrist    HYSTERECTOMY      Partial age 31    INCISION AND DRAINAGE  ABSCESS Bilateral 10/4/2022    Procedure: BILATERAL BREAST ABSCESS INCISION AND DRAINAGE, WITH LEFT BREAST IMPLANT REMOVAL;  Surgeon: Lacy Shelton MD;  Location: [facility] MAIN OR;  Service: Plastics;  Laterality: Bilateral;    INCISION AND DRAINAGE OF WOUND Right 03/11/2022    Procedure: INCISION AND DRAINAGE ABSCESS OF RIGHT BREAST WITH EXPANDER REMOVAL AND IMPLANT PLACEMENT;  Surgeon: Lacy Shelton MD;  Location: [facility] MAIN OR;  Service: Plastics;  Laterality: Right;    MASTECTOMY COMPLETE / SIMPLE W/ SENTINEL NODE BIOPSY Bilateral     PORTACATH PLACEMENT Left 12/28/2021    Procedure: INSERTION OF PORTACATH;  Surgeon: Jacqueline Mcgraw MD;  [place] [facility] [place] [facility] [place]: General;  Laterality: Left;    SENTINEL NODE BIOPSY Bilateral 12/28/2021    Procedure: BILATERAL BREAST MASTECTOMIES WITH RIGHT SENTINEL NODE BIOPSIES WITH FROZEN SECTIONS;  Surgeon: Jacqueline Mcgraw MD;  [place] [facility] [place] [facility] [place]  Service: General;  Laterality: Bilateral;     Social History     Socioeconomic History    Marital status:    Tobacco Use    Smoking status: Never    Smokeless tobacco: Never    Tobacco comments:     Father mother  smoker diring childhood and  adulthood   Vaping Use    Vaping status: Never Used   Substance and Sexual Activity    Alcohol use: Yes     Comment: Socially only . Occasionally    Drug use: Never    Sexual activity: Yes     Partners: Male     Birth control/protection: Post-menopausal     Comment: Hysterectomy age 33     Family History   Problem Relation Age of Onset    Hypertension Father     Diabetes Father     Cancer Father     Arthritis Father     Liver cancer Father     Lung cancer Father     Asthma Father     COPD Father     Heart disease Father     Kidney disease Father     Hypertension Mother     Diabetes Mother     Cancer Mother     Arthritis Mother         Osteo and RA    Breast cancer Mother     Breast cancer Maternal Aunt     Malig Hyperthermia Neg Hx      "Ovarian cancer Neg Hx     Uterine cancer Neg Hx     Colon cancer Neg Hx     Melanoma Neg Hx     Prostate cancer Neg Hx        Objective   Physical Exam  General: Alert, cooperative, no acute distress  Eyes: Anicteric sclera, PERRLA  Respiratory: normal respiratory effort  Cardiovascular: no lower extremity edema  Skin: Normal tone, no rash, + fever blister bottom right   Psychiatric: Appropriate affect, intact judgment  Neurologic: No focal sensory or motor deficits, normal cognition   Musculoskeletal: Normal muscle strength and tone      Vitals:    08/05/24 1407   BP: 122/64   Pulse: 82   Resp: 16   Temp: 98.7 °F (37.1 °C)   TempSrc: Temporal   SpO2: 98%   Weight: 80.7 kg (177 lb 14.6 oz)   Height: 165.1 cm (65\")   PainSc: 0-No pain                   PHQ-9 Total Score:         Result Review :   The following data was reviewed by: Judah Juarez MD on 08/05/24:  Lab Results   Component Value Date    HGB 12.3 08/05/2024    HCT 36.6 08/05/2024    .7 (H) 08/05/2024     08/05/2024    WBC 3.10 (L) 08/05/2024    NEUTROABS 1.76 08/05/2024    LYMPHSABS 0.84 08/05/2024    MONOSABS 0.37 08/05/2024    EOSABS 0.08 08/05/2024    BASOSABS 0.04 08/05/2024     Lab Results   Component Value Date    GLUCOSE 96 08/05/2024    BUN 17 08/05/2024    CREATININE 0.85 08/05/2024     08/05/2024    K 4.1 08/05/2024     08/05/2024    CO2 26.2 08/05/2024    CALCIUM 9.3 08/05/2024    PROTEINTOT 6.5 08/05/2024    ALBUMIN 4.2 08/05/2024    BILITOT 0.7 08/05/2024    ALKPHOS 64 08/05/2024    AST 16 08/05/2024    ALT 14 08/05/2024     Lab Results   Component Value Date    IRON 65 03/13/2022    LABIRON 35 03/13/2022    TRANSFERRIN 125 (L) 03/13/2022    TIBC 186 (L) 03/13/2022     Lab Results   Component Value Date    OYEPKKUS35 >2,000 (H) 03/11/2022    FOLATE 16.80 03/11/2022     No results found for: \"PSA\", \"CEA\", \"AFP\", \"\", \"\"    Labs personally reviewed. No anemia. ANC 1.76. CMP normal.        Assessment " and Plan    Diagnoses and all orders for this visit:    1. Malignant neoplasm of overlapping sites of right breast in female, estrogen receptor positive (Primary)  -     CBC & Differential; Future  -     Comprehensive Metabolic Panel; Future    2. Post-menopausal  -     DEXA Bone Density Axial; Future    3. Functional diarrhea  -     diphenoxylate-atropine (LOMOTIL) 2.5-0.025 MG per tablet; Take 1 tablet by mouth 4 (Four) Times a Day As Needed for Diarrhea.  Dispense: 40 tablet; Refill: 2    4. Chemotherapy induced diarrhea    Other orders  -     acyclovir (ZOVIRAX) 5 % ointment; Apply 1 Application topically to the appropriate area as directed Every 3 (Three) Hours.  Dispense: 15 g; Refill: 3      ER+ Right breast Cancer: pT2N2a, s/p bilateral mastectomy.  The pt completed 4 cycles of TC.  Her treatment course was complicated by breast abscesses. Radiation was delayed until December 2022.  She is tolerating anastrozole and abemiciclib (started on 1/1/23) with the exception of occasional severe bouts of diarrhea. Diarrhea improved but still occurs 1-2 times per week, better now that she is on lomotil. Currently on 100 mg BID dosing of the abemaciclib. Plan for 2 years of abemaciclib (due to finish 1/2025). Plan to continue with abemaciclib and letrozole at current dosages. Patient agreeable to plan. Continue to monitor for severe toxicities with frequent labs and office visits.    Diarrhea  2/2 abemaciclib.  Imodium causes cramping. PRN lomotil added and has helped. Can dose reduce abemaciclib if needed.    Menopausal symptoms: Secondary to letrozole. She is currently on venlafaxine 75mg daily.  Also on clonidine.     Muscle cramps  Not likely due to abemaciclib. In jaw and neck. Ok to continue mag supplement. Mag has improved the frequency. Also suggested massage and/or acupuncture.     Neutropenia  2/2 Verzenio. Has occurred off and on in the past. Overall stable. No dose adjustment required. ANC normal today.  Repeat CBC 3 months.     Patient follow up: 3 months  Patient was given instructions and counseling regarding her condition or for health maintenance advice. Please see specific information pulled into the AVS if appropriate.

## 2024-08-06 ENCOUNTER — SPECIALTY PHARMACY (OUTPATIENT)
Facility: HOSPITAL | Age: 64
End: 2024-08-06
Payer: COMMERCIAL

## 2024-08-19 ENCOUNTER — OFFICE VISIT (OUTPATIENT)
Dept: INTERNAL MEDICINE | Age: 64
End: 2024-08-19
Payer: COMMERCIAL

## 2024-08-19 VITALS
OXYGEN SATURATION: 95 % | BODY MASS INDEX: 29.66 KG/M2 | HEIGHT: 65 IN | SYSTOLIC BLOOD PRESSURE: 150 MMHG | TEMPERATURE: 98.2 F | DIASTOLIC BLOOD PRESSURE: 85 MMHG | HEART RATE: 81 BPM | WEIGHT: 178 LBS

## 2024-08-19 DIAGNOSIS — M79.89 LIMB SWELLING: ICD-10-CM

## 2024-08-19 DIAGNOSIS — E78.2 MIXED HYPERLIPIDEMIA: ICD-10-CM

## 2024-08-19 DIAGNOSIS — N95.8 GENITOURINARY SYNDROME OF MENOPAUSE: ICD-10-CM

## 2024-08-19 DIAGNOSIS — E55.9 VITAMIN D DEFICIENCY: ICD-10-CM

## 2024-08-19 DIAGNOSIS — Z00.00 PHYSICAL EXAM, ANNUAL: Primary | ICD-10-CM

## 2024-08-19 DIAGNOSIS — C50.811 MALIGNANT NEOPLASM OF OVERLAPPING SITES OF RIGHT FEMALE BREAST, UNSPECIFIED ESTROGEN RECEPTOR STATUS: ICD-10-CM

## 2024-08-19 DIAGNOSIS — M19.90 ARTHRITIS: ICD-10-CM

## 2024-08-19 DIAGNOSIS — N95.1 HOT FLASHES DUE TO MENOPAUSE: ICD-10-CM

## 2024-08-19 DIAGNOSIS — R73.01 IFG (IMPAIRED FASTING GLUCOSE): ICD-10-CM

## 2024-08-19 PROCEDURE — 99397 PER PM REEVAL EST PAT 65+ YR: CPT | Performed by: INTERNAL MEDICINE

## 2024-08-19 NOTE — PROGRESS NOTES
"CHIEF COMPLAINT/ HPI:  Annual Exam (Annual exam, Lab follow up (done with oncology). )              Objective   Vital Signs  Vitals:    08/19/24 1240   BP: 150/85   Pulse: 81   Temp: 98.2 °F (36.8 °C)   SpO2: 95%   Weight: 80.7 kg (178 lb)   Height: 165.1 cm (65\")      Body mass index is 29.62 kg/m².  Review of Systems   Constitutional: Negative.    HENT: Negative.     Eyes: Negative.    Respiratory: Negative.     Cardiovascular: Negative.    Gastrointestinal: Negative.    Endocrine: Negative.    Genitourinary: Negative.    Musculoskeletal: Negative.    Allergic/Immunologic: Negative.    Neurological: Negative.    Hematological: Negative.    Psychiatric/Behavioral: Negative.        Physical Exam  Constitutional:       General: She is not in acute distress.     Appearance: Normal appearance.   HENT:      Head: Normocephalic.      Mouth/Throat:      Mouth: Mucous membranes are moist.   Eyes:      Conjunctiva/sclera: Conjunctivae normal.      Pupils: Pupils are equal, round, and reactive to light.   Cardiovascular:      Rate and Rhythm: Normal rate and regular rhythm.      Pulses: Normal pulses.      Heart sounds: Normal heart sounds.   Pulmonary:      Effort: Pulmonary effort is normal.      Breath sounds: Normal breath sounds.   Abdominal:      General: Abdomen is flat. Bowel sounds are normal.      Palpations: Abdomen is soft.   Musculoskeletal:         General: No swelling. Normal range of motion.      Cervical back: Neck supple.   Skin:     General: Skin is warm and dry.      Coloration: Skin is not jaundiced.   Neurological:      General: No focal deficit present.      Mental Status: She is alert and oriented to person, place, and time. Mental status is at baseline.   Psychiatric:         Mood and Affect: Mood normal.         Behavior: Behavior normal.         Thought Content: Thought content normal.         Judgment: Judgment normal.        Result Review :   Lab Results   Component Value Date    PROBNP 1,085.0 " (H) 03/14/2022     CMP          2/6/2024    09:29 5/3/2024    10:57 8/5/2024    13:46   CMP   Glucose 147  108  96    BUN 17  14  17    Creatinine 1.00  0.92  0.85    EGFR 63.0  69.2  76.1    Sodium 140  138  138    Potassium 3.6  4.0  4.1    Chloride 104  107  104    Calcium 9.9  8.5  9.3    Total Protein 6.9  6.2  6.5    Albumin 4.6  4.2  4.2    Globulin 2.3  2.0  2.3    Total Bilirubin 0.5  0.4  0.7    Alkaline Phosphatase 64  60  64    AST (SGOT) 15  18  16    ALT (SGPT) 11  14  14    Albumin/Globulin Ratio 2.0  2.1  1.8    BUN/Creatinine Ratio 17.0  15.2  20.0    Anion Gap 8.2  3.2  7.8      CBC w/diff          2/6/2024    09:29 5/3/2024    10:57 8/5/2024    13:46   CBC w/Diff   WBC 3.69  2.60  3.10    RBC 3.66  3.40  3.60    Hemoglobin 12.7  11.8  12.3    Hematocrit 37.0  34.8  36.6    .1  102.4  101.7    MCH 34.7  34.7  34.2    MCHC 34.3  33.9  33.6    RDW 12.9  12.9  13.3    Platelets 193  163  177    Neutrophil Rel % 56.9  49.3  56.8    Immature Granulocyte Rel % 0.3  0.0  0.3    Lymphocyte Rel % 26.0  30.0  27.1    Monocyte Rel % 13.0  13.8  11.9    Eosinophil Rel % 2.7  5.0  2.6    Basophil Rel % 1.1  1.9  1.3       Lipid Panel          2/6/2024    09:29   Lipid Panel   Total Cholesterol 234    Triglycerides 132    HDL Cholesterol 73    VLDL Cholesterol 23    LDL Cholesterol  138    LDL/HDL Ratio 1.84       Lab Results   Component Value Date    TSH 0.651 08/01/2022    TSH 1.400 03/11/2022    TSH 1.200 02/23/2021      Lab Results   Component Value Date    FREET4 1.08 08/01/2022                          Visit Diagnoses:    ICD-10-CM ICD-9-CM   1. Physical exam, annual  Z00.00 V70.0   2. Genitourinary syndrome of menopause  N95.8 627.8   3. Arthritis  M19.90 716.90   4. Malignant neoplasm of overlapping sites of right female breast, unspecified estrogen receptor status  C50.811 174.8   5. IFG (impaired fasting glucose)  R73.01 790.21   6. Mixed hyperlipidemia  E78.2 272.2   7. Vitamin D deficiency   E55.9 268.9   8. Limb swelling  M79.89 729.81   9. Hot flashes due to menopause  N95.1 627.2       Assessment and Plan   Diagnoses and all orders for this visit:    1. Physical exam, annual (Primary)  -     CBC & Differential; Future  -     Comprehensive Metabolic Panel; Future  -     Lipid Panel; Future  -     Vitamin B12 anemia; Future  -     Folate anemia; Future  -     Vitamin D,25-Hydroxy; Future  -     TSH+Free T4; Future    2. Genitourinary syndrome of menopause  -     CBC & Differential; Future  -     Comprehensive Metabolic Panel; Future  -     Lipid Panel; Future  -     Vitamin B12 anemia; Future  -     Folate anemia; Future  -     Vitamin D,25-Hydroxy; Future  -     TSH+Free T4; Future    3. Arthritis  -     CBC & Differential; Future  -     Comprehensive Metabolic Panel; Future  -     Lipid Panel; Future  -     Vitamin B12 anemia; Future  -     Folate anemia; Future  -     Vitamin D,25-Hydroxy; Future  -     TSH+Free T4; Future    4. Malignant neoplasm of overlapping sites of right female breast, unspecified estrogen receptor status  -     CBC & Differential; Future  -     Comprehensive Metabolic Panel; Future  -     Lipid Panel; Future  -     Vitamin B12 anemia; Future  -     Folate anemia; Future  -     Vitamin D,25-Hydroxy; Future  -     TSH+Free T4; Future    5. IFG (impaired fasting glucose)  -     CBC & Differential; Future  -     Comprehensive Metabolic Panel; Future  -     Lipid Panel; Future  -     Vitamin B12 anemia; Future  -     Folate anemia; Future  -     Vitamin D,25-Hydroxy; Future  -     TSH+Free T4; Future    6. Mixed hyperlipidemia  -     CBC & Differential; Future  -     Comprehensive Metabolic Panel; Future  -     Lipid Panel; Future  -     Vitamin B12 anemia; Future  -     Folate anemia; Future  -     Vitamin D,25-Hydroxy; Future  -     TSH+Free T4; Future    7. Vitamin D deficiency  -     CBC & Differential; Future  -     Comprehensive Metabolic Panel; Future  -     Lipid Panel;  Future  -     Vitamin B12 anemia; Future  -     Folate anemia; Future  -     Vitamin D,25-Hydroxy; Future  -     TSH+Free T4; Future    8. Limb swelling  -     CBC & Differential; Future  -     Comprehensive Metabolic Panel; Future  -     Lipid Panel; Future  -     Vitamin B12 anemia; Future  -     Folate anemia; Future  -     Vitamin D,25-Hydroxy; Future  -     TSH+Free T4; Future    9. Hot flashes due to menopause  -     CBC & Differential; Future  -     Comprehensive Metabolic Panel; Future  -     Lipid Panel; Future  -     Vitamin B12 anemia; Future  -     Folate anemia; Future  -     Vitamin D,25-Hydroxy; Future  -     TSH+Free T4; Future            Annual exam checkup, preventive measures discussed, patient wears her seatbelt she is active she works on a regular basis does not smoke,, does not drink, discussed continued vitamin supplementations dietary interventions, August 19, 2024    Diffuse arthralgias myalgias, right hip, right thigh left hip, right upper back area, etiology is unclear, consider x-rays, August 16, 2023, sed rate equals 2 August 8, 2023----WILL DO XRAY OF HIPS, PELVIL, BACK AND FEMUR R , CXR, --- has not done x-rays because symptoms pain arthralgias have all improved as of August 2024    leukopenia,--    Peripheral arterial disease?,  Left leg, recommend vascular surgery second opinion repeat arterial evaluations,---? Did see vas surgery in [place] and then here with dr reyes -----arterial evaluation September 2023 shows normal ABIs bilateral,    Hot flashes postmenopausal, started  clonidine 0.2 mg qhs  to help by her gynecologist and sleep issues == working, February 2023, cont effexor xr 75 mg qd     Sepsis --infection MSSA of the left breast September 13, 2022 requiring hospitalization, treatment with IV antibiotics IV vancomycin drain placement by plastic surgery, she was sent home on the 16th with a drain and clindamycin,, she has done well she was then sent to infectious disease  Dr. Case in [place] after I discussed the case with her and the fact that she continued to have recurrent infections and now both breasts, I spoke with Jacqueline cunningham third  at [facility] in [place] who helped expedite her infectious disease consultation several weeks ago, they recommended removing all of the foreign bodies material in her breast area so that has been done and her drain has recently been removed as of October 21, 2022 she is finished a course of Ancef at home IV 2 weeks and has had no further fevers and she says she feels very good and is getting ready to start radiation therapy in November with Dr. Martinez,    Sepsis, multifactorial to include urinary tract infection and now cellulitis of the right breast and expander implant area, status post surgical debridement removal of pus// fluid, drain placed last night March 11, 2022------- patient's improved clinically,        Mixed hyperlipidemia good HDL cholesterol ---     Invasive ductal carcinoma right breast -------previous bilateral mastectomies with multifocal areas of invasive ductal carcinoma largest area 2.3 cm with positive lymph nodes 4 out of 10 ER and [place] positive HER-2 negative,,--------------- underwent chemotherapy, finished, April 2022,, finished December 20, 2022 radiation therapy due to previous infection abscess cellulitis and now seroma treatment, as of August 11, 2022, --- continues letrozole, July 2022 Dr. Mendez,, continues Verzenio 100 mg twice a day,, started January 1, 2023,----NOW HAVING RECONSTRUCTION AT [facility]  MARCH 2025  ---     MAGUI, appendectomy, Port-A-Cath placement,     ifg, hemoglobin A1c 5.5 August 8, 2023    Vitamin D deficiency, cont vitamin D 50,000 units weekly,     TIA 2010, MRI/ mrA underwent a Star closure procedure with clipping patient is okay to restart aspirin 81 mg twice a week     Osteoarthritis uses Tylenol,     Chronic venous insufficiency changes           Follow Up   Return in about 6  months (around 2/19/2025).  Patient was given instructions and counseling regarding her condition or for health maintenance advice. Please see specific information pulled into the AVS if appropriate.

## 2024-09-16 ENCOUNTER — SPECIALTY PHARMACY (OUTPATIENT)
Facility: HOSPITAL | Age: 64
End: 2024-09-16
Payer: COMMERCIAL

## 2024-09-18 ENCOUNTER — HOSPITAL ENCOUNTER (OUTPATIENT)
Dept: ONCOLOGY | Facility: HOSPITAL | Age: 64
Discharge: HOME | End: 2024-09-18
Admitting: INTERNAL MEDICINE
Payer: COMMERCIAL

## 2024-09-18 DIAGNOSIS — Z45.2 ENCOUNTER FOR ADJUSTMENT OR MANAGEMENT OF VASCULAR ACCESS DEVICE: Primary | ICD-10-CM

## 2024-09-18 PROCEDURE — 96523 IRRIG DRUG DELIVERY DEVICE: CPT

## 2024-09-18 PROCEDURE — 25010000002 HEPARIN LOCK FLUSH PER 10 UNITS: Performed by: INTERNAL MEDICINE

## 2024-09-18 RX ORDER — SODIUM CHLORIDE 0.9 % (FLUSH) 0.9 %
20 SYRINGE (ML) INJECTION AS NEEDED
OUTPATIENT
Start: 2024-09-18

## 2024-09-18 RX ORDER — HEPARIN SODIUM (PORCINE) LOCK FLUSH IV SOLN 100 UNIT/ML 100 UNIT/ML
500 SOLUTION INTRAVENOUS AS NEEDED
OUTPATIENT
Start: 2024-09-18

## 2024-09-18 RX ORDER — HEPARIN SODIUM (PORCINE) LOCK FLUSH IV SOLN 100 UNIT/ML 100 UNIT/ML
500 SOLUTION INTRAVENOUS AS NEEDED
Status: DISCONTINUED | OUTPATIENT
Start: 2024-09-18 | End: 2024-09-19 | Stop reason: HOSPADM

## 2024-09-18 RX ORDER — SODIUM CHLORIDE 0.9 % (FLUSH) 0.9 %
20 SYRINGE (ML) INJECTION AS NEEDED
Status: DISCONTINUED | OUTPATIENT
Start: 2024-09-18 | End: 2024-09-19 | Stop reason: HOSPADM

## 2024-09-18 RX ADMIN — Medication 20 ML: at 14:02

## 2024-09-18 RX ADMIN — HEPARIN 500 UNITS: 100 SYRINGE at 14:02

## 2024-10-09 ENCOUNTER — HOSPITAL ENCOUNTER (OUTPATIENT)
Dept: OCCUPATIONAL THERAPY | Facility: HOSPITAL | Age: 64
Setting detail: THERAPIES SERIES
Discharge: HOME | End: 2024-10-09
Payer: COMMERCIAL

## 2024-10-09 DIAGNOSIS — Z17.0 MALIGNANT NEOPLASM OF OVERLAPPING SITES OF RIGHT BREAST IN FEMALE, ESTROGEN RECEPTOR POSITIVE: ICD-10-CM

## 2024-10-09 DIAGNOSIS — Z91.89 AT RISK FOR LYMPHEDEMA: Primary | ICD-10-CM

## 2024-10-09 DIAGNOSIS — Z17.0 MALIGNANT NEOPLASM OF UPPER-OUTER QUADRANT OF RIGHT BREAST IN FEMALE, ESTROGEN RECEPTOR POSITIVE: ICD-10-CM

## 2024-10-09 DIAGNOSIS — M25.60 JOINT STIFFNESS: ICD-10-CM

## 2024-10-09 DIAGNOSIS — C50.811 MALIGNANT NEOPLASM OF OVERLAPPING SITES OF RIGHT BREAST IN FEMALE, ESTROGEN RECEPTOR POSITIVE: ICD-10-CM

## 2024-10-09 DIAGNOSIS — C50.411 MALIGNANT NEOPLASM OF UPPER-OUTER QUADRANT OF RIGHT BREAST IN FEMALE, ESTROGEN RECEPTOR POSITIVE: ICD-10-CM

## 2024-10-09 DIAGNOSIS — Z90.13 HISTORY OF MASTECTOMY, BILATERAL: ICD-10-CM

## 2024-10-09 DIAGNOSIS — L90.5 SCAR CONDITION AND FIBROSIS OF SKIN: ICD-10-CM

## 2024-10-09 DIAGNOSIS — R52 PAIN: ICD-10-CM

## 2024-10-09 DIAGNOSIS — C50.811 MALIGNANT NEOPLASM OF OVERLAPPING SITES OF RIGHT FEMALE BREAST, UNSPECIFIED ESTROGEN RECEPTOR STATUS: ICD-10-CM

## 2024-10-09 PROCEDURE — 93702 BIS XTRACELL FLUID ANALYSIS: CPT

## 2024-10-09 PROCEDURE — 97535 SELF CARE MNGMENT TRAINING: CPT

## 2024-10-09 NOTE — THERAPY RE-EVALUATION
Outpatient Occupational Therapy Lymphedema Re-Evaluation  ERIC [facility]     Patient Name: Marisa Portillo  : 1960  MRN: [id number]  Today's Date: 10/9/2024      Visit Date: 10/09/2024    Patient Active Problem List   Diagnosis    Arthritis    Bladder disorder    Concussion    Contact dermatitis and eczema    Injury, other and unspecified, finger    Limb swelling    Seasonal allergic rhinitis    Vitamin D deficiency    IFG (impaired fasting glucose)    Mixed hyperlipidemia    TIA (transient ischemic attack)    Malignant neoplasm of overlapping sites of right breast in female, estrogen receptor positive    Port-A-Cath placement    S/P bilateral nipple sparing mastectomy    S/P BILATERAL IMMEDIATE BREAST RECONSTRUCTION WITH LEFT PRE PEC PLACEMENT OF SILICONE GEL IMPLANT AND MESH, RIGHT PRE PEC PLACEMENT OF EXPANDER AND MESH    Sepsis    Breast abscess    Cellulitis of left breast    Psychophysiological insomnia    Recurrent seroma of breast    Malignant neoplasm of overlapping sites of right female breast    Hot flashes due to menopause    Breast reconstruction deformity    Genitourinary syndrome of menopause    Physical exam, annual    PAD (peripheral artery disease)    Encounter for adjustment or management of vascular access device        Past Medical History:   Diagnosis Date    Abnormal Pap smear of cervix     Allergies     Anemia During chemo     Breast cancer     Cervical dysplasia     Herpes     High cholesterol     IFG (impaired fasting glucose)     Migraine     Osteoarthritis     S/P BILATERAL IMMEDIATE BREAST RECONSTRUCTION WITH LEFT PRE PEC PLACEMENT OF SILICONE GEL IMPLANT AND MESH, RIGHT PRE PEC PLACEMENT OF EXPANDER AND MESH 2021    TIA (transient ischemic attack)     no residual    Vitamin D deficiency         Past Surgical History:   Procedure Laterality Date    APPENDECTOMY      BREAST AUGMENTATION Bilateral 2021    Procedure: bilateral breast reconstructon with bilateral mesh  placement.   left implant, right tissue expander placement;  Surgeon: Lacy Shelton MD;  [place] [facility] [place] [facility] [place]  Service: Plastics;  Laterality: Bilateral;    BREAST SURGERY  [id number]    Bilateral Mastectomy with implant Left expander Right    BREAST TISSUE EXPANDER REMOVAL INSERTION OF IMPLANT Right 04/15/2022    Procedure: RIGHT BREAST IMPLANT REMOVAL WITH INCISION AND DRAINAGE;  Surgeon: Lacy Shelton MD;  [place] [facility] [place] [facility] [place]  Service: Plastics;  Laterality: Right;    CEREBRAL ANGIOGRAM      clip placed    CYST REMOVAL Right     rt wrist    HYSTERECTOMY      Partial age 31    INCISION AND DRAINAGE ABSCESS Bilateral 10/4/2022    Procedure: BILATERAL BREAST ABSCESS INCISION AND DRAINAGE, WITH LEFT BREAST IMPLANT REMOVAL;  Surgeon: Lacy Shelton MD;  Location: [facility] MAIN OR;  Service: Plastics;  Laterality: Bilateral;    INCISION AND DRAINAGE OF WOUND Right 03/11/2022    Procedure: INCISION AND DRAINAGE ABSCESS OF RIGHT BREAST WITH EXPANDER REMOVAL AND IMPLANT PLACEMENT;  Surgeon: Lacy Shelton MD;  Location: [facility] MAIN OR;  Service: Plastics;  Laterality: Right;    MASTECTOMY COMPLETE / SIMPLE W/ SENTINEL NODE BIOPSY Bilateral     PORTACATH PLACEMENT Left 12/28/2021    Procedure: INSERTION OF PORTACATH;  Surgeon: Jacqueline Mcgraw MD;  [place] [facility] [place] [facility] [place]  Service: General;  Laterality: Left;    SENTINEL NODE BIOPSY Bilateral 12/28/2021    Procedure: BILATERAL BREAST MASTECTOMIES WITH RIGHT SENTINEL NODE BIOPSIES WITH FROZEN SECTIONS;  Surgeon: Jacqueline Mcgraw MD;  [place] [facility] [place] [facility] [place]  Service: General;  Laterality: Bilateral;         Visit Dx:     ICD-10-CM ICD-9-CM   1. At risk for lymphedema  Z91.89 V49.89   2. Malignant neoplasm of overlapping sites of right breast in female, estrogen receptor positive  C50.811 174.8    Z17.0 V86.0   3. Malignant neoplasm of overlapping sites of right female breast, unspecified estrogen receptor status  C50.811  "174.8   4. Malignant neoplasm of upper-outer quadrant of right breast in female, estrogen receptor positive  C50.411 174.4    Z17.0 V86.0   5. History of mastectomy, bilateral  Z90.13 V45.71   6. Scar condition and fibrosis of skin  L90.5 709.2   7. Joint stiffness  M25.60 719.50   8. Pain  R52 780.96            Lymphedema       Row Name 10/09/24 0900             Subjective Pain    Able to rate subjective pain? yes  -SF      Pre-Treatment Pain Level 0  -SF      Post-Treatment Pain Level 0  -SF         Subjective    Subjective Comments Patient reports she will have reconstructive surgery 3/2025 at . Patient denies soreness in R axilla.  -SF         Lymphedema Assessment    Lymphedema Classification RUE:;at risk/stage 0  -SF      Lymphedema Cancer Related Sx bilateral;simple mastectomy;sentinel node biopsy;other (comment)  -SF      Lymphedema Surgery Comments 12/28/2021  -SF      Lymph Nodes Removed # 10  -SF      Positive Lymph Nodes # 4  -SF      Chemo Received yes  -SF      Radiation Therapy Received yes  -SF      Radiation Treatments #/Timeframe 25  -SF         LLIS - Physical Concerns    The amount of pain associated with my lymphedema is: 0  -SF      The amount of limb heaviness associated with my lymphedema is: 0  -SF      The amount of skin tightness associated with my lymphedema is: 0  -SF      The size of my swollen limb(s) seems: 0  -SF      Lymphedema affects the movement of my swollen limb(s): 0  -SF      The strength in my swollen limb(s) is: 0  -SF         LLIS - Psychosocial Concerns    Lymphedema affects my body image (i.e., \"how I think I look\"). 0  -SF      Lymphedema affects my socializing with others. 0  -SF      Lymphedema affects my intimate relations with spouse or partner (rate 0 if not applicable 0  -SF      Lymphedema \"gets me down\" (i.e., depression, frustration, or anger) 0  -SF      I must rely on others for help due to my lymphedema. 0  -SF      I know what to do to manage my " lymphedema 2  -SF         LLIS - Functional Concerns    Lymphedema affects my ability to perform self-care activities (i.e. eating, dressing, hygiene) 0  -SF      Lymphedema affects my ability to perform routine home or work-related activities. 0  -SF      Lymphedema affects my performance of preferred leisure activities. 0  -SF      Lymphedema affects proper fit of clothing/shoes 0  -SF      Lymphedema affects my sleep 0  -SF         Posture/Observations    Posture- WNL Posture is WNL  -SF         General ROM    GENERAL ROM COMMENTS BUE WFL  -SF         MMT (Manual Muscle Testing)    General MMT Comments BUE WFL  -SF         Lymphedema Measurements    Measurement Type(s) Volumetric  -SF      Volumetric Areas Upper extremities  -SF         L-Dex Bioimpedence Screening    L-Dex Measurement Extremity RUE  -SF      L-Dex Patient Position Standing  -SF      L-Dex UE Dominate Side Right  -SF      L-Dex UE At Risk Side Right  -SF      L-Dex UE Pre Surgical Value Yes  -SF      L-Dex UE Score 1.5  -SF      L-Dex UE Baseline Score -4.5  -SF      L-Dex UE Value Change 6  -SF      $ L-Dex Charge yes  -SF         Lymphedema Life Impact Scale Totals    A.  Total Q1 - Q17 (Do not include Q18) 2  -SF      B.  Total number of questions answered (Q1-Q17) 17  -SF      C. Divide A by B 0.12  -SF      D. Multiple C by 25 3  -SF                User Key  (r) = Recorded By, (t) = Taken By, (c) = Cosigned By      Initials Name Provider Type    Faustina Henderson OT Occupational Therapist                    Patient was measured for the following compression garment: Juzo soft compression sleeve - 20-30mmHg - size 3 EW long - color pink        Therapy Education  Education Details: Therapist and patient discussing potentially having treatment before surgery in March to address scar tissue.  Given: Symptoms/condition management  Program: New  How Provided: Verbal  Provided to: Patient  Level of Understanding: Verbalized  01902 - OT Self Care/Mgmt  Minutes: 20         OT Goals       Row Name 10/09/24 1133          Time Calculation    OT Goal Re-Cert Due Date 11/08/24  -               User Key  (r) = Recorded By, (t) = Taken By, (c) = Cosigned By      Initials Name Provider Type    Faustina Henderson OT Occupational Therapist                  GOALS:   1. Post Breast Surgery Care/at risk for Lymphedema  LTG 1: 90 days:  As an indicator of no exacerbation of lymphedema staging, the patient will present with an L-Dex score less than 7 points from preoperative baseline.              STATUS: met. ongoing  STG 1a:   30 days: To prevent exacerbation of mixed edema to lymphedema, patient will utilize the 2 postsurgical compression garments daily.                 STATUS: met.  STG 1b: 30 days: Patient will be independent with self-manual lymphatic massage.               STATUS: met. ongoing  STG 1c: 30 days:  Patient will be independent with identification of signs and symptoms of lymphedema exasperation per stoplight to recovery education handout.              STATUS: met. ongoing  STG 1 d: 30 days: Patient will be independent with HEP to prevent advancement in lymphedema staging.              STATUS: met. Ongoing.  TREATMENT:  Self Care/ADL retraining, Therapeutic Activity, Neuromuscular Re-education, Therapeutic Exercise, Bioimpedence Fluid Analysis, Post-Surgical compression garement [address] [facility]/ [employer] Camisole Kit 2860K, Orthotic Management and training,  and Manual Therapy.     OT Assessment/Plan       Row Name 10/09/24 1131          OT Assessment    Functional Limitations Limitations in functional capacity and performance  -SF     Impairments Impaired lymphatic circulation  -SF     Assessment Comments Patient is a 64-year-old female with history of bilateral mastectomy with reconstruction and sentinel node biopsy on 12/28/2021.  Patient reports she will have reconstruction March 2025.  Patient reports improvements in symptoms at right axilla.   Patient reporting she travels frequently and therapist recommending ordering a compression sleeve for air travel.  Therapist to order garment.  Patient will continue to benefit from skilled occupational therapy to further evaluate ongoing lymphatic functioning to prevent advancement in lymphedema staging, increased pain and decreased range of motion.  -SF     OT Diagnosis At risk for lymphedema  -SF     OT Rehab Potential Good  -SF     Patient/caregiver participated in establishment of treatment plan and goals Yes  -SF     Patient would benefit from skilled therapy intervention Yes  -SF        OT Plan    OT Frequency Other (comment)  -SF     Predicted Duration of Therapy Intervention (OT) Patient will be reevaluated in 3 months.  -SF     Planned CPT's? OT EVAL LOW COMPLEXITY: 80774;OT RE-EVAL: 75763;OT THER ACT EA 15 MIN: 13250LO;OT THER PROC EA 15 MIN: 80874HZ;OT SELF CARE/MGMT/TRAIN 15 MIN: 38687;OT MANUAL THERAPY EA 15 MIN: 81248;OT BIS XTRACELL FLUID ANALYSIS: 30739;OT ORTHO/PROSTHET CHECKOUT EA 15 MIN: 19131;OT ORTHOTIC MGMT/TRAIN EA 15 MIN: 58994;OT CARE PLAN EA 15 MIN  -SF     Planned Therapy Interventions (Optional Details) home exercise program;orthotic fitting/training;patient/family education;postural re-education;prosthetic fitting/training;ROM (Range of Motion)  -SF     OT Plan Comments Continue POC  -SF               User Key  (r) = Recorded By, (t) = Taken By, (c) = Cosigned By      Initials Name Provider Type    SF Faustina Guthrie OT Occupational Therapist                              Time Calculation:   Timed Charges  69905 - OT Self Care/Mgmt Minutes: 20  Total Minutes  Timed Charges Total Minutes: 20   Total Minutes: 20     Therapy Charges for Today       Code Description Service Date Service Provider Modifiers Qty    63084837879 HC PT BIS XTRACELL FLUID ANALYSIS 10/9/2024 Faustina Guthrie OT  1    08941816933 HC OT SELF CARE/MGMT/TRAIN EA 15 MIN 10/9/2024 Faustina Guthrie OT GO 1                      Faustina  [place], LORRAINE  10/9/2024

## 2024-10-23 ENCOUNTER — SPECIALTY PHARMACY (OUTPATIENT)
Dept: PHARMACY | Facility: HOSPITAL | Age: 64
End: 2024-10-23
Payer: COMMERCIAL

## 2024-10-23 ENCOUNTER — HOSPITAL ENCOUNTER (OUTPATIENT)
Dept: ONCOLOGY | Facility: HOSPITAL | Age: 64
Discharge: HOME | End: 2024-10-23
Admitting: INTERNAL MEDICINE
Payer: COMMERCIAL

## 2024-10-23 DIAGNOSIS — Z45.2 ENCOUNTER FOR ADJUSTMENT OR MANAGEMENT OF VASCULAR ACCESS DEVICE: Primary | ICD-10-CM

## 2024-10-23 PROCEDURE — 25010000002 HEPARIN LOCK FLUSH PER 10 UNITS: Performed by: INTERNAL MEDICINE

## 2024-10-23 PROCEDURE — 96523 IRRIG DRUG DELIVERY DEVICE: CPT

## 2024-10-23 RX ORDER — SODIUM CHLORIDE 0.9 % (FLUSH) 0.9 %
20 SYRINGE (ML) INJECTION AS NEEDED
OUTPATIENT
Start: 2024-10-23

## 2024-10-23 RX ORDER — SODIUM CHLORIDE 0.9 % (FLUSH) 0.9 %
20 SYRINGE (ML) INJECTION AS NEEDED
Status: DISCONTINUED | OUTPATIENT
Start: 2024-10-23 | End: 2024-10-24 | Stop reason: HOSPADM

## 2024-10-23 RX ORDER — HEPARIN SODIUM (PORCINE) LOCK FLUSH IV SOLN 100 UNIT/ML 100 UNIT/ML
500 SOLUTION INTRAVENOUS AS NEEDED
OUTPATIENT
Start: 2024-10-23

## 2024-10-23 RX ORDER — HEPARIN SODIUM (PORCINE) LOCK FLUSH IV SOLN 100 UNIT/ML 100 UNIT/ML
500 SOLUTION INTRAVENOUS AS NEEDED
Status: DISCONTINUED | OUTPATIENT
Start: 2024-10-23 | End: 2024-10-24 | Stop reason: HOSPADM

## 2024-10-23 RX ADMIN — HEPARIN 500 UNITS: 100 SYRINGE at 08:33

## 2024-10-23 RX ADMIN — Medication 20 ML: at 08:32

## 2024-10-23 NOTE — PROGRESS NOTES
Specialty Pharmacy Patient Management Program  Oncology Reassessment     Marisa Portillo was referred by their provider to the Oncology Patient Management program offered by [facility] for ER+ Right breast Cancer . A follow-up outreach was conducted, including assessment of continued therapy appropriateness, medication adherence, and side effect incidence and management for abemaciclib.    Relevant Past Medical History and Comorbidities  Relevant medical history and concomitant health conditions were discussed with the patient. The patient's chart has been reviewed for relevant past medical history and comorbid health conditions and updated as necessary.   Past Medical History:   Diagnosis Date    Abnormal Pap smear of cervix     Allergies     Anemia During chemo 2022    Breast cancer     Cervical dysplasia     Herpes     High cholesterol     IFG (impaired fasting glucose)     Migraine     Osteoarthritis     S/P BILATERAL IMMEDIATE BREAST RECONSTRUCTION WITH LEFT PRE PEC PLACEMENT OF SILICONE GEL IMPLANT AND MESH, RIGHT PRE PEC PLACEMENT OF EXPANDER AND MESH 12/29/2021    TIA (transient ischemic attack)     no residual    Vitamin D deficiency      Social History     Socioeconomic History    Marital status:    Tobacco Use    Smoking status: Never    Smokeless tobacco: Never    Tobacco comments:     Father mother  smoker diring childhood and  adulthood   Vaping Use    Vaping status: Never Used   Substance and Sexual Activity    Alcohol use: Yes     Comment: Socially only . Occasionally    Drug use: Never    Sexual activity: Yes     Partners: Male     Birth control/protection: Post-menopausal     Comment: Hysterectomy age 33     Problem list reviewed by Ellie Arreola, Lv on 10/23/2024 at  2:57 PM    Allergies  Known allergies and reactions were discussed with the patient. The patient's chart has been reviewed for allergy information and updated as necessary.   Allergies    Allergen Reactions    Multihance [Gadobenate] Hives and Itching     MRI contrast    Contrast Dye (Echo Or Unknown Ct/Mr) Hives     Allergies reviewed by Ellie Arreola PharmD on 10/23/2024 at  2:57 PM    Relevant Laboratory Values  Lab Results   Component Value Date    GLUCOSE 96 08/05/2024    CALCIUM 9.3 08/05/2024     08/05/2024    K 4.1 08/05/2024    CO2 26.2 08/05/2024     08/05/2024    BUN 17 08/05/2024    CREATININE 0.85 08/05/2024    EGFRIFNONA 90 02/21/2022    BCR 20.0 08/05/2024    ANIONGAP 7.8 08/05/2024     Lab Results   Component Value Date    WBC 3.10 (L) 08/05/2024    RBC 3.60 (L) 08/05/2024    HGB 12.3 08/05/2024    HCT 36.6 08/05/2024    .7 (H) 08/05/2024    MCH 34.2 (H) 08/05/2024    MCHC 33.6 08/05/2024    RDW 13.3 08/05/2024    RDWSD 50.7 08/05/2024    MPV 9.6 08/05/2024     08/05/2024    NEUTRORELPCT 56.8 08/05/2024    LYMPHORELPCT 27.1 08/05/2024    MONORELPCT 11.9 08/05/2024    EOSRELPCT 2.6 08/05/2024    BASORELPCT 1.3 08/05/2024    AUTOIGPER 0.3 08/05/2024    NEUTROABS 1.76 08/05/2024    LYMPHSABS 0.84 08/05/2024    MONOSABS 0.37 08/05/2024    EOSABS 0.08 08/05/2024    BASOSABS 0.04 08/05/2024    AUTOIGNUM 0.01 08/05/2024    NRBC 0.2 08/08/2023        The above labs have been reviewed. The following labs are outside of normal limits: WBC are low. No dose adjustments are needed for the oral specialty medication(s) based on the labs.    Current Medication List  This medication list has been reviewed with the patient and evaluated for any interactions or necessary modifications/recommendations, and updated to include all prescription medications, OTC medications, and supplements the patient is currently taking.  This list reflects what is contained in the patient's profile, which has also been marked as reviewed to communicate to other providers it is the most up to date version of the patient's current medication therapy.     Current Outpatient Medications:      abemaciclib (VERZENIO) 100 mg tablet chemo tablet, Take 1 tablet by mouth 2 (Two) Times a Day., Disp: 56 tablet, Rfl: 11    acetaminophen (TYLENOL) 650 MG 8 hr tablet, Take 1 tablet by mouth 2 (Two) Times a Day., Disp: , Rfl:     acyclovir (ZOVIRAX) 5 % ointment, Apply 1 Application topically to the appropriate area as directed Every 3 (Three) Hours., Disp: 15 g, Rfl: 3    Aspirin 81 MG capsule, Take 81 mg by mouth 2 (Two) Times a Week., Disp: , Rfl:     cloNIDine (CATAPRES) 0.2 MG tablet, Take 1 tablet by mouth every night at bedtime., Disp: 90 tablet, Rfl: 4    diphenoxylate-atropine (LOMOTIL) 2.5-0.025 MG per tablet, Take 1 tablet by mouth 4 (Four) Times a Day As Needed for Diarrhea., Disp: 40 tablet, Rfl: 2    hydrOXYzine (ATARAX) 25 MG tablet, Take 1 tablet by mouth At Night As Needed for Itching., Disp: , Rfl:     letrozole (FEMARA) 2.5 MG tablet, TAKE 1 TABLET BY MOUTH DAILY., Disp: 90 tablet, Rfl: 1    loratadine (Claritin) 10 MG tablet, Take 1 tablet by mouth Daily., Disp: , Rfl:     Loratadine-Pseudoephedrine (CLARITIN-D 24 HOUR PO), Take 1 tablet by mouth Daily., Disp: , Rfl:     magnesium gluconate (MAGONATE) 500 MG tablet, Take 0.5 tablets by mouth Daily., Disp: , Rfl:     ondansetron (ZOFRAN) 8 MG tablet, Take 1 tablet by mouth 3 (Three) Times a Day As Needed for Nausea or Vomiting., Disp: 30 tablet, Rfl: 5    venlafaxine XR (EFFEXOR-XR) 75 MG 24 hr capsule, TAKE 1 CAPSULE BY MOUTH DAILY., Disp: 30 capsule, Rfl: 11    Vitamin A 3 MG (40867 UT) capsule, Take 1 capsule by mouth Daily., Disp: , Rfl:     vitamin D (ERGOCALCIFEROL) 1.25 MG (36738 UT) capsule capsule, TAKE 1 CAPSULE BY MOUTH EVERY 7 (SEVEN) DAYS., Disp: 4 capsule, Rfl: 11  No current facility-administered medications for this visit.    Facility-Administered Medications Ordered in Other Visits:     heparin injection 500 Units, 500 Units, Intravenous, PRN, Judah Juarez MD, 500 Units at 10/23/24 0833    sodium chloride 0.9 % flush 20  mL, 20 mL, Intravenous, PRN, Judah Juarez MD, 20 mL at 10/23/24 0832    Medicines reviewed by Ellie Arreola PharmD on 10/23/2024 at  2:57 PM    Drug Interactions  No interactions with specialty medication.     Adverse Drug Reactions  Medication tolerability: Tolerating with no to minimal ADRs  Medication plan: Continue therapy with normal follow-up  Adverse Reactions Experienced: Denied any ADRs   Plan for ADR Management: Not required     Hospitalizations and Urgent Care Since Last Assessment  ED Visits, Admissions, or Hospitalizations: none  Urgent Office Visits: none    Adherence and Self-Administration  Adherence related to the patient's specialty therapy was discussed with the patient. The Adherence segment of this outreach has been reviewed and updated.     Adherence Questions  Linked Medication(s) Assessed: Abemaciclib (VERZENIO)  On average, how many doses/injections does the patient miss per month?: 0  What are the identified reasons for non-adherence or missed doses? : no problems identified  What is the estimated medication adherence level?: %  Based on the patient/caregiver response and refill history, does this patient require an MTP to track adherence improvements?: no    Approximate Number of Doses Missed Since Last Assessment: 0   Ongoing or New Barriers to Patient Adherence and/or Self-Administration: no ongoing or new barriers  Methods for Supporting Patient Adherence and/or Self-Administration: continue current method    Open Medication Therapy Problems  No medication therapy recommendations to display    Goals of Therapy  Goals related to the patient's specialty therapy were discussed with the patient. The Patient Goals segment of this outreach has been reviewed and updated.   Goals Addressed Today        Specialty Pharmacy General Goal      Clinical goal/therapeutic target: disease control, per the recent oncology clinic notes, imaging, and labs.  Patient-identified goal of  therapy: Patient will adhere to medication regimen by %    Enrollment Date: 12/23/22 9/5/23: Bone scan: No convincing evidence of osseous metastatic disease.  CT scan: No evidence of tumor recurrence in the chest, abdomen, or pelvis.    10/23/24: No new scans, patient tolerating treatment well, plan to complete in 1/2025. Continue current regimen.              Quality of Life Assessment   Quality of Life related to the patient's enrollment in the patient management program and services provided was discussed with the patient. The QOL segment of this outreach has been reviewed and updated.  Quality of Life Improvement Scale: 10-Significantly better  Quality of Life Score: 10    Reassessment Plan & Follow-Up  Pharmacist to continue to perform regular reassessments no more than (6) months from the previous assessment.  Care Coordinator to facilitate future refill outreaches, coordinate prescription delivery, and escalate clinical questions to pharmacist.     Additional Plans, Therapy Recommendations or Therapy Problems to Be Addressed: none at this time    Attestation  Therapeutic appropriateness: Appropriate   I attest the patient was actively involved in and has agreed to the above plan of care.  If the prescribed therapy is at any point deemed not appropriate based on the current or future assessments, a consultation will be initiated with the patient's specialty care provider to determine the best course of action. The revised plan of therapy will be documented along with any required assessments and/or additional patient education provided.     Radha Arreola, PharmD, [facility]  Oncology Clinical Pharmacist  10/23/2024  15:05 EDT

## 2024-11-13 ENCOUNTER — SPECIALTY PHARMACY (OUTPATIENT)
Dept: PHARMACY | Facility: HOSPITAL | Age: 64
End: 2024-11-13
Payer: COMMERCIAL

## 2024-11-13 NOTE — PROGRESS NOTES
" Specialty Pharmacy Patient Management Program  Oncology Refill Outreach      Marisa \"Liberty\" is a 64 y.o. female contacted today regarding refills of her medication(s).    Specialty medication(s) and dose(s) confirmed: Completed refill outreach. [facility] will mail abemaciclib (VERZENIO) 100 mg tablet chemo tablet once ready to dispense.     Other medications being refilled: N/A    Refill Questions      Flowsheet Row Most Recent Value   Changes to allergies? No   Changes to medications? No   New conditions or infections since last clinic visit No   Unplanned office visit, urgent care, ED, or hospital admission in the last 4 weeks  No   How does patient/caregiver feel medication is working? Very good   Financial problems or insurance changes  No   Since the previous refill, were any specialty medication doses or scheduled injections missed or delayed?  No   Does this patient require a clinical escalation to a pharmacist? No            Delivery Questions      Flowsheet Row Most Recent Value   Delivery method [employer]   Delivery address verified with patient/caregiver? Yes   Delivery address Home   Number of medications in delivery 1   Medication(s) being filled and delivered Abemaciclib (VERZENIO)   Doses left of specialty medications 7   Copay verified? Yes   Copay amount [place]   Copay form of payment No copayment ($0)   Ship Date 11/13   Delivery Date 11/14   Signature Required No                   Follow-up: 21 days     Jennifer Diggs  Care Coordinator, [facility]  11/13/2024  08:27 EST          "

## 2024-11-14 ENCOUNTER — TELEPHONE (OUTPATIENT)
Dept: ONCOLOGY | Facility: HOSPITAL | Age: 64
End: 2024-11-14

## 2024-11-14 NOTE — TELEPHONE ENCOUNTER
"    Caller: Marisa Portillo \"Lbierty\"    Relationship to patient: Self    Best call back number: [phone]    Type of visit: LAB    Requested date: 11/18  AM    If rescheduling, when is the original appointment: 11/18  "

## 2024-11-18 ENCOUNTER — HOSPITAL ENCOUNTER (OUTPATIENT)
Dept: ONCOLOGY | Facility: HOSPITAL | Age: 64
Discharge: HOME | End: 2024-11-18
Admitting: INTERNAL MEDICINE
Payer: COMMERCIAL

## 2024-11-18 DIAGNOSIS — Z17.0 MALIGNANT NEOPLASM OF OVERLAPPING SITES OF RIGHT BREAST IN FEMALE, ESTROGEN RECEPTOR POSITIVE: ICD-10-CM

## 2024-11-18 DIAGNOSIS — Z45.2 ENCOUNTER FOR ADJUSTMENT OR MANAGEMENT OF VASCULAR ACCESS DEVICE: Primary | ICD-10-CM

## 2024-11-18 DIAGNOSIS — C50.811 MALIGNANT NEOPLASM OF OVERLAPPING SITES OF RIGHT BREAST IN FEMALE, ESTROGEN RECEPTOR POSITIVE: ICD-10-CM

## 2024-11-18 LAB
ALBUMIN SERPL-MCNC: 4 G/DL (ref 3.5–5.2)
ALBUMIN/GLOB SERPL: 1.7 G/DL
ALP SERPL-CCNC: 60 U/L (ref 39–117)
ALT SERPL W P-5'-P-CCNC: 13 U/L (ref 1–33)
ANION GAP SERPL CALCULATED.3IONS-SCNC: 9.5 MMOL/L (ref 5–15)
AST SERPL-CCNC: 17 U/L (ref 1–32)
BASOPHILS # BLD AUTO: 0.08 10*3/MM3 (ref 0–0.2)
BASOPHILS NFR BLD AUTO: 2.1 % (ref 0–1.5)
BILIRUB SERPL-MCNC: 0.5 MG/DL (ref 0–1.2)
BUN SERPL-MCNC: 16 MG/DL (ref 8–23)
BUN/CREAT SERPL: 18 (ref 7–25)
CALCIUM SPEC-SCNC: 9 MG/DL (ref 8.6–10.5)
CHLORIDE SERPL-SCNC: 102 MMOL/L (ref 98–107)
CO2 SERPL-SCNC: 26.5 MMOL/L (ref 22–29)
CREAT SERPL-MCNC: 0.89 MG/DL (ref 0.57–1)
DEPRECATED RDW RBC AUTO: 48.3 FL (ref 37–54)
EGFRCR SERPLBLD CKD-EPI 2021: 72.1 ML/MIN/1.73
EOSINOPHIL # BLD AUTO: 0.18 10*3/MM3 (ref 0–0.4)
EOSINOPHIL NFR BLD AUTO: 4.8 % (ref 0.3–6.2)
ERYTHROCYTE [DISTWIDTH] IN BLOOD BY AUTOMATED COUNT: 13.2 % (ref 12.3–15.4)
GLOBULIN UR ELPH-MCNC: 2.4 GM/DL
GLUCOSE SERPL-MCNC: 112 MG/DL (ref 65–99)
HCT VFR BLD AUTO: 34.5 % (ref 34–46.6)
HGB BLD-MCNC: 11.8 G/DL (ref 12–15.9)
IMM GRANULOCYTES # BLD AUTO: 0.01 10*3/MM3 (ref 0–0.05)
IMM GRANULOCYTES NFR BLD AUTO: 0.3 % (ref 0–0.5)
LYMPHOCYTES # BLD AUTO: 0.68 10*3/MM3 (ref 0.7–3.1)
LYMPHOCYTES NFR BLD AUTO: 18.1 % (ref 19.6–45.3)
MCH RBC QN AUTO: 34.2 PG (ref 26.6–33)
MCHC RBC AUTO-ENTMCNC: 34.2 G/DL (ref 31.5–35.7)
MCV RBC AUTO: 100 FL (ref 79–97)
MONOCYTES # BLD AUTO: 0.41 10*3/MM3 (ref 0.1–0.9)
MONOCYTES NFR BLD AUTO: 10.9 % (ref 5–12)
NEUTROPHILS NFR BLD AUTO: 2.4 10*3/MM3 (ref 1.7–7)
NEUTROPHILS NFR BLD AUTO: 63.8 % (ref 42.7–76)
NRBC BLD AUTO-RTO: 0 /100 WBC (ref 0–0.2)
PLATELET # BLD AUTO: 178 10*3/MM3 (ref 140–450)
PMV BLD AUTO: 9.5 FL (ref 6–12)
POTASSIUM SERPL-SCNC: 4 MMOL/L (ref 3.5–5.2)
PROT SERPL-MCNC: 6.4 G/DL (ref 6–8.5)
RBC # BLD AUTO: 3.45 10*6/MM3 (ref 3.77–5.28)
SODIUM SERPL-SCNC: 138 MMOL/L (ref 136–145)
WBC NRBC COR # BLD AUTO: 3.76 10*3/MM3 (ref 3.4–10.8)

## 2024-11-18 PROCEDURE — 85025 COMPLETE CBC W/AUTO DIFF WBC: CPT | Performed by: INTERNAL MEDICINE

## 2024-11-18 PROCEDURE — 25010000002 HEPARIN LOCK FLUSH PER 10 UNITS: Performed by: INTERNAL MEDICINE

## 2024-11-18 PROCEDURE — 80053 COMPREHEN METABOLIC PANEL: CPT | Performed by: INTERNAL MEDICINE

## 2024-11-18 PROCEDURE — 36591 DRAW BLOOD OFF VENOUS DEVICE: CPT

## 2024-11-18 RX ORDER — SODIUM CHLORIDE 0.9 % (FLUSH) 0.9 %
20 SYRINGE (ML) INJECTION AS NEEDED
OUTPATIENT
Start: 2024-11-18

## 2024-11-18 RX ORDER — HEPARIN SODIUM (PORCINE) LOCK FLUSH IV SOLN 100 UNIT/ML 100 UNIT/ML
500 SOLUTION INTRAVENOUS AS NEEDED
OUTPATIENT
Start: 2024-11-18

## 2024-11-18 RX ORDER — HEPARIN SODIUM (PORCINE) LOCK FLUSH IV SOLN 100 UNIT/ML 100 UNIT/ML
500 SOLUTION INTRAVENOUS AS NEEDED
Status: DISCONTINUED | OUTPATIENT
Start: 2024-11-18 | End: 2024-11-19 | Stop reason: HOSPADM

## 2024-11-18 RX ORDER — SODIUM CHLORIDE 0.9 % (FLUSH) 0.9 %
20 SYRINGE (ML) INJECTION AS NEEDED
Status: DISCONTINUED | OUTPATIENT
Start: 2024-11-18 | End: 2024-11-19 | Stop reason: HOSPADM

## 2024-11-18 RX ADMIN — Medication 20 ML: at 10:51

## 2024-11-18 RX ADMIN — HEPARIN 500 UNITS: 100 SYRINGE at 10:51

## 2024-12-05 ENCOUNTER — TELEPHONE (OUTPATIENT)
Dept: ONCOLOGY | Facility: HOSPITAL | Age: 64
End: 2024-12-05
Payer: COMMERCIAL

## 2024-12-05 NOTE — TELEPHONE ENCOUNTER
"  Caller: Marisa Portillo \"Liberty\"    Relationship: Self    Best call back number: [phone]     What is the best time to reach you: ASAP    Who are you requesting to speak with (clinical staff, provider,  specific staff member): SCHEDULING        What was the call regarding: PLEASE CALL PT TO R/S FOLLOW UP SILVIA THAT SHE MISSED IN NOVEMBER.  SHE CAN DO ANYTIME AFTER THE 17TH.       "

## 2024-12-09 ENCOUNTER — SPECIALTY PHARMACY (OUTPATIENT)
Dept: PHARMACY | Facility: HOSPITAL | Age: 64
End: 2024-12-09
Payer: COMMERCIAL

## 2024-12-09 DIAGNOSIS — Z17.0 MALIGNANT NEOPLASM OF OVERLAPPING SITES OF RIGHT BREAST IN FEMALE, ESTROGEN RECEPTOR POSITIVE: Primary | ICD-10-CM

## 2024-12-09 DIAGNOSIS — C50.811 MALIGNANT NEOPLASM OF OVERLAPPING SITES OF RIGHT BREAST IN FEMALE, ESTROGEN RECEPTOR POSITIVE: Primary | ICD-10-CM

## 2024-12-09 NOTE — PROGRESS NOTES
"Specialty Pharmacy Refill Coordination Note     Marisa \"Liberty\" is a 64 y.o. female contacted today regarding refills of  Verzenio specialty medication(s).    Reviewed and verified with patient:       Specialty medication(s) and dose(s) confirmed: yes    Refill Questions      Flowsheet Row Most Recent Value   Changes to allergies? No   Changes to medications? No   New conditions or infections since last clinic visit No   Unplanned office visit, urgent care, ED, or hospital admission in the last 4 weeks  No   How does patient/caregiver feel medication is working? Very good   Financial problems or insurance changes  No   Since the previous refill, were any specialty medication doses or scheduled injections missed or delayed?  No   Does this patient require a clinical escalation to a pharmacist? No            Delivery Questions      Flowsheet Row Most Recent Value   Delivery method UPS   Delivery address verified with patient/caregiver? Yes   Delivery address Home   Number of medications in delivery 1   Medication(s) being filled and delivered Abemaciclib (VERZENIO)   Doses left of specialty medications 10 days left   Copay verified? Yes   Copay amount $0.00   Copay form of payment No copayment ($0)   Ship Date 12/11   Delivery Date 12/12   Signature Required No                   Follow-up: 21 day(s)     Kiana Zamora, Pharmacy Technician  Specialty Pharmacy Technician        "

## 2024-12-12 ENCOUNTER — TELEPHONE (OUTPATIENT)
Dept: ONCOLOGY | Facility: HOSPITAL | Age: 64
End: 2024-12-12
Payer: COMMERCIAL

## 2024-12-12 NOTE — TELEPHONE ENCOUNTER
"  Caller: Marisa Portillo \"Liberty\"    Relationship to patient: Self    Best call back number: [phone]      Chief complaint: HAS A FAMILY SITUATION THAT WILL NEED TO LEAVE TOWN FOR AND NOT ABLE TO MAKE THIS APPT 12/19. WANTING TO SEE IF CAN GET DONE BEFORE  THEN, IS ALSO NEEDING PORT FLUSHED BEFORE LEAVING TOWN     Type of visit: LAB DRAW, FOLLOW UP 1, PORT FLUSH     Requested date: 12/16 OR 12/17    WOULD NEED APPT TIME BETWEEN 8AM-10AM     If rescheduling, when is the original appointment: 12/19       "

## 2024-12-17 ENCOUNTER — SPECIALTY PHARMACY (OUTPATIENT)
Dept: PHARMACY | Facility: HOSPITAL | Age: 64
End: 2024-12-17
Payer: COMMERCIAL

## 2024-12-17 ENCOUNTER — HOSPITAL ENCOUNTER (OUTPATIENT)
Dept: ONCOLOGY | Facility: HOSPITAL | Age: 64
Discharge: HOME | End: 2024-12-17
Payer: COMMERCIAL

## 2024-12-17 ENCOUNTER — OFFICE VISIT (OUTPATIENT)
Dept: ONCOLOGY | Facility: HOSPITAL | Age: 64
End: 2024-12-17
Payer: COMMERCIAL

## 2024-12-17 VITALS
HEIGHT: 65 IN | RESPIRATION RATE: 18 BRPM | HEART RATE: 105 BPM | TEMPERATURE: 97.7 F | DIASTOLIC BLOOD PRESSURE: 82 MMHG | BODY MASS INDEX: 29.97 KG/M2 | WEIGHT: 179.9 LBS | SYSTOLIC BLOOD PRESSURE: 149 MMHG | OXYGEN SATURATION: 97 %

## 2024-12-17 DIAGNOSIS — Z17.0 MALIGNANT NEOPLASM OF OVERLAPPING SITES OF RIGHT BREAST IN FEMALE, ESTROGEN RECEPTOR POSITIVE: Primary | ICD-10-CM

## 2024-12-17 DIAGNOSIS — Z17.0 MALIGNANT NEOPLASM OF OVERLAPPING SITES OF RIGHT BREAST IN FEMALE, ESTROGEN RECEPTOR POSITIVE: ICD-10-CM

## 2024-12-17 DIAGNOSIS — R25.2 MUSCLE CRAMP: ICD-10-CM

## 2024-12-17 DIAGNOSIS — T45.1X5A CHEMOTHERAPY INDUCED DIARRHEA: ICD-10-CM

## 2024-12-17 DIAGNOSIS — T45.1X5A CHEMOTHERAPY INDUCED NEUTROPENIA: ICD-10-CM

## 2024-12-17 DIAGNOSIS — Z45.2 ENCOUNTER FOR ADJUSTMENT OR MANAGEMENT OF VASCULAR ACCESS DEVICE: Primary | ICD-10-CM

## 2024-12-17 DIAGNOSIS — K52.1 CHEMOTHERAPY INDUCED DIARRHEA: ICD-10-CM

## 2024-12-17 DIAGNOSIS — C50.811 MALIGNANT NEOPLASM OF OVERLAPPING SITES OF RIGHT BREAST IN FEMALE, ESTROGEN RECEPTOR POSITIVE: ICD-10-CM

## 2024-12-17 DIAGNOSIS — C50.811 MALIGNANT NEOPLASM OF OVERLAPPING SITES OF RIGHT BREAST IN FEMALE, ESTROGEN RECEPTOR POSITIVE: Primary | ICD-10-CM

## 2024-12-17 DIAGNOSIS — D70.1 CHEMOTHERAPY INDUCED NEUTROPENIA: ICD-10-CM

## 2024-12-17 LAB
ALBUMIN SERPL-MCNC: 4.5 G/DL (ref 3.5–5.2)
ALBUMIN/GLOB SERPL: 1.7 G/DL
ALP SERPL-CCNC: 64 U/L (ref 39–117)
ALT SERPL W P-5'-P-CCNC: 13 U/L (ref 1–33)
ANION GAP SERPL CALCULATED.3IONS-SCNC: 11 MMOL/L (ref 5–15)
AST SERPL-CCNC: 19 U/L (ref 1–32)
BASOPHILS # BLD AUTO: 0.08 10*3/MM3 (ref 0–0.2)
BASOPHILS NFR BLD AUTO: 2.1 % (ref 0–1.5)
BILIRUB SERPL-MCNC: 0.4 MG/DL (ref 0–1.2)
BUN SERPL-MCNC: 17 MG/DL (ref 8–23)
BUN/CREAT SERPL: 18.3 (ref 7–25)
CALCIUM SPEC-SCNC: 9.3 MG/DL (ref 8.6–10.5)
CHLORIDE SERPL-SCNC: 103 MMOL/L (ref 98–107)
CO2 SERPL-SCNC: 25 MMOL/L (ref 22–29)
CREAT SERPL-MCNC: 0.93 MG/DL (ref 0.57–1)
DEPRECATED RDW RBC AUTO: 47.8 FL (ref 37–54)
EGFRCR SERPLBLD CKD-EPI 2021: 68.3 ML/MIN/1.73
EOSINOPHIL # BLD AUTO: 0.15 10*3/MM3 (ref 0–0.4)
EOSINOPHIL NFR BLD AUTO: 4 % (ref 0.3–6.2)
ERYTHROCYTE [DISTWIDTH] IN BLOOD BY AUTOMATED COUNT: 13.1 % (ref 12.3–15.4)
GLOBULIN UR ELPH-MCNC: 2.6 GM/DL
GLUCOSE SERPL-MCNC: 109 MG/DL (ref 65–99)
HCT VFR BLD AUTO: 35.1 % (ref 34–46.6)
HGB BLD-MCNC: 11.8 G/DL (ref 12–15.9)
IMM GRANULOCYTES # BLD AUTO: 0.01 10*3/MM3 (ref 0–0.05)
IMM GRANULOCYTES NFR BLD AUTO: 0.3 % (ref 0–0.5)
LYMPHOCYTES # BLD AUTO: 1.1 10*3/MM3 (ref 0.7–3.1)
LYMPHOCYTES NFR BLD AUTO: 29.4 % (ref 19.6–45.3)
MCH RBC QN AUTO: 33.8 PG (ref 26.6–33)
MCHC RBC AUTO-ENTMCNC: 33.6 G/DL (ref 31.5–35.7)
MCV RBC AUTO: 100.6 FL (ref 79–97)
MONOCYTES # BLD AUTO: 0.51 10*3/MM3 (ref 0.1–0.9)
MONOCYTES NFR BLD AUTO: 13.6 % (ref 5–12)
NEUTROPHILS NFR BLD AUTO: 1.89 10*3/MM3 (ref 1.7–7)
NEUTROPHILS NFR BLD AUTO: 50.6 % (ref 42.7–76)
NRBC BLD AUTO-RTO: 0 /100 WBC (ref 0–0.2)
PLATELET # BLD AUTO: 185 10*3/MM3 (ref 140–450)
PMV BLD AUTO: 9.6 FL (ref 6–12)
POTASSIUM SERPL-SCNC: 4 MMOL/L (ref 3.5–5.2)
PROT SERPL-MCNC: 7.1 G/DL (ref 6–8.5)
RBC # BLD AUTO: 3.49 10*6/MM3 (ref 3.77–5.28)
SODIUM SERPL-SCNC: 139 MMOL/L (ref 136–145)
WBC NRBC COR # BLD AUTO: 3.74 10*3/MM3 (ref 3.4–10.8)

## 2024-12-17 PROCEDURE — 25010000002 HEPARIN LOCK FLUSH PER 10 UNITS: Performed by: INTERNAL MEDICINE

## 2024-12-17 PROCEDURE — 80053 COMPREHEN METABOLIC PANEL: CPT | Performed by: INTERNAL MEDICINE

## 2024-12-17 PROCEDURE — 36591 DRAW BLOOD OFF VENOUS DEVICE: CPT

## 2024-12-17 PROCEDURE — 99214 OFFICE O/P EST MOD 30 MIN: CPT | Performed by: INTERNAL MEDICINE

## 2024-12-17 PROCEDURE — 85025 COMPLETE CBC W/AUTO DIFF WBC: CPT | Performed by: INTERNAL MEDICINE

## 2024-12-17 RX ORDER — SODIUM CHLORIDE 0.9 % (FLUSH) 0.9 %
20 SYRINGE (ML) INJECTION AS NEEDED
Status: DISCONTINUED | OUTPATIENT
Start: 2024-12-17 | End: 2024-12-18 | Stop reason: HOSPADM

## 2024-12-17 RX ORDER — SODIUM CHLORIDE 0.9 % (FLUSH) 0.9 %
20 SYRINGE (ML) INJECTION AS NEEDED
OUTPATIENT
Start: 2024-12-17

## 2024-12-17 RX ORDER — HEPARIN SODIUM (PORCINE) LOCK FLUSH IV SOLN 100 UNIT/ML 100 UNIT/ML
500 SOLUTION INTRAVENOUS AS NEEDED
OUTPATIENT
Start: 2024-12-17

## 2024-12-17 RX ORDER — LETROZOLE 2.5 MG/1
2.5 TABLET, FILM COATED ORAL DAILY
Qty: 90 TABLET | Refills: 1 | Status: SHIPPED | OUTPATIENT
Start: 2024-12-17

## 2024-12-17 RX ORDER — HEPARIN SODIUM (PORCINE) LOCK FLUSH IV SOLN 100 UNIT/ML 100 UNIT/ML
500 SOLUTION INTRAVENOUS AS NEEDED
Status: DISCONTINUED | OUTPATIENT
Start: 2024-12-17 | End: 2024-12-18 | Stop reason: HOSPADM

## 2024-12-17 RX ADMIN — HEPARIN 500 UNITS: 100 SYRINGE at 09:57

## 2024-12-17 RX ADMIN — Medication 20 ML: at 09:57

## 2024-12-17 NOTE — PROGRESS NOTES
Specialty Pharmacy Patient Management Program  Program Disenrollment      Marisa Portillo is seen by their provider for Breast Cancer. Patient was previously enrolled in the Oncology Patient Management program offered by [facility].      Disenrolling patient today patient will complete 2 years of abemaciclib at the end of this month.    Radha Arreola, PharmD, [facility]  Oncology Clinical Pharmacist  12/17/2024  15:15 EST

## 2024-12-17 NOTE — PROGRESS NOTES
[facility]    Location  [facility] HEMATOLOGY & ONCOLOGY    Chief Complaint  FOLLOW UP 1    Referring Provider: Judah Johnson, *  PCP: Judah Johnson MD    Subjective          Oncology/Hematology History Overview Note     ER+ Right Breast Cancer:    Diagnostic mammogram on 11/12/2021: 2 cm asymmetry in the outer central right breast with amorphous calcifications.  Similar appearing group of amorphous calcifications in the outer central right breast.  6 mm asymmetry in the inner right breast.  Right axillary lymphadenopathy.    Ultrasound-guided biopsy on 11/1/2021: Right breast 3 o'clock position, 2 cm from the nipple with invasive ductal carcinoma, grade 2.  Right breast 9 o'clock position, 3 cm from the nipple with invasive ductal carcinoma, grade 2, ER positive (50%), AR positive (3%), HER-2 negative (score 0), Ki-67 14%.  Associated with intermediate grade ductal carcinoma in situ.  Right axillary lymph node positive for breast carcinoma.    CT CAP and bone scan on 12/13/21: negative for metastatic disease    Bilateral mastectomy 12/28/2021: Right breast with multiple (2) foci of invasive ductal carcinoma, largest focus 2.3 cm, grade 2, associated with DCIS with extensive intraductal component, all margins negative, 4/10 lymph nodes involved with no extranodal extension and the largest focus measured at 3.3 cm, ER positive (40%), AR positive (5%), HER-2 negative by IHC (score 0), Ki-67 14%, pT2 pN2a cM0    Completed 4 cycles of chemotherapy with TC on 4/25/22.  Complicated by a UTI and multiple right breast abscesses causing delay in cycles 3 and 4.     Radiation was also delayed due to infection and wound healing. She finishes December 20th.     She started Abemiciclib on 1/1/23.    *The pt discontinued HRT in October of 2021 when she had an abnormal screening mammogram     Malignant neoplasm of overlapping sites of right breast in female, estrogen receptor  positive   12/9/2021 Initial Diagnosis    Malignant neoplasm of overlapping sites of right breast in female, estrogen receptor positive (HCC)     12/28/2021 Cancer Staged    Staging form: Breast, AJCC 8th Edition  - Pathologic stage from 12/28/2021: Stage IB (pT2, pN2a, cM0, G2, ER+, WA+, HER2-) - Signed by Starr Mendez MD PhD on 1/30/2022 1/14/2022 -  Chemotherapy    OP CENTRAL VENOUS ACCESS DEVICE ACCESS, CARE, AND MAINTENANCE (CVAD)     1/31/2022 - 4/26/2022 Chemotherapy    OP BREAST TC DOCEtaxel / Cyclophosphamide     11/15/2022 - 12/20/2022 Radiation    Radiation OncologyTreatment Course:  Marisa Portillo received 5000 cGy in 25 fractions to right chest wall and axillary levels 1 through 3.      12/27/2022 -  Chemotherapy    OP BREAST Abemaciclib      Malignant neoplasm of overlapping sites of right female breast   7/18/2022 Initial Diagnosis    Malignant neoplasm of overlapping sites of right female breast (HCC)         History of Present Illness  Patient comes in today for toxicity check while on abemaciclib and letrozole.  Reports her diarrhea is improved on Lomotil.  She still has unusual muscle cramps in jaw, neck, and scapula among other areas. Thinks it may be from letrozole. No nausea or vomiting.  No fevers or chills.  No bleeding.  No breast related concerns. Reports increase in fatigue during the day. Due to stop verzenio on 12/31/24. She is excited to stop. Will continue letrozole.     Review of Systems   Constitutional:  Positive for fatigue. Negative for appetite change, diaphoresis, fever, unexpected weight gain and unexpected weight loss.   HENT:  Negative for hearing loss, mouth sores, sore throat, swollen glands, trouble swallowing and voice change.    Eyes:  Negative for blurred vision.   Respiratory:  Negative for cough, shortness of breath and wheezing.    Cardiovascular:  Negative for chest pain and palpitations.   Gastrointestinal:  Negative for abdominal pain, blood in stool,  constipation, diarrhea, nausea and vomiting.   Endocrine: Negative for cold intolerance and heat intolerance.   Genitourinary:  Negative for difficulty urinating, dysuria, frequency, hematuria and urinary incontinence.   Musculoskeletal:  Positive for arthralgias. Negative for back pain and myalgias.   Skin:  Negative for rash, skin lesions and wound.   Neurological:  Negative for dizziness, seizures, weakness, numbness and headache.   Hematological:  Does not bruise/bleed easily.   Psychiatric/Behavioral:  Negative for depressed mood. The patient is not nervous/anxious.    All other systems reviewed and are negative.      Past Medical History:   Diagnosis Date    Abnormal Pap smear of cervix     Allergies     Anemia During chemo 2022    Breast cancer     Cervical dysplasia     Herpes     High cholesterol     IFG (impaired fasting glucose)     Migraine     Osteoarthritis     S/P BILATERAL IMMEDIATE BREAST RECONSTRUCTION WITH LEFT PRE PEC PLACEMENT OF SILICONE GEL IMPLANT AND MESH, RIGHT PRE PEC PLACEMENT OF EXPANDER AND MESH 12/29/2021    TIA (transient ischemic attack)     no residual    Vitamin D deficiency      Past Surgical History:   Procedure Laterality Date    APPENDECTOMY      BREAST AUGMENTATION Bilateral 12/28/2021    Procedure: bilateral breast reconstructon with bilateral mesh placement.   left implant, right tissue expander placement;  Surgeon: Lacy Shelton MD;  Location: [facility] [place] [facility] [place];  Service: Plastics;  Laterality: Bilateral;    BREAST SURGERY  [id number]    Bilateral Mastectomy with implant Left expander Right    BREAST TISSUE EXPANDER REMOVAL INSERTION OF IMPLANT Right 04/15/2022    Procedure: RIGHT BREAST IMPLANT REMOVAL WITH INCISION AND DRAINAGE;  Surgeon: Lacy Shelton MD;  Location: [facility] OR [facility] [place];  Service: Plastics;  Laterality: Right;    CEREBRAL ANGIOGRAM      clip placed    CYST REMOVAL Right     rt wrist    HYSTERECTOMY      Partial age 31    INCISION AND DRAINAGE  ABSCESS Bilateral 10/4/2022    Procedure: BILATERAL BREAST ABSCESS INCISION AND DRAINAGE, WITH LEFT BREAST IMPLANT REMOVAL;  Surgeon: Lacy Shelton MD;  Location: [facility] MAIN OR;  Service: Plastics;  Laterality: Bilateral;    INCISION AND DRAINAGE OF WOUND Right 03/11/2022    Procedure: INCISION AND DRAINAGE ABSCESS OF RIGHT BREAST WITH EXPANDER REMOVAL AND IMPLANT PLACEMENT;  Surgeon: Lacy Shelton MD;  Location: [facility] MAIN OR;  Service: Plastics;  Laterality: Right;    MASTECTOMY COMPLETE / SIMPLE W/ SENTINEL NODE BIOPSY Bilateral     PORTACATH PLACEMENT Left 12/28/2021    Procedure: INSERTION OF PORTACATH;  Surgeon: Jacqueline Mcgraw MD;  Location: [facility] OR [facility] [place];  Service: General;  Laterality: Left;    SENTINEL NODE BIOPSY Bilateral 12/28/2021    Procedure: BILATERAL BREAST MASTECTOMIES WITH RIGHT SENTINEL NODE BIOPSIES WITH FROZEN SECTIONS;  Surgeon: Jacqueline Mcgraw MD;  Location: [facility] OR [facility] [place];  Service: General;  Laterality: Bilateral;     Social History     Socioeconomic History    Marital status:    Tobacco Use    Smoking status: Never    Smokeless tobacco: Never    Tobacco comments:     Father mother  smoker diring childhood and  adulthood   Vaping Use    Vaping status: Never Used   Substance and Sexual Activity    Alcohol use: Yes     Comment: Socially only . Occasionally    Drug use: Never    Sexual activity: Yes     Partners: Male     Birth control/protection: Post-menopausal     Comment: Hysterectomy age 33     Family History   Problem Relation Age of Onset    Hypertension Father     Diabetes Father     Cancer Father     Arthritis Father     Liver cancer Father     Lung cancer Father     Asthma Father     COPD Father     Heart disease Father     Kidney disease Father     Hypertension Mother     Diabetes Mother     Cancer Mother     Arthritis Mother         Osteo and RA    Breast cancer Mother     Breast cancer Maternal Aunt     Malig Hyperthermia Neg Hx      "Ovarian cancer Neg Hx     Uterine cancer Neg Hx     Colon cancer Neg Hx     Melanoma Neg Hx     Prostate cancer Neg Hx        Objective   Physical Exam  General: Alert, cooperative, no acute distress  Eyes: Anicteric sclera  Respiratory: normal respiratory effort  Cardiovascular: no lower extremity edema  Skin: Normal tone, no rash, + fever blister bottom right   Psychiatric: Appropriate affect, intact judgment  Neurologic: No focal sensory or motor deficits, normal cognition       Vitals:    12/17/24 1038   BP: 149/82   Pulse: 105   Resp: 18   Temp: 97.7 °F (36.5 °C)   TempSrc: Temporal   SpO2: 97%   Weight: 81.6 kg (179 lb 14.3 oz)   Height: 165.1 cm (65\")   PainSc:   6   PainLoc: Neck                     PHQ-9 Total Score:         Result Review :   The following data was reviewed by: Judah Juarez MD on 12/17/24:  Lab Results   Component Value Date    HGB 11.8 (L) 12/17/2024    HCT 35.1 12/17/2024    .6 (H) 12/17/2024     12/17/2024    WBC 3.74 12/17/2024    NEUTROABS 1.89 12/17/2024    LYMPHSABS 1.10 12/17/2024    MONOSABS 0.51 12/17/2024    EOSABS 0.15 12/17/2024    BASOSABS 0.08 12/17/2024     Lab Results   Component Value Date    GLUCOSE 109 (H) 12/17/2024    BUN 17 12/17/2024    CREATININE 0.93 12/17/2024     12/17/2024    K 4.0 12/17/2024     12/17/2024    CO2 25.0 12/17/2024    CALCIUM 9.3 12/17/2024    PROTEINTOT 7.1 12/17/2024    ALBUMIN 4.5 12/17/2024    BILITOT 0.4 12/17/2024    ALKPHOS 64 12/17/2024    AST 19 12/17/2024    ALT 13 12/17/2024     Lab Results   Component Value Date    IRON 65 03/13/2022    LABIRON 35 03/13/2022    TRANSFERRIN 125 (L) 03/13/2022    TIBC 186 (L) 03/13/2022     Lab Results   Component Value Date    YDGPIYVI88 >2,000 (H) 03/11/2022    FOLATE 16.80 03/11/2022     No results found for: \"PSA\", \"CEA\", \"AFP\", \"\", \"\"    Labs personally reviewed. Mild anemia. ANC 1.89. CMP normal.        Assessment and Plan    Diagnoses and all orders " for this visit:    1. Malignant neoplasm of overlapping sites of right breast in female, estrogen receptor positive (Primary)    2. Muscle cramp    3. Chemotherapy induced diarrhea    4. Chemotherapy induced neutropenia    Other orders  -     letrozole (FEMARA) 2.5 MG tablet; Take 1 tablet by mouth Daily.  Dispense: 90 tablet; Refill: 1        ER+ Right breast Cancer: pT2N2a, s/p bilateral mastectomy.  Completed 4 cycles of TC. Radiation was delayed until December 2022.  She is tolerating anastrozole and abemiciclib (started on 1/1/23) with the exception of occasional severe bouts of diarrhea. Diarrhea improved but still occurs 1-2 times per week, better now that she is on lomotil. Currently on 100 mg BID dosing of the abemaciclib. Plan for 2 years of abemaciclib (due to finish 1/2025). Plan to continue with abemaciclib and letrozole at current dosages. Patient aware to complete 2 additional weeks of abemaciclib and then hold. Patient agreeable to plan. RTC 3 months. No indication for mammograms due to bilateral mastectomy.     Diarrhea  2/2 abemaciclib. Imodium causes cramping. PRN lomotil added and has helped. Abemaciclib will complete 12/31/24.    Menopausal symptoms: Secondary to letrozole. She is currently on venlafaxine 75mg daily.  Also on clonidine.     Muscle cramps  Not likely due to abemaciclib. In jaw and neck. Ok to continue mag supplement. Mag has improved the frequency. Also suggested massage and/or acupuncture. Could be from letrozole. Once verzenio held, suggested to hold letrozole x 2 weeks to see if this helps. If it does, can consider switch to exemestane.     Neutropenia  2/2 Verzenio. Has occurred off and on in the past. Overall stable. No dose adjustment required. ANC normal today. Verzenio to be held as of 12/31/24. No longer need to follow CBC thereafter.     Patient follow up: 3 months  Patient was given instructions and counseling regarding her condition or for health maintenance advice.  Please see specific information pulled into the AVS if appropriate.

## 2025-01-03 ENCOUNTER — HOSPITAL ENCOUNTER (OUTPATIENT)
Dept: BONE DENSITY | Facility: HOSPITAL | Age: 65
Discharge: HOME | End: 2025-01-03
Admitting: INTERNAL MEDICINE
Payer: COMMERCIAL

## 2025-01-03 DIAGNOSIS — Z78.0 POST-MENOPAUSAL: ICD-10-CM

## 2025-01-03 PROCEDURE — 77080 DXA BONE DENSITY AXIAL: CPT

## 2025-01-27 ENCOUNTER — OFFICE VISIT (OUTPATIENT)
Dept: OBSTETRICS AND GYNECOLOGY | Facility: CLINIC | Age: 65
End: 2025-01-27
Payer: COMMERCIAL

## 2025-01-27 VITALS
DIASTOLIC BLOOD PRESSURE: 75 MMHG | BODY MASS INDEX: 30.16 KG/M2 | WEIGHT: 181 LBS | SYSTOLIC BLOOD PRESSURE: 146 MMHG | HEART RATE: 85 BPM | HEIGHT: 65 IN

## 2025-01-27 DIAGNOSIS — Z01.419 ENCOUNTER FOR GYNECOLOGICAL EXAMINATION WITHOUT ABNORMAL FINDING: Primary | ICD-10-CM

## 2025-01-27 RX ORDER — CLONIDINE HYDROCHLORIDE 0.2 MG/1
0.2 TABLET ORAL
Qty: 90 TABLET | Refills: 4 | Status: SHIPPED | OUTPATIENT
Start: 2025-01-27

## 2025-01-27 NOTE — PROGRESS NOTES
"Well Woman Visit    CC: Annual well woman exam       HPI:   64 y.o. Contraception or HRT: s/p HYST, still has one or both ovaries, benign indications  Menses:  none  Pain:  None  Incontinence concerns: No  Hx of abnormal pap:  No  Pt has no complaints today. Desires refills on clonidine which she takes to control hot flashes. Denies hot flashes.       History: PMHx, Meds, Allergies, PSHx, Social Hx, and POBHx all reviewed and updated.      PHYSICAL EXAM:  /75   Pulse 85   Ht 165.1 cm (65\")   Wt 82.1 kg (181 lb)   BMI 30.12 kg/m²   General- NAD, alert and oriented, appropriate  Psych- Normal mood, good memory  Neck- No masses, no thyroid enlargement  CV- Regular rhythm, no murnurs  Resp- CTA to bases, no wheezes  Abdomen- Soft, non distended, non tender, no masses    S/p bilat mastectomy     External genitalia- Normal female, no lesions  Urethra/meatus- Normal, no masses, non tender, no prolapse  Bladder- Normal, no masses, non tender, no prolapse  Vagina- + atrophy, no lesions, no discharge, no prolapse  Cvx- Absent  Uterus- Absent  Adnexa- No mass, non tender  Anus/Rectum/Perineum- Normal appearance, no mass, good sphincter tone, no hemorrhoids, no prolapse , Hemoccult neg    Lymphatic- No palpable neck, axillary, or groin nodes  Ext- No edema, no cyanosis    Skin- No lesions, no rashes, no acanthosis nigricans        ASSESSMENT and PLAN:  WWE    Diagnoses and all orders for this visit:    1. Encounter for gynecological examination without abnormal finding (Primary)  -     POC Occult Blood Stool  -     cloNIDine (CATAPRES) 0.2 MG tablet; Take 1 tablet by mouth every night at bedtime.  Dispense: 90 tablet; Refill: 4        Domestic violence/abuse screen: negative    Depression screen: no SI    Preventative:   BREAST HEALTH- Monthly self breast exam importance and how to reviewed. MMG and/or MRI (prn) reviewed per society guidelines and her individual history. Mammo/MRI screen: Not medically " needed.  CERVICAL CANCER Screening- Reviewed current ASCCP guidelines for screening w and wo cotest HPV, age specific.  Screen: Not medically needed.  COLON CANCER Screening- Reviewed current medical society guidelines and options.  Colonoscopy screen:  Already up to date.  SEXUAL HEALTH: Declines STD screening.  BONE HEALTH- Reviewed current medical society guidelines and options for testing, calcium and vit D intake.  Weight bearing exercise.  DEXA: Already up to date.  VACCINATIONS Recommended: Flu annually, Zoster (49yo and older), Pneumococcal (66yo and older).  Importance discussed, risk being unvaccinated reviewed.  Questions answered  Smoking status- NON SMOKER.  Importance of avoiding second hand smoke.  Follow up PCP/Specialist PMHx and Labs  [facility] : pt reports she was tested previously and it was negative      She understands the importance of having any ordered tests to be performed in a timely fashion.  She is encouraged to review her results online and/or contact or office if she has questions.     Follow Up:  Return in about 1 year (around 1/27/2026) for Annual physical.      Yamini Michel, APRN  01/27/2025

## 2025-01-29 ENCOUNTER — HOSPITAL ENCOUNTER (OUTPATIENT)
Dept: ONCOLOGY | Facility: HOSPITAL | Age: 65
Discharge: HOME | End: 2025-01-29
Admitting: INTERNAL MEDICINE
Payer: COMMERCIAL

## 2025-01-29 DIAGNOSIS — Z45.2 ENCOUNTER FOR ADJUSTMENT OR MANAGEMENT OF VASCULAR ACCESS DEVICE: Primary | ICD-10-CM

## 2025-01-29 PROCEDURE — 25010000002 HEPARIN LOCK FLUSH PER 10 UNITS: Performed by: INTERNAL MEDICINE

## 2025-01-29 PROCEDURE — 96523 IRRIG DRUG DELIVERY DEVICE: CPT

## 2025-01-29 RX ORDER — HEPARIN SODIUM (PORCINE) LOCK FLUSH IV SOLN 100 UNIT/ML 100 UNIT/ML
500 SOLUTION INTRAVENOUS AS NEEDED
OUTPATIENT
Start: 2025-01-29

## 2025-01-29 RX ORDER — SODIUM CHLORIDE 0.9 % (FLUSH) 0.9 %
20 SYRINGE (ML) INJECTION AS NEEDED
Status: DISCONTINUED | OUTPATIENT
Start: 2025-01-29 | End: 2025-01-30 | Stop reason: HOSPADM

## 2025-01-29 RX ORDER — SODIUM CHLORIDE 0.9 % (FLUSH) 0.9 %
20 SYRINGE (ML) INJECTION AS NEEDED
OUTPATIENT
Start: 2025-01-29

## 2025-01-29 RX ORDER — HEPARIN SODIUM (PORCINE) LOCK FLUSH IV SOLN 100 UNIT/ML 100 UNIT/ML
500 SOLUTION INTRAVENOUS AS NEEDED
Status: DISCONTINUED | OUTPATIENT
Start: 2025-01-29 | End: 2025-01-30 | Stop reason: HOSPADM

## 2025-01-29 RX ADMIN — HEPARIN 500 UNITS: 100 SYRINGE at 15:28

## 2025-01-29 RX ADMIN — Medication 20 ML: at 15:28

## 2025-03-20 ENCOUNTER — OFFICE VISIT (OUTPATIENT)
Dept: ONCOLOGY | Facility: HOSPITAL | Age: 65
End: 2025-03-20
Payer: COMMERCIAL

## 2025-03-20 ENCOUNTER — HOSPITAL ENCOUNTER (OUTPATIENT)
Dept: ONCOLOGY | Facility: HOSPITAL | Age: 65
Discharge: HOME | End: 2025-03-20
Payer: COMMERCIAL

## 2025-03-20 VITALS
OXYGEN SATURATION: 98 % | DIASTOLIC BLOOD PRESSURE: 80 MMHG | WEIGHT: 179.45 LBS | HEART RATE: 86 BPM | RESPIRATION RATE: 18 BRPM | SYSTOLIC BLOOD PRESSURE: 104 MMHG | HEIGHT: 65 IN | TEMPERATURE: 97 F | BODY MASS INDEX: 29.9 KG/M2

## 2025-03-20 DIAGNOSIS — Z17.0 MALIGNANT NEOPLASM OF OVERLAPPING SITES OF RIGHT BREAST IN FEMALE, ESTROGEN RECEPTOR POSITIVE: ICD-10-CM

## 2025-03-20 DIAGNOSIS — R25.2 MUSCLE CRAMPS: ICD-10-CM

## 2025-03-20 DIAGNOSIS — R23.2 HOT FLASHES RELATED TO AROMATASE INHIBITOR THERAPY: ICD-10-CM

## 2025-03-20 DIAGNOSIS — R22.2 SUPRACLAVICULAR MASS: ICD-10-CM

## 2025-03-20 DIAGNOSIS — R22.31 AXILLARY LUMP, RIGHT: Primary | ICD-10-CM

## 2025-03-20 DIAGNOSIS — T45.1X5A CHEMOTHERAPY INDUCED DIARRHEA: ICD-10-CM

## 2025-03-20 DIAGNOSIS — K52.1 CHEMOTHERAPY INDUCED DIARRHEA: ICD-10-CM

## 2025-03-20 DIAGNOSIS — Z45.2 ENCOUNTER FOR ADJUSTMENT OR MANAGEMENT OF VASCULAR ACCESS DEVICE: Primary | ICD-10-CM

## 2025-03-20 DIAGNOSIS — C50.811 MALIGNANT NEOPLASM OF OVERLAPPING SITES OF RIGHT BREAST IN FEMALE, ESTROGEN RECEPTOR POSITIVE: ICD-10-CM

## 2025-03-20 DIAGNOSIS — T45.1X5A HOT FLASHES RELATED TO AROMATASE INHIBITOR THERAPY: ICD-10-CM

## 2025-03-20 PROCEDURE — 25010000002 HEPARIN LOCK FLUSH PER 10 UNITS: Performed by: INTERNAL MEDICINE

## 2025-03-20 PROCEDURE — 96523 IRRIG DRUG DELIVERY DEVICE: CPT

## 2025-03-20 RX ORDER — SODIUM CHLORIDE 0.9 % (FLUSH) 0.9 %
20 SYRINGE (ML) INJECTION AS NEEDED
OUTPATIENT
Start: 2025-03-20

## 2025-03-20 RX ORDER — LETROZOLE 2.5 MG/1
2.5 TABLET, FILM COATED ORAL DAILY
Qty: 90 TABLET | Refills: 3 | Status: SHIPPED | OUTPATIENT
Start: 2025-03-20

## 2025-03-20 RX ORDER — MUPIROCIN 20 MG/G
OINTMENT TOPICAL
COMMUNITY
Start: 2025-03-11

## 2025-03-20 RX ORDER — HEPARIN SODIUM (PORCINE) LOCK FLUSH IV SOLN 100 UNIT/ML 100 UNIT/ML
500 SOLUTION INTRAVENOUS AS NEEDED
Status: DISCONTINUED | OUTPATIENT
Start: 2025-03-20 | End: 2025-03-21 | Stop reason: HOSPADM

## 2025-03-20 RX ORDER — SODIUM CHLORIDE 0.9 % (FLUSH) 0.9 %
20 SYRINGE (ML) INJECTION AS NEEDED
Status: DISCONTINUED | OUTPATIENT
Start: 2025-03-20 | End: 2025-03-21 | Stop reason: HOSPADM

## 2025-03-20 RX ORDER — HEPARIN SODIUM (PORCINE) LOCK FLUSH IV SOLN 100 UNIT/ML 100 UNIT/ML
500 SOLUTION INTRAVENOUS AS NEEDED
OUTPATIENT
Start: 2025-03-20

## 2025-03-20 RX ADMIN — Medication 20 ML: at 08:52

## 2025-03-20 RX ADMIN — HEPARIN 500 UNITS: 100 SYRINGE at 08:52

## 2025-03-20 NOTE — PROGRESS NOTES
[facility]    [facility]    Chief Complaint  FOLLOW UP 1    Referring Provider: Judah Johnson, *  PCP: Judah Johnson MD    Subjective          Oncology/Hematology History Overview Note     ER+ Right Breast Cancer:    Diagnostic mammogram on 11/12/2021: 2 cm asymmetry in the outer central right breast with amorphous calcifications.  Similar appearing group of amorphous calcifications in the outer central right breast.  6 mm asymmetry in the inner right breast.  Right axillary lymphadenopathy.    Ultrasound-guided biopsy on 11/1/2021: Right breast 3 o'clock position, 2 cm from the nipple with invasive ductal carcinoma, grade 2.  Right breast 9 o'clock position, 3 cm from the nipple with invasive ductal carcinoma, grade 2, ER positive (50%), MT positive (3%), HER-2 negative (score 0), Ki-67 14%.  Associated with intermediate grade ductal carcinoma in situ.  Right axillary lymph node positive for breast carcinoma.    CT CAP and bone scan on 12/13/21: negative for metastatic disease    Bilateral mastectomy 12/28/2021: Right breast with multiple (2) foci of invasive ductal carcinoma, largest focus 2.3 cm, grade 2, associated with DCIS with extensive intraductal component, all margins negative, 4/10 lymph nodes involved with no extranodal extension and the largest focus measured at 3.3 cm, ER positive (40%), MT positive (5%), HER-2 negative by IHC (score 0), Ki-67 14%, pT2 pN2a cM0    Completed 4 cycles of chemotherapy with TC on 4/25/22.  Complicated by a UTI and multiple right breast abscesses causing delay in cycles 3 and 4.     Radiation was also delayed due to infection and wound healing. She finishes December 20th.     She started Abemiciclib on 1/1/23.    *The pt discontinued HRT in October of 2021 when she had an abnormal screening mammogram     Malignant neoplasm of overlapping sites of right breast in female, estrogen receptor  positive   12/9/2021 Initial Diagnosis    Malignant neoplasm of overlapping sites of right breast in female, estrogen receptor positive (HCC)     12/28/2021 Cancer Staged    Staging form: Breast, AJCC 8th Edition  - Pathologic stage from 12/28/2021: Stage IB (pT2, pN2a, cM0, G2, ER+, MA+, HER2-) - Signed by Starr Mendez MD PhD on 1/30/2022 1/14/2022 -  Chemotherapy    OP CENTRAL VENOUS ACCESS DEVICE ACCESS, CARE, AND MAINTENANCE (CVAD)     1/31/2022 - 4/26/2022 Chemotherapy    OP BREAST TC DOCEtaxel / Cyclophosphamide     11/15/2022 - 12/20/2022 Radiation    Radiation OncologyTreatment Course:  Marisa Portillo received 5000 cGy in 25 fractions to right chest wall and axillary levels 1 through 3.      12/27/2022 -  Chemotherapy    OP BREAST Abemaciclib      Malignant neoplasm of overlapping sites of right female breast (Resolved)   7/18/2022 Initial Diagnosis    Malignant neoplasm of overlapping sites of right female breast (HCC)         History of Present Illness  Patient comes in today for toxicity check while on letrozole. Abemaciclib stopped 1/2025. Reports her diarrhea is resolved.  She still remains fatigued.  She does take clonidine at night for sleep.  Reports pains in her feet and hands are improved since stopping her abemaciclib.  Reports she has felt a nodule above her right collarbone for the last few weeks.  It is tender.  Pain goes up into her neck.  She also continues to have soreness in the right axillary area.  She is due for flap reconstruction soon.  She knows she has scar tissue in the area of her right axilla.  She follows with lymphedema.  She remains on calcium and vitamin D.  No fevers or chills.  Agreeable to staying on letrozole.    Review of Systems   Constitutional:  Positive for fatigue. Negative for appetite change, diaphoresis, fever, unexpected weight gain and unexpected weight loss.   HENT:  Negative for hearing loss, mouth sores, sore throat, swollen glands, trouble  swallowing and voice change.    Eyes:  Negative for blurred vision.   Respiratory:  Negative for cough, shortness of breath and wheezing.    Cardiovascular:  Negative for chest pain and palpitations.   Gastrointestinal:  Negative for abdominal pain, blood in stool, constipation, diarrhea, nausea and vomiting.   Endocrine: Negative for cold intolerance and heat intolerance.   Genitourinary:  Negative for difficulty urinating, dysuria, frequency, hematuria and urinary incontinence.        Pain under the armpit right    Musculoskeletal:  Positive for arthralgias. Negative for back pain and myalgias.   Skin:  Negative for rash, skin lesions and wound.   Neurological:  Negative for dizziness, seizures, weakness, numbness and headache.   Hematological:  Does not bruise/bleed easily.   Psychiatric/Behavioral:  Negative for depressed mood. The patient is not nervous/anxious.    All other systems reviewed and are negative.      Past Medical History:   Diagnosis Date    Abnormal Pap smear of cervix     Allergies     Anemia During chemo 2022    Breast cancer     Cervical dysplasia     Herpes     High cholesterol     IFG (impaired fasting glucose)     Migraine     Osteoarthritis     S/P BILATERAL IMMEDIATE BREAST RECONSTRUCTION WITH LEFT PRE PEC PLACEMENT OF SILICONE GEL IMPLANT AND MESH, RIGHT PRE PEC PLACEMENT OF EXPANDER AND MESH 12/29/2021    TIA (transient ischemic attack)     no residual    Vitamin D deficiency      Past Surgical History:   Procedure Laterality Date    APPENDECTOMY      BREAST AUGMENTATION Bilateral 12/28/2021    Procedure: bilateral breast reconstructon with bilateral mesh placement.   left implant, right tissue expander placement;  Surgeon: Lacy Shelton MD;  Location: [facility] [place];  Service: Plastics;  Laterality: Bilateral;    BREAST SURGERY  [id number]    Bilateral Mastectomy with implant Left expander Right    BREAST TISSUE EXPANDER REMOVAL INSERTION OF IMPLANT Right 04/15/2022    Procedure:  RIGHT BREAST IMPLANT REMOVAL WITH INCISION AND DRAINAGE;  Surgeon: Lacy Shelton MD;  [place] [facility] [place] [facility] [place]  Service: Plastics;  Laterality: Right;    CEREBRAL ANGIOGRAM      clip placed    CYST REMOVAL Right     rt wrist    HYSTERECTOMY      Partial age 31    INCISION AND DRAINAGE ABSCESS Bilateral 10/4/2022    Procedure: BILATERAL BREAST ABSCESS INCISION AND DRAINAGE, WITH LEFT BREAST IMPLANT REMOVAL;  Surgeon: Lacy Shelton MD;  Location: [facility] MAIN OR;  Service: Plastics;  Laterality: Bilateral;    INCISION AND DRAINAGE OF WOUND Right 03/11/2022    Procedure: INCISION AND DRAINAGE ABSCESS OF RIGHT BREAST WITH EXPANDER REMOVAL AND IMPLANT PLACEMENT;  Surgeon: Lacy Shelton MD;  Location: [facility] MAIN OR;  Service: Plastics;  Laterality: Right;    MASTECTOMY COMPLETE / SIMPLE W/ SENTINEL NODE BIOPSY Bilateral     PORTACATH PLACEMENT Left 12/28/2021    Procedure: INSERTION OF PORTACATH;  Surgeon: Jacqueline Mcgraw MD;  Location: [facility] [place];  Service: General;  Laterality: Left;    SENTINEL NODE BIOPSY Bilateral 12/28/2021    Procedure: BILATERAL BREAST MASTECTOMIES WITH RIGHT SENTINEL NODE BIOPSIES WITH FROZEN SECTIONS;  Surgeon: Jacqueline Mcgraw MD;  Location: [facility] [place];  Service: General;  Laterality: Bilateral;     Social History     Socioeconomic History    Marital status:    Tobacco Use    Smoking status: Never    Smokeless tobacco: Never    Tobacco comments:     Father mother  smoker diring childhood and  adulthood   Vaping Use    Vaping status: Never Used   Substance and Sexual Activity    Alcohol use: Yes     Comment: Socially only . Occasionally    Drug use: Never    Sexual activity: Yes     Partners: Male     Birth control/protection: Post-menopausal     Comment: Hysterectomy age 33     Family History   Problem Relation Age of Onset    Hypertension Father     Diabetes Father     Cancer Father     Arthritis Father     Liver cancer Father     Lung  "cancer Father     Asthma Father     COPD Father     Heart disease Father     Kidney disease Father     Hypertension Mother     Diabetes Mother     Cancer Mother     Arthritis Mother         Osteo and RA    Breast cancer Mother     Breast cancer Maternal Aunt     Malig Hyperthermia Neg Hx     Ovarian cancer Neg Hx     Uterine cancer Neg Hx     Colon cancer Neg Hx     Melanoma Neg Hx     Prostate cancer Neg Hx        Objective   Physical Exam  General: Alert, cooperative, no acute distress  Eyes: Anicteric sclera  + nodule right supraclavicular area about 1-2 cm, tender. No adenopathy in neck  Respiratory: normal respiratory effort  Cardiovascular: no lower extremity edema  Skin: Normal tone, no rash, + fever blister bottom right   Psychiatric: Appropriate affect, intact judgment  Neurologic: No focal sensory or motor deficits, normal cognition       Vitals:    03/20/25 0826   BP: 104/80   Pulse: 86   Resp: 18   Temp: 97 °F (36.1 °C)   TempSrc: Temporal   SpO2: 98%   Weight: 81.4 kg (179 lb 7.3 oz)   Height: 165.1 cm (65\")   PainSc: 0-No pain           ECOG score: 0         PHQ-9 Total Score:         Result Review :   The following data was reviewed by: Judah Juarez MD on 03/20/25:  Lab Results   Component Value Date    HGB 13.1 03/11/2025    HCT 38.5 03/11/2025    MCV 98 03/11/2025     03/11/2025    WBC 5.01 03/11/2025    NEUTROABS 2.5 03/11/2025    LYMPHSABS 1.48 03/11/2025    MONOSABS 0.77 03/11/2025    EOSABS 0.2 03/11/2025    BASOSABS 0.04 03/11/2025     Lab Results   Component Value Date    GLUCOSE 109 (H) 12/17/2024    BUN 17 12/17/2024    CREATININE 0.93 12/17/2024     12/17/2024    K 4.0 12/17/2024     12/17/2024    CO2 25.0 12/17/2024    CALCIUM 9.3 12/17/2024    PROTEINTOT 7.1 12/17/2024    ALBUMIN 4.5 12/17/2024    BILITOT 0.4 12/17/2024    ALKPHOS 64 12/17/2024    AST 19 12/17/2024    ALT 13 12/17/2024     Lab Results   Component Value Date    IRON 65 03/13/2022    LABIRON 35 " "03/13/2022    TRANSFERRIN 125 (L) 03/13/2022    TIBC 186 (L) 03/13/2022     Lab Results   Component Value Date    ZBPJZMWZ16 >2,000 (H) 03/11/2022    FOLATE 16.80 03/11/2022     No results found for: \"PSA\", \"CEA\", \"AFP\", \"\", \"\"    Labs personally reviewed. CBC normal. CMP normal.        Assessment and Plan    Diagnoses and all orders for this visit:    1. Axillary lump, right (Primary)  -     US Axilla Right; Future    2. Malignant neoplasm of overlapping sites of right breast in female, estrogen receptor positive  -     letrozole (FEMARA) 2.5 MG tablet; Take 1 tablet by mouth Daily.  Dispense: 90 tablet; Refill: 3    3. Supraclavicular mass  -     US Soft Tissue; Future    4. Chemotherapy induced diarrhea    5. Hot flashes related to aromatase inhibitor therapy    6. Muscle cramps          ER+ Right breast Cancer: pT2N2a, s/p bilateral mastectomy.  Completed 4 cycles of TC. Radiation was delayed until December 2022. 2 years of abemaciclib completed 1/2025. Remains on letrozole alone. Patient agreeable to plan. RTC 3 months. No indication for mammograms due to bilateral mastectomy.     Right supraclavicular nodule  Exam felt more like a muscle.  With her history, we will get ultrasound of this area to rule out lymph node.  Will also get ultrasound of right axilla due to soreness and tenderness in the area, which is likely from scar tissue. She has flap reconstruction surgery upcoming at .     Diarrhea  Resolved since abemaciclib held.     Menopausal symptoms: Secondary to letrozole. She is currently on venlafaxine 75mg daily.  Also on clonidine, which helps her sleep.     Muscle cramps  Not likely due to abemaciclib. In jaw and neck. Ok to continue mag supplement. Mag has improved the frequency. Also suggested massage and/or acupuncture. Could be from letrozole. Improved.       Patient follow up: 3 months  Patient was given instructions and counseling regarding her condition or for health maintenance " advice. Please see specific information pulled into the AVS if appropriate.

## 2025-03-24 ENCOUNTER — HOSPITAL ENCOUNTER (OUTPATIENT)
Dept: ULTRASOUND IMAGING | Facility: HOSPITAL | Age: 65
Discharge: HOME | End: 2025-03-24
Payer: COMMERCIAL

## 2025-03-24 DIAGNOSIS — R22.31 AXILLARY LUMP, RIGHT: ICD-10-CM

## 2025-03-24 DIAGNOSIS — R22.2 SUPRACLAVICULAR MASS: ICD-10-CM

## 2025-03-24 PROCEDURE — 76882 US LMTD JT/FCL EVL NVASC XTR: CPT

## 2025-03-24 PROCEDURE — 76999 ECHO EXAMINATION PROCEDURE: CPT

## 2025-04-04 ENCOUNTER — READMISSION MANAGEMENT (OUTPATIENT)
Dept: CALL CENTER | Facility: HOSPITAL | Age: 65
End: 2025-04-04
Payer: COMMERCIAL

## 2025-04-05 NOTE — OUTREACH NOTE
Prep Survey      Flowsheet Row Responses   [facility]ond[facility] facility patient discharged from? Non-[facility]   Is LACE score < 7 ? Non-BH Discharge   Eligibility [facility]   Date of Admission 03/31/25   Date of Discharge 04/04/25   Discharge Disposition Home or Self Care   Discharge diagnosis Bialteral delyaed GISELA flaps   Does the patient have one of the following disease processes/diagnoses(primary or secondary)? General Surgery   Does the patient have Home health ordered? No   Prep survey completed? Yes            ASHLEY BAIG - Registered Nurse

## 2025-04-07 ENCOUNTER — TRANSITIONAL CARE MANAGEMENT TELEPHONE ENCOUNTER (OUTPATIENT)
Dept: CALL CENTER | Facility: HOSPITAL | Age: 65
End: 2025-04-07
Payer: COMMERCIAL

## 2025-04-07 NOTE — OUTREACH NOTE
Call Center TCM Note      Flowsheet Row Responses   [facility] patient discharged from? Non-  [[place]]   Does the patient have one of the following disease processes/diagnoses(primary or secondary)? Other   TCM attempt successful? Yes   Call start time 0940   Call end time 0943   Discharge diagnosis Bialteral delyaed GISELA flaps   Meds reviewed with patient/caregiver? Yes   Is the patient having any side effects they believe may be caused by any medication additions or changes? No   Does the patient have all medications ordered at discharge? Yes   Is the patient taking all medications as directed (includes completed medication regime)? Yes   Comments Pt will call office for HOSP DC FU appt if needed. TCM call complete.   Does the patient have an appointment with their PCP within 7-14 days of discharge? No   Nursing Interventions Routed TCM call to PCP office   Has home health visited the patient within 72 hours of discharge? N/A   Psychosocial issues? No   Did the patient receive a copy of their discharge instructions? Yes   Nursing interventions Reviewed instructions with patient   What is the patient's perception of their health status since discharge? Improving   Is the patient/caregiver able to teach back signs and symptoms related to disease process for when to call PCP? Yes   Is the patient/caregiver able to teach back signs and symptoms related to disease process for when to call 911? Yes   Is the patient/caregiver able to teach back the hierarchy of who to call/visit for symptoms/problems? PCP, Specialist, Home health nurse, Urgent Care, ED, 911 Yes   TCM call completed? Yes   Wrap up additional comments  reports Pt is doing well. Reviewed s/s of infection to monitor for.  Pt has FU with surgeon set. Pt will call office for HOSP DC FU appt if needed.   Call end time 0943            ANDREW PALM - Registered Nurse    4/7/2025, 09:43 EDT

## 2025-04-22 RX ORDER — ERGOCALCIFEROL 1.25 MG/1
50000 CAPSULE, LIQUID FILLED ORAL WEEKLY
Qty: 4 CAPSULE | Refills: 12 | Status: SHIPPED | OUTPATIENT
Start: 2025-04-22

## 2025-04-28 ENCOUNTER — HOSPITAL ENCOUNTER (OUTPATIENT)
Dept: ONCOLOGY | Facility: HOSPITAL | Age: 65
Discharge: HOME | End: 2025-04-28
Admitting: INTERNAL MEDICINE
Payer: COMMERCIAL

## 2025-04-28 DIAGNOSIS — Z45.2 ENCOUNTER FOR ADJUSTMENT OR MANAGEMENT OF VASCULAR ACCESS DEVICE: Primary | ICD-10-CM

## 2025-04-28 PROCEDURE — 25010000002 HEPARIN LOCK FLUSH PER 10 UNITS: Performed by: INTERNAL MEDICINE

## 2025-04-28 PROCEDURE — 96523 IRRIG DRUG DELIVERY DEVICE: CPT

## 2025-04-28 RX ORDER — HEPARIN SODIUM (PORCINE) LOCK FLUSH IV SOLN 100 UNIT/ML 100 UNIT/ML
500 SOLUTION INTRAVENOUS AS NEEDED
OUTPATIENT
Start: 2025-04-28

## 2025-04-28 RX ORDER — SODIUM CHLORIDE 0.9 % (FLUSH) 0.9 %
20 SYRINGE (ML) INJECTION AS NEEDED
OUTPATIENT
Start: 2025-04-28

## 2025-04-28 RX ORDER — HEPARIN SODIUM (PORCINE) LOCK FLUSH IV SOLN 100 UNIT/ML 100 UNIT/ML
500 SOLUTION INTRAVENOUS AS NEEDED
Status: DISCONTINUED | OUTPATIENT
Start: 2025-04-28 | End: 2025-04-29 | Stop reason: HOSPADM

## 2025-04-28 RX ORDER — SODIUM CHLORIDE 0.9 % (FLUSH) 0.9 %
20 SYRINGE (ML) INJECTION AS NEEDED
Status: DISCONTINUED | OUTPATIENT
Start: 2025-04-28 | End: 2025-04-29 | Stop reason: HOSPADM

## 2025-04-28 RX ADMIN — HEPARIN 500 UNITS: 100 SYRINGE at 14:36

## 2025-04-28 RX ADMIN — Medication 20 ML: at 14:35

## 2025-05-07 ENCOUNTER — HOSPITAL ENCOUNTER (OUTPATIENT)
Facility: HOSPITAL | Age: 65
Setting detail: THERAPIES SERIES
Discharge: HOME | End: 2025-05-07
Payer: COMMERCIAL

## 2025-05-07 DIAGNOSIS — L90.5 SCAR CONDITION AND FIBROSIS OF SKIN: ICD-10-CM

## 2025-05-07 DIAGNOSIS — C50.811 MALIGNANT NEOPLASM OF OVERLAPPING SITES OF RIGHT BREAST IN FEMALE, ESTROGEN RECEPTOR POSITIVE: ICD-10-CM

## 2025-05-07 DIAGNOSIS — R52 PAIN: ICD-10-CM

## 2025-05-07 DIAGNOSIS — Z91.89 AT RISK FOR LYMPHEDEMA: Primary | ICD-10-CM

## 2025-05-07 DIAGNOSIS — Z17.0 MALIGNANT NEOPLASM OF OVERLAPPING SITES OF RIGHT BREAST IN FEMALE, ESTROGEN RECEPTOR POSITIVE: ICD-10-CM

## 2025-05-07 DIAGNOSIS — M25.60 JOINT STIFFNESS: ICD-10-CM

## 2025-05-07 PROCEDURE — 93702 BIS XTRACELL FLUID ANALYSIS: CPT | Performed by: OCCUPATIONAL THERAPIST

## 2025-05-07 PROCEDURE — 97535 SELF CARE MNGMENT TRAINING: CPT | Performed by: OCCUPATIONAL THERAPIST

## 2025-05-07 NOTE — THERAPY RE-EVALUATION
Outpatient Occupational Therapy Lymphedema Re-Evaluation  [facility]     Patient Name: Marisa Portillo  : 1960  MRN: [id number]  Today's Date: 2025      Visit Date: 2025    Patient Active Problem List   Diagnosis    Arthritis    Bladder disorder    Concussion    Contact dermatitis and eczema    Injury, other and unspecified, finger    Limb swelling    Seasonal allergic rhinitis    Vitamin D deficiency    IFG (impaired fasting glucose)    Mixed hyperlipidemia    TIA (transient ischemic attack)    Malignant neoplasm of overlapping sites of right breast in female, estrogen receptor positive    Port-A-Cath placement    S/P bilateral nipple sparing mastectomy    S/P BILATERAL IMMEDIATE BREAST RECONSTRUCTION WITH LEFT PRE PEC PLACEMENT OF SILICONE GEL IMPLANT AND MESH, RIGHT PRE PEC PLACEMENT OF EXPANDER AND MESH    Sepsis    Breast abscess    Cellulitis of left breast    Psychophysiological insomnia    Recurrent seroma of breast    Hot flashes due to menopause    Breast reconstruction deformity    Genitourinary syndrome of menopause    Physical exam, annual    PAD (peripheral artery disease)    Encounter for adjustment or management of vascular access device        Past Medical History:   Diagnosis Date    Abnormal Pap smear of cervix     Allergies     Anemia During chemo     Breast cancer     Cervical dysplasia     Herpes     High cholesterol     IFG (impaired fasting glucose)     Migraine     Osteoarthritis     S/P BILATERAL IMMEDIATE BREAST RECONSTRUCTION WITH LEFT PRE PEC PLACEMENT OF SILICONE GEL IMPLANT AND MESH, RIGHT PRE PEC PLACEMENT OF EXPANDER AND MESH 2021    TIA (transient ischemic attack)     no residual    Vitamin D deficiency         Past Surgical History:   Procedure Laterality Date    APPENDECTOMY      BREAST AUGMENTATION Bilateral 2021    Procedure: bilateral breast reconstructon with bilateral mesh placement.   left implant, right tissue expander placement;  Surgeon:  Lacy Shelton MD;  Location: [facility] OR [facility];  Service: Plastics;  Laterality: Bilateral;    BREAST SURGERY  [id number]    Bilateral Mastectomy with implant Left expander Right    BREAST TISSUE EXPANDER REMOVAL INSERTION OF IMPLANT Right 04/15/2022    Procedure: RIGHT BREAST IMPLANT REMOVAL WITH INCISION AND DRAINAGE;  Surgeon: Lacy Shelton MD;  Location: [facility] OR [facility];  Service: Plastics;  Laterality: Right;    CEREBRAL ANGIOGRAM      clip placed    CYST REMOVAL Right     rt wrist    HYSTERECTOMY      Partial age 31    INCISION AND DRAINAGE ABSCESS Bilateral 10/4/2022    Procedure: BILATERAL BREAST ABSCESS INCISION AND DRAINAGE, WITH LEFT BREAST IMPLANT REMOVAL;  Surgeon: Lacy Shelton MD;  Location: [facility] MAIN OR;  Service: Plastics;  Laterality: Bilateral;    INCISION AND DRAINAGE OF WOUND Right 03/11/2022    Procedure: INCISION AND DRAINAGE ABSCESS OF RIGHT BREAST WITH EXPANDER REMOVAL AND IMPLANT PLACEMENT;  Surgeon: Lacy Shelton MD;  Location: [facility] MAIN OR;  Service: Plastics;  Laterality: Right;    MASTECTOMY COMPLETE / SIMPLE W/ SENTINEL NODE BIOPSY Bilateral     PORTACATH PLACEMENT Left 12/28/2021    Procedure: INSERTION OF PORTACATH;  Surgeon: Jacqueline Mcgraw MD;  Location: [facility];  Service: General;  Laterality: Left;    SENTINEL NODE BIOPSY Bilateral 12/28/2021    Procedure: BILATERAL BREAST MASTECTOMIES WITH RIGHT SENTINEL NODE BIOPSIES WITH FROZEN SECTIONS;  Surgeon: Jacqueline Mcgraw MD;  Location: [facility];  Service: General;  Laterality: Bilateral;         Visit Dx:     ICD-10-CM ICD-9-CM   1. At risk for lymphedema  Z91.89 V49.89   2. Joint stiffness  M25.60 719.50   3. Pain  R52 780.96   4. Scar condition and fibrosis of skin  L90.5 709.2   5. Malignant neoplasm of overlapping sites of right breast in female, estrogen receptor positive  C50.811 174.8    Z17.0 V86.0        Patient History       Row Name 05/07/25 1200             History     Chief Complaint Numbness;Pain  Lymphedema surveillance program  -TD      Date Current Problem(s) Began 12/28/21  -TD      Brief Description of Current Complaint B mastectomy with SNLB x10  -TD      Patient/Caregiver Goals Return to prior level of function;Return to work  -TD      Hand Dominance right-handed  -TD      What clinical tests have you had for this problem? X-ray;CT scan;MRI;Blood Work;Mammogram  -TD      Are you or can you be pregnant No  -TD         Fall Risk Assessment    Any falls in the past year: No  -TD      Does patient have a fear of falling No  -TD         Services    Prior Rehab/Home Health Experiences No  -TD      Are you currently receiving Home Health services No  -TD      Do you plan to receive Home Health services in the near future No  -TD         Daily Activities    Primary Language English  -TD      Are you able to read Yes  -TD      Are you able to write Yes  -TD      How does patient learn best? Listening;Reading;Demonstration;Pictures/Video  -TD      Barriers to learning None  -TD      Pt Participated in POC and Goals Yes  -TD         Safety    Are you being hurt, hit, or frightened by anyone at home or in your life? No  -TD      Are you being neglected by a caregiver No  -TD      Have you had any of the following issues with N/A  -TD                User Key  (r) = Recorded By, (t) = Taken By, (c) = Cosigned By      Initials Name Provider Type    TD Bárbara Randall OT Occupational Therapist                     Lymphedema       Row Name 05/07/25 1200             Subjective Pain    Able to rate subjective pain? yes  -TD      Pre-Treatment Pain Level 1  -TD      Post-Treatment Pain Level 1  -TD         Lymphedema Assessment    Lymphedema Classification RUE:;at risk/stage 0  -TD      Lymphedema Cancer Related Sx bilateral;simple mastectomy;sentinel node biopsy;reconstructive  -TD      Lymphedema Surgery Comments 12/2021  -TD      Lymph Nodes Removed # 10  -TD      Positive Lymph Nodes # 4   "-TD      Chemo Received yes  -TD      Radiation Therapy Received yes  -TD      Radiation Treatments #/Timeframe 25  -TD         LLIS - Physical Concerns    The amount of pain associated with my lymphedema is: 0  -TD      The amount of limb heaviness associated with my lymphedema is: 0  -TD      The amount of skin tightness associated with my lymphedema is: 0  -TD      The size of my swollen limb(s) seems: 0  -TD      Lymphedema affects the movement of my swollen limb(s): 0  -TD      The strength in my swollen limb(s) is: 0  -TD         LLIS - Psychosocial Concerns    Lymphedema affects my body image (i.e., \"how I think I look\"). 0  -TD      Lymphedema affects my socializing with others. 0  -TD      Lymphedema affects my intimate relations with spouse or partner (rate 0 if not applicable 0  -TD      Lymphedema \"gets me down\" (i.e., depression, frustration, or anger) 0  -TD      I must rely on others for help due to my lymphedema. 0  -TD      I know what to do to manage my lymphedema 2  -TD         LLIS - Functional Concerns    Lymphedema affects my ability to perform self-care activities (i.e. eating, dressing, hygiene) 0  -TD      Lymphedema affects my ability to perform routine home or work-related activities. 0  -TD      Lymphedema affects my performance of preferred leisure activities. 0  -TD      Lymphedema affects proper fit of clothing/shoes 0  -TD      Lymphedema affects my sleep 0  -TD         Posture/Observations    Posture- WNL Posture is WNL  -TD         General ROM    GENERAL ROM COMMENTS Not tested due to restrictions  -TD         MMT (Manual Muscle Testing)    General MMT Comments Not tested due to restrictions  -TD         Skin Changes/Observations    Location/Assessment Upper Quadrant  -TD      Upper Quadrant Conditions bilateral:;scab(s)  -TD      Upper Quadrant Color/Pigment bilateral:;normal  -TD      Skin Observations Comment Pt presented today with compression in place.  Pt had no signs of " infection at this time.  -TD         L-Dex Bioimpedence Screening    L-Dex Measurement Extremity RUE  -TD      L-Dex Patient Position Standing  -TD      L-Dex UE Dominate Side Right  -TD      L-Dex UE At Risk Side Right  -TD      L-Dex UE Pre Surgical Value Yes  -TD      L-Dex UE Score -3.5  -TD      L-Dex UE Baseline Score -4.5  -TD      L-Dex UE Value Change 1  -TD      $ L-Dex Charge yes  -TD         Lymphedema Life Impact Scale Totals    A.  Total Q1 - Q17 (Do not include Q18) 2  -TD      B.  Total number of questions answered (Q1-Q17) 17  -TD      C. Divide A by B 0.12  -TD      D. Multiple C by 25 3  -TD                User Key  (r) = Recorded By, (t) = Taken By, (c) = Cosigned By      Initials Name Provider Type    Bárbara Acevedo OT Occupational Therapist                  The patient had a follow up SOZO measurement which I reviewed today. The score is   WFL, see scanned to EMR. Bioimpedance spectroscopy helps identify the   onset of lymphedema in an arm or leg before patients experience noticeable swelling. Research has   shown that 92% of patients with early detection of lymphedema using L-Dex combined with   intervention do not progress to chronic lymphedema through three years. Additionally, as of March 2023, the NCCN Guidelines® for Survivorship recommend proactive screening for lymphedema using   bioimpedance spectroscopy. Whenever possible, patients are tested for baseline L-Dex score before   cancer treatment begins and then are reassessed during regular follow-up visits using the SOZO device.   Otherwise, this can be started postoperatively and continued during regular follow-up visits. If the   patient’s L-Dex score increases above normal levels, that is a sign that lymphedema is developing and a   referral is made to physical therapy for further evaluation and early compression treatment.   Lymphedema assessment with the SOZO L-Dex score is recommended to be done every 3 months for   the first  3 years and then every 6 months for years 4 and 5 followed by annually afterwards            Therapy Education  Education Details: Discussed are reviewed stretches this date and progression of therapy.  Given: Symptoms/condition management  Program: New, Reinforced  How Provided: Verbal  Provided to: Patient  Level of Understanding: Verbalized  52246 - OT Self Care/Mgmt Minutes: 25         OT Goals       Row Name 05/07/25 0827          Time Calculation    OT Goal Re-Cert Due Date 06/06/25  -TD               User Key  (r) = Recorded By, (t) = Taken By, (c) = Cosigned By      Initials Name Provider Type    Bárbara Acevedo OT Occupational Therapist                  GOALS:   1. Post Breast Surgery Care/at risk for Lymphedema  LTG 1: 90 days:  As an indicator of no exacerbation of lymphedema staging, the patient will present with an L-Dex score less than 7 points from preoperative baseline.              STATUS: met. ongoing  STG 1a:   30 days: To prevent exacerbation of mixed edema to lymphedema, patient will utilize the 2 postsurgical compression garments daily.                 STATUS: met.  STG 1b: 30 days: Patient will be independent with self-manual lymphatic massage.               STATUS: met. ongoing  STG 1c: 30 days:  Patient will be independent with identification of signs and symptoms of lymphedema exasperation per stoplight to recovery education handout.              STATUS: met. ongoing  STG 1 d: 30 days: Patient will be independent with HEP to prevent advancement in lymphedema staging.              STATUS: met. Ongoing.  TREATMENT:  Self Care/ADL retraining, Therapeutic Activity, Neuromuscular Re-education, Therapeutic Exercise, Bioimpedence Fluid Analysis, [facility]-Surgical compression garement [facility] [employer] 2860K, Orthotic Management and training,  and Manual Therapy.     OT Assessment/Plan       Row Name 05/07/25 1215          OT Assessment    Functional Limitations  Limitations in functional capacity and performance  -TD     Impairments Impaired lymphatic circulation  -TD     Assessment Comments Patient is a 65-year-old female with history of bilateral mastectomy with reconstruction and sentinel node biopsy on 12/28/2021. Patient had reconstruction March 2025. Pt reports she had not be released for full range at this time and was instructed to begin stretching once release by surgeron.  Pt will have second revision in July 2025.  Patient will continue to benefit from skilled occupational therapy to further evaluate ongoing lymphatic functioning to prevent advancement in lymphedema staging, increased pain and decreased range of motion.  -TD     OT Diagnosis at risk for lymphedema  -TD     OT Rehab Potential Good  -TD     Patient/caregiver participated in establishment of treatment plan and goals Yes  -TD     Patient would benefit from skilled therapy intervention Yes  -TD        OT Plan    OT Frequency --  see denis  -TD     Predicted Duration of Therapy Intervention (OT) Pt will be seen 3 weeks after next revision.  -TD     Planned CPT's? OT EVAL LOW COMPLEXITY: 71637;OT RE-EVAL: 01415;OT THER ACT EA 15 MIN: 88278SO;OT THER PROC EA 15 MIN: 97000OO;OT SELF CARE/MGMT/TRAIN 15 MIN: 66607;OT MANUAL THERAPY EA 15 MIN: 04040;OT BIS XTRACELL FLUID ANALYSIS: 51789;OT ORTHO/PROSTHET CHECKOUT EA 15 MIN: 49577;OT ORTHOTIC MGMT/TRAIN EA 15 MIN: 03800;OT CARE PLAN EA 15 MIN  -TD     Planned Therapy Interventions (Optional Details) home exercise program;orthotic fitting/training;patient/family education;postural re-education;prosthetic fitting/training;ROM (Range of Motion)  -TD     OT Plan Comments continue POC  -TD               User Key  (r) = Recorded By, (t) = Taken By, (c) = Cosigned By      Initials Name Provider Type    Bárbara Acevedo OT Occupational Therapist                              Time Calculation:   Timed Charges  75465 - OT Self Care/Mgmt Minutes: 25  Total  Minutes  Timed Charges Total Minutes: 25   Total Minutes: 25     Therapy Charges for Today       Code Description Service Date Service Provider Modifiers Qty    25236196122 HC PT BIS XTRACELL FLUID ANALYSIS 5/7/2025 Bárbara Randall OT  1    97843002149 HC OT SELF CARE/MGMT/TRAIN EA 15 MIN 5/7/2025 Bárbara Randall OT GO 2                      Bárbara Randall OT  5/7/2025

## 2025-06-09 NOTE — PROGRESS NOTES
" Specialty Pharmacy Patient Management Program  Oncology Refill Outreach      Marisa \"L[place]\" is a 64 y.o. female contacted today regarding refills of her medication(s).    Specialty medication(s) and dose(s) confirmed: Completed refill outreach. [facility] will mail abemaciclib (VERZENIO) 100 mg tablet chemo tablet once ready to dispense.     Other medications being refilled: N/A    Refill Questions      Flowsheet Row Most Recent Value   Changes to allergies? No   Changes to medications? No   New conditions or infections since last clinic visit No   Unplanned office visit, urgent care, ED, or hospital admission in the last 4 weeks  No   How does patient/caregiver feel medication is working? Good   Financial problems or insurance changes  No   Since the previous refill, were any specialty medication doses or scheduled injections missed or delayed?  No   Does this patient require a clinical escalation to a pharmacist? No            Delivery Questions      Flowsheet Row Most Recent Value   Delivery method [employer]   Delivery address verified with patient/caregiver? Yes   Delivery address Home   Number of medications in delivery 1   Medication(s) being filled and delivered Abemaciclib   Doses left of specialty medications 11   Copay verified? Yes   Copay amount 0 Copay card   Copay form of payment No copayment ($0)   Ship Date 07/08/24   Delivery Date 07/09/24   Signature Required No                   Follow-up: 21 days     Jennifer Diggs  Care Coordinator, [facility]  7/2/2024  16:15 EDT          "
Never

## 2025-06-12 ENCOUNTER — HOSPITAL ENCOUNTER (OUTPATIENT)
Dept: ONCOLOGY | Facility: HOSPITAL | Age: 65
Discharge: HOME | End: 2025-06-12
Admitting: INTERNAL MEDICINE
Payer: COMMERCIAL

## 2025-06-12 DIAGNOSIS — Z45.2 ENCOUNTER FOR ADJUSTMENT OR MANAGEMENT OF VASCULAR ACCESS DEVICE: Primary | ICD-10-CM

## 2025-06-12 PROCEDURE — 25010000002 HEPARIN LOCK FLUSH PER 10 UNITS: Performed by: INTERNAL MEDICINE

## 2025-06-12 PROCEDURE — 96523 IRRIG DRUG DELIVERY DEVICE: CPT

## 2025-06-12 RX ORDER — SODIUM CHLORIDE 0.9 % (FLUSH) 0.9 %
20 SYRINGE (ML) INJECTION AS NEEDED
Status: DISCONTINUED | OUTPATIENT
Start: 2025-06-12 | End: 2025-06-13 | Stop reason: HOSPADM

## 2025-06-12 RX ORDER — HEPARIN SODIUM (PORCINE) LOCK FLUSH IV SOLN 100 UNIT/ML 100 UNIT/ML
500 SOLUTION INTRAVENOUS AS NEEDED
Status: DISCONTINUED | OUTPATIENT
Start: 2025-06-12 | End: 2025-06-13 | Stop reason: HOSPADM

## 2025-06-12 RX ORDER — SODIUM CHLORIDE 0.9 % (FLUSH) 0.9 %
20 SYRINGE (ML) INJECTION AS NEEDED
OUTPATIENT
Start: 2025-06-12

## 2025-06-12 RX ORDER — HEPARIN SODIUM (PORCINE) LOCK FLUSH IV SOLN 100 UNIT/ML 100 UNIT/ML
500 SOLUTION INTRAVENOUS AS NEEDED
OUTPATIENT
Start: 2025-06-12

## 2025-06-12 RX ADMIN — HEPARIN 500 UNITS: 100 SYRINGE at 14:41

## 2025-06-12 RX ADMIN — Medication 20 ML: at 14:41

## 2025-06-20 DIAGNOSIS — R23.2 HOT FLASHES: ICD-10-CM

## 2025-06-20 RX ORDER — VENLAFAXINE HYDROCHLORIDE 75 MG/1
75 CAPSULE, EXTENDED RELEASE ORAL DAILY
Qty: 30 CAPSULE | Refills: 11 | Status: SHIPPED | OUTPATIENT
Start: 2025-06-20

## 2025-07-24 ENCOUNTER — HOSPITAL ENCOUNTER (OUTPATIENT)
Dept: ONCOLOGY | Facility: HOSPITAL | Age: 65
Discharge: HOME | End: 2025-07-24
Payer: COMMERCIAL

## 2025-07-24 ENCOUNTER — OFFICE VISIT (OUTPATIENT)
Dept: ONCOLOGY | Facility: HOSPITAL | Age: 65
End: 2025-07-24
Payer: COMMERCIAL

## 2025-07-24 VITALS
SYSTOLIC BLOOD PRESSURE: 134 MMHG | HEIGHT: 65 IN | RESPIRATION RATE: 18 BRPM | BODY MASS INDEX: 30.05 KG/M2 | DIASTOLIC BLOOD PRESSURE: 88 MMHG | TEMPERATURE: 98.3 F | HEART RATE: 74 BPM | WEIGHT: 180.34 LBS | OXYGEN SATURATION: 96 %

## 2025-07-24 DIAGNOSIS — Z17.0 MALIGNANT NEOPLASM OF OVERLAPPING SITES OF RIGHT BREAST IN FEMALE, ESTROGEN RECEPTOR POSITIVE: ICD-10-CM

## 2025-07-24 DIAGNOSIS — Z45.2 ENCOUNTER FOR ADJUSTMENT OR MANAGEMENT OF VASCULAR ACCESS DEVICE: Primary | ICD-10-CM

## 2025-07-24 DIAGNOSIS — R25.2 MUSCLE CRAMPS: ICD-10-CM

## 2025-07-24 DIAGNOSIS — C50.811 MALIGNANT NEOPLASM OF OVERLAPPING SITES OF RIGHT BREAST IN FEMALE, ESTROGEN RECEPTOR POSITIVE: ICD-10-CM

## 2025-07-24 DIAGNOSIS — N95.1 MENOPAUSAL SYMPTOMS: ICD-10-CM

## 2025-07-24 DIAGNOSIS — M25.50 ARTHRALGIA, UNSPECIFIED JOINT: Primary | ICD-10-CM

## 2025-07-24 PROCEDURE — 25010000002 HEPARIN LOCK FLUSH PER 10 UNITS: Performed by: INTERNAL MEDICINE

## 2025-07-24 PROCEDURE — 96523 IRRIG DRUG DELIVERY DEVICE: CPT

## 2025-07-24 RX ORDER — HEPARIN SODIUM (PORCINE) LOCK FLUSH IV SOLN 100 UNIT/ML 100 UNIT/ML
500 SOLUTION INTRAVENOUS AS NEEDED
Status: DISCONTINUED | OUTPATIENT
Start: 2025-07-24 | End: 2025-07-25 | Stop reason: HOSPADM

## 2025-07-24 RX ORDER — SODIUM CHLORIDE 0.9 % (FLUSH) 0.9 %
20 SYRINGE (ML) INJECTION AS NEEDED
OUTPATIENT
Start: 2025-07-24

## 2025-07-24 RX ORDER — CELECOXIB 200 MG/1
200 CAPSULE ORAL DAILY
Qty: 30 CAPSULE | Refills: 2 | Status: SHIPPED | OUTPATIENT
Start: 2025-07-24

## 2025-07-24 RX ORDER — SODIUM CHLORIDE 0.9 % (FLUSH) 0.9 %
20 SYRINGE (ML) INJECTION AS NEEDED
Status: DISCONTINUED | OUTPATIENT
Start: 2025-07-24 | End: 2025-07-25 | Stop reason: HOSPADM

## 2025-07-24 RX ORDER — HEPARIN SODIUM (PORCINE) LOCK FLUSH IV SOLN 100 UNIT/ML 100 UNIT/ML
500 SOLUTION INTRAVENOUS AS NEEDED
OUTPATIENT
Start: 2025-07-24

## 2025-07-24 RX ADMIN — HEPARIN 500 UNITS: 100 SYRINGE at 09:07

## 2025-07-24 RX ADMIN — Medication 20 ML: at 09:08

## 2025-07-24 NOTE — PROGRESS NOTES
[facility]    Location  [facility] HEMATOLOGY & ONCOLOGY    Chief Complaint  BREAST CA-- 3 MO FOLLOW UP    Referring Provider: Judah Johnson, *  PCP: Judah Johnson MD    Subjective          Oncology/Hematology History Overview Note     ER+ Right Breast Cancer:    Diagnostic mammogram on 11/12/2021: 2 cm asymmetry in the outer central right breast with amorphous calcifications.  Similar appearing group of amorphous calcifications in the outer central right breast.  6 mm asymmetry in the inner right breast.  Right axillary lymphadenopathy.    Ultrasound-guided biopsy on 11/1/2021: Right breast 3 o'clock position, 2 cm from the nipple with invasive ductal carcinoma, grade 2.  Right breast 9 o'clock position, 3 cm from the nipple with invasive ductal carcinoma, grade 2, ER positive (50%), MS positive (3%), HER-2 negative (score 0), Ki-67 14%.  Associated with intermediate grade ductal carcinoma in situ.  Right axillary lymph node positive for breast carcinoma.    CT CAP and bone scan on 12/13/21: negative for metastatic disease    Bilateral mastectomy 12/28/2021: Right breast with multiple (2) foci of invasive ductal carcinoma, largest focus 2.3 cm, grade 2, associated with DCIS with extensive intraductal component, all margins negative, 4/10 lymph nodes involved with no extranodal extension and the largest focus measured at 3.3 cm, ER positive (40%), MS positive (5%), HER-2 negative by IHC (score 0), Ki-67 14%, pT2 pN2a cM0    Completed 4 cycles of chemotherapy with TC on 4/25/22.  Complicated by a UTI and multiple right breast abscesses causing delay in cycles 3 and 4.     Radiation was also delayed due to infection and wound healing. She finishes December 20th.     She started Abemiciclib on 1/1/23.    *The pt discontinued HRT in October of 2021 when she had an abnormal screening mammogram     Malignant neoplasm of overlapping sites of right breast in female,  estrogen receptor positive   12/9/2021 Initial Diagnosis    Malignant neoplasm of overlapping sites of right breast in female, estrogen receptor positive (HCC)     12/28/2021 Cancer Staged    Staging form: Breast, AJCC 8th Edition  - Pathologic stage from 12/28/2021: Stage IB (pT2, pN2a, cM0, G2, ER+, LA+, HER2-) - Signed by Starr Mendez MD PhD on 1/30/2022 1/14/2022 -  Chemotherapy    OP CENTRAL VENOUS ACCESS DEVICE ACCESS, CARE, AND MAINTENANCE (CVAD)     1/31/2022 - 4/26/2022 Chemotherapy    OP BREAST TC DOCEtaxel / Cyclophosphamide     11/15/2022 - 12/20/2022 Radiation    Radiation OncologyTreatment Course:  Marisa Portillo received 5000 cGy in 25 fractions to right chest wall and axillary levels 1 through 3.      12/27/2022 -  Chemotherapy    OP BREAST Abemaciclib      Malignant neoplasm of overlapping sites of right female breast (Resolved)   7/18/2022 Initial Diagnosis    Malignant neoplasm of overlapping sites of right female breast (HCC)         History of Present Illness  Patient comes in today for toxicity check while on letrozole. Abemaciclib stopped 1/2025.  She is doing well overall.  She had reconstruction surgery with plastic surgery at  back in March.  Has another surgery upcoming for August.  Has recovered well from this.  She has bilateral feet pain.  Has trouble walking with balance issues in the morning because of it.  Bilateral hip pain.  Has history of bursitis.  She think she hurt/injured her right shoulder.  She has been having bilateral shoulder pain as well as bilateral wrist pain.  She continues have muscle cramps.  She is on magnesium daily.  She reports fatigue.  She remains on Effexor for hot flashes which she stated has helped.  Denies any breast related complaints at this time. Needs port flused. Prefers to keep in intact.     Review of Systems   Constitutional:  Positive for fatigue. Negative for appetite change, diaphoresis, fever, unexpected weight gain and unexpected  weight loss.   HENT:  Negative for hearing loss, mouth sores, sore throat, swollen glands, trouble swallowing and voice change.    Eyes:  Negative for blurred vision.   Respiratory:  Negative for cough, shortness of breath and wheezing.    Cardiovascular:  Negative for chest pain and palpitations.   Gastrointestinal:  Negative for abdominal pain, blood in stool, constipation, diarrhea, nausea and vomiting.   Endocrine: Negative for cold intolerance and heat intolerance.   Genitourinary:  Negative for difficulty urinating, dysuria, frequency, hematuria and urinary incontinence.        Pain under the armpit right    Musculoskeletal:  Positive for arthralgias. Negative for back pain and myalgias.   Skin:  Negative for rash, skin lesions and wound.   Neurological:  Negative for dizziness, seizures, weakness, numbness and headache.   Hematological:  Does not bruise/bleed easily.   Psychiatric/Behavioral:  Negative for depressed mood. The patient is not nervous/anxious.    All other systems reviewed and are negative.      Past Medical History:   Diagnosis Date    Abnormal Pap smear of cervix     Allergies     Anemia During chemo 2022    Breast cancer     Cervical dysplasia     Herpes     High cholesterol     IFG (impaired fasting glucose)     Migraine     Osteoarthritis     S/P BILATERAL IMMEDIATE BREAST RECONSTRUCTION WITH LEFT PRE PEC PLACEMENT OF SILICONE GEL IMPLANT AND MESH, RIGHT PRE PEC PLACEMENT OF EXPANDER AND MESH 12/29/2021    TIA (transient ischemic attack)     no residual    Vitamin D deficiency      Past Surgical History:   Procedure Laterality Date    APPENDECTOMY      BREAST AUGMENTATION Bilateral 12/28/2021    Procedure: bilateral breast reconstructon with bilateral mesh placement.   left implant, right tissue expander placement;  Surgeon: Lacy Shelton MD;  [place] [facility] [place] [facility] [place]  Service: Plastics;  Laterality: Bilateral;    BREAST SURGERY  [id number]    Bilateral Mastectomy with implant Left  expander Right    BREAST TISSUE EXPANDER REMOVAL INSERTION OF IMPLANT Right 04/15/2022    Procedure: RIGHT BREAST IMPLANT REMOVAL WITH INCISION AND DRAINAGE;  Surgeon: Lacy Shelton MD;  [place] [facility] [place] [facility] [place]  Service: [place]astics;  Laterality: Right;    CEREBRAL ANGIOGRAM      clip placed    CYST REMOVAL Right     rt wrist    HYSTERECTOMY      Partial age 31    INCISION AND DRAINAGE ABSCESS Bilateral 10/4/2022    Procedure: BILATERAL BREAST ABSCESS INCISION AND DRAINAGE, WITH LEFT BREAST IMPLANT REMOVAL;  Surgeon: Lacy Shelton MD;  Location: [facility] MAIN [facility];  Service: Plastics;  Laterality: Bilateral;    INCISION AND DRAINAGE OF WOUND Right 03/11/2022    Procedure: INCISION AND DRAINAGE ABSCESS OF RIGHT BREAST WITH EXPANDER REMOVAL AND IMPLANT PLACEMENT;  Surgeon: Lacy Shelton MD;  Location: [facility] MAIN [facility];  Service: Plastics;  Laterality: Right;    MASTECTOMY COMPLETE / SIMPLE W/ SENTINEL NODE BIOPSY Bilateral     PORTACATH PLACEMENT Left 12/28/2021    Procedure: INSERTION OF PORTACATH;  Surgeon: Jacqueline Mcgraw MD;  [place] [facility] [place] [facility] [place]  Service: General;  Laterality: Left;    SENTINEL NODE BIOPSY Bilateral 12/28/2021    Procedure: BILATERAL BREAST MASTECTOMIES WITH RIGHT SENTINEL NODE BIOPSIES WITH FROZEN SECTIONS;  Surgeon: Jacqueline Mcgraw MD;  Location: [facility];  Service: General;  Laterality: Bilateral;     Social History     Socioeconomic History    Marital status:    Tobacco Use    Smoking status: Never    Smokeless tobacco: Never    Tobacco comments:     Father mother  smoker diring childhood and  adulthood   Vaping Use    Vaping status: Never Used   Substance and Sexual Activity    Alcohol use: Yes     Comment: Socially only . Occasionally    Drug use: Never    Sexual activity: Yes     Partners: Male     Birth control/protection: Post-menopausal     Comment: Hysterectomy age 33     Family History   Problem Relation Age of Onset    Hypertension  "Father     Diabetes Father     Cancer Father     Arthritis Father     Liver cancer Father     Lung cancer Father     Asthma Father     COPD Father     Heart disease Father     Kidney disease Father     Hypertension Mother     Diabetes Mother     Cancer Mother     Arthritis Mother         Osteo and RA    Breast cancer Mother     Breast cancer Maternal Aunt     Maljasbir Hyperthermia Neg Hx     Ovarian cancer Neg Hx     Uterine cancer Neg Hx     Colon cancer Neg Hx     Melanoma Neg Hx     Prostate cancer Neg Hx        Objective   Physical Exam  General: Alert, cooperative, no acute distress  Eyes: Anicteric sclera  Respiratory: normal respiratory effort  Cardiovascular: no lower extremity edema  Skin: Normal tone, no rash, + fever blister bottom right   Psychiatric: Appropriate affect, intact judgment  Neurologic: No focal sensory or motor deficits, normal cognition       Vitals:    07/24/25 0827   BP: 134/88   Pulse: 74   Resp: 18   Temp: 98.3 °F (36.8 °C)   TempSrc: Oral   SpO2: 96%   Weight: 81.8 kg (180 lb 5.4 oz)   Height: 165.1 cm (65\")   PainSc: 0-No pain                   PHQ-9 Total Score:         Result Review :   The following data was reviewed by: Judah Juarez MD on 07/24/25:  Lab Results   Component Value Date    HGB 10.6 (L) 04/01/2025    HCT 31.9 (L) 04/01/2025    MCV 96 04/01/2025     (L) 04/01/2025    WBC 10.29 04/01/2025    NEUTROABS 2.5 03/11/2025    LYMPHSABS 1.48 03/11/2025    MONOSABS 0.77 03/11/2025    EOSABS 0.2 03/11/2025    BASOSABS 0.04 03/11/2025     Lab Results   Component Value Date    GLUCOSE 143 (H) 03/31/2025    BUN 17 12/17/2024    CREATININE 0.93 12/17/2024     12/17/2024    K 3.7 03/31/2025     (H) 03/31/2025    CO2 25.0 12/17/2024    CALCIUM 9.3 12/17/2024    PROTEINTOT 7.1 12/17/2024    ALBUMIN 4.5 12/17/2024    BILITOT 0.4 12/17/2024    ALKPHOS 64 12/17/2024    AST 19 12/17/2024    ALT 13 12/17/2024     Lab Results   Component Value Date    IRON 65 " "03/13/2022    LABIRON 35 03/13/2022    TRANSFERRIN 125 (L) 03/13/2022    TIBC 186 (L) 03/13/2022     Lab Results   Component Value Date    QOXWTSLB43 >2,000 (H) 03/11/2022    FOLATE 16.80 03/11/2022     No results found for: \"PSA\", \"CEA\", \"AFP\", \"\", \"\"    Labs personally reviewed. CBC normal. CMP normal.        Assessment and Plan    Diagnoses and all orders for this visit:    1. Arthralgia, unspecified joint (Primary)  -     celecoxib (CeleBREX) 200 MG capsule; Take 1 capsule by mouth Daily.  Dispense: 30 capsule; Refill: 2    2. Malignant neoplasm of overlapping sites of right breast in female, estrogen receptor positive    3. Menopausal symptoms    4. Muscle cramps            ER+ Right breast Cancer: pT2N2a, s/p bilateral mastectomy.  Completed 4 cycles of TC. Radiation was delayed until December 2022. 2 years of abemaciclib completed 1/2025. AI started 7/2022. Remains on letrozole alone. 7/24/25: Holding letrozole  x 2 weeks due to joint pains and starting exemestane as below if pains improved. RTC 6 weeks. No indication for mammograms due to bilateral mastectomy.     Joint pain  In feet, hips, shoulders, wrists.  Could be from letrozole.  Patient's does not think it is from letrozole.  She is off letrozole for 2 weeks around the time of her flap surgery and March.  She was also on anti-inflammatory and muscle relaxer at that time so is unsure if pains were better because of that or not.  Recommend holding letrozole for 2 weeks and then starting exemestane.  If pains do not get better with 2-week, then can start back on letrozole.  Will send in Celebrex as this helped her before.  Patient wants to avoid muscle relaxer    Right supraclavicular nodule  US of this area and right axilla both negative.      Diarrhea  Resolved since abemaciclib held.     Menopausal symptoms: Secondary to letrozole. She is currently on venlafaxine 75mg daily.  Also on clonidine, which helps her sleep.     Muscle cramps  Not " likely due to abemaciclib. Ok to continue mag supplement. Mag has improved the frequency. Also suggested massage and/or acupuncture. Could be from letrozole.       Patient follow up: 6 weeks  Patient was given instructions and counseling regarding her condition or for health maintenance advice. Please see specific information pulled into the AVS if appropriate.

## (undated) DEVICE — SUT SILK 0 CT1 18IN 424H

## (undated) DEVICE — SUT PROLN 2/0 CT2 30IN 8411H

## (undated) DEVICE — SUT VIC 3/0 SH 27IN J416H

## (undated) DEVICE — BLAKE SILICONE DRAIN, 19 FR ROUND, HUBLESS WITH 1/4" TROCAR: Brand: BLAKE

## (undated) DEVICE — STRAP STIRUP WO/ RNG

## (undated) DEVICE — MARKR SKIN W/RULR AND LBL

## (undated) DEVICE — LARGE BORE STOPCOCK WITH ROTATING MALE LUER LOCK

## (undated) DEVICE — SLV SCD LEG COMFORT KENDALLSCD MD REPROC

## (undated) DEVICE — INTENDED FOR TISSUE SEPARATION, AND OTHER PROCEDURES THAT REQUIRE A SHARP SURGICAL BLADE TO PUNCTURE OR CUT.: Brand: BARD-PARKER ® CARBON RIB-BACK BLADES

## (undated) DEVICE — SUT MNCRYL 4/0 PS2 18 IN

## (undated) DEVICE — ELECTRD BLD EDGE/INSUL1P 2.4X5.1MM STRL

## (undated) DEVICE — GOWN,REINFORCE,POLY,SIRUS,BREATH SLV,XLG: Brand: MEDLINE

## (undated) DEVICE — GAUZE,SPONGE,4"X4",16PLY,STRL,LF,10/TRAY: Brand: MEDLINE

## (undated) DEVICE — SOL IRR NACL 0.9PCT BT 1000ML

## (undated) DEVICE — MINOR-LF: Brand: MEDLINE INDUSTRIES, INC.

## (undated) DEVICE — STERILE POLYISOPRENE POWDER-FREE SURGICAL GLOVES WITH EMOLLIENT COATING: Brand: PROTEXIS

## (undated) DEVICE — BLAKE SILICONE DRAIN, 15 FR ROUND, HUBLESS WITH 3/16" TROCAR: Brand: BLAKE

## (undated) DEVICE — ANTIBACTERIAL UNDYED BRAIDED (POLYGLACTIN 910), SYNTHETIC ABSORBABLE SUTURE: Brand: COATED VICRYL

## (undated) DEVICE — PAD GRND REM POLYHESIVE A/ DISP

## (undated) DEVICE — GLV SURG SENSICARE SLT PF LF 6.5 STRL

## (undated) DEVICE — STRIP CLS WND SUTURESTRIP/PLS 0.5X4IN TP1103

## (undated) DEVICE — SYS RETR EIKON ILLUM WAVEGUIDE WD/FLT

## (undated) DEVICE — PENCL E/S SMOKEEVAC W/TELESCP CANN

## (undated) DEVICE — PROXIMATE RH ROTATING HEAD SKIN STAPLERS (35 WIDE) CONTAINS 35 STAINLESS STEEL STAPLES: Brand: PROXIMATE

## (undated) DEVICE — DEV OPN LIGASURE DISSCT EXACT 40DEG 21.6MM BX/1

## (undated) DEVICE — CVR PROB ULTRASND GLS STRL

## (undated) DEVICE — DRAPE,UNDERBUTTOCKS,PCH,STERILE: Brand: MEDLINE

## (undated) DEVICE — SZR BRST RND H/PROJ 620CC

## (undated) DEVICE — GLV SURG SENSICARE SLT PF LF 5.5 STRL

## (undated) DEVICE — GLV SURG BIOGEL LTX PF 6 1/2

## (undated) DEVICE — LEGGINGS, PAIR, CLEAR, STERILE: Brand: MEDLINE

## (undated) DEVICE — SYR LUERLOK 50ML

## (undated) DEVICE — DEV DEL BRST/IMPL GEL KELLERFUNNEL

## (undated) DEVICE — GLV SURG SENSICARE PI PF LF 7 GRN STRL

## (undated) DEVICE — ADHS SKIN SURG TISS VISC PREMIERPRO EXOFIN HI/VISC FAST/DRY

## (undated) DEVICE — CVR HNDL LT SURG ACCSSRY BLU STRL

## (undated) DEVICE — DECANTER: Brand: UNBRANDED

## (undated) DEVICE — JACKSON-PRATT 100CC BULB RESERVOIR: Brand: CARDINAL HEALTH

## (undated) DEVICE — CONTAINER,SPECIMEN,O.R.STRL,4.5OZ: Brand: MEDLINE

## (undated) DEVICE — ELECTRD BLD EXT EDGE/INSUL 6IN

## (undated) DEVICE — SUT ETHLN 3-0 FS118IN 663H

## (undated) DEVICE — ADHS LIQ MASTISOL 2/3ML

## (undated) DEVICE — SUT VIC 2/0 SH 27IN

## (undated) DEVICE — KT DRP SPY/PHI W/ICG 25MG STRL

## (undated) DEVICE — VASCULAR ACCESS-LF: Brand: MEDLINE INDUSTRIES, INC.

## (undated) DEVICE — SUT VIC 4/0 P3 18IN J494G

## (undated) DEVICE — INTRO, VALVED, 8F X 15CM: Brand: AIRGUARD

## (undated) DEVICE — SYR LL TP 10ML STRL

## (undated) DEVICE — PLASTIC MAJOR-LF: Brand: MEDLINE INDUSTRIES, INC.

## (undated) DEVICE — NDL HYPO PRECISIONGLIDE REG 22G 1 1/2

## (undated) DEVICE — Device

## (undated) DEVICE — MAJOR-LF: Brand: MEDLINE INDUSTRIES, INC.

## (undated) DEVICE — SUT MNCRYL PLS ANTIB UD 3/0 PS2 27IN

## (undated) DEVICE — GOWN,REINFRCE,POLY,SIRUS,BREATH SLV,XXLG: Brand: MEDLINE

## (undated) DEVICE — BRIEF KNIT SEAMLSS PREM 70IN 3XL PK/2

## (undated) DEVICE — STERILE POLYISOPRENE POWDER-FREE SURGICAL GLOVES: Brand: PROTEXIS

## (undated) DEVICE — GLV SURG SENSICARE PI ORTHO SZ8 LF STRL

## (undated) DEVICE — SUT ETHLN 4/0 P3 18IN 699G